# Patient Record
Sex: FEMALE | Race: WHITE | NOT HISPANIC OR LATINO | Employment: OTHER | URBAN - METROPOLITAN AREA
[De-identification: names, ages, dates, MRNs, and addresses within clinical notes are randomized per-mention and may not be internally consistent; named-entity substitution may affect disease eponyms.]

---

## 2017-04-27 ENCOUNTER — HOSPITAL ENCOUNTER (OUTPATIENT)
Dept: GENERAL RADIOLOGY | Facility: HOSPITAL | Age: 82
Discharge: HOME | End: 2017-04-27
Attending: FAMILY MEDICINE | Admitting: FAMILY MEDICINE

## 2017-05-02 ENCOUNTER — HOSPITAL ENCOUNTER (OUTPATIENT)
Dept: OTHER | Facility: HOSPITAL | Age: 82
Discharge: HOME | End: 2017-05-02
Attending: FAMILY MEDICINE | Admitting: FAMILY MEDICINE

## 2017-09-22 ENCOUNTER — HOSPITAL ENCOUNTER (OUTPATIENT)
Dept: GENERAL RADIOLOGY | Facility: HOSPITAL | Age: 82
Discharge: HOME | End: 2017-09-22
Attending: FAMILY MEDICINE | Admitting: FAMILY MEDICINE

## 2018-01-22 ENCOUNTER — HOSPITAL ENCOUNTER (OUTPATIENT)
Dept: CT IMAGING | Facility: HOSPITAL | Age: 83
Discharge: HOME | End: 2018-01-22
Attending: NURSE PRACTITIONER | Admitting: NURSE PRACTITIONER

## 2018-02-02 ENCOUNTER — HOSPITAL ENCOUNTER (OUTPATIENT)
Dept: PHYSICAL THERAPY | Facility: HOSPITAL | Age: 83
Setting detail: RECURRING SERIES
Discharge: HOME | End: 2018-03-02
Attending: FAMILY MEDICINE | Admitting: FAMILY MEDICINE

## 2019-08-30 ENCOUNTER — TRANSCRIBE ORDERS (OUTPATIENT)
Dept: ADMINISTRATIVE | Facility: HOSPITAL | Age: 84
End: 2019-08-30

## 2019-08-30 ENCOUNTER — LAB (OUTPATIENT)
Dept: LAB | Facility: HOSPITAL | Age: 84
End: 2019-08-30

## 2019-08-30 DIAGNOSIS — E87.5 HYPERPOTASSEMIA: Primary | ICD-10-CM

## 2019-08-30 DIAGNOSIS — E87.5 HYPERPOTASSEMIA: ICD-10-CM

## 2019-08-30 LAB
ANION GAP SERPL CALCULATED.3IONS-SCNC: 18.8 MMOL/L (ref 5–15)
BUN BLD-MCNC: 23 MG/DL (ref 8–20)
BUN/CREAT SERPL: 19.2 (ref 5.4–26.2)
CALCIUM SPEC-SCNC: 9.1 MG/DL (ref 8.9–10.3)
CHLORIDE SERPL-SCNC: 104 MMOL/L (ref 101–111)
CO2 SERPL-SCNC: 20 MMOL/L (ref 22–32)
CREAT BLD-MCNC: 1.2 MG/DL (ref 0.4–1)
GFR SERPL CREATININE-BSD FRML MDRD: 43 ML/MIN/1.73
GLUCOSE BLD-MCNC: 180 MG/DL (ref 65–99)
POTASSIUM BLD-SCNC: 4.8 MMOL/L (ref 3.6–5.1)
SODIUM BLD-SCNC: 138 MMOL/L (ref 136–144)

## 2019-08-30 PROCEDURE — 36415 COLL VENOUS BLD VENIPUNCTURE: CPT

## 2019-08-30 PROCEDURE — 80048 BASIC METABOLIC PNL TOTAL CA: CPT

## 2019-09-12 ENCOUNTER — TRANSCRIBE ORDERS (OUTPATIENT)
Dept: ADMINISTRATIVE | Facility: HOSPITAL | Age: 84
End: 2019-09-12

## 2019-09-12 DIAGNOSIS — Z12.31 SCREENING MAMMOGRAM, ENCOUNTER FOR: Primary | ICD-10-CM

## 2019-09-17 ENCOUNTER — HOSPITAL ENCOUNTER (OUTPATIENT)
Dept: MAMMOGRAPHY | Facility: HOSPITAL | Age: 84
Discharge: HOME | End: 2019-09-17
Admitting: FAMILY MEDICINE

## 2019-09-17 DIAGNOSIS — Z12.31 SCREENING MAMMOGRAM, ENCOUNTER FOR: ICD-10-CM

## 2019-09-17 PROCEDURE — 77067 SCR MAMMO BI INCL CAD: CPT

## 2019-09-17 PROCEDURE — 77063 BREAST TOMOSYNTHESIS BI: CPT

## 2020-08-31 ENCOUNTER — TRANSCRIBE ORDERS (OUTPATIENT)
Dept: ADMINISTRATIVE | Facility: HOSPITAL | Age: 85
End: 2020-08-31

## 2020-08-31 DIAGNOSIS — Z12.39 SCREENING BREAST EXAMINATION: Primary | ICD-10-CM

## 2020-09-16 ENCOUNTER — HOSPITAL ENCOUNTER (OUTPATIENT)
Dept: MAMMOGRAPHY | Facility: HOSPITAL | Age: 85
Discharge: HOME | End: 2020-09-16
Admitting: FAMILY MEDICINE

## 2020-09-16 DIAGNOSIS — Z12.39 SCREENING BREAST EXAMINATION: ICD-10-CM

## 2020-09-16 PROCEDURE — 77067 SCR MAMMO BI INCL CAD: CPT

## 2020-09-16 PROCEDURE — 77063 BREAST TOMOSYNTHESIS BI: CPT

## 2020-10-21 ENCOUNTER — APPOINTMENT (OUTPATIENT)
Dept: GENERAL RADIOLOGY | Facility: HOSPITAL | Age: 85
End: 2020-10-21

## 2020-10-21 ENCOUNTER — APPOINTMENT (OUTPATIENT)
Dept: CT IMAGING | Facility: HOSPITAL | Age: 85
End: 2020-10-21

## 2020-10-21 ENCOUNTER — HOSPITAL ENCOUNTER (OUTPATIENT)
Facility: HOSPITAL | Age: 85
Discharge: HOME | End: 2020-10-24
Attending: FAMILY MEDICINE | Admitting: INTERNAL MEDICINE
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
## 2020-10-21 DIAGNOSIS — R55 SYNCOPE AND COLLAPSE: Primary | ICD-10-CM

## 2020-10-21 DIAGNOSIS — R94.39 ABNORMAL NUCLEAR STRESS TEST: ICD-10-CM

## 2020-10-21 DIAGNOSIS — Z20.822 2019-NCOV NOT DETECTED: ICD-10-CM

## 2020-10-21 DIAGNOSIS — I44.1 ATRIOVENTRICULAR BLOCK, MOBITZ TYPE 1, WENCKEBACH: ICD-10-CM

## 2020-10-21 DIAGNOSIS — R41.0 CONFUSION: ICD-10-CM

## 2020-10-21 LAB
ABO GROUP BLD: NORMAL
ALBUMIN SERPL-MCNC: 4 G/DL (ref 3.5–5.2)
ALBUMIN/GLOB SERPL: 1.3 G/DL
ALP SERPL-CCNC: 94 U/L (ref 39–117)
ALT SERPL W P-5'-P-CCNC: 21 U/L (ref 1–33)
AMMONIA BLD-SCNC: 47 UMOL/L (ref 11–51)
ANION GAP SERPL CALCULATED.3IONS-SCNC: 15 MMOL/L (ref 5–15)
ARTERIAL PATENCY WRIST A: POSITIVE
AST SERPL-CCNC: 27 U/L (ref 1–32)
ATMOSPHERIC PRESS: ABNORMAL MM[HG]
BASE EXCESS BLDA CALC-SCNC: -4.8 MMOL/L (ref 0–3)
BASOPHILS # BLD AUTO: 0.1 10*3/MM3 (ref 0–0.2)
BASOPHILS NFR BLD AUTO: 1.1 % (ref 0–1.5)
BDY SITE: ABNORMAL
BILIRUB SERPL-MCNC: 0.3 MG/DL (ref 0–1.2)
BILIRUB UR QL STRIP: NEGATIVE
BLD GP AB SCN SERPL QL: NEGATIVE
BUN SERPL-MCNC: 27 MG/DL (ref 8–23)
BUN/CREAT SERPL: 20 (ref 7–25)
CALCIUM SPEC-SCNC: 9.3 MG/DL (ref 8.6–10.5)
CHLORIDE SERPL-SCNC: 105 MMOL/L (ref 98–107)
CK SERPL-CCNC: 168 U/L (ref 20–180)
CLARITY UR: ABNORMAL
CO2 BLDA-SCNC: 21.2 MMOL/L (ref 22–29)
CO2 SERPL-SCNC: 19 MMOL/L (ref 22–29)
COLOR UR: YELLOW
CREAT SERPL-MCNC: 1.35 MG/DL (ref 0.57–1)
DEPRECATED RDW RBC AUTO: 59.1 FL (ref 37–54)
EOSINOPHIL # BLD AUTO: 0.3 10*3/MM3 (ref 0–0.4)
EOSINOPHIL NFR BLD AUTO: 5.1 % (ref 0.3–6.2)
ERYTHROCYTE [DISTWIDTH] IN BLOOD BY AUTOMATED COUNT: 17.6 % (ref 12.3–15.4)
GFR SERPL CREATININE-BSD FRML MDRD: 37 ML/MIN/1.73
GFR SERPL CREATININE-BSD FRML MDRD: 45 ML/MIN/1.73
GLOBULIN UR ELPH-MCNC: 3.2 GM/DL
GLUCOSE BLDC GLUCOMTR-MCNC: 123 MG/DL (ref 70–105)
GLUCOSE SERPL-MCNC: 137 MG/DL (ref 65–99)
GLUCOSE UR STRIP-MCNC: NEGATIVE MG/DL
HCO3 BLDA-SCNC: 20.1 MMOL/L (ref 21–28)
HCT VFR BLD AUTO: 35.3 % (ref 34–46.6)
HEMODILUTION: NO
HGB BLD-MCNC: 11.3 G/DL (ref 12–15.9)
HGB UR QL STRIP.AUTO: NEGATIVE
HOLD SPECIMEN: NORMAL
HOLD SPECIMEN: NORMAL
INHALED O2 CONCENTRATION: 21 %
INR PPP: 0.99 (ref 0.93–1.1)
KETONES UR QL STRIP: NEGATIVE
LEUKOCYTE ESTERASE UR QL STRIP.AUTO: NEGATIVE
LYMPHOCYTES # BLD AUTO: 2.1 10*3/MM3 (ref 0.7–3.1)
LYMPHOCYTES NFR BLD AUTO: 36.8 % (ref 19.6–45.3)
MAGNESIUM SERPL-MCNC: 2.3 MG/DL (ref 1.6–2.4)
MCH RBC QN AUTO: 30.9 PG (ref 26.6–33)
MCHC RBC AUTO-ENTMCNC: 32.1 G/DL (ref 31.5–35.7)
MCV RBC AUTO: 96.4 FL (ref 79–97)
MODALITY: ABNORMAL
MONOCYTES # BLD AUTO: 0.5 10*3/MM3 (ref 0.1–0.9)
MONOCYTES NFR BLD AUTO: 9.3 % (ref 5–12)
NEUTROPHILS NFR BLD AUTO: 2.7 10*3/MM3 (ref 1.7–7)
NEUTROPHILS NFR BLD AUTO: 47.7 % (ref 42.7–76)
NITRITE UR QL STRIP: NEGATIVE
NRBC BLD AUTO-RTO: 0.1 /100 WBC (ref 0–0.2)
PCO2 BLDA: 35.5 MM HG (ref 35–48)
PH BLDA: 7.36 PH UNITS (ref 7.35–7.45)
PH UR STRIP.AUTO: 6 [PH] (ref 5–8)
PLATELET # BLD AUTO: 215 10*3/MM3 (ref 140–450)
PMV BLD AUTO: 7.3 FL (ref 6–12)
PO2 BLDA: 75.7 MM HG (ref 83–108)
POTASSIUM SERPL-SCNC: 4.8 MMOL/L (ref 3.5–5.2)
PROT SERPL-MCNC: 7.2 G/DL (ref 6–8.5)
PROT UR QL STRIP: NEGATIVE
PROTHROMBIN TIME: 10.9 SECONDS (ref 9.6–11.7)
RBC # BLD AUTO: 3.66 10*6/MM3 (ref 3.77–5.28)
RH BLD: POSITIVE
SAO2 % BLDCOA: 94.6 % (ref 94–98)
SARS-COV-2 RNA PNL SPEC NAA+PROBE: NOT DETECTED
SODIUM SERPL-SCNC: 139 MMOL/L (ref 136–145)
SP GR UR STRIP: 1.02 (ref 1–1.03)
T&S EXPIRATION DATE: NORMAL
TROPONIN T SERPL-MCNC: <0.01 NG/ML (ref 0–0.03)
UROBILINOGEN UR QL STRIP: ABNORMAL
WBC # BLD AUTO: 5.8 10*3/MM3 (ref 3.4–10.8)
WHOLE BLOOD HOLD SPECIMEN: NORMAL
WHOLE BLOOD HOLD SPECIMEN: NORMAL

## 2020-10-21 PROCEDURE — 86900 BLOOD TYPING SEROLOGIC ABO: CPT | Performed by: NURSE PRACTITIONER

## 2020-10-21 PROCEDURE — 82550 ASSAY OF CK (CPK): CPT | Performed by: NURSE PRACTITIONER

## 2020-10-21 PROCEDURE — 36600 WITHDRAWAL OF ARTERIAL BLOOD: CPT

## 2020-10-21 PROCEDURE — 99285 EMERGENCY DEPT VISIT HI MDM: CPT

## 2020-10-21 PROCEDURE — 70498 CT ANGIOGRAPHY NECK: CPT

## 2020-10-21 PROCEDURE — 82962 GLUCOSE BLOOD TEST: CPT

## 2020-10-21 PROCEDURE — 83735 ASSAY OF MAGNESIUM: CPT | Performed by: NURSE PRACTITIONER

## 2020-10-21 PROCEDURE — 86901 BLOOD TYPING SEROLOGIC RH(D): CPT

## 2020-10-21 PROCEDURE — 81003 URINALYSIS AUTO W/O SCOPE: CPT | Performed by: NURSE PRACTITIONER

## 2020-10-21 PROCEDURE — 85610 PROTHROMBIN TIME: CPT | Performed by: NURSE PRACTITIONER

## 2020-10-21 PROCEDURE — 86850 RBC ANTIBODY SCREEN: CPT | Performed by: NURSE PRACTITIONER

## 2020-10-21 PROCEDURE — 83036 HEMOGLOBIN GLYCOSYLATED A1C: CPT | Performed by: FAMILY MEDICINE

## 2020-10-21 PROCEDURE — 71045 X-RAY EXAM CHEST 1 VIEW: CPT

## 2020-10-21 PROCEDURE — 70450 CT HEAD/BRAIN W/O DYE: CPT

## 2020-10-21 PROCEDURE — 80053 COMPREHEN METABOLIC PANEL: CPT | Performed by: NURSE PRACTITIONER

## 2020-10-21 PROCEDURE — 86901 BLOOD TYPING SEROLOGIC RH(D): CPT | Performed by: NURSE PRACTITIONER

## 2020-10-21 PROCEDURE — 86900 BLOOD TYPING SEROLOGIC ABO: CPT

## 2020-10-21 PROCEDURE — P9612 CATHETERIZE FOR URINE SPEC: HCPCS

## 2020-10-21 PROCEDURE — 70496 CT ANGIOGRAPHY HEAD: CPT

## 2020-10-21 PROCEDURE — 0 IOPAMIDOL PER 1 ML: Performed by: NURSE PRACTITIONER

## 2020-10-21 PROCEDURE — 84484 ASSAY OF TROPONIN QUANT: CPT | Performed by: NURSE PRACTITIONER

## 2020-10-21 PROCEDURE — 87635 SARS-COV-2 COVID-19 AMP PRB: CPT | Performed by: NURSE PRACTITIONER

## 2020-10-21 PROCEDURE — 93005 ELECTROCARDIOGRAM TRACING: CPT | Performed by: NURSE PRACTITIONER

## 2020-10-21 PROCEDURE — 85025 COMPLETE CBC W/AUTO DIFF WBC: CPT | Performed by: NURSE PRACTITIONER

## 2020-10-21 PROCEDURE — 82140 ASSAY OF AMMONIA: CPT | Performed by: NURSE PRACTITIONER

## 2020-10-21 PROCEDURE — 82803 BLOOD GASES ANY COMBINATION: CPT

## 2020-10-21 RX ORDER — ASPIRIN 81 MG/1
324 TABLET, CHEWABLE ORAL ONCE
Status: COMPLETED | OUTPATIENT
Start: 2020-10-21 | End: 2020-10-22

## 2020-10-21 RX ORDER — SODIUM CHLORIDE 0.9 % (FLUSH) 0.9 %
10 SYRINGE (ML) INJECTION AS NEEDED
Status: DISCONTINUED | OUTPATIENT
Start: 2020-10-21 | End: 2020-10-24 | Stop reason: HOSPADM

## 2020-10-21 RX ADMIN — IOPAMIDOL 100 ML: 755 INJECTION, SOLUTION INTRAVENOUS at 21:20

## 2020-10-22 ENCOUNTER — APPOINTMENT (OUTPATIENT)
Dept: CARDIOLOGY | Facility: HOSPITAL | Age: 85
End: 2020-10-22

## 2020-10-22 ENCOUNTER — APPOINTMENT (OUTPATIENT)
Dept: NUCLEAR MEDICINE | Facility: HOSPITAL | Age: 85
End: 2020-10-22

## 2020-10-22 PROBLEM — E11.40 TYPE 2 DIABETES MELLITUS WITH DIABETIC NEUROPATHY, WITH LONG-TERM CURRENT USE OF INSULIN: Chronic | Status: ACTIVE | Noted: 2020-10-22
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
## 2020-10-22 PROBLEM — I12.9 HYPERTENSION ASSOCIATED WITH STAGE 3 CHRONIC KIDNEY DISEASE DUE TO TYPE 2 DIABETES MELLITUS: Chronic | Status: ACTIVE | Noted: 2020-10-22

## 2020-10-22 PROBLEM — E11.22 HYPERTENSION ASSOCIATED WITH STAGE 3 CHRONIC KIDNEY DISEASE DUE TO TYPE 2 DIABETES MELLITUS: Chronic | Status: ACTIVE | Noted: 2020-10-22

## 2020-10-22 PROBLEM — Z79.4 TYPE 2 DIABETES MELLITUS WITH DIABETIC NEUROPATHY, WITH LONG-TERM CURRENT USE OF INSULIN: Chronic | Status: ACTIVE | Noted: 2020-10-22

## 2020-10-22 PROBLEM — I25.10 ATHEROSCLEROSIS OF NATIVE CORONARY ARTERY OF NATIVE HEART WITHOUT ANGINA PECTORIS: Chronic | Status: ACTIVE | Noted: 2020-10-22

## 2020-10-22 PROBLEM — R94.39 ABNORMAL NUCLEAR STRESS TEST: Status: ACTIVE | Noted: 2020-10-21

## 2020-10-22 PROBLEM — N18.32 STAGE 3B CHRONIC KIDNEY DISEASE (HCC): Chronic | Status: ACTIVE | Noted: 2020-10-22

## 2020-10-22 PROBLEM — E66.01 MORBID OBESITY (HCC): Chronic | Status: ACTIVE | Noted: 2020-10-22

## 2020-10-22 PROBLEM — R55 SYNCOPE AND COLLAPSE: Status: ACTIVE | Noted: 2020-10-22

## 2020-10-22 PROBLEM — Z79.4 TYPE 2 DIABETES MELLITUS WITH DIABETIC NEUROPATHY, WITH LONG-TERM CURRENT USE OF INSULIN (HCC): Chronic | Status: ACTIVE | Noted: 2020-10-22

## 2020-10-22 PROBLEM — E11.69 DM TYPE 2 WITH DIABETIC DYSLIPIDEMIA: Status: ACTIVE | Noted: 2020-10-22

## 2020-10-22 PROBLEM — E78.5 DM TYPE 2 WITH DIABETIC DYSLIPIDEMIA: Status: ACTIVE | Noted: 2020-10-22

## 2020-10-22 PROBLEM — N18.30 HYPERTENSION ASSOCIATED WITH STAGE 3 CHRONIC KIDNEY DISEASE DUE TO TYPE 2 DIABETES MELLITUS (HCC): Chronic | Status: ACTIVE | Noted: 2020-10-22

## 2020-10-22 PROBLEM — E26.1 SECONDARY HYPERALDOSTERONISM: Chronic | Status: ACTIVE | Noted: 2020-10-22

## 2020-10-22 PROBLEM — I44.1 ATRIOVENTRICULAR BLOCK, MOBITZ TYPE 1, WENCKEBACH: Status: ACTIVE | Noted: 2020-10-21

## 2020-10-22 LAB
BH CV ECHO MEAS - ACS: 1.6 CM
BH CV ECHO MEAS - AO MAX PG (FULL): -0.77 MMHG
BH CV ECHO MEAS - AO MAX PG: 7.4 MMHG
BH CV ECHO MEAS - AO MEAN PG (FULL): -0.97 MMHG
BH CV ECHO MEAS - AO MEAN PG: 3.6 MMHG
BH CV ECHO MEAS - AO ROOT AREA (BSA CORRECTED): 1.7
BH CV ECHO MEAS - AO ROOT AREA: 9.7 CM^2
BH CV ECHO MEAS - AO ROOT DIAM: 3.5 CM
BH CV ECHO MEAS - AO V2 MAX: 136 CM/SEC
BH CV ECHO MEAS - AO V2 MEAN: 90.3 CM/SEC
BH CV ECHO MEAS - AO V2 VTI: 27.1 CM
BH CV ECHO MEAS - AORTIC HR: 88.1 BPM
BH CV ECHO MEAS - AORTIC R-R: 0.68 SEC
BH CV ECHO MEAS - AVA(I,A): 3.8 CM^2
BH CV ECHO MEAS - AVA(I,D): 3.8 CM^2
BH CV ECHO MEAS - AVA(V,A): 3.8 CM^2
BH CV ECHO MEAS - AVA(V,D): 3.8 CM^2
BH CV ECHO MEAS - BSA(HAYCOCK): 2.2 M^2
BH CV ECHO MEAS - BSA: 2 M^2
BH CV ECHO MEAS - BZI_BMI: 44 KILOGRAMS/M^2
BH CV ECHO MEAS - BZI_METRIC_HEIGHT: 154.9 CM
BH CV ECHO MEAS - BZI_METRIC_WEIGHT: 105.7 KG
BH CV ECHO MEAS - CI(AO): 11.5 L/MIN/M^2
BH CV ECHO MEAS - CI(LVOT): 4.5 L/MIN/M^2
BH CV ECHO MEAS - CO(AO): 23.2 L/MIN
BH CV ECHO MEAS - CO(LVOT): 9 L/MIN
BH CV ECHO MEAS - EDV(CUBED): 172 ML
BH CV ECHO MEAS - EDV(MOD-SP4): 77.2 ML
BH CV ECHO MEAS - EDV(TEICH): 151.3 ML
BH CV ECHO MEAS - EF(CUBED): 58.3 %
BH CV ECHO MEAS - EF(MOD-BP): 50 %
BH CV ECHO MEAS - EF(MOD-SP4): 50.1 %
BH CV ECHO MEAS - EF(TEICH): 49.3 %
BH CV ECHO MEAS - ESV(CUBED): 71.8 ML
BH CV ECHO MEAS - ESV(MOD-SP4): 38.5 ML
BH CV ECHO MEAS - ESV(TEICH): 76.6 ML
BH CV ECHO MEAS - FS: 25.3 %
BH CV ECHO MEAS - IVS/LVPW: 0.82
BH CV ECHO MEAS - IVSD: 0.95 CM
BH CV ECHO MEAS - LA DIMENSION(2D): 4 CM
BH CV ECHO MEAS - LV DIASTOLIC VOL/BSA (35-75): 38.3 ML/M^2
BH CV ECHO MEAS - LV MASS(C)D: 231 GRAMS
BH CV ECHO MEAS - LV MASS(C)DI: 114.6 GRAMS/M^2
BH CV ECHO MEAS - LV MAX PG: 8.2 MMHG
BH CV ECHO MEAS - LV MEAN PG: 4.6 MMHG
BH CV ECHO MEAS - LV SYSTOLIC VOL/BSA (12-30): 19.1 ML/M^2
BH CV ECHO MEAS - LV V1 MAX: 142.9 CM/SEC
BH CV ECHO MEAS - LV V1 MEAN: 99.2 CM/SEC
BH CV ECHO MEAS - LV V1 VTI: 27.9 CM
BH CV ECHO MEAS - LVIDD: 5.6 CM
BH CV ECHO MEAS - LVIDS: 4.2 CM
BH CV ECHO MEAS - LVOT AREA: 3.6 CM^2
BH CV ECHO MEAS - LVOT DIAM: 2.2 CM
BH CV ECHO MEAS - LVPWD: 1.2 CM
BH CV ECHO MEAS - MV A MAX VEL: 95.6 CM/SEC
BH CV ECHO MEAS - MV DEC SLOPE: 259.6 CM/SEC^2
BH CV ECHO MEAS - MV DEC TIME: 0.22 SEC
BH CV ECHO MEAS - MV E MAX VEL: 56.3 CM/SEC
BH CV ECHO MEAS - MV E/A: 0.59
BH CV ECHO MEAS - MV MAX PG: 5 MMHG
BH CV ECHO MEAS - MV MEAN PG: 2.6 MMHG
BH CV ECHO MEAS - MV V2 MAX: 111.3 CM/SEC
BH CV ECHO MEAS - MV V2 MEAN: 77.1 CM/SEC
BH CV ECHO MEAS - MV V2 VTI: 20.5 CM
BH CV ECHO MEAS - MVA(VTI): 5 CM^2
BH CV ECHO MEAS - PA MAX PG (FULL): 2.1 MMHG
BH CV ECHO MEAS - PA MAX PG: 6.5 MMHG
BH CV ECHO MEAS - PA V2 MAX: 127.5 CM/SEC
BH CV ECHO MEAS - PULM A REVS DUR: 0.13 SEC
BH CV ECHO MEAS - PULM A REVS VEL: 36.8 CM/SEC
BH CV ECHO MEAS - PULM DIAS VEL: 47 CM/SEC
BH CV ECHO MEAS - PULM S/D: 1.4
BH CV ECHO MEAS - PULM SYS VEL: 63.6 CM/SEC
BH CV ECHO MEAS - PVA(V,A): 3.4 CM^2
BH CV ECHO MEAS - PVA(V,D): 3.4 CM^2
BH CV ECHO MEAS - QP/QS: 0.83
BH CV ECHO MEAS - RV MAX PG: 4.5 MMHG
BH CV ECHO MEAS - RV MEAN PG: 2.5 MMHG
BH CV ECHO MEAS - RV V1 MAX: 105.6 CM/SEC
BH CV ECHO MEAS - RV V1 MEAN: 75.3 CM/SEC
BH CV ECHO MEAS - RV V1 VTI: 20.3 CM
BH CV ECHO MEAS - RVDD: 2.6 CM
BH CV ECHO MEAS - RVOT AREA: 4.1 CM^2
BH CV ECHO MEAS - RVOT DIAM: 2.3 CM
BH CV ECHO MEAS - SI(AO): 130.8 ML/M^2
BH CV ECHO MEAS - SI(CUBED): 49.8 ML/M^2
BH CV ECHO MEAS - SI(LVOT): 50.6 ML/M^2
BH CV ECHO MEAS - SI(MOD-SP4): 19.2 ML/M^2
BH CV ECHO MEAS - SI(TEICH): 37 ML/M^2
BH CV ECHO MEAS - SV(AO): 263.6 ML
BH CV ECHO MEAS - SV(CUBED): 100.3 ML
BH CV ECHO MEAS - SV(LVOT): 102 ML
BH CV ECHO MEAS - SV(MOD-SP4): 38.7 ML
BH CV ECHO MEAS - SV(RVOT): 84.3 ML
BH CV ECHO MEAS - SV(TEICH): 74.6 ML
BH CV NUCLEAR PRIOR STUDY: 3
BH CV STRESS BP STAGE 1: NORMAL
BH CV STRESS BP STAGE 2: NORMAL
BH CV STRESS COMMENTS STAGE 1: NORMAL
BH CV STRESS COMMENTS STAGE 2: NORMAL
BH CV STRESS DOSE REGADENOSON STAGE 1: 0.4
BH CV STRESS DURATION MIN STAGE 1: 0
BH CV STRESS DURATION MIN STAGE 2: 4
BH CV STRESS DURATION SEC STAGE 1: 10
BH CV STRESS DURATION SEC STAGE 2: 0
BH CV STRESS HR STAGE 1: 99
BH CV STRESS HR STAGE 2: 96
BH CV STRESS PROTOCOL 1: NORMAL
BH CV STRESS RECOVERY BP: NORMAL MMHG
BH CV STRESS RECOVERY HR: 96 BPM
BH CV STRESS STAGE 1: 1
BH CV STRESS STAGE 2: 2
GLUCOSE BLDC GLUCOMTR-MCNC: 132 MG/DL (ref 70–105)
GLUCOSE BLDC GLUCOMTR-MCNC: 157 MG/DL (ref 70–105)
GLUCOSE BLDC GLUCOMTR-MCNC: 162 MG/DL (ref 70–105)
HBA1C MFR BLD: 6.2 % (ref 3.5–5.6)
LV EF 2D ECHO EST: 60 %
MAGNESIUM SERPL-MCNC: 1.9 MG/DL (ref 1.6–2.4)
MAXIMAL PREDICTED HEART RATE: 137 BPM
MAXIMAL PREDICTED HEART RATE: 137 BPM
PERCENT MAX PREDICTED HR: 72.26 %
STRESS BASELINE BP: NORMAL MMHG
STRESS BASELINE HR: 92 BPM
STRESS PERCENT HR: 85 %
STRESS POST PEAK BP: NORMAL MMHG
STRESS POST PEAK HR: 99 BPM
STRESS TARGET HR: 116 BPM
STRESS TARGET HR: 116 BPM
TROPONIN T SERPL-MCNC: <0.01 NG/ML (ref 0–0.03)

## 2020-10-22 PROCEDURE — A9270 NON-COVERED ITEM OR SERVICE: HCPCS | Performed by: INTERNAL MEDICINE

## 2020-10-22 PROCEDURE — G0378 HOSPITAL OBSERVATION PER HR: HCPCS

## 2020-10-22 PROCEDURE — 84484 ASSAY OF TROPONIN QUANT: CPT | Performed by: FAMILY MEDICINE

## 2020-10-22 PROCEDURE — 93306 TTE W/DOPPLER COMPLETE: CPT

## 2020-10-22 PROCEDURE — 82962 GLUCOSE BLOOD TEST: CPT

## 2020-10-22 PROCEDURE — 25010000002 REGADENOSON 0.4 MG/5ML SOLUTION: Performed by: FAMILY MEDICINE

## 2020-10-22 PROCEDURE — 0 TECHNETIUM SESTAMIBI: Performed by: FAMILY MEDICINE

## 2020-10-22 PROCEDURE — 96374 THER/PROPH/DIAG INJ IV PUSH: CPT

## 2020-10-22 PROCEDURE — 63710000001 ATORVASTATIN 10 MG TABLET: Performed by: INTERNAL MEDICINE

## 2020-10-22 PROCEDURE — A9500 TC99M SESTAMIBI: HCPCS | Performed by: FAMILY MEDICINE

## 2020-10-22 PROCEDURE — 63710000001 AMLODIPINE 5 MG TABLET: Performed by: INTERNAL MEDICINE

## 2020-10-22 PROCEDURE — 93017 CV STRESS TEST TRACING ONLY: CPT

## 2020-10-22 PROCEDURE — 96376 TX/PRO/DX INJ SAME DRUG ADON: CPT

## 2020-10-22 PROCEDURE — 83735 ASSAY OF MAGNESIUM: CPT | Performed by: INTERNAL MEDICINE

## 2020-10-22 PROCEDURE — 78452 HT MUSCLE IMAGE SPECT MULT: CPT

## 2020-10-22 PROCEDURE — 96361 HYDRATE IV INFUSION ADD-ON: CPT

## 2020-10-22 PROCEDURE — 93018 CV STRESS TEST I&R ONLY: CPT | Performed by: NURSE PRACTITIONER

## 2020-10-22 PROCEDURE — 63710000001 INSULIN LISPRO (HUMAN) PER 5 UNITS: Performed by: INTERNAL MEDICINE

## 2020-10-22 PROCEDURE — 63710000001 SPIRONOLACTONE 25 MG TABLET: Performed by: INTERNAL MEDICINE

## 2020-10-22 PROCEDURE — 78452 HT MUSCLE IMAGE SPECT MULT: CPT | Performed by: INTERNAL MEDICINE

## 2020-10-22 PROCEDURE — 63710000001 METOPROLOL TARTRATE 25 MG TABLET: Performed by: INTERNAL MEDICINE

## 2020-10-22 PROCEDURE — 63710000001 PANTOPRAZOLE 40 MG TABLET DELAYED-RELEASE: Performed by: INTERNAL MEDICINE

## 2020-10-22 PROCEDURE — A9270 NON-COVERED ITEM OR SERVICE: HCPCS | Performed by: NURSE PRACTITIONER

## 2020-10-22 PROCEDURE — 93306 TTE W/DOPPLER COMPLETE: CPT | Performed by: INTERNAL MEDICINE

## 2020-10-22 PROCEDURE — 97165 OT EVAL LOW COMPLEX 30 MIN: CPT

## 2020-10-22 PROCEDURE — 63710000001 INSULIN LISPRO (HUMAN) PER 5 UNITS: Performed by: FAMILY MEDICINE

## 2020-10-22 PROCEDURE — 63710000001 ACETYLCYSTEINE 600 MG CAPSULE: Performed by: INTERNAL MEDICINE

## 2020-10-22 PROCEDURE — 63710000001 LOSARTAN 50 MG TABLET: Performed by: INTERNAL MEDICINE

## 2020-10-22 PROCEDURE — 63710000001 ASPIRIN 81 MG CHEWABLE TABLET: Performed by: NURSE PRACTITIONER

## 2020-10-22 PROCEDURE — 99205 OFFICE O/P NEW HI 60 MIN: CPT | Performed by: INTERNAL MEDICINE

## 2020-10-22 RX ORDER — GABAPENTIN 300 MG/1
300 CAPSULE ORAL 2 TIMES DAILY
COMMUNITY
End: 2022-07-03 | Stop reason: HOSPADM

## 2020-10-22 RX ORDER — INSULIN LISPRO 100 [IU]/ML
0-7 INJECTION, SOLUTION INTRAVENOUS; SUBCUTANEOUS AS NEEDED
Status: DISCONTINUED | OUTPATIENT
Start: 2020-10-22 | End: 2020-10-24 | Stop reason: HOSPADM

## 2020-10-22 RX ORDER — ATORVASTATIN CALCIUM 10 MG/1
10 TABLET, FILM COATED ORAL DAILY
COMMUNITY
End: 2021-11-22 | Stop reason: SDUPTHER

## 2020-10-22 RX ORDER — AMLODIPINE BESYLATE 5 MG/1
5 TABLET ORAL ONCE
Status: COMPLETED | OUTPATIENT
Start: 2020-10-23 | End: 2020-10-23

## 2020-10-22 RX ORDER — SODIUM CHLORIDE 9 MG/ML
75 INJECTION, SOLUTION INTRAVENOUS CONTINUOUS
Status: DISPENSED | OUTPATIENT
Start: 2020-10-22 | End: 2020-10-23

## 2020-10-22 RX ORDER — FUROSEMIDE 20 MG/1
20 TABLET ORAL DAILY
Status: DISCONTINUED | OUTPATIENT
Start: 2020-10-22 | End: 2020-10-22

## 2020-10-22 RX ORDER — SPIRONOLACTONE 25 MG/1
25 TABLET ORAL DAILY
COMMUNITY
End: 2020-12-07

## 2020-10-22 RX ORDER — METOPROLOL TARTRATE 50 MG/1
50 TABLET, FILM COATED ORAL 3 TIMES DAILY
Status: DISCONTINUED | OUTPATIENT
Start: 2020-10-22 | End: 2020-10-22

## 2020-10-22 RX ORDER — SODIUM CHLORIDE 0.9 % (FLUSH) 0.9 %
10 SYRINGE (ML) INJECTION AS NEEDED
Status: CANCELLED | OUTPATIENT
Start: 2020-10-22

## 2020-10-22 RX ORDER — LOSARTAN POTASSIUM 50 MG/1
100 TABLET ORAL DAILY
Status: DISCONTINUED | OUTPATIENT
Start: 2020-10-22 | End: 2020-10-24 | Stop reason: HOSPADM

## 2020-10-22 RX ORDER — LABETALOL HYDROCHLORIDE 5 MG/ML
10 INJECTION, SOLUTION INTRAVENOUS EVERY 6 HOURS PRN
Status: DISCONTINUED | OUTPATIENT
Start: 2020-10-22 | End: 2020-10-24 | Stop reason: HOSPADM

## 2020-10-22 RX ORDER — SPIRONOLACTONE 25 MG/1
25 TABLET ORAL DAILY
Status: DISCONTINUED | OUTPATIENT
Start: 2020-10-22 | End: 2020-10-24 | Stop reason: HOSPADM

## 2020-10-22 RX ORDER — OMEPRAZOLE 40 MG/1
40 CAPSULE, DELAYED RELEASE ORAL EVERY MORNING
COMMUNITY
End: 2022-08-17 | Stop reason: SDUPTHER

## 2020-10-22 RX ORDER — NICOTINE POLACRILEX 4 MG
15 LOZENGE BUCCAL
Status: DISCONTINUED | OUTPATIENT
Start: 2020-10-22 | End: 2020-10-24 | Stop reason: HOSPADM

## 2020-10-22 RX ORDER — AMLODIPINE BESYLATE 5 MG/1
5 TABLET ORAL ONCE
Status: COMPLETED | OUTPATIENT
Start: 2020-10-22 | End: 2020-10-22

## 2020-10-22 RX ORDER — VALSARTAN 160 MG/1
160 TABLET ORAL DAILY
COMMUNITY
End: 2020-12-07

## 2020-10-22 RX ORDER — METOPROLOL TARTRATE 50 MG/1
50 TABLET, FILM COATED ORAL 3 TIMES DAILY
COMMUNITY
End: 2020-10-24 | Stop reason: HOSPADM

## 2020-10-22 RX ORDER — LABETALOL HYDROCHLORIDE 5 MG/ML
10 INJECTION, SOLUTION INTRAVENOUS ONCE
Status: DISCONTINUED | OUTPATIENT
Start: 2020-10-22 | End: 2020-10-22

## 2020-10-22 RX ORDER — SODIUM CHLORIDE 0.9 % (FLUSH) 0.9 %
3 SYRINGE (ML) INJECTION EVERY 12 HOURS SCHEDULED
Status: CANCELLED | OUTPATIENT
Start: 2020-10-22

## 2020-10-22 RX ORDER — TOPIRAMATE 25 MG/1
12.5 TABLET ORAL 2 TIMES DAILY
COMMUNITY
End: 2022-03-23 | Stop reason: SDUPTHER

## 2020-10-22 RX ORDER — INSULIN LISPRO 100 [IU]/ML
0-7 INJECTION, SOLUTION INTRAVENOUS; SUBCUTANEOUS AS NEEDED
Status: DISCONTINUED | OUTPATIENT
Start: 2020-10-22 | End: 2020-10-22

## 2020-10-22 RX ORDER — INSULIN LISPRO 100 [IU]/ML
0-7 INJECTION, SOLUTION INTRAVENOUS; SUBCUTANEOUS
Status: DISCONTINUED | OUTPATIENT
Start: 2020-10-23 | End: 2020-10-22

## 2020-10-22 RX ORDER — FUROSEMIDE 20 MG/1
20 TABLET ORAL DAILY
COMMUNITY
End: 2021-06-02 | Stop reason: SDUPTHER

## 2020-10-22 RX ORDER — DEXTROSE MONOHYDRATE 25 G/50ML
25 INJECTION, SOLUTION INTRAVENOUS
Status: DISCONTINUED | OUTPATIENT
Start: 2020-10-22 | End: 2020-10-24 | Stop reason: HOSPADM

## 2020-10-22 RX ORDER — INSULIN LISPRO 100 [IU]/ML
0-7 INJECTION, SOLUTION INTRAVENOUS; SUBCUTANEOUS
Status: DISCONTINUED | OUTPATIENT
Start: 2020-10-22 | End: 2020-10-24 | Stop reason: HOSPADM

## 2020-10-22 RX ORDER — TRAZODONE HYDROCHLORIDE 50 MG/1
50 TABLET ORAL NIGHTLY
COMMUNITY
End: 2020-12-07

## 2020-10-22 RX ORDER — METOPROLOL TARTRATE 50 MG/1
50 TABLET, FILM COATED ORAL EVERY 12 HOURS SCHEDULED
Status: DISCONTINUED | OUTPATIENT
Start: 2020-10-22 | End: 2020-10-22

## 2020-10-22 RX ORDER — ATORVASTATIN CALCIUM 10 MG/1
10 TABLET, FILM COATED ORAL NIGHTLY
Status: DISCONTINUED | OUTPATIENT
Start: 2020-10-22 | End: 2020-10-24 | Stop reason: HOSPADM

## 2020-10-22 RX ORDER — PANTOPRAZOLE SODIUM 40 MG/1
40 TABLET, DELAYED RELEASE ORAL EVERY MORNING
Status: DISCONTINUED | OUTPATIENT
Start: 2020-10-22 | End: 2020-10-24 | Stop reason: HOSPADM

## 2020-10-22 RX ADMIN — SODIUM CHLORIDE 75 ML/HR: 9 INJECTION, SOLUTION INTRAVENOUS at 16:06

## 2020-10-22 RX ADMIN — Medication 10 ML: at 21:03

## 2020-10-22 RX ADMIN — Medication 600 MG: at 21:23

## 2020-10-22 RX ADMIN — INSULIN LISPRO 2 UNITS: 100 INJECTION, SOLUTION INTRAVENOUS; SUBCUTANEOUS at 21:23

## 2020-10-22 RX ADMIN — REGADENOSON 0.4 MG: 0.08 INJECTION, SOLUTION INTRAVENOUS at 11:30

## 2020-10-22 RX ADMIN — ASPIRIN 81 MG 324 MG: 81 TABLET ORAL at 02:55

## 2020-10-22 RX ADMIN — TECHNETIUM TC 99M SESTAMIBI 1 DOSE: 1 INJECTION INTRAVENOUS at 09:48

## 2020-10-22 RX ADMIN — LOSARTAN POTASSIUM 100 MG: 50 TABLET, FILM COATED ORAL at 08:23

## 2020-10-22 RX ADMIN — METOPROLOL TARTRATE 25 MG: 25 TABLET, FILM COATED ORAL at 21:03

## 2020-10-22 RX ADMIN — PANTOPRAZOLE SODIUM 40 MG: 40 TABLET, DELAYED RELEASE ORAL at 08:23

## 2020-10-22 RX ADMIN — ATORVASTATIN CALCIUM 10 MG: 10 TABLET, FILM COATED ORAL at 21:03

## 2020-10-22 RX ADMIN — LABETALOL 20 MG/4 ML (5 MG/ML) INTRAVENOUS SYRINGE 10 MG: at 23:12

## 2020-10-22 RX ADMIN — SPIRONOLACTONE 25 MG: 25 TABLET ORAL at 08:23

## 2020-10-22 RX ADMIN — AMLODIPINE BESYLATE 5 MG: 5 TABLET ORAL at 16:06

## 2020-10-22 RX ADMIN — LABETALOL 20 MG/4 ML (5 MG/ML) INTRAVENOUS SYRINGE 10 MG: at 13:32

## 2020-10-22 RX ADMIN — TECHNETIUM TC 99M SESTAMIBI 1 DOSE: 1 INJECTION INTRAVENOUS at 11:30

## 2020-10-22 RX ADMIN — Medication 600 MG: at 15:17

## 2020-10-22 NOTE — CONSULTS
Referring Provider: Gregory Pinedo MD  Reason for Consultation:  Syncope  Second-degree Mobitz type I AV block  Hypertension  Diabetes    Patient Care Team:  Gregory Pinedo MD as PCP - General (Family Medicine)    Chief complaint  Syncope    Subjective .     History of present illness:  Aracelis Vargas is a 85 y.o. female who presents with history of being in usual state of health with history of hypertension diabetes etc was admitted with history of possible syncope.  Patient apparently was in the living room and feeling fine except she was somewhat tired that morning and the next thing she knew she woke up from the floor.  She quickly lost her consciousness.  EMS reported  calling EMS because he was outside and even he came in she was lying down on the floor.  No other history is available.  Patient does not recall any other premonitory symptoms or post syncopal symptoms.  No seizure activity.  No loss of bowel control or bladder control.  No other history is available at this time.  Patient was seen in the ER and was noted to have second-degree Mobitz type I AV block with a narrow QRS complex.  Patient is feeling somewhat weak.  No other associated aggravating or alleviating factors.  Cardiology consultation was requested..         ROS      Since I have last seen, the patient has been without any chest discomfort ,shortness of breath, palpitations, dizziness or syncope.  Denies having any headache ,abdominal pain ,nausea, vomiting , diarrhea constipation, loss of weight or loss of appetite.  Denies having any excessive bruising ,hematuria or blood in the stool.    Review of all systems negative except as indicated      History  Past Medical History:   Diagnosis Date   • Atherosclerosis of native coronary artery of native heart without angina pectoris 10/22/2020   • Hypertension associated with stage 3 chronic kidney disease due to type 2 diabetes mellitus (CMS/[facility]) 10/22/2020   • Morbid obesity  (CMS/[facility]) 10/22/2020   • Secondary hyperaldosteronism (CMS/HCC) 10/22/2020   • Stage 3b chronic kidney disease 10/22/2020   • Type 2 diabetes mellitus with diabetic neuropathy, with long-term current use of insulin (CMS/HCC) 10/22/2020   • Type 2 diabetes mellitus with diabetic neuropathy, with long-term current use of insulin (CMS/HCC) 10/22/2020       Past Surgical History:   Procedure Laterality Date   • JOINT REPLACEMENT Right     Hip   • JOINT REPLACEMENT Left     hip   • JOINT REPLACEMENT Left     hip (for second time)   • JOINT REPLACEMENT Right     knee       Family History   Problem Relation Age of Onset   • Heart disease Mother        Social History     Tobacco Use   • Smoking status: Never Smoker   • Smokeless tobacco: Never Used   Substance Use Topics   • Alcohol use: Not Currently   • Drug use: Never        Medications Prior to Admission   Medication Sig Dispense Refill Last Dose   • atorvastatin (LIPITOR) 10 MG tablet Take 10 mg by mouth Daily.      • diclofenac (VOLTAREN) 1 % gel gel Apply 4 g topically to the appropriate area as directed 4 (Four) Times a Day As Needed.      • furosemide (LASIX) 20 MG tablet Take 20 mg by mouth Daily.      • gabapentin (NEURONTIN) 300 MG capsule Take 300 mg by mouth 2 (two) times a day.      • insulin NPH-insulin regular (humuLIN 70/30,novoLIN 70/30) (70-30) 100 UNIT/ML injection Inject  under the skin into the appropriate area as directed 2 (Two) Times a Day With Meals. Amount of units unknown      • metFORMIN (GLUCOPHAGE) 1000 MG tablet Take 1,000 mg by mouth 2 (Two) Times a Day With Meals.      • metoprolol tartrate (LOPRESSOR) 50 MG tablet Take 50 mg by mouth 3 (Three) Times a Day.      • omeprazole (priLOSEC) 40 MG capsule Take 40 mg by mouth Daily.      • spironolactone (ALDACTONE) 25 MG tablet Take 25 mg by mouth Daily.      • topiramate (TOPAMAX) 25 MG tablet Take 25 mg by mouth Daily.      • traZODone (DESYREL) 50 MG tablet Take 50 mg by mouth Every Night.     "  • valsartan (DIOVAN) 160 MG tablet Take 160 mg by mouth Daily.            Patient has no known allergies.    Scheduled Meds:atorvastatin, 10 mg, Oral, Nightly  losartan, 100 mg, Oral, Daily  metoprolol tartrate, 50 mg, Oral, Q12H  pantoprazole, 40 mg, Oral, QAM  spironolactone, 25 mg, Oral, Daily      Continuous Infusions:   PRN Meds:.labetalol  •  sodium chloride    Objective     VITAL SIGNS  Vitals:    10/22/20 0202 10/22/20 0343 10/22/20 0603 10/22/20 0805   BP: 169/48 164/62 (!) 186/80    BP Location:  Right arm Right arm    Patient Position:  Lying Lying    Pulse: 59 78 93 98   Resp: 17 16 16    Temp:  98.2 °F (36.8 °C) 98 °F (36.7 °C)    TempSrc:  Oral Oral    SpO2: 98% 99% 96% 96%   Weight:  105 kg (231 lb) 105 kg (230 lb 13.2 oz)    Height:  154.9 cm (61\")         Flowsheet Rows      First Filed Value   Admission Height  170.2 cm (67\") Documented at 10/21/2020 2036   Admission Weight  110 kg (242 lb 4.6 oz) Documented at 10/21/2020 2036          No intake or output data in the 24 hours ending 10/22/20 0905     TELEMETRY: Sinus rhythm    Physical Exam:  The patient is alert, oriented and in no distress.  Vital signs as noted above.  Exogenous obesity.  Head and neck revealed no carotid bruits or jugular venous distention.  No thyromegaly or lymph adenopathy is present  Lungs clear.  No wheezing.  Breath sounds are normal bilaterally.  Heart normal first and second heart sounds.No murmur.  No precordial rub is present.  No gallop is present.  Abdomen soft and nontender.  No organomegaly is present.  Extremities with good peripheral pulses without any pedal edema.  Skin warm and dry.  Musculoskeletal system is grossly normal  CNS grossly normal      Results Review:   I reviewed the patient's new clinical results.  Lab Results (last 24 hours)     Procedure Component Value Units Date/Time    POC Glucose Once [[id number]]  (Abnormal) Collected: 10/22/20 0832    Specimen: Blood Updated: 10/22/20 0833     Glucose " 132 mg/dL      Comment: Serial Number: [id number]:  [id number]       COVID-19,[facility] IN-HOUSE,Nasal Swab No Transport Media 3-4 HR TAT - Swab, Nasal Cavity [[id number]]  (Normal) Collected: 10/21/20 2202    Specimen: Swab from Nasal Cavity Updated: 10/21/20 2314     COVID19 Not Detected    Narrative:      Fact sheet for providers: https://www.fda.gov/media/359795/download     Fact sheet for patients: https://www.fda.gov/media/295654/download    Troponin [[id number]]  (Normal) Collected: 10/21/20 2055    Specimen: Blood Updated: 10/21/20 2202     Troponin T <0.010 ng/mL     Narrative:      Troponin T Reference Range:  <= 0.03 ng/mL-   Negative for AMI  >0.03 ng/mL-     Abnormal for myocardial necrosis.  Clinicians would have to utilize clinical acumen, EKG, Troponin and serial changes to determine if it is an Acute Myocardial Infarction or myocardial injury due to an underlying chronic condition.       Results may be falsely decreased if patient taking Biotin.      [facility] Draw [[id number]] Collected: 10/21/20 2055    Specimen: Blood Updated: 10/21/20 2201    Narrative:      The following orders were created for panel order [facility] Draw.  Procedure                               Abnormality         Status                     ---------                               -----------         ------                     Light Blue Top[[id number]]                                   Final result               Green Top (Gel)[[id number]]                                  Final result               Lavender Top[[id number]]                                     Final result               Gold Top - SST[[id number]]                                   Final result                 Please view results for these tests on the individual orders.    Light Blue Top [[id number]] Collected: 10/21/20 2055    Specimen: Blood Updated: 10/21/20 2201     Extra Tube hold for add-on     Comment: Auto resulted       Green Top (Gel) [[id number]] Collected:  10/21/20 2055    Specimen: Blood Updated: 10/21/20 2201     Extra Tube Hold for add-ons.     Comment: Auto resulted.       Gold Top - SST [[id number]] Collected: 10/21/20 2055    Specimen: Blood Updated: 10/21/20 2201     Extra Tube Hold for add-ons.     Comment: Auto resulted.       Urinalysis With Culture If Indicated - Urine, Catheter [[id number]]  (Abnormal) Collected: 10/21/20 2145    Specimen: Urine, Catheter Updated: 10/21/20 2201     Color, UA Yellow     Appearance, UA Cloudy     Comment: Result checked         pH, UA 6.0     Specific Gravity, UA 1.018     Glucose, UA Negative     Ketones, UA Negative     Bilirubin, UA Negative     Blood, UA Negative     Protein, UA Negative     Leuk Esterase, UA Negative     Nitrite, UA Negative     Urobilinogen, UA 0.2 E.U./dL    Narrative:      Urine microscopic not indicated.    Ammonia [[id number]]  (Normal) Collected: 10/21/20 2128    Specimen: Blood Updated: 10/21/20 2200     Ammonia 47 umol/L     CK [[id number]]  (Normal) Collected: 10/21/20 2055    Specimen: Blood Updated: 10/21/20 2134     Creatine Kinase 168 U/L     Magnesium [[id number]]  (Normal) Collected: 10/21/20 2055    Specimen: Blood Updated: 10/21/20 2134     Magnesium 2.3 mg/dL     Blood Gas, Arterial - [[id number]]  (Abnormal) Collected: 10/21/20 2126    Specimen: Arterial Blood Updated: 10/21/20 2129     Site Right Radial     Andrew's Test Positive     pH, Arterial 7.360 pH units      pCO2, Arterial 35.5 mm Hg      pO2, Arterial 75.7 mm Hg      HCO3, Arterial 20.1 mmol/L      Base Excess, Arterial -4.8 mmol/L      Comment: Serial Number: [id number]:  [id number]        O2 Saturation, Arterial 94.6 %      CO2 Content 21.2 mmol/L      Barometric Pressure for Blood Gas --     Comment: N/A        Modality Room Air     FIO2 21 %      Hemodilution No    POC Glucose Once [[id number]]  (Abnormal) Collected: 10/21/20 2122    Specimen: Blood Updated: 10/21/20 2124     Glucose 123 mg/dL      Comment: Serial Number:  [id number]:  [id number]       Comprehensive Metabolic Panel [[id number]]  (Abnormal) Collected: 10/21/20 2055    Specimen: Blood Updated: 10/21/20 2122     Glucose 137 mg/dL      BUN 27 mg/dL      Creatinine 1.35 mg/dL      Sodium 139 mmol/L      Potassium 4.8 mmol/L      Chloride 105 mmol/L      CO2 19.0 mmol/L      Calcium 9.3 mg/dL      Total Protein 7.2 g/dL      Albumin 4.00 g/dL      ALT (SGPT) 21 U/L      AST (SGOT) 27 U/L      Alkaline Phosphatase 94 U/L      Total Bilirubin 0.3 mg/dL      eGFR Non African Amer 37 mL/min/1.73      eGFR  African Amer 45 mL/min/1.73      Globulin 3.2 gm/dL      A/G Ratio 1.3 g/dL      BUN/Creatinine Ratio 20.0     Anion Gap 15.0 mmol/L     Narrative:      GFR Normal >60  Chronic Kidney Disease <60  Kidney Failure <15      Protime-INR [[id number]]  (Normal) Collected: 10/21/20 2055    Specimen: Blood Updated: 10/21/20 2117     Protime 10.9 Seconds      INR 0.99    Lavender Top [[id number]] Collected: 10/21/20 2055    Specimen: Blood Updated: 10/21/20 2117     Extra Tube --    CBC & Differential [[id number]]  (Abnormal) Collected: 10/21/20 2055    Specimen: Blood Updated: 10/21/20 2109    Narrative:      The following orders were created for panel order CBC & Differential.  Procedure                               Abnormality         Status                     ---------                               -----------         ------                     CBC Auto Differential[[id number]]        Abnormal            Final result                 Please view results for these tests on the individual orders.    CBC Auto Differential [[id number]]  (Abnormal) Collected: 10/21/20 2055    Specimen: Blood Updated: 10/21/20 2109     WBC 5.80 10*3/mm3      RBC 3.66 10*6/mm3      Hemoglobin 11.3 g/dL      Hematocrit 35.3 %      MCV 96.4 fL      MCH 30.9 pg      MCHC 32.1 g/dL      RDW 17.6 %      RDW-SD 59.1 fl      MPV 7.3 fL      Platelets 215 10*3/mm3      Neutrophil % 47.7 %      Lymphocyte % 36.8  %      Monocyte % 9.3 %      Eosinophil % 5.1 %      Basophil % 1.1 %      Neutrophils, Absolute 2.70 10*3/mm3      Lymphocytes, Absolute 2.10 10*3/mm3      Monocytes, Absolute 0.50 10*3/mm3      Eosinophils, Absolute 0.30 10*3/mm3      Basophils, Absolute 0.10 10*3/mm3      nRBC 0.1 /100 WBC           Imaging Results (Last 24 Hours)     Procedure Component Value Units Date/Time    CT Angiogram Head [[id number]] Collected: 10/21/20 2119     Updated: 10/21/20 2326    Narrative:      CTA HEAD WITH CONTRAST. CTA NECK WITH CONTRAST.    DATE: 10/21/2020 9:11 PM     INDICATION:  Fall one hour ago. Stroke follow-up. Altered mental status.     COMPARISON: CT head from earlier today     TECHNIQUE:   Thin section axial images through the head and neck after IV contrast. Two dimensional and three dimensional reformations. NASCET criteria used to determine stenosis.100 mL Isovue 370 given IV. Low-dose CT acquisition technique included one   of the following options: 1. Automated exposure control, 2. Adjustment of mA and/or KV according to patient's size and/or 3. Use of iterative reconstruction.    FINDINGS:           CTA NECK:    Aortic arch: Normal triple vessel anatomy. Calcifications distally.    Right common carotid artery: Normal origin. Calcifications proximally and distally. No significant stenosis, occlusion.    Right internal carotid artery: No significant stenosis, occlusion.    Right vertebral artery: Normal origin. No significant stenosis, occlusion.    Left common carotid artery: Normal origin. Calcifications distally. No significant stenosis, occlusion.    Left internal carotid artery: Calcifications distally. No significant stenosis, occlusion.    Left vertebral artery: Normal origin. No significant stenosis, occlusion.    Parotid and submandibular glands are intact. Posterior nasopharynx, oropharynx, hypopharynx, larynx, thyroid gland, proximal trachea, imaged superior mediastinum and lung apices are patent.  Lymph nodes in the neck do not meet the CT size criteria for   lymphadenopathy. Degenerative changes of the spine. Atelectasis in the lung apices.    CTA HEAD:    No large aneurysm, significant stenosis or occlusion of the proximal major cerebral arteries.     Major dural venous sinuses are patent.      Impression:      1. No significant stenosis or occlusion of the major arteries of the head and neck.  2. MRI is more sensitive for the detection of acute nonhemorrhagic infarct.       Report called to Niurka Melchor  at 10/21/2020 11:25 PM    Electronically signed by:  Ayad Mcclain M.D.    10/21/2020 9:25 PM    CT Angiogram Neck [[id number]] Collected: 10/21/20 2119     Updated: 10/21/20 2326    Narrative:      CTA HEAD WITH CONTRAST. CTA NECK WITH CONTRAST.    DATE: 10/21/2020 9:11 PM     INDICATION:  Fall one hour ago. Stroke follow-up. Altered mental status.     COMPARISON: CT head from earlier today     TECHNIQUE:   Thin section axial images through the head and neck after IV contrast. Two dimensional and three dimensional reformations. NASCET criteria used to determine stenosis.100 mL Isovue 370 given IV. Low-dose CT acquisition technique included one   of the following options: 1. Automated exposure control, 2. Adjustment of mA and/or KV according to patient's size and/or 3. Use of iterative reconstruction.    FINDINGS:           CTA NECK:    Aortic arch: Normal triple vessel anatomy. Calcifications distally.    Right common carotid artery: Normal origin. Calcifications proximally and distally. No significant stenosis, occlusion.    Right internal carotid artery: No significant stenosis, occlusion.    Right vertebral artery: Normal origin. No significant stenosis, occlusion.    Left common carotid artery: Normal origin. Calcifications distally. No significant stenosis, occlusion.    Left internal carotid artery: Calcifications distally. No significant stenosis, occlusion.    Left vertebral artery:  Normal origin. No significant stenosis, occlusion.    Parotid and submandibular glands are intact. Posterior nasopharynx, oropharynx, hypopharynx, larynx, thyroid gland, proximal trachea, imaged superior mediastinum and lung apices are patent. Lymph nodes in the neck do not meet the CT size criteria for   lymphadenopathy. Degenerative changes of the spine. Atelectasis in the lung apices.    CTA HEAD:    No large aneurysm, significant stenosis or occlusion of the proximal major cerebral arteries.     Major dural venous sinuses are patent.      Impression:      1. No significant stenosis or occlusion of the major arteries of the head and neck.  2. MRI is more sensitive for the detection of acute nonhemorrhagic infarct.       Report called to Niurka Melchor  at 10/21/2020 11:25 PM    Electronically signed by:  Ayad Mcclain M.D.    10/21/2020 9:25 PM    XR Chest 1 View [[id number]] Collected: 10/21/20 2144     Updated: 10/21/20 2147    Narrative:      XR CHEST 1 VW-     Date of Exam: 10/21/2020 9:10 PM     Indication: Stroke Protocol (Onset > 12 hrs)  recent fall     Comparison: None available.     Technique: 1 view(s) of the chest were obtained.     FINDINGS: Cardiac silhouette is enlarged. Mediastinal silhouette is  widened but this is likely all related to portable technique. There are  no prior studies for comparison. Thoracic aorta appears tortuous.  Pulmonary vascularity is unremarkable. No pneumothorax or pleural  effusion. Calcite granulomas present in the right lung base. No acute  infiltrates. Visualized bony structures are intact.       Impression:      Enlargement of the cardiac silhouette and mildly widened mediastinum,  likely reflection of portable technique. There are no prior studies for  comparison. When feasible, an upright PA and lateral view of the chest  is recommended. No active cardiopulmonary disease.        Electronically Signed By-Aaron Mattson DO. On:10/21/2020 9:45 PM  This report  was finalized on [id number] by  Aaron Mattson DO..    CT Head Without Contrast Stroke Protocol [[id number]] Collected: 10/21/20 2110     Updated: 10/21/20 2115    Narrative:         DATE OF EXAM:  10/21/2020 9:08 PM     PROCEDURE:   CT HEAD WO CONTRAST STROKE PROTOCOL-     INDICATIONS:  Stroke, follow up     COMPARISON:   No Comparisons Available     TECHNIQUE:  Routine transaxial and coronal reconstruction images were obtained  through the head without the administration of contrast. Automated  exposure control and iterative reconstruction methods were used.     FINDINGS:  There is no CT evidence of acute hemorrhage, infarct, mass, mass effect,  or midline shift. No hydrocephalus. No extra-axial fluid collections.  The globes and orbital contents are normal and symmetric. The mastoid  air cells and visualized paranasal sinuses are clear. No skull fractures  or calvarial lesions. There is a small right supraorbital scalp  hematoma/laceration. There is mild age-related volume loss.             Impression:      IMPRESSION :  Small right supraorbital scalp hematoma/laceration. No acute  intracranial abnormality.     Electronically Signed By-Aaron Mattson DO. On:10/21/2020 9:13 PM  This report was finalized on [id number] by  Aaron Mattson DO..      LAB RESULTS (LAST 7 DAYS)    CBC  Results from last 7 days   Lab Units 10/21/20  2055   WBC 10*3/mm3 5.80   RBC 10*6/mm3 3.66*   HEMOGLOBIN g/dL 11.3*   HEMATOCRIT % 35.3   MCV fL 96.4   PLATELETS 10*3/mm3 215       BMP  Results from last 7 days   Lab Units 10/21/20  2055   SODIUM mmol/L 139   POTASSIUM mmol/L 4.8   CHLORIDE mmol/L 105   CO2 mmol/L 19.0*   BUN mg/dL 27*   CREATININE mg/dL 1.35*   GLUCOSE mg/dL 137*   MAGNESIUM mg/dL 2.3       CMP   Results from last 7 days   Lab Units 10/21/20  2128 10/21/20  2055   SODIUM mmol/L  --  139   POTASSIUM mmol/L  --  4.8   CHLORIDE mmol/L  --  105   CO2 mmol/L  --  19.0*   BUN mg/dL  --  27*   CREATININE mg/dL  --  1.35*    GLUCOSE mg/dL  --  137*   ALBUMIN g/dL  --  4.00   BILIRUBIN mg/dL  --  0.3   ALK PHOS U/L  --  94   AST (SGOT) U/L  --  27   ALT (SGPT) U/L  --  21   AMMONIA umol/L 47  --          BNP        TROPONIN  Results from last 7 days   Lab Units 10/21/20  2055   CK TOTAL U/L 168   TROPONIN T ng/mL <0.010       CoAg  Results from last 7 days   Lab Units 10/21/20  2055   INR  0.99       Creatinine Clearance  Estimated Creatinine Clearance: 35.2 mL/min (A) (by C-G formula based on SCr of 1.35 mg/dL (H)).    ABG  Results from last 7 days   Lab Units 10/21/20  2126   PH, ARTERIAL pH units 7.360   PCO2, ARTERIAL mm Hg 35.5   PO2 ART mm Hg 75.7*   O2 SATURATION ART % 94.6   BASE EXCESS ART mmol/L -4.8*       Radiology  Ct Angiogram Head    Result Date: 10/21/2020  1. No significant stenosis or occlusion of the major arteries of the head and neck. 2. MRI is more sensitive for the detection of acute nonhemorrhagic infarct.  Report called to Niurka Melchor  at 10/21/2020 11:25 PM Electronically signed by:  Ayad Mcclain M.D.  10/21/2020 9:25 PM    Ct Angiogram Neck    Result Date: 10/21/2020  1. No significant stenosis or occlusion of the major arteries of the head and neck. 2. MRI is more sensitive for the detection of acute nonhemorrhagic infarct.  Report called to Niurka Melchor  at 10/21/2020 11:25 PM Electronically signed by:  Ayad Mcclain M.D.  10/21/2020 9:25 PM    Xr Chest 1 View    Result Date: 10/21/2020  Enlargement of the cardiac silhouette and mildly widened mediastinum, likely reflection of portable technique. There are no prior studies for comparison. When feasible, an upright PA and lateral view of the chest is recommended. No active cardiopulmonary disease.   Electronically Signed By-Aaron Mattson DO. On:10/21/2020 9:45 PM This report was finalized on [id number] by  Aaron Mattson DO..    Ct Head Without Contrast Stroke Protocol    Result Date: 10/21/2020  IMPRESSION : Small right  supraorbital scalp hematoma/laceration. No acute intracranial abnormality.  Electronically Signed By-Aaron Mattson DO. On:10/21/2020 9:13 PM This report was finalized on [id number] by  Aaorn Mattson DO..              EKG          I personally viewed and interpreted the patient's EKG/Telemetry data: Sinus rhythm with Mobitz type I AV block.  Normal intraventricular conduction.    ECHOCARDIOGRAM:                 Cardiolite (Tc-99m Sestamibi) stress test      OTHER:     Assessment/Plan     Principal Problem:    Syncope and collapse  Active Problems:    Fall    DM type 2 with diabetic dyslipidemia (CMS/HCC)    Secondary hyperaldosteronism (CMS/HCC)    Stage 3b chronic kidney disease    Hypertension associated with stage 3 chronic kidney disease due to type 2 diabetes mellitus (CMS/HCC)    Type 2 diabetes mellitus with diabetic neuropathy, with long-term current use of insulin (CMS/[facility])    Morbid obesity (CMS/[facility])    Atherosclerosis of native coronary artery of native heart without angina pectoris    ]]]]]]]]]]]]]]]]]]]]  Impression  ========  -Possible syncope.  Appears to be true syncope.  Concerned about advanced AV blocks    -Second-degree Mobitz type I AV block.  Narrow QRS complex.    -History of paroxysmal atrial fibrillation    -Hypertension dyslipidemia diabetes    -Renal dysfunction CKD 3  BUN 27 creatinine 1.35    -Exogenous obesity.    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    -No known allergies  ============  Plan  =========  Patient had probably a true syncopal episode likely due to advanced AV blocks.  In the ER EKG showed Mobitz type I second-degree AV block although patient is having one-to-one conduction at this time.  Patient has been on metoprolol 50 mg 3 times daily.  Reduce the dose to 25 mg twice daily or may discontinue and add antihypertensive medications that would not have effect on causing bradycardia.  Ischemic cardiac  work-up.  Echocardiogram  Stress Cardiolite test.  If no specific etiology is found consideration will be given for placement of loop recorder.  Highly suspicious for advanced AV block's causing her syncopal episode.    Renal dysfunction probably due to syncopal episode although she has problems with diabetes and hypertension etc.    Have discussed with attending nurse for coordination of care.    Close cardiac monitoring.    Further plan will depend on patient's progress.  ]]]]]]]]]]]]]]    Abnormal Lexiscan Cardiolite test with posterior and inferior ischemia.  Patient to have a cardiac catheterization tomorrow.  Renal precautions  Intravenous fluids and Mucomyst.  Depending on a.m. renal function consideration may be given for renal consultation.    Wang Plaza MD  10/22/20  09:05 EDT

## 2020-10-22 NOTE — THERAPY EVALUATION
Patient Name: Aracelis Vargas  : 1935    MRN: [id number]                              Today's Date: 10/22/2020       Admit Date: 10/21/2020    Visit Dx:     ICD-10-CM ICD-9-CM   1. Fall, initial encounter  W19.XXXA E888.9   2. Confusion  R41.0 298.9   3. Atrioventricular block, Mobitz type 1, Wenckebach  I44.1 426.13   2019-nCoV not detected  Z03.818 V71.83     Patient Active Problem List   Diagnosis   • Fall   • DM type 2 with diabetic dyslipidemia (CMS/HCC)   • Secondary hyperaldosteronism (CMS/HCC)   • Stage 3b chronic kidney disease   • Hypertension associated with stage 3 chronic kidney disease due to type 2 diabetes mellitus (CMS/HCC)   • Syncope and collapse   • Type 2 diabetes mellitus with diabetic neuropathy, with long-term current use of insulin (CMS/[facility])   • Morbid obesity (CMS/[facility])   • Atherosclerosis of native coronary artery of native heart without angina pectoris     Past Medical History:   Diagnosis Date   • Atherosclerosis of native coronary artery of native heart without angina pectoris 10/22/2020   • Hypertension associated with stage 3 chronic kidney disease due to type 2 diabetes mellitus (CMS/HCC) 10/22/2020   • Morbid obesity (CMS/HCC) 10/22/2020   • Secondary hyperaldosteronism (CMS/HCC) 10/22/2020   • Stage 3b chronic kidney disease 10/22/2020   • Type 2 diabetes mellitus with diabetic neuropathy, with long-term current use of insulin (CMS/[facility]) 10/22/2020   • Type 2 diabetes mellitus with diabetic neuropathy, with long-term current use of insulin (CMS/[facility]) 10/22/2020     Past Surgical History:   Procedure Laterality Date   • JOINT REPLACEMENT Right     Hip   • JOINT REPLACEMENT Left     hip   • JOINT REPLACEMENT Left     hip (for second time)   • JOINT REPLACEMENT Right     knee     General Information     Row Name 10/22/20 1018          OT Time and Intention    Document Type  evaluation  -NA     Mode of Treatment  individual therapy  -NA     Row Name 10/22/20 1018          General  Information    Patient Profile Reviewed  yes  -NA     Prior Level of Function  independent:;transfer;ADL's;feeding;grooming;dressing;bathing;home management;cooking;cleaning;driving;shopping;using stairs  -NA     Existing Precautions/Restrictions  fall  -NA     Barriers to Rehab  none identified  -NA     Row Name 10/22/20 1018          Living Environment    Lives With  spouse  -NA     Row Name 10/22/20 1018          Home Main Entrance    Number of Stairs, Main Entrance  one  -NA     Stair Railings, Main Entrance  none  -NA     Row Name 10/22/20 1018          Stairs Within Home, Primary    Stairs, Within Home, Primary  12 steps to basement, however pt does not use stairs as she has a chair lift  -NA     Row Name 10/22/20 1018          Cognition    Orientation Status (Cognition)  oriented x 4  -NA       User Key  (r) = Recorded By, (t) = Taken By, (c) = Cosigned By    Initials Name Provider Type    NA Vanna Vasquez OT Occupational Therapist        Mobility/ADL's     Row Name 10/22/20 1020          Transfers    Transfers  sit-stand transfer;toilet transfer  -NA     Sit-Stand [place] (Transfers)  supervision  -NA     [place] Level (Toilet Transfer)  supervision  -NA     Assistive Device (Toilet Transfer)  commode  -NA     Row Name 10/22/20 1020          Toilet Transfer    Type (Toilet Transfer)  sit-stand;stand-sit  -NA     Row Name 10/22/20 1020          Functional Mobility    Functional Mobility- Ind. Level  supervision required  -NA     Functional Mobility- Safety Issues  step length decreased;weight-shifting ability decreased  -NA     Row Name 10/22/20 1020          Activities of Daily Living    BADL Assessment/Intervention  upper body dressing;lower body dressing;grooming;toileting  -NA     Row Name 10/22/20 1020          Upper Body Dressing Assessment/Training    [place] Level (Upper Body Dressing)  upper body dressing skills;don;front opening garment;minimum assist (75% patient effort)  -NA      Position (Upper Body Dressing)  edge of bed sitting  -NA     Row Name 10/22/20 1020          Lower Body Dressing Assessment/Training    [place] Level (Lower Body Dressing)  lower body dressing skills;don;socks;moderate assist (50% patient effort)  -NA     Position (Lower Body Dressing)  edge of bed sitting  -NA     Row Name 10/22/20 1020          Grooming Assessment/Training    [place] Level (Grooming)  grooming skills;hair care, combing/brushing;wash face, hands;supervision  -NA     Position (Grooming)  supported standing  -NA     Row Name 10/22/20 1020          Toileting Assessment/Training    [place] Level (Toileting)  toileting skills;adjust/manage clothing;perform perineal hygiene;supervision  -NA     Position (Toileting)  supported standing  -NA       User Key  (r) = Recorded By, (t) = Taken By, (c) = Cosigned By    Initials Name Provider Type    NA Vanna Vasquez OT Occupational Therapist        Obj/Interventions     Row Name 10/22/20 1021          Sensory Assessment (Somatosensory)    Sensory Assessment (Somatosensory)  sensation intact  -NA     [facility] Name 10/22/20 1021          Vision Assessment/Intervention    Visual Impairment/Limitations  WFL  -NA     Row Name 10/22/20 1021          Range of Motion Comprehensive    General Range of Motion  no range of motion deficits identified  -NA     Row Name 10/22/20 1021          Strength Comprehensive (MMT)    General Manual Muscle Testing (MMT) Assessment  upper extremity strength deficits identified  -NA     Comment, General Manual Muscle Testing (MMT) Assessment  BUE 4/5  -NA     Row Name 10/22/20 1021          Balance    Balance Assessment  sitting static balance;sitting dynamic balance;standing static balance;standing dynamic balance  -NA     Static Sitting Balance  WFL  -NA     Dynamic Sitting Balance  WFL  -NA     Static Standing Balance  WFL  -NA     Dynamic Standing Balance  mild impairment  -NA       User Key  (r) = Recorded By, (t) =  Taken By, (c) = Cosigned By    Initials Name Provider Type    Vanna Hobbs OT Occupational Therapist        Goals/Plan     Row Name 10/22/20 1027          Transfer Goal 1 (OT)    Activity/Assistive Device (Transfer Goal 1, OT)  toilet  -NA     [place] Level/Cues Needed (Transfer Goal 1, OT)  modified independence  -NA     Time Frame (Transfer Goal 1, OT)  2 weeks  -NA     Row Name 10/22/20 1027          Toileting Goal 1 (OT)    Activity/Device (Toileting Goal 1, OT)  toileting skills, all;commode  -NA     [place] Level/Cues Needed (Toileting Goal 1, OT)  modified independence  -NA     Time Frame (Toileting Goal 1, OT)  2 weeks  -NA     Row Name 10/22/20 1027          Grooming Goal 1 (OT)    Activity/Device (Grooming Goal 1, OT)  grooming skills, all  -NA     [place] (Grooming Goal 1, OT)  modified independence  -NA     Time Frame (Grooming Goal 1, OT)  2 weeks  -NA       User Key  (r) = Recorded By, (t) = Taken By, (c) = Cosigned By    Initials Name Provider Type    Vanna Hobbs OT Occupational Therapist        Clinical Impression     Row Name 10/22/20 1021          Pain Assessment    Additional Documentation  Pain Scale: Numbers Pre/Post-Treatment (Group)  -NA     Row Name 10/22/20 1021          Pain Scale: Numbers Pre/Post-Treatment    Pretreatment Pain Rating  0/10 - no pain  -NA     Posttreatment Pain Rating  0/10 - no pain  -NA     Row Name 10/22/20 1021          Plan of Care Review    Plan of Care Reviewed With  patient  -NA     Outcome Summary  Pt is an 84 y/o F s/p fall at home and AMS. pt admitted for possible CVA vs TIA. CT head (-) for acute findings. Prior to admission, pt lives with  in a H with a basement, has chair lift to basement, uses a cane for func mob, independent with ADLs and IADLs. this date, pt req SUP for safety with func mob to bathroom and demo some unsteadiness on feet during ADLs in the bathroom. pt will benefit from skilled OT during hospital  stay to increase BUE strength, func transfers, and education for safety upon return home. REC home with  at [place]. Defer AE for func mob to PT cane vs RW. PPE worn: eye goggles, mask, gloves.  -NA     Row Name 10/22/20 1021          Therapy Assessment/Plan (OT)    Patient/Family Therapy Goal Statement (OT)  return home  -NA     Rehab Potential (OT)  good, to achieve stated therapy goals  -NA     Criteria for Skilled Therapeutic Interventions Met (OT)  yes;meets criteria;skilled treatment is necessary  -NA     Therapy Frequency (OT)  3 times/wk  -NA     Row Name 10/22/20 1026 10/22/20 1021       Therapy Plan Review/Discharge Plan (OT)    Anticipated Discharge Disposition (OT)  home with assist;home with home health  -NA  home with assist  -NA    Row Name 10/22/20 1021          Vital Signs    Intratreatment Heart Rate (beats/min)  91  -NA     Posttreatment Heart Rate (beats/min)  87  -NA     Intratreatment Resp Rate (breaths/min)  11  -NA     Posttreatment Resp Rate (breaths/min)  14  -NA     Pre SpO2 (%)  97  -NA     O2 Delivery Pre Treatment  room air  -NA     O2 Delivery Intra Treatment  room air  -NA     Post SpO2 (%)  94  -NA     O2 Delivery Post Treatment  room air  -NA     Pre Patient Position  Supine  -NA     Intra Patient Position  Standing  -NA     Post Patient Position  Sitting  -NA     Row Name 10/22/20 1021          Positioning and Restraints    Pre-Treatment Position  in bed  -NA     Post Treatment Position  chair  -NA     In Chair  notified nsg;sitting;encouraged to call for assist;call light within reach;exit alarm on  -NA       User Key  (r) = Recorded By, (t) = Taken By, (c) = Cosigned By    Initials Name Provider Type    NA Vanna Vasquez, DONAVAN Occupational Therapist        Outcome Measures     Row Name 10/22/20 1027          Modified [place] Scale    Pre-Stroke Modified [place] Scale  6 - Unable to determine (UTD) from the medical record documentation  -NA     Modified [place] Scale  1 - No  significant disability despite symptoms.  Able to carry out all usual duties and activities.  -TORRI     Row Name 10/22/20 1027          Functional Assessment    Outcome Measure Options  Modified [place]  -TORRI       User Key  (r) = Recorded By, (t) = Taken By, (c) = Cosigned By    Initials Name Provider Type    Vanna Hobbs OT Occupational Therapist        Occupational Therapy Education                 Title: PT OT SLP Therapies (Done)     Topic: Occupational Therapy (Done)     Point: ADL training (Done)     Description:   Instruct learner(s) on proper safety adaptation and remediation techniques during self care or transfers.   Instruct in proper use of assistive devices.              Learning Progress Summary           Patient Acceptance, E,TB, VU,DU by TORRI at 10/22/2020 1028    Comment: role and benefits of OT, safety with ADLs, signs and symptoms of CVA, commode transfers, BUE AROM in all planes, using call light                   Point: Home exercise program (Done)     Description:   Instruct learner(s) on appropriate technique for monitoring, assisting and/or progressing therapeutic exercises/activities.              Learning Progress Summary           Patient Acceptance, E,TB, VU,DU by TORRI at 10/22/2020 1028    Comment: role and benefits of OT, safety with ADLs, signs and symptoms of CVA, commode transfers, BUE AROM in all planes, using call light                   Point: Precautions (Done)     Description:   Instruct learner(s) on prescribed precautions during self-care and functional transfers.              Learning Progress Summary           Patient Acceptance, E,TB, VU,DU by TORRI at 10/22/2020 1028    Comment: role and benefits of OT, safety with ADLs, signs and symptoms of CVA, commode transfers, BUE AROM in all planes, using call light                   Point: Body mechanics (Done)     Description:   Instruct learner(s) on proper positioning and spine alignment during self-care, functional mobility  activities and/or exercises.              Learning Progress Summary           Patient Acceptance, E,TB, VU,DU by TORRI at 10/22/2020 1028    Comment: role and benefits of OT, safety with ADLs, signs and symptoms of CVA, commode transfers, BUE AROM in all planes, using call light                               User Key     Initials Effective Dates Name Provider Type Discipline     09/30/20 -  Vanna Vasquez OT Occupational Therapist OT              OT Recommendation and Plan  Therapy Frequency (OT): 3 times/wk  Plan of Care Review  Plan of Care Reviewed With: patient  Outcome Summary: Pt is an 84 y/o F s/p fall at home and AMS. pt admitted for possible CVA vs TIA. CT head (-) for acute findings. Prior to admission, pt lives with  in a SSH with a basement, has chair lift to basement, uses a cane for func mob, independent with ADLs and IADLs. this date, pt req SUP for safety with func mob to bathroom and demo some unsteadiness on feet during ADLs in the bathroom. pt will benefit from skilled OT during hospital stay to increase BUE strength, func transfers, and education for safety upon return home. REC home with  at [place]. Defer AE for func mob to PT cane vs RW. PPE worn: eye goggles, mask, gloves.     Time Calculation:   Time Calculation- OT     Row Name 10/22/20 0748             Time Calculation- OT    OT Start Time  0748  -NA      OT Stop Time  0810  -NA      OT Time Calculation (min)  22 min  -NA      Total Timed Code Minutes- OT  0 minute(s)  -NA      OT Non-Billable Time (min)  22 min  -NA      OT Received On  10/23/20  -NA      OT - Next Appointment  10/26/20  -NA      OT Goal Re-Cert Due Date  11/05/20  -NA        User Key  (r) = Recorded By, (t) = Taken By, (c) = Cosigned By    Initials Name Provider Type    NA Vanna Vasquez OT Occupational Therapist        Therapy Charges for Today     Code Description Service Date Service Provider Modifiers Qty    [id number] HC OT EVAL LOW COMPLEXITY 4  10/22/2020 Vanna Vasquez, OT GO 1               Vanna Vasquez, OT  10/22/2020

## 2020-10-22 NOTE — PLAN OF CARE
Goal Outcome Evaluation:  Plan of Care Reviewed With: patient       NIH = 0.  Patient is a falls risk due to recent fall at home causing aq bruise to her right eye.     Patient reports no pain.     Skin intact.    Plan for today: Stress test, echocardiogram, and orthostatic hypotension check when the patient returns to the floor from testing.      Problem: Adult Inpatient Plan of Care  Goal: Plan of Care Review  Outcome: Ongoing, Progressing  Flowsheets (Taken 10/22/2020 1026)  Plan of Care Reviewed With: patient  Goal: Patient-Specific Goal (Individualized)  Outcome: Ongoing, Progressing  Goal: Absence of Hospital-Acquired Illness or Injury  Outcome: Ongoing, Progressing  Intervention: Identify and Manage Fall Risk  Recent Flowsheet Documentation  Taken 10/22/2020 1025 by Mila Chavez RN  Safety Promotion/Fall Prevention: patient off unit  Taken 10/22/2020 1000 by Mila Chavez RN  Safety Promotion/Fall Prevention:   assistive device/personal items within reach   clutter free environment maintained  Taken 10/22/2020 0800 by Mila Chavez RN  Safety Promotion/Fall Prevention:   assistive device/personal items within reach   clutter free environment maintained   nonskid shoes/slippers when out of bed   safety round/check completed   fall prevention program maintained  Intervention: Prevent and Manage VTE (venous thromboembolism) Risk  Recent Flowsheet Documentation  Taken 10/22/2020 0833 by Mila Chavez RN  VTE Prevention/Management:   bilateral   sequential compression devices on  Intervention: Prevent Infection  Recent Flowsheet Documentation  Taken 10/22/2020 1000 by Mila Chavez RN  Infection Prevention: personal protective equipment utilized  Taken 10/22/2020 0800 by Mila Chavez RN  Infection Prevention: personal protective equipment utilized  Goal: Optimal Comfort and Wellbeing  Outcome: Ongoing, Progressing  Goal: Readiness for Transition of Care  Outcome: Ongoing, Progressing     Problem: Fall  Injury Risk  Goal: Absence of Fall and Fall-Related Injury  Outcome: Ongoing, Progressing  Intervention: Identify and Manage Contributors to Fall Injury Risk  Recent Flowsheet Documentation  Taken 10/22/2020 1000 by Mila Chavez RN  Medication Review/Management: medications reviewed  Taken 10/22/2020 0800 by Mila Chavez RN  Medication Review/Management: medications reviewed  Intervention: Promote Injury-Free Environment  Recent Flowsheet Documentation  Taken 10/22/2020 1025 by Mila Chavez RN  Safety Promotion/Fall Prevention: patient off unit  Taken 10/22/2020 1000 by Mila Chavez RN  Safety Promotion/Fall Prevention:   assistive device/personal items within reach   clutter free environment maintained  Taken 10/22/2020 0800 by Mila Chavez RN  Safety Promotion/Fall Prevention:   assistive device/personal items within reach   clutter free environment maintained   nonskid shoes/slippers when out of bed   safety round/check completed   fall prevention program maintained     Problem: Adjustment to Illness (Stroke, Ischemic/Transient Ischemic Attack)  Goal: Optimal Coping  Outcome: Ongoing, Progressing     Problem: Pain (Stroke, Ischemic/Transient Ischemic Attack)  Goal: Acceptable Pain Control  Outcome: Ongoing, Progressing     Problem: Urinary Elimination Impaired (Stroke, Ischemic/Transient Ischemic Attack)  Goal: Effective Urinary Elimination  Outcome: Ongoing, Progressing

## 2020-10-22 NOTE — PROGRESS NOTES
Discharge Planning Assessment  [facility]     Patient Name: Aracelis Vargas  MRN: [id number]  Today's Date: 10/22/2020    Admit Date: 10/21/2020    Discharge Needs Assessment     Row Name 10/22/20 1532       Living Environment    Lives With  spouse    Current Living Arrangements  home/apartment/condo    Primary Care Provided by  self    Able to Return to Prior Arrangements  yes       Resource/Environmental Concerns    Resource/Environmental Concerns  none    Transportation Concerns  car, none       Transition Planning    Patient/Family Anticipates Transition to  home with family    Patient/Family Anticipated Services at Transition  none    Transportation Anticipated  car, drives self;family or friend will provide       Discharge Needs Assessment    Equipment Currently Used at Home  cane, quad    Concerns to be Addressed  no discharge needs identified    Anticipated Changes Related to Illness  none    Equipment Needed After Discharge  none        Discharge Plan     Row Name 10/22/20 1533       Plan    Plan  Routine d/c to home      Plan Comments  Spoke to patient in room with mask and goggles maintaining 6 ft for less than 15 min. Patient states she lives at home with her spouse. SHe is able to drive and care for herself. Confirmed PCP and Pharmacy DC Barriers - Plan for Stress Test and Echo        Continued Care and Services - Admitted Since 10/21/2020          Demographic Summary     Row Name 10/22/20 1532       General Information    Admission Type  observation    Arrived From  emergency department    Reason for Consult  discharge planning    Preferred Language  English        Functional Status     Row Name 10/22/20 1532       Functional Status, IADL    Medications  independent    Meal Preparation  independent    Housekeeping  independent    Laundry  independent    Shopping  independent                 Elsa Blackwell RN, CM  Office Phone [phone]  Cell [phone]

## 2020-10-22 NOTE — NURSING NOTE
Stroke packet brought into the room.    Personal risk factors for stroke identified are BMI elevated at 43.6, normal range is 18-25 and elevated blood pressure.

## 2020-10-22 NOTE — H&P (VIEW-ONLY)
Referring Provider: Gregory Pinedo MD  Reason for Consultation:  Syncope  Second-degree Mobitz type I AV block  Hypertension  Diabetes    Patient Care Team:  Gregory Pinedo MD as PCP - General (Family Medicine)    Chief complaint  Syncope    Subjective .     History of present illness:  Aracelis Vargas is a 85 y.o. female who presents with history of being in usual state of health with history of hypertension diabetes etc was admitted with history of possible syncope.  Patient apparently was in the living room and feeling fine except she was somewhat tired that morning and the next thing she knew she woke up from the floor.  She quickly lost her consciousness.  EMS reported  calling EMS because he was outside and even he came in she was lying down on the floor.  No other history is available.  Patient does not recall any other premonitory symptoms or post syncopal symptoms.  No seizure activity.  No loss of bowel control or bladder control.  No other history is available at this time.  Patient was seen in the ER and was noted to have second-degree Mobitz type I AV block with a narrow QRS complex.  Patient is feeling somewhat weak.  No other associated aggravating or alleviating factors.  Cardiology consultation was requested..         ROS      Since I have last seen, the patient has been without any chest discomfort ,shortness of breath, palpitations, dizziness or syncope.  Denies having any headache ,abdominal pain ,nausea, vomiting , diarrhea constipation, loss of weight or loss of appetite.  Denies having any excessive bruising ,hematuria or blood in the stool.    Review of all systems negative except as indicated      History  Past Medical History:   Diagnosis Date   • Atherosclerosis of native coronary artery of native heart without angina pectoris 10/22/2020   • Hypertension associated with stage 3 chronic kidney disease due to type 2 diabetes mellitus (CMS/[facility]) 10/22/2020   • Morbid obesity  (CMS/[facility]) 10/22/2020   • Secondary hyperaldosteronism (CMS/HCC) 10/22/2020   • Stage 3b chronic kidney disease 10/22/2020   • Type 2 diabetes mellitus with diabetic neuropathy, with long-term current use of insulin (CMS/HCC) 10/22/2020   • Type 2 diabetes mellitus with diabetic neuropathy, with long-term current use of insulin (CMS/HCC) 10/22/2020       Past Surgical History:   Procedure Laterality Date   • JOINT REPLACEMENT Right     Hip   • JOINT REPLACEMENT Left     hip   • JOINT REPLACEMENT Left     hip (for second time)   • JOINT REPLACEMENT Right     knee       Family History   Problem Relation Age of Onset   • Heart disease Mother        Social History     Tobacco Use   • Smoking status: Never Smoker   • Smokeless tobacco: Never Used   Substance Use Topics   • Alcohol use: Not Currently   • Drug use: Never        Medications Prior to Admission   Medication Sig Dispense Refill Last Dose   • atorvastatin (LIPITOR) 10 MG tablet Take 10 mg by mouth Daily.      • diclofenac (VOLTAREN) 1 % gel gel Apply 4 g topically to the appropriate area as directed 4 (Four) Times a Day As Needed.      • furosemide (LASIX) 20 MG tablet Take 20 mg by mouth Daily.      • gabapentin (NEURONTIN) 300 MG capsule Take 300 mg by mouth 2 (two) times a day.      • insulin NPH-insulin regular (humuLIN 70/30,novoLIN 70/30) (70-30) 100 UNIT/ML injection Inject  under the skin into the appropriate area as directed 2 (Two) Times a Day With Meals. Amount of units unknown      • metFORMIN (GLUCOPHAGE) 1000 MG tablet Take 1,000 mg by mouth 2 (Two) Times a Day With Meals.      • metoprolol tartrate (LOPRESSOR) 50 MG tablet Take 50 mg by mouth 3 (Three) Times a Day.      • omeprazole (priLOSEC) 40 MG capsule Take 40 mg by mouth Daily.      • spironolactone (ALDACTONE) 25 MG tablet Take 25 mg by mouth Daily.      • topiramate (TOPAMAX) 25 MG tablet Take 25 mg by mouth Daily.      • traZODone (DESYREL) 50 MG tablet Take 50 mg by mouth Every Night.     "  • valsartan (DIOVAN) 160 MG tablet Take 160 mg by mouth Daily.            Patient has no known allergies.    Scheduled Meds:atorvastatin, 10 mg, Oral, Nightly  losartan, 100 mg, Oral, Daily  metoprolol tartrate, 50 mg, Oral, Q12H  pantoprazole, 40 mg, Oral, QAM  spironolactone, 25 mg, Oral, Daily      Continuous Infusions:   PRN Meds:.labetalol  •  sodium chloride    Objective     VITAL SIGNS  Vitals:    10/22/20 0202 10/22/20 0343 10/22/20 0603 10/22/20 0805   BP: 169/48 164/62 (!) 186/80    BP Location:  Right arm Right arm    Patient Position:  Lying Lying    Pulse: 59 78 93 98   Resp: 17 16 16    Temp:  98.2 °F (36.8 °C) 98 °F (36.7 °C)    TempSrc:  Oral Oral    SpO2: 98% 99% 96% 96%   Weight:  105 kg (231 lb) 105 kg (230 lb 13.2 oz)    Height:  154.9 cm (61\")         Flowsheet Rows      First Filed Value   Admission Height  170.2 cm (67\") Documented at 10/21/2020 2036   Admission Weight  110 kg (242 lb 4.6 oz) Documented at 10/21/2020 2036          No intake or output data in the 24 hours ending 10/22/20 0905     TELEMETRY: Sinus rhythm    Physical Exam:  The patient is alert, oriented and in no distress.  Vital signs as noted above.  Exogenous obesity.  Head and neck revealed no carotid bruits or jugular venous distention.  No thyromegaly or lymph adenopathy is present  Lungs clear.  No wheezing.  Breath sounds are normal bilaterally.  Heart normal first and second heart sounds.No murmur.  No precordial rub is present.  No gallop is present.  Abdomen soft and nontender.  No organomegaly is present.  Extremities with good peripheral pulses without any pedal edema.  Skin warm and dry.  Musculoskeletal system is grossly normal  CNS grossly normal      Results Review:   I reviewed the patient's new clinical results.  Lab Results (last 24 hours)     Procedure Component Value Units Date/Time    POC Glucose Once [[id number]]  (Abnormal) Collected: 10/22/20 0832    Specimen: Blood Updated: 10/22/20 0833     Glucose " 132 mg/dL      Comment: Serial Number: [id number]:  [id number]       COVID-19,[facility] [employer] IN-HOUSE,Nasal Swab No Transport Media 3-4 HR TAT - Swab, Nasal Cavity [[id number]]  (Normal) Collected: 10/21/20 2202    Specimen: Swab from Nasal Cavity Updated: 10/21/20 2314     COVID19 Not Detected    Narrative:      Fact sheet for providers: https://www.fda.gov/media/973750/download     Fact sheet for patients: https://www.fda.gov/media/583660/download    Troponin [[id number]]  (Normal) Collected: 10/21/20 2055    Specimen: Blood Updated: 10/21/20 2202     Troponin T <0.010 ng/mL     Narrative:      Troponin T Reference Range:  <= 0.03 ng/mL-   Negative for AMI  >0.03 ng/mL-     Abnormal for myocardial necrosis.  Clinicians would have to utilize clinical acumen, EKG, Troponin and serial changes to determine if it is an Acute Myocardial Infarction or myocardial injury due to an underlying chronic condition.       Results may be falsely decreased if patient taking Biotin.      [place] Draw [[id number]] Collected: 10/21/20 2055    Specimen: Blood Updated: 10/21/20 2201    Narrative:      The following orders were created for panel order [place] Draw.  Procedure                               Abnormality         Status                     ---------                               -----------         ------                     Light Blue Top[[id number]]                                   Final result               Green Top (Gel)[[id number]]                                  Final result               Lavender Top[[id number]]                                     Final result               Gold Top - SST[[id number]]                                   Final result                 Please view results for these tests on the individual orders.    Light Blue Top [[id number]] Collected: 10/21/20 2055    Specimen: Blood Updated: 10/21/20 2201     Extra Tube hold for add-on     Comment: Auto resulted       Green Top (Gel) [[id number]] Collected:  10/21/20 2055    Specimen: Blood Updated: 10/21/20 2201     Extra Tube Hold for add-ons.     Comment: Auto resulted.       Gold Top - SST [[id number]] Collected: 10/21/20 2055    Specimen: Blood Updated: 10/21/20 2201     Extra Tube Hold for add-ons.     Comment: Auto resulted.       Urinalysis With Culture If Indicated - Urine, Catheter [[id number]]  (Abnormal) Collected: 10/21/20 2145    Specimen: Urine, Catheter Updated: 10/21/20 2201     Color, UA Yellow     Appearance, UA Cloudy     Comment: Result checked         pH, UA 6.0     Specific Gravity, UA 1.018     Glucose, UA Negative     Ketones, UA Negative     Bilirubin, UA Negative     Blood, UA Negative     Protein, UA Negative     Leuk Esterase, UA Negative     Nitrite, UA Negative     Urobilinogen, UA 0.2 E.U./dL    Narrative:      Urine microscopic not indicated.    Ammonia [[id number]]  (Normal) Collected: 10/21/20 2128    Specimen: Blood Updated: 10/21/20 2200     Ammonia 47 umol/L     CK [[id number]]  (Normal) Collected: 10/21/20 2055    Specimen: Blood Updated: 10/21/20 2134     Creatine Kinase 168 U/L     Magnesium [[id number]]  (Normal) Collected: 10/21/20 2055    Specimen: Blood Updated: 10/21/20 2134     Magnesium 2.3 mg/dL     Blood Gas, Arterial - [[id number]]  (Abnormal) Collected: 10/21/20 2126    Specimen: Arterial Blood Updated: 10/21/20 2129     Site Right Radial     Andrew's Test Positive     pH, Arterial 7.360 pH units      pCO2, Arterial 35.5 mm Hg      pO2, Arterial 75.7 mm Hg      HCO3, Arterial 20.1 mmol/L      Base Excess, Arterial -4.8 mmol/L      Comment: Serial Number: [id number]:  [id number]        O2 Saturation, Arterial 94.6 %      CO2 Content 21.2 mmol/L      Barometric Pressure for Blood Gas --     Comment: N/A        Modality Room Air     FIO2 21 %      Hemodilution No    POC Glucose Once [[id number]]  (Abnormal) Collected: 10/21/20 2122    Specimen: Blood Updated: 10/21/20 2124     Glucose 123 mg/dL      Comment: Serial Number:  [id number]:  [id number]       Comprehensive Metabolic Panel [[id number]]  (Abnormal) Collected: 10/21/20 2055    Specimen: Blood Updated: 10/21/20 2122     Glucose 137 mg/dL      BUN 27 mg/dL      Creatinine 1.35 mg/dL      Sodium 139 mmol/L      Potassium 4.8 mmol/L      Chloride 105 mmol/L      CO2 19.0 mmol/L      Calcium 9.3 mg/dL      Total Protein 7.2 g/dL      Albumin 4.00 g/dL      ALT (SGPT) 21 U/L      AST (SGOT) 27 U/L      Alkaline Phosphatase 94 U/L      Total Bilirubin 0.3 mg/dL      eGFR Non African Amer 37 mL/min/1.73      eGFR  African Amer 45 mL/min/1.73      Globulin 3.2 gm/dL      A/G Ratio 1.3 g/dL      BUN/Creatinine Ratio 20.0     Anion Gap 15.0 mmol/L     Narrative:      GFR Normal >60  Chronic Kidney Disease <60  Kidney Failure <15      Protime-INR [[id number]]  (Normal) Collected: 10/21/20 2055    Specimen: Blood Updated: 10/21/20 2117     Protime 10.9 Seconds      INR 0.99    Lavender Top [[id number]] Collected: 10/21/20 2055    Specimen: Blood Updated: 10/21/20 2117     Extra Tube --    CBC & Differential [[id number]]  (Abnormal) Collected: 10/21/20 2055    Specimen: Blood Updated: 10/21/20 2109    Narrative:      The following orders were created for panel order CBC & Differential.  Procedure                               Abnormality         Status                     ---------                               -----------         ------                     CBC Auto Differential[[id number]]        Abnormal            Final result                 Please view results for these tests on the individual orders.    CBC Auto Differential [[id number]]  (Abnormal) Collected: 10/21/20 2055    Specimen: Blood Updated: 10/21/20 2109     WBC 5.80 10*3/mm3      RBC 3.66 10*6/mm3      Hemoglobin 11.3 g/dL      Hematocrit 35.3 %      MCV 96.4 fL      MCH 30.9 pg      MCHC 32.1 g/dL      RDW 17.6 %      RDW-SD 59.1 fl      MPV 7.3 fL      Platelets 215 10*3/mm3      Neutrophil % 47.7 %      Lymphocyte % 36.8  %      Monocyte % 9.3 %      Eosinophil % 5.1 %      Basophil % 1.1 %      Neutrophils, Absolute 2.70 10*3/mm3      Lymphocytes, Absolute 2.10 10*3/mm3      Monocytes, Absolute 0.50 10*3/mm3      Eosinophils, Absolute 0.30 10*3/mm3      Basophils, Absolute 0.10 10*3/mm3      nRBC 0.1 /100 WBC           Imaging Results (Last 24 Hours)     Procedure Component Value Units Date/Time    CT Angiogram Head [[id number]] Collected: 10/21/20 2119     Updated: 10/21/20 2326    Narrative:      CTA HEAD WITH CONTRAST. CTA NECK WITH CONTRAST.    DATE: 10/21/2020 9:11 PM     INDICATION:  Fall one hour ago. Stroke follow-up. Altered mental status.     COMPARISON: CT head from earlier today     TECHNIQUE:   Thin section axial images through the head and neck after IV contrast. Two dimensional and three dimensional reformations. NASCET criteria used to determine stenosis.100 mL Isovue 370 given IV. Low-dose CT acquisition technique included one   of the following options: 1. Automated exposure control, 2. Adjustment of mA and/or KV according to patient's size and/or 3. Use of iterative reconstruction.    FINDINGS:           CTA NECK:    Aortic arch: Normal triple vessel anatomy. Calcifications distally.    Right common carotid artery: Normal origin. Calcifications proximally and distally. No significant stenosis, occlusion.    Right internal carotid artery: No significant stenosis, occlusion.    Right vertebral artery: Normal origin. No significant stenosis, occlusion.    Left common carotid artery: Normal origin. Calcifications distally. No significant stenosis, occlusion.    Left internal carotid artery: Calcifications distally. No significant stenosis, occlusion.    Left vertebral artery: Normal origin. No significant stenosis, occlusion.    Parotid and submandibular glands are intact. Posterior nasopharynx, oropharynx, hypopharynx, larynx, thyroid gland, proximal trachea, imaged superior mediastinum and lung apices are patent.  Lymph nodes in the neck do not meet the CT size criteria for   lymphadenopathy. Degenerative changes of the spine. Atelectasis in the lung apices.    CTA HEAD:    No large aneurysm, significant stenosis or occlusion of the proximal major cerebral arteries.     Major dural venous sinuses are patent.      Impression:      1. No significant stenosis or occlusion of the major arteries of the head and neck.  2. MRI is more sensitive for the detection of acute nonhemorrhagic infarct.       Report called to Niurka Melchor  at 10/21/2020 11:25 PM    Electronically signed by:  Ayad Mcclain M.D.    10/21/2020 9:25 PM    CT Angiogram Neck [[id number]] Collected: 10/21/20 2119     Updated: 10/21/20 2326    Narrative:      CTA HEAD WITH CONTRAST. CTA NECK WITH CONTRAST.    DATE: 10/21/2020 9:11 PM     INDICATION:  Fall one hour ago. Stroke follow-up. Altered mental status.     COMPARISON: CT head from earlier today     TECHNIQUE:   Thin section axial images through the head and neck after IV contrast. Two dimensional and three dimensional reformations. NASCET criteria used to determine stenosis.100 mL Isovue 370 given IV. Low-dose CT acquisition technique included one   of the following options: 1. Automated exposure control, 2. Adjustment of mA and/or KV according to patient's size and/or 3. Use of iterative reconstruction.    FINDINGS:           CTA NECK:    Aortic arch: Normal triple vessel anatomy. Calcifications distally.    Right common carotid artery: Normal origin. Calcifications proximally and distally. No significant stenosis, occlusion.    Right internal carotid artery: No significant stenosis, occlusion.    Right vertebral artery: Normal origin. No significant stenosis, occlusion.    Left common carotid artery: Normal origin. Calcifications distally. No significant stenosis, occlusion.    Left internal carotid artery: Calcifications distally. No significant stenosis, occlusion.    Left vertebral artery:  Normal origin. No significant stenosis, occlusion.    Parotid and submandibular glands are intact. Posterior nasopharynx, oropharynx, hypopharynx, larynx, thyroid gland, proximal trachea, imaged superior mediastinum and lung apices are patent. Lymph nodes in the neck do not meet the CT size criteria for   lymphadenopathy. Degenerative changes of the spine. Atelectasis in the lung apices.    CTA HEAD:    No large aneurysm, significant stenosis or occlusion of the proximal major cerebral arteries.     Major dural venous sinuses are patent.      Impression:      1. No significant stenosis or occlusion of the major arteries of the head and neck.  2. MRI is more sensitive for the detection of acute nonhemorrhagic infarct.       Report called to Niurka Melchor  at 10/21/2020 11:25 PM    Electronically signed by:  Ayad Mcclain M.D.    10/21/2020 9:25 PM    XR Chest 1 View [[id number]] Collected: 10/21/20 2144     Updated: 10/21/20 2147    Narrative:      XR CHEST 1 VW-     Date of Exam: 10/21/2020 9:10 PM     Indication: Stroke Protocol (Onset > 12 hrs)  recent fall     Comparison: None available.     Technique: 1 view(s) of the chest were obtained.     FINDINGS: Cardiac silhouette is enlarged. Mediastinal silhouette is  widened but this is likely all related to portable technique. There are  no prior studies for comparison. Thoracic aorta appears tortuous.  Pulmonary vascularity is unremarkable. No pneumothorax or pleural  effusion. Calcite granulomas present in the right lung base. No acute  infiltrates. Visualized bony structures are intact.       Impression:      Enlargement of the cardiac silhouette and mildly widened mediastinum,  likely reflection of portable technique. There are no prior studies for  comparison. When feasible, an upright PA and lateral view of the chest  is recommended. No active cardiopulmonary disease.        Electronically Signed By-Aaron Mattson DO. On:10/21/2020 9:45 PM  This report  was finalized on [id number] by  Aaron Mattson DO..    CT Head Without Contrast Stroke Protocol [[id number]] Collected: 10/21/20 2110     Updated: 10/21/20 2115    Narrative:         DATE OF EXAM:  10/21/2020 9:08 PM     PROCEDURE:   CT HEAD WO CONTRAST STROKE PROTOCOL-     INDICATIONS:  Stroke, follow up     COMPARISON:   No Comparisons Available     TECHNIQUE:  Routine transaxial and coronal reconstruction images were obtained  through the head without the administration of contrast. Automated  exposure control and iterative reconstruction methods were used.     FINDINGS:  There is no CT evidence of acute hemorrhage, infarct, mass, mass effect,  or midline shift. No hydrocephalus. No extra-axial fluid collections.  The globes and orbital contents are normal and symmetric. The mastoid  air cells and visualized paranasal sinuses are clear. No skull fractures  or calvarial lesions. There is a small right supraorbital scalp  hematoma/laceration. There is mild age-related volume loss.             Impression:      IMPRESSION :  Small right supraorbital scalp hematoma/laceration. No acute  intracranial abnormality.     Electronically Signed By-Aaron Mattson DO. On:10/21/2020 9:13 PM  This report was finalized on [id number] by  Aaron Mattson DO..      LAB RESULTS (LAST 7 DAYS)    CBC  Results from last 7 days   Lab Units 10/21/20  2055   WBC 10*3/mm3 5.80   RBC 10*6/mm3 3.66*   HEMOGLOBIN g/dL 11.3*   HEMATOCRIT % 35.3   MCV fL 96.4   PLATELETS 10*3/mm3 215       BMP  Results from last 7 days   Lab Units 10/21/20  2055   SODIUM mmol/L 139   POTASSIUM mmol/L 4.8   CHLORIDE mmol/L 105   CO2 mmol/L 19.0*   BUN mg/dL 27*   CREATININE mg/dL 1.35*   GLUCOSE mg/dL 137*   MAGNESIUM mg/dL 2.3       CMP   Results from last 7 days   Lab Units 10/21/20  2128 10/21/20  2055   SODIUM mmol/L  --  139   POTASSIUM mmol/L  --  4.8   CHLORIDE mmol/L  --  105   CO2 mmol/L  --  19.0*   BUN mg/dL  --  27*   CREATININE mg/dL  --  1.35*    GLUCOSE mg/dL  --  137*   ALBUMIN g/dL  --  4.00   BILIRUBIN mg/dL  --  0.3   ALK PHOS U/L  --  94   AST (SGOT) U/L  --  27   ALT (SGPT) U/L  --  21   AMMONIA umol/L 47  --          BNP        TROPONIN  Results from last 7 days   Lab Units 10/21/20  2055   CK TOTAL U/L 168   TROPONIN T ng/mL <0.010       CoAg  Results from last 7 days   Lab Units 10/21/20  2055   INR  0.99       Creatinine Clearance  Estimated Creatinine Clearance: 35.2 mL/min (A) (by C-G formula based on SCr of 1.35 mg/dL (H)).    ABG  Results from last 7 days   Lab Units 10/21/20  2126   PH, ARTERIAL pH units 7.360   PCO2, ARTERIAL mm Hg 35.5   PO2 ART mm Hg 75.7*   O2 SATURATION ART % 94.6   BASE EXCESS ART mmol/L -4.8*       Radiology  Ct Angiogram Head    Result Date: 10/21/2020  1. No significant stenosis or occlusion of the major arteries of the head and neck. 2. MRI is more sensitive for the detection of acute nonhemorrhagic infarct.  Report called to Niurka Melchor  at 10/21/2020 11:25 PM Electronically signed by:  Ayad Mcclain M.D.  10/21/2020 9:25 PM    Ct Angiogram Neck    Result Date: 10/21/2020  1. No significant stenosis or occlusion of the major arteries of the head and neck. 2. MRI is more sensitive for the detection of acute nonhemorrhagic infarct.  Report called to Niurka Melchor  at 10/21/2020 11:25 PM Electronically signed by:  Ayad Mcclain M.D.  10/21/2020 9:25 PM    Xr Chest 1 View    Result Date: 10/21/2020  Enlargement of the cardiac silhouette and mildly widened mediastinum, likely reflection of portable technique. There are no prior studies for comparison. When feasible, an upright PA and lateral view of the chest is recommended. No active cardiopulmonary disease.   Electronically Signed By-Aaron Mattson DO. On:10/21/2020 9:45 PM This report was finalized on [id number] by  Aaron Mattson DO..    Ct Head Without Contrast Stroke Protocol    Result Date: 10/21/2020  IMPRESSION : Small right  supraorbital scalp hematoma/laceration. No acute intracranial abnormality.  Electronically Signed By-Aaron Mattson DO. On:10/21/2020 9:13 PM This report was finalized on [id number] by  Aaron Mattson DO..              EKG          I personally viewed and interpreted the patient's EKG/Telemetry data: Sinus rhythm with Mobitz type I AV block.  Normal intraventricular conduction.    ECHOCARDIOGRAM:                 Cardiolite (Tc-99m Sestamibi) stress test      OTHER:     Assessment/Plan     Principal Problem:    Syncope and collapse  Active Problems:    Fall    DM type 2 with diabetic dyslipidemia (CMS/HCC)    Secondary hyperaldosteronism (CMS/HCC)    Stage 3b chronic kidney disease    Hypertension associated with stage 3 chronic kidney disease due to type 2 diabetes mellitus (CMS/HCC)    Type 2 diabetes mellitus with diabetic neuropathy, with long-term current use of insulin (CMS/[facility])    Morbid obesity (CMS/[facility])    Atherosclerosis of native coronary artery of native heart without angina pectoris    ]]]]]]]]]]]]]]]]]]]]  Impression  ========  -Possible syncope.  Appears to be true syncope.  Concerned about advanced AV blocks    -Second-degree Mobitz type I AV block.  Narrow QRS complex.    -History of paroxysmal atrial fibrillation    -Hypertension dyslipidemia diabetes    -Renal dysfunction CKD 3  BUN 27 creatinine 1.35    -Exogenous obesity.    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    -No known allergies  ============  Plan  =========  Patient had probably a true syncopal episode likely due to advanced AV blocks.  In the ER EKG showed Mobitz type I second-degree AV block although patient is having one-to-one conduction at this time.  Patient has been on metoprolol 50 mg 3 times daily.  Reduce the dose to 25 mg twice daily or may discontinue and add antihypertensive medications that would not have effect on causing bradycardia.  Ischemic cardiac  work-up.  Echocardiogram  Stress Cardiolite test.  If no specific etiology is found consideration will be given for placement of loop recorder.  Highly suspicious for advanced AV block's causing her syncopal episode.    Renal dysfunction probably due to syncopal episode although she has problems with diabetes and hypertension etc.    Have discussed with attending nurse for coordination of care.    Close cardiac monitoring.    Further plan will depend on patient's progress.  ]]]]]]]]]]]]]]    Abnormal Lexiscan Cardiolite test with posterior and inferior ischemia.  Patient to have a cardiac catheterization tomorrow.  Renal precautions  Intravenous fluids and Mucomyst.  Depending on a.m. renal function consideration may be given for renal consultation.    Wang Plaza MD  10/22/20  09:05 EDT

## 2020-10-22 NOTE — ED NOTES
"EMS states pt fell x1 hour ago, reports  called grandson to help get the pt off the floor and grandson called EMS for altered mental status. Reports pt is \"not acting right\" and appears confused, LKW unknown according to EMS.     Paula Hidalgo, LPN  10/21/20 2040    "

## 2020-10-22 NOTE — PLAN OF CARE
Goal Outcome Evaluation:         Discussed plan of care with patient. Patient had a fall yesterday evening at home and was found down by her . Patient seems to be back to baseline at this time but has no memory of what happened. Will CTM

## 2020-10-22 NOTE — PLAN OF CARE
Problem: Adult Inpatient Plan of Care  Goal: Plan of Care Review  Recent Flowsheet Documentation  Taken 10/22/2020 1021 by Vanna Vasquez OT  Plan of Care Reviewed With: patient  Outcome Summary: Pt is an 84 y/o F s/p fall at home and AMS. pt admitted for possible CVA vs TIA. CT head (-) for acute findings. Prior to admission, pt lives with  in a H with a basement, has chair lift to basement, uses a cane for func mob, independent with ADLs and IADLs. this date, pt req SUP for safety with func mob to bathroom and demo some unsteadiness on feet during ADLs in the bathroom. pt will benefit from skilled OT during hospital stay to increase BUE strength, func transfers, and education for safety upon return home. REC home with  and HHOT at [place]. Defer AE for func mob to PT cane vs RW. PPE worn: eye goggles, mask, gloves.

## 2020-10-22 NOTE — ED PROVIDER NOTES
Subjective   Chief complaint: Confusion      Context: Patient is a 89-year-old female who comes in by EMS with no past medical history, no medication list, and no family at bedside with an unknown last known well time.  EMS reports the  called EMS because he was outside and when he came in she was laying on the floor.  No other history available.    Duration: Unknown    Timing: Unknown    Severity: Denies    Associated symptoms: Unknown          PCP:      LNMP:            Review of Systems   Unable to perform ROS: Mental status change       Past Medical History:   Diagnosis Date   • Diabetes mellitus (CMS/HCC)        No Known Allergies    No past surgical history on file.    No family history on file.    Social History     Socioeconomic History   • Marital status:      Spouse name: Not on file   • Number of children: Not on file   • Years of education: Not on file   • Highest education level: Not on file           Objective   Physical Exam     Vital signs in triage nurse note reviewed.  Constitutional: Awake, alert,    HEENT: Normocephalic, there is a small hematoma noted over the right eyebrow without any pain to palpation; pupils are PERRL with intact EOM; oropharynx is pink and moist without exudate or erythema.  Negative hill sign or raccoon eyes.  Neck: Supple, full range of motion without pain; there is no midline tenderness down the C-spine  Cardiovascular: Regular rate and rhythm, normal S1-S2.    Pulmonary: Respiratory effort regular, nonlabored; breath sounds clear to auscultation all fields.  Abdomen: Soft, nontender, nondistended with normoactive bowel sounds; no rebound or guarding.  Musculoskeletal: Independent range of motion of all extremities, no palpable tenderness or edema. Spine is midline without obvious curvature scoliosis. No bony tenderness, soft tissue swelling, deformity is noted. Paraspinal musculature is soft, nontender.  Neuro: Alert oriented x3, speech is clear but  "inappropriate.  Normal shoulder shrug, equal palate rise, patient will not cooperate for peripheral visual field evaluation, no facial asymmetry,no pronator drift, normal coordination.      ECG 12 Lead      Date/Time: 10/21/2020 9:44 PM  Performed by: Niurka Melchor APRN  Authorized by: Niurka Melchor APRN   Interpreted by physician (simran)  Comparison: not compared with previous ECG   Previous ECG: no previous ECG available  Rhythm: sinus rhythm  BPM: 82  Conduction: 2nd degree (Mobitz 1)                 ED Course  ED Course as of Oct 21 2335   Wed Oct 21, 2020   2106 Family just called. Initially no info from EMS and patient has never been here.      [JW]   2109 Dtr spoke with CN. States she was normal last night, was lethargic this morning and \"wasn't feeling well went back to bed for a couple hours.\"  states he went outside for approx 30 min and came back in and she was laying on the ground. Could not get her up and called grandson and EMS.     [JW]   2324 Spoke with radiologist dr porter- no acute findings on CTA    [JW]      ED Course User Index  [JW] Niurka Melchor APRN              Labs Reviewed   COMPREHENSIVE METABOLIC PANEL - Abnormal; Notable for the following components:       Result Value    Glucose 137 (*)     BUN 27 (*)     Creatinine 1.35 (*)     CO2 19.0 (*)     eGFR Non  Amer 37 (*)     eGFR   Amer 45 (*)     All other components within normal limits    Narrative:     GFR Normal >60  Chronic Kidney Disease <60  Kidney Failure <15     CBC WITH AUTO DIFFERENTIAL - Abnormal; Notable for the following components:    RBC 3.66 (*)     Hemoglobin 11.3 (*)     RDW 17.6 (*)     RDW-SD 59.1 (*)     All other components within normal limits   URINALYSIS W/ CULTURE IF INDICATED - Abnormal; Notable for the following components:    Appearance, UA Cloudy (*)     All other components within normal limits    Narrative:     Urine microscopic not indicated.   BLOOD GAS, ARTERIAL - " Abnormal; Notable for the following components:    pO2, Arterial 75.7 (*)     HCO3, Arterial 20.1 (*)     Base Excess, Arterial -4.8 (*)     CO2 Content 21.2 (*)     All other components within normal limits   POCT GLUCOSE FINGERSTICK - Abnormal; Notable for the following components:    Glucose 123 (*)     All other components within normal limits   COVID-19,PRIETO BIO IN-HOUSE,NASAL SWAB NO TRANSPORT MEDIA 2 HR TAT - Normal    Narrative:     Fact sheet for providers: https://www.fda.gov/media/048968/download     Fact sheet for patients: https://www.fda.gov/media/353600/download   PROTIME-INR - Normal   AMMONIA - Normal   CK - Normal   MAGNESIUM - Normal   TROPONIN (IN-HOUSE) - Normal    Narrative:     Troponin T Reference Range:  <= 0.03 ng/mL-   Negative for AMI  >0.03 ng/mL-     Abnormal for myocardial necrosis.  Clinicians would have to utilize clinical acumen, EKG, Troponin and serial changes to determine if it is an Acute Myocardial Infarction or myocardial injury due to an underlying chronic condition.       Results may be falsely decreased if patient taking Biotin.     RAINBOW DRAW    Narrative:     The following orders were created for panel order [place] Draw.  Procedure                               Abnormality         Status                     ---------                               -----------         ------                     Light Blue Top[[id number]]                                   Final result               Green Top (Gel)[[id number]]                                  Final result               Lavender Top[[id number]]                                     Final result               Gold Top - SST[[id number]]                                   Final result                 Please view results for these tests on the individual orders.   BLOOD GAS, ARTERIAL   POCT GLUCOSE FINGERSTICK   TYPE AND SCREEN   BB ARMBAND CHECK   CBC AND DIFFERENTIAL    Narrative:     The following orders were created for panel  order CBC & Differential.  Procedure                               Abnormality         Status                     ---------                               -----------         ------                     CBC Auto Differential[[id number]]        Abnormal            Final result                 Please view results for these tests on the individual orders.   LIGHT BLUE TOP   GREEN TOP   LAVENDER TOP   GOLD TOP - SST     Medications   sodium chloride 0.9 % flush 10 mL (has no administration in time range)   aspirin chewable tablet 324 mg (has no administration in time range)   iopamidol (ISOVUE-370) 76 % injection 100 mL (100 mL Intravenous Given 10/21/20 2120)     Ct Angiogram Head    Result Date: 10/21/2020  1. No significant stenosis or occlusion of the major arteries of the head and neck. 2. MRI is more sensitive for the detection of acute nonhemorrhagic infarct.  Report called to Niurka Melchor  at 10/21/2020 11:25 PM Electronically signed by:  Ayad Mcclain M.D.  10/21/2020 9:25 PM    Ct Angiogram Neck    Result Date: 10/21/2020  1. No significant stenosis or occlusion of the major arteries of the head and neck. 2. MRI is more sensitive for the detection of acute nonhemorrhagic infarct.  Report called to Niurka Melchor  at 10/21/2020 11:25 PM Electronically signed by:  Ayad Mcclain M.D.  10/21/2020 9:25 PM    Xr Chest 1 View    Result Date: 10/21/2020  Enlargement of the cardiac silhouette and mildly widened mediastinum, likely reflection of portable technique. There are no prior studies for comparison. When feasible, an upright PA and lateral view of the chest is recommended. No active cardiopulmonary disease.   Electronically Signed By-Araon Mattson DO. On:10/21/2020 9:45 PM This report was finalized on [id number] by  Aaron Mattson DO..    Ct Head Without Contrast Stroke Protocol    Result Date: 10/21/2020  IMPRESSION : Small right supraorbital scalp hematoma/laceration. No acute  intracranial abnormality.  Electronically Signed By-Aaron Mattson DO. On:10/21/2020 9:13 PM This report was finalized on [id number] by  Aaron Mattson DO..                                    MDM  Number of Diagnoses or Management Options  2019-nCoV not detected:   Atrioventricular block, Mobitz type 1, Wenckebach:   Confusion:   Fall, initial encounter:   Diagnosis management comments: Chart review: Patient has never been to this facility      Comorbidities:  has a past medical history of Diabetes mellitus (CMS/HCC).  Differentials: CVA ICH LVO hepatic encephalopathy infection not all inclusive of differentials considered  Discussion with provider: rudy sifuentes  Radiology interpretation:  X-rays reviewed by me and interpreted by radiologist,   Ct Angiogram Head    Result Date: 10/21/2020  1. No significant stenosis or occlusion of the major arteries of the head and neck. 2. MRI is more sensitive for the detection of acute nonhemorrhagic infarct.  Report called to Niurka Melchor  at 10/21/2020 11:25 PM Electronically signed by:  Ayad Mcclain M.D.  10/21/2020 9:25 PM    Ct Angiogram Neck    Result Date: 10/21/2020  1. No significant stenosis or occlusion of the major arteries of the head and neck. 2. MRI is more sensitive for the detection of acute nonhemorrhagic infarct.  Report called to Niurka Melchor  at 10/21/2020 11:25 PM Electronically signed by:  Ayad Mcclain M.D.  10/21/2020 9:25 PM    Xr Chest 1 View    Result Date: 10/21/2020  Enlargement of the cardiac silhouette and mildly widened mediastinum, likely reflection of portable technique. There are no prior studies for comparison. When feasible, an upright PA and lateral view of the chest is recommended. No active cardiopulmonary disease.   Electronically Signed By-Aaron Mattson DO. On:10/21/2020 9:45 PM This report was finalized on [id number] by  Aaron Mattson DO..    Ct Head Without Contrast Stroke Protocol    Result  Date: 10/21/2020  IMPRESSION : Small right supraorbital scalp hematoma/laceration. No acute intracranial abnormality.  Electronically Signed By-Aaron Mattson DO. On:10/21/2020 9:13 PM This report was finalized on [id number] by  Aaron Mattson DO..    Lab interpretation:  Labs viewed by me significant for, creat 1.3    Appropriate PPE worn during exam.  Patient remains neurologically unchanged during her ER stay.  She will be admitted for the confusion.  She was given an aspirin.  Discussed with her family doctor on-call who agreed to admit.  Discussed with Dr. Ureña who agrees with disposition and plan of care    i discussed findings with patient who voices understanding of admission        Final diagnoses:   Fall, initial encounter   Confusion   Atrioventricular block, Mobitz type 1, Mitch   2019-nCoV not detected            Niurka Melchor, APRN  10/21/20 1177

## 2020-10-22 NOTE — H&P
Patient Care Team:  Gregory Pinedo MD as PCP - General (Family Medicine)    Chief Conplaint  Subjective     The patient is a 85 y.o. female presents with an acute fall with syncope.    HPI  The patient was in her usual state of health until the day of presentation when she fell while outside.  She cannot remember details but relates that she did not trip but simply lost consciousness.  She presented to the [facility] emergency room where she was admitted.  She was found to have some arrhythmia via EKG/telemetry.  She denied substernal chest pain left arm paresthesias orthopnea or other constitutional complaint.  Review of Systems  Review of Systems   Constitutional: Positive for activity change.   Respiratory: Negative.    Cardiovascular: Negative.    Neurological: Positive for weakness.       History  Past Medical History:   Diagnosis Date   • Diabetes mellitus (CMS/HCC)      Past Surgical History:   Procedure Laterality Date   • JOINT REPLACEMENT Right     Hip   • JOINT REPLACEMENT Left     hip   • JOINT REPLACEMENT Left     hip (for second time)   • JOINT REPLACEMENT Right     knee     Family History   Problem Relation Age of Onset   • Heart disease Mother      Social History     Tobacco Use   • Smoking status: Never Smoker   • Smokeless tobacco: Never Used   Substance Use Topics   • Alcohol use: Not Currently   • Drug use: Never     Allergies:  Patient has no known allergies.    Objective     Vital Signs  Temp:  [98 °F (36.7 °C)-98.9 °F (37.2 °C)] 98 °F (36.7 °C)  Heart Rate:  [59-96] 93  Resp:  [16-18] 16  BP: (125-186)/(48-92) 186/80      Physical Exam:   Physical Exam  Vitals signs and nursing note reviewed.   HENT:      Head: Normocephalic and atraumatic.   Cardiovascular:      Rate and Rhythm: Normal rate.   Pulmonary:      Effort: Pulmonary effort is normal.   Abdominal:      Palpations: Abdomen is soft.   Skin:     General: Skin is warm.   Neurological:      General: No focal deficit present.       Mental Status: She is oriented to person, place, and time.              Results Review:   CBC    Results from last 7 days   Lab Units 10/21/20  2055   WBC 10*3/mm3 5.80   HEMOGLOBIN g/dL 11.3*   PLATELETS 10*3/mm3 215     BMP   Results from last 7 days   Lab Units 10/21/20  2055   SODIUM mmol/L 139   POTASSIUM mmol/L 4.8   CHLORIDE mmol/L 105   CO2 mmol/L 19.0*   BUN mg/dL 27*   CREATININE mg/dL 1.35*   GLUCOSE mg/dL 137*   MAGNESIUM mg/dL 2.3     Cr Clearance Estimated Creatinine Clearance: 35.2 mL/min (A) (by C-G formula based on SCr of 1.35 mg/dL (H)).  Coag   Results from last 7 days   Lab Units 10/21/20  2055   INR  0.99     HbA1C No results found for: HGBA1C  Blood Glucose   Glucose   Date/Time Value Ref Range Status   10/21/2020 2122 123 (H) 70 - 105 mg/dL Final     Comment:     Serial Number: [id number]:  [id number]     Infection     CMP   Results from last 7 days   Lab Units 10/21/20  2128 10/21/20  2055   SODIUM mmol/L  --  139   POTASSIUM mmol/L  --  4.8   CHLORIDE mmol/L  --  105   CO2 mmol/L  --  19.0*   BUN mg/dL  --  27*   CREATININE mg/dL  --  1.35*   GLUCOSE mg/dL  --  137*   ALBUMIN g/dL  --  4.00   BILIRUBIN mg/dL  --  0.3   ALK PHOS U/L  --  94   AST (SGOT) U/L  --  27   ALT (SGPT) U/L  --  21   AMMONIA umol/L 47  --      UA    Results from last 7 days   Lab Units 10/21/20  2145   NITRITE UA  Negative     Radiology(recent) Ct Angiogram Head    Result Date: 10/21/2020  1. No significant stenosis or occlusion of the major arteries of the head and neck. 2. MRI is more sensitive for the detection of acute nonhemorrhagic infarct.  Report called to Niurka Melchor  at 10/21/2020 11:25 PM Electronically signed by:  Ayad Mcclain M.D.  10/21/2020 9:25 PM    Ct Angiogram Neck    Result Date: 10/21/2020  1. No significant stenosis or occlusion of the major arteries of the head and neck. 2. MRI is more sensitive for the detection of acute nonhemorrhagic infarct.  Report called to  Niurka Melchor  at 10/21/2020 11:25 PM Electronically signed by:  Ayad Mcclain M.D.  10/21/2020 9:25 PM    Xr Chest 1 View    Result Date: 10/21/2020  Enlargement of the cardiac silhouette and mildly widened mediastinum, likely reflection of portable technique. There are no prior studies for comparison. When feasible, an upright PA and lateral view of the chest is recommended. No active cardiopulmonary disease.   Electronically Signed By-Aaron Mattson DO. On:10/21/2020 9:45 PM This report was finalized on [id number] by  Aaron Mattson DO..    Ct Head Without Contrast Stroke Protocol    Result Date: 10/21/2020  IMPRESSION : Small right supraorbital scalp hematoma/laceration. No acute intracranial abnormality.  Electronically Signed By-Aaron Mattson DO. On:10/21/2020 9:13 PM This report was finalized on [id number] by  Aaron Mattson DO..       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  Diabetes mellitus type 2 with renal, angiopathic and neuropathic manifestations  Morbid exogenous obesity  Atherosclerotic heart disease of native coronary arteries with angina pectoris  Paroxysmal atrial fibrillation  Chronic insomnia  Cervical spinal stenosis  Degenerative disc disease lumbosacral spine  Diabetic dyslipidemia  Long-term use of insulin  Myofascial pain syndrome  Osteoarthritis  Panlobular COPD with emphysema  Peripheral vascular disease  Venous hypertension  Vitamin D deficiency  Normocytic anemia/ACD  Polypharmacy    Plan:  Cardiac evaluation//medication modification//home if she continues to do well  Expected Length of Stay 1 days    I discussed the patients findings and my recommendations with patient and nursing staff.     Gregory Pinedo MD  10/22/20  06:50 EDT

## 2020-10-22 NOTE — NURSING NOTE
Placed call to on call NP Alejandrina Whitt due to elevated blood pressure and no admission orders at this time. Gave verbal order for 10 mg IV labetalol. Will CTM    Spoke with Dr. Pinedo at patient BS. Said to hold labetalol for now until patient gets her morning metoprolol dose. Will alert day shift RN

## 2020-10-23 ENCOUNTER — APPOINTMENT (OUTPATIENT)
Dept: CARDIOLOGY | Facility: HOSPITAL | Age: 85
End: 2020-10-23

## 2020-10-23 LAB
ANION GAP SERPL CALCULATED.3IONS-SCNC: 14 MMOL/L (ref 5–15)
BASOPHILS # BLD AUTO: 0 10*3/MM3 (ref 0–0.2)
BASOPHILS NFR BLD AUTO: 0.8 % (ref 0–1.5)
BUN SERPL-MCNC: 20 MG/DL (ref 8–23)
BUN/CREAT SERPL: 20.8 (ref 7–25)
CALCIUM SPEC-SCNC: 8.6 MG/DL (ref 8.6–10.5)
CHLORIDE SERPL-SCNC: 107 MMOL/L (ref 98–107)
CHOLEST SERPL-MCNC: 171 MG/DL (ref 0–200)
CO2 SERPL-SCNC: 19 MMOL/L (ref 22–29)
CREAT SERPL-MCNC: 0.96 MG/DL (ref 0.57–1)
DEPRECATED RDW RBC AUTO: 57.8 FL (ref 37–54)
EOSINOPHIL # BLD AUTO: 0.2 10*3/MM3 (ref 0–0.4)
EOSINOPHIL NFR BLD AUTO: 3.8 % (ref 0.3–6.2)
ERYTHROCYTE [DISTWIDTH] IN BLOOD BY AUTOMATED COUNT: 17.2 % (ref 12.3–15.4)
GFR SERPL CREATININE-BSD FRML MDRD: 56 ML/MIN/1.73
GLUCOSE BLDC GLUCOMTR-MCNC: 145 MG/DL (ref 70–105)
GLUCOSE BLDC GLUCOMTR-MCNC: 166 MG/DL (ref 70–105)
GLUCOSE BLDC GLUCOMTR-MCNC: 174 MG/DL (ref 70–105)
GLUCOSE SERPL-MCNC: 157 MG/DL (ref 65–99)
HCT VFR BLD AUTO: 32.1 % (ref 34–46.6)
HDLC SERPL-MCNC: 72 MG/DL (ref 40–60)
HGB BLD-MCNC: 10.5 G/DL (ref 12–15.9)
INR PPP: 1.03 (ref 0.93–1.1)
LDLC SERPL CALC-MCNC: 69 MG/DL (ref 0–100)
LDLC/HDLC SERPL: 0.87 {RATIO}
LYMPHOCYTES # BLD AUTO: 1.1 10*3/MM3 (ref 0.7–3.1)
LYMPHOCYTES NFR BLD AUTO: 23 % (ref 19.6–45.3)
MAGNESIUM SERPL-MCNC: 2 MG/DL (ref 1.6–2.4)
MCH RBC QN AUTO: 31.2 PG (ref 26.6–33)
MCHC RBC AUTO-ENTMCNC: 32.8 G/DL (ref 31.5–35.7)
MCV RBC AUTO: 95.2 FL (ref 79–97)
MONOCYTES # BLD AUTO: 0.4 10*3/MM3 (ref 0.1–0.9)
MONOCYTES NFR BLD AUTO: 8.3 % (ref 5–12)
NEUTROPHILS NFR BLD AUTO: 3.1 10*3/MM3 (ref 1.7–7)
NEUTROPHILS NFR BLD AUTO: 64.1 % (ref 42.7–76)
NRBC BLD AUTO-RTO: 0.1 /100 WBC (ref 0–0.2)
PLATELET # BLD AUTO: 179 10*3/MM3 (ref 140–450)
PMV BLD AUTO: 7.3 FL (ref 6–12)
POTASSIUM SERPL-SCNC: 3.8 MMOL/L (ref 3.5–5.2)
PROTHROMBIN TIME: 11.3 SECONDS (ref 9.6–11.7)
RBC # BLD AUTO: 3.37 10*6/MM3 (ref 3.77–5.28)
SODIUM SERPL-SCNC: 140 MMOL/L (ref 136–145)
TRIGL SERPL-MCNC: 181 MG/DL (ref 0–150)
TROPONIN T SERPL-MCNC: <0.01 NG/ML (ref 0–0.03)
TSH SERPL DL<=0.05 MIU/L-ACNC: 2.93 UIU/ML (ref 0.27–4.2)
VIT B12 BLD-MCNC: 173 PG/ML (ref 211–946)
VLDLC SERPL-MCNC: 30 MG/DL (ref 5–40)
WBC # BLD AUTO: 4.8 10*3/MM3 (ref 3.4–10.8)

## 2020-10-23 PROCEDURE — 99214 OFFICE O/P EST MOD 30 MIN: CPT | Performed by: INTERNAL MEDICINE

## 2020-10-23 PROCEDURE — 99152 MOD SED SAME PHYS/QHP 5/>YRS: CPT | Performed by: INTERNAL MEDICINE

## 2020-10-23 PROCEDURE — 63710000001 METOPROLOL TARTRATE 25 MG TABLET: Performed by: INTERNAL MEDICINE

## 2020-10-23 PROCEDURE — 83735 ASSAY OF MAGNESIUM: CPT | Performed by: INTERNAL MEDICINE

## 2020-10-23 PROCEDURE — A9270 NON-COVERED ITEM OR SERVICE: HCPCS | Performed by: INTERNAL MEDICINE

## 2020-10-23 PROCEDURE — 80061 LIPID PANEL: CPT | Performed by: FAMILY MEDICINE

## 2020-10-23 PROCEDURE — C1894 INTRO/SHEATH, NON-LASER: HCPCS | Performed by: INTERNAL MEDICINE

## 2020-10-23 PROCEDURE — 63710000001 ACETYLCYSTEINE 600 MG CAPSULE: Performed by: INTERNAL MEDICINE

## 2020-10-23 PROCEDURE — 82962 GLUCOSE BLOOD TEST: CPT

## 2020-10-23 PROCEDURE — 96361 HYDRATE IV INFUSION ADD-ON: CPT

## 2020-10-23 PROCEDURE — 63710000001 CLONIDINE 0.1 MG TABLET: Performed by: FAMILY MEDICINE

## 2020-10-23 PROCEDURE — 85025 COMPLETE CBC W/AUTO DIFF WBC: CPT | Performed by: INTERNAL MEDICINE

## 2020-10-23 PROCEDURE — C1769 GUIDE WIRE: HCPCS | Performed by: INTERNAL MEDICINE

## 2020-10-23 PROCEDURE — 63710000001 AMLODIPINE 5 MG TABLET: Performed by: INTERNAL MEDICINE

## 2020-10-23 PROCEDURE — 0 IOPAMIDOL PER 1 ML: Performed by: INTERNAL MEDICINE

## 2020-10-23 PROCEDURE — G0378 HOSPITAL OBSERVATION PER HR: HCPCS

## 2020-10-23 PROCEDURE — 82607 VITAMIN B-12: CPT | Performed by: FAMILY MEDICINE

## 2020-10-23 PROCEDURE — 84443 ASSAY THYROID STIM HORMONE: CPT | Performed by: FAMILY MEDICINE

## 2020-10-23 PROCEDURE — 80048 BASIC METABOLIC PNL TOTAL CA: CPT | Performed by: INTERNAL MEDICINE

## 2020-10-23 PROCEDURE — 93458 L HRT ARTERY/VENTRICLE ANGIO: CPT | Performed by: INTERNAL MEDICINE

## 2020-10-23 PROCEDURE — 63710000001 INSULIN LISPRO (HUMAN) PER 5 UNITS: Performed by: FAMILY MEDICINE

## 2020-10-23 PROCEDURE — 84484 ASSAY OF TROPONIN QUANT: CPT | Performed by: INTERNAL MEDICINE

## 2020-10-23 PROCEDURE — 33285 INSJ SUBQ CAR RHYTHM MNTR: CPT | Performed by: INTERNAL MEDICINE

## 2020-10-23 PROCEDURE — A9270 NON-COVERED ITEM OR SERVICE: HCPCS | Performed by: FAMILY MEDICINE

## 2020-10-23 PROCEDURE — 63710000001 ATORVASTATIN 10 MG TABLET: Performed by: INTERNAL MEDICINE

## 2020-10-23 PROCEDURE — 96375 TX/PRO/DX INJ NEW DRUG ADDON: CPT

## 2020-10-23 PROCEDURE — 33285 INSJ SUBQ CAR RHYTHM MNTR: CPT

## 2020-10-23 PROCEDURE — 63710000001 INSULIN LISPRO (HUMAN) PER 5 UNITS: Performed by: INTERNAL MEDICINE

## 2020-10-23 PROCEDURE — 85610 PROTHROMBIN TIME: CPT | Performed by: INTERNAL MEDICINE

## 2020-10-23 PROCEDURE — 25010000002 LORAZEPAM PER 2 MG: Performed by: FAMILY MEDICINE

## 2020-10-23 PROCEDURE — 63710000001 SPIRONOLACTONE 25 MG TABLET: Performed by: INTERNAL MEDICINE

## 2020-10-23 PROCEDURE — 25010000002 MIDAZOLAM PER 1 MG: Performed by: INTERNAL MEDICINE

## 2020-10-23 PROCEDURE — 63710000001 LOSARTAN 50 MG TABLET: Performed by: INTERNAL MEDICINE

## 2020-10-23 PROCEDURE — 25010000002 LORAZEPAM PER 2 MG: Performed by: INTERNAL MEDICINE

## 2020-10-23 PROCEDURE — 63710000001 PANTOPRAZOLE 40 MG TABLET DELAYED-RELEASE: Performed by: INTERNAL MEDICINE

## 2020-10-23 PROCEDURE — 25010000002 FENTANYL CITRATE (PF) 100 MCG/2ML SOLUTION: Performed by: INTERNAL MEDICINE

## 2020-10-23 PROCEDURE — 99153 MOD SED SAME PHYS/QHP EA: CPT | Performed by: INTERNAL MEDICINE

## 2020-10-23 RX ORDER — ONDANSETRON 4 MG/1
4 TABLET, FILM COATED ORAL EVERY 6 HOURS PRN
Status: DISCONTINUED | OUTPATIENT
Start: 2020-10-23 | End: 2020-10-24 | Stop reason: HOSPADM

## 2020-10-23 RX ORDER — MIDAZOLAM HYDROCHLORIDE 1 MG/ML
INJECTION INTRAMUSCULAR; INTRAVENOUS AS NEEDED
Status: DISCONTINUED | OUTPATIENT
Start: 2020-10-23 | End: 2020-10-23 | Stop reason: HOSPADM

## 2020-10-23 RX ORDER — CLONIDINE HYDROCHLORIDE 0.1 MG/1
0.1 TABLET ORAL ONCE AS NEEDED
Status: COMPLETED | OUTPATIENT
Start: 2020-10-23 | End: 2020-10-23

## 2020-10-23 RX ORDER — FENTANYL CITRATE 50 UG/ML
INJECTION, SOLUTION INTRAMUSCULAR; INTRAVENOUS AS NEEDED
Status: DISCONTINUED | OUTPATIENT
Start: 2020-10-23 | End: 2020-10-23 | Stop reason: HOSPADM

## 2020-10-23 RX ORDER — DIPHENHYDRAMINE HCL 25 MG
25 CAPSULE ORAL EVERY 6 HOURS PRN
Status: DISCONTINUED | OUTPATIENT
Start: 2020-10-23 | End: 2020-10-24 | Stop reason: HOSPADM

## 2020-10-23 RX ORDER — LORAZEPAM 2 MG/ML
0.25 INJECTION INTRAMUSCULAR EVERY 6 HOURS PRN
Status: DISCONTINUED | OUTPATIENT
Start: 2020-10-23 | End: 2020-10-24 | Stop reason: HOSPADM

## 2020-10-23 RX ORDER — CLONIDINE HYDROCHLORIDE 0.1 MG/1
0.1 TABLET ORAL ONCE
Status: COMPLETED | OUTPATIENT
Start: 2020-10-23 | End: 2020-10-23

## 2020-10-23 RX ORDER — ACETAMINOPHEN 325 MG/1
650 TABLET ORAL EVERY 4 HOURS PRN
Status: DISCONTINUED | OUTPATIENT
Start: 2020-10-23 | End: 2020-10-24 | Stop reason: HOSPADM

## 2020-10-23 RX ORDER — ONDANSETRON 2 MG/ML
4 INJECTION INTRAMUSCULAR; INTRAVENOUS EVERY 6 HOURS PRN
Status: DISCONTINUED | OUTPATIENT
Start: 2020-10-23 | End: 2020-10-24 | Stop reason: HOSPADM

## 2020-10-23 RX ORDER — SODIUM CHLORIDE 9 MG/ML
250 INJECTION, SOLUTION INTRAVENOUS ONCE AS NEEDED
Status: DISCONTINUED | OUTPATIENT
Start: 2020-10-23 | End: 2020-10-24 | Stop reason: HOSPADM

## 2020-10-23 RX ADMIN — CLONIDINE HYDROCHLORIDE 0.1 MG: 0.1 TABLET ORAL at 06:40

## 2020-10-23 RX ADMIN — PANTOPRAZOLE SODIUM 40 MG: 40 TABLET, DELAYED RELEASE ORAL at 08:09

## 2020-10-23 RX ADMIN — ATORVASTATIN CALCIUM 10 MG: 10 TABLET, FILM COATED ORAL at 20:42

## 2020-10-23 RX ADMIN — INSULIN LISPRO 2 UNITS: 100 INJECTION, SOLUTION INTRAVENOUS; SUBCUTANEOUS at 08:10

## 2020-10-23 RX ADMIN — AMLODIPINE BESYLATE 5 MG: 5 TABLET ORAL at 01:13

## 2020-10-23 RX ADMIN — METOPROLOL TARTRATE 25 MG: 25 TABLET, FILM COATED ORAL at 08:10

## 2020-10-23 RX ADMIN — LORAZEPAM 0.25 MG: 2 INJECTION INTRAMUSCULAR; INTRAVENOUS at 08:20

## 2020-10-23 RX ADMIN — Medication 10 ML: at 20:42

## 2020-10-23 RX ADMIN — INSULIN LISPRO 2 UNITS: 100 INJECTION, SOLUTION INTRAVENOUS; SUBCUTANEOUS at 20:49

## 2020-10-23 RX ADMIN — CLONIDINE HYDROCHLORIDE 0.1 MG: 0.1 TABLET ORAL at 02:24

## 2020-10-23 RX ADMIN — LOSARTAN POTASSIUM 100 MG: 50 TABLET, FILM COATED ORAL at 08:10

## 2020-10-23 RX ADMIN — SPIRONOLACTONE 25 MG: 25 TABLET ORAL at 08:10

## 2020-10-23 RX ADMIN — Medication 600 MG: at 08:10

## 2020-10-23 NOTE — PROGRESS NOTES
Continued Stay Note  BRIAN Stephens     Patient Name: Aracelis Vargas  MRN: [id number]  Today's Date: 10/23/2020    Admit Date: 10/21/2020    Discharge Plan     Row Name 10/23/20 1222       Plan    Plan  Home with [facility]    Plan Comments  OT recommends HHC. Spoke to patient in room with mask and goggles and she is agreeable. Chose [facility] as spouse has had them before. Referral made to [facility]. Order placed and sent per Epic      DC Barriers - Plan for Cardiac Cath today    Elsa Blackwell RN, CM  Office Phone [phone]  Cell [phone]

## 2020-10-23 NOTE — PROGRESS NOTES
Referring Provider: Gregory Pinedo MD    Reason for follow-up:  Syncope  Second-degree Mobitz type I AV block     Patient Care Team:  Gregory Pinedo MD as PCP - General (Family Medicine)    Subjective .  Feeling okay     ROS    Since I have last seen her yesterday, the patient has been without any chest discomfort ,shortness of breath, palpitations, dizziness or syncope.  Denies having any headache ,abdominal pain ,nausea, vomiting , diarrhea constipation, loss of weight or loss of appetite.  Denies having any excessive bruising ,hematuria or blood in the stool.    Review of all systems negative except as indicated    History  Past Medical History:   Diagnosis Date   • Atherosclerosis of native coronary artery of native heart without angina pectoris 10/22/2020   • Hypertension associated with stage 3 chronic kidney disease due to type 2 diabetes mellitus (CMS/[facility]) 10/22/2020   • Morbid obesity (CMS/[facility]) 10/22/2020   • Secondary hyperaldosteronism (CMS/[facility]) 10/22/2020   • Stage 3b chronic kidney disease 10/22/2020   • Type 2 diabetes mellitus with diabetic neuropathy, with long-term current use of insulin (CMS/[facility]) 10/22/2020   • Type 2 diabetes mellitus with diabetic neuropathy, with long-term current use of insulin (CMS/[facility]) 10/22/2020       Past Surgical History:   Procedure Laterality Date   • JOINT REPLACEMENT Right     Hip   • JOINT REPLACEMENT Left     hip   • JOINT REPLACEMENT Left     hip (for second time)   • JOINT REPLACEMENT Right     knee       Family History   Problem Relation Age of Onset   • Heart disease Mother        Social History     Tobacco Use   • Smoking status: Never Smoker   • Smokeless tobacco: Never Used   Substance Use Topics   • Alcohol use: Not Currently   • Drug use: Never        Medications Prior to Admission   Medication Sig Dispense Refill Last Dose   • atorvastatin (LIPITOR) 10 MG tablet Take 10 mg by mouth Daily.      • diclofenac (VOLTAREN) 1 % gel gel Apply 4 g topically to  "the appropriate area as directed 4 (Four) Times a Day As Needed.      • furosemide (LASIX) 20 MG tablet Take 20 mg by mouth Daily.      • gabapentin (NEURONTIN) 300 MG capsule Take 300 mg by mouth 2 (two) times a day.      • insulin NPH-insulin regular (humuLIN 70/30,novoLIN 70/30) (70-30) 100 UNIT/ML injection Inject  under the skin into the appropriate area as directed 2 (Two) Times a Day With Meals. Amount of units unknown      • metFORMIN (GLUCOPHAGE) 1000 MG tablet Take 1,000 mg by mouth 2 (Two) Times a Day With Meals.      • metoprolol tartrate (LOPRESSOR) 50 MG tablet Take 50 mg by mouth 3 (Three) Times a Day.      • omeprazole (priLOSEC) 40 MG capsule Take 40 mg by mouth Daily.      • spironolactone (ALDACTONE) 25 MG tablet Take 25 mg by mouth Daily.      • topiramate (TOPAMAX) 25 MG tablet Take 25 mg by mouth Daily.      • traZODone (DESYREL) 50 MG tablet Take 50 mg by mouth Every Night.      • valsartan (DIOVAN) 160 MG tablet Take 160 mg by mouth Daily.          Allergies  Patient has no known allergies.    Scheduled Meds:Acetylcysteine, 600 mg, Oral, Q12H  atorvastatin, 10 mg, Oral, Nightly  insulin lispro, 0-7 Units, Subcutaneous, 4x Daily With Meals & Nightly  losartan, 100 mg, Oral, Daily  metoprolol tartrate, 25 mg, Oral, Q12H  pantoprazole, 40 mg, Oral, QAM  spironolactone, 25 mg, Oral, Daily      Continuous Infusions:   PRN Meds:.dextrose  •  dextrose  •  glucagon (human recombinant)  •  insulin lispro **AND** insulin lispro  •  labetalol  •  sodium chloride    Objective     VITAL SIGNS  Vitals:    10/23/20 0345 10/23/20 0400 10/23/20 0620 10/23/20 0640   BP: 138/56  174/69 177/72   BP Location:   Left arm    Patient Position:   Lying    Pulse: 75 79 72    Resp:   22    Temp:   98.7 °F (37.1 °C)    TempSrc:   Oral    SpO2: 96% 96% 96%    Weight:   105 kg (231 lb 7.7 oz)    Height:           Flowsheet Rows      First Filed Value   Admission Height  170.2 cm (67\") Documented at 10/21/2020 2036 "   Admission Weight  110 kg (242 lb 4.6 oz) Documented at 10/21/2020 2036            Intake/Output Summary (Last 24 hours) at 10/23/2020 0648  Last data filed at 10/22/2020 1800  Gross per 24 hour   Intake 366 ml   Output --   Net 366 ml        TELEMETRY: Sinus rhythm with Mobitz type I second-degree AV block    Physical Exam:  The patient is alert, oriented and in no distress.  Vital signs as noted above.  Exogenous obesity.  Head and neck revealed no carotid bruits or jugular venous distention.  No thyromegaly or lymphadenopathy is present  Lungs clear.  No wheezing.  Breath sounds are normal bilaterally.  Heart normal first and second heart sounds.  No murmur. No precordial rub is present.  No gallop is present.  Abdomen soft and nontender.  No organomegaly is present.  Extremities with good peripheral pulses without any pedal edema.  Skin warm and dry.  Musculoskeletal system is grossly normal  CNS grossly normal      Results Review:   I reviewed the patient's new clinical results.  Lab Results (last 24 hours)     Procedure Component Value Units Date/Time    Lipid Panel [[id number]]  (Abnormal) Collected: 10/23/20 0440    Specimen: Blood Updated: 10/23/20 0644     Total Cholesterol 171 mg/dL      Triglycerides 181 mg/dL      HDL Cholesterol 72 mg/dL      LDL Cholesterol  69 mg/dL      VLDL Cholesterol 30 mg/dL      LDL/HDL Ratio 0.87    Narrative:      Cholesterol Reference Ranges  (U.S. Department of Health and Human Services ATP III Classifications)    Desirable          <200 mg/dL  Borderline High    200-239 mg/dL  High Risk          >240 mg/dL      Triglyceride Reference Ranges  (U.S. Department of Health and Human Services ATP III Classifications)    Normal           <150 mg/dL  Borderline High  150-199 mg/dL  High             200-499 mg/dL  Very High        >500 mg/dL    HDL Reference Ranges  (U.S. Department of Health and Human Services ATP III Classifcations)    Low     <40 mg/dl (major risk factor for  CHD)  High    >60 mg/dl ('negative' risk factor for CHD)        LDL Reference Ranges  (U.S. Department of Health and Human Services ATP III Classifcations)    Optimal          <100 mg/dL  Near Optimal     100-129 mg/dL  Borderline High  130-159 mg/dL  High             160-189 mg/dL  Very High        >189 mg/dL    TSH [[id number]]  (Normal) Collected: 10/23/20 0440    Specimen: Blood Updated: 10/23/20 0604     TSH 2.930 uIU/mL     Troponin [[id number]]  (Normal) Collected: 10/23/20 0440    Specimen: Blood Updated: 10/23/20 0604     Troponin T <0.010 ng/mL     Narrative:      Troponin T Reference Range:  <= 0.03 ng/mL-   Negative for AMI  >0.03 ng/mL-     Abnormal for myocardial necrosis.  Clinicians would have to utilize clinical acumen, EKG, Troponin and serial changes to determine if it is an Acute Myocardial Infarction or myocardial injury due to an underlying chronic condition.       Results may be falsely decreased if patient taking Biotin.      Basic Metabolic Panel [[id number]]  (Abnormal) Collected: 10/23/20 0440    Specimen: Blood Updated: 10/23/20 0601     Glucose 157 mg/dL      BUN 20 mg/dL      Creatinine 0.96 mg/dL      Sodium 140 mmol/L      Potassium 3.8 mmol/L      Chloride 107 mmol/L      CO2 19.0 mmol/L      Calcium 8.6 mg/dL      eGFR Non African Amer 56 mL/min/1.73      BUN/Creatinine Ratio 20.8     Anion Gap 14.0 mmol/L     Narrative:      GFR Normal >60  Chronic Kidney Disease <60  Kidney Failure <15      Magnesium [[id number]]  (Normal) Collected: 10/23/20 0440    Specimen: Blood Updated: 10/23/20 0601     Magnesium 2.0 mg/dL     Protime-INR [[id number]]  (Normal) Collected: 10/23/20 0440    Specimen: Blood Updated: 10/23/20 0544     Protime 11.3 Seconds      INR 1.03    CBC & Differential [[id number]]  (Abnormal) Collected: 10/23/20 0440    Specimen: Blood Updated: 10/23/20 0537    Narrative:      The following orders were created for panel order CBC & Differential.  Procedure                                Abnormality         Status                     ---------                               -----------         ------                     CBC Auto Differential[[id number]]        Abnormal            Final result                 Please view results for these tests on the individual orders.    CBC Auto Differential [[id number]]  (Abnormal) Collected: 10/23/20 0440    Specimen: Blood Updated: 10/23/20 0537     WBC 4.80 10*3/mm3      RBC 3.37 10*6/mm3      Hemoglobin 10.5 g/dL      Hematocrit 32.1 %      MCV 95.2 fL      MCH 31.2 pg      MCHC 32.8 g/dL      RDW 17.2 %      RDW-SD 57.8 fl      MPV 7.3 fL      Platelets 179 10*3/mm3      Neutrophil % 64.1 %      Lymphocyte % 23.0 %      Monocyte % 8.3 %      Eosinophil % 3.8 %      Basophil % 0.8 %      Neutrophils, Absolute 3.10 10*3/mm3      Lymphocytes, Absolute 1.10 10*3/mm3      Monocytes, Absolute 0.40 10*3/mm3      Eosinophils, Absolute 0.20 10*3/mm3      Basophils, Absolute 0.00 10*3/mm3      nRBC 0.1 /100 WBC     Vitamin B12 [[id number]] Collected: 10/23/20 0440    Specimen: Blood Updated: 10/23/20 0526    POC Glucose Once [[id number]]  (Abnormal) Collected: 10/22/20 2035    Specimen: Blood Updated: 10/22/20 2038     Glucose 162 mg/dL      Comment: Serial Number: [id number]:  [id number]       Hemoglobin A1c [[id number]]  (Abnormal) Collected: 10/21/20 2055    Specimen: Blood Updated: 10/22/20 1801     Hemoglobin A1C 6.2 %     Narrative:      Hemoglobin A1C Reference Range:    <5.7 %        Normal  5.7-6.4 %     Increased risk for diabetes  > 6.4 %        Diabetes       These guidelines have been recommended by the American Diabetic Association for Hgb A1c.      The following 2010 guidelines have been recommended by the American Diabetes Association for Hemoglobin A1c.    HBA1c 5.7-6.4% Increased risk for future diabetes (pre-diabetes)  HBA1c     >6.4% Diabetes      POC Glucose Once [[id number]]  (Abnormal) Collected: 10/22/20 1636    Specimen: Blood Updated:  10/22/20 1640     Glucose 157 mg/dL      Comment: Serial Number: [id number]:  [id number]       Magnesium [[id number]]  (Normal) Collected: 10/22/20 1340    Specimen: Blood Updated: 10/22/20 1439     Magnesium 1.9 mg/dL     Troponin [[id number]]  (Normal) Collected: 10/22/20 0854    Specimen: Blood Updated: 10/22/20 1018     Troponin T <0.010 ng/mL     Narrative:      Troponin T Reference Range:  <= 0.03 ng/mL-   Negative for AMI  >0.03 ng/mL-     Abnormal for myocardial necrosis.  Clinicians would have to utilize clinical acumen, EKG, Troponin and serial changes to determine if it is an Acute Myocardial Infarction or myocardial injury due to an underlying chronic condition.       Results may be falsely decreased if patient taking Biotin.      POC Glucose Once [[id number]]  (Abnormal) Collected: 10/22/20 0832    Specimen: Blood Updated: 10/22/20 0833     Glucose 132 mg/dL      Comment: Serial Number: [id number]:  [id number]             Imaging Results (Last 24 Hours)     ** No results found for the last 24 hours. **      LAB RESULTS (LAST 7 DAYS)    CBC  Results from last 7 days   Lab Units 10/23/20  0440 10/21/20  2055   WBC 10*3/mm3 4.80 5.80   RBC 10*6/mm3 3.37* 3.66*   HEMOGLOBIN g/dL 10.5* 11.3*   HEMATOCRIT % 32.1* 35.3   MCV fL 95.2 96.4   PLATELETS 10*3/mm3 179 215       BMP  Results from last 7 days   Lab Units 10/23/20  0440 10/22/20  1340 10/21/20  2055   SODIUM mmol/L 140  --  139   POTASSIUM mmol/L 3.8  --  4.8   CHLORIDE mmol/L 107  --  105   CO2 mmol/L 19.0*  --  19.0*   BUN mg/dL 20  --  27*   CREATININE mg/dL 0.96  --  1.35*   GLUCOSE mg/dL 157*  --  137*   MAGNESIUM mg/dL 2.0 1.9 2.3       CMP   Results from last 7 days   Lab Units 10/23/20  0440 10/21/20  2128 10/21/20  2055   SODIUM mmol/L 140  --  139   POTASSIUM mmol/L 3.8  --  4.8   CHLORIDE mmol/L 107  --  105   CO2 mmol/L 19.0*  --  19.0*   BUN mg/dL 20  --  27*   CREATININE mg/dL 0.96  --  1.35*   GLUCOSE mg/dL 157*  --  137*    ALBUMIN g/dL  --   --  4.00   BILIRUBIN mg/dL  --   --  0.3   ALK PHOS U/L  --   --  94   AST (SGOT) U/L  --   --  27   ALT (SGPT) U/L  --   --  21   AMMONIA umol/L  --  47  --          BNP        TROPONIN  Results from last 7 days   Lab Units 10/23/20  0440  10/21/20  2055   CK TOTAL U/L  --   --  168   TROPONIN T ng/mL <0.010   < > <0.010    < > = values in this interval not displayed.       CoAg  Results from last 7 days   Lab Units 10/23/20  0440 10/21/20  2055   INR  1.03 0.99       Creatinine Clearance  Estimated Creatinine Clearance: 49.6 mL/min (by C-G formula based on SCr of 0.96 mg/dL).    ABG  Results from last 7 days   Lab Units 10/21/20  2126   PH, ARTERIAL pH units 7.360   PCO2, ARTERIAL mm Hg 35.5   PO2 ART mm Hg 75.7*   O2 SATURATION ART % 94.6   BASE EXCESS ART mmol/L -4.8*       Radiology  Ct Angiogram Head    Result Date: 10/21/2020  1. No significant stenosis or occlusion of the major arteries of the head and neck. 2. MRI is more sensitive for the detection of acute nonhemorrhagic infarct.  Report called to Niurka Melchor  at 10/21/2020 11:25 PM Electronically signed by:  Ayad Mcclain M.D.  10/21/2020 9:25 PM    Ct Angiogram Neck    Result Date: 10/21/2020  1. No significant stenosis or occlusion of the major arteries of the head and neck. 2. MRI is more sensitive for the detection of acute nonhemorrhagic infarct.  Report called to Niurka Melchor  at 10/21/2020 11:25 PM Electronically signed by:  Ayad Mcclain M.D.  10/21/2020 9:25 PM    Xr Chest 1 View    Result Date: 10/21/2020  Enlargement of the cardiac silhouette and mildly widened mediastinum, likely reflection of portable technique. There are no prior studies for comparison. When feasible, an upright PA and lateral view of the chest is recommended. No active cardiopulmonary disease.   Electronically Signed By-Aaron Mattson DO. On:10/21/2020 9:45 PM This report was finalized on [id number] by  Aaron Mattson,  .    Ct Head Without Contrast Stroke Protocol    Result Date: 10/21/2020  IMPRESSION : Small right supraorbital scalp hematoma/laceration. No acute intracranial abnormality.  Electronically Signed By-Aaron Mattson DO. On:10/21/2020 9:13 PM This report was finalized on [id number] by  Aaron Mattson DO..              EKG          I personally viewed and interpreted the patient's EKG/Telemetry data: Sinus rhythm Mobitz type I second-degree AV block    ECHOCARDIOGRAM:    Results for orders placed during the hospital encounter of 10/21/20   Adult Transthoracic Echo Complete W/ Cont if Necessary Per Protocol    Narrative · Estimated left ventricular EF = 60% Left ventricular systolic function   is normal.     Indications  Syncope    Technically satisfactory study.  Mitral valve is structurally normal.  Tricuspid valve is structurally normal.  Aortic valve is structurally normal.  Pulmonic valve could not be well visualized.  No evidence for mitral tricuspid or aortic regurgitation is seen by   Doppler study.  Left atrium is normal in size.  Right atrium is normal in size.  Left ventricle is normal in size and contractility with ejection fraction   of 60%.  Right ventricle is normal in size.  Atrial septum is intact.  Aorta is normal.  No pericardial effusion or intracardiac thrombus is seen.    Impression  Structurally and functionally normal cardiac valves.  Left ventricular size and contractility is normal with ejection fraction   of 60%.           STRESS MYOVIEW:    Cardiolite (Tc-99m Sestamibi) stress test    CARDIAC CATHETERIZATION:            OTHER:         Assessment/Plan     Principal Problem:    Syncope and collapse  Active Problems:    Atrioventricular block, Mobitz type 1, Wenckebach    Abnormal nuclear stress test    Fall    DM type 2 with diabetic dyslipidemia (CMS/HCC)    Secondary hyperaldosteronism (CMS/HCC)    Stage 3b chronic kidney disease    Hypertension associated with stage 3 chronic kidney  disease due to type 2 diabetes mellitus (CMS/HCC)    Type 2 diabetes mellitus with diabetic neuropathy, with long-term current use of insulin (CMS/HCC)    Morbid obesity (CMS/HCC)    Atherosclerosis of native coronary artery of native heart without angina pectoris        ]]]]]]]]]]]]]]]]]]]]  Impression  ========  -Possible syncope.  Appears to be true syncope.  Concerned about advanced AV blocks     -Second-degree Mobitz type I AV block.  Narrow QRS complex.     -History of paroxysmal atrial fibrillation     -Hypertension dyslipidemia diabetes     -Renal dysfunction CKD 3  BUN 27 creatinine 1.35     -Exogenous obesity.     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     -No known allergies  ============  Plan  =========  Patient had probably a true syncopal episode likely due to advanced AV blocks.  In the ER EKG showed Mobitz type I second-degree AV block although patient is having one-to-one conduction at this time.  Discontinue metoprolol  Antihypertensive medications that would not have effect on causing bradycardia.    Echocardiogram-normal    Highly suspicious for advanced AV block's causing her syncopal episode.     Renal dysfunction probably due to syncopal episode although she has problems with diabetes and hypertension etc.  Renal function has improved with IV fluids.  BUN 20 creatinine 0.96    Abnormal stress Cardiolite test.  Patient to have cardiac catheterization coronary arteriography.  Risks and benefits pros and cons of the procedure were discussed with patient.  Renal precautions.      Close cardiac monitoring.     Further plan will depend on patient's progress.  ]]]]]]]]]]]]]]      Cardiac catheterization 10/23/2020 revealed:  Left ventricular size and contractility is normal with ejection fraction of 60%.  Normal epicardial coronary arteries.    RECOMMENDATIONS:  Patient presented with syncope and has Mobitz type I second-degree AV block.  Likely syncope  is due to advanced AV blocks although no advanced AV blocks were documented so far.  Patient has been on metoprolol as an outpatient which is discontinued.  Options will be discussed further that included placement of loop recorder versus permanent dual-chamber pacemaker.  Patient will have loop recorder placement.    ]]]]]]]]]]]]]]]]]]]]]]          Wang Plaza MD  10/23/20  06:48 EDT

## 2020-10-23 NOTE — PROGRESS NOTES
LOS: 0 days   Patient Care Team:  Gregory Pinedo MD as PCP - General (Family Medicine)    Subjective:  No chest pain but worried about impending procedure  Objective:   Myoview reviewed//afebrile      Review of Systems:   Review of Systems   Constitutional: Positive for activity change.   Respiratory: Negative.    Cardiovascular: Positive for chest pain.   Gastrointestinal: Negative.    Genitourinary: Negative.    Musculoskeletal: Positive for back pain.   Psychiatric/Behavioral: The patient is nervous/anxious.            Vital Signs  Temp:  [98.3 °F (36.8 °C)-98.8 °F (37.1 °C)] 98.7 °F (37.1 °C)  Heart Rate:  [59-98] 72  Resp:  [14-22] 22  BP: (138-232)/(56-94) 177/72    Physical Exam:  Physical Exam  Vitals signs and nursing note reviewed.   Constitutional:       Appearance: Normal appearance.   Cardiovascular:      Rate and Rhythm: Rhythm irregular.   Pulmonary:      Effort: Pulmonary effort is normal.   Skin:     General: Skin is warm.   Neurological:      General: No focal deficit present.      Mental Status: She is alert and oriented to person, place, and time.          Radiology:  Ct Angiogram Head    Result Date: 10/21/2020  1. No significant stenosis or occlusion of the major arteries of the head and neck. 2. MRI is more sensitive for the detection of acute nonhemorrhagic infarct.  Report called to Niurka Melchor  at 10/21/2020 11:25 PM Electronically signed by:  Ayad Mcclain M.D.  10/21/2020 9:25 PM    Ct Angiogram Neck    Result Date: 10/21/2020  1. No significant stenosis or occlusion of the major arteries of the head and neck. 2. MRI is more sensitive for the detection of acute nonhemorrhagic infarct.  Report called to Niurka Melchor  at 10/21/2020 11:25 PM Electronically signed by:  Ayad Mcclain M.D.  10/21/2020 9:25 PM    Xr Chest 1 View    Result Date: 10/21/2020  Enlargement of the cardiac silhouette and mildly widened mediastinum, likely reflection of portable  technique. There are no prior studies for comparison. When feasible, an upright PA and lateral view of the chest is recommended. No active cardiopulmonary disease.   Electronically Signed By-Aaron Mattson DO. On:10/21/2020 9:45 PM This report was finalized on [id number] by  Aaron Mattson DO..    Ct Head Without Contrast Stroke Protocol    Result Date: 10/21/2020  IMPRESSION : Small right supraorbital scalp hematoma/laceration. No acute intracranial abnormality.  Electronically Signed By-Aaron Mattson DO. On:10/21/2020 9:13 PM This report was finalized on [id number] by  Aaron Mattson DO..         Results Review:     I reviewed the patient's new clinical results.  I reviewed the patient's new imaging results and agree with the interpretation.    Medication Review:   Scheduled Meds:Acetylcysteine, 600 mg, Oral, Q12H  atorvastatin, 10 mg, Oral, Nightly  insulin lispro, 0-7 Units, Subcutaneous, 4x Daily With Meals & Nightly  losartan, 100 mg, Oral, Daily  metoprolol tartrate, 25 mg, Oral, Q12H  pantoprazole, 40 mg, Oral, QAM  spironolactone, 25 mg, Oral, Daily      Continuous Infusions:   PRN Meds:.dextrose  •  dextrose  •  glucagon (human recombinant)  •  insulin lispro **AND** insulin lispro  •  labetalol  •  sodium chloride    Labs:    CBC    Results from last 7 days   Lab Units 10/23/20  0440 10/21/20  2055   WBC 10*3/mm3 4.80 5.80   HEMOGLOBIN g/dL 10.5* 11.3*   PLATELETS 10*3/mm3 179 215     BMP   Results from last 7 days   Lab Units 10/23/20  0440 10/22/20  1340 10/21/20  2055   SODIUM mmol/L 140  --  139   POTASSIUM mmol/L 3.8  --  4.8   CHLORIDE mmol/L 107  --  105   CO2 mmol/L 19.0*  --  19.0*   BUN mg/dL 20  --  27*   CREATININE mg/dL 0.96  --  1.35*   GLUCOSE mg/dL 157*  --  137*   MAGNESIUM mg/dL 2.0 1.9 2.3     Cr Clearance Estimated Creatinine Clearance: 49.6 mL/min (by C-G formula based on SCr of 0.96 mg/dL).  Coag   Results from last 7 days   Lab Units 10/23/20  0440 10/21/20  2971   INR   1.03 0.99     HbA1C   Lab Results   Component Value Date    HGBA1C 6.2 (H) 10/21/2020     Blood Glucose   Glucose   Date/Time Value Ref Range Status   10/23/2020 0713 166 (H) 70 - 105 mg/dL Final     Comment:     Serial Number: [id number]:  [id number]   10/22/2020 2035 162 (H) 70 - 105 mg/dL Final     Comment:     Serial Number: [id number]:  [id number]   10/22/2020 1636 157 (H) 70 - 105 mg/dL Final     Comment:     Serial Number: [id number]:  [id number]   10/22/2020 0832 132 (H) 70 - 105 mg/dL Final     Comment:     Serial Number: [id number]:  [id number]   10/21/2020 2122 123 (H) 70 - 105 mg/dL Final     Comment:     Serial Number: [id number]:  [id number]     Infection     CMP   Results from last 7 days   Lab Units 10/23/20  0440 10/21/20  2128 10/21/20  2055   SODIUM mmol/L 140  --  139   POTASSIUM mmol/L 3.8  --  4.8   CHLORIDE mmol/L 107  --  105   CO2 mmol/L 19.0*  --  19.0*   BUN mg/dL 20  --  27*   CREATININE mg/dL 0.96  --  1.35*   GLUCOSE mg/dL 157*  --  137*   ALBUMIN g/dL  --   --  4.00   BILIRUBIN mg/dL  --   --  0.3   ALK PHOS U/L  --   --  94   AST (SGOT) U/L  --   --  27   ALT (SGPT) U/L  --   --  21   AMMONIA umol/L  --  47  --      UA    Results from last 7 days   Lab Units 10/21/20  2145   NITRITE UA  Negative     Radiology(recent) Ct Angiogram Head    Result Date: 10/21/2020  1. No significant stenosis or occlusion of the major arteries of the head and neck. 2. MRI is more sensitive for the detection of acute nonhemorrhagic infarct.  Report called to Niurka Melchor  at 10/21/2020 11:25 PM Electronically signed by:  Ayad Mcclain M.D.  10/21/2020 9:25 PM    Ct Angiogram Neck    Result Date: 10/21/2020  1. No significant stenosis or occlusion of the major arteries of the head and neck. 2. MRI is more sensitive for the detection of acute nonhemorrhagic infarct.  Report called to Niurka Melchor  at 10/21/2020 11:25 PM Electronically signed by:  Ayad FORBES  SHANTA Mcclain  10/21/2020 9:25 PM    Xr Chest 1 View    Result Date: 10/21/2020  Enlargement of the cardiac silhouette and mildly widened mediastinum, likely reflection of portable technique. There are no prior studies for comparison. When feasible, an upright PA and lateral view of the chest is recommended. No active cardiopulmonary disease.   Electronically Signed By-Aaron Mattson DO. On:10/21/2020 9:45 PM This report was finalized on [id number] by  Aaron Mattson DO..    Ct Head Without Contrast Stroke Protocol    Result Date: 10/21/2020  IMPRESSION : Small right supraorbital scalp hematoma/laceration. No acute intracranial abnormality.  Electronically Signed By-Aaron Mattson DO. On:10/21/2020 9:13 PM This report was finalized on [id number] by  Aaron Mattson DO..     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  Diabetes mellitus type 2 with renal, angiopathic and neuropathic manifestations  Morbid exogenous obesity  Atherosclerotic heart disease of native coronary arteries with angina pectoris  Paroxysmal atrial fibrillation  Chronic insomnia  Cervical spinal stenosis  Degenerative disc disease lumbosacral spine  Diabetic dyslipidemia  Long-term use of insulin  Myofascial pain syndrome  Osteoarthritis  Panlobular COPD with emphysema  Peripheral vascular disease  Venous hypertension  Vitamin D deficiency  Normocytic anemia/ACD  Polypharmacy    Plan:  Elective coronary catheterization today        Gregory Pinedo MD  10/23/20  07:56 EDT

## 2020-10-23 NOTE — PLAN OF CARE
Goal Outcome Evaluation:  Plan of Care Reviewed With: patient    Cath lab planned for about 11:30 am today, consent signed.    Patient remains a falls risk due to recent fall at home, skin intact with blanchable redness to the coccyx, and scattered bruising and scabs from recent fall.    NIH = 0 this morning.    Problem: Sensorimotor Impairment (Stroke, Ischemic/Transient Ischemic Attack)  Goal: Improved Sensorimotor Function  Outcome: Ongoing, Progressing  Intervention: Optimize Range of Motion, Motor Control and Function  Recent Flowsheet Documentation  Taken 10/23/2020 0915 by Mila Chavez RN  Positioning/Transfer Devices:   pillows   in use     Problem: Adult Inpatient Plan of Care  Goal: Absence of Hospital-Acquired Illness or Injury  Intervention: Prevent Skin Injury  Recent Flowsheet Documentation  Taken 10/23/2020 0915 by Mila Chavez RN  Body Position: position changed independently     Problem: Adult Inpatient Plan of Care  Goal: Absence of Hospital-Acquired Illness or Injury  Intervention: Identify and Manage Fall Risk  Recent Flowsheet Documentation  Taken 10/23/2020 0915 by Mila Chavez RN  Safety Promotion/Fall Prevention:   assistive device/personal items within reach   clutter free environment maintained   fall prevention program maintained   nonskid shoes/slippers when out of bed   safety round/check completed  Taken 10/23/2020 0800 by Mila Chavez RN  Safety Promotion/Fall Prevention:   assistive device/personal items within reach   clutter free environment maintained   fall prevention program maintained   nonskid shoes/slippers when out of bed   safety round/check completed     Problem: Fall Injury Risk  Goal: Absence of Fall and Fall-Related Injury  Outcome: Ongoing, Progressing  Intervention: Identify and Manage Contributors to Fall Injury Risk  Recent Flowsheet Documentation  Taken 10/23/2020 0915 by Mila Chavez RN  Medication Review/Management: medications reviewed  Taken 10/23/2020  0800 by Mila Chavez, RN  Medication Review/Management: medications reviewed  Intervention: Promote Injury-Free Environment  Recent Flowsheet Documentation  Taken 10/23/2020 0915 by Mila Chavez RN  Safety Promotion/Fall Prevention:   assistive device/personal items within reach   clutter free environment maintained   fall prevention program maintained   nonskid shoes/slippers when out of bed   safety round/check completed  Taken 10/23/2020 0800 by Mila Chavez RN  Safety Promotion/Fall Prevention:   assistive device/personal items within reach   clutter free environment maintained   fall prevention program maintained   nonskid shoes/slippers when out of bed   safety round/check completed

## 2020-10-23 NOTE — PLAN OF CARE
Pt had abnormal stress test, scheduled for cardiac cath 10/23.      Problem: Adult Inpatient Plan of Care  Goal: Plan of Care Review  Outcome: Ongoing, Progressing  Goal: Patient-Specific Goal (Individualized)  Outcome: Ongoing, Progressing  Goal: Absence of Hospital-Acquired Illness or Injury  Outcome: Ongoing, Progressing  Intervention: Identify and Manage Fall Risk  Recent Flowsheet Documentation  Taken 10/22/2020 2347 by Humberto Browne RN  Safety Promotion/Fall Prevention:   assistive device/personal items within reach   clutter free environment maintained   fall prevention program maintained   nonskid shoes/slippers when out of bed   safety round/check completed  Taken 10/22/2020 2001 by Humberto Browne RN  Safety Promotion/Fall Prevention:   assistive device/personal items within reach   clutter free environment maintained   fall prevention program maintained   nonskid shoes/slippers when out of bed   room organization consistent  Intervention: Prevent Skin Injury  Recent Flowsheet Documentation  Taken 10/22/2020 2347 by Humberto Browne RN  Body Position: position changed independently  Taken 10/22/2020 2001 by Humberto Browne RN  Body Position: position changed independently  Intervention: Prevent and Manage VTE (venous thromboembolism) Risk  Recent Flowsheet Documentation  Taken 10/22/2020 2347 by Humberto Browne RN  VTE Prevention/Management:   bilateral   sequential compression devices on  Taken 10/22/2020 2001 by Humberto Browne RN  VTE Prevention/Management:   bilateral   sequential compression devices on  Intervention: Prevent Infection  Recent Flowsheet Documentation  Taken 10/22/2020 2347 by Humberto Browne RN  Infection Prevention:   environmental surveillance performed   rest/sleep promoted   single patient room provided  Taken 10/22/2020 2001 by Humberto Browne RN  Infection Prevention:   environmental surveillance performed   hand hygiene promoted   rest/sleep promoted   single patient  room provided  Goal: Optimal Comfort and Wellbeing  Outcome: Ongoing, Progressing  Intervention: Provide Person-Centered Care  Recent Flowsheet Documentation  Taken 10/22/2020 2347 by Humberto Browne RN  Trust Relationship/Rapport:   care explained   choices provided   emotional support provided   empathic listening provided   questions answered   questions encouraged   reassurance provided   thoughts/feelings acknowledged  Taken 10/22/2020 2001 by Humberto Browne RN  Trust Relationship/Rapport:   care explained   choices provided   emotional support provided   empathic listening provided   questions answered   questions encouraged   reassurance provided   thoughts/feelings acknowledged  Goal: Readiness for Transition of Care  Outcome: Ongoing, Progressing     Problem: Fall Injury Risk  Goal: Absence of Fall and Fall-Related Injury  Outcome: Ongoing, Progressing  Intervention: Identify and Manage Contributors to Fall Injury Risk  Recent Flowsheet Documentation  Taken 10/22/2020 2347 by Humberto Browne RN  Medication Review/Management: medications reviewed  Self-Care Promotion:   independence encouraged   BADL personal objects within reach  Taken 10/22/2020 2001 by Humberto Browne RN  Medication Review/Management: medications reviewed  Self-Care Promotion:   independence encouraged   BADL personal objects within reach  Intervention: Promote Injury-Free Environment  Recent Flowsheet Documentation  Taken 10/22/2020 2347 by Humberto Browne RN  Safety Promotion/Fall Prevention:   assistive device/personal items within reach   clutter free environment maintained   fall prevention program maintained   nonskid shoes/slippers when out of bed   safety round/check completed  Taken 10/22/2020 2001 by Humberto Browne RN  Safety Promotion/Fall Prevention:   assistive device/personal items within reach   clutter free environment maintained   fall prevention program maintained   nonskid shoes/slippers when out of bed   room  organization consistent     Problem: Adjustment to Illness (Stroke, Ischemic/Transient Ischemic Attack)  Goal: Optimal Coping  Outcome: Ongoing, Progressing  Intervention: Support Patient/Family Psychosocial Response to Stroke  Recent Flowsheet Documentation  Taken 10/22/2020 2347 by Humberto Browne RN  Supportive Measures:   active listening utilized   decision-making supported   relaxation techniques promoted   self-care encouraged  Family/Support System Care: self-care encouraged  Taken 10/22/2020 2001 by Humberto Browne RN  Supportive Measures:   active listening utilized   decision-making supported   relaxation techniques promoted   self-care encouraged  Family/Support System Care:   self-care encouraged   support provided     Problem: Bowel Elimination Impaired (Stroke, Ischemic/Transient Ischemic Attack)  Goal: Effective Bowel Elimination  Outcome: Ongoing, Progressing     Problem: Cerebral Tissue Perfusion Risk (Stroke, Ischemic/Transient Ischemic Attack)  Goal: Optimal Cerebral Tissue Perfusion  Outcome: Ongoing, Progressing  Intervention: Protect and Optimize Cerebral Perfusion  Recent Flowsheet Documentation  Taken 10/22/2020 2347 by Humberto Browne RN  Sensory Stimulation Regulation:   care clustered   quiet environment promoted  Cerebral Perfusion Promotion: blood pressure monitored  Taken 10/22/2020 2001 by Humberto Browne RN  Sensory Stimulation Regulation:   care clustered   quiet environment promoted  Cerebral Perfusion Promotion: blood pressure monitored  Intervention: Optimize Oxygenation and Ventilation  Recent Flowsheet Documentation  Taken 10/22/2020 2347 by Humberto Browne RN  Head of Bed (HOB): HOB elevated  Taken 10/22/2020 2001 by Humberto Browne RN  Head of Bed (HOB): HOB elevated     Problem: Communication Impairment (Stroke, Ischemic/Transient Ischemic Attack)  Goal: Improved Communication Skills  Outcome: Ongoing, Progressing  Intervention: Optimize Cognitive and Communication  Skills  Recent Flowsheet Documentation  Taken 10/22/2020 2347 by Humberto Browne RN  Reorientation Measures:   clock in view   reorientation provided  Environment Familiarity/Consistency: daily routine followed  Communication Enhancement Strategies: call light answered in person  Taken 10/22/2020 2001 by Humberto Browne RN  Reorientation Measures: clock in view  Environment Familiarity/Consistency: daily routine followed  Communication Enhancement Strategies: call light answered in person     Problem: Eating/Swallowing Impairment (Stroke, Ischemic/Transient Ischemic Attack)  Goal: Oral Intake without Aspiration  Outcome: Ongoing, Progressing  Intervention: Optimize Eating and Swallowing  Recent Flowsheet Documentation  Taken 10/22/2020 2347 by Humberto Browne RN  Aspiration Precautions: awake/alert before oral intake  Taken 10/22/2020 2001 by Humberto Browne RN  Aspiration Precautions: awake/alert before oral intake     Problem: Functional Ability Impaired (Stroke, Ischemic/Transient Ischemic Attack)  Goal: Optimal Functional Ability  Outcome: Ongoing, Progressing  Intervention: Optimize Functional Ability  Recent Flowsheet Documentation  Taken 10/22/2020 2347 by Humberto Browne RN  Activity Management: up ad sue  Self-Care Promotion:   independence encouraged   BADL personal objects within reach  Taken 10/22/2020 2001 by Humberto Browne RN  Activity Management: up ad sue  Self-Care Promotion:   independence encouraged   BADL personal objects within reach     Problem: Hemodynamic Instability (Stroke, Ischemic/Transient Ischemic Attack)  Goal: Vital Signs Remain in Desired Range  Outcome: Ongoing, Progressing  Intervention: Optimize Blood Flow  Recent Flowsheet Documentation  Taken 10/22/2020 2347 by Humberto Browne RN  Fluid/Electrolyte Management: fluids provided  Taken 10/22/2020 2001 by Humberto Browne RN  Fluid/Electrolyte Management: fluids provided     Problem: Pain (Stroke, Ischemic/Transient Ischemic  Attack)  Goal: Acceptable Pain Control  Outcome: Ongoing, Progressing     Problem: Sensorimotor Impairment (Stroke, Ischemic/Transient Ischemic Attack)  Goal: Improved Sensorimotor Function  Outcome: Ongoing, Progressing  Intervention: Optimize Range of Motion, Motor Control and Function  Recent Flowsheet Documentation  Taken 10/22/2020 2347 by Humberto Browne RN  Positioning/Transfer Devices:   pillows   in use  Range of Motion: active ROM (range of motion) encouraged  Taken 10/22/2020 2001 by Humberto Browne RN  Positioning/Transfer Devices:   pillows   in use  Range of Motion: active ROM (range of motion) encouraged  Intervention: Optimize Sensory and Perceptual Abilities  Recent Flowsheet Documentation  Taken 10/22/2020 2347 by Humberto Browne RN  Pressure Reduction Techniques: frequent weight shift encouraged  Sensation Impairment Protection: cues provided for safety  Pressure Reduction Devices: pressure-redistributing mattress utilized  Taken 10/22/2020 2001 by Humberto Browne RN  Pressure Reduction Techniques: frequent weight shift encouraged  Sensation Impairment Protection: cues provided for safety  Pressure Reduction Devices: pressure-redistributing mattress utilized     Problem: Urinary Elimination Impaired (Stroke, Ischemic/Transient Ischemic Attack)  Goal: Effective Urinary Elimination  Outcome: Ongoing, Progressing   Goal Outcome Evaluation:  Plan of Care Reviewed With: patient

## 2020-10-23 NOTE — DISCHARGE PLACEMENT REQUEST
"Sean Vargas (85 y.o. Female)     Date of Birth Social Security Number Address Home Phone MRN    1935  [id number] [facility] [address] [phone] [id number]    Anabaptism Marital Status          [facility]        Admission Date Admission Type Admitting Provider Attending Provider Department, Room/Bed    10/21/20 Emergency Gregory Pinedo MD Heimer, Brian T, MD [facility] OPCV, Pool/OPCV    Discharge Date Discharge Disposition Discharge Destination                       Attending Provider: Gregory Pinedo MD    Allergies: No Known Allergies    Isolation: None   Infection: None   Code Status: CPR    Ht: 154.9 cm (61\")   Wt: 105 kg (231 lb 7.7 oz)    Admission Cmt: None   Principal Problem: Syncope and collapse [R55]                 Active Insurance as of 10/21/2020     Primary Coverage     Payor Plan Insurance Group Employer/Plan Group    MEDICARE MEDICARE A & B      Payor Plan Address Payor Plan Phone Number Payor Plan Fax Number Effective Dates    [address] [phone]  3/1/2002 - None Entered    [facility] [id number]       Subscriber Name Subscriber Birth Date Member ID       SEAN VARGAS 1935 [id number]           Secondary Coverage     Payor Plan Insurance Group Employer/Plan Group    [facility] SUPP [id number]     Payor Plan Address Payor Plan Phone Number Payor Plan Fax Number Effective Dates    [address]   12/1/2016 - None Entered    [facility] [id number]       Subscriber Name Subscriber Birth Date Member ID       SEAN VARGAS 1935 [id number]                 Emergency Contacts      (Rel.) Home Phone Work Phone Mobile Phone    DAMIENPAOLAESDRAS MADRID (Daughter) [phone] -- [phone]    GEORGIE VARGAS (Spouse) [phone] -- --    CatherineNika (Other) [phone] -- [phone]      Ambulatory Referral to Home Health [[id number]] (Order [id number])  Order  Date: 10/23/2020 [facility] " Ordering/Authorizing: Sarah Sagastume MD   Order History  Outpatient  Date/Time Action Taken User Additional Information   10/23/20 1218 Sign Elsa Blackwell RN Ordering Mode: Verbal with readback   Order Details    Frequency Duration Priority Order Class   None None Routine Internal Referral   Start Date/Time    Start Date   10/23/20   Order Information    Order Date Service Start Date Start Time   10/23/20 Medicine 10/23/20    Reference Links    Associated Reports External References   View Encounter Current Health Referral Information   Order Questions    Question Answer Comment   Face to Face Visit Date: 10/23/2020    Follow-up provider for Plan of Care? I treated the patient in an acute care facility and will not continue treatment after discharge.    Follow-up provider: SARAH SAGASTUME    Reason/Clinical Findings syncope and collapse    Describe mobility limitations that make leaving home difficult: syncope and collapse    Nursing/Therapeutic Services Requested Other Eval and treat   Frequency: 1 Week 1           Verbal Order Info    Action Created on Order Mode Entered by Responsible Provider Signed by Signed on   Ordering 10/23/20 1218 Verbal with readback Elsa Blackwell RN Heimer, Brian T, MD     Source Order Set / Preference List    Preference List   AMB [facility] REFERRALS [[id number]]   Clinical Indications     ICD-10-CM ICD-9-CM   Syncope and collapse  - Primary     R55 780.2   Reprint Order Requisition    Ambulatory Referral to Home Health (Order #[id number]) on 10/23/20   Encounter    View Encounter          Order Provider Info        Office phone Pager E-mail   Ordering User Elsa Blackwell RN -- -- --   Authorizing Provider Sarah Sagastume [phone] -- --   Attending Provider Sarah Sagastume [phone] -- --   Entered By Elsa Blackwell RN -- -- --   Ordering Provider Sarah Sagastume [phone] -- --   Linked Charges    No charges linked to this procedure   Tracking Reports  Rajinder  Tracking Order Transmittal Tracking   Authorized by:  Gregory Pinedo MD  (NPI: [id number])       Lab Component SmartPhrase Guide    Ambulatory Referral to Home Health (Order #[id number]) on 10/23/20           Insurance Information                MEDICARE/MEDICARE A & B Phone: [phone]    Subscriber: Aracelis Vargas Subscriber#: [id number]    Group#:  Precert#:         ANTHEM BLUE CROSS/ANTHEM BC  SUPP Phone:     Subscriber: Aracelis Vargas Subscriber#: [id number]    Group#: [id number] Precert#:              History & Physical      Wang Plaza MD at 10/23/20 1131          H&P reviewed. The patient was examined and there are no changes to the H&P.      Electronically signed by Wang Plaza MD at 10/23/20 3093   Source Note            Referring Provider: Gregory Pinedo MD  Reason for Consultation:  Syncope  Second-degree Mobitz type I AV block  Hypertension  Diabetes    Patient Care Team:  Gregory Pinedo MD as PCP - General (Family Medicine)    Chief complaint  Syncope    Subjective .     History of present illness:  Aracelis Vargas is a 85 y.o. female who presents with history of being in usual state of health with history of hypertension diabetes etc was admitted with history of possible syncope.  Patient apparently was in the living room and feeling fine except she was somewhat tired that morning and the next thing she knew she woke up from the floor.  She quickly lost her consciousness.  EMS reported  calling EMS because he was outside and even he came in she was lying down on the floor.  No other history is available.  Patient does not recall any other premonitory symptoms or post syncopal symptoms.  No seizure activity.  No loss of bowel control or bladder control.  No other history is available at this time.  Patient was seen in the ER and was noted to have second-degree Mobitz type I AV block with a narrow QRS complex.  Patient is feeling somewhat weak.  No other associated aggravating or  alleviating factors.  Cardiology consultation was requested..         ROS      Since I have last seen, the patient has been without any chest discomfort ,shortness of breath, palpitations, dizziness or syncope.  Denies having any headache ,abdominal pain ,nausea, vomiting , diarrhea constipation, loss of weight or loss of appetite.  Denies having any excessive bruising ,hematuria or blood in the stool.    Review of all systems negative except as indicated      History  Past Medical History:   Diagnosis Date   • Atherosclerosis of native coronary artery of native heart without angina pectoris 10/22/2020   • Hypertension associated with stage 3 chronic kidney disease due to type 2 diabetes mellitus (CMS/HCC) 10/22/2020   • Morbid obesity (CMS/HCC) 10/22/2020   • Secondary hyperaldosteronism (CMS/HCC) 10/22/2020   • Stage 3b chronic kidney disease 10/22/2020   • Type 2 diabetes mellitus with diabetic neuropathy, with long-term current use of insulin (CMS/HCC) 10/22/2020   • Type 2 diabetes mellitus with diabetic neuropathy, with long-term current use of insulin (CMS/HCC) 10/22/2020       Past Surgical History:   Procedure Laterality Date   • JOINT REPLACEMENT Right     Hip   • JOINT REPLACEMENT Left     hip   • JOINT REPLACEMENT Left     hip (for second time)   • JOINT REPLACEMENT Right     knee       Family History   Problem Relation Age of Onset   • Heart disease Mother        Social History     Tobacco Use   • Smoking status: Never Smoker   • Smokeless tobacco: Never Used   Substance Use Topics   • Alcohol use: Not Currently   • Drug use: Never        Medications Prior to Admission   Medication Sig Dispense Refill Last Dose   • atorvastatin (LIPITOR) 10 MG tablet Take 10 mg by mouth Daily.      • diclofenac (VOLTAREN) 1 % gel gel Apply 4 g topically to the appropriate area as directed 4 (Four) Times a Day As Needed.      • furosemide (LASIX) 20 MG tablet Take 20 mg by mouth Daily.      • gabapentin (NEURONTIN) 300  "MG capsule Take 300 mg by mouth 2 (two) times a day.      • insulin NPH-insulin regular (humuLIN 70/30,novoLIN 70/30) (70-30) 100 UNIT/ML injection Inject  under the skin into the appropriate area as directed 2 (Two) Times a Day With Meals. Amount of units unknown      • metFORMIN (GLUCOPHAGE) 1000 MG tablet Take 1,000 mg by mouth 2 (Two) Times a Day With Meals.      • metoprolol tartrate (LOPRESSOR) 50 MG tablet Take 50 mg by mouth 3 (Three) Times a Day.      • omeprazole (priLOSEC) 40 MG capsule Take 40 mg by mouth Daily.      • spironolactone (ALDACTONE) 25 MG tablet Take 25 mg by mouth Daily.      • topiramate (TOPAMAX) 25 MG tablet Take 25 mg by mouth Daily.      • traZODone (DESYREL) 50 MG tablet Take 50 mg by mouth Every Night.      • valsartan (DIOVAN) 160 MG tablet Take 160 mg by mouth Daily.            Patient has no known allergies.    Scheduled Meds:atorvastatin, 10 mg, Oral, Nightly  losartan, 100 mg, Oral, Daily  metoprolol tartrate, 50 mg, Oral, Q12H  pantoprazole, 40 mg, Oral, QAM  spironolactone, 25 mg, Oral, Daily      Continuous Infusions:   PRN Meds:.labetalol  •  sodium chloride    Objective     VITAL SIGNS  Vitals:    10/22/20 0202 10/22/20 0343 10/22/20 0603 10/22/20 0805   BP: 169/48 164/62 (!) 186/80    BP Location:  Right arm Right arm    Patient Position:  Lying Lying    Pulse: 59 78 93 98   Resp: 17 16 16    Temp:  98.2 °F (36.8 °C) 98 °F (36.7 °C)    TempSrc:  Oral Oral    SpO2: 98% 99% 96% 96%   Weight:  105 kg (231 lb) 105 kg (230 lb 13.2 oz)    Height:  154.9 cm (61\")         Flowsheet Rows      First Filed Value   Admission Height  170.2 cm (67\") Documented at 10/21/2020 2036   Admission Weight  110 kg (242 lb 4.6 oz) Documented at 10/21/2020 2036          No intake or output data in the 24 hours ending 10/22/20 0905     TELEMETRY: Sinus rhythm    Physical Exam:  The patient is alert, oriented and in no distress.  Vital signs as noted above.  Exogenous obesity.  Head and neck " revealed no carotid bruits or jugular venous distention.  No thyromegaly or lymph adenopathy is present  Lungs clear.  No wheezing.  Breath sounds are normal bilaterally.  Heart normal first and second heart sounds.No murmur.  No precordial rub is present.  No gallop is present.  Abdomen soft and nontender.  No organomegaly is present.  Extremities with good peripheral pulses without any pedal edema.  Skin warm and dry.  Musculoskeletal system is grossly normal  CNS grossly normal      Results Review:   I reviewed the patient's new clinical results.  Lab Results (last 24 hours)     Procedure Component Value Units Date/Time    POC Glucose Once [[id number]]  (Abnormal) Collected: 10/22/20 0832    Specimen: Blood Updated: 10/22/20 0833     Glucose 132 mg/dL      Comment: Serial Number: [id number]:  [id number]       COVID-19,[facility] [employer] IN-HOUSE,Nasal Swab No Transport Media 3-4 HR TAT - Swab, Nasal Cavity [[id number]]  (Normal) Collected: 10/21/20 2202    Specimen: Swab from Nasal Cavity Updated: 10/21/20 2314     COVID19 Not Detected    Narrative:      Fact sheet for providers: https://www.fda.gov/media/036052/download     Fact sheet for patients: https://www.fda.gov/media/946256/download    Troponin [[id number]]  (Normal) Collected: 10/21/20 2055    Specimen: Blood Updated: 10/21/20 2202     Troponin T <0.010 ng/mL     Narrative:      Troponin T Reference Range:  <= 0.03 ng/mL-   Negative for AMI  >0.03 ng/mL-     Abnormal for myocardial necrosis.  Clinicians would have to utilize clinical acumen, EKG, Troponin and serial changes to determine if it is an Acute Myocardial Infarction or myocardial injury due to an underlying chronic condition.       Results may be falsely decreased if patient taking Biotin.      Metaline Draw [[id number]] Collected: 10/21/20 2055    Specimen: Blood Updated: 10/21/20 2201    Narrative:      The following orders were created for panel order Metaline Draw.  Procedure                                Abnormality         Status                     ---------                               -----------         ------                     Light Blue Top[[id number]]                                   Final result               Green Top (Gel)[[id number]]                                  Final result               Lavender Top[[id number]]                                     Final result               Gold Top - SST[[id number]]                                   Final result                 Please view results for these tests on the individual orders.    Light Blue Top [[id number]] Collected: 10/21/20 2055    Specimen: Blood Updated: 10/21/20 2201     Extra Tube hold for add-on     Comment: Auto resulted       Green Top (Gel) [[id number]] Collected: 10/21/20 2055    Specimen: Blood Updated: 10/21/20 2201     Extra Tube Hold for add-ons.     Comment: Auto resulted.       Gold Top - SST [[id number]] Collected: 10/21/20 2055    Specimen: Blood Updated: 10/21/20 2201     Extra Tube Hold for add-ons.     Comment: Auto resulted.       Urinalysis With Culture If Indicated - Urine, Catheter [[id number]]  (Abnormal) Collected: 10/21/20 2145    Specimen: Urine, Catheter Updated: 10/21/20 2201     Color, UA Yellow     Appearance, UA Cloudy     Comment: Result checked         pH, UA 6.0     Specific Gravity, UA 1.018     Glucose, UA Negative     Ketones, UA Negative     Bilirubin, UA Negative     Blood, UA Negative     Protein, UA Negative     Leuk Esterase, UA Negative     Nitrite, UA Negative     Urobilinogen, UA 0.2 E.U./dL    Narrative:      Urine microscopic not indicated.    Ammonia [[id number]]  (Normal) Collected: 10/21/20 2128    Specimen: Blood Updated: 10/21/20 2200     Ammonia 47 umol/L     CK [[id number]]  (Normal) Collected: 10/21/20 2055    Specimen: Blood Updated: 10/21/20 2134     Creatine Kinase 168 U/L     Magnesium [[id number]]  (Normal) Collected: 10/21/20 2055    Specimen: Blood Updated: 10/21/20 2134     Magnesium 2.3  mg/dL     Blood Gas, Arterial - [[id number]]  (Abnormal) Collected: 10/21/20 2126    Specimen: Arterial Blood Updated: 10/21/20 2129     Site Right Radial     Andrew's Test Positive     pH, Arterial 7.360 pH units      pCO2, Arterial 35.5 mm Hg      pO2, Arterial 75.7 mm Hg      HCO3, Arterial 20.1 mmol/L      Base Excess, Arterial -4.8 mmol/L      Comment: Serial Number: [id number]:  [id number]        O2 Saturation, Arterial 94.6 %      CO2 Content 21.2 mmol/L      Barometric Pressure for Blood Gas --     Comment: N/A        Modality Room Air     FIO2 21 %      Hemodilution No    POC Glucose Once [[id number]]  (Abnormal) Collected: 10/21/20 2122    Specimen: Blood Updated: 10/21/20 2124     Glucose 123 mg/dL      Comment: Serial Number: [id number]:  [id number]       Comprehensive Metabolic Panel [[id number]]  (Abnormal) Collected: 10/21/20 2055    Specimen: Blood Updated: 10/21/20 2122     Glucose 137 mg/dL      BUN 27 mg/dL      Creatinine 1.35 mg/dL      Sodium 139 mmol/L      Potassium 4.8 mmol/L      Chloride 105 mmol/L      CO2 19.0 mmol/L      Calcium 9.3 mg/dL      Total Protein 7.2 g/dL      Albumin 4.00 g/dL      ALT (SGPT) 21 U/L      AST (SGOT) 27 U/L      Alkaline Phosphatase 94 U/L      Total Bilirubin 0.3 mg/dL      eGFR Non African Amer 37 mL/min/1.73      eGFR  African Amer 45 mL/min/1.73      Globulin 3.2 gm/dL      A/G Ratio 1.3 g/dL      BUN/Creatinine Ratio 20.0     Anion Gap 15.0 mmol/L     Narrative:      GFR Normal >60  Chronic Kidney Disease <60  Kidney Failure <15      Protime-INR [[id number]]  (Normal) Collected: 10/21/20 2055    Specimen: Blood Updated: 10/21/20 2117     Protime 10.9 Seconds      INR 0.99    Lavender Top [[id number]] Collected: 10/21/20 2055    Specimen: Blood Updated: 10/21/20 2117     Extra Tube --    CBC & Differential [[id number]]  (Abnormal) Collected: 10/21/20 2055    Specimen: Blood Updated: 10/21/20 2109    Narrative:      The following orders were created for panel  order CBC & Differential.  Procedure                               Abnormality         Status                     ---------                               -----------         ------                     CBC Auto Differential[[id number]]        Abnormal            Final result                 Please view results for these tests on the individual orders.    CBC Auto Differential [[id number]]  (Abnormal) Collected: 10/21/20 2055    Specimen: Blood Updated: 10/21/20 2109     WBC 5.80 10*3/mm3      RBC 3.66 10*6/mm3      Hemoglobin 11.3 g/dL      Hematocrit 35.3 %      MCV 96.4 fL      MCH 30.9 pg      MCHC 32.1 g/dL      RDW 17.6 %      RDW-SD 59.1 fl      MPV 7.3 fL      Platelets 215 10*3/mm3      Neutrophil % 47.7 %      Lymphocyte % 36.8 %      Monocyte % 9.3 %      Eosinophil % 5.1 %      Basophil % 1.1 %      Neutrophils, Absolute 2.70 10*3/mm3      Lymphocytes, Absolute 2.10 10*3/mm3      Monocytes, Absolute 0.50 10*3/mm3      Eosinophils, Absolute 0.30 10*3/mm3      Basophils, Absolute 0.10 10*3/mm3      nRBC 0.1 /100 WBC           Imaging Results (Last 24 Hours)     Procedure Component Value Units Date/Time    CT Angiogram Head [[id number]] Collected: 10/21/20 2119     Updated: 10/21/20 2326    Narrative:      CTA HEAD WITH CONTRAST. CTA NECK WITH CONTRAST.    DATE: 10/21/2020 9:11 PM     INDICATION:  Fall one hour ago. Stroke follow-up. Altered mental status.     COMPARISON: CT head from earlier today     TECHNIQUE:   Thin section axial images through the head and neck after IV contrast. Two dimensional and three dimensional reformations. NASCET criteria used to determine stenosis.100 mL Isovue 370 given IV. Low-dose CT acquisition technique included one   of the following options: 1. Automated exposure control, 2. Adjustment of mA and/or KV according to patient's size and/or 3. Use of iterative reconstruction.    FINDINGS:           CTA NECK:    Aortic arch: Normal triple vessel anatomy. Calcifications  distally.    Right common carotid artery: Normal origin. Calcifications proximally and distally. No significant stenosis, occlusion.    Right internal carotid artery: No significant stenosis, occlusion.    Right vertebral artery: Normal origin. No significant stenosis, occlusion.    Left common carotid artery: Normal origin. Calcifications distally. No significant stenosis, occlusion.    Left internal carotid artery: Calcifications distally. No significant stenosis, occlusion.    Left vertebral artery: Normal origin. No significant stenosis, occlusion.    Parotid and submandibular glands are intact. Posterior nasopharynx, oropharynx, hypopharynx, larynx, thyroid gland, proximal trachea, imaged superior mediastinum and lung apices are patent. Lymph nodes in the neck do not meet the CT size criteria for   lymphadenopathy. Degenerative changes of the spine. Atelectasis in the lung apices.    CTA HEAD:    No large aneurysm, significant stenosis or occlusion of the proximal major cerebral arteries.     Major dural venous sinuses are patent.      Impression:      1. No significant stenosis or occlusion of the major arteries of the head and neck.  2. MRI is more sensitive for the detection of acute nonhemorrhagic infarct.       Report called to Niurka Melchor  at 10/21/2020 11:25 PM    Electronically signed by:  Ayad Mcclain M.D.    10/21/2020 9:25 PM    CT Angiogram Neck [[id number]] Collected: 10/21/20 2119     Updated: 10/21/20 2326    Narrative:      CTA HEAD WITH CONTRAST. CTA NECK WITH CONTRAST.    DATE: 10/21/2020 9:11 PM     INDICATION:  Fall one hour ago. Stroke follow-up. Altered mental status.     COMPARISON: CT head from earlier today     TECHNIQUE:   Thin section axial images through the head and neck after IV contrast. Two dimensional and three dimensional reformations. NASCET criteria used to determine stenosis.100 mL Isovue 370 given IV. Low-dose CT acquisition technique included one   of the  following options: 1. Automated exposure control, 2. Adjustment of mA and/or KV according to patient's size and/or 3. Use of iterative reconstruction.    FINDINGS:           CTA NECK:    Aortic arch: Normal triple vessel anatomy. Calcifications distally.    Right common carotid artery: Normal origin. Calcifications proximally and distally. No significant stenosis, occlusion.    Right internal carotid artery: No significant stenosis, occlusion.    Right vertebral artery: Normal origin. No significant stenosis, occlusion.    Left common carotid artery: Normal origin. Calcifications distally. No significant stenosis, occlusion.    Left internal carotid artery: Calcifications distally. No significant stenosis, occlusion.    Left vertebral artery: Normal origin. No significant stenosis, occlusion.    Parotid and submandibular glands are intact. Posterior nasopharynx, oropharynx, hypopharynx, larynx, thyroid gland, proximal trachea, imaged superior mediastinum and lung apices are patent. Lymph nodes in the neck do not meet the CT size criteria for   lymphadenopathy. Degenerative changes of the spine. Atelectasis in the lung apices.    CTA HEAD:    No large aneurysm, significant stenosis or occlusion of the proximal major cerebral arteries.     Major dural venous sinuses are patent.      Impression:      1. No significant stenosis or occlusion of the major arteries of the head and neck.  2. MRI is more sensitive for the detection of acute nonhemorrhagic infarct.       Report called to Niurka Melchor  at 10/21/2020 11:25 PM    Electronically signed by:  Ayad Mcclain M.D.    10/21/2020 9:25 PM    XR Chest 1 View [[id number]] Collected: 10/21/20 2144     Updated: 10/21/20 2147    Narrative:      XR CHEST 1 VW-     Date of Exam: 10/21/2020 9:10 PM     Indication: Stroke Protocol (Onset > 12 hrs)  recent fall     Comparison: None available.     Technique: 1 view(s) of the chest were obtained.     FINDINGS: Cardiac  silhouette is enlarged. Mediastinal silhouette is  widened but this is likely all related to portable technique. There are  no prior studies for comparison. Thoracic aorta appears tortuous.  Pulmonary vascularity is unremarkable. No pneumothorax or pleural  effusion. Calcite granulomas present in the right lung base. No acute  infiltrates. Visualized bony structures are intact.       Impression:      Enlargement of the cardiac silhouette and mildly widened mediastinum,  likely reflection of portable technique. There are no prior studies for  comparison. When feasible, an upright PA and lateral view of the chest  is recommended. No active cardiopulmonary disease.        Electronically Signed By-Aaron Mattson DO. On:10/21/2020 9:45 PM  This report was finalized on [id number] by  Aaron Mattson DO..    CT Head Without Contrast Stroke Protocol [[id number]] Collected: 10/21/20 2110     Updated: 10/21/20 2115    Narrative:         DATE OF EXAM:  10/21/2020 9:08 PM     PROCEDURE:   CT HEAD WO CONTRAST STROKE PROTOCOL-     INDICATIONS:  Stroke, follow up     COMPARISON:   No Comparisons Available     TECHNIQUE:  Routine transaxial and coronal reconstruction images were obtained  through the head without the administration of contrast. Automated  exposure control and iterative reconstruction methods were used.     FINDINGS:  There is no CT evidence of acute hemorrhage, infarct, mass, mass effect,  or midline shift. No hydrocephalus. No extra-axial fluid collections.  The globes and orbital contents are normal and symmetric. The mastoid  air cells and visualized paranasal sinuses are clear. No skull fractures  or calvarial lesions. There is a small right supraorbital scalp  hematoma/laceration. There is mild age-related volume loss.             Impression:      IMPRESSION :  Small right supraorbital scalp hematoma/laceration. No acute  intracranial abnormality.     Electronically Signed By-Aaron Mattson DO. On:10/21/2020  9:13 PM  This report was finalized on [id number] by  Aaron Mattson DO..      LAB RESULTS (LAST 7 DAYS)    CBC  Results from last 7 days   Lab Units 10/21/20  2055   WBC 10*3/mm3 5.80   RBC 10*6/mm3 3.66*   HEMOGLOBIN g/dL 11.3*   HEMATOCRIT % 35.3   MCV fL 96.4   PLATELETS 10*3/mm3 215       BMP  Results from last 7 days   Lab Units 10/21/20  2055   SODIUM mmol/L 139   POTASSIUM mmol/L 4.8   CHLORIDE mmol/L 105   CO2 mmol/L 19.0*   BUN mg/dL 27*   CREATININE mg/dL 1.35*   GLUCOSE mg/dL 137*   MAGNESIUM mg/dL 2.3       CMP   Results from last 7 days   Lab Units 10/21/20  2128 10/21/20  2055   SODIUM mmol/L  --  139   POTASSIUM mmol/L  --  4.8   CHLORIDE mmol/L  --  105   CO2 mmol/L  --  19.0*   BUN mg/dL  --  27*   CREATININE mg/dL  --  1.35*   GLUCOSE mg/dL  --  137*   ALBUMIN g/dL  --  4.00   BILIRUBIN mg/dL  --  0.3   ALK PHOS U/L  --  94   AST (SGOT) U/L  --  27   ALT (SGPT) U/L  --  21   AMMONIA umol/L 47  --          BNP        TROPONIN  Results from last 7 days   Lab Units 10/21/20  2055   CK TOTAL U/L 168   TROPONIN T ng/mL <0.010       CoAg  Results from last 7 days   Lab Units 10/21/20  2055   INR  0.99       Creatinine Clearance  Estimated Creatinine Clearance: 35.2 mL/min (A) (by C-G formula based on SCr of 1.35 mg/dL (H)).    ABG  Results from last 7 days   Lab Units 10/21/20  2126   PH, ARTERIAL pH units 7.360   PCO2, ARTERIAL mm Hg 35.5   PO2 ART mm Hg 75.7*   O2 SATURATION ART % 94.6   BASE EXCESS ART mmol/L -4.8*       Radiology  Ct Angiogram Head    Result Date: 10/21/2020  1. No significant stenosis or occlusion of the major arteries of the head and neck. 2. MRI is more sensitive for the detection of acute nonhemorrhagic infarct.  Report called to Niurka Melchor  at 10/21/2020 11:25 PM Electronically signed by:  Ayad Mcclain M.D.  10/21/2020 9:25 PM    Ct Angiogram Neck    Result Date: 10/21/2020  1. No significant stenosis or occlusion of the major arteries of the head and neck. 2.  MRI is more sensitive for the detection of acute nonhemorrhagic infarct.  Report called to Niurka Melchor  at 10/21/2020 11:25 PM Electronically signed by:  Ayad Mcclain M.D.  10/21/2020 9:25 PM    Xr Chest 1 View    Result Date: 10/21/2020  Enlargement of the cardiac silhouette and mildly widened mediastinum, likely reflection of portable technique. There are no prior studies for comparison. When feasible, an upright PA and lateral view of the chest is recommended. No active cardiopulmonary disease.   Electronically Signed By-Aaron Mattson DO. On:10/21/2020 9:45 PM This report was finalized on [id number] by  Aaron Mattson DO..    Ct Head Without Contrast Stroke Protocol    Result Date: 10/21/2020  IMPRESSION : Small right supraorbital scalp hematoma/laceration. No acute intracranial abnormality.  Electronically Signed By-Aaron Mattson DO. On:10/21/2020 9:13 PM This report was finalized on [id number] by  Aaron Mattson DO..              EKG          I personally viewed and interpreted the patient's EKG/Telemetry data: Sinus rhythm with Mobitz type I AV block.  Normal intraventricular conduction.    ECHOCARDIOGRAM:                 Cardiolite (Tc-99m Sestamibi) stress test      OTHER:     Assessment/Plan     Principal Problem:    Syncope and collapse  Active Problems:    Fall    DM type 2 with diabetic dyslipidemia (CMS/HCC)    Secondary hyperaldosteronism (CMS/HCC)    Stage 3b chronic kidney disease    Hypertension associated with stage 3 chronic kidney disease due to type 2 diabetes mellitus (CMS/HCC)    Type 2 diabetes mellitus with diabetic neuropathy, with long-term current use of insulin (CMS/HCC)    Morbid obesity (CMS/HCC)    Atherosclerosis of native coronary artery of native heart without angina pectoris    ]]]]]]]]]]]]]]]]]]]]  Impression  ========  -Possible syncope.  Appears to be true syncope.  Concerned about advanced AV blocks    -Second-degree Mobitz type I AV block.  Narrow QRS  complex.    -History of paroxysmal atrial fibrillation    -Hypertension dyslipidemia diabetes    -Renal dysfunction CKD 3  BUN 27 creatinine 1.35    -Exogenous obesity.    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    -No known allergies  ============  Plan  =========  Patient had probably a true syncopal episode likely due to advanced AV blocks.  In the ER EKG showed Mobitz type I second-degree AV block although patient is having one-to-one conduction at this time.  Patient has been on metoprolol 50 mg 3 times daily.  Reduce the dose to 25 mg twice daily or may discontinue and add antihypertensive medications that would not have effect on causing bradycardia.  Ischemic cardiac work-up.  Echocardiogram  Stress Cardiolite test.  If no specific etiology is found consideration will be given for placement of loop recorder.  Highly suspicious for advanced AV block's causing her syncopal episode.    Renal dysfunction probably due to syncopal episode although she has problems with diabetes and hypertension etc.    Have discussed with attending nurse for coordination of care.    Close cardiac monitoring.    Further plan will depend on patient's progress.  ]]]]]]]]]]]]]]    Abnormal Lexiscan Cardiolite test with posterior and inferior ischemia.  Patient to have a cardiac catheterization tomorrow.  Renal precautions  Intravenous fluids and Mucomyst.  Depending on a.m. renal function consideration may be given for renal consultation.    Wang Plaza MD  10/22/20  09:05 EDT              Electronically signed by Wang Plaza MD at 10/22/20 1314             Wang Plaza MD at 10/22/20 0905            Referring Provider: Gregory Pinedo MD  Reason for Consultation:  Syncope  Second-degree Mobitz type I AV block  Hypertension  Diabetes    Patient Care Team:  Gregory Pinedo MD as PCP - General (Family Medicine)    Chief complaint  Syncope    Subjective .     History of present  illness:  Aracelis Vargas is a 85 y.o. female who presents with history of being in usual state of health with history of hypertension diabetes etc was admitted with history of possible syncope.  Patient apparently was in the living room and feeling fine except she was somewhat tired that morning and the next thing she knew she woke up from the floor.  She quickly lost her consciousness.  EMS reported  calling EMS because he was outside and even he came in she was lying down on the floor.  No other history is available.  Patient does not recall any other premonitory symptoms or post syncopal symptoms.  No seizure activity.  No loss of bowel control or bladder control.  No other history is available at this time.  Patient was seen in the ER and was noted to have second-degree Mobitz type I AV block with a narrow QRS complex.  Patient is feeling somewhat weak.  No other associated aggravating or alleviating factors.  Cardiology consultation was requested..         ROS      Since I have last seen, the patient has been without any chest discomfort ,shortness of breath, palpitations, dizziness or syncope.  Denies having any headache ,abdominal pain ,nausea, vomiting , diarrhea constipation, loss of weight or loss of appetite.  Denies having any excessive bruising ,hematuria or blood in the stool.    Review of all systems negative except as indicated      History  Past Medical History:   Diagnosis Date   • Atherosclerosis of native coronary artery of native heart without angina pectoris 10/22/2020   • Hypertension associated with stage 3 chronic kidney disease due to type 2 diabetes mellitus (CMS/[facility]) 10/22/2020   • Morbid obesity (CMS/[facility]) 10/22/2020   • Secondary hyperaldosteronism (CMS/[facility]) 10/22/2020   • Stage 3b chronic kidney disease 10/22/2020   • Type 2 diabetes mellitus with diabetic neuropathy, with long-term current use of insulin (CMS/[facility]) 10/22/2020   • Type 2 diabetes mellitus with diabetic neuropathy,  with long-term current use of insulin (CMS/[facility]) 10/22/2020       Past Surgical History:   Procedure Laterality Date   • JOINT REPLACEMENT Right     Hip   • JOINT REPLACEMENT Left     hip   • JOINT REPLACEMENT Left     hip (for second time)   • JOINT REPLACEMENT Right     knee       Family History   Problem Relation Age of Onset   • Heart disease Mother        Social History     Tobacco Use   • Smoking status: Never Smoker   • Smokeless tobacco: Never Used   Substance Use Topics   • Alcohol use: Not Currently   • Drug use: Never        Medications Prior to Admission   Medication Sig Dispense Refill Last Dose   • atorvastatin (LIPITOR) 10 MG tablet Take 10 mg by mouth Daily.      • diclofenac (VOLTAREN) 1 % gel gel Apply 4 g topically to the appropriate area as directed 4 (Four) Times a Day As Needed.      • furosemide (LASIX) 20 MG tablet Take 20 mg by mouth Daily.      • gabapentin (NEURONTIN) 300 MG capsule Take 300 mg by mouth 2 (two) times a day.      • insulin NPH-insulin regular (humuLIN 70/30,novoLIN 70/30) (70-30) 100 UNIT/ML injection Inject  under the skin into the appropriate area as directed 2 (Two) Times a Day With Meals. Amount of units unknown      • metFORMIN (GLUCOPHAGE) 1000 MG tablet Take 1,000 mg by mouth 2 (Two) Times a Day With Meals.      • metoprolol tartrate (LOPRESSOR) 50 MG tablet Take 50 mg by mouth 3 (Three) Times a Day.      • omeprazole (priLOSEC) 40 MG capsule Take 40 mg by mouth Daily.      • spironolactone (ALDACTONE) 25 MG tablet Take 25 mg by mouth Daily.      • topiramate (TOPAMAX) 25 MG tablet Take 25 mg by mouth Daily.      • traZODone (DESYREL) 50 MG tablet Take 50 mg by mouth Every Night.      • valsartan (DIOVAN) 160 MG tablet Take 160 mg by mouth Daily.            Patient has no known allergies.    Scheduled Meds:atorvastatin, 10 mg, Oral, Nightly  losartan, 100 mg, Oral, Daily  metoprolol tartrate, 50 mg, Oral, Q12H  pantoprazole, 40 mg, Oral, QAM  spironolactone, 25 mg,  "Oral, Daily      Continuous Infusions:   PRN Meds:.labetalol  •  sodium chloride    Objective     VITAL SIGNS  Vitals:    10/22/20 0202 10/22/20 0343 10/22/20 0603 10/22/20 0805   BP: 169/48 164/62 (!) 186/80    BP Location:  Right arm Right arm    Patient Position:  Lying Lying    Pulse: 59 78 93 98   Resp: 17 16 16    Temp:  98.2 °F (36.8 °C) 98 °F (36.7 °C)    TempSrc:  Oral Oral    SpO2: 98% 99% 96% 96%   Weight:  105 kg (231 lb) 105 kg (230 lb 13.2 oz)    Height:  154.9 cm (61\")         Flowsheet Rows      First Filed Value   Admission Height  170.2 cm (67\") Documented at 10/21/2020 2036   Admission Weight  110 kg (242 lb 4.6 oz) Documented at 10/21/2020 2036          No intake or output data in the 24 hours ending 10/22/20 0905     TELEMETRY: Sinus rhythm    Physical Exam:  The patient is alert, oriented and in no distress.  Vital signs as noted above.  Exogenous obesity.  Head and neck revealed no carotid bruits or jugular venous distention.  No thyromegaly or lymph adenopathy is present  Lungs clear.  No wheezing.  Breath sounds are normal bilaterally.  Heart normal first and second heart sounds.No murmur.  No precordial rub is present.  No gallop is present.  Abdomen soft and nontender.  No organomegaly is present.  Extremities with good peripheral pulses without any pedal edema.  Skin warm and dry.  Musculoskeletal system is grossly normal  CNS grossly normal      Results Review:   I reviewed the patient's new clinical results.  Lab Results (last 24 hours)     Procedure Component Value Units Date/Time    POC Glucose Once [[id number]]  (Abnormal) Collected: 10/22/20 0832    Specimen: Blood Updated: 10/22/20 0833     Glucose 132 mg/dL      Comment: Serial Number: [id number]:  [id number]       COVID-19,[facility] [employer] IN-HOUSE,Nasal Swab No Transport Media 3-4 HR TAT - Swab, Nasal Cavity [[id number]]  (Normal) Collected: 10/21/20 2202    Specimen: Swab from Nasal Cavity Updated: 10/21/20 2314     COVID19 Not " Detected    Narrative:      Fact sheet for providers: https://www.fda.gov/media/181024/download     Fact sheet for patients: https://www.fda.gov/media/597145/download    Troponin [[id number]]  (Normal) Collected: 10/21/20 2055    Specimen: Blood Updated: 10/21/20 2202     Troponin T <0.010 ng/mL     Narrative:      Troponin T Reference Range:  <= 0.03 ng/mL-   Negative for AMI  >0.03 ng/mL-     Abnormal for myocardial necrosis.  Clinicians would have to utilize clinical acumen, EKG, Troponin and serial changes to determine if it is an Acute Myocardial Infarction or myocardial injury due to an underlying chronic condition.       Results may be falsely decreased if patient taking Biotin.      [facility] Draw [[id number]] Collected: 10/21/20 2055    Specimen: Blood Updated: 10/21/20 2201    Narrative:      The following orders were created for panel order [facility] Draw.  Procedure                               Abnormality         Status                     ---------                               -----------         ------                     Light Blue Top[[id number]]                                   Final result               Green Top (Gel)[[id number]]                                  Final result               Lavender Top[[id number]]                                     Final result               Gold Top - SST[[id number]]                                   Final result                 Please view results for these tests on the individual orders.    Light Blue Top [[id number]] Collected: 10/21/20 2055    Specimen: Blood Updated: 10/21/20 2201     Extra Tube hold for add-on     Comment: Auto resulted       Green Top (Gel) [[id number]] Collected: 10/21/20 2055    Specimen: Blood Updated: 10/21/20 2201     Extra Tube Hold for add-ons.     Comment: Auto resulted.       Gold Top - SST [[id number]] Collected: 10/21/20 2055    Specimen: Blood Updated: 10/21/20 2201     Extra Tube Hold for add-ons.     Comment: Auto resulted.        Urinalysis With Culture If Indicated - Urine, Catheter [[id number]]  (Abnormal) Collected: 10/21/20 2145    Specimen: Urine, Catheter Updated: 10/21/20 2201     Color, UA Yellow     Appearance, UA Cloudy     Comment: Result checked         pH, UA 6.0     Specific Gravity, UA 1.018     Glucose, UA Negative     Ketones, UA Negative     Bilirubin, UA Negative     Blood, UA Negative     Protein, UA Negative     Leuk Esterase, UA Negative     Nitrite, UA Negative     Urobilinogen, UA 0.2 E.U./dL    Narrative:      Urine microscopic not indicated.    Ammonia [[id number]]  (Normal) Collected: 10/21/20 2128    Specimen: Blood Updated: 10/21/20 2200     Ammonia 47 umol/L     CK [[id number]]  (Normal) Collected: 10/21/20 2055    Specimen: Blood Updated: 10/21/20 2134     Creatine Kinase 168 U/L     Magnesium [[id number]]  (Normal) Collected: 10/21/20 2055    Specimen: Blood Updated: 10/21/20 2134     Magnesium 2.3 mg/dL     Blood Gas, Arterial - [[id number]]  (Abnormal) Collected: 10/21/20 2126    Specimen: Arterial Blood Updated: 10/21/20 2129     Site Right Radial     Andrew's Test Positive     pH, Arterial 7.360 pH units      pCO2, Arterial 35.5 mm Hg      pO2, Arterial 75.7 mm Hg      HCO3, Arterial 20.1 mmol/L      Base Excess, Arterial -4.8 mmol/L      Comment: Serial Number: [id number]:  [id number]        O2 Saturation, Arterial 94.6 %      CO2 Content 21.2 mmol/L      Barometric Pressure for Blood Gas --     Comment: N/A        Modality Room Air     FIO2 21 %      Hemodilution No    POC Glucose Once [[id number]]  (Abnormal) Collected: 10/21/20 2122    Specimen: Blood Updated: 10/21/20 2124     Glucose 123 mg/dL      Comment: Serial Number: [id number]:  [id number]       Comprehensive Metabolic Panel [[id number]]  (Abnormal) Collected: 10/21/20 2055    Specimen: Blood Updated: 10/21/20 2122     Glucose 137 mg/dL      BUN 27 mg/dL      Creatinine 1.35 mg/dL      Sodium 139 mmol/L      Potassium 4.8 mmol/L      Chloride 105  mmol/L      CO2 19.0 mmol/L      Calcium 9.3 mg/dL      Total Protein 7.2 g/dL      Albumin 4.00 g/dL      ALT (SGPT) 21 U/L      AST (SGOT) 27 U/L      Alkaline Phosphatase 94 U/L      Total Bilirubin 0.3 mg/dL      eGFR Non African Amer 37 mL/min/1.73      eGFR  African Amer 45 mL/min/1.73      Globulin 3.2 gm/dL      A/G Ratio 1.3 g/dL      BUN/Creatinine Ratio 20.0     Anion Gap 15.0 mmol/L     Narrative:      GFR Normal >60  Chronic Kidney Disease <60  Kidney Failure <15      Protime-INR [[id number]]  (Normal) Collected: 10/21/20 2055    Specimen: Blood Updated: 10/21/20 2117     Protime 10.9 Seconds      INR 0.99    Lavender Top [[id number]] Collected: 10/21/20 2055    Specimen: Blood Updated: 10/21/20 2117     Extra Tube --    CBC & Differential [[id number]]  (Abnormal) Collected: 10/21/20 2055    Specimen: Blood Updated: 10/21/20 2109    Narrative:      The following orders were created for panel order CBC & Differential.  Procedure                               Abnormality         Status                     ---------                               -----------         ------                     CBC Auto Differential[[id number]]        Abnormal            Final result                 Please view results for these tests on the individual orders.    CBC Auto Differential [[id number]]  (Abnormal) Collected: 10/21/20 2055    Specimen: Blood Updated: 10/21/20 2109     WBC 5.80 10*3/mm3      RBC 3.66 10*6/mm3      Hemoglobin 11.3 g/dL      Hematocrit 35.3 %      MCV 96.4 fL      MCH 30.9 pg      MCHC 32.1 g/dL      RDW 17.6 %      RDW-SD 59.1 fl      MPV 7.3 fL      Platelets 215 10*3/mm3      Neutrophil % 47.7 %      Lymphocyte % 36.8 %      Monocyte % 9.3 %      Eosinophil % 5.1 %      Basophil % 1.1 %      Neutrophils, Absolute 2.70 10*3/mm3      Lymphocytes, Absolute 2.10 10*3/mm3      Monocytes, Absolute 0.50 10*3/mm3      Eosinophils, Absolute 0.30 10*3/mm3      Basophils, Absolute 0.10 10*3/mm3      nRBC 0.1 /100  WBC           Imaging Results (Last 24 Hours)     Procedure Component Value Units Date/Time    CT Angiogram Head [[id number]] Collected: 10/21/20 2119     Updated: 10/21/20 2326    Narrative:      CTA HEAD WITH CONTRAST. CTA NECK WITH CONTRAST.    DATE: 10/21/2020 9:11 PM     INDICATION:  Fall one hour ago. Stroke follow-up. Altered mental status.     COMPARISON: CT head from earlier today     TECHNIQUE:   Thin section axial images through the head and neck after IV contrast. Two dimensional and three dimensional reformations. NASCET criteria used to determine stenosis.100 mL Isovue 370 given IV. Low-dose CT acquisition technique included one   of the following options: 1. Automated exposure control, 2. Adjustment of mA and/or KV according to patient's size and/or 3. Use of iterative reconstruction.    FINDINGS:           CTA NECK:    Aortic arch: Normal triple vessel anatomy. Calcifications distally.    Right common carotid artery: Normal origin. Calcifications proximally and distally. No significant stenosis, occlusion.    Right internal carotid artery: No significant stenosis, occlusion.    Right vertebral artery: Normal origin. No significant stenosis, occlusion.    Left common carotid artery: Normal origin. Calcifications distally. No significant stenosis, occlusion.    Left internal carotid artery: Calcifications distally. No significant stenosis, occlusion.    Left vertebral artery: Normal origin. No significant stenosis, occlusion.    Parotid and submandibular glands are intact. Posterior nasopharynx, oropharynx, hypopharynx, larynx, thyroid gland, proximal trachea, imaged superior mediastinum and lung apices are patent. Lymph nodes in the neck do not meet the CT size criteria for   lymphadenopathy. Degenerative changes of the spine. Atelectasis in the lung apices.    CTA HEAD:    No large aneurysm, significant stenosis or occlusion of the proximal major cerebral arteries.     Major dural venous sinuses are  patent.      Impression:      1. No significant stenosis or occlusion of the major arteries of the head and neck.  2. MRI is more sensitive for the detection of acute nonhemorrhagic infarct.       Report called to Niurka Melchor  at 10/21/2020 11:25 PM    Electronically signed by:  Ayad Mcclain M.D.    10/21/2020 9:25 PM    CT Angiogram Neck [[id number]] Collected: 10/21/20 2119     Updated: 10/21/20 2326    Narrative:      CTA HEAD WITH CONTRAST. CTA NECK WITH CONTRAST.    DATE: 10/21/2020 9:11 PM     INDICATION:  Fall one hour ago. Stroke follow-up. Altered mental status.     COMPARISON: CT head from earlier today     TECHNIQUE:   Thin section axial images through the head and neck after IV contrast. Two dimensional and three dimensional reformations. NASCET criteria used to determine stenosis.100 mL Isovue 370 given IV. Low-dose CT acquisition technique included one   of the following options: 1. Automated exposure control, 2. Adjustment of mA and/or KV according to patient's size and/or 3. Use of iterative reconstruction.    FINDINGS:           CTA NECK:    Aortic arch: Normal triple vessel anatomy. Calcifications distally.    Right common carotid artery: Normal origin. Calcifications proximally and distally. No significant stenosis, occlusion.    Right internal carotid artery: No significant stenosis, occlusion.    Right vertebral artery: Normal origin. No significant stenosis, occlusion.    Left common carotid artery: Normal origin. Calcifications distally. No significant stenosis, occlusion.    Left internal carotid artery: Calcifications distally. No significant stenosis, occlusion.    Left vertebral artery: Normal origin. No significant stenosis, occlusion.    Parotid and submandibular glands are intact. Posterior nasopharynx, oropharynx, hypopharynx, larynx, thyroid gland, proximal trachea, imaged superior mediastinum and lung apices are patent. Lymph nodes in the neck do not meet the CT size  criteria for   lymphadenopathy. Degenerative changes of the spine. Atelectasis in the lung apices.    CTA HEAD:    No large aneurysm, significant stenosis or occlusion of the proximal major cerebral arteries.     Major dural venous sinuses are patent.      Impression:      1. No significant stenosis or occlusion of the major arteries of the head and neck.  2. MRI is more sensitive for the detection of acute nonhemorrhagic infarct.       Report called to Niurka Melchor  at 10/21/2020 11:25 PM    Electronically signed by:  Ayad Mcclain M.D.    10/21/2020 9:25 PM    XR Chest 1 View [[id number]] Collected: 10/21/20 2144     Updated: 10/21/20 2147    Narrative:      XR CHEST 1 VW-     Date of Exam: 10/21/2020 9:10 PM     Indication: Stroke Protocol (Onset > 12 hrs)  recent fall     Comparison: None available.     Technique: 1 view(s) of the chest were obtained.     FINDINGS: Cardiac silhouette is enlarged. Mediastinal silhouette is  widened but this is likely all related to portable technique. There are  no prior studies for comparison. Thoracic aorta appears tortuous.  Pulmonary vascularity is unremarkable. No pneumothorax or pleural  effusion. Calcite granulomas present in the right lung base. No acute  infiltrates. Visualized bony structures are intact.       Impression:      Enlargement of the cardiac silhouette and mildly widened mediastinum,  likely reflection of portable technique. There are no prior studies for  comparison. When feasible, an upright PA and lateral view of the chest  is recommended. No active cardiopulmonary disease.        Electronically Signed By-Aaron Mattson DO. On:10/21/2020 9:45 PM  This report was finalized on [id number] by  Aaron Mattson DO..    CT Head Without Contrast Stroke Protocol [[id number]] Collected: 10/21/20 2110     Updated: 10/21/20 2115    Narrative:         DATE OF EXAM:  10/21/2020 9:08 PM     PROCEDURE:   CT HEAD WO CONTRAST STROKE PROTOCOL-      INDICATIONS:  Stroke, follow up     COMPARISON:   No Comparisons Available     TECHNIQUE:  Routine transaxial and coronal reconstruction images were obtained  through the head without the administration of contrast. Automated  exposure control and iterative reconstruction methods were used.     FINDINGS:  There is no CT evidence of acute hemorrhage, infarct, mass, mass effect,  or midline shift. No hydrocephalus. No extra-axial fluid collections.  The globes and orbital contents are normal and symmetric. The mastoid  air cells and visualized paranasal sinuses are clear. No skull fractures  or calvarial lesions. There is a small right supraorbital scalp  hematoma/laceration. There is mild age-related volume loss.             Impression:      IMPRESSION :  Small right supraorbital scalp hematoma/laceration. No acute  intracranial abnormality.     Electronically Signed By-Aaron Mattson DO. On:10/21/2020 9:13 PM  This report was finalized on [id number] by  Aaron Mattson DO..      LAB RESULTS (LAST 7 DAYS)    CBC  Results from last 7 days   Lab Units 10/21/20  2055   WBC 10*3/mm3 5.80   RBC 10*6/mm3 3.66*   HEMOGLOBIN g/dL 11.3*   HEMATOCRIT % 35.3   MCV fL 96.4   PLATELETS 10*3/mm3 215       BMP  Results from last 7 days   Lab Units 10/21/20  2055   SODIUM mmol/L 139   POTASSIUM mmol/L 4.8   CHLORIDE mmol/L 105   CO2 mmol/L 19.0*   BUN mg/dL 27*   CREATININE mg/dL 1.35*   GLUCOSE mg/dL 137*   MAGNESIUM mg/dL 2.3       CMP   Results from last 7 days   Lab Units 10/21/20  2128 10/21/20  2055   SODIUM mmol/L  --  139   POTASSIUM mmol/L  --  4.8   CHLORIDE mmol/L  --  105   CO2 mmol/L  --  19.0*   BUN mg/dL  --  27*   CREATININE mg/dL  --  1.35*   GLUCOSE mg/dL  --  137*   ALBUMIN g/dL  --  4.00   BILIRUBIN mg/dL  --  0.3   ALK PHOS U/L  --  94   AST (SGOT) U/L  --  27   ALT (SGPT) U/L  --  21   AMMONIA umol/L 47  --          BNP        TROPONIN  Results from last 7 days   Lab Units 10/21/20  2055   CK TOTAL U/L 168    TROPONIN T ng/mL <0.010       CoAg  Results from last 7 days   Lab Units 10/21/20  2055   INR  0.99       Creatinine Clearance  Estimated Creatinine Clearance: 35.2 mL/min (A) (by C-G formula based on SCr of 1.35 mg/dL (H)).    ABG  Results from last 7 days   Lab Units 10/21/20  2126   PH, ARTERIAL pH units 7.360   PCO2, ARTERIAL mm Hg 35.5   PO2 ART mm Hg 75.7*   O2 SATURATION ART % 94.6   BASE EXCESS ART mmol/L -4.8*       Radiology  Ct Angiogram Head    Result Date: 10/21/2020  1. No significant stenosis or occlusion of the major arteries of the head and neck. 2. MRI is more sensitive for the detection of acute nonhemorrhagic infarct.  Report called to Niurka Melchor  at 10/21/2020 11:25 PM Electronically signed by:  Ayad Mcclain M.D.  10/21/2020 9:25 PM    Ct Angiogram Neck    Result Date: 10/21/2020  1. No significant stenosis or occlusion of the major arteries of the head and neck. 2. MRI is more sensitive for the detection of acute nonhemorrhagic infarct.  Report called to Niurka Melchro  at 10/21/2020 11:25 PM Electronically signed by:  Ayad Mcclain M.D.  10/21/2020 9:25 PM    Xr Chest 1 View    Result Date: 10/21/2020  Enlargement of the cardiac silhouette and mildly widened mediastinum, likely reflection of portable technique. There are no prior studies for comparison. When feasible, an upright PA and lateral view of the chest is recommended. No active cardiopulmonary disease.   Electronically Signed By-Aaron Mattson DO. On:10/21/2020 9:45 PM This report was finalized on [id number] by  Aaron Mattson DO..    Ct Head Without Contrast Stroke Protocol    Result Date: 10/21/2020  IMPRESSION : Small right supraorbital scalp hematoma/laceration. No acute intracranial abnormality.  Electronically Signed By-Aaron Mattson DO. On:10/21/2020 9:13 PM This report was finalized on [id number] by  Aaron Mattson DO..              EKG          I personally viewed and interpreted the  patient's EKG/Telemetry data: Sinus rhythm with Mobitz type I AV block.  Normal intraventricular conduction.    ECHOCARDIOGRAM:                 Cardiolite (Tc-99m Sestamibi) stress test      OTHER:     Assessment/Plan     Principal Problem:    Syncope and collapse  Active Problems:    Fall    DM type 2 with diabetic dyslipidemia (CMS/HCC)    Secondary hyperaldosteronism (CMS/HCC)    Stage 3b chronic kidney disease    Hypertension associated with stage 3 chronic kidney disease due to type 2 diabetes mellitus (CMS/HCC)    Type 2 diabetes mellitus with diabetic neuropathy, with long-term current use of insulin (CMS/[facility])    Morbid obesity (CMS/[facility])    Atherosclerosis of native coronary artery of native heart without angina pectoris    ]]]]]]]]]]]]]]]]]]]]  Impression  ========  -Possible syncope.  Appears to be true syncope.  Concerned about advanced AV blocks    -Second-degree Mobitz type I AV block.  Narrow QRS complex.    -History of paroxysmal atrial fibrillation    -Hypertension dyslipidemia diabetes    -Renal dysfunction CKD 3  BUN 27 creatinine 1.35    -Exogenous obesity.    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    -No known allergies  ============  Plan  =========  Patient had probably a true syncopal episode likely due to advanced AV blocks.  In the ER EKG showed Mobitz type I second-degree AV block although patient is having one-to-one conduction at this time.  Patient has been on metoprolol 50 mg 3 times daily.  Reduce the dose to 25 mg twice daily or may discontinue and add antihypertensive medications that would not have effect on causing bradycardia.  Ischemic cardiac work-up.  Echocardiogram  Stress Cardiolite test.  If no specific etiology is found consideration will be given for placement of loop recorder.  Highly suspicious for advanced AV block's causing her syncopal episode.    Renal dysfunction probably due to syncopal episode although she has problems  with diabetes and hypertension etc.    Have discussed with attending nurse for coordination of care.    Close cardiac monitoring.    Further plan will depend on patient's progress.  ]]]]]]]]]]]]]]    Abnormal Lexiscan Cardiolite test with posterior and inferior ischemia.  Patient to have a cardiac catheterization tomorrow.  Renal precautions  Intravenous fluids and Mucomyst.  Depending on a.m. renal function consideration may be given for renal consultation.    Wang Plaza MD  10/22/20  09:05 EDT              Electronically signed by Wang Plaza MD at 10/22/20 1314     Gregory Pinedo MD at 10/22/20 0673          Patient Care Team:  Gregory Pinedo MD as PCP - General (Family Medicine)    Chief Conplaint  Subjective     The patient is a 85 y.o. female presents with an acute fall with syncope.    HPI  The patient was in her usual state of health until the day of presentation when she fell while outside.  She cannot remember details but relates that she did not trip but simply lost consciousness.  She presented to the [facility] emergency room where she was admitted.  She was found to have some arrhythmia via EKG/telemetry.  She denied substernal chest pain left arm paresthesias orthopnea or other constitutional complaint.  Review of Systems  Review of Systems   Constitutional: Positive for activity change.   Respiratory: Negative.    Cardiovascular: Negative.    Neurological: Positive for weakness.       History  Past Medical History:   Diagnosis Date   • Diabetes mellitus (CMS/HCC)      Past Surgical History:   Procedure Laterality Date   • JOINT REPLACEMENT Right     Hip   • JOINT REPLACEMENT Left     hip   • JOINT REPLACEMENT Left     hip (for second time)   • JOINT REPLACEMENT Right     knee     Family History   Problem Relation Age of Onset   • Heart disease Mother      Social History     Tobacco Use   • Smoking status: Never Smoker   • Smokeless tobacco: Never Used   Substance Use Topics    • Alcohol use: Not Currently   • Drug use: Never     Allergies:  Patient has no known allergies.    Objective     Vital Signs  Temp:  [98 °F (36.7 °C)-98.9 °F (37.2 °C)] 98 °F (36.7 °C)  Heart Rate:  [59-96] 93  Resp:  [16-18] 16  BP: (125-186)/(48-92) 186/80      Physical Exam:   Physical Exam  Vitals signs and nursing note reviewed.   HENT:      Head: Normocephalic and atraumatic.   Cardiovascular:      Rate and Rhythm: Normal rate.   Pulmonary:      Effort: Pulmonary effort is normal.   Abdominal:      Palpations: Abdomen is soft.   Skin:     General: Skin is warm.   Neurological:      General: No focal deficit present.      Mental Status: She is oriented to person, place, and time.              Results Review:   CBC    Results from last 7 days   Lab Units 10/21/20  2055   WBC 10*3/mm3 5.80   HEMOGLOBIN g/dL 11.3*   PLATELETS 10*3/mm3 215     BMP   Results from last 7 days   Lab Units 10/21/20  2055   SODIUM mmol/L 139   POTASSIUM mmol/L 4.8   CHLORIDE mmol/L 105   CO2 mmol/L 19.0*   BUN mg/dL 27*   CREATININE mg/dL 1.35*   GLUCOSE mg/dL 137*   MAGNESIUM mg/dL 2.3     Cr Clearance Estimated Creatinine Clearance: 35.2 mL/min (A) (by C-G formula based on SCr of 1.35 mg/dL (H)).  Coag   Results from last 7 days   Lab Units 10/21/20  2055   INR  0.99     HbA1C No results found for: HGBA1C  Blood Glucose   Glucose   Date/Time Value Ref Range Status   10/21/2020 2122 123 (H) 70 - 105 mg/dL Final     Comment:     Serial Number: [id number]:  [id number]     Infection     CMP   Results from last 7 days   Lab Units 10/21/20  2128 10/21/20  2055   SODIUM mmol/L  --  139   POTASSIUM mmol/L  --  4.8   CHLORIDE mmol/L  --  105   CO2 mmol/L  --  19.0*   BUN mg/dL  --  27*   CREATININE mg/dL  --  1.35*   GLUCOSE mg/dL  --  137*   ALBUMIN g/dL  --  4.00   BILIRUBIN mg/dL  --  0.3   ALK PHOS U/L  --  94   AST (SGOT) U/L  --  27   ALT (SGPT) U/L  --  21   AMMONIA umol/L 47  --      UA    Results from last 7 days   Lab Units  10/21/20  2145   NITRITE UA  Negative     Radiology(recent) Ct Angiogram Head    Result Date: 10/21/2020  1. No significant stenosis or occlusion of the major arteries of the head and neck. 2. MRI is more sensitive for the detection of acute nonhemorrhagic infarct.  Report called to Niurka Melchor  at 10/21/2020 11:25 PM Electronically signed by:  Ayad Mcclain M.D.  10/21/2020 9:25 PM    Ct Angiogram Neck    Result Date: 10/21/2020  1. No significant stenosis or occlusion of the major arteries of the head and neck. 2. MRI is more sensitive for the detection of acute nonhemorrhagic infarct.  Report called to Niurka Melchor  at 10/21/2020 11:25 PM Electronically signed by:  Ayad Mcclain M.D.  10/21/2020 9:25 PM    Xr Chest 1 View    Result Date: 10/21/2020  Enlargement of the cardiac silhouette and mildly widened mediastinum, likely reflection of portable technique. There are no prior studies for comparison. When feasible, an upright PA and lateral view of the chest is recommended. No active cardiopulmonary disease.   Electronically Signed By-Aaron Mattson DO. On:10/21/2020 9:45 PM This report was finalized on [id number] by  Aaron Mattson DO..    Ct Head Without Contrast Stroke Protocol    Result Date: 10/21/2020  IMPRESSION : Small right supraorbital scalp hematoma/laceration. No acute intracranial abnormality.  Electronically Signed By-Aaron Mattson DO. On:10/21/2020 9:13 PM This report was finalized on [id number] by  Aaron Mattson DO..       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  Diabetes mellitus type 2 with renal, angiopathic and neuropathic manifestations  Morbid exogenous obesity  Atherosclerotic heart disease of native coronary arteries with angina pectoris  Paroxysmal atrial fibrillation  Chronic insomnia  Cervical spinal stenosis  Degenerative disc disease lumbosacral  spine  Diabetic dyslipidemia  Long-term use of insulin  Myofascial pain syndrome  Osteoarthritis  Panlobular COPD with emphysema  Peripheral vascular disease  Venous hypertension  Vitamin D deficiency  Normocytic anemia/ACD  Polypharmacy    Plan:  Cardiac evaluation//medication modification//home if she continues to do well  Expected Length of Stay 1 days    I discussed the patients findings and my recommendations with patient and nursing staff.     Gregory Pinedo MD  10/22/20  06:50 EDT          Electronically signed by Gregory Pinedo MD at 10/22/20 0655          Physician Progress Notes (last 24 hours) (Notes from 10/22/20 1220 through 10/23/20 1220)      Gregory Pinedo MD at 10/23/20 0756               LOS: 0 days   Patient Care Team:  Gregory Pinedo MD as PCP - General (Family Medicine)    Subjective:  No chest pain but worried about impending procedure  Objective:   Myoview reviewed//afebrile      Review of Systems:   Review of Systems   Constitutional: Positive for activity change.   Respiratory: Negative.    Cardiovascular: Positive for chest pain.   Gastrointestinal: Negative.    Genitourinary: Negative.    Musculoskeletal: Positive for back pain.   Psychiatric/Behavioral: The patient is nervous/anxious.            Vital Signs  Temp:  [98.3 °F (36.8 °C)-98.8 °F (37.1 °C)] 98.7 °F (37.1 °C)  Heart Rate:  [59-98] 72  Resp:  [14-22] 22  BP: (138-232)/(56-94) 177/72    Physical Exam:  Physical Exam  Vitals signs and nursing note reviewed.   Constitutional:       Appearance: Normal appearance.   Cardiovascular:      Rate and Rhythm: Rhythm irregular.   Pulmonary:      Effort: Pulmonary effort is normal.   Skin:     General: Skin is warm.   Neurological:      General: No focal deficit present.      Mental Status: She is alert and oriented to person, place, and time.          Radiology:  Ct Angiogram Head    Result Date: 10/21/2020  1. No significant stenosis or occlusion of the major arteries of the head  and neck. 2. MRI is more sensitive for the detection of acute nonhemorrhagic infarct.  Report called to Niurka Melchor  at 10/21/2020 11:25 PM Electronically signed by:  Ayad Mcclain M.D.  10/21/2020 9:25 PM    Ct Angiogram Neck    Result Date: 10/21/2020  1. No significant stenosis or occlusion of the major arteries of the head and neck. 2. MRI is more sensitive for the detection of acute nonhemorrhagic infarct.  Report called to Niurka Melchor  at 10/21/2020 11:25 PM Electronically signed by:  Ayad Mcclain M.D.  10/21/2020 9:25 PM    Xr Chest 1 View    Result Date: 10/21/2020  Enlargement of the cardiac silhouette and mildly widened mediastinum, likely reflection of portable technique. There are no prior studies for comparison. When feasible, an upright PA and lateral view of the chest is recommended. No active cardiopulmonary disease.   Electronically Signed By-Aaron Mattson DO. On:10/21/2020 9:45 PM This report was finalized on [id number] by  Aaron Mattson DO..    Ct Head Without Contrast Stroke Protocol    Result Date: 10/21/2020  IMPRESSION : Small right supraorbital scalp hematoma/laceration. No acute intracranial abnormality.  Electronically Signed By-Aaron Mattson DO. On:10/21/2020 9:13 PM This report was finalized on [id number] by  Aaron Mattson DO..         Results Review:     I reviewed the patient's new clinical results.  I reviewed the patient's new imaging results and agree with the interpretation.    Medication Review:   Scheduled Meds:Acetylcysteine, 600 mg, Oral, Q12H  atorvastatin, 10 mg, Oral, Nightly  insulin lispro, 0-7 Units, Subcutaneous, 4x Daily With Meals & Nightly  losartan, 100 mg, Oral, Daily  metoprolol tartrate, 25 mg, Oral, Q12H  pantoprazole, 40 mg, Oral, QAM  spironolactone, 25 mg, Oral, Daily      Continuous Infusions:   PRN Meds:.dextrose  •  dextrose  •  glucagon (human recombinant)  •  insulin lispro **AND** insulin lispro  •  labetalol  •   sodium chloride    Labs:    CBC    Results from last 7 days   Lab Units 10/23/20  0440 10/21/20  2055   WBC 10*3/mm3 4.80 5.80   HEMOGLOBIN g/dL 10.5* 11.3*   PLATELETS 10*3/mm3 179 215     BMP   Results from last 7 days   Lab Units 10/23/20  0440 10/22/20  1340 10/21/20  2055   SODIUM mmol/L 140  --  139   POTASSIUM mmol/L 3.8  --  4.8   CHLORIDE mmol/L 107  --  105   CO2 mmol/L 19.0*  --  19.0*   BUN mg/dL 20  --  27*   CREATININE mg/dL 0.96  --  1.35*   GLUCOSE mg/dL 157*  --  137*   MAGNESIUM mg/dL 2.0 1.9 2.3     Cr Clearance Estimated Creatinine Clearance: 49.6 mL/min (by C-G formula based on SCr of 0.96 mg/dL).  Coag   Results from last 7 days   Lab Units 10/23/20  0440 10/21/20  2055   INR  1.03 0.99     HbA1C   Lab Results   Component Value Date    HGBA1C 6.2 (H) 10/21/2020     Blood Glucose   Glucose   Date/Time Value Ref Range Status   10/23/2020 0713 166 (H) 70 - 105 mg/dL Final     Comment:     Serial Number: [id number]:  [id number]   10/22/2020 2035 162 (H) 70 - 105 mg/dL Final     Comment:     Serial Number: [id number]:  [id number]   10/22/2020 1636 157 (H) 70 - 105 mg/dL Final     Comment:     Serial Number: [id number]:  [id number]   10/22/2020 0832 132 (H) 70 - 105 mg/dL Final     Comment:     Serial Number: [id number]:  [id number]   10/21/2020 2122 123 (H) 70 - 105 mg/dL Final     Comment:     Serial Number: [id number]:  [id number]     Infection     CMP   Results from last 7 days   Lab Units 10/23/20  0440 10/21/20  2128 10/21/20  2055   SODIUM mmol/L 140  --  139   POTASSIUM mmol/L 3.8  --  4.8   CHLORIDE mmol/L 107  --  105   CO2 mmol/L 19.0*  --  19.0*   BUN mg/dL 20  --  27*   CREATININE mg/dL 0.96  --  1.35*   GLUCOSE mg/dL 157*  --  137*   ALBUMIN g/dL  --   --  4.00   BILIRUBIN mg/dL  --   --  0.3   ALK PHOS U/L  --   --  94   AST (SGOT) U/L  --   --  27   ALT (SGPT) U/L  --   --  21   AMMONIA umol/L  --  47  --      UA    Results from last 7 days   Lab Units  10/21/20  2145   NITRITE UA  Negative     Radiology(recent) Ct Angiogram Head    Result Date: 10/21/2020  1. No significant stenosis or occlusion of the major arteries of the head and neck. 2. MRI is more sensitive for the detection of acute nonhemorrhagic infarct.  Report called to Niurka Melchor  at 10/21/2020 11:25 PM Electronically signed by:  Ayad Mcclain M.D.  10/21/2020 9:25 PM    Ct Angiogram Neck    Result Date: 10/21/2020  1. No significant stenosis or occlusion of the major arteries of the head and neck. 2. MRI is more sensitive for the detection of acute nonhemorrhagic infarct.  Report called to Niurka Melchor  at 10/21/2020 11:25 PM Electronically signed by:  Ayad Mcclain M.D.  10/21/2020 9:25 PM    Xr Chest 1 View    Result Date: 10/21/2020  Enlargement of the cardiac silhouette and mildly widened mediastinum, likely reflection of portable technique. There are no prior studies for comparison. When feasible, an upright PA and lateral view of the chest is recommended. No active cardiopulmonary disease.   Electronically Signed By-Aaron Mattson DO. On:10/21/2020 9:45 PM This report was finalized on [id number] by  Aaron Mattson DO..    Ct Head Without Contrast Stroke Protocol    Result Date: 10/21/2020  IMPRESSION : Small right supraorbital scalp hematoma/laceration. No acute intracranial abnormality.  Electronically Signed By-Aaron Mattson DO. On:10/21/2020 9:13 PM This report was finalized on [id number] by  Aaron Mattson DO..     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  Diabetes mellitus type 2 with renal, angiopathic and neuropathic manifestations  Morbid exogenous obesity  Atherosclerotic heart disease of native coronary arteries with angina pectoris  Paroxysmal atrial fibrillation  Chronic insomnia  Cervical spinal stenosis  Degenerative disc disease lumbosacral  spine  Diabetic dyslipidemia  Long-term use of insulin  Myofascial pain syndrome  Osteoarthritis  Panlobular COPD with emphysema  Peripheral vascular disease  Venous hypertension  Vitamin D deficiency  Normocytic anemia/ACD  Polypharmacy    Plan:  Elective coronary catheterization today        Gregory Pinedo MD  10/23/20  07:56 EDT          Electronically signed by Gregory Pinedo MD at 10/23/20 3149

## 2020-10-24 VITALS
WEIGHT: 234.57 LBS | BODY MASS INDEX: 44.29 KG/M2 | OXYGEN SATURATION: 95 % | SYSTOLIC BLOOD PRESSURE: 138 MMHG | RESPIRATION RATE: 13 BRPM | TEMPERATURE: 97.5 F | DIASTOLIC BLOOD PRESSURE: 61 MMHG | HEIGHT: 61 IN | HEART RATE: 89 BPM

## 2020-10-24 LAB
ANION GAP SERPL CALCULATED.3IONS-SCNC: 11 MMOL/L (ref 5–15)
BUN SERPL-MCNC: 20 MG/DL (ref 8–23)
BUN/CREAT SERPL: 18.3 (ref 7–25)
CALCIUM SPEC-SCNC: 8.9 MG/DL (ref 8.6–10.5)
CHLORIDE SERPL-SCNC: 108 MMOL/L (ref 98–107)
CO2 SERPL-SCNC: 22 MMOL/L (ref 22–29)
CREAT SERPL-MCNC: 1.09 MG/DL (ref 0.57–1)
DEPRECATED RDW RBC AUTO: 58.6 FL (ref 37–54)
ERYTHROCYTE [DISTWIDTH] IN BLOOD BY AUTOMATED COUNT: 17.3 % (ref 12.3–15.4)
GFR SERPL CREATININE-BSD FRML MDRD: 48 ML/MIN/1.73
GLUCOSE BLDC GLUCOMTR-MCNC: 135 MG/DL (ref 70–105)
GLUCOSE BLDC GLUCOMTR-MCNC: 154 MG/DL (ref 70–105)
GLUCOSE SERPL-MCNC: 129 MG/DL (ref 65–99)
HCT VFR BLD AUTO: 31.4 % (ref 34–46.6)
HGB BLD-MCNC: 10.3 G/DL (ref 12–15.9)
MCH RBC QN AUTO: 31.7 PG (ref 26.6–33)
MCHC RBC AUTO-ENTMCNC: 32.8 G/DL (ref 31.5–35.7)
MCV RBC AUTO: 96.7 FL (ref 79–97)
PLATELET # BLD AUTO: 158 10*3/MM3 (ref 140–450)
PMV BLD AUTO: 8.2 FL (ref 6–12)
POTASSIUM SERPL-SCNC: 4.6 MMOL/L (ref 3.5–5.2)
RBC # BLD AUTO: 3.25 10*6/MM3 (ref 3.77–5.28)
SODIUM SERPL-SCNC: 141 MMOL/L (ref 136–145)
WBC # BLD AUTO: 5 10*3/MM3 (ref 3.4–10.8)

## 2020-10-24 PROCEDURE — G0378 HOSPITAL OBSERVATION PER HR: HCPCS

## 2020-10-24 PROCEDURE — 80048 BASIC METABOLIC PNL TOTAL CA: CPT | Performed by: INTERNAL MEDICINE

## 2020-10-24 PROCEDURE — A9270 NON-COVERED ITEM OR SERVICE: HCPCS | Performed by: INTERNAL MEDICINE

## 2020-10-24 PROCEDURE — 63710000001 INSULIN LISPRO (HUMAN) PER 5 UNITS: Performed by: INTERNAL MEDICINE

## 2020-10-24 PROCEDURE — 63710000001 SPIRONOLACTONE 25 MG TABLET: Performed by: INTERNAL MEDICINE

## 2020-10-24 PROCEDURE — 63710000001 LOSARTAN 50 MG TABLET: Performed by: INTERNAL MEDICINE

## 2020-10-24 PROCEDURE — 97162 PT EVAL MOD COMPLEX 30 MIN: CPT

## 2020-10-24 PROCEDURE — 82962 GLUCOSE BLOOD TEST: CPT

## 2020-10-24 PROCEDURE — 99213 OFFICE O/P EST LOW 20 MIN: CPT | Performed by: INTERNAL MEDICINE

## 2020-10-24 PROCEDURE — 85027 COMPLETE CBC AUTOMATED: CPT | Performed by: INTERNAL MEDICINE

## 2020-10-24 PROCEDURE — 63710000001 PANTOPRAZOLE 40 MG TABLET DELAYED-RELEASE: Performed by: INTERNAL MEDICINE

## 2020-10-24 RX ORDER — ACETAMINOPHEN 325 MG/1
650 TABLET ORAL EVERY 4 HOURS PRN
Start: 2020-10-24 | End: 2021-09-07

## 2020-10-24 RX ADMIN — PANTOPRAZOLE SODIUM 40 MG: 40 TABLET, DELAYED RELEASE ORAL at 09:12

## 2020-10-24 RX ADMIN — SPIRONOLACTONE 25 MG: 25 TABLET ORAL at 09:12

## 2020-10-24 RX ADMIN — LOSARTAN POTASSIUM 100 MG: 50 TABLET, FILM COATED ORAL at 09:12

## 2020-10-24 RX ADMIN — INSULIN LISPRO 2 UNITS: 100 INJECTION, SOLUTION INTRAVENOUS; SUBCUTANEOUS at 11:20

## 2020-10-24 NOTE — PLAN OF CARE
Problem: Adult Inpatient Plan of Care  Goal: Plan of Care Review  Recent Flowsheet Documentation  Taken 10/24/2020 1144 by Bethany Wallace, SHANNON  Plan of Care Reviewed With: patient  Outcome Summary: 86 yo female fell while outside after syncopal spell.  Pt from home with spouse, normally ind with quad cane as needed.  Pt feels near baseline, had cardiac cath 10/23.  Pt able to ambulate using RW, unsteady without AD.  Safe for home with recommendation of HHPT at d/c.  PPE worn:  gloves, mask, goggles.

## 2020-10-24 NOTE — THERAPY EVALUATION
Patient Name: Aracelis Vargas  : 1935    MRN: [id number]                              Today's Date: 10/24/2020       Admit Date: 10/21/2020    Visit Dx:     ICD-10-CM ICD-9-CM   1. Syncope and collapse  R55 780.2   2. Fall, initial encounter  W19.XXXA E888.9   3. Confusion  R41.0 298.9   4. Atrioventricular block, Mobitz type 1, Wenckebach  I44.1 426.13   5. 2019-nCoV not detected  Z03.818 V71.83   6. Abnormal nuclear stress test  R94.39 794.39     Patient Active Problem List   Diagnosis   • Fall   • DM type 2 with diabetic dyslipidemia (CMS/[facility])   • Secondary hyperaldosteronism (CMS/HCC)   • Stage 3b chronic kidney disease   • Hypertension associated with stage 3 chronic kidney disease due to type 2 diabetes mellitus (CMS/HCC)   • Syncope and collapse   • Type 2 diabetes mellitus with diabetic neuropathy, with long-term current use of insulin (CMS/[facility])   • Morbid obesity (CMS/[facility])   • Atherosclerosis of native coronary artery of native heart without angina pectoris   • Atrioventricular block, Mobitz type 1, Wenckebach   • Abnormal nuclear stress test     Past Medical History:   Diagnosis Date   • Atherosclerosis of native coronary artery of native heart without angina pectoris 10/22/2020   • Hypertension associated with stage 3 chronic kidney disease due to type 2 diabetes mellitus (CMS/HCC) 10/22/2020   • Morbid obesity (CMS/HCC) 10/22/2020   • Secondary hyperaldosteronism (CMS/HCC) 10/22/2020   • Stage 3b chronic kidney disease 10/22/2020   • Type 2 diabetes mellitus with diabetic neuropathy, with long-term current use of insulin (CMS/HCC) 10/22/2020   • Type 2 diabetes mellitus with diabetic neuropathy, with long-term current use of insulin (CMS/[facility]) 10/22/2020     Past Surgical History:   Procedure Laterality Date   • CARDIAC CATHETERIZATION N/A 10/23/2020    Procedure: Left Heart Cath and coronary angiogram;  Surgeon: Wang Plaza MD;  Location: [facility] CATH INVASIVE LOCATION;  Service:  Cardiovascular;  Laterality: N/A;   • JOINT REPLACEMENT Right     Hip   • JOINT REPLACEMENT Left     hip   • JOINT REPLACEMENT Left     hip (for second time)   • JOINT REPLACEMENT Right     knee     General Information     [facility] Name 10/24/20 1140          Physical Therapy Time and Intention    Document Type  evaluation  -     Mode of Treatment  physical therapy  -[facility] Name 10/24/20 1140          General Information    Patient Profile Reviewed  yes  -     Prior Level of Function  independent: quad cane for distance walking  -     Existing Precautions/Restrictions  no known precautions/restrictions  -     Barriers to Rehab  none identified  -[facility] Name 10/24/20 1140          Living Environment    Lives With  spouse  -[facility] Name 10/24/20 1140          Home Main Entrance    Number of Stairs, Main Entrance  one  -[facility] Name 10/24/20 1140          Stairs Within Home, Primary    Number of Stairs, Within Home, Primary  none  -[facility] Name 10/24/20 1140          Cognition    Orientation Status (Cognition)  oriented x 4  -       User Key  (r) = Recorded By, (t) = Taken By, (c) = Cosigned By    Initials Name Provider Type     Bethany Wallace, PT Physical Therapist        Mobility     Row Name 10/24/20 1142          Bed Mobility    Bed Mobility  bed mobility (all) activities  -     All Activities, Hooker (Bed Mobility)  independent  -     Row Name 10/24/20 1142          Bed-Chair Transfer    Bed-Chair [place] (Transfers)  independent  -     Row Name 10/24/20 1142          Sit-Stand Transfer    Sit-Stand Hooker (Transfers)  independent  -[facility] Name 10/24/20 1142          Gait/Stairs (Locomotion)    Hooker Level (Gait)  modified independence  -     Assistive Device (Gait)  walker, front-wheeled  -     Distance in Feet (Gait)  150'  -     Comment (Gait/Stairs)  initially amb without assistive device, pt with wide  LASHONDA and unsteady on her feet.  once issued  RW, steady gait pattern in mcdonald with supervision  -       User Key  (r) = Recorded By, (t) = Taken By, (c) = Cosigned By    Initials Name Provider Type     Bethany Wallace PT Physical Therapist        Obj/Interventions     Row Name 10/24/20 1143          Range of Motion Comprehensive    General Range of Motion  no range of motion deficits identified  -[facility] 10/24/20 1143          Strength Comprehensive (MMT)    General Manual Muscle Testing (MMT) Assessment  no strength deficits identified  -[facility] 10/24/20 1143          Balance    Static Sitting Balance  WNL  -     Dynamic Sitting Balance  WNL  -     Static Standing Balance  WFL  -     Dynamic Standing Balance  mild impairment  -[facility] 10/24/20 1143          Sensory Assessment (Somatosensory)    Sensory Assessment (Somatosensory)  sensation intact  -       User Key  (r) = Recorded By, (t) = Taken By, (c) = Cosigned By    Initials Name Provider Type     Bethany Wallace PT Physical Therapist        Goals/Plan    No documentation.       Clinical Impression     Row Name 10/24/20 1144          Pain Scale: Numbers Pre/Post-Treatment    Pretreatment Pain Rating  0/10 - no pain  -     Posttreatment Pain Rating  0/10 - no pain  -[facility]     Row Name 10/24/20 1144          Plan of Care Review    Plan of Care Reviewed With  patient  -     Outcome Summary  86 yo female fall while outside after syncopal spell.  Pt from home with spouse, normally ind with quad cane as needed.  Pt feels back to baseline, had cardiac cath 10/23.  Pt able to ambulate using RW, unsteady without AD.  Safe for home with recommendation of HHPT at d/c.  PPE worn:  gloves, mask, goggles.  -[facility]     Row Name 10/24/20 1144          Therapy Assessment/Plan (PT)    Criteria for Skilled Interventions Met (PT)  no;no problems identified which require skilled intervention  -[facility] 10/24/20 1144          Vital Signs    O2 Delivery Pre Treatment  room air  -     O2  Delivery Intra Treatment  room air  -     Post SpO2 (%)  97  -     O2 Delivery Post Treatment  room air  -     Row Name 10/24/20 1144          Positioning and Restraints    Pre-Treatment Position  in bed  -     Post Treatment Position  chair  -     In Chair  notified nsg;sitting;call light within reach;exit alarm on  -       User Key  (r) = Recorded By, (t) = Taken By, (c) = Cosigned By    Initials Name Provider Type     Bethany Wallace, SHANNON Physical Therapist        Outcome Measures     Row Name 10/24/20 1146          Functional Assessment    Outcome Measure Options  Modified Carpenter  -       User Key  (r) = Recorded By, (t) = Taken By, (c) = Cosigned By    Initials Name Provider Type     Bethany Wallace, PT Physical Therapist        Physical Therapy Education                 Title: PT OT SLP Therapies (Done)     Topic: Physical Therapy (Done)     Point: Mobility training (Done)     Learning Progress Summary           Patient Acceptance, E,TB, VU by  at 10/24/2020 1147                   Point: Precautions (Done)     Learning Progress Summary           Patient Acceptance, E,TB, VU by  at 10/24/2020 1147                               User Key     Initials Effective Dates Name Provider Type [facility] 03/01/19 -  Bethany Wallace, PT Physical Therapist PT              PT Recommendation and Plan     Plan of Care Reviewed With: patient  Outcome Summary: 86 yo female fall while outside after syncopal spell.  Pt from home with spouse, normally ind with quad cane as needed.  Pt feels back to baseline, had cardiac cath 10/23.  Pt able to ambulate using RW, unsteady without AD.  Safe for home with recommendation of HHPT at d/c.  PPE worn:  gloves, mask, goggles.     Time Calculation:   PT Charges     Row Name 10/24/20 1148             Time Calculation    Start Time  0835  -      Stop Time  0850  -      Time Calculation (min)  15 min  -      PT Received On  10/24/20  -         Time Calculation- PT     Total Timed Code Minutes- PT  0 minute(s)  -JOSE        User Key  (r) = Recorded By, (t) = Taken By, (c) = Cosigned By    Initials Name Provider Type    Bethany Costa, PT Physical Therapist        Therapy Charges for Today     Code Description Service Date Service Provider Modifiers Qty    [id number]  PT EVAL MOD COMPLEXITY 2 10/24/2020 Bethany Wallace, SHANNON GP 1          PT G-Codes  Outcome Measure Options: Modified Arline  Modified Arline Scale: 1 - No significant disability despite symptoms.  Able to carry out all usual duties and activities.    Bethany Wallace PT  10/24/2020

## 2020-10-24 NOTE — PLAN OF CARE
Problem: Adult Inpatient Plan of Care  Goal: Plan of Care Review  Outcome: Ongoing, Progressing  Goal: Patient-Specific Goal (Individualized)  Outcome: Ongoing, Progressing  Goal: Absence of Hospital-Acquired Illness or Injury  Outcome: Ongoing, Progressing  Intervention: Identify and Manage Fall Risk  Recent Flowsheet Documentation  Taken 10/23/2020 1931 by Humberto Browne RN  Safety Promotion/Fall Prevention:   assistive device/personal items within reach   clutter free environment maintained   fall prevention program maintained   nonskid shoes/slippers when out of bed   safety round/check completed  Intervention: Prevent Skin Injury  Recent Flowsheet Documentation  Taken 10/24/2020 0015 by Humberto Browne RN  Body Position: supine  Taken 10/23/2020 1931 by Humberto Browne RN  Body Position: supine  Intervention: Prevent and Manage VTE (venous thromboembolism) Risk  Recent Flowsheet Documentation  Taken 10/24/2020 0015 by Humberto Browne RN  VTE Prevention/Management:   bilateral   sequential compression devices on  Taken 10/23/2020 1931 by Humberto Browne RN  VTE Prevention/Management:   bilateral   sequential compression devices on  Intervention: Prevent Infection  Recent Flowsheet Documentation  Taken 10/24/2020 0015 by Humberto Browne RN  Infection Prevention:   environmental surveillance performed   rest/sleep promoted   single patient room provided  Taken 10/23/2020 1931 by Humberto Browne RN  Infection Prevention:   environmental surveillance performed   rest/sleep promoted   single patient room provided  Goal: Optimal Comfort and Wellbeing  Outcome: Ongoing, Progressing  Intervention: Provide Person-Centered Care  Recent Flowsheet Documentation  Taken 10/24/2020 0015 by Humberto Browne RN  Trust Relationship/Rapport:   care explained   choices provided   emotional support provided   empathic listening provided   questions answered   questions encouraged   reassurance provided   thoughts/feelings  acknowledged  Taken 10/23/2020 1931 by Humberto Browne RN  Trust Relationship/Rapport:   care explained   choices provided   emotional support provided   empathic listening provided   questions answered   questions encouraged   reassurance provided   thoughts/feelings acknowledged  Goal: Readiness for Transition of Care  Outcome: Ongoing, Progressing     Problem: Fall Injury Risk  Goal: Absence of Fall and Fall-Related Injury  Outcome: Ongoing, Progressing  Intervention: Identify and Manage Contributors to Fall Injury Risk  Recent Flowsheet Documentation  Taken 10/24/2020 0015 by Humberto Browne RN  Self-Care Promotion:   independence encouraged   BADL personal objects within reach  Taken 10/23/2020 1931 by Humberto Browne RN  Medication Review/Management: medications reviewed  Self-Care Promotion:   independence encouraged   BADL personal objects within reach  Intervention: Promote Injury-Free Environment  Recent Flowsheet Documentation  Taken 10/23/2020 1931 by Humberto Browne RN  Safety Promotion/Fall Prevention:   assistive device/personal items within reach   clutter free environment maintained   fall prevention program maintained   nonskid shoes/slippers when out of bed   safety round/check completed     Problem: Adjustment to Illness (Stroke, Ischemic/Transient Ischemic Attack)  Goal: Optimal Coping  Outcome: Ongoing, Progressing  Intervention: Support Patient/Family Psychosocial Response to Stroke  Recent Flowsheet Documentation  Taken 10/24/2020 0015 by Humberto Browne RN  Supportive Measures:   active listening utilized   decision-making supported   relaxation techniques promoted   self-care encouraged  Family/Support System Care:   self-care encouraged   support provided  Taken 10/23/2020 1931 by Humberto Browne RN  Supportive Measures:   active listening utilized   decision-making supported   relaxation techniques promoted   self-care encouraged  Family/Support System Care:   self-care encouraged    support provided     Problem: Bowel Elimination Impaired (Stroke, Ischemic/Transient Ischemic Attack)  Goal: Effective Bowel Elimination  Outcome: Ongoing, Progressing     Problem: Cerebral Tissue Perfusion Risk (Stroke, Ischemic/Transient Ischemic Attack)  Goal: Optimal Cerebral Tissue Perfusion  Outcome: Ongoing, Progressing  Intervention: Protect and Optimize Cerebral Perfusion  Recent Flowsheet Documentation  Taken 10/24/2020 0015 by Humberto Browne RN  Sensory Stimulation Regulation:   care clustered   quiet environment promoted  Cerebral Perfusion Promotion: blood pressure monitored  Taken 10/23/2020 1931 by Humberto Browne RN  Sensory Stimulation Regulation:   care clustered   quiet environment promoted  Cerebral Perfusion Promotion: blood pressure monitored  Intervention: Optimize Oxygenation and Ventilation  Recent Flowsheet Documentation  Taken 10/24/2020 0015 by Humberto Browne RN  Head of Bed (HOB): HOB elevated  Taken 10/23/2020 1931 by Humberto Browne RN  Head of Bed (HOB): HOB elevated     Problem: Communication Impairment (Stroke, Ischemic/Transient Ischemic Attack)  Goal: Improved Communication Skills  Outcome: Ongoing, Progressing  Intervention: Optimize Cognitive and Communication Skills  Recent Flowsheet Documentation  Taken 10/24/2020 0015 by Humberto Browne RN  Reorientation Measures:   clock in view   reorientation provided  Environment Familiarity/Consistency: daily routine followed  Communication Enhancement Strategies: call light answered in person  Taken 10/23/2020 1931 by Humberto Browne RN  Reorientation Measures:   clock in view   reorientation provided  Environment Familiarity/Consistency: daily routine followed  Communication Enhancement Strategies: call light answered in person     Problem: Eating/Swallowing Impairment (Stroke, Ischemic/Transient Ischemic Attack)  Goal: Oral Intake without Aspiration  Outcome: Ongoing, Progressing  Intervention: Optimize Eating and  Swallowing  Recent Flowsheet Documentation  Taken 10/24/2020 0015 by Humberto Browne RN  Aspiration Precautions: awake/alert before oral intake  Taken 10/23/2020 1931 by Humberto Browne RN  Aspiration Precautions: awake/alert before oral intake     Problem: Functional Ability Impaired (Stroke, Ischemic/Transient Ischemic Attack)  Goal: Optimal Functional Ability  Outcome: Ongoing, Progressing  Intervention: Optimize Functional Ability  Recent Flowsheet Documentation  Taken 10/24/2020 0015 by Humberto Browne RN  Activity Management: up ad sue  Self-Care Promotion:   independence encouraged   BADL personal objects within reach  Taken 10/23/2020 1931 by Humberto Browne RN  Activity Management: up ad sue  Self-Care Promotion:   independence encouraged   BADL personal objects within reach     Problem: Hemodynamic Instability (Stroke, Ischemic/Transient Ischemic Attack)  Goal: Vital Signs Remain in Desired Range  Outcome: Ongoing, Progressing  Intervention: Optimize Blood Flow  Recent Flowsheet Documentation  Taken 10/24/2020 0015 by Humberto Browne RN  Fluid/Electrolyte Management: fluids provided  Taken 10/23/2020 1931 by Humberto Browne RN  Fluid/Electrolyte Management: fluids provided     Problem: Pain (Stroke, Ischemic/Transient Ischemic Attack)  Goal: Acceptable Pain Control  Outcome: Ongoing, Progressing     Problem: Sensorimotor Impairment (Stroke, Ischemic/Transient Ischemic Attack)  Goal: Improved Sensorimotor Function  Outcome: Ongoing, Progressing  Intervention: Optimize Range of Motion, Motor Control and Function  Recent Flowsheet Documentation  Taken 10/24/2020 0015 by Humberto Browne RN  Positioning/Transfer Devices:   pillows   in use  Range of Motion: active ROM (range of motion) encouraged  Taken 10/23/2020 1931 by Humberto Browne RN  Positioning/Transfer Devices:   pillows   in use  Range of Motion: active ROM (range of motion) encouraged  Intervention: Optimize Sensory and Perceptual  Abilities  Recent Flowsheet Documentation  Taken 10/24/2020 0015 by Humberto Browne, RN  Pressure Reduction Techniques: frequent weight shift encouraged  Pressure Reduction Devices: pressure-redistributing mattress utilized  Taken 10/23/2020 1931 by Humberto Browne, RN  Pressure Reduction Techniques: frequent weight shift encouraged  Sensation Impairment Protection: cues provided for safety  Pressure Reduction Devices: pressure-redistributing mattress utilized     Problem: Urinary Elimination Impaired (Stroke, Ischemic/Transient Ischemic Attack)  Goal: Effective Urinary Elimination  Outcome: Ongoing, Progressing   Goal Outcome Evaluation:  Plan of Care Reviewed With: patient

## 2020-10-24 NOTE — PROGRESS NOTES
Referring Provider: Gregory Pinedo MD    Reason for follow-up:  Syncope  Second-degree Mobitz type I AV block     Patient Care Team:  Gregory Pinedo MD as PCP - General (Family Medicine)    Subjective .  Feeling okay     ROS    Since I have last seen her yesterday, the patient has been without any chest discomfort ,shortness of breath, palpitations, dizziness or syncope.  Denies having any headache ,abdominal pain ,nausea, vomiting , diarrhea constipation, loss of weight or loss of appetite.  Denies having any excessive bruising ,hematuria or blood in the stool.    Review of all systems negative except as indicated    History  Past Medical History:   Diagnosis Date   • Atherosclerosis of native coronary artery of native heart without angina pectoris 10/22/2020   • Hypertension associated with stage 3 chronic kidney disease due to type 2 diabetes mellitus (CMS/[facility]) 10/22/2020   • Morbid obesity (CMS/[facility]) 10/22/2020   • Secondary hyperaldosteronism (CMS/[facility]) 10/22/2020   • Stage 3b chronic kidney disease 10/22/2020   • Type 2 diabetes mellitus with diabetic neuropathy, with long-term current use of insulin (CMS/[facility]) 10/22/2020   • Type 2 diabetes mellitus with diabetic neuropathy, with long-term current use of insulin (CMS/[facility]) 10/22/2020       Past Surgical History:   Procedure Laterality Date   • JOINT REPLACEMENT Right     Hip   • JOINT REPLACEMENT Left     hip   • JOINT REPLACEMENT Left     hip (for second time)   • JOINT REPLACEMENT Right     knee       Family History   Problem Relation Age of Onset   • Heart disease Mother        Social History     Tobacco Use   • Smoking status: Never Smoker   • Smokeless tobacco: Never Used   Substance Use Topics   • Alcohol use: Not Currently   • Drug use: Never        Medications Prior to Admission   Medication Sig Dispense Refill Last Dose   • atorvastatin (LIPITOR) 10 MG tablet Take 10 mg by mouth Daily.      • diclofenac (VOLTAREN) 1 % gel gel Apply 4 g topically to  the appropriate area as directed 4 (Four) Times a Day As Needed.      • furosemide (LASIX) 20 MG tablet Take 20 mg by mouth Daily.      • gabapentin (NEURONTIN) 300 MG capsule Take 300 mg by mouth 2 (two) times a day.      • insulin NPH-insulin regular (humuLIN 70/30,novoLIN 70/30) (70-30) 100 UNIT/ML injection Inject  under the skin into the appropriate area as directed 2 (Two) Times a Day With Meals. Amount of units unknown      • metFORMIN (GLUCOPHAGE) 1000 MG tablet Take 1,000 mg by mouth 2 (Two) Times a Day With Meals.      • metoprolol tartrate (LOPRESSOR) 50 MG tablet Take 50 mg by mouth 3 (Three) Times a Day.      • omeprazole (priLOSEC) 40 MG capsule Take 40 mg by mouth Daily.      • spironolactone (ALDACTONE) 25 MG tablet Take 25 mg by mouth Daily.      • topiramate (TOPAMAX) 25 MG tablet Take 25 mg by mouth Daily.      • traZODone (DESYREL) 50 MG tablet Take 50 mg by mouth Every Night.      • valsartan (DIOVAN) 160 MG tablet Take 160 mg by mouth Daily.          Allergies  Patient has no known allergies.    Scheduled Meds:atorvastatin, 10 mg, Oral, Nightly  insulin lispro, 0-7 Units, Subcutaneous, 4x Daily With Meals & Nightly  losartan, 100 mg, Oral, Daily  pantoprazole, 40 mg, Oral, QAM  spironolactone, 25 mg, Oral, Daily      Continuous Infusions:   PRN Meds:.•  acetaminophen  •  atropine  •  dextrose  •  dextrose  •  diphenhydrAMINE  •  glucagon (human recombinant)  •  insulin lispro **AND** insulin lispro  •  labetalol  •  LORazepam  •  ondansetron **OR** ondansetron  •  sodium chloride  •  sodium chloride    Objective     VITAL SIGNS  Vitals:    10/23/20 1756 10/23/20 2236 10/24/20 0214 10/24/20 0634   BP: 144/64 152/62 147/62 151/69   BP Location: Right arm Right arm Right arm Right arm   Patient Position: Lying Lying Lying Lying   Pulse: 85 82 83 87   Resp: 15 16 16 16   Temp: 98.1 °F (36.7 °C) 98.8 °F (37.1 °C) 98.9 °F (37.2 °C) 99 °F (37.2 °C)   TempSrc: Oral Oral Oral Oral   SpO2: 95% 96% 95%  "96%   Weight:    106 kg (234 lb 9.1 oz)   Height:           Flowsheet Rows      First Filed Value   Admission Height  170.2 cm (67\") Documented at 10/21/2020 2036   Admission Weight  110 kg (242 lb 4.6 oz) Documented at 10/21/2020 2036            Intake/Output Summary (Last 24 hours) at 10/24/2020 0738  Last data filed at 10/23/2020 1700  Gross per 24 hour   Intake 240 ml   Output --   Net 240 ml        TELEMETRY: Sinus rhythm     Physical Exam:  The patient is alert, oriented and in no distress.  Vital signs as noted above.  Exogenous obesity.  Head and neck revealed no carotid bruits or jugular venous distention.  No thyromegaly or lymphadenopathy is present  Lungs clear.  No wheezing.  Breath sounds are normal bilaterally.  Heart normal first and second heart sounds.  No murmur. No precordial rub is present.  No gallop is present.  Abdomen soft and nontender.  No organomegaly is present.  Extremities with good peripheral pulses without any pedal edema.  Skin warm and dry.  Loop recorder site looks normal.  Musculoskeletal system is grossly normal  CNS grossly normal      Results Review:   I reviewed the patient's new clinical results.  Lab Results (last 24 hours)     Procedure Component Value Units Date/Time    POC Glucose Once [[id number]]  (Abnormal) Collected: 10/24/20 0725    Specimen: Blood Updated: 10/24/20 0727     Glucose 135 mg/dL      Comment: Serial Number: [id number]:  [id number]       Basic Metabolic Panel [[id number]]  (Abnormal) Collected: 10/24/20 0206    Specimen: Blood Updated: 10/24/20 0250     Glucose 129 mg/dL      BUN 20 mg/dL      Creatinine 1.09 mg/dL      Sodium 141 mmol/L      Potassium 4.6 mmol/L      Comment: Slight hemolysis detected by analyzer. Results may be affected.        Chloride 108 mmol/L      CO2 22.0 mmol/L      Calcium 8.9 mg/dL      eGFR Non African Amer 48 mL/min/1.73      BUN/Creatinine Ratio 18.3     Anion Gap 11.0 mmol/L     Narrative:      GFR Normal >60  Chronic " Kidney Disease <60  Kidney Failure <15      CBC (No Diff) [[id number]]  (Abnormal) Collected: 10/24/20 0206    Specimen: Blood Updated: 10/24/20 0239     WBC 5.00 10*3/mm3      RBC 3.25 10*6/mm3      Hemoglobin 10.3 g/dL      Hematocrit 31.4 %      MCV 96.7 fL      MCH 31.7 pg      MCHC 32.8 g/dL      RDW 17.3 %      RDW-SD 58.6 fl      MPV 8.2 fL      Platelets 158 10*3/mm3      Comment: Result checked        POC Glucose Once [[id number]]  (Abnormal) Collected: 10/23/20 2045    Specimen: Blood Updated: 10/23/20 2045     Glucose 174 mg/dL      Comment: Serial Number: [id number]:  [id number]       POC Glucose Once [[id number]]  (Abnormal) Collected: 10/23/20 1630    Specimen: Blood Updated: 10/23/20 1635     Glucose 145 mg/dL      Comment: Serial Number: [id number]:  [id number]       Vitamin B12 [[id number]]  (Abnormal) Collected: 10/23/20 0440    Specimen: Blood Updated: 10/23/20 1112     Vitamin B-12 173 pg/mL     Narrative:      Results may be falsely increased if patient taking Biotin.            Imaging Results (Last 24 Hours)     ** No results found for the last 24 hours. **      LAB RESULTS (LAST 7 DAYS)    CBC  Results from last 7 days   Lab Units 10/24/20  0206 10/23/20  0440 10/21/20  2055   WBC 10*3/mm3 5.00 4.80 5.80   RBC 10*6/mm3 3.25* 3.37* 3.66*   HEMOGLOBIN g/dL 10.3* 10.5* 11.3*   HEMATOCRIT % 31.4* 32.1* 35.3   MCV fL 96.7 95.2 96.4   PLATELETS 10*3/mm3 158 179 215       BMP  Results from last 7 days   Lab Units 10/24/20  0206 10/23/20  0440 10/22/20  1340 10/21/20  2055   SODIUM mmol/L 141 140  --  139   POTASSIUM mmol/L 4.6 3.8  --  4.8   CHLORIDE mmol/L 108* 107  --  105   CO2 mmol/L 22.0 19.0*  --  19.0*   BUN mg/dL 20 20  --  27*   CREATININE mg/dL 1.09* 0.96  --  1.35*   GLUCOSE mg/dL 129* 157*  --  137*   MAGNESIUM mg/dL  --  2.0 1.9 2.3       CMP   Results from last 7 days   Lab Units 10/24/20  0206 10/23/20  0440 10/21/20  2128 10/21/20  2055   SODIUM mmol/L 141 140  --  139    POTASSIUM mmol/L 4.6 3.8  --  4.8   CHLORIDE mmol/L 108* 107  --  105   CO2 mmol/L 22.0 19.0*  --  19.0*   BUN mg/dL 20 20  --  27*   CREATININE mg/dL 1.09* 0.96  --  1.35*   GLUCOSE mg/dL 129* 157*  --  137*   ALBUMIN g/dL  --   --   --  4.00   BILIRUBIN mg/dL  --   --   --  0.3   ALK PHOS U/L  --   --   --  94   AST (SGOT) U/L  --   --   --  27   ALT (SGPT) U/L  --   --   --  21   AMMONIA umol/L  --   --  47  --          BNP        TROPONIN  Results from last 7 days   Lab Units 10/23/20  0440  10/21/20  2055   CK TOTAL U/L  --   --  168   TROPONIN T ng/mL <0.010   < > <0.010    < > = values in this interval not displayed.       CoAg  Results from last 7 days   Lab Units 10/23/20  0440 10/21/20  2055   INR  1.03 0.99       Creatinine Clearance  Estimated Creatinine Clearance: 43.9 mL/min (A) (by C-G formula based on SCr of 1.09 mg/dL (H)).    ABG  Results from last 7 days   Lab Units 10/21/20  2126   PH, ARTERIAL pH units 7.360   PCO2, ARTERIAL mm Hg 35.5   PO2 ART mm Hg 75.7*   O2 SATURATION ART % 94.6   BASE EXCESS ART mmol/L -4.8*       Radiology  No radiology results for the last day            EKG          I personally viewed and interpreted the patient's EKG/Telemetry data: Sinus rhythm Mobitz type I second-degree AV block    ECHOCARDIOGRAM:    Results for orders placed during the hospital encounter of 10/21/20   Adult Transthoracic Echo Complete W/ Cont if Necessary Per Protocol    Narrative · Estimated left ventricular EF = 60% Left ventricular systolic function   is normal.     Indications  Syncope    Technically satisfactory study.  Mitral valve is structurally normal.  Tricuspid valve is structurally normal.  Aortic valve is structurally normal.  Pulmonic valve could not be well visualized.  No evidence for mitral tricuspid or aortic regurgitation is seen by   Doppler study.  Left atrium is normal in size.  Right atrium is normal in size.  Left ventricle is normal in size and contractility with  ejection fraction   of 60%.  Right ventricle is normal in size.  Atrial septum is intact.  Aorta is normal.  No pericardial effusion or intracardiac thrombus is seen.    Impression  Structurally and functionally normal cardiac valves.  Left ventricular size and contractility is normal with ejection fraction   of 60%.           STRESS MYOVIEW:    Cardiolite (Tc-99m Sestamibi) stress test    CARDIAC CATHETERIZATION:            OTHER:         Assessment/Plan     Principal Problem:    Syncope and collapse  Active Problems:    Atrioventricular block, Mobitz type 1, Wenckebach    Abnormal nuclear stress test    Fall    DM type 2 with diabetic dyslipidemia (CMS/HCC)    Secondary hyperaldosteronism (CMS/HCC)    Stage 3b chronic kidney disease    Hypertension associated with stage 3 chronic kidney disease due to type 2 diabetes mellitus (CMS/HCC)    Type 2 diabetes mellitus with diabetic neuropathy, with long-term current use of insulin (CMS/HCC)    Morbid obesity (CMS/HCC)    Atherosclerosis of native coronary artery of native heart without angina pectoris        ]]]]]]]]]]]]]]]]]]]]  Impression  ========  -Possible syncope.  Appears to be true syncope.  Concerned about advanced AV blocks     -Second-degree Mobitz type I AV block.  Narrow QRS complex.    Cardiac catheterization 10/23/2020 revealed:  Left ventricular size and contractility is normal with ejection fraction of 60%.  Normal epicardial coronary arteries.    Status post loop recorder placement 10/23/2020 ([employer])     -History of paroxysmal atrial fibrillation     -Hypertension dyslipidemia diabetes     -Renal dysfunction CKD 3  BUN 27 creatinine 1.35     -Exogenous obesity.     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     -No known allergies  ============  Plan  =========  Patient had probably a true syncopal episode likely due to advanced AV blocks.  In the ER EKG showed Mobitz type I second-degree AV block  although patient is having one-to-one conduction at this time.  Patient is off metoprolol    Echocardiogram-normal    Highly suspicious for advanced AV block's causing her syncopal episode.     Renal dysfunction probably due to syncopal episode although she has problems with diabetes and hypertension etc.  Renal function has improved with IV fluids.  BUN 20 creatinine 0.96    Abnormal stress Cardiolite test.  Cardiac catheterization 10/23/2020 revealed normal left ventricle function and normal coronary arteries.    Status post loop recorder placement 10/23/2020 ([employer].    Okay with discharge plans.    Follow-up in the office in 1 to 2 weeks.    Permanent pacemaker implantation if patient has further symptoms with documented bradycardia arrhythmias.  Hopefully staying off metoprolol would maintain sinus rhythm without AV blocks.    I have discussed with attending nurse for coordination of care.    Further plan will depend on patient's progress.  ]]]]]]]]]]]]]]                Wang Plaza MD  10/24/20  07:38 EDT

## 2020-10-24 NOTE — DISCHARGE SUMMARY
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]   Stage 3b chronic kidney disease [N18.32]   Hypertension associated with stage 3 chronic kidney disease due to type 2 diabetes mellitus (CMS/HCC) [E11.22, I12.9, N18.30]   Type 2 diabetes mellitus with diabetic neuropathy, with long-term current use of insulin (CMS/HCC) [E11.40, Z79.4]   Morbid obesity (CMS/HCC) [E66.01]   Atherosclerosis of native coronary artery of native heart without angina pectoris [I25.10]   Atrioventricular block, Mobitz type 1, Wenckebach [I44.1]   Abnormal nuclear stress test [R94.39]       Presenting Problem/History of Present Illness  Active Hospital Problems    Diagnosis  POA   • **Syncope and collapse [R55]  Yes   • Fall [W19.XXXA]  Yes   • DM type 2 with diabetic dyslipidemia (CMS/HCC) [E11.69, E78.5]  Yes   • 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]  Yes   • Stage 3b chronic kidney disease [N18.32]  Yes   • Hypertension associated with stage 3 chronic kidney disease due to type 2 diabetes mellitus (CMS/HCC) [E11.22, I12.9, N18.30]  Yes   • Type 2 diabetes mellitus with diabetic neuropathy, with long-term current use of insulin (CMS/HCC) [E11.40, Z79.4]  Not Applicable   • Morbid obesity (CMS/HCC) [E66.01]  Yes   • Atherosclerosis of native coronary artery of native heart without angina pectoris [I25.10]  Yes   • Atrioventricular block, Mobitz type 1, Wenckebach [I44.1]  Yes   • Abnormal nuclear stress test [R94.39]  No      Resolved Hospital Problems   No resolved problems to display.          Hospital Course  Patient is a 85 y.o. female presented with syncopal episode at home.  She was found to have be in a Mobitz type I AV block.  She had a normal echo and an equivocal stress test.  Heart cath was done showed a 50% ostial lesion but was otherwise normal.    Mela recommended an implantable loop recorder, which was placed.  Her beta-blocker was discontinued and she had no symptoms following discontinuation of the beta-blocker.  At the time of discharge she feels well.  Her blood pressure is well controlled.  She is ambulating in the halls well.  And is requesting to be discharged.  She will follow-up with Dr. Pinedo in 1 week and Dr. Plaza in 1 month.     Procedures Performed    Procedure(s):  Left Heart Cath and coronary angiogram  -------------------       Consults:   Consults     Date and Time Order Name Status Description    10/22/2020 0648 Inpatient Cardiology Consult Completed     10/21/2020 8725 Family Medicine Consult Completed           Pertinent Test Results:    Lab Results (most recent)     Procedure Component Value Units Date/Time    POC Glucose Once [[id number]]  (Abnormal) Collected: 10/24/20 1103    Specimen: Blood Updated: 10/24/20 1105     Glucose 154 mg/dL      Comment: Serial Number: [id number]:  [id number]       POC Glucose Once [[id number]]  (Abnormal) Collected: 10/24/20 0725    Specimen: Blood Updated: 10/24/20 0727     Glucose 135 mg/dL      Comment: Serial Number: [id number]:  [id number]       Basic Metabolic Panel [[id number]]  (Abnormal) Collected: 10/24/20 0206    Specimen: Blood Updated: 10/24/20 0250     Glucose 129 mg/dL      BUN 20 mg/dL      Creatinine 1.09 mg/dL      Sodium 141 mmol/L      Potassium 4.6 mmol/L      Comment: Slight hemolysis detected by analyzer. Results may be affected.        Chloride 108 mmol/L      CO2 22.0 mmol/L      Calcium 8.9 mg/dL      eGFR Non African Amer 48 mL/min/1.73      BUN/Creatinine Ratio 18.3     Anion Gap 11.0 mmol/L     Narrative:      GFR Normal >60  Chronic Kidney Disease <60  Kidney Failure <15      CBC (No Diff) [[id number]]  (Abnormal) Collected: 10/24/20 0206    Specimen: Blood Updated: 10/24/20 0239     WBC 5.00 10*3/mm3      RBC 3.25 10*6/mm3      Hemoglobin 10.3 g/dL      Hematocrit  31.4 %      MCV 96.7 fL      MCH 31.7 pg      MCHC 32.8 g/dL      RDW 17.3 %      RDW-SD 58.6 fl      MPV 8.2 fL      Platelets 158 10*3/mm3      Comment: Result checked        Vitamin B12 [[id number]]  (Abnormal) Collected: 10/23/20 0440    Specimen: Blood Updated: 10/23/20 1112     Vitamin B-12 173 pg/mL     Narrative:      Results may be falsely increased if patient taking Biotin.      Lipid Panel [[id number]]  (Abnormal) Collected: 10/23/20 0440    Specimen: Blood Updated: 10/23/20 0644     Total Cholesterol 171 mg/dL      Triglycerides 181 mg/dL      HDL Cholesterol 72 mg/dL      LDL Cholesterol  69 mg/dL      VLDL Cholesterol 30 mg/dL      LDL/HDL Ratio 0.87    Narrative:      Cholesterol Reference Ranges  (U.S. Department of Health and Human Services ATP III Classifications)    Desirable          <200 mg/dL  Borderline High    200-239 mg/dL  High Risk          >240 mg/dL      Triglyceride Reference Ranges  (U.S. Department of Health and Human Services ATP III Classifications)    Normal           <150 mg/dL  Borderline High  150-199 mg/dL  High             200-499 mg/dL  Very High        >500 mg/dL    HDL Reference Ranges  (U.S. Department of Health and Human Services ATP III Classifcations)    Low     <40 mg/dl (major risk factor for CHD)  High    >60 mg/dl ('negative' risk factor for CHD)        LDL Reference Ranges  (U.S. Department of Health and Human Services ATP III Classifcations)    Optimal          <100 mg/dL  Near Optimal     100-129 mg/dL  Borderline High  130-159 mg/dL  High             160-189 mg/dL  Very High        >189 mg/dL    TSH [[id number]]  (Normal) Collected: 10/23/20 0440    Specimen: Blood Updated: 10/23/20 0604     TSH 2.930 uIU/mL     Troponin [[id number]]  (Normal) Collected: 10/23/20 0440    Specimen: Blood Updated: 10/23/20 0604     Troponin T <0.010 ng/mL     Narrative:      Troponin T Reference Range:  <= 0.03 ng/mL-   Negative for AMI  >0.03 ng/mL-     Abnormal for myocardial  necrosis.  Clinicians would have to utilize clinical acumen, EKG, Troponin and serial changes to determine if it is an Acute Myocardial Infarction or myocardial injury due to an underlying chronic condition.       Results may be falsely decreased if patient taking Biotin.      Basic Metabolic Panel [[id number]]  (Abnormal) Collected: 10/23/20 0440    Specimen: Blood Updated: 10/23/20 0601     Glucose 157 mg/dL      BUN 20 mg/dL      Creatinine 0.96 mg/dL      Sodium 140 mmol/L      Potassium 3.8 mmol/L      Chloride 107 mmol/L      CO2 19.0 mmol/L      Calcium 8.6 mg/dL      eGFR Non African Amer 56 mL/min/1.73      BUN/Creatinine Ratio 20.8     Anion Gap 14.0 mmol/L     Narrative:      GFR Normal >60  Chronic Kidney Disease <60  Kidney Failure <15      Magnesium [[id number]]  (Normal) Collected: 10/23/20 0440    Specimen: Blood Updated: 10/23/20 0601     Magnesium 2.0 mg/dL     Protime-INR [[id number]]  (Normal) Collected: 10/23/20 0440    Specimen: Blood Updated: 10/23/20 0544     Protime 11.3 Seconds      INR 1.03    CBC & Differential [[id number]]  (Abnormal) Collected: 10/23/20 0440    Specimen: Blood Updated: 10/23/20 0537    Narrative:      The following orders were created for panel order CBC & Differential.  Procedure                               Abnormality         Status                     ---------                               -----------         ------                     CBC Auto Differential[[id number]]        Abnormal            Final result                 Please view results for these tests on the individual orders.    CBC Auto Differential [[id number]]  (Abnormal) Collected: 10/23/20 0440    Specimen: Blood Updated: 10/23/20 0537     WBC 4.80 10*3/mm3      RBC 3.37 10*6/mm3      Hemoglobin 10.5 g/dL      Hematocrit 32.1 %      MCV 95.2 fL      MCH 31.2 pg      MCHC 32.8 g/dL      RDW 17.2 %      RDW-SD 57.8 fl      MPV 7.3 fL      Platelets 179 10*3/mm3      Neutrophil % 64.1 %      Lymphocyte % 23.0  %      Monocyte % 8.3 %      Eosinophil % 3.8 %      Basophil % 0.8 %      Neutrophils, Absolute 3.10 10*3/mm3      Lymphocytes, Absolute 1.10 10*3/mm3      Monocytes, Absolute 0.40 10*3/mm3      Eosinophils, Absolute 0.20 10*3/mm3      Basophils, Absolute 0.00 10*3/mm3      nRBC 0.1 /100 WBC     Hemoglobin A1c [[id number]]  (Abnormal) Collected: 10/21/20 2055    Specimen: Blood Updated: 10/22/20 1801     Hemoglobin A1C 6.2 %     Narrative:      Hemoglobin A1C Reference Range:    <5.7 %        Normal  5.7-6.4 %     Increased risk for diabetes  > 6.4 %        Diabetes       These guidelines have been recommended by the American Diabetic Association for Hgb A1c.      The following 2010 guidelines have been recommended by the American Diabetes Association for Hemoglobin A1c.    HBA1c 5.7-6.4% Increased risk for future diabetes (pre-diabetes)  HBA1c     >6.4% Diabetes      Magnesium [[id number]]  (Normal) Collected: 10/22/20 1340    Specimen: Blood Updated: 10/22/20 1439     Magnesium 1.9 mg/dL     Troponin [[id number]]  (Normal) Collected: 10/22/20 0854    Specimen: Blood Updated: 10/22/20 1018     Troponin T <0.010 ng/mL     Narrative:      Troponin T Reference Range:  <= 0.03 ng/mL-   Negative for AMI  >0.03 ng/mL-     Abnormal for myocardial necrosis.  Clinicians would have to utilize clinical acumen, EKG, Troponin and serial changes to determine if it is an Acute Myocardial Infarction or myocardial injury due to an underlying chronic condition.       Results may be falsely decreased if patient taking Biotin.      COVID-19,[facility] IN-HOUSE,Nasal Swab No Transport Media 3-4 HR TAT - Swab, Nasal Cavity [[id number]]  (Normal) Collected: 10/21/20 2202    Specimen: Swab from Nasal Cavity Updated: 10/21/20 2314     COVID19 Not Detected    Narrative:      Fact sheet for providers: https://www.fda.gov/media/983234/download     Fact sheet for patients: https://www.fda.gov/media/026960/download    [place] Draw [[id number]]  Collected: 10/21/20 2055    Specimen: Blood Updated: 10/21/20 2201    Narrative:      The following orders were created for panel order [facility] Draw.  Procedure                               Abnormality         Status                     ---------                               -----------         ------                     Light Blue Top[[id number]]                                   Final result               Green Top (Gel)[[id number]]                                  Final result               Lavender Top[[id number]]                                     Final result               Gold Top - SST[[id number]]                                   Final result                 Please view results for these tests on the individual orders.    Light Blue Top [[id number]] Collected: 10/21/20 2055    Specimen: Blood Updated: 10/21/20 2201     Extra Tube hold for add-on     Comment: Auto resulted       Green Top (Gel) [[id number]] Collected: 10/21/20 2055    Specimen: Blood Updated: 10/21/20 2201     Extra Tube Hold for add-ons.     Comment: Auto resulted.       Gold Top - SST [[id number]] Collected: 10/21/20 2055    Specimen: Blood Updated: 10/21/20 2201     Extra Tube Hold for add-ons.     Comment: Auto resulted.       Urinalysis With Culture If Indicated - Urine, Catheter [[id number]]  (Abnormal) Collected: 10/21/20 2145    Specimen: Urine, Catheter Updated: 10/21/20 2201     Color, UA Yellow     Appearance, UA Cloudy     Comment: Result checked         pH, UA 6.0     Specific Gravity, UA 1.018     Glucose, UA Negative     Ketones, UA Negative     Bilirubin, UA Negative     Blood, UA Negative     Protein, UA Negative     Leuk Esterase, UA Negative     Nitrite, UA Negative     Urobilinogen, UA 0.2 E.U./dL    Narrative:      Urine microscopic not indicated.    Ammonia [[id number]]  (Normal) Collected: 10/21/20 2128    Specimen: Blood Updated: 10/21/20 2200     Ammonia 47 umol/L     CK [[id number]]  (Normal) Collected: 10/21/20 2055     Specimen: Blood Updated: 10/21/20 2134     Creatine Kinase 168 U/L     Blood Gas, Arterial - [[id number]]  (Abnormal) Collected: 10/21/20 2126    Specimen: Arterial Blood Updated: 10/21/20 2129     Site Right Radial     Andrew's Test Positive     pH, Arterial 7.360 pH units      pCO2, Arterial 35.5 mm Hg      pO2, Arterial 75.7 mm Hg      HCO3, Arterial 20.1 mmol/L      Base Excess, Arterial -4.8 mmol/L      Comment: Serial Number: [id number]:  [id number]        O2 Saturation, Arterial 94.6 %      CO2 Content 21.2 mmol/L      Barometric Pressure for Blood Gas --     Comment: N/A        Modality Room Air     FIO2 21 %      Hemodilution No    Comprehensive Metabolic Panel [[id number]]  (Abnormal) Collected: 10/21/20 2055    Specimen: Blood Updated: 10/21/20 2122     Glucose 137 mg/dL      BUN 27 mg/dL      Creatinine 1.35 mg/dL      Sodium 139 mmol/L      Potassium 4.8 mmol/L      Chloride 105 mmol/L      CO2 19.0 mmol/L      Calcium 9.3 mg/dL      Total Protein 7.2 g/dL      Albumin 4.00 g/dL      ALT (SGPT) 21 U/L      AST (SGOT) 27 U/L      Alkaline Phosphatase 94 U/L      Total Bilirubin 0.3 mg/dL      eGFR Non African Amer 37 mL/min/1.73      eGFR  African Amer 45 mL/min/1.73      Globulin 3.2 gm/dL      A/G Ratio 1.3 g/dL      BUN/Creatinine Ratio 20.0     Anion Gap 15.0 mmol/L     Narrative:      GFR Normal >60  Chronic Kidney Disease <60  Kidney Failure <15      Protime-INR [[id number]]  (Normal) Collected: 10/21/20 2055    Specimen: Blood Updated: 10/21/20 2117     Protime 10.9 Seconds      INR 0.99    Lavender Top [[id number]] Collected: 10/21/20 2055    Specimen: Blood Updated: 10/21/20 2117     Extra Tube --    CBC & Differential [[id number]]  (Abnormal) Collected: 10/21/20 2055    Specimen: Blood Updated: 10/21/20 2109    Narrative:      The following orders were created for panel order CBC & Differential.  Procedure                               Abnormality         Status                     ---------                                -----------         ------                     CBC Auto Differential[[id number]]        Abnormal            Final result                 Please view results for these tests on the individual orders.    CBC Auto Differential [[id number]]  (Abnormal) Collected: 10/21/20 2055    Specimen: Blood Updated: 10/21/20 2109     WBC 5.80 10*3/mm3      RBC 3.66 10*6/mm3      Hemoglobin 11.3 g/dL      Hematocrit 35.3 %      MCV 96.4 fL      MCH 30.9 pg      MCHC 32.1 g/dL      RDW 17.6 %      RDW-SD 59.1 fl      MPV 7.3 fL      Platelets 215 10*3/mm3      Neutrophil % 47.7 %      Lymphocyte % 36.8 %      Monocyte % 9.3 %      Eosinophil % 5.1 %      Basophil % 1.1 %      Neutrophils, Absolute 2.70 10*3/mm3      Lymphocytes, Absolute 2.10 10*3/mm3      Monocytes, Absolute 0.50 10*3/mm3      Eosinophils, Absolute 0.30 10*3/mm3      Basophils, Absolute 0.10 10*3/mm3      nRBC 0.1 /100 WBC            Results for orders placed during the hospital encounter of 10/21/20   Adult Transthoracic Echo Complete W/ Cont if Necessary Per Protocol    Narrative · Estimated left ventricular EF = 60% Left ventricular systolic function   is normal.     Indications  Syncope    Technically satisfactory study.  Mitral valve is structurally normal.  Tricuspid valve is structurally normal.  Aortic valve is structurally normal.  Pulmonic valve could not be well visualized.  No evidence for mitral tricuspid or aortic regurgitation is seen by   Doppler study.  Left atrium is normal in size.  Right atrium is normal in size.  Left ventricle is normal in size and contractility with ejection fraction   of 60%.  Right ventricle is normal in size.  Atrial septum is intact.  Aorta is normal.  No pericardial effusion or intracardiac thrombus is seen.    Impression  Structurally and functionally normal cardiac valves.  Left ventricular size and contractility is normal with ejection fraction   of 60%.               Condition on Discharge:  Stable    Vital Signs  Temp:  [97.5 °F (36.4 °C)-99 °F (37.2 °C)] 97.5 °F (36.4 °C)  Heart Rate:  [42-89] 89  Resp:  [13-16] 13  BP: (131-162)/(32-73) 138/61    Physical Exam:     General Appearance:    Alert, cooperative, obese, pleasant, no acute distress   Head:    Normocephalic, without obvious abnormality, atraumatic   Eyes:            Lids and lashes normal, conjunctivae and sclerae normal, no   icterus, no pallor, corneas clear, PERRLA   Ears:    Ears appear intact with no abnormalities noted   Throat:   No oral lesions, no thrush, oral mucosa moist   Neck:   No adenopathy, supple, trachea midline, no thyromegaly, no   carotid bruit, no JVD   Lungs:     Clear to auscultation,respirations regular, even and                  unlabored    Heart:    Regular rhythm and normal rate, normal S1 and S2, no            murmur, no gallop, no rub, no click   Chest Wall:    No abnormalities observed   Abdomen:     Normal bowel sounds, no masses, no organomegaly, soft        non-tender, non-distended, no guarding, no rebound                tenderness   Extremities:   Moves all extremities well, no edema, no cyanosis, no             redness   Pulses:   Pulses palpable and equal bilaterally   Skin:   No bleeding, bruising or rash   Lymph nodes:   No palpable adenopathy   Neurologic:   Cranial nerves 2 - 12 grossly intact, sensation intact, DTR       present and equal bilaterally       Discharge Disposition  Home or Self Care    Discharge Medications     Discharge Medications      New Medications      Instructions Start Date   acetaminophen 325 MG tablet  Commonly known as: TYLENOL   650 mg, Oral, Every 4 Hours PRN         Continue These Medications      Instructions Start Date   atorvastatin 10 MG tablet  Commonly known as: LIPITOR   10 mg, Oral, Daily      diclofenac 1 % gel gel  Commonly known as: VOLTAREN   4 g, Topical, 4 Times Daily PRN      furosemide 20 MG tablet  Commonly known as: LASIX   20 mg, Oral, Daily       gabapentin 300 MG capsule  Commonly known as: NEURONTIN   300 mg, Oral, 2 times daily      insulin NPH-insulin regular (70-30) 100 UNIT/ML injection  Commonly known as: humuLIN 70/30,novoLIN 70/30   Subcutaneous, 2 Times Daily With Meals, Amount of units unknown       metFORMIN 1000 MG tablet  Commonly known as: GLUCOPHAGE   1,000 mg, Oral, 2 Times Daily With Meals      omeprazole 40 MG capsule  Commonly known as: priLOSEC   40 mg, Oral, Daily      spironolactone 25 MG tablet  Commonly known as: ALDACTONE   25 mg, Oral, Daily      topiramate 25 MG tablet  Commonly known as: TOPAMAX   25 mg, Oral, Daily      traZODone 50 MG tablet  Commonly known as: DESYREL   50 mg, Oral, Nightly      valsartan 160 MG tablet  Commonly known as: DIOVAN   160 mg, Oral, Daily         Stop These Medications    metoprolol tartrate 50 MG tablet  Commonly known as: LOPRESSOR            Discharge Diet: Low concentrated sweets    Activity at Discharge: No driving    Follow-up Appointments  Dr. Pinedo in 1 week; Dr. Plaza in 1 month      Test Results Pending at Discharge       Tanesha Hair MD  10/24/20  11:21 EDT    Time: Discharge 25 min

## 2020-10-25 ENCOUNTER — READMISSION MANAGEMENT (OUTPATIENT)
Dept: CALL CENTER | Facility: HOSPITAL | Age: 85
End: 2020-10-25

## 2020-10-25 NOTE — OUTREACH NOTE
Prep Survey      Responses   [facility] facility patient discharged from?  [facility]   Is LACE score < 7 ?  No   Eligibility  Readm Mgmt   Discharge diagnosis  Syncope and collapse    Does the patient have one of the following disease processes/diagnoses(primary or secondary)?  Other   Does the patient have Home health ordered?  Yes   What is the Home health agency?   [facility]    Is there a DME ordered?  No   General alerts for this patient  10/23 Left Heart Cath and coronary angiogram   Prep survey completed?  Yes          Tahira Parra RN

## 2020-10-26 ENCOUNTER — TRANSCRIBE ORDERS (OUTPATIENT)
Dept: ADMINISTRATIVE | Facility: HOSPITAL | Age: 85
End: 2020-10-26

## 2020-10-26 ENCOUNTER — READMISSION MANAGEMENT (OUTPATIENT)
Dept: CALL CENTER | Facility: HOSPITAL | Age: 85
End: 2020-10-26

## 2020-10-26 DIAGNOSIS — N18.30 STAGE 3 CHRONIC KIDNEY DISEASE, UNSPECIFIED WHETHER STAGE 3A OR 3B CKD (HCC): Primary | ICD-10-CM

## 2020-10-26 NOTE — PROGRESS NOTES
Case Management Discharge Note      Final Note: Home with [facility]         Selected Continued Care - Discharged on 10/24/2020 Admission date: 10/21/2020 - Discharge disposition: Home or Self Care        Home Medical Care     Service Provider Selected Services Address Phone Fax    [facility] [address] [id number] [phone]                       Final Discharge Disposition Code: 06 - home with home health care

## 2020-10-26 NOTE — OUTREACH NOTE
"Medical Week 1 Survey      Responses   [facility] patient discharged from?  [facility]   Does the patient have one of the following disease processes/diagnoses(primary or secondary)?  Other   Week 1 attempt successful?  Yes   Call start time  1953   Call end time  1955   General alerts for this patient  10/23 Left Heart Cath and coronary angiogram   Discharge diagnosis  Syncope and collapse    Meds reviewed with patient/caregiver?  Yes   Is the patient having any side effects they believe may be caused by any medication additions or changes?  No   Does the patient have all medications ordered at discharge?  Yes   Is the patient taking all medications as directed (includes completed medication regime)?  Yes   Does the patient have a primary care provider?   Yes   Does the patient have an appointment with their PCP within 7 days of discharge?  Yes   Has the patient kept scheduled appointments due by today?  N/A   Comments  Dr. Pinedo next Tues and Dr. Plaza on Nov 24th   What is the Home health agency?   [facility]    Has home health visited the patient within 72 hours of discharge?  Yes   Did the patient receive a copy of their discharge instructions?  Yes   Nursing interventions  Reviewed instructions with patient   What is the patient's perception of their health status since discharge?  Improving   Is the patient/caregiver able to teach back signs and symptoms related to disease process for when to call PCP?  Yes   Is the patient/caregiver able to teach back signs and symptoms related to disease process for when to call 911?  Yes   Is the patient/caregiver able to teach back the hierarchy of who to call/visit for symptoms/problems? PCP, Specialist, Home health nurse, Urgent Care, ED, 911  Yes   Additional teach back comments  States she is doing \"ok\".  Denies fall or syncope.  Feels she is getting stronger each day.  States she gets tired but will rest when she does.     Week 1 call completed?  Yes   Wrap up " additional comments  Denies needs or questions at this time          Ange Mario, SATYAN

## 2020-11-04 ENCOUNTER — READMISSION MANAGEMENT (OUTPATIENT)
Dept: CALL CENTER | Facility: HOSPITAL | Age: 85
End: 2020-11-04

## 2020-11-04 NOTE — OUTREACH NOTE
Medical Week 2 Survey      Responses   [facility] patient discharged from?  [facility]   Does the patient have one of the following disease processes/diagnoses(primary or secondary)?  Other   Week 2 attempt successful?  No   Unsuccessful attempts  Attempt 1          Ange Mario LPN

## 2020-11-09 ENCOUNTER — READMISSION MANAGEMENT (OUTPATIENT)
Dept: CALL CENTER | Facility: HOSPITAL | Age: 85
End: 2020-11-09

## 2020-11-09 ENCOUNTER — HOSPITAL ENCOUNTER (OUTPATIENT)
Dept: ULTRASOUND IMAGING | Facility: HOSPITAL | Age: 85
Discharge: HOME | End: 2020-11-09
Admitting: FAMILY MEDICINE

## 2020-11-09 DIAGNOSIS — N18.30 STAGE 3 CHRONIC KIDNEY DISEASE, UNSPECIFIED WHETHER STAGE 3A OR 3B CKD (HCC): ICD-10-CM

## 2020-11-09 PROCEDURE — 76775 US EXAM ABDO BACK WALL LIM: CPT

## 2020-11-09 NOTE — OUTREACH NOTE
Medical Week 2 Survey      Responses   [facility] patient discharged from?  [facility]   Does the patient have one of the following disease processes/diagnoses(primary or secondary)?  Other   Week 2 attempt successful?  No   Unsuccessful attempts  Attempt 2          Ange Mario LPN

## 2020-11-11 PROBLEM — E78.5 HYPERLIPIDEMIA: Status: ACTIVE | Noted: 2020-11-11

## 2020-11-11 PROBLEM — I10 HYPERTENSION: Status: ACTIVE | Noted: 2020-11-11

## 2020-11-18 ENCOUNTER — READMISSION MANAGEMENT (OUTPATIENT)
Dept: CALL CENTER | Facility: HOSPITAL | Age: 85
End: 2020-11-18

## 2020-11-18 NOTE — OUTREACH NOTE
Medical Week 3 Survey      Responses   [facility] patient discharged from?  [facility]   Does the patient have one of the following disease processes/diagnoses(primary or secondary)?  Other   Week 3 attempt successful?  Yes   Call start time  1637   Call end time  1640   General alerts for this patient  10/23 Left Heart Cath and coronary angiogram   Discharge diagnosis  Syncope and collapse    Meds reviewed with patient/caregiver?  Yes   Is the patient having any side effects they believe may be caused by any medication additions or changes?  No   Does the patient have all medications ordered at discharge?  Yes   Is the patient taking all medications as directed (includes completed medication regime)?  Yes   Does the patient have a primary care provider?   Yes   Does the patient have an appointment with their PCP within 7 days of discharge?  Yes   Has the patient kept scheduled appointments due by today?  Yes   What is the Home health agency?   [facility]    Has home health visited the patient within 72 hours of discharge?  Yes   Psychosocial issues?  No   Did the patient receive a copy of their discharge instructions?  Yes   Nursing interventions  Reviewed instructions with patient   What is the patient's perception of their health status since discharge?  Improving   Is the patient/caregiver able to teach back signs and symptoms related to disease process for when to call PCP?  Yes   Is the patient/caregiver able to teach back signs and symptoms related to disease process for when to call 911?  Yes   Is the patient/caregiver able to teach back the hierarchy of who to call/visit for symptoms/problems? PCP, Specialist, Home health nurse, Urgent Care, ED, 911  Yes   Week 3 Call Completed?  Yes          Isac Adams RN

## 2020-12-07 ENCOUNTER — OFFICE VISIT (OUTPATIENT)
Dept: CARDIOLOGY | Facility: CLINIC | Age: 85
End: 2020-12-07

## 2020-12-07 VITALS
WEIGHT: 238 LBS | SYSTOLIC BLOOD PRESSURE: 163 MMHG | BODY MASS INDEX: 44.93 KG/M2 | DIASTOLIC BLOOD PRESSURE: 92 MMHG | OXYGEN SATURATION: 97 % | HEART RATE: 90 BPM | TEMPERATURE: 96.9 F | HEIGHT: 61 IN

## 2020-12-07 DIAGNOSIS — R55 SYNCOPE AND COLLAPSE: ICD-10-CM

## 2020-12-07 DIAGNOSIS — I44.1 ATRIOVENTRICULAR BLOCK, MOBITZ TYPE 1, WENCKEBACH: Primary | ICD-10-CM

## 2020-12-07 PROCEDURE — 99214 OFFICE O/P EST MOD 30 MIN: CPT | Performed by: INTERNAL MEDICINE

## 2020-12-07 RX ORDER — ASPIRIN 81 MG/1
81 TABLET ORAL DAILY
COMMUNITY
End: 2022-02-06 | Stop reason: HOSPADM

## 2020-12-07 NOTE — PROGRESS NOTES
Encounter Date:12/07/2020  Last seen 10/24/2020      Patient ID: Aracelis Vargas is a 85 y.o. female.    Chief Complaint:  History of syncope  Second-degree Mobitz type I AV block  Hypertension  History of paroxysmal atrial fibrillation      History of Present Illness  Patient recently was admitted to [facility], [place], operated by [facility] with history of syncope and was found to have second-degree Mobitz type I AV block.  Patient was taken off metoprolol with improvement.  Patient was discharged home.    Since I have last seen, the patient has been without any chest discomfort ,shortness of breath, palpitations, dizziness or syncope.  Denies having any headache ,abdominal pain ,nausea, vomiting , diarrhea constipation, loss of weight or loss of appetite.  Denies having any excessive bruising ,hematuria or blood in the stool.    Review of all systems negative except as indicated.    Reviewed ROS.    Assessment and Plan       ]]]]]]]]]]]]]]]]]]]]  Impression  ========  -Possible syncope.  Appears to be true syncope.  Improved and no further episodes.  Concerned about advanced AV blocks     -Second-degree Mobitz type I AV block.  Narrow QRS complex.    Echocardiogram-normal 10/22/2020     Cardiac catheterization 10/23/2020 revealed:  Left ventricular size and contractility is normal with ejection fraction of 60%.  Normal epicardial coronary arteries.     Status post loop recorder placement 10/23/2020 (GLO Linq)     -History of paroxysmal atrial fibrillation     -Hypertension dyslipidemia diabetes     -Renal dysfunction CKD 3  BUN 27 creatinine 1.35     -Exogenous obesity.     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     -No known allergies  ============  Plan  =========  Recent syncope-improved.  No further episodes.  Highly suspicious for advanced AV blocks.    Mobitz type I second-degree AV block-improved off metoprolol.    Patient is not having any angina pectoris or congestive heart  failure.    Status post loop recorder placement.  No significant dysrhythmia noted.    Hypertension-not well controlled.  163/92  Started on Norvasc 5 mg a day    Permanent pacemaker implantation if patient has further symptoms with documented bradycardia arrhythmias.  Hopefully staying off metoprolol would maintain sinus rhythm without AV blocks.    Follow-up in the office in 3 months.    Further plan will depend on patient's progress.  ]]]]]]]]]]]]]]             Diagnosis Plan   1. Atrioventricular block, Mobitz type 1, Wenckebach     2. Syncope and collapse     LAB RESULTS (LAST 7 DAYS)    CBC        BMP        CMP         BNP        TROPONIN        CoAg        Creatinine Clearance  CrCl cannot be calculated (Patient's most recent lab result is older than the maximum 30 days allowed.).    ABG        Radiology  No radiology results for the last day                The following portions of the patient's history were reviewed and updated as appropriate: allergies, current medications, past family history, past medical history, past social history, past surgical history and problem list.    Review of Systems   Constitution: Negative for malaise/fatigue.   Cardiovascular: Positive for leg swelling. Negative for chest pain, palpitations and syncope.   Respiratory: Negative for shortness of breath.    Skin: Negative for rash.   Gastrointestinal: Negative for nausea and vomiting.   Neurological: Negative for dizziness, light-headedness and numbness.         Current Outpatient Medications:   •  acetaminophen (TYLENOL) 325 MG tablet, Take 2 tablets by mouth Every 4 (Four) Hours As Needed for Mild Pain  or Fever (temperature greater than 101F)., Disp:  , Rfl:   •  aspirin 81 MG EC tablet, Take 81 mg by mouth Daily., Disp: , Rfl:   •  atorvastatin (LIPITOR) 10 MG tablet, Take 10 mg by mouth Daily., Disp: , Rfl:   •  diclofenac (VOLTAREN) 1 % gel gel, Apply 4 g topically to the appropriate area as directed 4 (Four) Times a  Day As Needed., Disp: , Rfl:   •  furosemide (LASIX) 20 MG tablet, Take 20 mg by mouth Daily., Disp: , Rfl:   •  gabapentin (NEURONTIN) 300 MG capsule, Take 300 mg by mouth 2 (two) times a day., Disp: , Rfl:   •  insulin NPH-insulin regular (humuLIN 70/30,novoLIN 70/30) (70-30) 100 UNIT/ML injection, Inject  under the skin into the appropriate area as directed 2 (Two) Times a Day With Meals. Amount of units unknown, Disp: , Rfl:   •  metFORMIN (GLUCOPHAGE) 1000 MG tablet, Take 1,000 mg by mouth 2 (Two) Times a Day With Meals., Disp: , Rfl:   •  omeprazole (priLOSEC) 40 MG capsule, Take 40 mg by mouth Daily., Disp: , Rfl:   •  topiramate (TOPAMAX) 25 MG tablet, Take 25 mg by mouth Daily., Disp: , Rfl:     No Known Allergies    Family History   Problem Relation Age of Onset   • Heart disease Mother        Past Surgical History:   Procedure Laterality Date   • CARDIAC CATHETERIZATION N/A 10/23/2020    Procedure: Left Heart Cath and coronary angiogram;  Surgeon: Wang Plaza MD;  Location: [facility] CATH INVASIVE LOCATION;  Service: Cardiovascular;  Laterality: N/A;   • HYSTERECTOMY     • JOINT REPLACEMENT Right     Hip   • JOINT REPLACEMENT Left     hip   • JOINT REPLACEMENT Left     hip (for second time)   • JOINT REPLACEMENT Right     knee   • OOPHORECTOMY         Past Medical History:   Diagnosis Date   • Atherosclerosis of native coronary artery of native heart without angina pectoris 10/22/2020   • Hypertension associated with stage 3 chronic kidney disease due to type 2 diabetes mellitus (CMS/[facility]) 10/22/2020   • Morbid obesity (CMS/[facility]) 10/22/2020   • Secondary hyperaldosteronism (CMS/[facility]) 10/22/2020   • Stage 3b chronic kidney disease 10/22/2020   • Type 2 diabetes mellitus with diabetic neuropathy, with long-term current use of insulin (CMS/[facility]) 10/22/2020   • Type 2 diabetes mellitus with diabetic neuropathy, with long-term current use of insulin (CMS/[facility]) 10/22/2020       Family History   Problem Relation  "Age of Onset   • Heart disease Mother        Social History     Socioeconomic History   • Marital status:      Spouse name: Not on file   • Number of children: Not on file   • Years of education: Not on file   • Highest education level: Not on file   Tobacco Use   • Smoking status: Never Smoker   • Smokeless tobacco: Never Used   Substance and Sexual Activity   • Alcohol use: Not Currently   • Drug use: Never   • Sexual activity: Defer   Social History Narrative    ** Merged History Encounter **              Procedures      Objective:       Physical Exam    /92 (BP Location: Right arm, Patient Position: Sitting, Cuff Size: Large Adult) Comment (BP Location): wrist  Pulse 90   Temp 96.9 °F (36.1 °C)   Ht 154.9 cm (61\")   Wt 108 kg (238 lb)   SpO2 97%   BMI 44.97 kg/m²   The patient is alert, oriented and in no distress.    Vital signs as noted above.    Head and neck revealed no carotid bruits or jugular venous distension.  No thyromegaly or lymphadenopathy is present.    Lungs clear.  No wheezing.  Breath sounds are normal bilaterally.    Heart normal first and second heart sounds.  No murmur..  No pericardial rub is present.  No gallop is present.    Abdomen soft and nontender.  No organomegaly is present.    Extremities revealed good peripheral pulses without any pedal edema.    Skin warm and dry.  Loop recorder site looks normal.    Musculoskeletal system is grossly normal.    CNS grossly normal.    Reviewed and unchanged from last visit.        "

## 2020-12-21 ENCOUNTER — OFFICE VISIT (OUTPATIENT)
Dept: CARDIOLOGY | Facility: CLINIC | Age: 85
End: 2020-12-21

## 2020-12-21 VITALS
WEIGHT: 232 LBS | BODY MASS INDEX: 43.84 KG/M2 | DIASTOLIC BLOOD PRESSURE: 80 MMHG | HEART RATE: 98 BPM | SYSTOLIC BLOOD PRESSURE: 176 MMHG | OXYGEN SATURATION: 97 %

## 2020-12-21 DIAGNOSIS — I44.1 ATRIOVENTRICULAR BLOCK, MOBITZ TYPE 1, WENCKEBACH: Primary | ICD-10-CM

## 2020-12-21 DIAGNOSIS — I10 ESSENTIAL HYPERTENSION: ICD-10-CM

## 2020-12-21 DIAGNOSIS — R00.2 PALPITATIONS: ICD-10-CM

## 2020-12-21 DIAGNOSIS — R55 SYNCOPE AND COLLAPSE: ICD-10-CM

## 2020-12-21 PROCEDURE — 99204 OFFICE O/P NEW MOD 45 MIN: CPT | Performed by: INTERNAL MEDICINE

## 2020-12-21 PROCEDURE — 93000 ELECTROCARDIOGRAM COMPLETE: CPT | Performed by: INTERNAL MEDICINE

## 2020-12-21 RX ORDER — AMLODIPINE BESYLATE 5 MG/1
5 TABLET ORAL EVERY MORNING
COMMUNITY
End: 2022-03-24

## 2020-12-21 RX ORDER — LOSARTAN POTASSIUM 50 MG/1
50 TABLET ORAL DAILY
Qty: 90 TABLET | Refills: 3 | Status: SHIPPED | OUTPATIENT
Start: 2020-12-21 | End: 2021-12-30

## 2020-12-21 NOTE — PROGRESS NOTES
Patient came as a new referral from the office of Dr. Khris STEPHENS--syncope with loop recorder placement    Sub--85-year-old female patient presented to hospital with sudden loss of consciousness which was later witnessed by her  with significant confusion post syncope and further evaluation in the hospital including neurological work-up negative for any stroke.  Patient does not recall how she lost her consciousness but she had significant confusion and speech issues post episode which took quite some time for her to recover.  There was no urinary or bowel incontinence  She denied any head trauma  During hospital evaluation patient was found to have Mobitz type I AV block and underwent cardiac work-up with normal EF without any coronary artery stenosis and beta-blockers were stopped and amlodipine started recently for hypertension optimization and post evaluation in the hospital a loop recorder was placed to exclude any cardiac causes for her syncope.  Patient also has additional history of diabetes, hypertension and hyperlipidemia with acid reflux.  She denies any active symptoms apart from ankle edema.        Past Medical History:   Diagnosis Date   • Atherosclerosis of native coronary artery of native heart without angina pectoris 10/22/2020   • Hypertension associated with stage 3 chronic kidney disease due to type 2 diabetes mellitus (CMS/[facility]) 10/22/2020   • Morbid obesity (CMS/[facility]) 10/22/2020   • Secondary hyperaldosteronism (CMS/[facility]) 10/22/2020   • Stage 3b chronic kidney disease 10/22/2020   • Type 2 diabetes mellitus with diabetic neuropathy, with long-term current use of insulin (CMS/[facility]) 10/22/2020   • Type 2 diabetes mellitus with diabetic neuropathy, with long-term current use of insulin (CMS/[facility]) 10/22/2020     Past Surgical History:   Procedure Laterality Date   • CARDIAC CATHETERIZATION N/A 10/23/2020    Procedure: Left Heart Cath and coronary angiogram;  Surgeon: Wang Plaza MD;   Location: [facility] CATH INVASIVE LOCATION;  Service: Cardiovascular;  Laterality: N/A;   • HYSTERECTOMY     • JOINT REPLACEMENT Right     Hip   • JOINT REPLACEMENT Left     hip   • JOINT REPLACEMENT Left     hip (for second time)   • JOINT REPLACEMENT Right     knee   • OOPHORECTOMY       Family History   Problem Relation Age of Onset   • Heart disease Mother      Social History     Tobacco Use   • Smoking status: Never Smoker   • Smokeless tobacco: Never Used   Substance Use Topics   • Alcohol use: Not Currently   • Drug use: Never     Review of Systems  General: Negative  for fatigue and tiredness  Eyes: No redness  Cardiovascular: No chest pain,  palpitations  Respiratory:   No  shortness of breath  Gastrointestinal: No nausea or vomiting, bleeding  Genitourinary: no hematuria or dysuria  Musculoskeletal: No arthralgia or myalgia  Skin: No rash  Neurologic: No numbness, tingling, syncope  Hematologic/Lymphatic: No abnormal bleeding        Physical Exam--blood pressure 176/80, pulse rate 98 patient is afebrile respiration 12 times a minute    General:      well developed, well nourished, in no acute distress.    Head:      normocephalic and atraumatic.    Eyes:      PERRL/EOM intact, conjunctiva and sclera clear with out nystagmus.    Neck:      no masses, thyromegaly, trachea central with normal respiratory effort  Lungs:      clear bilaterally to auscultation.    Heart:       regular rate and rhythm, S1, S2 without murmurs, rubs, or gallops  Skin:      intact without lesions or rashes.    Psych:      alert and cooperative; normal mood and affect; normal attention span and concentration.      Extremities with 1+ bilateral ankle edema without any cyanosis or clubbing    Neurological no focal deficits and alert oriented x3        Assessment and plan    Sudden onset of loss of consciousness with syncope followed by prolonged confusion post event highly consistent with neurological cause either TIA with transient  seizure or reversible neurological deficit which could have been prompted by an arrhythmic cause--post extensive work-up without any acute stroke followed by cardiac evaluation followed by placement of loop recorder.  No recurrent syncope since then.  Hypertension not well controlled--start losartan 50 mg a day at nighttime and check BMP in 2 weeks to optimize hypertension and continue amlodipine  History of diabetes and hyperlipidemia  Loop recorder being followed by Dr. Plaza  Reevaluate this patient after few months  Medications reviewed      ECG 12 Lead    Date/Time: 12/21/2020 1:11 PM  Performed by: Alan Park MD  Authorized by: Alan Park MD   Comparison: compared with previous ECG   Comparison to previous ECG: Compared to prior EKG current EKG reveals sinus rhythm with first-degree AV block and absence of Mobitz type I AV block  Rhythm: sinus rhythm  Rate: normal  Conduction: 1st degree AV block  QRS axis: normal  Other findings: left atrial abnormality          Electronically signed by Alan Park MD, 12/21/20, 1:11 PM EST.

## 2021-01-04 ENCOUNTER — TRANSCRIBE ORDERS (OUTPATIENT)
Dept: ADMINISTRATIVE | Facility: HOSPITAL | Age: 86
End: 2021-01-04

## 2021-01-04 ENCOUNTER — LAB (OUTPATIENT)
Dept: LAB | Facility: HOSPITAL | Age: 86
End: 2021-01-04

## 2021-01-04 DIAGNOSIS — R55 SYNCOPE AND COLLAPSE: ICD-10-CM

## 2021-01-04 DIAGNOSIS — R00.2 PALPITATIONS: ICD-10-CM

## 2021-01-04 DIAGNOSIS — I44.1 MOBITZ TYPE II ATRIOVENTRICULAR BLOCK: Primary | ICD-10-CM

## 2021-01-04 DIAGNOSIS — I44.1 MOBITZ TYPE II ATRIOVENTRICULAR BLOCK: ICD-10-CM

## 2021-01-04 DIAGNOSIS — I10 ESSENTIAL HYPERTENSION, MALIGNANT: ICD-10-CM

## 2021-01-04 LAB
ALBUMIN SERPL-MCNC: 4.2 G/DL (ref 3.5–5.2)
ALBUMIN/GLOB SERPL: 1.2 G/DL
ALP SERPL-CCNC: 61 U/L (ref 39–117)
ALT SERPL W P-5'-P-CCNC: 20 U/L (ref 1–33)
ANION GAP SERPL CALCULATED.3IONS-SCNC: 12.2 MMOL/L (ref 5–15)
AST SERPL-CCNC: 33 U/L (ref 1–32)
BILIRUB SERPL-MCNC: 0.3 MG/DL (ref 0–1.2)
BUN SERPL-MCNC: 22 MG/DL (ref 8–23)
BUN/CREAT SERPL: 15.6 (ref 7–25)
CALCIUM SPEC-SCNC: 9 MG/DL (ref 8.6–10.5)
CHLORIDE SERPL-SCNC: 104 MMOL/L (ref 98–107)
CO2 SERPL-SCNC: 23.8 MMOL/L (ref 22–29)
CREAT SERPL-MCNC: 1.41 MG/DL (ref 0.57–1)
GFR SERPL CREATININE-BSD FRML MDRD: 36 ML/MIN/1.73
GLOBULIN UR ELPH-MCNC: 3.6 GM/DL
GLUCOSE SERPL-MCNC: 169 MG/DL (ref 65–99)
POTASSIUM SERPL-SCNC: 4.1 MMOL/L (ref 3.5–5.2)
PROT SERPL-MCNC: 7.8 G/DL (ref 6–8.5)
SODIUM SERPL-SCNC: 140 MMOL/L (ref 136–145)

## 2021-01-04 PROCEDURE — 36415 COLL VENOUS BLD VENIPUNCTURE: CPT

## 2021-01-04 PROCEDURE — 80053 COMPREHEN METABOLIC PANEL: CPT

## 2021-01-12 ENCOUNTER — TELEPHONE (OUTPATIENT)
Dept: CARDIOLOGY | Facility: CLINIC | Age: 86
End: 2021-01-12

## 2021-01-12 NOTE — TELEPHONE ENCOUNTER
She may want to discuss with her primary care.  She may need to be tested to make sure she is not infected but not symptomatic.

## 2021-02-24 PROCEDURE — 93298 REM INTERROG DEV EVAL SCRMS: CPT | Performed by: INTERNAL MEDICINE

## 2021-02-24 PROCEDURE — G2066 INTER DEVC REMOTE 30D: HCPCS | Performed by: INTERNAL MEDICINE

## 2021-03-08 ENCOUNTER — OFFICE VISIT (OUTPATIENT)
Dept: CARDIOLOGY | Facility: CLINIC | Age: 86
End: 2021-03-08

## 2021-03-08 VITALS
OXYGEN SATURATION: 97 % | SYSTOLIC BLOOD PRESSURE: 132 MMHG | TEMPERATURE: 97.1 F | BODY MASS INDEX: 42.95 KG/M2 | HEART RATE: 89 BPM | WEIGHT: 227.5 LBS | HEIGHT: 61 IN | DIASTOLIC BLOOD PRESSURE: 63 MMHG

## 2021-03-08 DIAGNOSIS — I44.1 ATRIOVENTRICULAR BLOCK, MOBITZ TYPE 1, WENCKEBACH: Primary | ICD-10-CM

## 2021-03-08 DIAGNOSIS — R00.2 PALPITATIONS: ICD-10-CM

## 2021-03-08 DIAGNOSIS — R55 SYNCOPE AND COLLAPSE: ICD-10-CM

## 2021-03-08 DIAGNOSIS — I10 ESSENTIAL HYPERTENSION: ICD-10-CM

## 2021-03-08 PROCEDURE — 99214 OFFICE O/P EST MOD 30 MIN: CPT | Performed by: INTERNAL MEDICINE

## 2021-03-08 RX ORDER — SPIRONOLACTONE 25 MG/1
25 TABLET ORAL DAILY
COMMUNITY
End: 2021-09-07

## 2021-03-08 NOTE — PROGRESS NOTES
Encounter Date:03/08/2021  Last seen on 12/7/2020      Patient ID: Aracelis Vargas is a 85 y.o. female.    Chief Complaint:    History of syncope  Second-degree Mobitz type I AV block  Hypertension  History of paroxysmal atrial fibrillation        History of Present Illness  Since I have last seen, the patient has been without any chest discomfort ,shortness of breath, palpitations, dizziness or syncope.  Denies having any headache ,abdominal pain ,nausea, vomiting , diarrhea constipation, loss of weight or loss of appetite.  Denies having any excessive bruising ,hematuria or blood in the stool.    Review of all systems negative except as indicated.    Reviewed ROS.  Assessment and Plan         ]]]]]]]]]]]]]]]]]]]]  Impression  ========  -History of possible syncope.  Appears to be true syncope.  Improved with discontinuation of metoprolol and no further episodes.  Concerned about advanced AV blocks     -Second-degree Mobitz type I AV block.  Narrow QRS complex.     Echocardiogram-normal 10/22/2020     Cardiac catheterization 10/23/2020 revealed:  Left ventricular size and contractility is normal with ejection fraction of 60%.  Normal epicardial coronary arteries.     Status post loop recorder placement 10/23/2020 (ITeam Linq)     -History of paroxysmal atrial fibrillation     -Hypertension dyslipidemia diabetes     -Renal dysfunction CKD 3  BUN 27 creatinine 1.35     -Exogenous obesity.     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     -No known allergies  ============  Plan  =========  History of syncope-improved.  No further episodes.  Highly suspicious for advanced AV blocks.     Mobitz type I second-degree AV block-improved off metoprolol.     Patient is not having any angina pectoris or congestive heart failure.     Status post loop recorder placement.  No significant dysrhythmia noted.     Hypertension-well controlled.  132/63  Patient is on on Norvasc 5 mg a  day     Permanent pacemaker implantation if patient has further symptoms with documented bradycardia arrhythmias.  Hopefully staying off metoprolol would maintain sinus rhythm without AV blocks.     Follow-up in the office in 6 months.     Further plan will depend on patient's progress.  ]]]]]]]]]]]]]]                     Diagnosis Plan   1. Atrioventricular block, Mobitz type 1, Wenckebach     2. Syncope and collapse     3. Palpitations     4. Essential hypertension     LAB RESULTS (LAST 7 DAYS)    CBC        BMP        CMP         BNP        TROPONIN        CoAg        Creatinine Clearance  CrCl cannot be calculated (Patient's most recent lab result is older than the maximum 30 days allowed.).    ABG        Radiology  No radiology results for the last day                The following portions of the patient's history were reviewed and updated as appropriate: allergies, current medications, past family history, past medical history, past social history, past surgical history and problem list.    Review of Systems   Constitutional: Negative for malaise/fatigue.   Cardiovascular: Positive for leg swelling (feet and legs). Negative for chest pain, palpitations and syncope.   Respiratory: Positive for shortness of breath (with exertion).    Skin: Negative for rash.   Gastrointestinal: Negative for nausea and vomiting.   Neurological: Negative for dizziness, light-headedness and numbness.         Current Outpatient Medications:   •  acetaminophen (TYLENOL) 325 MG tablet, Take 2 tablets by mouth Every 4 (Four) Hours As Needed for Mild Pain  or Fever (temperature greater than 101F)., Disp:  , Rfl:   •  amLODIPine (NORVASC) 5 MG tablet, Take 5 mg by mouth Daily., Disp: , Rfl:   •  aspirin 81 MG EC tablet, Take 81 mg by mouth Daily., Disp: , Rfl:   •  atorvastatin (LIPITOR) 10 MG tablet, Take 10 mg by mouth Daily., Disp: , Rfl:   •  diclofenac (VOLTAREN) 1 % gel gel, Apply 4 g topically to the appropriate area as directed 4  (Four) Times a Day As Needed., Disp: , Rfl:   •  Ergocalciferol (VITAMIN D2 PO), Take  by mouth., Disp: , Rfl:   •  furosemide (LASIX) 20 MG tablet, Take 20 mg by mouth Daily., Disp: , Rfl:   •  gabapentin (NEURONTIN) 300 MG capsule, Take 300 mg by mouth 2 (two) times a day., Disp: , Rfl:   •  insulin NPH-insulin regular (humuLIN 70/30,novoLIN 70/30) (70-30) 100 UNIT/ML injection, Inject  under the skin into the appropriate area as directed 2 (Two) Times a Day With Meals. Amount of units unknown, Disp: , Rfl:   •  losartan (Cozaar) 50 MG tablet, Take 1 tablet by mouth Daily., Disp: 90 tablet, Rfl: 3  •  metFORMIN (GLUCOPHAGE) 1000 MG tablet, Take 1,000 mg by mouth 2 (Two) Times a Day With Meals., Disp: , Rfl:   •  omeprazole (priLOSEC) 40 MG capsule, Take 40 mg by mouth Daily., Disp: , Rfl:   •  spironolactone (ALDACTONE) 25 MG tablet, Take 25 mg by mouth Daily., Disp: , Rfl:   •  topiramate (TOPAMAX) 25 MG tablet, Take 25 mg by mouth Daily., Disp: , Rfl:     Allergies   Allergen Reactions   • Adhesive Tape Rash       Family History   Problem Relation Age of Onset   • Heart disease Mother        Past Surgical History:   Procedure Laterality Date   • CARDIAC CATHETERIZATION N/A 10/23/2020    Procedure: Left Heart Cath and coronary angiogram;  Surgeon: Wang Plaza MD;  Location: [facility] INVASIVE LOCATION;  Service: Cardiovascular;  Laterality: N/A;   • HYSTERECTOMY     • JOINT REPLACEMENT Right     Hip   • JOINT REPLACEMENT Left     hip   • JOINT REPLACEMENT Left     hip (for second time)   • JOINT REPLACEMENT Right     knee   • OOPHORECTOMY         Past Medical History:   Diagnosis Date   • Atherosclerosis of native coronary artery of native heart without angina pectoris 10/22/2020   • Hypertension associated with stage 3 chronic kidney disease due to type 2 diabetes mellitus (CMS/[facility]) 10/22/2020   • Morbid obesity (CMS/[facility]) 10/22/2020   • Secondary hyperaldosteronism (CMS/[facility]) 10/22/2020   • Stage 3b  "chronic kidney disease (CMS/[facility]) 10/22/2020   • Type 2 diabetes mellitus with diabetic neuropathy, with long-term current use of insulin (CMS/[facility]) 10/22/2020   • Type 2 diabetes mellitus with diabetic neuropathy, with long-term current use of insulin (CMS/[facility]) 10/22/2020       Family History   Problem Relation Age of Onset   • Heart disease Mother        Social History     Socioeconomic History   • Marital status:      Spouse name: Not on file   • Number of children: Not on file   • Years of education: Not on file   • Highest education level: Not on file   Tobacco Use   • Smoking status: Never Smoker   • Smokeless tobacco: Never Used   Vaping Use   • Vaping Use: Never used   Substance and Sexual Activity   • Alcohol use: Not Currently   • Drug use: Never   • Sexual activity: Defer         Procedures      Objective:       Physical Exam    /63   Pulse 89   Temp 97.1 °F (36.2 °C)   Ht 154.9 cm (61\")   Wt 103 kg (227 lb 8 oz)   SpO2 97%   BMI 42.99 kg/m²   The patient is alert, oriented and in no distress.    Vital signs as noted above.    Head and neck revealed no carotid bruits or jugular venous distension.  No thyromegaly or lymphadenopathy is present.    Lungs clear.  No wheezing.  Breath sounds are normal bilaterally.    Heart normal first and second heart sounds.  No murmur..  No pericardial rub is present.  No gallop is present.    Abdomen soft and nontender.  No organomegaly is present.    Extremities revealed good peripheral pulses.  1+ edema    Skin warm and dry.    Musculoskeletal system is grossly normal.    CNS grossly normal.    Reviewed and updated.      "

## 2021-03-27 PROCEDURE — G2066 INTER DEVC REMOTE 30D: HCPCS | Performed by: INTERNAL MEDICINE

## 2021-03-27 PROCEDURE — 93298 REM INTERROG DEV EVAL SCRMS: CPT | Performed by: INTERNAL MEDICINE

## 2021-04-07 ENCOUNTER — LAB (OUTPATIENT)
Dept: FAMILY MEDICINE CLINIC | Facility: CLINIC | Age: 86
End: 2021-04-07

## 2021-04-07 ENCOUNTER — OFFICE VISIT (OUTPATIENT)
Dept: FAMILY MEDICINE CLINIC | Facility: CLINIC | Age: 86
End: 2021-04-07

## 2021-04-07 VITALS
OXYGEN SATURATION: 96 % | HEART RATE: 85 BPM | SYSTOLIC BLOOD PRESSURE: 120 MMHG | DIASTOLIC BLOOD PRESSURE: 74 MMHG | BODY MASS INDEX: 40.81 KG/M2 | WEIGHT: 216 LBS | TEMPERATURE: 98 F

## 2021-04-07 DIAGNOSIS — R53.83 FATIGUE, UNSPECIFIED TYPE: ICD-10-CM

## 2021-04-07 DIAGNOSIS — E11.22 HYPERTENSION ASSOCIATED WITH STAGE 3 CHRONIC KIDNEY DISEASE DUE TO TYPE 2 DIABETES MELLITUS (HCC): Chronic | ICD-10-CM

## 2021-04-07 DIAGNOSIS — Z79.4 TYPE 2 DIABETES MELLITUS WITH DIABETIC NEUROPATHY, WITH LONG-TERM CURRENT USE OF INSULIN (HCC): ICD-10-CM

## 2021-04-07 DIAGNOSIS — I12.9 HYPERTENSION ASSOCIATED WITH STAGE 3 CHRONIC KIDNEY DISEASE DUE TO TYPE 2 DIABETES MELLITUS (HCC): Chronic | ICD-10-CM

## 2021-04-07 DIAGNOSIS — E11.40 TYPE 2 DIABETES MELLITUS WITH DIABETIC NEUROPATHY, WITH LONG-TERM CURRENT USE OF INSULIN (HCC): ICD-10-CM

## 2021-04-07 DIAGNOSIS — E78.5 HYPERLIPIDEMIA, UNSPECIFIED HYPERLIPIDEMIA TYPE: Primary | ICD-10-CM

## 2021-04-07 DIAGNOSIS — N18.30 HYPERTENSION ASSOCIATED WITH STAGE 3 CHRONIC KIDNEY DISEASE DUE TO TYPE 2 DIABETES MELLITUS (HCC): Chronic | ICD-10-CM

## 2021-04-07 LAB
ALBUMIN SERPL-MCNC: 4.3 G/DL (ref 3.5–5.2)
ALBUMIN/GLOB SERPL: 1.1 G/DL
ALP SERPL-CCNC: 67 U/L (ref 39–117)
ALT SERPL W P-5'-P-CCNC: 25 U/L (ref 1–33)
ANION GAP SERPL CALCULATED.3IONS-SCNC: 8.4 MMOL/L (ref 5–15)
AST SERPL-CCNC: 47 U/L (ref 1–32)
BASOPHILS # BLD AUTO: 0.1 10*3/MM3 (ref 0–0.2)
BASOPHILS NFR BLD AUTO: 1.6 % (ref 0–1.5)
BILIRUB SERPL-MCNC: 0.4 MG/DL (ref 0–1.2)
BUN SERPL-MCNC: 20 MG/DL (ref 8–23)
BUN/CREAT SERPL: 14.2 (ref 7–25)
CALCIUM SPEC-SCNC: 9.3 MG/DL (ref 8.6–10.5)
CHLORIDE SERPL-SCNC: 104 MMOL/L (ref 98–107)
CHOLEST SERPL-MCNC: 188 MG/DL (ref 0–200)
CO2 SERPL-SCNC: 26.6 MMOL/L (ref 22–29)
CREAT SERPL-MCNC: 1.41 MG/DL (ref 0.57–1)
DEPRECATED RDW RBC AUTO: 51.5 FL (ref 37–54)
EOSINOPHIL # BLD AUTO: 0.45 10*3/MM3 (ref 0–0.4)
EOSINOPHIL NFR BLD AUTO: 7.4 % (ref 0.3–6.2)
ERYTHROCYTE [DISTWIDTH] IN BLOOD BY AUTOMATED COUNT: 15.8 % (ref 12.3–15.4)
GFR SERPL CREATININE-BSD FRML MDRD: 36 ML/MIN/1.73
GLOBULIN UR ELPH-MCNC: 3.8 GM/DL
GLUCOSE SERPL-MCNC: 182 MG/DL (ref 65–99)
HBA1C MFR BLD: 6.9 % (ref 3.5–5.6)
HCT VFR BLD AUTO: 34.1 % (ref 34–46.6)
HDLC SERPL-MCNC: 60 MG/DL (ref 40–60)
HGB BLD-MCNC: 10.4 G/DL (ref 12–15.9)
IMM GRANULOCYTES # BLD AUTO: 0.01 10*3/MM3 (ref 0–0.05)
IMM GRANULOCYTES NFR BLD AUTO: 0.2 % (ref 0–0.5)
LDLC SERPL CALC-MCNC: 96 MG/DL (ref 0–100)
LDLC/HDLC SERPL: 1.5 {RATIO}
LYMPHOCYTES # BLD AUTO: 1.9 10*3/MM3 (ref 0.7–3.1)
LYMPHOCYTES NFR BLD AUTO: 31 % (ref 19.6–45.3)
MCH RBC QN AUTO: 27.2 PG (ref 26.6–33)
MCHC RBC AUTO-ENTMCNC: 30.5 G/DL (ref 31.5–35.7)
MCV RBC AUTO: 89 FL (ref 79–97)
MONOCYTES # BLD AUTO: 0.47 10*3/MM3 (ref 0.1–0.9)
MONOCYTES NFR BLD AUTO: 7.7 % (ref 5–12)
NEUTROPHILS NFR BLD AUTO: 3.19 10*3/MM3 (ref 1.7–7)
NEUTROPHILS NFR BLD AUTO: 52.1 % (ref 42.7–76)
NRBC BLD AUTO-RTO: 0 /100 WBC (ref 0–0.2)
PLATELET # BLD AUTO: 254 10*3/MM3 (ref 140–450)
PMV BLD AUTO: 10.6 FL (ref 6–12)
POTASSIUM SERPL-SCNC: 4.6 MMOL/L (ref 3.5–5.2)
PROT SERPL-MCNC: 8.1 G/DL (ref 6–8.5)
RBC # BLD AUTO: 3.83 10*6/MM3 (ref 3.77–5.28)
SODIUM SERPL-SCNC: 139 MMOL/L (ref 136–145)
T3FREE SERPL-MCNC: 2.25 PG/ML (ref 2–4.4)
T4 FREE SERPL-MCNC: 1.1 NG/DL (ref 0.93–1.7)
TRIGL SERPL-MCNC: 189 MG/DL (ref 0–150)
TSH SERPL DL<=0.05 MIU/L-ACNC: 1.85 UIU/ML (ref 0.27–4.2)
VLDLC SERPL-MCNC: 32 MG/DL (ref 5–40)
WBC # BLD AUTO: 6.12 10*3/MM3 (ref 3.4–10.8)

## 2021-04-07 PROCEDURE — 85025 COMPLETE CBC W/AUTO DIFF WBC: CPT | Performed by: FAMILY MEDICINE

## 2021-04-07 PROCEDURE — 83036 HEMOGLOBIN GLYCOSYLATED A1C: CPT | Performed by: FAMILY MEDICINE

## 2021-04-07 PROCEDURE — 99203 OFFICE O/P NEW LOW 30 MIN: CPT | Performed by: FAMILY MEDICINE

## 2021-04-07 PROCEDURE — 84481 FREE ASSAY (FT-3): CPT | Performed by: FAMILY MEDICINE

## 2021-04-07 PROCEDURE — 36415 COLL VENOUS BLD VENIPUNCTURE: CPT | Performed by: FAMILY MEDICINE

## 2021-04-07 PROCEDURE — 84439 ASSAY OF FREE THYROXINE: CPT | Performed by: FAMILY MEDICINE

## 2021-04-07 PROCEDURE — 80061 LIPID PANEL: CPT | Performed by: FAMILY MEDICINE

## 2021-04-07 PROCEDURE — 84443 ASSAY THYROID STIM HORMONE: CPT | Performed by: FAMILY MEDICINE

## 2021-04-07 PROCEDURE — 80053 COMPREHEN METABOLIC PANEL: CPT | Performed by: FAMILY MEDICINE

## 2021-04-07 NOTE — PROGRESS NOTES
Subjective   Aracelis Vargas is a 86 y.o. female.     Aracelis Vargas is in to establish as a new patient.  She has history of multiple chronic health issues including type 2 diabetes, high blood pressure, and chronic renal insufficiency related to both.  She has a cardiologist that she sees for heart disease and currently has an implanted loop recorder.  She is bothered by some overwhelming fatigue of late.  She does not move much during the day and takes naps a lot.  As a result, she does not sleep well at night.  She has not had any syncopal episodes.  She has significant arthritis that does limit her mobility. There is no history of chest pain or dyspnea of late. There is no history of issue with bowel or bladder function. There is no history of vision or hearing problems. There is history of dizziness or lightheadedness but only if she rises too quickly. There is no history of issue with mood. As for checking blood sugar readings, they vary based on her dietary compliance, and she does not seem to worry about them at all.  She is due for an A1c. There is no issue with medication compliance. Diet and exercise compliance has been variable.         /74 (BP Location: Left arm, Patient Position: Sitting, Cuff Size: Large Adult)   Pulse 85   Temp 98 °F (36.7 °C) (Temporal)   Wt 98 kg (216 lb)   SpO2 96%   BMI 40.81 kg/m²       Chief Complaint   Patient presents with   • Med Refill     New Pat Est            Current Outpatient Medications:   •  acetaminophen (TYLENOL) 325 MG tablet, Take 2 tablets by mouth Every 4 (Four) Hours As Needed for Mild Pain  or Fever (temperature greater than 101F)., Disp:  , Rfl:   •  amLODIPine (NORVASC) 5 MG tablet, Take 5 mg by mouth Daily., Disp: , Rfl:   •  aspirin 81 MG EC tablet, Take 81 mg by mouth Daily., Disp: , Rfl:   •  atorvastatin (LIPITOR) 10 MG tablet, Take 10 mg by mouth Daily., Disp: , Rfl:   •  diclofenac (VOLTAREN) 1 % gel gel, Apply 4 g topically to the appropriate  area as directed 4 (Four) Times a Day As Needed., Disp: , Rfl:   •  Ergocalciferol (VITAMIN D2 PO), Take  by mouth., Disp: , Rfl:   •  furosemide (LASIX) 20 MG tablet, Take 20 mg by mouth Daily., Disp: , Rfl:   •  gabapentin (NEURONTIN) 300 MG capsule, Take 300 mg by mouth 2 (two) times a day., Disp: , Rfl:   •  insulin NPH-insulin regular (humuLIN 70/30,novoLIN 70/30) (70-30) 100 UNIT/ML injection, Inject  under the skin into the appropriate area as directed 2 (Two) Times a Day With Meals. Amount of units unknown, Disp: , Rfl:   •  losartan (Cozaar) 50 MG tablet, Take 1 tablet by mouth Daily., Disp: 90 tablet, Rfl: 3  •  metFORMIN (GLUCOPHAGE) 1000 MG tablet, Take 1,000 mg by mouth 2 (Two) Times a Day With Meals., Disp: , Rfl:   •  omeprazole (priLOSEC) 40 MG capsule, Take 40 mg by mouth Daily., Disp: , Rfl:   •  spironolactone (ALDACTONE) 25 MG tablet, Take 25 mg by mouth Daily., Disp: , Rfl:   •  topiramate (TOPAMAX) 25 MG tablet, Take 25 mg by mouth Daily., Disp: , Rfl:     Lab Results   Component Value Date    HGBA1C 6.2 (H) 10/21/2020     Lab Results   Component Value Date    CREATININE 1.41 (H) 01/04/2021         Wt Readings from Last 3 Encounters:   04/07/21 98 kg (216 lb)   03/08/21 103 kg (227 lb 8 oz)   12/21/20 105 kg (232 lb)       The following portions of the patient's history were reviewed and updated as appropriate: allergies, current medications, past family history, past medical history, past social history, past surgical history, and problem list.    Review of Systems   Constitutional: Positive for fatigue. Negative for activity change and fever.   HENT: Negative for congestion, sinus pressure, sinus pain, sore throat and trouble swallowing.    Eyes: Negative for visual disturbance.   Respiratory: Negative for chest tightness, shortness of breath and wheezing.    Cardiovascular: Negative for chest pain.   Gastrointestinal: Negative for abdominal distention, abdominal pain, constipation, diarrhea,  nausea and vomiting.   Genitourinary: Negative for difficulty urinating, dysuria, frequency and urgency.   Musculoskeletal: Positive for arthralgias and myalgias. Negative for back pain and neck pain.   Skin: Negative for rash.   Neurological: Positive for dizziness and light-headedness. Negative for weakness and headaches.   Psychiatric/Behavioral: Positive for sleep disturbance. Negative for agitation, decreased concentration, dysphoric mood, hallucinations and suicidal ideas. The patient is not nervous/anxious.        Objective   Physical Exam  Vitals and nursing note reviewed.   Constitutional:       Appearance: She is obese.   HENT:      Right Ear: Tympanic membrane, ear canal and external ear normal.      Left Ear: Tympanic membrane, ear canal and external ear normal.   Eyes:      Conjunctiva/sclera: Conjunctivae normal.      Pupils: Pupils are equal, round, and reactive to light.   Cardiovascular:      Rate and Rhythm: Normal rate and regular rhythm.      Heart sounds: Normal heart sounds. No murmur heard.     Pulmonary:      Effort: Pulmonary effort is normal.      Breath sounds: No wheezing or rales.   Abdominal:      General: Bowel sounds are normal.      Palpations: Abdomen is soft.      Tenderness: There is no abdominal tenderness. There is no guarding.   Musculoskeletal:         General: No deformity or signs of injury.      Cervical back: Neck supple.      Comments: Bilateral pedal edema from medication  Using cane to successfully ambulate  Able to get out of chair without assistance   Lymphadenopathy:      Cervical: No cervical adenopathy.   Skin:     Findings: No rash.   Neurological:      Mental Status: She is alert and oriented to person, place, and time. Mental status is at baseline.   Psychiatric:         Mood and Affect: Mood normal.           Assessment/Plan   Problems Addressed this Visit        Cardiac and Vasculature    Hypertension associated with stage 3 chronic kidney disease due to type  2 diabetes mellitus (CMS/HCC) (Chronic)    Hyperlipidemia - Primary       Endocrine and Metabolic    Type 2 diabetes mellitus with diabetic neuropathy, with long-term current use of insulin (CMS/HCC) (Chronic)    Relevant Orders    Comprehensive metabolic panel    Lipid panel    Hemoglobin A1c    CBC w AUTO Differential    TSH      Other Visit Diagnoses     Fatigue, unspecified type        Relevant Orders    CBC w AUTO Differential    TSH    T4, free    T3, free      Diagnoses       Codes Comments    Hyperlipidemia, unspecified hyperlipidemia type    -  Primary ICD-10-CM: E78.5  ICD-9-CM: 272.4     Type 2 diabetes mellitus with diabetic neuropathy, with long-term current use of insulin (CMS/HCC)     ICD-10-CM: E11.40, Z79.4  ICD-9-CM: 250.60, 357.2, V58.67     Hypertension associated with stage 3 chronic kidney disease due to type 2 diabetes mellitus (CMS/HCC)     ICD-10-CM: E11.22, I12.9, N18.30  ICD-9-CM: 250.40, 403.90, 585.3     Fatigue, unspecified type     ICD-10-CM: R53.83  ICD-9-CM: 780.79         I will update some labs to see if there is a source of the fatigue  I have asked her to be a little more active during the day and fight the sedentary lifestyle  We discussed some dietary and diabetic testing compliance  I gave post vaccine education as she and her  are both fully vaccinated  I will see her back later in the year based on test results

## 2021-04-09 ENCOUNTER — TELEPHONE (OUTPATIENT)
Dept: FAMILY MEDICINE CLINIC | Facility: CLINIC | Age: 86
End: 2021-04-09

## 2021-04-09 NOTE — TELEPHONE ENCOUNTER
Caller: Aracelis Vargas    Relationship: Self    Best call back number: [phone] (H)    Caller requesting test results: BLOOD WORK RESULTS     What test was performed: 04/07/21    When was the test performed: BLOOD WORK    Where was the test performed:     Additional notes: PATIENT CALLED TO REQUEST A CALLBACK TO DISCUSS THE RESULTS FROM HER BLOOD WORK. PLEASE CONTACT PATIENT TO ADVISE.      THANKS

## 2021-04-27 ENCOUNTER — TELEPHONE (OUTPATIENT)
Dept: CARDIOLOGY | Facility: CLINIC | Age: 86
End: 2021-04-27

## 2021-04-27 PROCEDURE — G2066 INTER DEVC REMOTE 30D: HCPCS | Performed by: INTERNAL MEDICINE

## 2021-04-27 PROCEDURE — 93298 REM INTERROG DEV EVAL SCRMS: CPT | Performed by: INTERNAL MEDICINE

## 2021-04-27 NOTE — TELEPHONE ENCOUNTER
"We are getting almost daily loop recordings on patient for bradycardia and HR in 40\"s. Can I change her to 30 or 40 bpm? Thanks    Patient set to record at 50 bpm.    "

## 2021-05-03 ENCOUNTER — CLINICAL SUPPORT NO REQUIREMENTS (OUTPATIENT)
Dept: CARDIOLOGY | Facility: CLINIC | Age: 86
End: 2021-05-03

## 2021-05-03 DIAGNOSIS — R55 SYNCOPE AND COLLAPSE: Primary | ICD-10-CM

## 2021-05-03 DIAGNOSIS — I44.1 ATRIOVENTRICULAR BLOCK, MOBITZ TYPE 1, WENCKEBACH: ICD-10-CM

## 2021-05-03 NOTE — PROGRESS NOTES
Patient here to have her loop recorder programmed. She has had 8,564 bill episodes. Programmed her lower rate to 40 bpm, Will monitor. NC, remote monitoring.

## 2021-05-28 PROCEDURE — 93298 REM INTERROG DEV EVAL SCRMS: CPT | Performed by: INTERNAL MEDICINE

## 2021-05-28 PROCEDURE — G2066 INTER DEVC REMOTE 30D: HCPCS | Performed by: INTERNAL MEDICINE

## 2021-06-02 ENCOUNTER — TELEPHONE (OUTPATIENT)
Dept: CARDIOLOGY | Facility: CLINIC | Age: 86
End: 2021-06-02

## 2021-06-02 RX ORDER — FUROSEMIDE 20 MG/1
20 TABLET ORAL DAILY
Qty: 90 TABLET | Refills: 1 | Status: SHIPPED | OUTPATIENT
Start: 2021-06-02 | End: 2021-11-25

## 2021-06-02 NOTE — TELEPHONE ENCOUNTER
Patient had 96 bill responses since 5/3/21. Spoke to Dr Plaza, reduce lower detection rate to 30 bpm.

## 2021-06-15 ENCOUNTER — TELEPHONE (OUTPATIENT)
Dept: CARDIOLOGY | Facility: CLINIC | Age: 86
End: 2021-06-15

## 2021-06-21 ENCOUNTER — OFFICE VISIT (OUTPATIENT)
Dept: CARDIOLOGY | Facility: CLINIC | Age: 86
End: 2021-06-21

## 2021-06-21 VITALS
DIASTOLIC BLOOD PRESSURE: 82 MMHG | HEIGHT: 61 IN | OXYGEN SATURATION: 98 % | BODY MASS INDEX: 43.43 KG/M2 | HEART RATE: 84 BPM | SYSTOLIC BLOOD PRESSURE: 176 MMHG | WEIGHT: 230 LBS

## 2021-06-21 DIAGNOSIS — I10 ESSENTIAL HYPERTENSION: ICD-10-CM

## 2021-06-21 DIAGNOSIS — I44.1 ATRIOVENTRICULAR BLOCK, MOBITZ TYPE 1, WENCKEBACH: ICD-10-CM

## 2021-06-21 DIAGNOSIS — E66.01 MORBID OBESITY (HCC): ICD-10-CM

## 2021-06-21 DIAGNOSIS — R55 SYNCOPE AND COLLAPSE: Primary | ICD-10-CM

## 2021-06-21 PROCEDURE — 99214 OFFICE O/P EST MOD 30 MIN: CPT | Performed by: INTERNAL MEDICINE

## 2021-06-21 PROCEDURE — 93000 ELECTROCARDIOGRAM COMPLETE: CPT | Performed by: INTERNAL MEDICINE

## 2021-06-21 NOTE — PROGRESS NOTES
Encounter Date:06/21/2021  Last seen 3/8/2021      Patient ID: Aracelis Vargas is a 86 y.o. female.    Chief Complaint:  History of syncope  Second-degree Mobitz type I AV block  Hypertension  History of paroxysmal atrial fibrillation        History of Present Illness  Patient has ankle edema.    Since I have last seen, the patient has been without any chest discomfort ,shortness of breath, palpitations, dizziness or syncope.  Denies having any headache ,abdominal pain ,nausea, vomiting , diarrhea constipation, loss of weight or loss of appetite.  Denies having any excessive bruising ,hematuria or blood in the stool.    Review of all systems negative except as indicated.    Reviewed ROS.  Assessment and Plan         ]]]]]]]]]]]]]]]]]]]]  Impression  ========  -History of possible syncope.  Appears to be true syncope.  Improved with discontinuation of metoprolol and no further episodes.  Concerned about advanced AV blocks     -Second-degree Mobitz type I AV block.  Narrow QRS complex.     Echocardiogram-normal 10/22/2020     Cardiac catheterization 10/23/2020 revealed:  Left ventricular size and contractility is normal with ejection fraction of 60%.  Normal epicardial coronary arteries.     Status post loop recorder placement 10/23/2020 ([facility])     -History of paroxysmal atrial fibrillation     -Hypertension dyslipidemia diabetes     -Renal dysfunction CKD 3  BUN 27 creatinine 1.35     -Exogenous obesity.     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     -No known allergies  ============  Plan  =========  History of syncope-improved.  No further episodes.  Highly suspicious for advanced AV blocks.     Mobitz type I second-degree AV block-improved off metoprolol.     Patient is not having any angina pectoris or congestive heart failure.     Status post loop recorder placement.  No significant dysrhythmia noted.     Hypertension-176/82.    Renal dysfunction  BUN 20  creatinine 1.41-4/7/2021.  Recent TSH was normal.    Patient has bilateral ankle edema could be due to venous insufficiency or due to Norvasc.  Patient is on Norvasc 5 mg a day.  Patient was asked to keep her legs propped up.  Patient is on Lasix and spironolactone.  Patient may need amlodipine discontinued.  Favor not to increase diuretics due to renal dysfunction.  I had a lengthy discussion with patient and patient's family.     Permanent pacemaker implantation if patient has further symptoms with documented bradycardia arrhythmias.  Hopefully staying off metoprolol would maintain sinus rhythm without AV blocks.     Follow-up in the office in 6 months.     Further plan will depend on patient's progress.  ]]]]]]]]]]]]]]                       Diagnosis Plan   1. Syncope and collapse  ECG 12 Lead   2. Atrioventricular block, Mobitz type 1, Wenckebach  ECG 12 Lead   3. Essential hypertension  ECG 12 Lead   4. Morbid obesity (CMS/HCC)  ECG 12 Lead   LAB RESULTS (LAST 7 DAYS)    CBC        BMP        CMP         BNP        TROPONIN        CoAg        Creatinine Clearance  CrCl cannot be calculated (Patient's most recent lab result is older than the maximum 30 days allowed.).    ABG        Radiology  No radiology results for the last day                The following portions of the patient's history were reviewed and updated as appropriate: allergies, current medications, past family history, past medical history, past social history, past surgical history and problem list.    Review of Systems   Constitutional: Positive for malaise/fatigue.   Cardiovascular: Positive for chest pain (pressure), leg swelling (ankles) and palpitations. Negative for syncope.   Respiratory: Positive for shortness of breath.    Skin: Negative for rash.   Gastrointestinal: Negative for nausea and vomiting.   Neurological: Negative for dizziness, light-headedness and numbness.         Current Outpatient Medications:   •  acetaminophen  (TYLENOL) 325 MG tablet, Take 2 tablets by mouth Every 4 (Four) Hours As Needed for Mild Pain  or Fever (temperature greater than 101F)., Disp:  , Rfl:   •  amLODIPine (NORVASC) 5 MG tablet, Take 5 mg by mouth Daily., Disp: , Rfl:   •  aspirin 81 MG EC tablet, Take 81 mg by mouth Daily., Disp: , Rfl:   •  atorvastatin (LIPITOR) 10 MG tablet, Take 10 mg by mouth Daily., Disp: , Rfl:   •  diclofenac (VOLTAREN) 1 % gel gel, Apply 4 g topically to the appropriate area as directed 4 (Four) Times a Day As Needed., Disp: , Rfl:   •  Ergocalciferol (VITAMIN D2 PO), Take  by mouth., Disp: , Rfl:   •  furosemide (LASIX) 20 MG tablet, Take 1 tablet by mouth Daily., Disp: 90 tablet, Rfl: 1  •  gabapentin (NEURONTIN) 300 MG capsule, Take 300 mg by mouth 2 (two) times a day., Disp: , Rfl:   •  insulin NPH-insulin regular (humuLIN 70/30,novoLIN 70/30) (70-30) 100 UNIT/ML injection, Inject 30 Units under the skin into the appropriate area as directed 2 (Two) Times a Day With Meals. Amount of units unknown, Disp: 20 mL, Rfl: 1  •  losartan (Cozaar) 50 MG tablet, Take 1 tablet by mouth Daily., Disp: 90 tablet, Rfl: 3  •  metFORMIN (GLUCOPHAGE) 1000 MG tablet, Take 1,000 mg by mouth 2 (Two) Times a Day With Meals., Disp: , Rfl:   •  omeprazole (priLOSEC) 40 MG capsule, Take 40 mg by mouth Daily., Disp: , Rfl:   •  spironolactone (ALDACTONE) 25 MG tablet, Take 25 mg by mouth Daily., Disp: , Rfl:   •  topiramate (TOPAMAX) 25 MG tablet, Take 25 mg by mouth Daily., Disp: , Rfl:     Allergies   Allergen Reactions   • Latex, Natural Rubber Rash   • Adhesive Tape Rash       Family History   Problem Relation Age of Onset   • Heart disease Mother        Past Surgical History:   Procedure Laterality Date   • CARDIAC CATHETERIZATION N/A 10/23/2020    Procedure: Left Heart Cath and coronary angiogram;  Surgeon: Wang Plaza MD;  Location: [facility] CATH INVASIVE LOCATION;  Service: Cardiovascular;  Laterality: N/A;   • HYSTERECTOMY     • JOINT  "REPLACEMENT Right     Hip   • JOINT REPLACEMENT Left     hip   • JOINT REPLACEMENT Left     hip (for second time)   • JOINT REPLACEMENT Right     knee   • OOPHORECTOMY         Past Medical History:   Diagnosis Date   • Atherosclerosis of native coronary artery of native heart without angina pectoris 10/22/2020   • Hypertension associated with stage 3 chronic kidney disease due to type 2 diabetes mellitus (CMS/[facility]) 10/22/2020   • Morbid obesity (CMS/[facility]) 10/22/2020   • Secondary hyperaldosteronism (CMS/[facility]) 10/22/2020   • Stage 3b chronic kidney disease (CMS/HCC) 10/22/2020   • Type 2 diabetes mellitus with diabetic neuropathy, with long-term current use of insulin (CMS/HCC) 10/22/2020   • Type 2 diabetes mellitus with diabetic neuropathy, with long-term current use of insulin (CMS/HCC) 10/22/2020       Family History   Problem Relation Age of Onset   • Heart disease Mother        Social History     Socioeconomic History   • Marital status:      Spouse name: Not on file   • Number of children: Not on file   • Years of education: Not on file   • Highest education level: Not on file   Tobacco Use   • Smoking status: Never Smoker   • Smokeless tobacco: Never Used   Vaping Use   • Vaping Use: Never used   Substance and Sexual Activity   • Alcohol use: Not Currently   • Drug use: Never   • Sexual activity: Defer           ECG 12 Lead    Date/Time: 6/21/2021 2:23 PM  Performed by: Wang Plaza MD  Authorized by: Wang Plaza MD   Comparison: compared with previous ECG   Similar to previous ECG  Comparison to previous ECG: Normal sinus rhythm first-degree AV block 82/min nonspecific ST-T wave changes normal axis normal intervals no ectopy no significant change from 12/21/2020                Objective:       Physical Exam    /82 (BP Location: Left arm, Patient Position: Sitting, Cuff Size: Adult) Comment (BP Location): lower ARM  Pulse 84   Ht 154.9 cm (61\")   Wt 104 kg (230 lb)   SpO2 98%   BMI " 43.46 kg/m²   The patient is alert, oriented and in no distress.    Vital signs as noted above.    Head and neck revealed no carotid bruits or jugular venous distension.  No thyromegaly or lymphadenopathy is present.    Lungs clear.  No wheezing.  Breath sounds are normal bilaterally.    Heart normal first and second heart sounds.  No murmur..  No pericardial rub is present.  No gallop is present.    Abdomen soft and nontender.  No organomegaly is present.    Extremities revealed good peripheral pulses.  2+ ankle edema.    Skin warm and dry.    Musculoskeletal system is grossly normal.    CNS grossly normal.

## 2021-06-28 PROCEDURE — G2066 INTER DEVC REMOTE 30D: HCPCS | Performed by: INTERNAL MEDICINE

## 2021-06-28 PROCEDURE — 93298 REM INTERROG DEV EVAL SCRMS: CPT | Performed by: INTERNAL MEDICINE

## 2021-07-29 PROCEDURE — G2066 INTER DEVC REMOTE 30D: HCPCS | Performed by: INTERNAL MEDICINE

## 2021-07-29 PROCEDURE — 93298 REM INTERROG DEV EVAL SCRMS: CPT | Performed by: INTERNAL MEDICINE

## 2021-08-10 ENCOUNTER — OFFICE VISIT (OUTPATIENT)
Dept: FAMILY MEDICINE CLINIC | Facility: CLINIC | Age: 86
End: 2021-08-10

## 2021-08-10 ENCOUNTER — LAB (OUTPATIENT)
Dept: FAMILY MEDICINE CLINIC | Facility: CLINIC | Age: 86
End: 2021-08-10

## 2021-08-10 VITALS
BODY MASS INDEX: 43.46 KG/M2 | DIASTOLIC BLOOD PRESSURE: 79 MMHG | OXYGEN SATURATION: 95 % | SYSTOLIC BLOOD PRESSURE: 126 MMHG | HEART RATE: 79 BPM | TEMPERATURE: 99.6 F | WEIGHT: 230 LBS

## 2021-08-10 DIAGNOSIS — D50.9 IRON DEFICIENCY ANEMIA, UNSPECIFIED IRON DEFICIENCY ANEMIA TYPE: ICD-10-CM

## 2021-08-10 DIAGNOSIS — I12.9 HYPERTENSION ASSOCIATED WITH STAGE 3 CHRONIC KIDNEY DISEASE DUE TO TYPE 2 DIABETES MELLITUS (HCC): ICD-10-CM

## 2021-08-10 DIAGNOSIS — E11.40 TYPE 2 DIABETES MELLITUS WITH DIABETIC NEUROPATHY, WITH LONG-TERM CURRENT USE OF INSULIN (HCC): Primary | ICD-10-CM

## 2021-08-10 DIAGNOSIS — E11.22 HYPERTENSION ASSOCIATED WITH STAGE 3 CHRONIC KIDNEY DISEASE DUE TO TYPE 2 DIABETES MELLITUS (HCC): ICD-10-CM

## 2021-08-10 DIAGNOSIS — Z79.4 TYPE 2 DIABETES MELLITUS WITH DIABETIC NEUROPATHY, WITH LONG-TERM CURRENT USE OF INSULIN (HCC): Primary | ICD-10-CM

## 2021-08-10 DIAGNOSIS — N18.30 HYPERTENSION ASSOCIATED WITH STAGE 3 CHRONIC KIDNEY DISEASE DUE TO TYPE 2 DIABETES MELLITUS (HCC): ICD-10-CM

## 2021-08-10 LAB
ALBUMIN SERPL-MCNC: 4.1 G/DL (ref 3.5–5.2)
ALBUMIN/GLOB SERPL: 1.1 G/DL
ALP SERPL-CCNC: 69 U/L (ref 39–117)
ALT SERPL W P-5'-P-CCNC: 25 U/L (ref 1–33)
ANION GAP SERPL CALCULATED.3IONS-SCNC: 13.6 MMOL/L (ref 5–15)
AST SERPL-CCNC: 48 U/L (ref 1–32)
BASOPHILS # BLD AUTO: 0.06 10*3/MM3 (ref 0–0.2)
BASOPHILS NFR BLD AUTO: 1.1 % (ref 0–1.5)
BILIRUB SERPL-MCNC: 0.4 MG/DL (ref 0–1.2)
BUN SERPL-MCNC: 30 MG/DL (ref 8–23)
BUN/CREAT SERPL: 21.1 (ref 7–25)
CALCIUM SPEC-SCNC: 9.2 MG/DL (ref 8.6–10.5)
CHLORIDE SERPL-SCNC: 101 MMOL/L (ref 98–107)
CHOLEST SERPL-MCNC: 179 MG/DL (ref 0–200)
CO2 SERPL-SCNC: 25.4 MMOL/L (ref 22–29)
CREAT SERPL-MCNC: 1.42 MG/DL (ref 0.57–1)
DEPRECATED RDW RBC AUTO: 51.5 FL (ref 37–54)
EOSINOPHIL # BLD AUTO: 0.31 10*3/MM3 (ref 0–0.4)
EOSINOPHIL NFR BLD AUTO: 5.7 % (ref 0.3–6.2)
ERYTHROCYTE [DISTWIDTH] IN BLOOD BY AUTOMATED COUNT: 16.2 % (ref 12.3–15.4)
GFR SERPL CREATININE-BSD FRML MDRD: 35 ML/MIN/1.73
GLOBULIN UR ELPH-MCNC: 3.8 GM/DL
GLUCOSE SERPL-MCNC: 119 MG/DL (ref 65–99)
HBA1C MFR BLD: 7 % (ref 3.5–5.6)
HCT VFR BLD AUTO: 30.7 % (ref 34–46.6)
HDLC SERPL-MCNC: 79 MG/DL (ref 40–60)
HGB BLD-MCNC: 9.4 G/DL (ref 12–15.9)
IMM GRANULOCYTES # BLD AUTO: 0.02 10*3/MM3 (ref 0–0.05)
IMM GRANULOCYTES NFR BLD AUTO: 0.4 % (ref 0–0.5)
IRON 24H UR-MRATE: 48 MCG/DL (ref 37–145)
IRON SATN MFR SERPL: 9 % (ref 20–50)
LDLC SERPL CALC-MCNC: 79 MG/DL (ref 0–100)
LDLC/HDLC SERPL: 0.95 {RATIO}
LYMPHOCYTES # BLD AUTO: 1.8 10*3/MM3 (ref 0.7–3.1)
LYMPHOCYTES NFR BLD AUTO: 33.3 % (ref 19.6–45.3)
MCH RBC QN AUTO: 27.1 PG (ref 26.6–33)
MCHC RBC AUTO-ENTMCNC: 30.6 G/DL (ref 31.5–35.7)
MCV RBC AUTO: 88.5 FL (ref 79–97)
MONOCYTES # BLD AUTO: 0.52 10*3/MM3 (ref 0.1–0.9)
MONOCYTES NFR BLD AUTO: 9.6 % (ref 5–12)
NEUTROPHILS NFR BLD AUTO: 2.69 10*3/MM3 (ref 1.7–7)
NEUTROPHILS NFR BLD AUTO: 49.9 % (ref 42.7–76)
NRBC BLD AUTO-RTO: 0 /100 WBC (ref 0–0.2)
PLATELET # BLD AUTO: 234 10*3/MM3 (ref 140–450)
PMV BLD AUTO: 9.3 FL (ref 6–12)
POTASSIUM SERPL-SCNC: 4.8 MMOL/L (ref 3.5–5.2)
PROT SERPL-MCNC: 7.9 G/DL (ref 6–8.5)
RBC # BLD AUTO: 3.47 10*6/MM3 (ref 3.77–5.28)
SODIUM SERPL-SCNC: 140 MMOL/L (ref 136–145)
TIBC SERPL-MCNC: 513 MCG/DL (ref 298–536)
TRANSFERRIN SERPL-MCNC: 344 MG/DL (ref 200–360)
TRIGL SERPL-MCNC: 125 MG/DL (ref 0–150)
VLDLC SERPL-MCNC: 21 MG/DL (ref 5–40)
WBC # BLD AUTO: 5.4 10*3/MM3 (ref 3.4–10.8)

## 2021-08-10 PROCEDURE — 83036 HEMOGLOBIN GLYCOSYLATED A1C: CPT | Performed by: FAMILY MEDICINE

## 2021-08-10 PROCEDURE — 99214 OFFICE O/P EST MOD 30 MIN: CPT | Performed by: FAMILY MEDICINE

## 2021-08-10 PROCEDURE — 84466 ASSAY OF TRANSFERRIN: CPT | Performed by: FAMILY MEDICINE

## 2021-08-10 PROCEDURE — 80061 LIPID PANEL: CPT | Performed by: FAMILY MEDICINE

## 2021-08-10 PROCEDURE — 83540 ASSAY OF IRON: CPT | Performed by: FAMILY MEDICINE

## 2021-08-10 PROCEDURE — 36415 COLL VENOUS BLD VENIPUNCTURE: CPT | Performed by: FAMILY MEDICINE

## 2021-08-10 PROCEDURE — 80053 COMPREHEN METABOLIC PANEL: CPT | Performed by: FAMILY MEDICINE

## 2021-08-10 PROCEDURE — 85025 COMPLETE CBC W/AUTO DIFF WBC: CPT | Performed by: FAMILY MEDICINE

## 2021-08-10 NOTE — PROGRESS NOTES
Subjective   Aracelis Vargas is a 86 y.o. female.     Aracelis Vargas is in for follow up on her chronic issues with diabetes and high cholesterol and anemia.  She has felt a little tired of late.  She has been taking her medication.  She has been under a little bit of stress.. There is no history of chest pain or dyspnea of late. There is no history of issue with bowel or bladder function. There is no history of vision or hearing problems. There is no history of dizziness or lightheadedness. There is no history of issue with sleep or mood. As for checking blood sugar readings, she did not bring her logbook in. There is no issue with medication compliance. Diet and exercise compliance has been okay but exercise is very difficult for her due to arthritis issues and she still walks with a cane.         /79 (BP Location: Left arm, Patient Position: Sitting, Cuff Size: Large Adult)   Pulse 79   Temp 99.6 °F (37.6 °C) (Temporal)   Wt 104 kg (230 lb)   SpO2 95%   BMI 43.46 kg/m²       Chief Complaint   Patient presents with   • Hyperlipidemia     4 month f/u            Current Outpatient Medications:   •  acetaminophen (TYLENOL) 325 MG tablet, Take 2 tablets by mouth Every 4 (Four) Hours As Needed for Mild Pain  or Fever (temperature greater than 101F)., Disp:  , Rfl:   •  amLODIPine (NORVASC) 5 MG tablet, Take 5 mg by mouth Daily., Disp: , Rfl:   •  aspirin 81 MG EC tablet, Take 81 mg by mouth Daily., Disp: , Rfl:   •  atorvastatin (LIPITOR) 10 MG tablet, Take 10 mg by mouth Daily., Disp: , Rfl:   •  diclofenac (VOLTAREN) 1 % gel gel, Apply 4 g topically to the appropriate area as directed 4 (Four) Times a Day As Needed., Disp: , Rfl:   •  Ergocalciferol (VITAMIN D2 PO), Take  by mouth., Disp: , Rfl:   •  furosemide (LASIX) 20 MG tablet, Take 1 tablet by mouth Daily., Disp: 90 tablet, Rfl: 1  •  gabapentin (NEURONTIN) 300 MG capsule, Take 300 mg by mouth 2 (two) times a day., Disp: , Rfl:   •  insulin NPH-insulin  regular (humuLIN 70/30,novoLIN 70/30) (70-30) 100 UNIT/ML injection, Inject 30 Units under the skin into the appropriate area as directed 2 (Two) Times a Day With Meals. Amount of units unknown, Disp: 20 mL, Rfl: 1  •  losartan (Cozaar) 50 MG tablet, Take 1 tablet by mouth Daily., Disp: 90 tablet, Rfl: 3  •  metFORMIN (GLUCOPHAGE) 1000 MG tablet, Take 1,000 mg by mouth 2 (Two) Times a Day With Meals., Disp: , Rfl:   •  omeprazole (priLOSEC) 40 MG capsule, Take 40 mg by mouth Daily., Disp: , Rfl:   •  spironolactone (ALDACTONE) 25 MG tablet, Take 25 mg by mouth Daily., Disp: , Rfl:   •  topiramate (TOPAMAX) 25 MG tablet, Take 25 mg by mouth Daily., Disp: , Rfl:         The following portions of the patient's history were reviewed and updated as appropriate: allergies, current medications, past family history, past medical history, past social history, past surgical history, and problem list.    Review of Systems   Constitutional: Positive for fatigue. Negative for activity change and fever.   HENT: Negative for congestion, sinus pressure, sinus pain, sore throat and trouble swallowing.    Eyes: Negative for visual disturbance.   Respiratory: Negative for chest tightness, shortness of breath and wheezing.    Cardiovascular: Negative for chest pain.   Gastrointestinal: Negative for abdominal distention, abdominal pain, constipation, diarrhea, nausea and vomiting.   Genitourinary: Negative for difficulty urinating, dysuria, frequency and urgency.   Musculoskeletal: Positive for arthralgias and myalgias. Negative for back pain and neck pain.   Skin: Negative for rash.   Neurological: Positive for dizziness and light-headedness. Negative for weakness and headaches.   Psychiatric/Behavioral: Positive for sleep disturbance. Negative for agitation, decreased concentration, dysphoric mood, hallucinations and suicidal ideas. The patient is not nervous/anxious.        Objective   Physical Exam  Vitals and nursing note reviewed.    Constitutional:       Appearance: She is obese.   Cardiovascular:      Rate and Rhythm: Normal rate and regular rhythm.      Heart sounds: Normal heart sounds. No murmur heard.     Pulmonary:      Effort: Pulmonary effort is normal.      Breath sounds: No wheezing or rales.   Abdominal:      General: Bowel sounds are normal.      Palpations: Abdomen is soft.      Tenderness: There is no abdominal tenderness. There is no guarding.   Musculoskeletal:      Cervical back: Neck supple.      Right lower leg: Edema present.      Left lower leg: Edema present.   Lymphadenopathy:      Cervical: No cervical adenopathy.   Skin:     Findings: No rash.   Neurological:      General: No focal deficit present.      Mental Status: She is alert and oriented to person, place, and time.   Psychiatric:         Mood and Affect: Mood normal.           Assessment/Plan   Problems Addressed this Visit        Cardiac and Vasculature    Hypertension associated with stage 3 chronic kidney disease due to type 2 diabetes mellitus (CMS/HCC) (Chronic)       Endocrine and Metabolic    Type 2 diabetes mellitus with diabetic neuropathy, with long-term current use of insulin (CMS/[facility]) - Primary (Chronic)    Relevant Orders    Comprehensive metabolic panel    Hemoglobin A1c    CBC w AUTO Differential    Lipid panel      Other Visit Diagnoses     Iron deficiency anemia, unspecified iron deficiency anemia type        Relevant Orders    CBC w AUTO Differential    Iron and TIBC      Diagnoses       Codes Comments    Type 2 diabetes mellitus with diabetic neuropathy, with long-term current use of insulin (CMS/HCC)    -  Primary ICD-10-CM: E11.40, Z79.4  ICD-9-CM: 250.60, 357.2, V58.67     Hypertension associated with stage 3 chronic kidney disease due to type 2 diabetes mellitus (CMS/HCC)     ICD-10-CM: E11.22, I12.9, N18.30  ICD-9-CM: 250.40, 403.90, 585.3     Iron deficiency anemia, unspecified iron deficiency anemia type     ICD-10-CM: D50.9  ICD-9-CM:  280.9           I will update labs and see if adjustments are needed in her treatment plan  I will set up follow-up appointment once I have seen her lab results  I have asked her to try to be as active as she can tolerate  She has completed her Covid series and I did make her aware she will need a booster later this year  She is to call for new concerns in the interim

## 2021-08-23 ENCOUNTER — TELEPHONE (OUTPATIENT)
Dept: FAMILY MEDICINE CLINIC | Facility: CLINIC | Age: 86
End: 2021-08-23

## 2021-08-23 ENCOUNTER — LAB (OUTPATIENT)
Dept: FAMILY MEDICINE CLINIC | Facility: CLINIC | Age: 86
End: 2021-08-23

## 2021-08-23 DIAGNOSIS — Z20.822 CLOSE EXPOSURE TO COVID-19 VIRUS: ICD-10-CM

## 2021-08-23 DIAGNOSIS — Z20.822 CLOSE EXPOSURE TO COVID-19 VIRUS: Primary | ICD-10-CM

## 2021-08-23 PROCEDURE — U0003 INFECTIOUS AGENT DETECTION BY NUCLEIC ACID (DNA OR RNA); SEVERE ACUTE RESPIRATORY SYNDROME CORONAVIRUS 2 (SARS-COV-2) (CORONAVIRUS DISEASE [COVID-19]), AMPLIFIED PROBE TECHNIQUE, MAKING USE OF HIGH THROUGHPUT TECHNOLOGIES AS DESCRIBED BY CMS-2020-01-R: HCPCS | Performed by: NURSE PRACTITIONER

## 2021-08-23 PROCEDURE — C9803 HOPD COVID-19 SPEC COLLECT: HCPCS

## 2021-08-23 PROCEDURE — U0005 INFEC AGEN DETEC AMPLI PROBE: HCPCS

## 2021-08-24 LAB
LABCORP SARS-COV-2, NAA 2 DAY TAT: NORMAL
SARS-COV-2 RNA RESP QL NAA+PROBE: NOT DETECTED

## 2021-09-01 RX ORDER — HUMAN INSULIN 100 [USP'U]/ML
INJECTION, SUSPENSION SUBCUTANEOUS
Qty: 20 EACH | Refills: 3 | Status: SHIPPED | OUTPATIENT
Start: 2021-09-01 | End: 2021-09-07

## 2021-09-07 ENCOUNTER — APPOINTMENT (OUTPATIENT)
Dept: CT IMAGING | Facility: HOSPITAL | Age: 86
End: 2021-09-07

## 2021-09-07 ENCOUNTER — APPOINTMENT (OUTPATIENT)
Dept: CARDIOLOGY | Facility: HOSPITAL | Age: 86
End: 2021-09-07

## 2021-09-07 ENCOUNTER — HOSPITAL ENCOUNTER (INPATIENT)
Facility: HOSPITAL | Age: 86
LOS: 1 days | Discharge: HOME HEALTH | End: 2021-09-09
Attending: EMERGENCY MEDICINE | Admitting: HOSPITALIST

## 2021-09-07 ENCOUNTER — APPOINTMENT (OUTPATIENT)
Dept: GENERAL RADIOLOGY | Facility: HOSPITAL | Age: 86
End: 2021-09-07

## 2021-09-07 DIAGNOSIS — R41.0 CONFUSION: ICD-10-CM

## 2021-09-07 DIAGNOSIS — S09.90XA INJURY OF HEAD, INITIAL ENCOUNTER: ICD-10-CM

## 2021-09-07 DIAGNOSIS — N39.0 ACUTE UTI: Primary | ICD-10-CM

## 2021-09-07 PROBLEM — K21.9 GERD (GASTROESOPHAGEAL REFLUX DISEASE): Status: ACTIVE | Noted: 2021-09-07

## 2021-09-07 PROBLEM — M25.551 RIGHT HIP PAIN: Status: ACTIVE | Noted: 2021-09-07

## 2021-09-07 LAB
ALBUMIN SERPL-MCNC: 4 G/DL (ref 3.5–5.2)
ALBUMIN/GLOB SERPL: 1.1 G/DL
ALP SERPL-CCNC: 90 U/L (ref 39–117)
ALT SERPL W P-5'-P-CCNC: 23 U/L (ref 1–33)
ANION GAP SERPL CALCULATED.3IONS-SCNC: 13 MMOL/L (ref 5–15)
APTT PPP: 26.3 SECONDS (ref 24–31)
AST SERPL-CCNC: 36 U/L (ref 1–32)
BACTERIA UR QL AUTO: ABNORMAL /HPF
BASOPHILS # BLD AUTO: 0.1 10*3/MM3 (ref 0–0.2)
BASOPHILS NFR BLD AUTO: 1.5 % (ref 0–1.5)
BH CV ECHO MEAS - ACS: 2.2 CM
BH CV ECHO MEAS - AI DEC SLOPE: 363.4 CM/SEC^2
BH CV ECHO MEAS - AI DEC TIME: 1.2 SEC
BH CV ECHO MEAS - AI MAX PG: 67.5 MMHG
BH CV ECHO MEAS - AI MAX VEL: 410.7 CM/SEC
BH CV ECHO MEAS - AI P1/2T: 331 MSEC
BH CV ECHO MEAS - AO MAX PG (FULL): 3.8 MMHG
BH CV ECHO MEAS - AO MAX PG: 14.5 MMHG
BH CV ECHO MEAS - AO MEAN PG (FULL): 3.9 MMHG
BH CV ECHO MEAS - AO MEAN PG: 8.5 MMHG
BH CV ECHO MEAS - AO ROOT AREA (BSA CORRECTED): 1.8
BH CV ECHO MEAS - AO ROOT AREA: 10.2 CM^2
BH CV ECHO MEAS - AO ROOT DIAM: 3.6 CM
BH CV ECHO MEAS - AO V2 MAX: 190.5 CM/SEC
BH CV ECHO MEAS - AO V2 MEAN: 137.9 CM/SEC
BH CV ECHO MEAS - AO V2 VTI: 36.6 CM
BH CV ECHO MEAS - AORTIC HR: 92.2 BPM
BH CV ECHO MEAS - AORTIC R-R: 0.65 SEC
BH CV ECHO MEAS - ASC AORTA: 3.8 CM
BH CV ECHO MEAS - AVA(I,A): 2.3 CM^2
BH CV ECHO MEAS - AVA(I,D): 2.3 CM^2
BH CV ECHO MEAS - AVA(V,A): 2.4 CM^2
BH CV ECHO MEAS - AVA(V,D): 2.4 CM^2
BH CV ECHO MEAS - BSA(HAYCOCK): 2.1 M^2
BH CV ECHO MEAS - BSA: 2 M^2
BH CV ECHO MEAS - BZI_BMI: 41.6 KILOGRAMS/M^2
BH CV ECHO MEAS - BZI_METRIC_HEIGHT: 154.9 CM
BH CV ECHO MEAS - BZI_METRIC_WEIGHT: 99.8 KG
BH CV ECHO MEAS - CI(AO): 17.5 L/MIN/M^2
BH CV ECHO MEAS - CI(LVOT): 3.9 L/MIN/M^2
BH CV ECHO MEAS - CO(AO): 34.4 L/MIN
BH CV ECHO MEAS - CO(LVOT): 7.7 L/MIN
BH CV ECHO MEAS - EDV(CUBED): 68 ML
BH CV ECHO MEAS - EDV(MOD-SP2): 93.4 ML
BH CV ECHO MEAS - EDV(MOD-SP4): 94.1 ML
BH CV ECHO MEAS - EDV(TEICH): 73.4 ML
BH CV ECHO MEAS - EF(CUBED): 69.3 %
BH CV ECHO MEAS - EF(MOD-BP): 72 %
BH CV ECHO MEAS - EF(MOD-SP2): 70.1 %
BH CV ECHO MEAS - EF(MOD-SP4): 72.1 %
BH CV ECHO MEAS - EF(TEICH): 61.4 %
BH CV ECHO MEAS - ESV(CUBED): 20.9 ML
BH CV ECHO MEAS - ESV(MOD-SP2): 27.9 ML
BH CV ECHO MEAS - ESV(MOD-SP4): 26.2 ML
BH CV ECHO MEAS - ESV(TEICH): 28.4 ML
BH CV ECHO MEAS - FS: 32.5 %
BH CV ECHO MEAS - IVS/LVPW: 0.95
BH CV ECHO MEAS - IVSD: 1.4 CM
BH CV ECHO MEAS - LA DIMENSION(2D): 4.3 CM
BH CV ECHO MEAS - LA DIMENSION: 3.8 CM
BH CV ECHO MEAS - LA/AO: 1.1
BH CV ECHO MEAS - LV DIASTOLIC VOL/BSA (35-75): 47.9 ML/M^2
BH CV ECHO MEAS - LV MASS(C)D: 226.5 GRAMS
BH CV ECHO MEAS - LV MASS(C)DI: 115.1 GRAMS/M^2
BH CV ECHO MEAS - LV MAX PG: 10.7 MMHG
BH CV ECHO MEAS - LV MEAN PG: 4.6 MMHG
BH CV ECHO MEAS - LV SYSTOLIC VOL/BSA (12-30): 13.3 ML/M^2
BH CV ECHO MEAS - LV V1 MAX: 163.6 CM/SEC
BH CV ECHO MEAS - LV V1 MEAN: 97.2 CM/SEC
BH CV ECHO MEAS - LV V1 VTI: 30.2 CM
BH CV ECHO MEAS - LVIDD: 4.1 CM
BH CV ECHO MEAS - LVIDS: 2.8 CM
BH CV ECHO MEAS - LVOT AREA: 2.7 CM^2
BH CV ECHO MEAS - LVOT DIAM: 1.9 CM
BH CV ECHO MEAS - LVPWD: 1.5 CM
BH CV ECHO MEAS - MV A MAX VEL: 84.3 CM/SEC
BH CV ECHO MEAS - MV DEC SLOPE: 431.1 CM/SEC^2
BH CV ECHO MEAS - MV DEC TIME: 0.13 SEC
BH CV ECHO MEAS - MV E MAX VEL: 58.1 CM/SEC
BH CV ECHO MEAS - MV E/A: 0.69
BH CV ECHO MEAS - MV MAX PG: 3.6 MMHG
BH CV ECHO MEAS - MV MEAN PG: 1.6 MMHG
BH CV ECHO MEAS - MV V2 MAX: 94.3 CM/SEC
BH CV ECHO MEAS - MV V2 MEAN: 58.8 CM/SEC
BH CV ECHO MEAS - MV V2 VTI: 17.5 CM
BH CV ECHO MEAS - MVA(VTI): 4.8 CM^2
BH CV ECHO MEAS - PA ACC TIME: 0.05 SEC
BH CV ECHO MEAS - PA MAX PG (FULL): 0.94 MMHG
BH CV ECHO MEAS - PA MAX PG: 5.9 MMHG
BH CV ECHO MEAS - PA MEAN PG (FULL): 0.05 MMHG
BH CV ECHO MEAS - PA MEAN PG: 2.2 MMHG
BH CV ECHO MEAS - PA PR(ACCEL): 54.6 MMHG
BH CV ECHO MEAS - PA V2 MAX: 121.6 CM/SEC
BH CV ECHO MEAS - PA V2 MEAN: 65.6 CM/SEC
BH CV ECHO MEAS - PA V2 VTI: 18.2 CM
BH CV ECHO MEAS - PI END-D VEL: 85.2 CM/SEC
BH CV ECHO MEAS - PI MAX PG: 10.9 MMHG
BH CV ECHO MEAS - PI MAX VEL: 164.7 CM/SEC
BH CV ECHO MEAS - RAP SYSTOLE: 3 MMHG
BH CV ECHO MEAS - RV MAX PG: 5 MMHG
BH CV ECHO MEAS - RV MEAN PG: 2.2 MMHG
BH CV ECHO MEAS - RV V1 MAX: 111.5 CM/SEC
BH CV ECHO MEAS - RV V1 MEAN: 67 CM/SEC
BH CV ECHO MEAS - RV V1 VTI: 18 CM
BH CV ECHO MEAS - RVDD: 3.6 CM
BH CV ECHO MEAS - RVSP: 18 MMHG
BH CV ECHO MEAS - SI(AO): 189.5 ML/M^2
BH CV ECHO MEAS - SI(CUBED): 23.9 ML/M^2
BH CV ECHO MEAS - SI(LVOT): 42.2 ML/M^2
BH CV ECHO MEAS - SI(MOD-SP2): 33.3 ML/M^2
BH CV ECHO MEAS - SI(MOD-SP4): 34.5 ML/M^2
BH CV ECHO MEAS - SI(TEICH): 22.9 ML/M^2
BH CV ECHO MEAS - SV(AO): 372.8 ML
BH CV ECHO MEAS - SV(CUBED): 47.1 ML
BH CV ECHO MEAS - SV(LVOT): 83.1 ML
BH CV ECHO MEAS - SV(MOD-SP2): 65.5 ML
BH CV ECHO MEAS - SV(MOD-SP4): 67.9 ML
BH CV ECHO MEAS - SV(TEICH): 45 ML
BH CV ECHO MEAS - TR MAX VEL: 187.5 CM/SEC
BILIRUB SERPL-MCNC: 0.4 MG/DL (ref 0–1.2)
BILIRUB UR QL STRIP: NEGATIVE
BUN SERPL-MCNC: 17 MG/DL (ref 8–23)
BUN/CREAT SERPL: 17.7 (ref 7–25)
CALCIUM SPEC-SCNC: 8.6 MG/DL (ref 8.6–10.5)
CHLORIDE SERPL-SCNC: 103 MMOL/L (ref 98–107)
CK SERPL-CCNC: 328 U/L (ref 20–180)
CLARITY UR: CLEAR
CO2 SERPL-SCNC: 21 MMOL/L (ref 22–29)
COLOR UR: YELLOW
CREAT SERPL-MCNC: 0.96 MG/DL (ref 0.57–1)
DEPRECATED RDW RBC AUTO: 55.6 FL (ref 37–54)
EOSINOPHIL # BLD AUTO: 0.1 10*3/MM3 (ref 0–0.4)
EOSINOPHIL NFR BLD AUTO: 3.2 % (ref 0.3–6.2)
ERYTHROCYTE [DISTWIDTH] IN BLOOD BY AUTOMATED COUNT: 18.4 % (ref 12.3–15.4)
GFR SERPL CREATININE-BSD FRML MDRD: 55 ML/MIN/1.73
GLOBULIN UR ELPH-MCNC: 3.8 GM/DL
GLUCOSE BLDC GLUCOMTR-MCNC: 148 MG/DL (ref 70–105)
GLUCOSE BLDC GLUCOMTR-MCNC: 194 MG/DL (ref 70–105)
GLUCOSE BLDC GLUCOMTR-MCNC: 94 MG/DL (ref 70–105)
GLUCOSE SERPL-MCNC: 182 MG/DL (ref 65–99)
GLUCOSE UR STRIP-MCNC: NEGATIVE MG/DL
HBA1C MFR BLD: 6.9 % (ref 3.5–5.6)
HCT VFR BLD AUTO: 29.8 % (ref 34–46.6)
HGB BLD-MCNC: 9.6 G/DL (ref 12–15.9)
HGB UR QL STRIP.AUTO: ABNORMAL
HOLD SPECIMEN: NORMAL
HYALINE CASTS UR QL AUTO: ABNORMAL /LPF
INR PPP: 1.07 (ref 0.93–1.1)
KETONES UR QL STRIP: ABNORMAL
LEUKOCYTE ESTERASE UR QL STRIP.AUTO: NEGATIVE
LV EF 2D ECHO EST: 60 %
LYMPHOCYTES # BLD AUTO: 0.6 10*3/MM3 (ref 0.7–3.1)
LYMPHOCYTES NFR BLD AUTO: 14.7 % (ref 19.6–45.3)
MCH RBC QN AUTO: 28.2 PG (ref 26.6–33)
MCHC RBC AUTO-ENTMCNC: 32.3 G/DL (ref 31.5–35.7)
MCV RBC AUTO: 87.4 FL (ref 79–97)
MONOCYTES # BLD AUTO: 0.3 10*3/MM3 (ref 0.1–0.9)
MONOCYTES NFR BLD AUTO: 6.5 % (ref 5–12)
NEUTROPHILS NFR BLD AUTO: 3.1 10*3/MM3 (ref 1.7–7)
NEUTROPHILS NFR BLD AUTO: 74.1 % (ref 42.7–76)
NITRITE UR QL STRIP: POSITIVE
NRBC BLD AUTO-RTO: 0 /100 WBC (ref 0–0.2)
PH UR STRIP.AUTO: 6 [PH] (ref 5–8)
PLATELET # BLD AUTO: 241 10*3/MM3 (ref 140–450)
PMV BLD AUTO: 6.7 FL (ref 6–12)
POTASSIUM SERPL-SCNC: 4.2 MMOL/L (ref 3.5–5.2)
PROT SERPL-MCNC: 7.8 G/DL (ref 6–8.5)
PROT UR QL STRIP: NEGATIVE
PROTHROMBIN TIME: 11.8 SECONDS (ref 9.6–11.7)
RBC # BLD AUTO: 3.42 10*6/MM3 (ref 3.77–5.28)
RBC # UR: ABNORMAL /HPF
REF LAB TEST METHOD: ABNORMAL
SARS-COV-2 RNA PNL SPEC NAA+PROBE: NOT DETECTED
SODIUM SERPL-SCNC: 137 MMOL/L (ref 136–145)
SP GR UR STRIP: 1.01 (ref 1–1.03)
SQUAMOUS #/AREA URNS HPF: ABNORMAL /HPF
TROPONIN T SERPL-MCNC: <0.01 NG/ML (ref 0–0.03)
TROPONIN T SERPL-MCNC: <0.01 NG/ML (ref 0–0.03)
UROBILINOGEN UR QL STRIP: ABNORMAL
WBC # BLD AUTO: 4.2 10*3/MM3 (ref 3.4–10.8)
WBC UR QL AUTO: ABNORMAL /HPF

## 2021-09-07 PROCEDURE — 93306 TTE W/DOPPLER COMPLETE: CPT | Performed by: INTERNAL MEDICINE

## 2021-09-07 PROCEDURE — 99222 1ST HOSP IP/OBS MODERATE 55: CPT | Performed by: HOSPITALIST

## 2021-09-07 PROCEDURE — 82550 ASSAY OF CK (CPK): CPT | Performed by: EMERGENCY MEDICINE

## 2021-09-07 PROCEDURE — 87635 SARS-COV-2 COVID-19 AMP PRB: CPT | Performed by: EMERGENCY MEDICINE

## 2021-09-07 PROCEDURE — 85025 COMPLETE CBC W/AUTO DIFF WBC: CPT | Performed by: EMERGENCY MEDICINE

## 2021-09-07 PROCEDURE — 73502 X-RAY EXAM HIP UNI 2-3 VIEWS: CPT

## 2021-09-07 PROCEDURE — 93005 ELECTROCARDIOGRAM TRACING: CPT | Performed by: EMERGENCY MEDICINE

## 2021-09-07 PROCEDURE — 97530 THERAPEUTIC ACTIVITIES: CPT

## 2021-09-07 PROCEDURE — 80053 COMPREHEN METABOLIC PANEL: CPT | Performed by: EMERGENCY MEDICINE

## 2021-09-07 PROCEDURE — 84484 ASSAY OF TROPONIN QUANT: CPT | Performed by: NURSE PRACTITIONER

## 2021-09-07 PROCEDURE — 97162 PT EVAL MOD COMPLEX 30 MIN: CPT

## 2021-09-07 PROCEDURE — 84484 ASSAY OF TROPONIN QUANT: CPT | Performed by: EMERGENCY MEDICINE

## 2021-09-07 PROCEDURE — 85610 PROTHROMBIN TIME: CPT | Performed by: EMERGENCY MEDICINE

## 2021-09-07 PROCEDURE — 63710000001 INSULIN LISPRO (HUMAN) PER 5 UNITS: Performed by: NURSE PRACTITIONER

## 2021-09-07 PROCEDURE — 99285 EMERGENCY DEPT VISIT HI MDM: CPT

## 2021-09-07 PROCEDURE — G0378 HOSPITAL OBSERVATION PER HR: HCPCS

## 2021-09-07 PROCEDURE — 63710000001 INSULIN ISOPHANE & REGULAR PER 5 UNITS: Performed by: NURSE PRACTITIONER

## 2021-09-07 PROCEDURE — 85730 THROMBOPLASTIN TIME PARTIAL: CPT | Performed by: EMERGENCY MEDICINE

## 2021-09-07 PROCEDURE — 25010000002 CEFTRIAXONE PER 250 MG: Performed by: EMERGENCY MEDICINE

## 2021-09-07 PROCEDURE — 81001 URINALYSIS AUTO W/SCOPE: CPT | Performed by: EMERGENCY MEDICINE

## 2021-09-07 PROCEDURE — 83036 HEMOGLOBIN GLYCOSYLATED A1C: CPT | Performed by: NURSE PRACTITIONER

## 2021-09-07 PROCEDURE — 82962 GLUCOSE BLOOD TEST: CPT

## 2021-09-07 PROCEDURE — 93306 TTE W/DOPPLER COMPLETE: CPT

## 2021-09-07 PROCEDURE — 70450 CT HEAD/BRAIN W/O DYE: CPT

## 2021-09-07 RX ORDER — DEXTROSE MONOHYDRATE 25 G/50ML
25 INJECTION, SOLUTION INTRAVENOUS
Status: DISCONTINUED | OUTPATIENT
Start: 2021-09-07 | End: 2021-09-09 | Stop reason: HOSPADM

## 2021-09-07 RX ORDER — ONDANSETRON 2 MG/ML
4 INJECTION INTRAMUSCULAR; INTRAVENOUS EVERY 6 HOURS PRN
Status: DISCONTINUED | OUTPATIENT
Start: 2021-09-07 | End: 2021-09-09 | Stop reason: HOSPADM

## 2021-09-07 RX ORDER — ALUMINA, MAGNESIA, AND SIMETHICONE 2400; 2400; 240 MG/30ML; MG/30ML; MG/30ML
15 SUSPENSION ORAL EVERY 6 HOURS PRN
Status: DISCONTINUED | OUTPATIENT
Start: 2021-09-07 | End: 2021-09-09 | Stop reason: HOSPADM

## 2021-09-07 RX ORDER — CHOLECALCIFEROL (VITAMIN D3) 125 MCG
5 CAPSULE ORAL NIGHTLY PRN
Status: DISCONTINUED | OUTPATIENT
Start: 2021-09-07 | End: 2021-09-09 | Stop reason: HOSPADM

## 2021-09-07 RX ORDER — ACETAMINOPHEN 160 MG/5ML
650 SOLUTION ORAL EVERY 4 HOURS PRN
Status: DISCONTINUED | OUTPATIENT
Start: 2021-09-07 | End: 2021-09-09 | Stop reason: HOSPADM

## 2021-09-07 RX ORDER — ASPIRIN 81 MG/1
81 TABLET ORAL DAILY
Status: DISCONTINUED | OUTPATIENT
Start: 2021-09-07 | End: 2021-09-09 | Stop reason: HOSPADM

## 2021-09-07 RX ORDER — SODIUM CHLORIDE 0.9 % (FLUSH) 0.9 %
10 SYRINGE (ML) INJECTION EVERY 12 HOURS SCHEDULED
Status: DISCONTINUED | OUTPATIENT
Start: 2021-09-07 | End: 2021-09-09 | Stop reason: HOSPADM

## 2021-09-07 RX ORDER — OLANZAPINE 10 MG/2ML
1 INJECTION, POWDER, LYOPHILIZED, FOR SOLUTION INTRAMUSCULAR AS NEEDED
Status: DISCONTINUED | OUTPATIENT
Start: 2021-09-07 | End: 2021-09-09 | Stop reason: HOSPADM

## 2021-09-07 RX ORDER — ACETAMINOPHEN 650 MG/1
650 SUPPOSITORY RECTAL EVERY 4 HOURS PRN
Status: DISCONTINUED | OUTPATIENT
Start: 2021-09-07 | End: 2021-09-09 | Stop reason: HOSPADM

## 2021-09-07 RX ORDER — GABAPENTIN 300 MG/1
300 CAPSULE ORAL EVERY 12 HOURS SCHEDULED
Status: DISCONTINUED | OUTPATIENT
Start: 2021-09-07 | End: 2021-09-09 | Stop reason: HOSPADM

## 2021-09-07 RX ORDER — TOPIRAMATE 25 MG/1
12.5 TABLET ORAL 2 TIMES DAILY
Status: DISCONTINUED | OUTPATIENT
Start: 2021-09-07 | End: 2021-09-09 | Stop reason: HOSPADM

## 2021-09-07 RX ORDER — NICOTINE POLACRILEX 4 MG
15 LOZENGE BUCCAL
Status: DISCONTINUED | OUTPATIENT
Start: 2021-09-07 | End: 2021-09-09 | Stop reason: HOSPADM

## 2021-09-07 RX ORDER — ACETAMINOPHEN 325 MG/1
650 TABLET ORAL EVERY 4 HOURS PRN
Status: DISCONTINUED | OUTPATIENT
Start: 2021-09-07 | End: 2021-09-09 | Stop reason: HOSPADM

## 2021-09-07 RX ORDER — ATORVASTATIN CALCIUM 10 MG/1
10 TABLET, FILM COATED ORAL DAILY
Status: DISCONTINUED | OUTPATIENT
Start: 2021-09-07 | End: 2021-09-07

## 2021-09-07 RX ORDER — LOSARTAN POTASSIUM 50 MG/1
50 TABLET ORAL DAILY
Status: DISCONTINUED | OUTPATIENT
Start: 2021-09-07 | End: 2021-09-09 | Stop reason: HOSPADM

## 2021-09-07 RX ORDER — PANTOPRAZOLE SODIUM 40 MG/1
40 TABLET, DELAYED RELEASE ORAL EVERY MORNING
Status: DISCONTINUED | OUTPATIENT
Start: 2021-09-07 | End: 2021-09-09 | Stop reason: HOSPADM

## 2021-09-07 RX ORDER — SODIUM CHLORIDE 0.9 % (FLUSH) 0.9 %
10 SYRINGE (ML) INJECTION AS NEEDED
Status: DISCONTINUED | OUTPATIENT
Start: 2021-09-07 | End: 2021-09-09 | Stop reason: HOSPADM

## 2021-09-07 RX ORDER — INSULIN LISPRO 100 [IU]/ML
0-7 INJECTION, SOLUTION INTRAVENOUS; SUBCUTANEOUS AS NEEDED
Status: DISCONTINUED | OUTPATIENT
Start: 2021-09-07 | End: 2021-09-09 | Stop reason: HOSPADM

## 2021-09-07 RX ORDER — AMLODIPINE BESYLATE 5 MG/1
5 TABLET ORAL EVERY MORNING
Status: DISCONTINUED | OUTPATIENT
Start: 2021-09-07 | End: 2021-09-09 | Stop reason: HOSPADM

## 2021-09-07 RX ORDER — ONDANSETRON 4 MG/1
4 TABLET, FILM COATED ORAL EVERY 6 HOURS PRN
Status: DISCONTINUED | OUTPATIENT
Start: 2021-09-07 | End: 2021-09-09 | Stop reason: HOSPADM

## 2021-09-07 RX ORDER — INSULIN LISPRO 100 [IU]/ML
0-7 INJECTION, SOLUTION INTRAVENOUS; SUBCUTANEOUS
Status: DISCONTINUED | OUTPATIENT
Start: 2021-09-07 | End: 2021-09-09 | Stop reason: HOSPADM

## 2021-09-07 RX ORDER — FUROSEMIDE 20 MG/1
20 TABLET ORAL DAILY
Status: DISCONTINUED | OUTPATIENT
Start: 2021-09-07 | End: 2021-09-08

## 2021-09-07 RX ADMIN — CEFTRIAXONE 1 G: 10 INJECTION, POWDER, FOR SOLUTION INTRAVENOUS at 07:59

## 2021-09-07 RX ADMIN — GABAPENTIN 300 MG: 300 CAPSULE ORAL at 20:13

## 2021-09-07 RX ADMIN — INSULIN LISPRO 2 UNITS: 100 INJECTION, SOLUTION INTRAVENOUS; SUBCUTANEOUS at 17:17

## 2021-09-07 RX ADMIN — SODIUM CHLORIDE, PRESERVATIVE FREE 10 ML: 5 INJECTION INTRAVENOUS at 20:13

## 2021-09-07 RX ADMIN — PANTOPRAZOLE SODIUM 40 MG: 40 TABLET, DELAYED RELEASE ORAL at 12:18

## 2021-09-07 RX ADMIN — ASPIRIN 81 MG: 81 TABLET, COATED ORAL at 12:18

## 2021-09-07 RX ADMIN — FUROSEMIDE 20 MG: 20 TABLET ORAL at 12:18

## 2021-09-07 RX ADMIN — SODIUM CHLORIDE 1000 ML: 9 INJECTION, SOLUTION INTRAVENOUS at 07:59

## 2021-09-07 RX ADMIN — INSULIN HUMAN 30 UNITS: 100 INJECTION, SUSPENSION SUBCUTANEOUS at 17:18

## 2021-09-07 RX ADMIN — TOPIRAMATE 12.5 MG: 25 TABLET, FILM COATED ORAL at 12:18

## 2021-09-07 RX ADMIN — LOSARTAN POTASSIUM 50 MG: 50 TABLET, FILM COATED ORAL at 12:18

## 2021-09-07 RX ADMIN — GABAPENTIN 300 MG: 300 CAPSULE ORAL at 12:18

## 2021-09-07 RX ADMIN — SODIUM CHLORIDE, PRESERVATIVE FREE 10 ML: 5 INJECTION INTRAVENOUS at 12:20

## 2021-09-07 RX ADMIN — TOPIRAMATE 12.5 MG: 25 TABLET, FILM COATED ORAL at 20:13

## 2021-09-07 RX ADMIN — AMLODIPINE BESYLATE 5 MG: 5 TABLET ORAL at 12:17

## 2021-09-07 NOTE — H&P
[facility] Medicine Services      Patient Name: Aracelis Vargas  : 1935  MRN: [id number]  Primary Care Physician:  Gabriel Goss MD  Date of admission: 2021      Subjective      Chief Complaint: fall    History of Present Illness: Aracelis Vargas is a 86 y.o. female with past medical history of CAD, hypertension, hyperlipidemia, CKD stage III, diabetes mellitus type 2, MADHU who presented to [facility] ED on 2021 after a fall at home. She does not remember the fall. Her  assisted her to the bathroom and then went to another bathroom himself. When he returned, the patient was on the floor. It is unknown if she fell or pass out. She was awake when her  found her. She complained of right hip pain. She was unable to get up. She has had recent urinary frequency and urgency but no dysuria, no fever. She stated she has been weak for nearly a month. She uses a cane at baseline. She has had a recent cough but no shortness of breath. A family member was positive for Covid-19 two weeks ago. The patient was tested shortly after exposure and was negative. She has been fully vaccinated.     In the ED patient had CT head that showed no acute intracranial findings.  WBC normal .  Urinalysis showed trace ketones, positive nitrites, 3-5 WBCs, 3+ bacteria.  EKG showed sinus rhythm rate 88, prolonged DE interval.  Blood pressure systolic 160s and 1 episode of systolic 190.  She was given 1 L IV fluids and 1g IV Rocephin.  Covid-19 negative. She was admitted for further treatment of UTI, fall, weakness. She is currently alert and oriented x3.     Medical record review:   Patient was admitted to [facility] back in 2020 after a syncopal episode. She had negative stroke workup. She had EKG that showed mobitz type 1 AV block and cardiac workup showed normal EF, no coronary artery stenosis. Beta-blockers were stopped. Loop recorder was placed.       Review of  Systems   HENT: Negative.    Eyes: Negative.    Cardiovascular: Negative.    Respiratory: Positive for cough.    Endocrine: Negative.    Hematologic/Lymphatic: Negative.    Skin: Negative.    Musculoskeletal: Negative.    Gastrointestinal: Negative.    Genitourinary: Positive for frequency and urgency.   Neurological: Positive for weakness.   Psychiatric/Behavioral: Negative.    Allergic/Immunologic: Negative.    All other systems reviewed and are negative.      Personal History     Past Medical History:   Diagnosis Date   • Atherosclerosis of native coronary artery of native heart without angina pectoris 10/22/2020   • Hypertension associated with stage 3 chronic kidney disease due to type 2 diabetes mellitus (CMS/[facility]) 10/22/2020   • Morbid obesity (CMS/[facility]) 10/22/2020   • Secondary hyperaldosteronism (CMS/[facility]) 10/22/2020   • Stage 3b chronic kidney disease (CMS/[facility]) 10/22/2020   • Type 2 diabetes mellitus with diabetic neuropathy, with long-term current use of insulin (CMS/[facility]) 10/22/2020   • Type 2 diabetes mellitus with diabetic neuropathy, with long-term current use of insulin (CMS/[facility]) 10/22/2020       Past Surgical History:   Procedure Laterality Date   • CARDIAC CATHETERIZATION N/A 10/23/2020    Procedure: Left Heart Cath and coronary angiogram;  Surgeon: Wang Plaza MD;  Location: [facility] CATH INVASIVE LOCATION;  Service: Cardiovascular;  Laterality: N/A;   • HYSTERECTOMY     • JOINT REPLACEMENT Right     Hip   • JOINT REPLACEMENT Left     hip   • JOINT REPLACEMENT Left     hip (for second time)   • JOINT REPLACEMENT Right     knee   • OOPHORECTOMY         Family History: family history includes Heart disease in her mother. Otherwise pertinent FHx was reviewed and not pertinent to current issue.    Social History:  reports that she has never smoked. She has never used smokeless tobacco. She reports previous alcohol use. She reports that she does not use drugs.    Home Medications:  Prior to Admission  Medications     Prescriptions Last Dose Informant Patient Reported? Taking?    amLODIPine (NORVASC) 5 MG tablet 9/6/2021 Self Yes Yes    Take 5 mg by mouth Daily.    aspirin 81 MG EC tablet 9/6/2021  Yes Yes    Take 81 mg by mouth Daily.    atorvastatin (LIPITOR) 10 MG tablet 9/6/2021  Yes Yes    Take 10 mg by mouth Daily.    furosemide (LASIX) 20 MG tablet 9/6/2021  No Yes    Take 1 tablet by mouth Daily.    insulin NPH-insulin regular (humuLIN 70/30,novoLIN 70/30) (70-30) 100 UNIT/ML injection 9/6/2021  Yes Yes    Inject 30 Units under the skin into the appropriate area as directed 2 (Two) Times a Day With Meals.    losartan (Cozaar) 50 MG tablet 9/6/2021  No Yes    Take 1 tablet by mouth Daily.    metFORMIN (GLUCOPHAGE) 1000 MG tablet 9/6/2021  Yes Yes    Take 500 mg by mouth 2 (Two) Times a Day With Meals.    omeprazole (priLOSEC) 40 MG capsule 9/6/2021  Yes Yes    Take 40 mg by mouth Daily.    topiramate (TOPAMAX) 25 MG tablet 9/6/2021  Yes Yes    Take 25 mg by mouth Daily.    gabapentin (NEURONTIN) 300 MG capsule   Yes No    Take 300 mg by mouth 2 (two) times a day.            Allergies:  Allergies   Allergen Reactions   • Latex, Natural Rubber Rash   • Adhesive Tape Rash       Objective      Vitals:   Temp:  [98.5 °F (36.9 °C)-98.6 °F (37 °C)] 98.6 °F (37 °C)  Heart Rate:  [89-96] 94  Resp:  [16-17] 16  BP: (147-190)/(72-86) 160/73    Physical Exam  Vitals and nursing note reviewed.   HENT:      Head: Normocephalic and atraumatic.   Eyes:      Extraocular Movements: Extraocular movements intact.      Pupils: Pupils are equal, round, and reactive to light.   Cardiovascular:      Rate and Rhythm: Normal rate and regular rhythm.      Pulses: Normal pulses.      Heart sounds: Normal heart sounds.   Pulmonary:      Effort: Pulmonary effort is normal.      Breath sounds: Normal breath sounds.   Abdominal:      General: Bowel sounds are normal.      Palpations: Abdomen is soft.      Tenderness: There is no abdominal  tenderness.   Musculoskeletal:         General: Normal range of motion.      Cervical back: Normal range of motion.   Skin:     General: Skin is warm and dry.   Neurological:      Mental Status: She is alert and oriented to person, place, and time.   Psychiatric:         Mood and Affect: Mood normal.         Behavior: Behavior normal.         Result Review    Result Review:  I have personally reviewed the results from the time of this admission to 9/7/2021 09:11 EDT and agree with these findings:  [x]  Laboratory  []  Microbiology  [x]  Radiology  [x]  EKG/Telemetry   []  Cardiology/Vascular   []  Pathology  [x]  Old records  []  Other:      Assessment/Plan        Active Hospital Problems:  Active Hospital Problems    Diagnosis    • **Acute UTI    • Fall    • Right hip pain    • GERD (gastroesophageal reflux disease)    • DM type 2 with diabetic dyslipidemia (CMS/[facility])    • Stage 3b chronic kidney disease (CMS/[facility])    • Hypertension associated with stage 3 chronic kidney disease due to type 2 diabetes mellitus (CMS/[facility])    • Type 2 diabetes mellitus with diabetic neuropathy, with long-term current use of insulin (CMS/[facility])    • Morbid obesity (CMS/[facility])    • Atherosclerosis of native coronary artery of native heart without angina pectoris      Plan:     Acute UTI  -WBC normal  -UA: Urinalysis showed trace ketones, positive nitrites, 3-5 WBCs, 3+ bacteria.  -follow urine culture  -IV rocephin     Fall  Right hip pain  -cannot exclude syncope  -No current confusion on exam but does not recall the fall  -CT head: no acute intracranial findings  -EKG: sinus rhythm rate 88, prolonged OH interval  -troponin normal  -CK total 328  -check right hip XR  -check serial troponins r/o ACS  -check orthostatic vitals  -PT eval in am    CAD   -cont home aspirin    Hypertension  -uncontrolled. Cont home norvasc, losartan, lasix    Hyperlipidemia  -hold home statin    CKD stage III  -BUN/creatinine stable    DM Type II  -glucose  182  -cont home insulin, hold oral metformin while inpatient  -add SSI AC/HS    GERD  -cont home ppi    Morbid obesity BMI 41  -encourage lifestyle modifications    DVT prophylaxis:  Mechanical DVT prophylaxis orders are present.    CODE STATUS:    Level Of Support Discussed With: Patient  Code Status: CPR  Medical Interventions (Level of Support Prior to Arrest): Full    Admission Status:  I believe this patient meets observation status.    I discussed the patient's findings and my recommendations with patient and nursing staff.    This patient has been examined wearing appropriate Personal Protective Equipment. 09/07/21      Signature: Electronically signed by SHANI You, 09/07/21, 9:11 AM EDT.

## 2021-09-07 NOTE — CASE MANAGEMENT/SOCIAL WORK
Discharge Planning Assessment   [facility]     Patient Name: Aracelis Vargas  MRN: [id number]  Today's Date: 9/7/2021    Admit Date: 9/7/2021    Discharge Needs Assessment     Row Name 09/07/21 1421       Living Environment    Lives With  spouse    Current Living Arrangements  home/apartment/condo    Primary Care Provided by  self    Provides Primary Care For  no one, unable/limited ability to care for self;spouse    Family Caregiver if Needed  spouse    Quality of Family Relationships  helpful    Able to Return to Prior Arrangements  yes       Resource/Environmental Concerns    Resource/Environmental Concerns  none    Transportation Concerns  car, none       Transition Planning    Patient/Family Anticipates Transition to  home with family    Patient/Family Anticipated Services at Transition      Transportation Anticipated  family or friend will provide       Discharge Needs Assessment    Readmission Within the Last 30 Days  no previous admission in last 30 days    Equipment Currently Used at Home  cane, straight    Concerns to be Addressed  discharge planning    Anticipated Changes Related to Illness  none    Equipment Needed After Discharge  none    Provided Post Acute Provider List?  N/A    N/A Provider List Comment  has used [facility]  in the past, undecided at this time        Discharge Plan     Row Name 09/07/21 1422       Plan    Plan  PT eval ordered. From home with spouse.    Patient/Family in Agreement with Plan  yes    Plan Comments  Met with patient at bedside, reports she lives at home with spouse. Normally I with ADLs, still drives. Spouse to transport home on d/c. PCP and pharmacy confirmed. No issues affording food or medications. Discussed home health, she has used [facility] in the past and undecided if she will need home health at this time. Will follow.          Expected Discharge Date and Time     Expected Discharge Date Expected Discharge Time    Sep 8, 2021          Demographic Summary     Row Name 09/07/21 1417       General Information    Admission Type  observation    Arrived From  emergency department    Required Notices Provided  Observation Status Notice    Referral Source  admission list    Reason for Consult  discharge planning    Preferred Language  English        Functional Status     Row Name 09/07/21 1417       Functional Status    Usual Activity Tolerance  moderate    Current Activity Tolerance  moderate       Functional Status, IADL    Medications  independent    Meal Preparation  independent    Housekeeping  independent    Laundry  independent    Shopping  independent          Patient Forms     Row Name 09/07/21 1424       Patient Forms    Important Message from Medicare ([facility])  -- Damico 9/7    Patient Observation Letter  Delivered    Delivered to  Patient    Method of delivery  In person        Met with patient in room wearing PPE: mask     Maintained distance greater than six feet and spent less than 15 minutes in the room.          Thi Trammell RN

## 2021-09-07 NOTE — ED NOTES
pT BROUGHT IN by EMS from home after a fall last night. Pt c/o generalized weakness, right hip and shoulder pain and Nausea and dizziness since falling. Pt reports she fell last night at dinner time and EMS reports she fell this am on the way back from the bathroom. Pt does not remember falling or why she fell.     Kira Mendez, RN  09/07/21 0650

## 2021-09-07 NOTE — ED PROVIDER NOTES
Subjective   86-year-old female presents status post fall in bathroom at home.   states he helped patient to the bathroom and then had to go to the bathroom himself and another bathroom in the house and came back and his wife was on the floor.  Patient was amnestic to the fall.  Patient is somewhat confused and told me originally that she fell last evening and laid on the floor all night long.   states this is inaccurate . Patient felt lightheaded and nauseous when she got up with some mild right hip and right shoulder pain.  Patient has a mild associated cough but no fever, no chest pain or shortness of air, no neck or back pain.  Symptoms overall are moderate, worse with movement.          Review of Systems   Constitutional: Positive for fatigue.   Respiratory: Positive for cough.    Gastrointestinal: Positive for nausea.   Neurological: Positive for syncope and light-headedness.   All other systems reviewed and are negative.      Past Medical History:   Diagnosis Date   • Atherosclerosis of native coronary artery of native heart without angina pectoris 10/22/2020   • Hypertension associated with stage 3 chronic kidney disease due to type 2 diabetes mellitus (CMS/[facility]) 10/22/2020   • Morbid obesity (CMS/[facility]) 10/22/2020   • Secondary hyperaldosteronism (CMS/[facility]) 10/22/2020   • Stage 3b chronic kidney disease (CMS/[facility]) 10/22/2020   • Type 2 diabetes mellitus with diabetic neuropathy, with long-term current use of insulin (CMS/[facility]) 10/22/2020   • Type 2 diabetes mellitus with diabetic neuropathy, with long-term current use of insulin (CMS/[facility]) 10/22/2020       Allergies   Allergen Reactions   • Latex, Natural Rubber Rash   • Adhesive Tape Rash       Past Surgical History:   Procedure Laterality Date   • CARDIAC CATHETERIZATION N/A 10/23/2020    Procedure: Left Heart Cath and coronary angiogram;  Surgeon: Wang Plaza MD;  Location: [facility] CATH INVASIVE LOCATION;  Service: Cardiovascular;   Laterality: N/A;   • HYSTERECTOMY     • JOINT REPLACEMENT Right     Hip   • JOINT REPLACEMENT Left     hip   • JOINT REPLACEMENT Left     hip (for second time)   • JOINT REPLACEMENT Right     knee   • OOPHORECTOMY         Family History   Problem Relation Age of Onset   • Heart disease Mother        Social History     Socioeconomic History   • Marital status:      Spouse name: Not on file   • Number of children: Not on file   • Years of education: Not on file   • Highest education level: Not on file   Tobacco Use   • Smoking status: Never Smoker   • Smokeless tobacco: Never Used   Vaping Use   • Vaping Use: Never used   Substance and Sexual Activity   • Alcohol use: Not Currently   • Drug use: Never   • Sexual activity: Defer           Objective   Physical Exam  Constitutional:       Appearance: Normal appearance.   HENT:      Head:      Comments: Mild ecchymosis left eyebrow, no facial bone tenderness     Mouth/Throat:      Mouth: Mucous membranes are moist.      Pharynx: Oropharynx is clear.   Eyes:      Extraocular Movements: Extraocular movements intact.      Conjunctiva/sclera: Conjunctivae normal.      Pupils: Pupils are equal, round, and reactive to light.   Cardiovascular:      Rate and Rhythm: Normal rate and regular rhythm.      Heart sounds: Normal heart sounds.   Pulmonary:      Effort: Pulmonary effort is normal.      Breath sounds: Normal breath sounds.   Abdominal:      General: Bowel sounds are normal. There is no distension.      Palpations: Abdomen is soft.      Tenderness: There is no abdominal tenderness.   Musculoskeletal:         General: Normal range of motion.      Cervical back: Normal range of motion and neck supple. No tenderness.      Comments: Full range of motion right shoulder and right hip without pain or tenderness   Skin:     General: Skin is warm and dry.      Capillary Refill: Capillary refill takes less than 2 seconds.   Neurological:      Mental Status: She is alert and  oriented to person, place, and time.      Cranial Nerves: No cranial nerve deficit.      Sensory: No sensory deficit.      Motor: No weakness.   Psychiatric:         Mood and Affect: Mood normal.         Behavior: Behavior normal.         Procedures           ED Course  ED Course as of Sep 07 0744   Tue Sep 07, 2021   0700 EKG interpretation: Normal sinus rhythm, rate 88, no acute ST change    [JR]      ED Course User Index  [JR] Faraz Fernández MD                                           MDM  Number of Diagnoses or Management Options  Acute UTI  Confusion  Injury of head, initial encounter  Diagnosis management comments: Results for orders placed or performed during the hospital encounter of 09/07/21  -COVID-19,CEPHEID/SHAHLA/BDMAX,COR/RAYRAY/PAD/MARTHA IN-HOUSE(OR EMERGENT/ADD-ON),NP SWAB IN TRANSPORT MEDIA 3-4 HR TAT, RT-PCR - Swab, Nasopharynx  Specimen: Nasopharynx; Swab       Result                      Value             Ref Range           COVID19                     Not Detected      Not Detected*  -Comprehensive Metabolic Panel  Specimen: Blood       Result                      Value             Ref Range           Glucose                     182 (H)           65 - 99 mg/dL       BUN                         17                8 - 23 mg/dL        Creatinine                  0.96              0.57 - 1.00 *       Sodium                      137               136 - 145 mm*       Potassium                   4.2               3.5 - 5.2 mm*       Chloride                    103               98 - 107 mmo*       CO2                         21.0 (L)          22.0 - 29.0 *       Calcium                     8.6               8.6 - 10.5 m*       Total Protein               7.8               6.0 - 8.5 g/*       Albumin                     4.00              3.50 - 5.20 *       ALT (SGPT)                  23                1 - 33 U/L          AST (SGOT)                  36 (H)            1 - 32 U/L          Alkaline Phosphatase         90                39 - 117 U/L        Total Bilirubin             0.4               0.0 - 1.2 mg*       eGFR Non  Amer       55 (L)            >60 mL/min/1*       Globulin                    3.8               gm/dL               A/G Ratio                   1.1               g/dL                BUN/Creatinine Ratio        17.7              7.0 - 25.0          Anion Gap                   13.0              5.0 - 15.0 m*  -Protime-INR  Specimen: Blood       Result                      Value             Ref Range           Protime                     11.8 (H)          9.6 - 11.7 S*       INR                         1.07              0.93 - 1.10    -aPTT  Specimen: Blood       Result                      Value             Ref Range           PTT                         26.3              24.0 - 31.0 *  -CK  Specimen: Blood       Result                      Value             Ref Range           Creatine Kinase             328 (H)           20 - 180 U/L   -Urinalysis With Culture If Indicated - Urine, Clean Catch  Specimen: Urine, Clean Catch       Result                      Value             Ref Range           Color, UA                   Yellow            Yellow, Straw       Appearance, UA              Clear             Clear               pH, UA                      6.0               5.0 - 8.0           Specific [place], UA        1.013             1.005 - 1.030       Glucose, UA                 Negative          Negative            Ketones, UA                 Trace (A)         Negative            Bilirubin, UA               Negative          Negative            Blood, UA                   Trace (A)         Negative            Protein, UA                 Negative          Negative            Leuk Esterase, UA           Negative          Negative            Nitrite, UA                 Positive (A)      Negative            Urobilinogen, UA            0.2 E.U./dL       0.2 - 1.0 E.*  -CBC Auto  Differential  Specimen: Blood       Result                      Value             Ref Range           WBC                         4.20              3.40 - 10.80*       RBC                         3.42 (L)          3.77 - 5.28 *       Hemoglobin                  9.6 (L)           12.0 - 15.9 *       Hematocrit                  29.8 (L)          34.0 - 46.6 %       MCV                         87.4              79.0 - 97.0 *       MCH                         28.2              26.6 - 33.0 *       MCHC                        32.3              31.5 - 35.7 *       RDW                         18.4 (H)          12.3 - 15.4 %       RDW-SD                      55.6 (H)          37.0 - 54.0 *       MPV                         6.7               6.0 - 12.0 fL       Platelets                   241               140 - 450 10*       Neutrophil %                74.1              42.7 - 76.0 %       Lymphocyte %                14.7 (L)          19.6 - 45.3 %       Monocyte %                  6.5               5.0 - 12.0 %        Eosinophil %                3.2               0.3 - 6.2 %         Basophil %                  1.5               0.0 - 1.5 %         Neutrophils, Absolute       3.10              1.70 - 7.00 *       Lymphocytes, Absolute       0.60 (L)          0.70 - 3.10 *       Monocytes, Absolute         0.30              0.10 - 0.90 *       Eosinophils, Absolute       0.10              0.00 - 0.40 *       Basophils, Absolute         0.10              0.00 - 0.20 *       nRBC                        0.0               0.0 - 0.2 /1*  -Troponin  Specimen: Blood       Result                      Value             Ref Range           Troponin T                  <0.010            0.000 - 0.03*  -Urinalysis, Microscopic Only - Urine, Clean Catch  Specimen: Urine, Clean Catch       Result                      Value             Ref Range           RBC, UA                     0-2 (A)           None Seen /H*       WBC, UA                      3-5 (A)           None Seen /H*       Bacteria, UA                3+ (A)            None Seen /H*       Squamous Epithelial Ce*     0-2               None Seen, 0*       Hyaline Casts, UA           None Seen         None Seen /L*       Methodology                                                   Automated Microscopy  -ECG 12 Lead       Result                      Value             Ref Range           QT Interval                 388               ms             -Gold Top - SST       Result                      Value             Ref Range           Extra Tube                                                    Hold for add-ons.  CT Head Without Contrast   Final Result         1. Generalized atrophy, consistent with the patient's age.    2. No acute intracranial findings.         Electronically Signed By-Faraz Patricia MD On:9/7/2021 7:26 AM    This report was finalized on [id number] by  Faraz Patricia MD.       I asked patient to clarify her story given the discrepancies between her recollection and her 's.  Patient tells me that she is not sure and feels the urinary tract infection may be causing her some confusion.  Will admit patient given age and confusion in the setting of UTI.  Syncope is in the differential as well. IV Rocephin, IV fluids.       Amount and/or Complexity of Data Reviewed  Clinical lab tests: reviewed and ordered  Tests in the radiology section of CPT®: ordered and reviewed  Tests in the medicine section of CPT®: reviewed and ordered  Decide to obtain previous medical records or to obtain history from someone other than the patient: yes        Final diagnoses:   Acute UTI   Confusion   Injury of head, initial encounter       ED Disposition  ED Disposition     ED Disposition Condition Comment    Decision to Admit  Level of Care: Telemetry [5]   Admitting Physician: NIVIA NAVA [[id number]]            No follow-up provider specified.       Medication List      No changes were made to your  prescriptions during this visit.          Faraz Fernández MD  09/07/21 0744

## 2021-09-07 NOTE — PLAN OF CARE
Goal Outcome Evaluation:  Plan of Care Reviewed With: patient           Outcome Summary: Pt is a 86 year old female who was admitted from fall s/p fall secondary to dizziness with (+) LOC.  Pt with c/o R hip pain.  Pt dx with UTI.  Head CT (-), R hip x-ray (-).  Pt on room air and on telemetry.  Pt reports she lives with  in a 1 story home with basement (chair lift to basement) with 1 step to enter into home.  Pt ambulates with st cane.  Pt reports that this is her 2nd fall within the past month.  Pt required min/mod A for rolling, mod A for bed mobiltiy and min/mod A for sit to stand transfer, stand pivot transfer from bed to b/s chair with RW.  Pt with antalgic RLE with standing.  Recommend inpt rehab

## 2021-09-07 NOTE — THERAPY EVALUATION
Patient Name: Aracelis Vargas  : 1935    MRN: [id number]                              Today's Date: 2021       Admit Date: 2021    Visit Dx:     ICD-10-CM ICD-9-CM   1. Acute UTI  N39.0 599.0   2. Confusion  R41.0 298.9   3. Injury of head, initial encounter  S09.90XA 959.01     Patient Active Problem List   Diagnosis   • Fall   • DM type 2 with diabetic dyslipidemia (CMS/[facility])   • Secondary hyperaldosteronism (CMS/[facility])   • Stage 3b chronic kidney disease (CMS/[facility])   • Hypertension associated with stage 3 chronic kidney disease due to type 2 diabetes mellitus (CMS/[facility])   • Syncope and collapse   • Type 2 diabetes mellitus with diabetic neuropathy, with long-term current use of insulin (CMS/[facility])   • Morbid obesity (CMS/[facility])   • Atherosclerosis of native coronary artery of native heart without angina pectoris   • Atrioventricular block, Mobitz type 1, Wenckebach   • Abnormal nuclear stress test   • Hyperlipidemia   • Hypertension   • Acute UTI   • GERD (gastroesophageal reflux disease)   • Right hip pain     Past Medical History:   Diagnosis Date   • Atherosclerosis of native coronary artery of native heart without angina pectoris 10/22/2020   • Hypertension associated with stage 3 chronic kidney disease due to type 2 diabetes mellitus (CMS/[facility]) 10/22/2020   • Morbid obesity (CMS/[facility]) 10/22/2020   • Secondary hyperaldosteronism (CMS/[facility]) 10/22/2020   • Stage 3b chronic kidney disease (CMS/[facility]) 10/22/2020   • Type 2 diabetes mellitus with diabetic neuropathy, with long-term current use of insulin (CMS/[facility]) 10/22/2020   • Type 2 diabetes mellitus with diabetic neuropathy, with long-term current use of insulin (CMS/[facility]) 10/22/2020     Past Surgical History:   Procedure Laterality Date   • CARDIAC CATHETERIZATION N/A 10/23/2020    Procedure: Left Heart Cath and coronary angiogram;  Surgeon: Wang Plaza MD;  Location: [facility] CATH INVASIVE LOCATION;  Service: Cardiovascular;  Laterality: N/A;   •  HYSTERECTOMY     • JOINT REPLACEMENT Right     Hip   • JOINT REPLACEMENT Left     hip   • JOINT REPLACEMENT Left     hip (for second time)   • JOINT REPLACEMENT Right     knee   • OOPHORECTOMY       General Information     [facility] Name 09/07/21 1425          Physical Therapy Time and Intention    Document Type  evaluation  -AM     Mode of Treatment  physical therapy  -AM     Row Name 09/07/21 1425          General Information    Patient Profile Reviewed  yes  -AM     Prior Level of Function  independent:;gait;transfer;bed mobility;using stairs uses st cane for ambulation  -AM     Existing Precautions/Restrictions  fall  -AM     Row Name 09/07/21 1425          Living Environment    Lives With  spouse  -AM     Row Name 09/07/21 1425          Home Main Entrance    Number of Stairs, Main Entrance  one  -AM     Stair Railings, Main Entrance  railings safe and in good condition  -AM     Row Name 09/07/21 1425          Stairs Within Home, Primary    Number of Stairs, Within Home, Primary  none  -AM     [facility] Name 09/07/21 1425          Cognition    Orientation Status (Cognition)  oriented x 3  -AM     [facility] Name 09/07/21 1425          Safety Issues, Functional Mobility    Safety Issues Affecting Function (Mobility)  safety precaution awareness  -AM     Impairments Affecting Function (Mobility)  balance;endurance/activity tolerance;pain;strength  -AM     Comment, Safety Issues/Impairments (Mobility)  gait belt utilized  -AM       User Key  (r) = Recorded By, (t) = Taken By, (c) = Cosigned By    Initials Name Provider Type    AM Aaron Farr, PT Physical Therapist        Mobility     Row Name 09/07/21 1426          Bed Mobility    Bed Mobility  supine-sit  -AM     Supine-Sit Carlos (Bed Mobility)  set up;verbal cues;moderate assist (50% patient effort);1 person assist  -AM     Assistive Device (Bed Mobility)  bed rails;head of bed elevated  -AM     Row Name 09/07/21 1426          Bed-Chair Transfer    Bed-Chair Carlos  (Transfers)  set up;verbal cues;minimum assist (75% patient effort);moderate assist (50% patient effort);1 person assist  -AM     Assistive Device (Bed-Chair Transfers)  walker, front-wheeled  -AM     Row Name 09/07/21 1426          Sit-Stand Transfer    Sit-Stand [place] (Transfers)  set up;verbal cues;minimum assist (75% patient effort);moderate assist (50% patient effort);1 person assist  -AM     Assistive Device (Sit-Stand Transfers)  walker, front-wheeled  -AM       User Key  (r) = Recorded By, (t) = Taken By, (c) = Cosigned By    Initials Name Provider Type    AM Aaron Farr, SHANNON Physical Therapist        Obj/Interventions     Row Name 09/07/21 1428          Range of Motion Comprehensive    Comment, General Range of Motion  Decreased R hip flexion, all others WFL  -AM     Row Name 09/07/21 1428          Strength Comprehensive (MMT)    Comment, General Manual Muscle Testing (MMT) Assessment  R hip flexion 4-/5, all others 4/5  -AM     [facility] Name 09/07/21 1428          Balance    Balance Assessment  sitting dynamic balance;standing static balance;standing dynamic balance;sitting static balance  -AM     Static Sitting Balance  WFL  -AM     Dynamic Sitting Balance  WFL  -AM     Static Standing Balance  mild impairment  -AM     Dynamic Standing Balance  moderate impairment  -AM     Row Name 09/07/21 1428          Sensory Assessment (Somatosensory)    Sensory Assessment (Somatosensory)  sensation intact  -AM       User Key  (r) = Recorded By, (t) = Taken By, (c) = Cosigned By    Initials Name Provider Type    AM Aaron Farr, SHANNON Physical Therapist        Goals/Plan     Row Name 09/07/21 1434          Bed Mobility Goal 1 (PT)    Activity/Assistive Device (Bed Mobility Goal 1, PT)  bed mobility activities, all  -AM     [place] Level/Cues Needed (Bed Mobility Goal 1, PT)  modified independence  -AM     Time Frame (Bed Mobility Goal 1, PT)  long term goal (LTG)  -AM     Row Name 09/07/21 1434           Transfer Goal 1 (PT)    Activity/Assistive Device (Transfer Goal 1, PT)  transfers, all  -AM     [place] Level/Cues Needed (Transfer Goal 1, PT)  modified independence  -AM     Time Frame (Transfer Goal 1, PT)  long term goal (LTG)  -AM     Row Name 09/07/21 1434          Gait Training Goal 1 (PT)    Activity/Assistive Device (Gait Training Goal 1, PT)  gait (walking locomotion);walker, rolling  -AM     [place] Level (Gait Training Goal 1, PT)  modified independence  -AM     Distance (Gait Training Goal 1, PT)  150'  -AM     Time Frame (Gait Training Goal 1, PT)  long term goal (LTG)  -AM     Row Name 09/07/21 1434          Stairs Goal 1 (PT)    Activity/Assistive Device (Stairs Goal 1, PT)  stairs, all skills  -AM     [place] Level/Cues Needed (Stairs Goal 1, PT)  contact guard assist  -AM     Number of Stairs (Stairs Goal 1, PT)  1  -AM     Time Frame (Stairs Goal 1, PT)  long term goal (LTG)  -AM       User Key  (r) = Recorded By, (t) = Taken By, (c) = Cosigned By    Initials Name Provider Type    AM Aaron Farr, PT Physical Therapist        Clinical Impression     Row Name 09/07/21 1428          Pain    Additional Documentation  Pain Scale: Numbers Pre/Post-Treatment (Group)  -AM     Row Name 09/07/21 1428          Pain Scale: Numbers Pre/Post-Treatment    Pretreatment Pain Rating  4/10  -AM     Posttreatment Pain Rating  4/10  -AM     Pain Location - Side  Right  -AM     Pain Location  hip  -AM     Pain Intervention(s)  Repositioned;Emotional support  -AM     Row Name 09/07/21 1424          Plan of Care Review    Plan of Care Reviewed With  patient  -AM     Outcome Summary  Pt is a 86 year old female who was admitted from fall s/p fall secondary to dizziness with (+) LOC.  Pt with c/o R hip pain.  Pt dx with UTI.  Head CT (-), R hip x-ray (-).  Pt on room air and on telemetry.  Pt reports she lives with  in a 1 story home with basement (chair lift to basement) with 1 step to enter into  home.  Pt ambulates with st cane.  Pt reports that this is her 2nd fall within the past month.  Pt required min/mod A for rolling, mod A for bed mobiltiy and min/mod A for sit to stand transfer, stand pivot transfer from bed to b/s chair with RW.  Pt with antalgic RLE with standing.  Recommend inpt rehab  -AM     Row Name 09/07/21 1428          Therapy Assessment/Plan (PT)    Patient/Family Therapy Goals Statement (PT)  To get stronger  -AM     Criteria for Skilled Interventions Met (PT)  yes  -AM     Predicted Duration of Therapy Intervention (PT)  until d/c  -AM     Row Name 09/07/21 1428          Vital Signs    O2 Delivery Pre Treatment  room air  -AM     O2 Delivery Intra Treatment  room air  -AM     O2 Delivery Post Treatment  room air  -AM     Row Name 09/07/21 1428          Positioning and Restraints    Pre-Treatment Position  in bed  -AM     Post Treatment Position  chair  -AM     In Chair  notified nsg;sitting;call light within reach;encouraged to call for assist;exit alarm on  -AM       User Key  (r) = Recorded By, (t) = Taken By, (c) = Cosigned By    Initials Name Provider Type    AM Aaron Farr, PT Physical Therapist        Outcome Measures     Row Name 09/07/21 1435          How much help from another person do you currently need...    Turning from your back to your side while in flat bed without using bedrails?  3  -AM     Moving from lying on back to sitting on the side of a flat bed without bedrails?  2  -AM     Moving to and from a bed to a chair (including a wheelchair)?  3  -AM     Standing up from a chair using your arms (e.g., wheelchair, bedside chair)?  3  -AM     Climbing 3-5 steps with a railing?  1  -AM     To walk in hospital room?  1  -AM     AM-PAC 6 Clicks Score (PT)  13  -AM     Row Name 09/07/21 1435          Functional Assessment    Outcome Measure Options  AM-PAC 6 Clicks Basic Mobility (PT)  -AM       User Key  (r) = Recorded By, (t) = Taken By, (c) = Cosigned By    Initials  Name Provider Type    AM Aaron Farr PT Physical Therapist                       Physical Therapy Education                 Title: PT OT SLP Therapies (In Progress)     Topic: Physical Therapy (In Progress)     Point: Mobility training (Done)     Learning Progress Summary           Patient Acceptance, E,TB, VU by AM at 9/7/2021 1436                   Point: Home exercise program (Not Started)     Learner Progress:  Not documented in this visit.          Point: Body mechanics (Not Started)     Learner Progress:  Not documented in this visit.          Point: Precautions (Not Started)     Learner Progress:  Not documented in this visit.                      User Key     Initials Effective Dates Name Provider Type Discipline    AM 05/10/21 -  Aaron Farr PT Physical Therapist PT              PT Recommendation and Plan  Planned Therapy Interventions (PT): bed mobility training, gait training, patient/family education, strengthening, transfer training  Plan of Care Reviewed With: patient  Outcome Summary: Pt is a 86 year old female who was admitted from fall s/p fall secondary to dizziness with (+) LOC.  Pt with c/o R hip pain.  Pt dx with UTI.  Head CT (-), R hip x-ray (-).  Pt on room air and on telemetry.  Pt reports she lives with  in a 1 story home with basement (chair lift to basement) with 1 step to enter into home.  Pt ambulates with st cane.  Pt reports that this is her 2nd fall within the past month.  Pt required min/mod A for rolling, mod A for bed mobiltiy and min/mod A for sit to stand transfer, stand pivot transfer from bed to b/s chair with RW.  Pt with antalgic RLE with standing.  Recommend inpt rehab     Time Calculation:   PT Charges     Row Name 09/07/21 1437             Time Calculation    Start Time  1119  -AM      Stop Time  1144  -AM      Time Calculation (min)  25 min  -AM      PT Received On  09/07/21  -AM      PT - Next Appointment  09/08/21  -AM      PT Goal Re-Cert Due Date   09/21/21  -AM         Time Calculation- PT    Total Timed Code Minutes- PT  15 minute(s)  -AM        User Key  (r) = Recorded By, (t) = Taken By, (c) = Cosigned By    Initials Name Provider Type    Aaron Vora, PT Physical Therapist        Therapy Charges for Today     Code Description Service Date Service Provider Modifiers Qty    [id number]  PT EVAL MOD COMPLEXITY 3 9/7/2021 Aaron Farr, PT GP 1    [id number]  PT THERAPEUTIC ACT EA 15 MIN 9/7/2021 Aaron Frar, PT GP 1          PT G-Codes  Outcome Measure Options: AM-PAC 6 Clicks Basic Mobility (PT)  AM-PAC 6 Clicks Score (PT): 13    Aaron Farr, PT  9/7/2021

## 2021-09-08 ENCOUNTER — HOME HEALTH ADMISSION (OUTPATIENT)
Dept: HOME HEALTH SERVICES | Facility: HOME HEALTHCARE | Age: 86
End: 2021-09-08

## 2021-09-08 LAB
ANION GAP SERPL CALCULATED.3IONS-SCNC: 7 MMOL/L (ref 5–15)
BASOPHILS # BLD AUTO: 0.1 10*3/MM3 (ref 0–0.2)
BASOPHILS NFR BLD AUTO: 1.3 % (ref 0–1.5)
BUN SERPL-MCNC: 15 MG/DL (ref 8–23)
BUN/CREAT SERPL: 13.8 (ref 7–25)
CALCIUM SPEC-SCNC: 8.3 MG/DL (ref 8.6–10.5)
CHLORIDE SERPL-SCNC: 105 MMOL/L (ref 98–107)
CO2 SERPL-SCNC: 26 MMOL/L (ref 22–29)
CREAT SERPL-MCNC: 1.09 MG/DL (ref 0.57–1)
DEPRECATED RDW RBC AUTO: 58.2 FL (ref 37–54)
EOSINOPHIL # BLD AUTO: 0.3 10*3/MM3 (ref 0–0.4)
EOSINOPHIL NFR BLD AUTO: 6.7 % (ref 0.3–6.2)
ERYTHROCYTE [DISTWIDTH] IN BLOOD BY AUTOMATED COUNT: 18.8 % (ref 12.3–15.4)
GFR SERPL CREATININE-BSD FRML MDRD: 48 ML/MIN/1.73
GLUCOSE BLDC GLUCOMTR-MCNC: 139 MG/DL (ref 70–105)
GLUCOSE BLDC GLUCOMTR-MCNC: 148 MG/DL (ref 70–105)
GLUCOSE BLDC GLUCOMTR-MCNC: 149 MG/DL (ref 70–105)
GLUCOSE BLDC GLUCOMTR-MCNC: 154 MG/DL (ref 70–105)
GLUCOSE SERPL-MCNC: 126 MG/DL (ref 65–99)
HCT VFR BLD AUTO: 28.3 % (ref 34–46.6)
HGB BLD-MCNC: 9.1 G/DL (ref 12–15.9)
LYMPHOCYTES # BLD AUTO: 1.4 10*3/MM3 (ref 0.7–3.1)
LYMPHOCYTES NFR BLD AUTO: 28.2 % (ref 19.6–45.3)
MCH RBC QN AUTO: 28.2 PG (ref 26.6–33)
MCHC RBC AUTO-ENTMCNC: 32 G/DL (ref 31.5–35.7)
MCV RBC AUTO: 88 FL (ref 79–97)
MONOCYTES # BLD AUTO: 0.4 10*3/MM3 (ref 0.1–0.9)
MONOCYTES NFR BLD AUTO: 8.3 % (ref 5–12)
NEUTROPHILS NFR BLD AUTO: 2.8 10*3/MM3 (ref 1.7–7)
NEUTROPHILS NFR BLD AUTO: 55.5 % (ref 42.7–76)
NRBC BLD AUTO-RTO: 0.1 /100 WBC (ref 0–0.2)
PLATELET # BLD AUTO: 241 10*3/MM3 (ref 140–450)
PMV BLD AUTO: 7 FL (ref 6–12)
POTASSIUM SERPL-SCNC: 4.2 MMOL/L (ref 3.5–5.2)
QT INTERVAL: 388 MS
RBC # BLD AUTO: 3.21 10*6/MM3 (ref 3.77–5.28)
SODIUM SERPL-SCNC: 138 MMOL/L (ref 136–145)
WBC # BLD AUTO: 5.1 10*3/MM3 (ref 3.4–10.8)

## 2021-09-08 PROCEDURE — 99232 SBSQ HOSP IP/OBS MODERATE 35: CPT | Performed by: HOSPITALIST

## 2021-09-08 PROCEDURE — 36415 COLL VENOUS BLD VENIPUNCTURE: CPT | Performed by: NURSE PRACTITIONER

## 2021-09-08 PROCEDURE — 80048 BASIC METABOLIC PNL TOTAL CA: CPT | Performed by: NURSE PRACTITIONER

## 2021-09-08 PROCEDURE — 25010000002 CEFTRIAXONE PER 250 MG: Performed by: NURSE PRACTITIONER

## 2021-09-08 PROCEDURE — 63710000001 INSULIN LISPRO (HUMAN) PER 5 UNITS: Performed by: NURSE PRACTITIONER

## 2021-09-08 PROCEDURE — 63710000001 INSULIN ISOPHANE & REGULAR PER 5 UNITS: Performed by: NURSE PRACTITIONER

## 2021-09-08 PROCEDURE — 82962 GLUCOSE BLOOD TEST: CPT

## 2021-09-08 PROCEDURE — 85025 COMPLETE CBC W/AUTO DIFF WBC: CPT | Performed by: NURSE PRACTITIONER

## 2021-09-08 RX ADMIN — INSULIN HUMAN 30 UNITS: 100 INJECTION, SUSPENSION SUBCUTANEOUS at 08:38

## 2021-09-08 RX ADMIN — WATER 1 G: 1000 INJECTION, SOLUTION INTRAVENOUS at 08:36

## 2021-09-08 RX ADMIN — INSULIN HUMAN 30 UNITS: 100 INJECTION, SUSPENSION SUBCUTANEOUS at 17:31

## 2021-09-08 RX ADMIN — ASPIRIN 81 MG: 81 TABLET, COATED ORAL at 08:36

## 2021-09-08 RX ADMIN — PANTOPRAZOLE SODIUM 40 MG: 40 TABLET, DELAYED RELEASE ORAL at 06:20

## 2021-09-08 RX ADMIN — LOSARTAN POTASSIUM 50 MG: 50 TABLET, FILM COATED ORAL at 08:36

## 2021-09-08 RX ADMIN — AMLODIPINE BESYLATE 5 MG: 5 TABLET ORAL at 06:20

## 2021-09-08 RX ADMIN — TOPIRAMATE 12.5 MG: 25 TABLET, FILM COATED ORAL at 08:36

## 2021-09-08 RX ADMIN — SODIUM CHLORIDE, PRESERVATIVE FREE 10 ML: 5 INJECTION INTRAVENOUS at 08:36

## 2021-09-08 RX ADMIN — TOPIRAMATE 12.5 MG: 25 TABLET, FILM COATED ORAL at 20:01

## 2021-09-08 RX ADMIN — GABAPENTIN 300 MG: 300 CAPSULE ORAL at 08:36

## 2021-09-08 RX ADMIN — GABAPENTIN 300 MG: 300 CAPSULE ORAL at 20:01

## 2021-09-08 RX ADMIN — INSULIN LISPRO 2 UNITS: 100 INJECTION, SOLUTION INTRAVENOUS; SUBCUTANEOUS at 16:48

## 2021-09-08 RX ADMIN — SODIUM CHLORIDE, PRESERVATIVE FREE 10 ML: 5 INJECTION INTRAVENOUS at 20:01

## 2021-09-08 NOTE — PROGRESS NOTES
[facility] Medicine Services Daily Progress Note    Patient Name: Aracelis Vargas  : 1935  MRN: [id number]  Primary Care Physician:  Gabriel Goss MD  Date of admission: 2021      Subjective      Chief Complaint: Fall    Seen and examined bedside.  Doing well right hip pain resolved.    Review of systems  neuro no focal weakness no focal numbness  cardiovascular no chest pain no shortness of breath  pulmonary no cough no shortness of breath  GI no nausea no vomiting      Objective      Vitals:   Temp:  [98.1 °F (36.7 °C)-99.1 °F (37.3 °C)] 98.1 °F (36.7 °C)  Heart Rate:  [85-92] 88  Resp:  [16-18] 16  BP: (129-159)/() 147/75    Physical Exam   Constitutional:  oriented to person, place, and time. No distress.   HENT:   Head: Normocephalic and atraumatic.   Eyes: Conjunctivae and EOM are normal. Pupils are equal, round, and reactive to light.   Neck: No JVD present. No thyromegaly present.   Cardiovascular: Normal rate, regular rhythm, normal heart sounds and intact distal pulses. Exam reveals no gallop and no friction rub.   No murmur heard.  Pulmonary/Chest: Effort normal and breath sounds normal. No stridor. No respiratory distress.  has no wheezes.  has no rales.  exhibits no tenderness.   Abdominal: Soft. Bowel sounds are normal.  no distension and no mass. There is no tenderness. There is no rebound and no guarding. No hernia.   Musculoskeletal: Normal range of motion.   Lymphadenopathy:     no cervical adenopathy.   Neurological:  alert and oriented to person, place, and time. No cranial nerve deficit or sensory deficit. exhibits normal muscle tone.   Skin: No rash noted.  not diaphoretic.   Psychiatric:  normal mood and affect.   Vitals reviewed.         Result Review    Result Review:  I have personally reviewed the results from the time of this admission to 2021 11:15 EDT and agree with these findings:  [x]  Laboratory  []  Microbiology  [x]   Radiology  [x]  EKG/Telemetry   []  Cardiology/Vascular   []  Pathology  []  Old records  []  Other:  Most notable findings include: UTI, KYRA        Assessment/Plan      Brief Patient Summary:  Aracelis Vargas is a 86 y.o. female who presented status post fall diagnosed with a UTI      amLODIPine, 5 mg, Oral, QAM  aspirin, 81 mg, Oral, Daily  cefTRIAXone, 1 g, Intravenous, Q24H  gabapentin, 300 mg, Oral, Q12H  insulin lispro, 0-7 Units, Subcutaneous, TID AC  insulin NPH-insulin regular, 30 Units, Subcutaneous, BID With Meals  losartan, 50 mg, Oral, Daily  pantoprazole, 40 mg, Oral, QAM  sodium chloride, 10 mL, Intravenous, Q12H  topiramate, 12.5 mg, Oral, BID             Active Hospital Problems:  Active Hospital Problems    Diagnosis    • **Acute UTI    • GERD (gastroesophageal reflux disease)    • Right hip pain    • Fall    • DM type 2 with diabetic dyslipidemia (CMS/[facility])    • Stage 3b chronic kidney disease (CMS/[facility])    • Hypertension associated with stage 3 chronic kidney disease due to type 2 diabetes mellitus (CMS/[facility])    • Type 2 diabetes mellitus with diabetic neuropathy, with long-term current use of insulin (CMS/[facility])    • Morbid obesity (CMS/[facility])    • Atherosclerosis of native coronary artery of native heart without angina pectoris      Plan:   Fall  questionable syncope, likely vasovagal from volume depletion and UTI  monitor on telemetry  EKG troponin without acute injury pattern or arrhythmia  check 2D echo  PT OT    dehydration  continue IV fluids    acute kidney injury  creatinine 1.09  volume resuscitated repeat BMP in the morning    right hip pain  status post fall  no fracture noted on x-rays    UTI  continue current antibiotic  follow-up on micro    hypertension  continue Norvasc losartan    Diabetes  continue insulin NPH and regular  continue sliding scale and Accu-Cheks    migraines  continue Topamax    DVT PUD prophylaxis    plan continue above plan discharge hopefully next 24 to 48 hours.  DVT  prophylaxis:  Mechanical DVT prophylaxis orders are present.    CODE STATUS:    Level Of Support Discussed With: Patient  Code Status: CPR  Medical Interventions (Level of Support Prior to Arrest): Full      Disposition:  I expect patient to be discharged 2 to 3 days.    Electronically signed by Shawna Gill MD, 09/08/21, 11:15 EDT.  [facility]

## 2021-09-08 NOTE — DISCHARGE PLACEMENT REQUEST
"Aracelis Vargas (86 y.o. Female)     Date of Birth Social Security Number Address Home Phone MRN    1935  [id number] [address] [phone] [id number]    Mormonism Marital Status          Jain        Admission Date Admission Type Admitting Provider Attending Provider Department, Room/Bed    9/7/21 Emergency Shawna Gill MD Piwko, Radomir, MD [facility] 2C MEDICAL INPATIENT, 246/1    Discharge Date Discharge Disposition Discharge Destination                       Attending Provider: Shawna Gill MD    Allergies: Latex, Natural Rubber, Adhesive Tape    Isolation: None   Infection: None   Code Status: CPR    Ht: 154.9 cm (61\")   Wt: 99.8 kg (220 lb)    Admission Cmt: None   Principal Problem: Acute UTI [N39.0]                 Active Insurance as of 9/7/2021     Primary Coverage     Payor Plan Insurance Group Employer/Plan Group    MEDICARE MEDICARE A & B      Payor Plan Address Payor Plan Phone Number Payor Plan Fax Number Effective Dates    [address] [phone]  3/1/2002 - None Entered    [facility] [id number]       Subscriber Name Subscriber Birth Date Member ID       ARACELIS VARGAS 1935 [id number]           Secondary Coverage     Payor Plan Insurance Group Employer/Plan Group    [facility] SUPP [id number]     Payor Plan Address Payor Plan Phone Number Payor Plan Fax Number Effective Dates    [address]   12/1/2016 - None Entered    [facility] [id number]       Subscriber Name Subscriber Birth Date Member ID       ARACELIS VARGAS 1935 [id number]                 Emergency Contacts      (Rel.) Home Phone Work Phone Mobile Phone    GEORGIE VARGAS (Spouse) [phone] -- --              "

## 2021-09-08 NOTE — PLAN OF CARE
07-Aug-2018 14:04 Goal Outcome Evaluation:  Plan of Care Reviewed With: patient        Progress: no change  Outcome Summary: Pt has voiced  no complaints tonight. She has rested well.

## 2021-09-08 NOTE — CASE MANAGEMENT/SOCIAL WORK
Continued Stay Note  [facility]     Patient Name: Aracelis Vargas  MRN: [id number]  Today's Date: 9/8/2021    Admit Date: 9/7/2021    Discharge Plan     Row Name 09/08/21 1337       Plan    Plan  Declined IP rehab. Anticipate routine home with [facility], ordered and accepted.    Plan Comments  Received notice from [facility] that patient is accepted, order placed.     Phone communication or documentation only - no physical contact with patient or family.        Thi Trammell RN

## 2021-09-08 NOTE — CASE MANAGEMENT/SOCIAL WORK
Continued Stay Note   [facility]     Patient Name: Aracelis Vargas  MRN: [id number]  Today's Date: 9/8/2021    Admit Date: 9/7/2021    Discharge Plan     Row Name 09/08/21 1217       Plan    Plan  Declined IP rehab. Anticipate routine home, referral to [facility], pending acceptance.    Provided Post Acute Provider List?  Yes    Post Acute Provider List  Home Health    Delivered To  Patient    Method of Delivery  In person    Patient/Family in Agreement with Plan  yes    Plan Comments  Met with patient at bedside to discuss IP rehab, patient declines at this time. She is agreeable to home health, options discussed and choose [facility] . Referral to [facility] and pending acceptance. Will require order once accepted.     Met with patient in room wearing PPE: mask    Maintained distance greater than six feet and spent less than 15 minutes in the room.      Thi Trammell RN

## 2021-09-09 ENCOUNTER — READMISSION MANAGEMENT (OUTPATIENT)
Dept: CALL CENTER | Facility: HOSPITAL | Age: 86
End: 2021-09-09

## 2021-09-09 VITALS
HEIGHT: 61 IN | HEART RATE: 86 BPM | RESPIRATION RATE: 18 BRPM | OXYGEN SATURATION: 94 % | DIASTOLIC BLOOD PRESSURE: 66 MMHG | SYSTOLIC BLOOD PRESSURE: 129 MMHG | WEIGHT: 220 LBS | BODY MASS INDEX: 41.54 KG/M2 | TEMPERATURE: 98 F

## 2021-09-09 LAB
ANION GAP SERPL CALCULATED.3IONS-SCNC: 8 MMOL/L (ref 5–15)
BASOPHILS # BLD AUTO: 0 10*3/MM3 (ref 0–0.2)
BASOPHILS NFR BLD AUTO: 1.1 % (ref 0–1.5)
BUN SERPL-MCNC: 15 MG/DL (ref 8–23)
BUN/CREAT SERPL: 15.5 (ref 7–25)
CALCIUM SPEC-SCNC: 8.3 MG/DL (ref 8.6–10.5)
CHLORIDE SERPL-SCNC: 106 MMOL/L (ref 98–107)
CO2 SERPL-SCNC: 25 MMOL/L (ref 22–29)
CREAT SERPL-MCNC: 0.97 MG/DL (ref 0.57–1)
DEPRECATED RDW RBC AUTO: 56.4 FL (ref 37–54)
EOSINOPHIL # BLD AUTO: 0.3 10*3/MM3 (ref 0–0.4)
EOSINOPHIL NFR BLD AUTO: 6.4 % (ref 0.3–6.2)
ERYTHROCYTE [DISTWIDTH] IN BLOOD BY AUTOMATED COUNT: 18.3 % (ref 12.3–15.4)
GFR SERPL CREATININE-BSD FRML MDRD: 55 ML/MIN/1.73
GLUCOSE BLDC GLUCOMTR-MCNC: 137 MG/DL (ref 70–105)
GLUCOSE BLDC GLUCOMTR-MCNC: 167 MG/DL (ref 70–105)
GLUCOSE SERPL-MCNC: 164 MG/DL (ref 65–99)
HCT VFR BLD AUTO: 27.8 % (ref 34–46.6)
HGB BLD-MCNC: 9.1 G/DL (ref 12–15.9)
LYMPHOCYTES # BLD AUTO: 1.3 10*3/MM3 (ref 0.7–3.1)
LYMPHOCYTES NFR BLD AUTO: 29.3 % (ref 19.6–45.3)
MCH RBC QN AUTO: 28.8 PG (ref 26.6–33)
MCHC RBC AUTO-ENTMCNC: 32.7 G/DL (ref 31.5–35.7)
MCV RBC AUTO: 88 FL (ref 79–97)
MONOCYTES # BLD AUTO: 0.4 10*3/MM3 (ref 0.1–0.9)
MONOCYTES NFR BLD AUTO: 8.3 % (ref 5–12)
NEUTROPHILS NFR BLD AUTO: 2.4 10*3/MM3 (ref 1.7–7)
NEUTROPHILS NFR BLD AUTO: 54.9 % (ref 42.7–76)
NRBC BLD AUTO-RTO: 0.2 /100 WBC (ref 0–0.2)
PLATELET # BLD AUTO: 221 10*3/MM3 (ref 140–450)
PMV BLD AUTO: 7.1 FL (ref 6–12)
POTASSIUM SERPL-SCNC: 4 MMOL/L (ref 3.5–5.2)
RBC # BLD AUTO: 3.16 10*6/MM3 (ref 3.77–5.28)
SODIUM SERPL-SCNC: 139 MMOL/L (ref 136–145)
WBC # BLD AUTO: 4.3 10*3/MM3 (ref 3.4–10.8)

## 2021-09-09 PROCEDURE — 63710000001 INSULIN ISOPHANE & REGULAR PER 5 UNITS: Performed by: NURSE PRACTITIONER

## 2021-09-09 PROCEDURE — 93010 ELECTROCARDIOGRAM REPORT: CPT | Performed by: INTERNAL MEDICINE

## 2021-09-09 PROCEDURE — 85025 COMPLETE CBC W/AUTO DIFF WBC: CPT | Performed by: HOSPITALIST

## 2021-09-09 PROCEDURE — 25010000002 CEFTRIAXONE PER 250 MG: Performed by: NURSE PRACTITIONER

## 2021-09-09 PROCEDURE — 97165 OT EVAL LOW COMPLEX 30 MIN: CPT

## 2021-09-09 PROCEDURE — 82962 GLUCOSE BLOOD TEST: CPT

## 2021-09-09 PROCEDURE — 99239 HOSP IP/OBS DSCHRG MGMT >30: CPT | Performed by: HOSPITALIST

## 2021-09-09 PROCEDURE — 63710000001 INSULIN LISPRO (HUMAN) PER 5 UNITS: Performed by: NURSE PRACTITIONER

## 2021-09-09 PROCEDURE — 93005 ELECTROCARDIOGRAM TRACING: CPT | Performed by: HOSPITALIST

## 2021-09-09 PROCEDURE — 97116 GAIT TRAINING THERAPY: CPT

## 2021-09-09 PROCEDURE — 97530 THERAPEUTIC ACTIVITIES: CPT

## 2021-09-09 PROCEDURE — 80048 BASIC METABOLIC PNL TOTAL CA: CPT | Performed by: HOSPITALIST

## 2021-09-09 RX ORDER — CEFDINIR 300 MG/1
300 CAPSULE ORAL 2 TIMES DAILY
Qty: 12 CAPSULE | Refills: 0 | Status: SHIPPED | OUTPATIENT
Start: 2021-09-09 | End: 2021-09-16

## 2021-09-09 RX ADMIN — SODIUM CHLORIDE, PRESERVATIVE FREE 10 ML: 5 INJECTION INTRAVENOUS at 08:07

## 2021-09-09 RX ADMIN — TOPIRAMATE 12.5 MG: 25 TABLET, FILM COATED ORAL at 08:05

## 2021-09-09 RX ADMIN — WATER 1 G: 1000 INJECTION, SOLUTION INTRAVENOUS at 08:04

## 2021-09-09 RX ADMIN — ASPIRIN 81 MG: 81 TABLET, COATED ORAL at 08:06

## 2021-09-09 RX ADMIN — INSULIN HUMAN 30 UNITS: 100 INJECTION, SUSPENSION SUBCUTANEOUS at 07:58

## 2021-09-09 RX ADMIN — AMLODIPINE BESYLATE 5 MG: 5 TABLET ORAL at 05:47

## 2021-09-09 RX ADMIN — INSULIN LISPRO 2 UNITS: 100 INJECTION, SOLUTION INTRAVENOUS; SUBCUTANEOUS at 11:59

## 2021-09-09 RX ADMIN — GABAPENTIN 300 MG: 300 CAPSULE ORAL at 08:05

## 2021-09-09 RX ADMIN — PANTOPRAZOLE SODIUM 40 MG: 40 TABLET, DELAYED RELEASE ORAL at 05:46

## 2021-09-09 RX ADMIN — LOSARTAN POTASSIUM 50 MG: 50 TABLET, FILM COATED ORAL at 08:06

## 2021-09-09 NOTE — DISCHARGE SUMMARY
[facility] Medicine Services  DISCHARGE SUMMARY    Patient Name: Aracelis Vargas  : 1935  MRN: [id number]    Date of Admission: 2021  Date of Discharge: 2021  Primary Care Physician: Gabriel Goss MD      Presenting Problem:   Confusion [R41.0]  Acute UTI [N39.0]  Injury of head, initial encounter [S09.90XA]    Active and Resolved Hospital Problems:  Active Hospital Problems    Diagnosis POA   • **Acute UTI [N39.0] Yes   • GERD (gastroesophageal reflux disease) [K21.9] Yes   • Right hip pain [M25.551] Yes   • Fall [W19.XXXA] Yes   • DM type 2 with diabetic dyslipidemia (CMS/[facility]) [E11.69, E78.5] Yes   • Stage 3b chronic kidney disease (CMS/[facility]) [N18.32] Yes   • Hypertension associated with stage 3 chronic kidney disease due to type 2 diabetes mellitus (CMS/[facility]) [E11.22, I12.9, N18.30] Yes   • Type 2 diabetes mellitus with diabetic neuropathy, with long-term current use of insulin (CMS/[facility]) [E11.40, Z79.4] Not Applicable   • Morbid obesity (CMS/[facility]) [E66.01] Yes   • Atherosclerosis of native coronary artery of native heart without angina pectoris [I25.10] Yes      Resolved Hospital Problems   No resolved problems to display.         Hospital Course     Hospital Course:  Chief Complaint: fall     History of Present Illness: Aracelis Vargas is a 86 y.o. female with past medical history of CAD, hypertension, hyperlipidemia, CKD stage III, diabetes mellitus type 2, MADHU who presented to [facility] ED on 2021 after a fall at home. She does not remember the fall. Her  assisted her to the bathroom and then went to another bathroom himself. When he returned, the patient was on the floor. It is unknown if she fell or pass out. She was awake when her  found her. She complained of right hip pain. She was unable to get up. She has had recent urinary frequency and urgency but no dysuria, no fever. She stated she has been weak for nearly a month. She  uses a cane at baseline. She has had a recent cough but no shortness of breath. A family member was positive for Covid-19 two weeks ago. The patient was tested shortly after exposure and was negative. She has been fully vaccinated.      In the ED patient had CT head that showed no acute intracranial findings.  WBC normal .  Urinalysis showed trace ketones, positive nitrites, 3-5 WBCs, 3+ bacteria.  EKG showed sinus rhythm rate 88, prolonged CT interval.  Blood pressure systolic 160s and 1 episode of systolic 190.  She was given 1 L IV fluids and 1g IV Rocephin.  Covid-19 negative. She was admitted for further treatment of UTI, fall, weakness. She is currently alert and oriented x3.      Medical record review:   Patient was admitted to [facility] in October 2020 after a syncopal episode. She had negative stroke workup. She had EKG that showed mobitz type 1 AV block and cardiac workup showed normal EF, no coronary artery stenosis. Beta-blockers were stopped. Loop recorder was placed.     Hospital course and problem list    Fall  questionable syncope, likely vasovagal from volume depletion and UTI though she does have history of first and even second-degree AV block history.  She does have a loop recorder implanted.  On her EKG on this admission there is no evidence of any AV blocks.  There are some PVCs.  CT is just slightly elevated.  This has been discussed with Dr. Plaza who will see the patient in a clinic for follow-up.  EKG troponin without acute injury pattern or arrhythmia as mentioned above  2D echo showed preserved EF no gross valve abnormalities  PT OT patient wants to go home     dehydration  Has been volume resuscitated with IV fluids     acute kidney injury  Resolved, creatinine normalized with IV hydration     right hip pain  status post fall  no fracture noted on x-rays     UTI  She was treated ceftriaxone week discharged on a course of Omnicef.  Functional cultures have a reflex however  her UA did suggest UTI patient did have some dysuria.     hypertension  continue Norvasc losartan  Blood pressure controlled    Diabetes  continue insulin NPH and regular  continue sliding scale and Accu-Cheks     migraines  continue Topamax     DVT PUD prophylaxis     Plan discharge home stable condition follow-up with PCP and Dr. Plaza in a week    Reasons For Change In Medications and Indications for New Medications:      Day of Discharge     Vital Signs:  Temp:  [98.1 °F (36.7 °C)-99.3 °F (37.4 °C)] 98.1 °F (36.7 °C)  Heart Rate:  [82-85] 84  Resp:  [18] 18  BP: (148-157)/(68-82) 157/68    Physical Exam:  Physical Exam   Constitutional:  oriented to person, place, and time. No distress.   HENT:   Head: Normocephalic and atraumatic.   Eyes: Conjunctivae and EOM are normal. Pupils are equal, round, and reactive to light.   Neck: No JVD present. No thyromegaly present.   Cardiovascular: Normal rate, regular rhythm, normal heart sounds and intact distal pulses. Exam reveals no gallop and no friction rub.   No murmur heard.  Pulmonary/Chest: Effort normal and breath sounds normal. No stridor. No respiratory distress.  has no wheezes.  has no rales.  exhibits no tenderness.   Abdominal: Soft. Bowel sounds are normal.  no distension and no mass. There is no tenderness. There is no rebound and no guarding. No hernia.   Musculoskeletal: Normal range of motion.   Lymphadenopathy:     no cervical adenopathy.   Neurological:  alert and oriented to person, place, and time. No cranial nerve deficit or sensory deficit. exhibits normal muscle tone.   Skin: No rash noted.  not diaphoretic.   Psychiatric:  normal mood and affect.   Vitals reviewed.        Pertinent  and/or Most Recent Results     LAB RESULTS:      Lab 09/09/21  0744 09/08/21  0413 09/07/21  0628   WBC 4.30 5.10 4.20   HEMOGLOBIN 9.1* 9.1* 9.6*   HEMATOCRIT 27.8* 28.3* 29.8*   PLATELETS 221 241 241   NEUTROS ABS 2.40 2.80 3.10   LYMPHS ABS 1.30 1.40 0.60*   MONOS  ABS 0.40 0.40 0.30   EOS ABS 0.30 0.30 0.10   MCV 88.0 88.0 87.4   PROTIME  --   --  11.8*   APTT  --   --  26.3         Lab 09/09/21  0744 09/08/21  0413 09/07/21  1015 09/07/21 0628   SODIUM 139 138  --  137   POTASSIUM 4.0 4.2  --  4.2   CHLORIDE 106 105  --  103   CO2 25.0 26.0  --  21.0*   ANION GAP 8.0 7.0  --  13.0   BUN 15 15  --  17   CREATININE 0.97 1.09*  --  0.96   GLUCOSE 164* 126*  --  182*   CALCIUM 8.3* 8.3*  --  8.6   HEMOGLOBIN A1C  --   --  6.9*  --          Lab 09/07/21 0628   TOTAL PROTEIN 7.8   ALBUMIN 4.00   GLOBULIN 3.8   ALT (SGPT) 23   AST (SGOT) 36*   BILIRUBIN 0.4   ALK PHOS 90         Lab 09/07/21 0628   TROPONIN T <0.010  <0.010   PROTIME 11.8*   INR 1.07                 Brief Urine Lab Results  (Last result in the past 365 days)      Color   Clarity   Blood   Leuk Est   Nitrite   Protein   CREAT   Urine HCG        09/07/21 0638 Yellow Clear  Comment:  Result checked  Trace Negative Positive Negative             Microbiology Results (last 10 days)     Procedure Component Value - Date/Time    COVID-19,CEPHEID/SHAHLA/BDMAX,COR/RAYRAY/PAD/MARTHA IN-HOUSE(OR EMERGENT/ADD-ON),NP SWAB IN TRANSPORT MEDIA 3-4 HR TAT, RT-PCR - Swab, Nasopharynx [[id number]]  (Normal) Collected: 09/07/21 0628    Lab Status: Final result Specimen: Swab from Nasopharynx Updated: 09/07/21 0704     COVID19 Not Detected    Narrative:      Fact sheet for providers: https://www.fda.gov/media/307210/download     Fact sheet for patients: https://www.fda.gov/media/614508/download  Fact sheet for providers: https://www.fda.gov/media/370440/download    Fact sheet for patients: https://www.fda.gov/media/114150/download    Test performed by PCR.          CT Head Without Contrast    Result Date: 9/7/2021  Impression:  1. Generalized atrophy, consistent with the patient's age. 2. No acute intracranial findings.  Electronically Signed By-Faraz Patricia MD On:9/7/2021 7:26 AM This report was finalized on [id number] by  Faraz Patricia,  MD.    XR Hip With or Without Pelvis 2 - 3 View Right    Result Date: 9/7/2021  Impression:  1. Postoperative changes of bilateral total hip arthroplasty. No acute findings.  Electronically Signed By-Faraz Patricia MD On:9/7/2021 9:52 AM This report was finalized on [id number] by  Faraz Patricia MD.              Results for orders placed during the hospital encounter of 09/07/21    Adult Transthoracic Echo Complete W/ Cont if Necessary Per Protocol    Interpretation Summary  · Estimated left ventricular EF = 60% Left ventricular systolic function is normal.    Indications  Syncope    Technically satisfactory study.  Mitral valve is structurally normal.  Tricuspid valve is structurally normal.  Aortic valve is structurally normal.  Pulmonic valve could not be well visualized.  No evidence for mitral tricuspid or aortic regurgitation is seen by Doppler study.  Left atrium is enlarged.  Right atrium is normal in size.  Left ventricle is normal in size and contractility with ejection fraction of 60%.  Right ventricle is normal in size.  Atrial septum is intact.  Aorta is normal.  No pericardial effusion or intracardiac thrombus is seen.    Impression  Left atrium is enlarged.  Structurally and functionally normal cardiac valves.  Left ventricular size and contractility is normal with ejection fraction of 60%.      Labs Pending at Discharge:      Procedures Performed           Consults:   Consults     Date and Time Order Name Status Description    9/7/2021  7:36 AM Inpatient Hospitalist Consult Completed             Discharge Details        Discharge Medications      New Medications      Instructions Start Date   cefdinir 300 MG capsule  Commonly known as: OMNICEF   300 mg, Oral, 2 Times Daily         Continue These Medications      Instructions Start Date   amLODIPine 5 MG tablet  Commonly known as: NORVASC   5 mg, Oral, Every Morning      aspirin 81 MG EC tablet   81 mg, Oral, Daily      atorvastatin 10 MG  tablet  Commonly known as: LIPITOR   10 mg, Oral, Daily      furosemide 20 MG tablet  Commonly known as: LASIX   20 mg, Oral, Daily      gabapentin 300 MG capsule  Commonly known as: NEURONTIN   300 mg, Oral, 2 times daily      insulin NPH-insulin regular (70-30) 100 UNIT/ML injection  Commonly known as: humuLIN 70/30,novoLIN 70/30   30 Units, Subcutaneous, 2 Times Daily With Meals      losartan 50 MG tablet  Commonly known as: Cozaar   50 mg, Oral, Daily      metFORMIN 1000 MG tablet  Commonly known as: GLUCOPHAGE   1,000 mg, Oral, 2 Times Daily With Meals      omeprazole 40 MG capsule  Commonly known as: priLOSEC   40 mg, Oral, Every Morning      topiramate 25 MG tablet  Commonly known as: TOPAMAX   12.5 mg, Oral, 2 Times Daily             Allergies   Allergen Reactions   • Latex, Natural Rubber Rash   • Adhesive Tape Rash         Discharge Disposition: Discharged home stable condition  [place] [facility] [place]    Diet:  Hospital:  Diet Order   Procedures   • Diet Diabetic/Consistent Carbs; Diabetic - Consistent Carb         Discharge Activity:   Activity Instructions     Activity as Tolerated              CODE STATUS:  Code Status and Medical Interventions:   Ordered at: 09/07/21 0844     Level Of Support Discussed With:    Patient     Code Status:    CPR     Medical Interventions (Level of Support Prior to Arrest):    Full         Future Appointments   Date Time Provider Department Center   12/29/2021  1:20 PM Wang Plaza MD [facility]       Additional Instructions for the Follow-ups that You Need to Schedule     Ambulatory Referral to Home Health   As directed      Face to Face Visit Date: 9/8/2021    Follow-up provider for Plan of Care?: I treated the patient in an acute care facility and will not continue treatment after discharge.    Follow-up provider: SIMBA ODONNELL [[id number]]    Reason/Clinical Findings: post hospital evaluation    Describe mobility limitations that make leaving home  difficult: weakness    Nursing/Therapeutic Services Requested: Skilled Nursing    Skilled nursing orders: Other (UTI teaching, eval for further disciplines)    Frequency: 1 Week 1         Discharge Follow-up with PCP   As directed       Currently Documented PCP:    Gabriel Goss MD    PCP Phone Number:    [phone]     Follow Up Details: one week               Time spent on Discharge including face to face service: 38 minutes    Signature:   Electronically signed by Shawna Gill MD, 09/09/21, 12:25 PM EDT.

## 2021-09-09 NOTE — PLAN OF CARE
Goal Outcome Evaluation:  Plan of Care Reviewed With: patient        Progress: no change  Outcome Summary: Pt is a 86 year old female who was admitted from fall s/p fall secondary to dizziness with (+) LOC.  Pt with c/o R hip pain.  Pt dx with UTI.  Head CT (-), R hip x-ray (-).  Pt. states she is ADL independent at baseline and lives at home w/ spouse. Pt. completes functional transfers this date w/ supervision for safety and increased min A for all LB ADLs. Pt. states she utilizes DME at home w/ bathing ADL. Pt. appears close to baseline level of functional independence, will require 24 hour care/support of spouse at d/c.

## 2021-09-09 NOTE — PLAN OF CARE
Problem: Adult Inpatient Plan of Care  Goal: Plan of Care Review  Outcome: Ongoing, Progressing  Flowsheets  Taken 9/9/2021 0342 by Leann Will RN  Progress: improving  Outcome Summary: Patient has rested well through the night. Frequent urination. No complaints. Patient using bedpan as she states she is too weak for BSC, however she is refusing IP rehab. Possible discharge home today.  Taken 9/8/2021 0403 by Maribell Gonzales RN  Plan of Care Reviewed With: patient   Goal Outcome Evaluation:           Progress: improving  Outcome Summary: Patient has rested well through the night. Frequent urination. No complaints. Patient using bedpan as she states she is too weak for BSC, however she is refusing IP rehab. Possible discharge home today.

## 2021-09-09 NOTE — THERAPY TREATMENT NOTE
Subjective: Pt agreeable to therapeutic plan of care.    Objective:     Bed mobility - Modified-Independent  Transfers - Modified-Independent  Ambulation - 40 feet Supervision and with rolling walker    Pain: 0 VAS  Education: Provided education on importance of mobility and skilled verbal / tactile cueing throughout intervention.     Assessment: Aracelis Vargas presents with functional mobility impairments which indicate the need for skilled intervention. Pt showed significant improvement from previous session and reports she feels she has returned to her baseline function. She required mod I to SBA for bed mobility, transfers and ambulation 40' with rolling walker. Her mobility was limited by fatigue which is baseline. SaO2 and HR WNL throughout on room air. Changing recs to HHPT for which pt is agreeable and plans to d/c today. Tolerating session today without incident.     Plan/Recommendations:   Pt would benefit from Home with Home Health and Home with family assist at discharge from facility and requires no DME at discharge.   Pt desires Home with Home Health and Home with family assist at discharge. Pt cooperative; agreeable to therapeutic recommendations and plan of care.       Post-Tx Position: Up in Chair, Alarms activated and Call light and personal items within reach  PPE: gloves, surgical mask, eyewear protection

## 2021-09-09 NOTE — OUTREACH NOTE
Prep Survey      Responses   [facility] facility patient discharged from?  [facility]   Is LACE score < 7 ?  No   Emergency Room discharge w/ pulse ox?  No   Eligibility  [facility]   Date of Admission  09/07/21   Date of Discharge  09/09/21   Discharge Disposition  Home-Health Care Sv   Discharge diagnosis  Acute UTI  Injury of head  Confusion   Does the patient have one of the following disease processes/diagnoses(primary or secondary)?  Other   Does the patient have Home health ordered?  Yes   What is the Home health agency?   [facility]   Is there a DME ordered?  No   Prep survey completed?  Yes          Nika Clemens RN

## 2021-09-09 NOTE — THERAPY EVALUATION
Patient Name: Aracelis Vargas  : 1935    MRN: [id number]                              Today's Date: 2021       Admit Date: 2021    Visit Dx:     ICD-10-CM ICD-9-CM   1. Acute UTI  N39.0 599.0   2. Confusion  R41.0 298.9   3. Injury of head, initial encounter  S09.90XA 959.01     Patient Active Problem List   Diagnosis   • Fall   • DM type 2 with diabetic dyslipidemia (CMS/[facility])   • Secondary hyperaldosteronism (CMS/[facility])   • Stage 3b chronic kidney disease (CMS/[facility])   • Hypertension associated with stage 3 chronic kidney disease due to type 2 diabetes mellitus (CMS/[facility])   • Syncope and collapse   • Type 2 diabetes mellitus with diabetic neuropathy, with long-term current use of insulin (CMS/[facility])   • Morbid obesity (CMS/[facility])   • Atherosclerosis of native coronary artery of native heart without angina pectoris   • Atrioventricular block, Mobitz type 1, Wenckebach   • Abnormal nuclear stress test   • Hyperlipidemia   • Hypertension   • Acute UTI   • GERD (gastroesophageal reflux disease)   • Right hip pain     Past Medical History:   Diagnosis Date   • Atherosclerosis of native coronary artery of native heart without angina pectoris 10/22/2020   • Hypertension associated with stage 3 chronic kidney disease due to type 2 diabetes mellitus (CMS/[facility]) 10/22/2020   • Morbid obesity (CMS/[facility]) 10/22/2020   • Secondary hyperaldosteronism (CMS/[facility]) 10/22/2020   • Stage 3b chronic kidney disease (CMS/[facility]) 10/22/2020   • Type 2 diabetes mellitus with diabetic neuropathy, with long-term current use of insulin (CMS/[facility]) 10/22/2020   • Type 2 diabetes mellitus with diabetic neuropathy, with long-term current use of insulin (CMS/[facility]) 10/22/2020     Past Surgical History:   Procedure Laterality Date   • CARDIAC CATHETERIZATION N/A 10/23/2020    Procedure: Left Heart Cath and coronary angiogram;  Surgeon: Wang Plaza MD;  Location: [facility] CATH INVASIVE LOCATION;  Service: Cardiovascular;  Laterality: N/A;   •  HYSTERECTOMY     • JOINT REPLACEMENT Right     Hip   • JOINT REPLACEMENT Left     hip   • JOINT REPLACEMENT Left     hip (for second time)   • JOINT REPLACEMENT Right     knee   • OOPHORECTOMY       General Information     [facility] Name 09/09/21 1700          OT Time and Intention    Document Type  evaluation  -     Mode of Treatment  occupational therapy  -[facility] Name 09/09/21 1700          General Information    Patient Profile Reviewed  yes  -MP     Prior Level of Function  independent:;ADL's  -     Existing Precautions/Restrictions  fall  -[facility] Name 09/09/21 1700          Living Environment    Lives With  spouse  -[facility] Name 09/09/21 1700          Cognition    Orientation Status (Cognition)  oriented x 3  -[facility] Name 09/09/21 1700          Safety Issues, Functional Mobility    Impairments Affecting Function (Mobility)  balance;endurance/activity tolerance;pain;strength  -       User Key  (r) = Recorded By, (t) = Taken By, (c) = Cosigned By    Initials Name Provider Type    MP Eddie Hanson OT Occupational Therapist          Mobility/ADL's     [facility] Name 09/09/21 1700          Bed Mobility    Bed Mobility  supine-sit  -     Supine-Sit [place] (Bed Mobility)  modified independence  -[facility] Name 09/09/21 1700          Transfers    Transfers  sit-stand transfer  -     Bed-Chair [place] (Transfers)  supervision  -     Assistive Device (Bed-Chair Transfers)  walker, front-wheeled  -     Sit-Stand [place] (Transfers)  supervision  -[facility] Name 09/09/21 1700          Sit-Stand Transfer    Assistive Device (Sit-Stand Transfers)  walker, front-wheeled  -[facility] Name 09/09/21 1700          Functional Mobility    Functional Mobility- Ind. Level  contact guard assist  -[facility] Name 09/09/21 1700          Activities of Daily Living    BADL Assessment/Intervention  lower body dressing  -[facility] Name 09/09/21 1700          Lower Body Dressing Assessment/Training     [place] Level (Lower Body Dressing)  don;doff;socks;moderate assist (50% patient effort)  -MP       User Key  (r) = Recorded By, (t) = Taken By, (c) = Cosigned By    Initials Name Provider Type    Eddie Woods OT Occupational Therapist        Obj/Interventions     Row Name 09/09/21 1701          Range of Motion Comprehensive    Comment, General Range of Motion  BUE WFL  -MP     Row Name 09/09/21 1701          Strength Comprehensive (MMT)    Comment, General Manual Muscle Testing (MMT) Assessment  BUE 4/5  -MP     Row Name 09/09/21 1701          Balance    Static Sitting Balance  WFL  -MP     Dynamic Sitting Balance  WFL  -MP     Static Standing Balance  WFL  -MP     Dynamic Standing Balance  WFL  -MP     Balance Interventions  sitting;standing;sit to stand;supported;static;dynamic  -MP       User Key  (r) = Recorded By, (t) = Taken By, (c) = Cosigned By    Initials Name Provider Type    Eddie Woods OT Occupational Therapist        Goals/Plan     Row Name 09/09/21 1706          Bed Mobility Goal 1 (OT)    Activity/Assistive Device (Bed Mobility Goal 1, OT)  bed mobility activities, all  -MP     [place] Level/Cues Needed (Bed Mobility Goal 1, OT)  independent  -MP     Time Frame (Bed Mobility Goal 1, OT)  long term goal (LTG);2 weeks  -MP     [facility] Name 09/09/21 1706          Transfer Goal 1 (OT)    Activity/Assistive Device (Transfer Goal 1, OT)  sit-to-stand/stand-to-sit  -MP     [place] Level/Cues Needed (Transfer Goal 1, OT)  independent  -MP     Time Frame (Transfer Goal 1, OT)  long term goal (LTG);2 weeks  -MP     [facility] Name 09/09/21 1706          Dressing Goal 1 (OT)    Activity/Device (Dressing Goal 1, OT)  dressing skills, all  -MP     [place]/Cues Needed (Dressing Goal 1, OT)  independent  -MP     Time Frame (Dressing Goal 1, OT)  long term goal (LTG);2 weeks  -MP       User Key  (r) = Recorded By, (t) = Taken By, (c) = Cosigned By    Initials Name Provider Type    ANDRADE  Eddie Hanson OT Occupational Therapist        Clinical Impression     Row Name 09/09/21 1702          Pain Scale: Numbers Pre/Post-Treatment    Pretreatment Pain Rating  0/10 - no pain  -MP     Posttreatment Pain Rating  0/10 - no pain  -MP     Row Name 09/09/21 1702          Plan of Care Review    Plan of Care Reviewed With  patient  -MP     Progress  no change  -MP     Outcome Summary  Pt is a 86 year old female who was admitted from fall s/p fall secondary to dizziness with (+) LOC.  Pt with c/o R hip pain.  Pt dx with UTI.  Head CT (-), R hip x-ray (-).  Pt. states she is ADL independent at baseline and lives at home w/ spouse. Pt. completes functional transfers this date w/ supervision for safety and increased min A for all LB ADLs. Pt. states she utilizes DME at home w/ bathing ADL. Pt. appears close to baseline level of functional independence, will require 24 hour care/support of spouse at d/c.  -MP     Row Name 09/09/21 1702          Therapy Assessment/Plan (OT)    Rehab Potential (OT)  good, to achieve stated therapy goals  -MP     Criteria for Skilled Therapeutic Interventions Met (OT)  yes;skilled treatment is necessary  -MP     Therapy Frequency (OT)  3 times/wk  -MP     Row Name 09/09/21 1702          Therapy Plan Review/Discharge Plan (OT)    Anticipated Discharge Disposition (OT)  home with assist  -MP     Row Name 09/09/21 1702          Vital Signs    Pre Patient Position  Supine  -MP     Intra Patient Position  Standing  -MP     Post Patient Position  Sitting  -MP     Row Name 09/09/21 1702          Positioning and Restraints    Pre-Treatment Position  in bed  -MP     Post Treatment Position  chair  -MP     In Chair  sitting;call light within reach;encouraged to call for assist;exit alarm on  -MP       User Key  (r) = Recorded By, (t) = Taken By, (c) = Cosigned By    Initials Name Provider Type    Eddie Woods OT Occupational Therapist        Outcome Measures     Row Name 09/09/21  0700          How much help from another person do you currently need...    Turning from your back to your side while in flat bed without using bedrails?  3  -MG     Moving from lying on back to sitting on the side of a flat bed without bedrails?  2  -MG     Moving to and from a bed to a chair (including a wheelchair)?  3  -MG     Standing up from a chair using your arms (e.g., wheelchair, bedside chair)?  3  -MG     Climbing 3-5 steps with a railing?  1  -MG     To walk in hospital room?  1  -MG     AM-PAC 6 Clicks Score (PT)  13  -MG       User Key  (r) = Recorded By, (t) = Taken By, (c) = Cosigned By    Initials Name Provider Type    Fatou Dawkins RN Registered Nurse          Occupational Therapy Education                 Title: PT OT SLP Therapies (In Progress)     Topic: Occupational Therapy (Resolved)     Point: ADL training (Resolved)     Description:   Instruct learner(s) on proper safety adaptation and remediation techniques during self care or transfers.   Instruct in proper use of assistive devices.              Learner Progress:  Not documented in this visit.          Point: Home exercise program (Resolved)     Description:   Instruct learner(s) on appropriate technique for monitoring, assisting and/or progressing therapeutic exercises/activities.              Learner Progress:  Not documented in this visit.          Point: Precautions (Resolved)     Description:   Instruct learner(s) on prescribed precautions during self-care and functional transfers.              Learner Progress:  Not documented in this visit.          Point: Body mechanics (Resolved)     Description:   Instruct learner(s) on proper positioning and spine alignment during self-care, functional mobility activities and/or exercises.              Learner Progress:  Not documented in this visit.                          OT Recommendation and Plan  Therapy Frequency (OT): 3 times/wk  Plan of Care Review  Plan of Care Reviewed  With: patient  Progress: no change  Outcome Summary: Pt is a 86 year old female who was admitted from fall s/p fall secondary to dizziness with (+) LOC.  Pt with c/o R hip pain.  Pt dx with UTI.  Head CT (-), R hip x-ray (-).  Pt. states she is ADL independent at baseline and lives at home w/ spouse. Pt. completes functional transfers this date w/ supervision for safety and increased min A for all LB ADLs. Pt. states she utilizes DME at home w/ bathing ADL. Pt. appears close to baseline level of functional independence, will require 24 hour care/support of spouse at d/c.     Time Calculation:   Time Calculation- OT     Row Name 09/09/21 1707             Time Calculation- OT    OT Start Time  1008  -MP      OT Stop Time  1022  -      OT Time Calculation (min)  14 min  -      Total Timed Code Minutes- OT  0 minute(s)  -MP      OT Received On  09/09/21  -      OT - Next Appointment  09/13/21  -      OT Goal Re-Cert Due Date  09/23/21  -        User Key  (r) = Recorded By, (t) = Taken By, (c) = Cosigned By    Initials Name Provider Type    Eddie Woods OT Occupational Therapist        Therapy Charges for Today     Code Description Service Date Service Provider Modifiers Qty    [id number]  OT EVAL LOW COMPLEXITY 3 9/9/2021 Eddie Hanson OT GO 1               Eddie Hanson OT  9/9/2021

## 2021-09-09 NOTE — CASE MANAGEMENT/SOCIAL WORK
Continued Stay Note  [facility]     Patient Name: Aracelis Vargas  MRN: [id number]  Today's Date: 9/9/2021    Admit Date: 9/7/2021    Discharge Plan     Row Name 09/09/21 1114       Plan    Plan Comments  DC orders noted.    Final Discharge Disposition Code  06 - home with home health care    Final Note  home with [facility]        Expected Discharge Date and Time     Expected Discharge Date Expected Discharge Time    Sep 9, 2021         Phone communication or documentation only - no physical contact with patient or family.      Paula Forbes RN

## 2021-09-10 ENCOUNTER — TELEPHONE (OUTPATIENT)
Dept: CARDIOLOGY | Facility: CLINIC | Age: 86
End: 2021-09-10

## 2021-09-10 ENCOUNTER — TRANSITIONAL CARE MANAGEMENT TELEPHONE ENCOUNTER (OUTPATIENT)
Dept: CALL CENTER | Facility: HOSPITAL | Age: 86
End: 2021-09-10

## 2021-09-10 ENCOUNTER — HOME CARE VISIT (OUTPATIENT)
Dept: HOME HEALTH SERVICES | Facility: HOME HEALTHCARE | Age: 86
End: 2021-09-10

## 2021-09-10 VITALS
DIASTOLIC BLOOD PRESSURE: 76 MMHG | OXYGEN SATURATION: 95 % | TEMPERATURE: 98.6 F | SYSTOLIC BLOOD PRESSURE: 120 MMHG | RESPIRATION RATE: 19 BRPM | HEART RATE: 83 BPM

## 2021-09-10 PROCEDURE — G0299 HHS/HOSPICE OF RN EA 15 MIN: HCPCS

## 2021-09-10 NOTE — TELEPHONE ENCOUNTER
Patient in ER 9/7/21, confusion, acute UTI,injury of head.She did have echo and EKG. She called to make f/u with Dr Pickard. I made apt 9/29/21. She said she needs to see Dr. Plaza within a week? Please advise.

## 2021-09-10 NOTE — TELEPHONE ENCOUNTER
Called and spoke with patient   Says she just wants to go over EKG and echo she had dont @ [facility].   Denies any symptoms or issues.   Advised she was on the wait list if anyone cancelled.   Understood

## 2021-09-10 NOTE — HOME HEALTH
Patient is a very pleasant 86 year old female who lives in her two story home with her spouse. She and her spouse are familiar to this agency. Patient was most recently admitted to [facility] after her spouse found her on the floor in the bathroom. She was found to have UTI and was treated with antibiotics, fluids and discharged to home with home health care to monitor and manage. Patient requiers SN for disease process education and to monitor for new s.sx/conerns and PT for strengthening and gait s/p fall

## 2021-09-10 NOTE — OUTREACH NOTE
Call Center TCM Note      Responses   [facility] patient discharged from?  [facility]   Does the patient have one of the following disease processes/diagnoses(primary or secondary)?  Other   TCM attempt successful?  Yes   Call start time  0945   Call end time  0947   Discharge diagnosis  Acute UTI  Injury of head  Confusion   Meds reviewed with patient/caregiver?  Yes   Is the patient having any side effects they believe may be caused by any medication additions or changes?  No   Does the patient have all medications ordered at discharge?  Yes   Is the patient taking all medications as directed (includes completed medication regime)?  Yes   Medication comments  on antibiotic   Does the patient have a primary care provider?   Yes   Does the patient have an appointment with their PCP within 7 days of discharge?  No   What is preventing the patient from scheduling follow up appointments within 7 days of discharge?  Haven't had time   Nursing Interventions  Educated patient on importance of making appointment, Advised patient to make appointment   Has the patient kept scheduled appointments due by today?  N/A   What is the Home health agency?   [facility]   Has home health visited the patient within 72 hours of discharge?  Yes   Psychosocial issues?  No   Did the patient receive a copy of their discharge instructions?  Yes   Nursing interventions  Reviewed instructions with patient   What is the patient's perception of their health status since discharge?  Improving   Is the patient/caregiver able to teach back signs and symptoms related to disease process for when to call PCP?  Yes   Is the patient/caregiver able to teach back signs and symptoms related to disease process for when to call 911?  Yes   Is the patient/caregiver able to teach back the hierarchy of who to call/visit for symptoms/problems? PCP, Specialist, Home health nurse, Urgent Care, ED, 911  Yes   If the patient is a current smoker, are they able  to teach back resources for cessation?  Not a smoker   TCM call completed?  Yes          Marlyn Mueller RN    9/10/2021, 09:47 EDT

## 2021-09-13 ENCOUNTER — HOSPITAL ENCOUNTER (EMERGENCY)
Facility: HOSPITAL | Age: 86
Discharge: HOME | End: 2021-09-13
Attending: EMERGENCY MEDICINE | Admitting: EMERGENCY MEDICINE

## 2021-09-13 ENCOUNTER — APPOINTMENT (OUTPATIENT)
Dept: CT IMAGING | Facility: HOSPITAL | Age: 86
End: 2021-09-13

## 2021-09-13 VITALS
OXYGEN SATURATION: 92 % | BODY MASS INDEX: 40.48 KG/M2 | HEART RATE: 56 BPM | SYSTOLIC BLOOD PRESSURE: 111 MMHG | TEMPERATURE: 99.2 F | WEIGHT: 220 LBS | HEIGHT: 62 IN | DIASTOLIC BLOOD PRESSURE: 55 MMHG | RESPIRATION RATE: 18 BRPM

## 2021-09-13 DIAGNOSIS — R41.0 CONFUSION: ICD-10-CM

## 2021-09-13 DIAGNOSIS — R19.7 DIARRHEA, UNSPECIFIED TYPE: ICD-10-CM

## 2021-09-13 DIAGNOSIS — M79.604 RIGHT LEG PAIN: ICD-10-CM

## 2021-09-13 DIAGNOSIS — R53.1 WEAKNESS: Primary | ICD-10-CM

## 2021-09-13 LAB
ALBUMIN SERPL-MCNC: 3.9 G/DL (ref 3.5–5.2)
ALBUMIN/GLOB SERPL: 1.2 G/DL
ALP SERPL-CCNC: 80 U/L (ref 39–117)
ALT SERPL W P-5'-P-CCNC: 26 U/L (ref 1–33)
ANION GAP SERPL CALCULATED.3IONS-SCNC: 13 MMOL/L (ref 5–15)
AST SERPL-CCNC: 47 U/L (ref 1–32)
BASOPHILS # BLD AUTO: 0.1 10*3/MM3 (ref 0–0.2)
BASOPHILS NFR BLD AUTO: 1.2 % (ref 0–1.5)
BILIRUB SERPL-MCNC: 0.3 MG/DL (ref 0–1.2)
BILIRUB UR QL STRIP: NEGATIVE
BUN SERPL-MCNC: 15 MG/DL (ref 8–23)
BUN/CREAT SERPL: 14 (ref 7–25)
CALCIUM SPEC-SCNC: 8 MG/DL (ref 8.6–10.5)
CHLORIDE SERPL-SCNC: 102 MMOL/L (ref 98–107)
CLARITY UR: CLEAR
CO2 SERPL-SCNC: 21 MMOL/L (ref 22–29)
COLOR UR: YELLOW
CREAT SERPL-MCNC: 1.07 MG/DL (ref 0.57–1)
DEPRECATED RDW RBC AUTO: 56.4 FL (ref 37–54)
EOSINOPHIL # BLD AUTO: 0.3 10*3/MM3 (ref 0–0.4)
EOSINOPHIL NFR BLD AUTO: 5.6 % (ref 0.3–6.2)
ERYTHROCYTE [DISTWIDTH] IN BLOOD BY AUTOMATED COUNT: 18.4 % (ref 12.3–15.4)
GFR SERPL CREATININE-BSD FRML MDRD: 49 ML/MIN/1.73
GLOBULIN UR ELPH-MCNC: 3.3 GM/DL
GLUCOSE SERPL-MCNC: 149 MG/DL (ref 65–99)
GLUCOSE UR STRIP-MCNC: NEGATIVE MG/DL
HCT VFR BLD AUTO: 28.6 % (ref 34–46.6)
HGB BLD-MCNC: 9.2 G/DL (ref 12–15.9)
HGB UR QL STRIP.AUTO: NEGATIVE
HOLD SPECIMEN: NORMAL
KETONES UR QL STRIP: NEGATIVE
LEUKOCYTE ESTERASE UR QL STRIP.AUTO: NEGATIVE
LYMPHOCYTES # BLD AUTO: 2 10*3/MM3 (ref 0.7–3.1)
LYMPHOCYTES NFR BLD AUTO: 32.4 % (ref 19.6–45.3)
MCH RBC QN AUTO: 28.2 PG (ref 26.6–33)
MCHC RBC AUTO-ENTMCNC: 32.1 G/DL (ref 31.5–35.7)
MCV RBC AUTO: 87.8 FL (ref 79–97)
MONOCYTES # BLD AUTO: 0.5 10*3/MM3 (ref 0.1–0.9)
MONOCYTES NFR BLD AUTO: 8.2 % (ref 5–12)
NEUTROPHILS NFR BLD AUTO: 3.2 10*3/MM3 (ref 1.7–7)
NEUTROPHILS NFR BLD AUTO: 52.6 % (ref 42.7–76)
NITRITE UR QL STRIP: NEGATIVE
NRBC BLD AUTO-RTO: 0 /100 WBC (ref 0–0.2)
PH UR STRIP.AUTO: 5.5 [PH] (ref 5–8)
PLATELET # BLD AUTO: 244 10*3/MM3 (ref 140–450)
PMV BLD AUTO: 6.5 FL (ref 6–12)
POTASSIUM SERPL-SCNC: 3.8 MMOL/L (ref 3.5–5.2)
PROT SERPL-MCNC: 7.2 G/DL (ref 6–8.5)
PROT UR QL STRIP: NEGATIVE
RBC # BLD AUTO: 3.25 10*6/MM3 (ref 3.77–5.28)
SODIUM SERPL-SCNC: 136 MMOL/L (ref 136–145)
SP GR UR STRIP: 1.01 (ref 1–1.03)
UROBILINOGEN UR QL STRIP: NORMAL
WBC # BLD AUTO: 6.1 10*3/MM3 (ref 3.4–10.8)
WHOLE BLOOD HOLD SPECIMEN: NORMAL

## 2021-09-13 PROCEDURE — 85025 COMPLETE CBC W/AUTO DIFF WBC: CPT | Performed by: EMERGENCY MEDICINE

## 2021-09-13 PROCEDURE — 70450 CT HEAD/BRAIN W/O DYE: CPT

## 2021-09-13 PROCEDURE — 99284 EMERGENCY DEPT VISIT MOD MDM: CPT

## 2021-09-13 PROCEDURE — 80053 COMPREHEN METABOLIC PANEL: CPT | Performed by: EMERGENCY MEDICINE

## 2021-09-13 PROCEDURE — P9612 CATHETERIZE FOR URINE SPEC: HCPCS

## 2021-09-13 PROCEDURE — 81003 URINALYSIS AUTO W/O SCOPE: CPT | Performed by: EMERGENCY MEDICINE

## 2021-09-13 PROCEDURE — 93005 ELECTROCARDIOGRAM TRACING: CPT | Performed by: EMERGENCY MEDICINE

## 2021-09-13 RX ORDER — SODIUM CHLORIDE 0.9 % (FLUSH) 0.9 %
10 SYRINGE (ML) INJECTION AS NEEDED
Status: DISCONTINUED | OUTPATIENT
Start: 2021-09-13 | End: 2021-09-14 | Stop reason: HOSPADM

## 2021-09-13 RX ORDER — ACETAMINOPHEN AND CODEINE PHOSPHATE 300; 30 MG/1; MG/1
1 TABLET ORAL EVERY 6 HOURS PRN
Qty: 12 TABLET | Refills: 0 | Status: ON HOLD | OUTPATIENT
Start: 2021-09-13 | End: 2021-11-02

## 2021-09-14 ENCOUNTER — HOME CARE VISIT (OUTPATIENT)
Dept: HOME HEALTH SERVICES | Facility: HOME HEALTHCARE | Age: 86
End: 2021-09-14

## 2021-09-14 VITALS
TEMPERATURE: 97.8 F | OXYGEN SATURATION: 95 % | SYSTOLIC BLOOD PRESSURE: 140 MMHG | DIASTOLIC BLOOD PRESSURE: 60 MMHG | HEART RATE: 83 BPM

## 2021-09-14 VITALS
HEART RATE: 84 BPM | DIASTOLIC BLOOD PRESSURE: 77 MMHG | TEMPERATURE: 98.5 F | OXYGEN SATURATION: 95 % | RESPIRATION RATE: 18 BRPM | SYSTOLIC BLOOD PRESSURE: 168 MMHG

## 2021-09-14 LAB — QT INTERVAL: 454 MS

## 2021-09-14 PROCEDURE — G0151 HHCP-SERV OF PT,EA 15 MIN: HCPCS

## 2021-09-14 PROCEDURE — G0300 HHS/HOSPICE OF LPN EA 15 MIN: HCPCS

## 2021-09-14 NOTE — HOME HEALTH
Pt is an 85 yo female referred for PT sp hospitalization for UTI.    PLOF Community ambulator without AD. I with ADL.    Environment Lives with spouse in a single story house with one small step to get in and out. House is clutter free. Pt able to navigate residence with use of wheeled walker for support.     Pt's main complaint is weakness and fatigue. Pt requires SBA for bed mobility and transfers with use of wheeled walker for support. Pt can only tolerate ambulation with SBA with use of wheeled walker x 20 ft with discontinuous gait and flexed trunk. Pt wants to be able to return to walking without AD.

## 2021-09-14 NOTE — HOME HEALTH
Patient denies pain, has had no falls, and there have been no medication or insurance changes.  Patient reports she had a fall. 9/13/21. S/S of Orthostatic Hypotension. Taken to ER via ambulance with right knee pain. Xray taken, no fx indicated.  SN assessed right knee, no bruising, redness or swelling noted. Patient also states she doesn't have much of an appetite.  Pain @ 4/10 when ambulating only.     NEXT VISIT:  CP assess  Safety, Pain, Falls assess  Asssess right knee

## 2021-09-14 NOTE — ED PROVIDER NOTES
Subjective   Patient is an 86-year-old female who states she has had some weakness and mild confusion over the past several weeks.  She also complains of chronic pain to her right leg which seems to be worse recently.  She states she is not taking any pain medications at this time.  She denies cough fever of arm diarrhea or other complaint.  She states he is concerned she might have urinary tract infection as this is caused some mild weakness and confusion in the past.          Review of Systems  Negative for headache earache sore throat cough fever chest pain shortness of breath abdominal pain [place] dysuria case weight loss or other complaint.  A complete assessment was obtained and is otherwise negative  Past Medical History:   Diagnosis Date   • Atherosclerosis of native coronary artery of native heart without angina pectoris 10/22/2020   • Hypertension associated with stage 3 chronic kidney disease due to type 2 diabetes mellitus (CMS/[facility]) 10/22/2020   • Morbid obesity (CMS/[facility]) 10/22/2020   • Secondary hyperaldosteronism (CMS/[facility]) 10/22/2020   • Stage 3b chronic kidney disease (CMS/[facility]) 10/22/2020   • Type 2 diabetes mellitus with diabetic neuropathy, with long-term current use of insulin (CMS/[facility]) 10/22/2020   • Type 2 diabetes mellitus with diabetic neuropathy, with long-term current use of insulin (CMS/[facility]) 10/22/2020       Allergies   Allergen Reactions   • Latex, Natural Rubber Rash   • Adhesive Tape Rash       Past Surgical History:   Procedure Laterality Date   • CARDIAC CATHETERIZATION N/A 10/23/2020    Procedure: Left Heart Cath and coronary angiogram;  Surgeon: Wang Plaza MD;  Location: [facility] CATH INVASIVE LOCATION;  Service: Cardiovascular;  Laterality: N/A;   • HYSTERECTOMY     • JOINT REPLACEMENT Right     Hip   • JOINT REPLACEMENT Left     hip   • JOINT REPLACEMENT Left     hip (for second time)   • JOINT REPLACEMENT Right     knee   • OOPHORECTOMY         Family History   Problem  Relation Age of Onset   • Heart disease Mother        Social History     Socioeconomic History   • Marital status:      Spouse name: Not on file   • Number of children: Not on file   • Years of education: Not on file   • Highest education level: Not on file   Tobacco Use   • Smoking status: Never Smoker   • Smokeless tobacco: Never Used   Vaping Use   • Vaping Use: Never used   Substance and Sexual Activity   • Alcohol use: Not Currently   • Drug use: Never   • Sexual activity: Defer           Objective   Physical Exam  HEENT exam shows TMs to be clear.  Oropharynx comers but sclera nonicteric.  Neck has no adenopathy JVD or bruits.  Lungs are clear.  Heart has regular rate rhythm without murmur rub or gallop.  Chest is nontender.  Abdomen is soft nontender.  Patient has normal bowel sounds without rebound or guarding.  Back has no CVA tenderness.  Extremity exam is no cyanosis or edema.  Procedures       My EKG interpretation shows normal sinus rhythm with no acute ST change    ED Course      Results for orders placed or performed during the hospital encounter of 09/13/21   Comprehensive Metabolic Panel    Specimen: Blood   Result Value Ref Range    Glucose 149 (H) 65 - 99 mg/dL    BUN 15 8 - 23 mg/dL    Creatinine 1.07 (H) 0.57 - 1.00 mg/dL    Sodium 136 136 - 145 mmol/L    Potassium 3.8 3.5 - 5.2 mmol/L    Chloride 102 98 - 107 mmol/L    CO2 21.0 (L) 22.0 - 29.0 mmol/L    Calcium 8.0 (L) 8.6 - 10.5 mg/dL    Total Protein 7.2 6.0 - 8.5 g/dL    Albumin 3.90 3.50 - 5.20 g/dL    ALT (SGPT) 26 1 - 33 U/L    AST (SGOT) 47 (H) 1 - 32 U/L    Alkaline Phosphatase 80 39 - 117 U/L    Total Bilirubin 0.3 0.0 - 1.2 mg/dL    eGFR Non African Amer 49 (L) >60 mL/min/1.73    Globulin 3.3 gm/dL    A/G Ratio 1.2 g/dL    BUN/Creatinine Ratio 14.0 7.0 - 25.0    Anion Gap 13.0 5.0 - 15.0 mmol/L   Urinalysis With Culture If Indicated - Urine, Catheter In/Out    Specimen: Urine, Catheter In/Out   Result Value Ref Range    Color,  UA Yellow Yellow, Straw    Appearance, UA Clear Clear    pH, UA 5.5 5.0 - 8.0    Specific Gravity, UA 1.011 1.005 - 1.030    Glucose, UA Negative Negative    Ketones, UA Negative Negative    Bilirubin, UA Negative Negative    Blood, UA Negative Negative    Protein, UA Negative Negative    Leuk Esterase, UA Negative Negative    Nitrite, UA Negative Negative    Urobilinogen, UA 0.2 E.U./dL 0.2 - 1.0 E.U./dL   CBC Auto Differential    Specimen: Blood   Result Value Ref Range    WBC 6.10 3.40 - 10.80 10*3/mm3    RBC 3.25 (L) 3.77 - 5.28 10*6/mm3    Hemoglobin 9.2 (L) 12.0 - 15.9 g/dL    Hematocrit 28.6 (L) 34.0 - 46.6 %    MCV 87.8 79.0 - 97.0 fL    MCH 28.2 26.6 - 33.0 pg    MCHC 32.1 31.5 - 35.7 g/dL    RDW 18.4 (H) 12.3 - 15.4 %    RDW-SD 56.4 (H) 37.0 - 54.0 fl    MPV 6.5 6.0 - 12.0 fL    Platelets 244 140 - 450 10*3/mm3    Neutrophil % 52.6 42.7 - 76.0 %    Lymphocyte % 32.4 19.6 - 45.3 %    Monocyte % 8.2 5.0 - 12.0 %    Eosinophil % 5.6 0.3 - 6.2 %    Basophil % 1.2 0.0 - 1.5 %    Neutrophils, Absolute 3.20 1.70 - 7.00 10*3/mm3    Lymphocytes, Absolute 2.00 0.70 - 3.10 10*3/mm3    Monocytes, Absolute 0.50 0.10 - 0.90 10*3/mm3    Eosinophils, Absolute 0.30 0.00 - 0.40 10*3/mm3    Basophils, Absolute 0.10 0.00 - 0.20 10*3/mm3    nRBC 0.0 0.0 - 0.2 /100 WBC   ECG 12 Lead   Result Value Ref Range    QT Interval 454 ms   Gold Top - SST   Result Value Ref Range    Extra Tube hold    Light Blue Top   Result Value Ref Range    Extra Tube hold for add-on      CT Head Without Contrast    Result Date: 9/13/2021  1.No CT findings of acute intracranial abnormality. 2.Volume loss and suspected chronic small vessel ischemic changes, as before.  Electronically Signed By-Osavldo Markham MD On:9/13/2021 9:08 PM This report was finalized on [id number] by  Osvaldo Markham MD.                                         MDM  Number of Diagnoses or Management Options  Diagnosis management comments: Patient has benign physical exam.  She  has no evidence of infectious process or metabolic abnormality.  On reexamination she is at baseline she is alert and oriented x3.  She states did have one episode of diarrhea earlier today.  She will check his Imodium A-D as needed.  She will see MD for recheck as needed.       Amount and/or Complexity of Data Reviewed  Clinical lab tests: reviewed  Tests in the radiology section of CPT®: reviewed  Tests in the medicine section of CPT®: reviewed    Risk of Complications, Morbidity, and/or Mortality  Presenting problems: high  Diagnostic procedures: high  Management options: high    Patient Progress  Patient progress: stable      Final diagnoses:   Weakness   Confusion   Diarrhea, unspecified type       ED Disposition  ED Disposition     ED Disposition Condition Comment    Discharge Stable           No follow-up provider specified.       Medication List      No changes were made to your prescriptions during this visit.          Germain Ureña MD  09/13/21 2823

## 2021-09-15 PROCEDURE — G0180 MD CERTIFICATION HHA PATIENT: HCPCS | Performed by: FAMILY MEDICINE

## 2021-09-16 ENCOUNTER — OFFICE VISIT (OUTPATIENT)
Dept: FAMILY MEDICINE CLINIC | Facility: CLINIC | Age: 86
End: 2021-09-16

## 2021-09-16 VITALS
DIASTOLIC BLOOD PRESSURE: 67 MMHG | WEIGHT: 227 LBS | TEMPERATURE: 98.4 F | BODY MASS INDEX: 41.52 KG/M2 | HEART RATE: 92 BPM | OXYGEN SATURATION: 96 % | SYSTOLIC BLOOD PRESSURE: 130 MMHG

## 2021-09-16 DIAGNOSIS — N39.0 URINARY TRACT INFECTION WITHOUT HEMATURIA, SITE UNSPECIFIED: ICD-10-CM

## 2021-09-16 DIAGNOSIS — G89.29 CHRONIC PAIN OF RIGHT KNEE: Primary | ICD-10-CM

## 2021-09-16 DIAGNOSIS — M25.561 CHRONIC PAIN OF RIGHT KNEE: Primary | ICD-10-CM
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
## 2021-09-16 PROCEDURE — 99495 TRANSJ CARE MGMT MOD F2F 14D: CPT | Performed by: NURSE PRACTITIONER

## 2021-09-16 PROCEDURE — 1111F DSCHRG MED/CURRENT MED MERGE: CPT | Performed by: NURSE PRACTITIONER

## 2021-09-16 NOTE — PROGRESS NOTES
Transitional Care Follow Up Visit  Subjective     Aracelis Vargas is a 86 y.o. female who presents for a transitional care management visit.    Within 48 business hours after discharge our office contacted her via telephone to coordinate her care and needs.      I reviewed and discussed the details of that call along with the discharge summary, hospital problems, inpatient lab results, inpatient diagnostic studies, and consultation reports with Aracelis.     Current outpatient and discharge medications have been reconciled for the patient.  Reviewed by: SHANI Lombardo      Date of TCM Phone Call 9/9/2021   [facility]   Date of Admission 9/7/2021   Date of Discharge 9/9/2021   [place] [facility]     Risk for Readmission (LACE) Score: 10 (9/9/2021  6:01 AM)      Patient is here today for [facility] admission follow-up.  She was admitted from September 7 through September 9.  She presented to the ER with hip pain status post fall.  Patient was going to the bathroom in the middle of the night.  When her  returned to the restroom patient was on the ground.  They are unsure whether she fell or passed out.  Patient had x-rays of the right hip which were normal.  Patient had a UA completed in the ER which showed urinary tract infection.  Patient does have a significant heart history is seen by Dr. Plaza.  She has a loop recorder implanted.  Echo and EKG were stable in the hospital.  Patient returned to the ED on September 13 with complaints of weakness and mild confusion.  CT scan was negative for acute findings.  Pt states that she is feeling better now but continues to have weakness.   She states that she was in Dr. Peguero office with her  and they checked her loop recorder and it was normal.   She is starting PT weekly.  She continues to have right leg and knee pain.   She has chronic knee pain.  She has had total knee replacement.  She hasnt had a recent  "xray.  Rates the pain 10/10.  She is taking tylenol for the pain    Per chart review hospital note as followed    \"Fall  questionable syncope, likely vasovagal from volume depletion and UTI though she does have history of first and even second-degree AV block history.  She does have a loop recorder implanted.  On her EKG on this admission there is no evidence of any AV blocks.  There are some PVCs.  SD is just slightly elevated.  This has been discussed with Dr. Plaza who will see the patient in a clinic for follow-up.  EKG troponin without acute injury pattern or arrhythmia as mentioned above  2D echo showed preserved EF no gross valve abnormalities  PT OT patient wants to go home     dehydration  Has been volume resuscitated with IV fluids     acute kidney injury  Resolved, creatinine normalized with IV hydration     right hip pain  status post fall  no fracture noted on x-rays     UTI  She was treated ceftriaxone week discharged on a course of Omnicef.  Functional cultures have a reflex however her UA did suggest UTI patient did have some dysuria.     hypertension  continue Norvasc losartan  Blood pressure controlled     Diabetes  continue insulin NPH and regular  continue sliding scale and Accu-Cheks     migraines  continue Topamax     DVT PUD prophylaxis     Plan discharge home stable condition follow-up with PCP and Dr. Plaza in a week\"     Course During Hospital Stay:  See HPI     The following portions of the patient's history were reviewed and updated as appropriate: allergies, current medications, past family history, past medical history, past social history, past surgical history and problem list.    Review of Systems   Constitutional: Positive for fatigue. Negative for chills and fever.   Respiratory: Negative for chest tightness and shortness of breath.    Cardiovascular: Negative for chest pain and palpitations.   Gastrointestinal: Negative for abdominal pain, nausea and vomiting.   Genitourinary: " Negative for frequency and urgency.   Musculoskeletal: Positive for arthralgias (right knee and leg pain).   Neurological: Negative for dizziness and headaches.   Psychiatric/Behavioral: Negative for confusion.       Objective   Physical Exam  Vitals reviewed.   Constitutional:       General: She is not in acute distress.     Appearance: Normal appearance. She is well-developed. She is not diaphoretic.   HENT:      Head: Normocephalic and atraumatic.   Eyes:      General:         Right eye: No discharge.         Left eye: No discharge.      Conjunctiva/sclera: Conjunctivae normal.   Cardiovascular:      Rate and Rhythm: Normal rate and regular rhythm.      Heart sounds: No murmur heard.     Pulmonary:      Effort: Pulmonary effort is normal. No respiratory distress.      Breath sounds: Normal breath sounds. No wheezing or rales.   Abdominal:      General: Bowel sounds are normal. There is no distension.      Palpations: Abdomen is soft.      Tenderness: There is no abdominal tenderness.   Musculoskeletal:         General: Tenderness (right lower knee) present. Normal range of motion.      Cervical back: Normal range of motion and neck supple.   Skin:     General: Skin is warm and dry.   Neurological:      General: No focal deficit present.      Mental Status: She is alert and oriented to person, place, and time.   Psychiatric:         Mood and Affect: Mood normal.         Behavior: Behavior normal.         Thought Content: Thought content normal.         Judgment: Judgment normal.         Assessment/Plan   Problems Addressed this Visit        Musculoskeletal and Injuries    Fall      Other Visit Diagnoses     Chronic pain of right knee    -  Primary    seems to be worsening  wants to cont PT before seeing ortho  check Xray    Relevant Orders    XR Knee 1 or 2 View Right    Urinary tract infection without hematuria, site unspecified        resolved  doing well  push fluids      Diagnoses       Codes Comments     Chronic pain of right knee    -  Primary ICD-10-CM: M25.561, G89.29  ICD-9-CM: 719.46, 338.29 seems to be worsening  wants to cont PT before seeing ortho  check Xray    Urinary tract infection without hematuria, site unspecified     ICD-10-CM: N39.0  ICD-9-CM: 599.0 resolved  doing well  push fluids    Fall, initial encounter     ICD-10-CM: W19.XXXA  ICD-9-CM: E888.9 doing well  reviewed hospital notes, labs, CT  no dizziness  neuro exam normal  return if symptoms persist

## 2021-09-16 NOTE — PATIENT INSTRUCTIONS
Follow up with Dr. Plaza  Get xray of right knee  Continue PT  Tylenol as needed  Push water intake  Call for issues or concerns

## 2021-09-17 ENCOUNTER — HOSPITAL ENCOUNTER (OUTPATIENT)
Dept: GENERAL RADIOLOGY | Facility: HOSPITAL | Age: 86
Discharge: HOME | End: 2021-09-17
Admitting: NURSE PRACTITIONER

## 2021-09-17 DIAGNOSIS — M25.561 CHRONIC PAIN OF RIGHT KNEE: ICD-10-CM

## 2021-09-17 DIAGNOSIS — G89.29 CHRONIC PAIN OF RIGHT KNEE: ICD-10-CM

## 2021-09-17 PROCEDURE — 73560 X-RAY EXAM OF KNEE 1 OR 2: CPT

## 2021-09-19 LAB — QT INTERVAL: 372 MS

## 2021-09-20 DIAGNOSIS — M25.561 CHRONIC PAIN OF RIGHT KNEE: Primary | ICD-10-CM

## 2021-09-20 DIAGNOSIS — G89.29 CHRONIC PAIN OF RIGHT KNEE: Primary | ICD-10-CM

## 2021-09-21 ENCOUNTER — HOME CARE VISIT (OUTPATIENT)
Dept: HOME HEALTH SERVICES | Facility: HOME HEALTHCARE | Age: 86
End: 2021-09-21

## 2021-09-21 ENCOUNTER — READMISSION MANAGEMENT (OUTPATIENT)
Dept: CALL CENTER | Facility: HOSPITAL | Age: 86
End: 2021-09-21

## 2021-09-21 VITALS
OXYGEN SATURATION: 96 % | TEMPERATURE: 98.2 F | HEART RATE: 83 BPM | RESPIRATION RATE: 17 BRPM | DIASTOLIC BLOOD PRESSURE: 64 MMHG | SYSTOLIC BLOOD PRESSURE: 112 MMHG

## 2021-09-21 PROCEDURE — G0299 HHS/HOSPICE OF RN EA 15 MIN: HCPCS

## 2021-09-21 NOTE — OUTREACH NOTE
Medical Week 2 Survey      Responses   [facility] patient discharged from?  [facility]   Does the patient have one of the following disease processes/diagnoses(primary or secondary)?  Other   Week 2 attempt successful?  Yes   Call start time  1612   Discharge diagnosis  Acute UTI  Injury of head  Confusion   Call end time  1614   Meds reviewed with patient/caregiver?  Yes   Is the patient having any side effects they believe may be caused by any medication additions or changes?  No   Does the patient have all medications ordered at discharge?  Yes   Is the patient taking all medications as directed (includes completed medication regime)?  Yes   Does the patient have a primary care provider?   Yes   Does the patient have an appointment with their PCP within 7 days of discharge?  Yes   Has the patient kept scheduled appointments due by today?  Yes   What is the Home health agency?   [facility] [place]    Has home health visited the patient within 72 hours of discharge?  Yes   Psychosocial issues?  No   Comments  No break in kneecap she says, bad bruise they say.   Did the patient receive a copy of their discharge instructions?  Yes   Nursing interventions  Reviewed instructions with patient   What is the patient's perception of their health status since discharge?  Improving   Is the patient/caregiver able to teach back signs and symptoms related to disease process for when to call PCP?  Yes   Is the patient/caregiver able to teach back signs and symptoms related to disease process for when to call 911?  Yes   Is the patient/caregiver able to teach back the hierarchy of who to call/visit for symptoms/problems? PCP, Specialist, Home health nurse, Urgent Care, ED, 911  Yes   If the patient is a current smoker, are they able to teach back resources for cessation?  Not a smoker   Additional teach back comments  She is doing well she says.  No falls, headaches, on the walker and anxious to be done with it.   Week 2 Call  Completed?  Yes   Wrap up additional comments  [facility] therapy begins Thursday.          Pricila Farias RN

## 2021-09-23 ENCOUNTER — HOME CARE VISIT (OUTPATIENT)
Dept: HOME HEALTH SERVICES | Facility: HOME HEALTHCARE | Age: 86
End: 2021-09-23

## 2021-09-23 VITALS
SYSTOLIC BLOOD PRESSURE: 136 MMHG | TEMPERATURE: 98.3 F | OXYGEN SATURATION: 96 % | HEART RATE: 74 BPM | DIASTOLIC BLOOD PRESSURE: 70 MMHG | RESPIRATION RATE: 17 BRPM

## 2021-09-23 PROCEDURE — G0157 HHC PT ASSISTANT EA 15: HCPCS

## 2021-09-24 NOTE — HOME HEALTH
pt up and prepared for PT session.   pt states she is concerned regarding mobility deficits and weakness, admitting she is having increased difficulty performing adl's and mobility related activities.      pt was compliant throughout PT session but very limited in most areas with extensive rest periods needed.  pt was challenged today to transfer to standing with repeated attempts needed and cues for proper trunk and knee flexion.    pt was minimally unsteady upon standing and needed cues to properly est dona prior to standing.

## 2021-09-28 ENCOUNTER — HOME CARE VISIT (OUTPATIENT)
Dept: HOME HEALTH SERVICES | Facility: HOME HEALTHCARE | Age: 86
End: 2021-09-28

## 2021-09-28 VITALS
DIASTOLIC BLOOD PRESSURE: 68 MMHG | TEMPERATURE: 98.3 F | OXYGEN SATURATION: 86 % | SYSTOLIC BLOOD PRESSURE: 130 MMHG | HEART RATE: 78 BPM | RESPIRATION RATE: 18 BRPM

## 2021-09-28 PROCEDURE — G0299 HHS/HOSPICE OF RN EA 15 MIN: HCPCS

## 2021-09-28 NOTE — HOME HEALTH
Patient denies new s.sx. Denies falls, uncontrolled pain, and new medications or changes to insurance. Reviewed fall prevention education. Understanding verbalized. Reviewed when to contact HHC vs when to seek immediate medical attention. Understanding verbalized    Plan for next visit -   CP assess  Assess pain/falls  Assess new s.sx/meds

## 2021-09-29 ENCOUNTER — OFFICE VISIT (OUTPATIENT)
Dept: CARDIOLOGY | Facility: CLINIC | Age: 86
End: 2021-09-29

## 2021-09-29 VITALS
DIASTOLIC BLOOD PRESSURE: 75 MMHG | HEIGHT: 62 IN | WEIGHT: 238 LBS | BODY MASS INDEX: 43.79 KG/M2 | HEART RATE: 81 BPM | OXYGEN SATURATION: 97 % | SYSTOLIC BLOOD PRESSURE: 149 MMHG

## 2021-09-29 DIAGNOSIS — I44.1 ATRIOVENTRICULAR BLOCK, MOBITZ TYPE 1, WENCKEBACH: ICD-10-CM

## 2021-09-29 DIAGNOSIS — R55 SYNCOPE AND COLLAPSE: Primary | ICD-10-CM

## 2021-09-29 DIAGNOSIS — R00.2 PALPITATIONS: ICD-10-CM

## 2021-09-29 DIAGNOSIS — I10 ESSENTIAL HYPERTENSION: ICD-10-CM

## 2021-09-29 DIAGNOSIS — E66.01 MORBID OBESITY (HCC): ICD-10-CM

## 2021-09-29 PROCEDURE — 99214 OFFICE O/P EST MOD 30 MIN: CPT | Performed by: INTERNAL MEDICINE

## 2021-09-29 PROCEDURE — G2066 INTER DEVC REMOTE 30D: HCPCS | Performed by: INTERNAL MEDICINE

## 2021-09-29 PROCEDURE — 93298 REM INTERROG DEV EVAL SCRMS: CPT | Performed by: INTERNAL MEDICINE

## 2021-09-29 RX ORDER — SPIRONOLACTONE 25 MG/1
1 TABLET ORAL EVERY MORNING
COMMUNITY
Start: 2021-09-22 | End: 2022-07-03 | Stop reason: HOSPADM

## 2021-09-29 NOTE — PROGRESS NOTES
Encounter Date:09/29/2021  Last seen 6/21/2021      Patient ID: Aracelis Vargas is a 86 y.o. female.    Chief Complaint:  History of syncope  Second-degree Mobitz type I AV block  Hypertension  History of paroxysmal atrial fibrillation        History of Present Illness  Since I have last seen, the patient has been without any chest discomfort ,shortness of breath, palpitations, dizziness or syncope.  Denies having any headache ,abdominal pain ,nausea, vomiting , diarrhea constipation, loss of weight or loss of appetite.  Denies having any excessive bruising ,hematuria or blood in the stool.    Review of all systems negative except as indicated.    Reviewed ROS.    Assessment and Plan         ]]]]]]]]]]]]]]]]]]]]  Impression  ========  -History of possible syncope.  Appears to be true syncope.  Improved with discontinuation of metoprolol and no further episodes.  Concerned about advanced AV blocks     -Second-degree Mobitz type I AV block.  Narrow QRS complex.     Echocardiogram-normal 10/22/2020     Cardiac catheterization 10/23/2020 revealed:  Left ventricular size and contractility is normal with ejection fraction of 60%.  Normal epicardial coronary arteries.     Status post loop recorder placement 10/23/2020 ([employer])     -History of paroxysmal atrial fibrillation     -Hypertension dyslipidemia diabetes     -Renal dysfunction CKD 3  BUN 27 creatinine 1.35     -Exogenous obesity.     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     -No known allergies  ============  Plan  =========  History of syncope-improved.  No further episodes.  highly suspicious for advanced AV blocks.     Mobitz type I second-degree AV block-improved off metoprolol.     Patient is not having any angina pectoris or congestive heart failure.     Status post loop recorder placement.  No significant dysrhythmia noted.     Hypertension-149/75    Renal dysfunction  BUN 20 creatinine  1.41-4/7/2021.  Recent TSH was normal.     Lower extremity edema-improved  Patient was asked to keep her legs propped up.  Patient is on Lasix and spironolactone.  Patient may need amlodipine discontinued.  Favor not to increase diuretics due to renal dysfunction.     Permanent pacemaker implantation if patient has further symptoms with documented bradycardia arrhythmias.  Hopefully staying off metoprolol would maintain sinus rhythm without AV blocks.     Follow-up in the office in 6 months.     Further plan will depend on patient's progress.  ]]]]]]]]]]]]]]                      Diagnosis Plan   1. Syncope and collapse     2. Atrioventricular block, Mobitz type 1, Wenckebach     3. Essential hypertension     4. Morbid obesity (HCC)     5. Palpitations     LAB RESULTS (LAST 7 DAYS)    CBC        BMP        CMP         BNP        TROPONIN        CoAg        Creatinine Clearance  Estimated Creatinine Clearance: 45.3 mL/min (A) (by C-G formula based on SCr of 1.07 mg/dL (H)).    ABG        Radiology  No radiology results for the last day                The following portions of the patient's history were reviewed and updated as appropriate: allergies, current medications, past family history, past medical history, past social history, past surgical history and problem list.    Review of Systems   Constitutional: Positive for malaise/fatigue. Negative for chills and fever.   Cardiovascular: Positive for leg swelling. Negative for chest pain, dyspnea on exertion, palpitations and syncope.   Respiratory: Positive for shortness of breath.    Skin: Negative for rash.   Neurological: Negative for dizziness, light-headedness and numbness.         Current Outpatient Medications:   •  acetaminophen-codeine (TYLENOL #3) 300-30 MG per tablet, Take 1 tablet by mouth Every 6 (Six) Hours As Needed for Moderate Pain ., Disp: 12 tablet, Rfl: 0  •  amLODIPine (NORVASC) 5 MG tablet, Take 5 mg by mouth Every Morning., Disp: , Rfl:   •   aspirin 81 MG EC tablet, Take 81 mg by mouth Daily., Disp: , Rfl:   •  atorvastatin (LIPITOR) 10 MG tablet, Take 10 mg by mouth Daily., Disp: , Rfl:   •  furosemide (LASIX) 20 MG tablet, Take 1 tablet by mouth Daily., Disp: 90 tablet, Rfl: 1  •  gabapentin (NEURONTIN) 300 MG capsule, Take 300 mg by mouth 2 (two) times a day., Disp: , Rfl:   •  insulin NPH-insulin regular (humuLIN 70/30,novoLIN 70/30) (70-30) 100 UNIT/ML injection, Inject 30 Units under the skin into the appropriate area as directed 2 (Two) Times a Day With Meals., Disp: , Rfl:   •  losartan (Cozaar) 50 MG tablet, Take 1 tablet by mouth Daily., Disp: 90 tablet, Rfl: 3  •  metFORMIN (GLUCOPHAGE) 1000 MG tablet, Take 1,000 mg by mouth 2 (Two) Times a Day With Meals., Disp: , Rfl:   •  omeprazole (priLOSEC) 40 MG capsule, Take 40 mg by mouth Every Morning., Disp: , Rfl:   •  spironolactone (ALDACTONE) 25 MG tablet, Take 1 tablet by mouth Daily., Disp: , Rfl:   •  topiramate (TOPAMAX) 25 MG tablet, Take 12.5 mg by mouth 2 (Two) Times a Day., Disp: , Rfl:     Allergies   Allergen Reactions   • Latex, Natural Rubber Rash   • Adhesive Tape Rash       Family History   Problem Relation Age of Onset   • Heart disease Mother        Past Surgical History:   Procedure Laterality Date   • CARDIAC CATHETERIZATION N/A 10/23/2020    Procedure: Left Heart Cath and coronary angiogram;  Surgeon: Wang Plaza MD;  Location: [facility] CATH INVASIVE LOC[facility];  Service: Cardiovascular;  Laterality: N/A;   • HYSTERECTOMY     • JOINT REPLACEMENT Right     Hip   • JOINT REPLACEMENT Left     hip   • JOINT REPLACEMENT Left     hip (for second time)   • JOINT REPLACEMENT Right     knee   • OOPHORECTOMY         Past Medical History:   Diagnosis Date   • Atherosclerosis of native coronary artery of native heart without angina pectoris 10/22/2020   • Hypertension associated with stage 3 chronic kidney disease due to type 2 diabetes mellitus (CMS/[facility]) 10/22/2020   • Morbid obesity  "(CMS/[facility]) 10/22/2020   • Secondary hyperaldosteronism (CMS/[facility]) 10/22/2020   • Stage 3b chronic kidney disease (CMS/[facility]) 10/22/2020   • Type 2 diabetes mellitus with diabetic neuropathy, with long-term current use of insulin (CMS/[facility]) 10/22/2020   • Type 2 diabetes mellitus with diabetic neuropathy, with long-term current use of insulin (CMS/[facility]) 10/22/2020       Family History   Problem Relation Age of Onset   • Heart disease Mother        Social History     Socioeconomic History   • Marital status:      Spouse name: Not on file   • Number of children: Not on file   • Years of education: Not on file   • Highest education level: Not on file   Tobacco Use   • Smoking status: Never Smoker   • Smokeless tobacco: Never Used   Vaping Use   • Vaping Use: Never used   Substance and Sexual Activity   • Alcohol use: Not Currently   • Drug use: Never   • Sexual activity: Defer         Procedures      Objective:       Physical Exam    /75   Pulse 81   Ht 157.5 cm (62\")   Wt 108 kg (238 lb)   SpO2 97%   BMI 43.53 kg/m²   The patient is alert, oriented and in no distress.    Vital signs as noted above.  Exogenous obesity.    Head and neck revealed no carotid bruits or jugular venous distension.  No thyromegaly or lymphadenopathy is present.    Lungs clear.  No wheezing.  Breath sounds are normal bilaterally.    Heart normal first and second heart sounds.  No murmur..  No pericardial rub is present.  No gallop is present.    Abdomen soft and nontender.  No organomegaly is present.    Extremities revealed good peripheral pulses without any pedal edema.    Skin warm and dry.Loop recorder site looks normal.    Musculoskeletal system is grossly normal.    CNS grossly normal.    Reviewed and unchanged from last visit.        "

## 2021-09-30 ENCOUNTER — HOME CARE VISIT (OUTPATIENT)
Dept: HOME HEALTH SERVICES | Facility: HOME HEALTHCARE | Age: 86
End: 2021-09-30

## 2021-09-30 VITALS
DIASTOLIC BLOOD PRESSURE: 66 MMHG | SYSTOLIC BLOOD PRESSURE: 130 MMHG | TEMPERATURE: 98.1 F | HEART RATE: 80 BPM | OXYGEN SATURATION: 94 %

## 2021-09-30 PROCEDURE — G0151 HHCP-SERV OF PT,EA 15 MIN: HCPCS

## 2021-09-30 NOTE — HOME HEALTH
Denies medication changes or adverse reactions. No insurance change. No falls reported. Pt states she is improving and she has been performing her HEP. Pt states she is still weak but she will just continue with her HEP and does not need continued PT visits.

## 2021-10-05 ENCOUNTER — HOME CARE VISIT (OUTPATIENT)
Dept: HOME HEALTH SERVICES | Facility: HOME HEALTHCARE | Age: 86
End: 2021-10-05

## 2021-10-05 VITALS
OXYGEN SATURATION: 96 % | DIASTOLIC BLOOD PRESSURE: 74 MMHG | HEART RATE: 77 BPM | RESPIRATION RATE: 19 BRPM | TEMPERATURE: 98.6 F | SYSTOLIC BLOOD PRESSURE: 106 MMHG

## 2021-10-05 PROCEDURE — G0299 HHS/HOSPICE OF RN EA 15 MIN: HCPCS

## 2021-10-08 ENCOUNTER — HOME CARE VISIT (OUTPATIENT)
Dept: HOME HEALTH SERVICES | Facility: HOME HEALTHCARE | Age: 86
End: 2021-10-08

## 2021-10-08 PROCEDURE — G0299 HHS/HOSPICE OF RN EA 15 MIN: HCPCS

## 2021-10-11 VITALS
SYSTOLIC BLOOD PRESSURE: 124 MMHG | HEART RATE: 76 BPM | TEMPERATURE: 98.1 F | DIASTOLIC BLOOD PRESSURE: 78 MMHG | RESPIRATION RATE: 19 BRPM | OXYGEN SATURATION: 97 %

## 2021-10-11 NOTE — HOME HEALTH
Patient/spouse report that patient had an unwitnessed fall. Spouse reports that she was in the floor when he came into check on her. Patient states she does not remember the fall or what happened. Spouse reports that she was responsive and talking when he got to her. He states that he was unable to get her up and she laid in the floor for quite some time before she allowed him to contact the fire department to help assist her up. Still reporting pain to the left knee, but denies new onset pain and states that she does not believe she hit her head. Possible syncopal episode.

## 2021-10-19 ENCOUNTER — HOME CARE VISIT (OUTPATIENT)
Dept: HOME HEALTH SERVICES | Facility: HOME HEALTHCARE | Age: 86
End: 2021-10-19

## 2021-10-19 VITALS
OXYGEN SATURATION: 96 % | HEART RATE: 76 BPM | TEMPERATURE: 98 F | SYSTOLIC BLOOD PRESSURE: 140 MMHG | DIASTOLIC BLOOD PRESSURE: 86 MMHG | RESPIRATION RATE: 18 BRPM

## 2021-10-19 PROCEDURE — G0299 HHS/HOSPICE OF RN EA 15 MIN: HCPCS

## 2021-10-20 NOTE — HOME HEALTH
pt. doing well, will see PCP tomorrow. Pt. states she is feeling well, BS is well controlled most days.  Denies any falls or any pain. SN discussed with pt. today about discharge from [facility] next week and is in agreeance.             Next SN visit: discharge from [facility] Alert and oriented to person, place and time

## 2021-10-31 ENCOUNTER — APPOINTMENT (OUTPATIENT)
Dept: CT IMAGING | Facility: HOSPITAL | Age: 86
End: 2021-10-31

## 2021-10-31 ENCOUNTER — APPOINTMENT (OUTPATIENT)
Dept: GENERAL RADIOLOGY | Facility: HOSPITAL | Age: 86
End: 2021-10-31

## 2021-10-31 ENCOUNTER — HOSPITAL ENCOUNTER (INPATIENT)
Facility: HOSPITAL | Age: 86
LOS: 3 days | Discharge: NURSING, REHAB OR LONG-TERM CARE | End: 2021-11-05
Attending: INTERNAL MEDICINE | Admitting: FAMILY MEDICINE

## 2021-10-31 DIAGNOSIS — I44.1 ATRIOVENTRICULAR BLOCK, MOBITZ TYPE 1, WENCKEBACH: ICD-10-CM

## 2021-10-31 DIAGNOSIS — A49.9 UTI (URINARY TRACT INFECTION), BACTERIAL: ICD-10-CM

## 2021-10-31 DIAGNOSIS — I44.1 SECOND DEGREE AV BLOCK, MOBITZ TYPE II: Primary | ICD-10-CM

## 2021-10-31 DIAGNOSIS — R55 SYNCOPE AND COLLAPSE: ICD-10-CM

## 2021-10-31 DIAGNOSIS — N39.0 UTI (URINARY TRACT INFECTION), BACTERIAL: ICD-10-CM

## 2021-10-31 DIAGNOSIS — R41.82 ALTERED MENTAL STATUS, UNSPECIFIED ALTERED MENTAL STATUS TYPE: ICD-10-CM

## 2021-10-31 LAB
ALBUMIN SERPL-MCNC: 3.8 G/DL (ref 3.5–5.2)
ALBUMIN/GLOB SERPL: 1.2 G/DL
ALP SERPL-CCNC: 70 U/L (ref 39–117)
ALT SERPL W P-5'-P-CCNC: 19 U/L (ref 1–33)
ANION GAP SERPL CALCULATED.3IONS-SCNC: 16 MMOL/L (ref 5–15)
AST SERPL-CCNC: 32 U/L (ref 1–32)
BACTERIA UR QL AUTO: ABNORMAL /HPF
BASOPHILS # BLD AUTO: 0 10*3/MM3 (ref 0–0.2)
BASOPHILS NFR BLD AUTO: 1 % (ref 0–1.5)
BILIRUB SERPL-MCNC: 0.2 MG/DL (ref 0–1.2)
BILIRUB UR QL STRIP: NEGATIVE
BUN SERPL-MCNC: 30 MG/DL (ref 8–23)
BUN/CREAT SERPL: 19.4 (ref 7–25)
CALCIUM SPEC-SCNC: 8.4 MG/DL (ref 8.6–10.5)
CHLORIDE SERPL-SCNC: 104 MMOL/L (ref 98–107)
CLARITY UR: ABNORMAL
CO2 SERPL-SCNC: 18 MMOL/L (ref 22–29)
COLOR UR: YELLOW
CREAT SERPL-MCNC: 1.55 MG/DL (ref 0.57–1)
DEPRECATED RDW RBC AUTO: 60.4 FL (ref 37–54)
EOSINOPHIL # BLD AUTO: 0.3 10*3/MM3 (ref 0–0.4)
EOSINOPHIL NFR BLD AUTO: 6.1 % (ref 0.3–6.2)
ERYTHROCYTE [DISTWIDTH] IN BLOOD BY AUTOMATED COUNT: 19.3 % (ref 12.3–15.4)
GFR SERPL CREATININE-BSD FRML MDRD: 32 ML/MIN/1.73
GLOBULIN UR ELPH-MCNC: 3.1 GM/DL
GLUCOSE SERPL-MCNC: 128 MG/DL (ref 65–99)
GLUCOSE UR STRIP-MCNC: NEGATIVE MG/DL
HCT VFR BLD AUTO: 27.4 % (ref 34–46.6)
HGB BLD-MCNC: 8.5 G/DL (ref 12–15.9)
HGB UR QL STRIP.AUTO: NEGATIVE
HOLD SPECIMEN: NORMAL
HYALINE CASTS UR QL AUTO: ABNORMAL /LPF
KETONES UR QL STRIP: NEGATIVE
LEUKOCYTE ESTERASE UR QL STRIP.AUTO: ABNORMAL
LYMPHOCYTES # BLD AUTO: 2 10*3/MM3 (ref 0.7–3.1)
LYMPHOCYTES NFR BLD AUTO: 40 % (ref 19.6–45.3)
MCH RBC QN AUTO: 27.4 PG (ref 26.6–33)
MCHC RBC AUTO-ENTMCNC: 31.2 G/DL (ref 31.5–35.7)
MCV RBC AUTO: 88 FL (ref 79–97)
MONOCYTES # BLD AUTO: 0.5 10*3/MM3 (ref 0.1–0.9)
MONOCYTES NFR BLD AUTO: 9 % (ref 5–12)
MUCOUS THREADS URNS QL MICRO: ABNORMAL /HPF
NEUTROPHILS NFR BLD AUTO: 2.2 10*3/MM3 (ref 1.7–7)
NEUTROPHILS NFR BLD AUTO: 43.9 % (ref 42.7–76)
NITRITE UR QL STRIP: NEGATIVE
NRBC BLD AUTO-RTO: 0 /100 WBC (ref 0–0.2)
NT-PROBNP SERPL-MCNC: 792.7 PG/ML (ref 0–1800)
PH UR STRIP.AUTO: <=5 [PH] (ref 5–8)
PLATELET # BLD AUTO: 183 10*3/MM3 (ref 140–450)
PMV BLD AUTO: 7.1 FL (ref 6–12)
POTASSIUM SERPL-SCNC: 4.5 MMOL/L (ref 3.5–5.2)
PROT SERPL-MCNC: 6.9 G/DL (ref 6–8.5)
PROT UR QL STRIP: NEGATIVE
QT INTERVAL: 377 MS
RBC # BLD AUTO: 3.11 10*6/MM3 (ref 3.77–5.28)
RBC # UR: ABNORMAL /HPF
REF LAB TEST METHOD: ABNORMAL
SODIUM SERPL-SCNC: 138 MMOL/L (ref 136–145)
SP GR UR STRIP: 1.02 (ref 1–1.03)
SQUAMOUS #/AREA URNS HPF: ABNORMAL /HPF
TROPONIN T SERPL-MCNC: 0.02 NG/ML (ref 0–0.03)
UROBILINOGEN UR QL STRIP: ABNORMAL
WBC # BLD AUTO: 5.1 10*3/MM3 (ref 3.4–10.8)
WBC UR QL AUTO: ABNORMAL /HPF
WHOLE BLOOD HOLD SPECIMEN: NORMAL
YEAST URNS QL MICRO: ABNORMAL /HPF

## 2021-10-31 PROCEDURE — 85025 COMPLETE CBC W/AUTO DIFF WBC: CPT | Performed by: NURSE PRACTITIONER

## 2021-10-31 PROCEDURE — 25010000002 CEFTRIAXONE PER 250 MG: Performed by: NURSE PRACTITIONER

## 2021-10-31 PROCEDURE — 84484 ASSAY OF TROPONIN QUANT: CPT | Performed by: NURSE PRACTITIONER

## 2021-10-31 PROCEDURE — 99222 1ST HOSP IP/OBS MODERATE 55: CPT | Performed by: NURSE PRACTITIONER

## 2021-10-31 PROCEDURE — 99285 EMERGENCY DEPT VISIT HI MDM: CPT

## 2021-10-31 PROCEDURE — 83880 ASSAY OF NATRIURETIC PEPTIDE: CPT | Performed by: NURSE PRACTITIONER

## 2021-10-31 PROCEDURE — 71045 X-RAY EXAM CHEST 1 VIEW: CPT

## 2021-10-31 PROCEDURE — 87086 URINE CULTURE/COLONY COUNT: CPT | Performed by: NURSE PRACTITIONER

## 2021-10-31 PROCEDURE — 81001 URINALYSIS AUTO W/SCOPE: CPT | Performed by: NURSE PRACTITIONER

## 2021-10-31 PROCEDURE — 93005 ELECTROCARDIOGRAM TRACING: CPT | Performed by: NURSE PRACTITIONER

## 2021-10-31 PROCEDURE — 70450 CT HEAD/BRAIN W/O DYE: CPT

## 2021-10-31 PROCEDURE — 87040 BLOOD CULTURE FOR BACTERIA: CPT | Performed by: NURSE PRACTITIONER

## 2021-10-31 PROCEDURE — 80053 COMPREHEN METABOLIC PANEL: CPT | Performed by: NURSE PRACTITIONER

## 2021-10-31 RX ORDER — ACETAMINOPHEN 160 MG/5ML
650 SOLUTION ORAL EVERY 4 HOURS PRN
Status: DISCONTINUED | OUTPATIENT
Start: 2021-10-31 | End: 2021-11-05 | Stop reason: HOSPADM

## 2021-10-31 RX ORDER — DEXTROSE MONOHYDRATE 25 G/50ML
25 INJECTION, SOLUTION INTRAVENOUS
Status: DISCONTINUED | OUTPATIENT
Start: 2021-10-31 | End: 2021-11-05 | Stop reason: HOSPADM

## 2021-10-31 RX ORDER — INSULIN LISPRO 100 [IU]/ML
0-7 INJECTION, SOLUTION INTRAVENOUS; SUBCUTANEOUS AS NEEDED
Status: DISCONTINUED | OUTPATIENT
Start: 2021-10-31 | End: 2021-11-05 | Stop reason: HOSPADM

## 2021-10-31 RX ORDER — SODIUM CHLORIDE 0.9 % (FLUSH) 0.9 %
3 SYRINGE (ML) INJECTION EVERY 12 HOURS SCHEDULED
Status: DISCONTINUED | OUTPATIENT
Start: 2021-10-31 | End: 2021-11-05 | Stop reason: HOSPADM

## 2021-10-31 RX ORDER — CHOLECALCIFEROL (VITAMIN D3) 125 MCG
5 CAPSULE ORAL NIGHTLY PRN
Status: DISCONTINUED | OUTPATIENT
Start: 2021-10-31 | End: 2021-11-05 | Stop reason: HOSPADM

## 2021-10-31 RX ORDER — SODIUM CHLORIDE 0.9 % (FLUSH) 0.9 %
3-10 SYRINGE (ML) INJECTION AS NEEDED
Status: DISCONTINUED | OUTPATIENT
Start: 2021-10-31 | End: 2021-11-05 | Stop reason: HOSPADM

## 2021-10-31 RX ORDER — OLANZAPINE 10 MG/2ML
1 INJECTION, POWDER, LYOPHILIZED, FOR SOLUTION INTRAMUSCULAR AS NEEDED
Status: DISCONTINUED | OUTPATIENT
Start: 2021-10-31 | End: 2021-11-05 | Stop reason: HOSPADM

## 2021-10-31 RX ORDER — INSULIN LISPRO 100 [IU]/ML
0-7 INJECTION, SOLUTION INTRAVENOUS; SUBCUTANEOUS
Status: DISCONTINUED | OUTPATIENT
Start: 2021-11-01 | End: 2021-11-05 | Stop reason: HOSPADM

## 2021-10-31 RX ORDER — ACETAMINOPHEN 325 MG/1
650 TABLET ORAL EVERY 4 HOURS PRN
Status: DISCONTINUED | OUTPATIENT
Start: 2021-10-31 | End: 2021-11-05 | Stop reason: HOSPADM

## 2021-10-31 RX ORDER — NICOTINE POLACRILEX 4 MG
15 LOZENGE BUCCAL
Status: DISCONTINUED | OUTPATIENT
Start: 2021-10-31 | End: 2021-11-05 | Stop reason: HOSPADM

## 2021-10-31 RX ORDER — SODIUM CHLORIDE 0.9 % (FLUSH) 0.9 %
10 SYRINGE (ML) INJECTION AS NEEDED
Status: DISCONTINUED | OUTPATIENT
Start: 2021-10-31 | End: 2021-11-05 | Stop reason: HOSPADM

## 2021-10-31 RX ORDER — ONDANSETRON 4 MG/1
4 TABLET, FILM COATED ORAL EVERY 6 HOURS PRN
Status: DISCONTINUED | OUTPATIENT
Start: 2021-10-31 | End: 2021-11-05 | Stop reason: HOSPADM

## 2021-10-31 RX ORDER — NITROGLYCERIN 0.4 MG/1
0.4 TABLET SUBLINGUAL
Status: DISCONTINUED | OUTPATIENT
Start: 2021-10-31 | End: 2021-11-05 | Stop reason: HOSPADM

## 2021-10-31 RX ORDER — ACETAMINOPHEN 650 MG/1
650 SUPPOSITORY RECTAL EVERY 4 HOURS PRN
Status: DISCONTINUED | OUTPATIENT
Start: 2021-10-31 | End: 2021-11-05 | Stop reason: HOSPADM

## 2021-10-31 RX ORDER — SODIUM CHLORIDE 9 MG/ML
75 INJECTION, SOLUTION INTRAVENOUS CONTINUOUS
Status: DISCONTINUED | OUTPATIENT
Start: 2021-10-31 | End: 2021-11-05 | Stop reason: HOSPADM

## 2021-10-31 RX ORDER — ONDANSETRON 2 MG/ML
4 INJECTION INTRAMUSCULAR; INTRAVENOUS EVERY 6 HOURS PRN
Status: DISCONTINUED | OUTPATIENT
Start: 2021-10-31 | End: 2021-11-05 | Stop reason: HOSPADM

## 2021-10-31 RX ADMIN — WATER 1 G: 100 INJECTION, SOLUTION INTRAVENOUS at 23:33

## 2021-10-31 RX ADMIN — SODIUM CHLORIDE 500 ML: 9 INJECTION, SOLUTION INTRAVENOUS at 23:38

## 2021-11-01 ENCOUNTER — APPOINTMENT (OUTPATIENT)
Dept: GENERAL RADIOLOGY | Facility: HOSPITAL | Age: 86
End: 2021-11-01

## 2021-11-01 PROBLEM — D64.9 NORMOCYTIC ANEMIA: Status: ACTIVE | Noted: 2021-11-01

## 2021-11-01 LAB
ANION GAP SERPL CALCULATED.3IONS-SCNC: 17 MMOL/L (ref 5–15)
BACTERIA SPEC AEROBE CULT: NO GROWTH
BASOPHILS # BLD AUTO: 0 10*3/MM3 (ref 0–0.2)
BASOPHILS NFR BLD AUTO: 0.9 % (ref 0–1.5)
BUN SERPL-MCNC: 29 MG/DL (ref 8–23)
BUN/CREAT SERPL: 20 (ref 7–25)
CALCIUM SPEC-SCNC: 8.3 MG/DL (ref 8.6–10.5)
CHLORIDE SERPL-SCNC: 107 MMOL/L (ref 98–107)
CO2 SERPL-SCNC: 16 MMOL/L (ref 22–29)
CREAT SERPL-MCNC: 1.45 MG/DL (ref 0.57–1)
DEPRECATED RDW RBC AUTO: 60.4 FL (ref 37–54)
EOSINOPHIL # BLD AUTO: 0.2 10*3/MM3 (ref 0–0.4)
EOSINOPHIL NFR BLD AUTO: 4 % (ref 0.3–6.2)
ERYTHROCYTE [DISTWIDTH] IN BLOOD BY AUTOMATED COUNT: 19.1 % (ref 12.3–15.4)
GFR SERPL CREATININE-BSD FRML MDRD: 34 ML/MIN/1.73
GLUCOSE BLDC GLUCOMTR-MCNC: 109 MG/DL (ref 70–105)
GLUCOSE BLDC GLUCOMTR-MCNC: 114 MG/DL (ref 70–105)
GLUCOSE BLDC GLUCOMTR-MCNC: 115 MG/DL (ref 70–105)
GLUCOSE BLDC GLUCOMTR-MCNC: 142 MG/DL (ref 70–105)
GLUCOSE BLDC GLUCOMTR-MCNC: 180 MG/DL (ref 70–105)
GLUCOSE SERPL-MCNC: 120 MG/DL (ref 65–99)
HBA1C MFR BLD: 6.2 % (ref 3.5–5.6)
HCT VFR BLD AUTO: 26.3 % (ref 34–46.6)
HGB BLD-MCNC: 8.2 G/DL (ref 12–15.9)
LYMPHOCYTES # BLD AUTO: 1.3 10*3/MM3 (ref 0.7–3.1)
LYMPHOCYTES NFR BLD AUTO: 34.3 % (ref 19.6–45.3)
MCH RBC QN AUTO: 27.4 PG (ref 26.6–33)
MCHC RBC AUTO-ENTMCNC: 31.1 G/DL (ref 31.5–35.7)
MCV RBC AUTO: 88 FL (ref 79–97)
MONOCYTES # BLD AUTO: 0.4 10*3/MM3 (ref 0.1–0.9)
MONOCYTES NFR BLD AUTO: 10 % (ref 5–12)
NEUTROPHILS NFR BLD AUTO: 2 10*3/MM3 (ref 1.7–7)
NEUTROPHILS NFR BLD AUTO: 50.8 % (ref 42.7–76)
NRBC BLD AUTO-RTO: 0.1 /100 WBC (ref 0–0.2)
PLATELET # BLD AUTO: 161 10*3/MM3 (ref 140–450)
PMV BLD AUTO: 7 FL (ref 6–12)
POTASSIUM SERPL-SCNC: 4.6 MMOL/L (ref 3.5–5.2)
QT INTERVAL: 391 MS
RBC # BLD AUTO: 2.99 10*6/MM3 (ref 3.77–5.28)
SARS-COV-2 RNA PNL SPEC NAA+PROBE: NOT DETECTED
SODIUM SERPL-SCNC: 140 MMOL/L (ref 136–145)
WBC # BLD AUTO: 3.9 10*3/MM3 (ref 3.4–10.8)

## 2021-11-01 PROCEDURE — 83036 HEMOGLOBIN GLYCOSYLATED A1C: CPT | Performed by: NURSE PRACTITIONER

## 2021-11-01 PROCEDURE — 97166 OT EVAL MOD COMPLEX 45 MIN: CPT

## 2021-11-01 PROCEDURE — G0378 HOSPITAL OBSERVATION PER HR: HCPCS

## 2021-11-01 PROCEDURE — 73523 X-RAY EXAM HIPS BI 5/> VIEWS: CPT

## 2021-11-01 PROCEDURE — 93010 ELECTROCARDIOGRAM REPORT: CPT | Performed by: INTERNAL MEDICINE

## 2021-11-01 PROCEDURE — 25010000002 CEFTRIAXONE PER 250 MG: Performed by: NURSE PRACTITIONER

## 2021-11-01 PROCEDURE — 93005 ELECTROCARDIOGRAM TRACING: CPT | Performed by: INTERNAL MEDICINE

## 2021-11-01 PROCEDURE — 80048 BASIC METABOLIC PNL TOTAL CA: CPT | Performed by: NURSE PRACTITIONER

## 2021-11-01 PROCEDURE — 82962 GLUCOSE BLOOD TEST: CPT

## 2021-11-01 PROCEDURE — 99232 SBSQ HOSP IP/OBS MODERATE 35: CPT | Performed by: INTERNAL MEDICINE

## 2021-11-01 PROCEDURE — 87635 SARS-COV-2 COVID-19 AMP PRB: CPT | Performed by: INTERNAL MEDICINE

## 2021-11-01 PROCEDURE — 99215 OFFICE O/P EST HI 40 MIN: CPT | Performed by: INTERNAL MEDICINE

## 2021-11-01 PROCEDURE — 36415 COLL VENOUS BLD VENIPUNCTURE: CPT | Performed by: NURSE PRACTITIONER

## 2021-11-01 PROCEDURE — 85025 COMPLETE CBC W/AUTO DIFF WBC: CPT | Performed by: NURSE PRACTITIONER

## 2021-11-01 RX ADMIN — ACETAMINOPHEN 650 MG: 325 TABLET, FILM COATED ORAL at 00:04

## 2021-11-01 RX ADMIN — SODIUM CHLORIDE 75 ML/HR: 900 INJECTION INTRAVENOUS at 20:33

## 2021-11-01 RX ADMIN — ACETAMINOPHEN 650 MG: 325 TABLET, FILM COATED ORAL at 20:39

## 2021-11-01 RX ADMIN — CEFTRIAXONE SODIUM 1 G: 1 INJECTION, POWDER, FOR SOLUTION INTRAMUSCULAR; INTRAVENOUS at 20:33

## 2021-11-01 RX ADMIN — ACETAMINOPHEN 650 MG: 325 TABLET, FILM COATED ORAL at 09:33

## 2021-11-01 RX ADMIN — ACETAMINOPHEN 650 MG: 325 TABLET, FILM COATED ORAL at 14:43

## 2021-11-01 RX ADMIN — SODIUM CHLORIDE 75 ML/HR: 900 INJECTION INTRAVENOUS at 00:06

## 2021-11-01 NOTE — H&P
"    [facility] Medicine Services      Patient Name: Aracelis Vargas  : 1935  MRN: [id number]  Primary Care Physician:  Gabriel Goss MD  Date of admission: 10/31/2021      Subjective      Chief Complaint: reported confusion     History of Present Illness: Aracelis Vargas is a 86 y.o. female who presented to [facility] on 10/31/2021 with reported altered mental status per EMS.  She is currently awake and alert and answering appropriately.  She is oriented to person and place.  She denies any chest pain, shortness of air, headache, focal weakness, blurry vision, confusion, or dye Boyd, fever cough or congestion.  CT head per radiology today:    \" 1. No acute intracranial abnormality.  2. No fracture the calvarium or skull base.  3. Mild chronic age-related intracranial findings, unchanged from prior.  \"    Troponin 0 0.024 proBNP 792.7 glucose was 128 on admission.  Creatinine was 1.55 with BUN 30 WBC not elevated, hemoglobin 8.5.  Urinalysis was positive for bacteria/trace WBCs 13-20 2+ leukocytes negative nitrites.  She was started on IV ceftriaxone with urine cultures pending.  IV fluids initiated.  He has a past medical history of coronary artery disease status post CABG, anemia, diabetes mellitus type 2, hyperlipidemia, hypertension, neuropathy, carotid stenosis, COPD and chronic back pain.  She will be admitted for further evaluation and treatment.  She was COVID-19 negative.        Review of Systems   Constitutional: Negative.   HENT: Negative.    Eyes: Negative.    Cardiovascular: Negative.    Respiratory: Negative.    Endocrine: Negative.    Hematologic/Lymphatic: Negative.    Skin: Negative.    Musculoskeletal: Positive for muscle weakness.   Gastrointestinal: Negative.    Genitourinary: Negative.    Neurological: Negative.    Psychiatric/Behavioral: Negative.    Allergic/Immunologic: Negative.         Personal History     Past Medical History:   Diagnosis Date   • " Atherosclerosis of native coronary artery of native heart without angina pectoris 10/22/2020   • Hypertension associated with stage 3 chronic kidney disease due to type 2 diabetes mellitus ([facility]) 10/22/2020   • Morbid obesity ([facility]) 10/22/2020   • Secondary hyperaldosteronism ([facility]) 10/22/2020   • Stage 3b chronic kidney disease ([facility]) 10/22/2020   • Type 2 diabetes mellitus with diabetic neuropathy, with long-term current use of insulin ([facility]) 10/22/2020   • Type 2 diabetes mellitus with diabetic neuropathy, with long-term current use of insulin ([facility]) 10/22/2020       Past Surgical History:   Procedure Laterality Date   • CARDIAC CATHETERIZATION N/A 10/23/2020    Procedure: Left Heart Cath and coronary angiogram;  Surgeon: Wang Plaza MD;  Location: [facility] CATH INVASIVE LOCATION;  Service: Cardiovascular;  Laterality: N/A;   • HYSTERECTOMY     • JOINT REPLACEMENT Right     Hip   • JOINT REPLACEMENT Left     hip   • JOINT REPLACEMENT Left     hip (for second time)   • JOINT REPLACEMENT Right     knee   • OOPHORECTOMY         Family History: family history includes Heart disease in her mother. Otherwise pertinent FHx was reviewed and not pertinent to current issue.    Social History:  reports that she has never smoked. She has never used smokeless tobacco. She reports previous alcohol use. She reports that she does not use drugs.    Home Medications:  Prior to Admission Medications     Prescriptions Last Dose Informant Patient Reported? Taking?    acetaminophen-codeine (TYLENOL #3) 300-30 MG per tablet   No No    Take 1 tablet by mouth Every 6 (Six) Hours As Needed for Moderate Pain .    amLODIPine (NORVASC) 5 MG tablet  Self Yes No    Take 5 mg by mouth Every Morning.    aspirin 81 MG EC tablet   Yes No    Take 81 mg by mouth Daily.    atorvastatin (LIPITOR) 10 MG tablet   Yes No    Take 10 mg by mouth Daily.    furosemide (LASIX) 20 MG tablet   No No    Take 1 tablet by mouth Daily.    gabapentin (NEURONTIN) 300  MG capsule   Yes No    Take 300 mg by mouth 2 (two) times a day.    insulin NPH-insulin regular (humuLIN 70/30,novoLIN 70/30) (70-30) 100 UNIT/ML injection   Yes No    Inject 30 Units under the skin into the appropriate area as directed 2 (Two) Times a Day With Meals.    losartan (Cozaar) 50 MG tablet   No No    Take 1 tablet by mouth Daily.    metFORMIN (GLUCOPHAGE) 1000 MG tablet   Yes No    Take 1,000 mg by mouth 2 (Two) Times a Day With Meals.    omeprazole (priLOSEC) 40 MG capsule   Yes No    Take 40 mg by mouth Every Morning.    spironolactone (ALDACTONE) 25 MG tablet   Yes No    Take 1 tablet by mouth Daily.    topiramate (TOPAMAX) 25 MG tablet   Yes No    Take 12.5 mg by mouth 2 (Two) Times a Day.            Allergies:  Allergies   Allergen Reactions   • Latex, Natural Rubber Rash   • Adhesive Tape Rash       Objective      Vitals:   Temp:  [97.5 °F (36.4 °C)-98.4 °F (36.9 °C)] 97.5 °F (36.4 °C)  Heart Rate:  [80-85] 85  Resp:  [16-20] 18  BP: ()/(45-57) 98/57  Flow (L/min):  [2-3] 2    Physical Exam  Vitals reviewed.   Constitutional:       Appearance: Normal appearance. She is obese.   HENT:      Head: Normocephalic and atraumatic.      Right Ear: External ear normal.      Left Ear: External ear normal.      Nose: Nose normal.      Mouth/Throat:      Mouth: Mucous membranes are moist.   Eyes:      Extraocular Movements: Extraocular movements intact.   Cardiovascular:      Rate and Rhythm: Normal rate and regular rhythm.      Pulses: Normal pulses.      Heart sounds: Normal heart sounds.   Pulmonary:      Effort: Pulmonary effort is normal.      Breath sounds: Normal breath sounds.   Abdominal:      Palpations: Abdomen is soft.   Genitourinary:     Comments: deferred  Musculoskeletal:         General: Normal range of motion.      Cervical back: Normal range of motion and neck supple.   Skin:     General: Skin is warm and dry.   Neurological:      General: No focal deficit present.      Mental Status:  She is alert.      Comments: Oriented x2   Psychiatric:         Mood and Affect: Mood normal.         Behavior: Behavior normal.         Thought Content: Thought content normal.         Judgment: Judgment normal.          Result Review    Result Review:  I have personally reviewed the results from the time of this admission to 11/1/2021 02:27 EDT and agree with these findings:  []  Laboratory  []  Microbiology  []  Radiology  []  EKG/Telemetry   []  Cardiology/Vascular   []  Pathology  []  Old records  []  Other:  Most notable findings include: Urinalysis suggestive of infection, creatinine 1.55 BUN 30, hemoglobin 8.5: 0.024 proBNP 792.7.  CT head negative for acute.      Assessment/Plan        Active Hospital Problems:  Active Hospital Problems    Diagnosis    • Altered mental status    • Acute UTI    • Stage 3b chronic kidney disease (CMS/[facility])    • Normocytic anemia    • Hypertension    • Type 2 diabetes mellitus with diabetic neuropathy, with long-term current use of insulin ([facility])    • GERD (gastroesophageal reflux disease)    • Hyperlipidemia    • Morbid obesity ([facility])      Plan:    Altered mental status, appears resolved oriented x2 awake alert and answering appropriately, CT head negative for acute per radiology, likely secondary to below    Acute UTI, trace bacteria elevated WBC urine, positive leukocytes continue IV ceftriaxone with urine culture pending    Acute on chronic stage IIIb chronic kidney disease creatinine 1.55 elevated from previous normal saline IV fluid hydration repeat BMP in a.m. avoid nephrotoxic meds    Normocytic anemia hemoglobin 8.5 MCV 88 hemoglobin was 9.2 previously no signs of bleeding monitor CBC    Hypertension, controlled on home meds Home meds unverified at this time monitor BP will add as needed medication if needed    Type 2 diabetes mellitus with diabetic neuropathy, home meds unverified at this time serum glucose 128, add SSI as needed and Accu-Cheks AC at bedtime A1c in a.m.  low concentrated sweet diet    Hyperlipidemia Home meds unverified at this time reorder pending verification from pharmacy    Morbid obesity BMI 44.7 lifestyle management education      DVT prophylaxis:  Mechanical DVT prophylaxis orders are present.    CODE STATUS:    Code Status: CPR  Medical Interventions (Level of Support Prior to Arrest): Full    Admission Status:  I believe this patient meets observation status.    I discussed the patient's findings and my recommendations with patient.    This patient has been examined wearing appropriate Personal Protective Equipment     Signature: Electronically signed by SHANI Evans, 11/01/21, 2:26 AM EDT.

## 2021-11-01 NOTE — PLAN OF CARE
Goal Outcome Evaluation:  Plan of Care Reviewed With: patient           Outcome Summary: 87 y/o female presenting to [facility] s/p fall at home. CT head (-) for acute. Pt lives a home and reports MOD indendent PLOF for ADL and mobility using RW and cane. Pt has a shower chair at home and reports having a lift chair to get down into the basement for laundry. Pt lives with  who uses a cane for mobility. This date, pt required mod A for bed mobility and sit to stand secondary to R posterior flank pain. Pt dependent for donning socks at baseline and SBA for grooming ADLs sitting at EOB. Pt presenting below baseline for ADLs and is at high risk for falls. OT recommending IP rehab, however pt stated she will be going home with HH. OT will follow at [facility].

## 2021-11-01 NOTE — PROGRESS NOTES
Pt is current with [facility]. Please call [phone] with any questions or concerns. Thank you

## 2021-11-01 NOTE — THERAPY EVALUATION
Patient Name: Aracelis Vargas  : 1935    MRN: [id number]                              Today's Date: 2021       Admit Date: 10/31/2021    Visit Dx:     ICD-10-CM ICD-9-CM   1. Altered mental status, unspecified altered mental status type  R41.82 780.97   2. UTI (urinary tract infection), bacterial  N39.0 599.0    A49.9 041.9     Patient Active Problem List   Diagnosis   • Fall   • DM type 2 with diabetic dyslipidemia ([facility])   • Secondary hyperaldosteronism (HCC)   • Stage 3b chronic kidney disease (CMS/HCC)   • Hypertension associated with stage 3 chronic kidney disease due to type 2 diabetes mellitus ([facility])   • Syncope and collapse   • Type 2 diabetes mellitus with diabetic neuropathy, with long-term current use of insulin ([facility])   • Morbid obesity ([facility])   • Atherosclerosis of native coronary artery of native heart without angina pectoris   • Atrioventricular block, Mobitz type 1, Wenckebach   • Abnormal nuclear stress test   • Hyperlipidemia   • Hypertension   • Acute UTI   • GERD (gastroesophageal reflux disease)   • Right hip pain   • Altered mental status   • Normocytic anemia     Past Medical History:   Diagnosis Date   • Atherosclerosis of native coronary artery of native heart without angina pectoris 10/22/2020   • Hypertension associated with stage 3 chronic kidney disease due to type 2 diabetes mellitus ([facility]) 10/22/2020   • Morbid obesity ([facility]) 10/22/2020   • Secondary hyperaldosteronism ([facility]) 10/22/2020   • Stage 3b chronic kidney disease ([facility]) 10/22/2020   • Type 2 diabetes mellitus with diabetic neuropathy, with long-term current use of insulin ([facility]) 10/22/2020   • Type 2 diabetes mellitus with diabetic neuropathy, with long-term current use of insulin ([facility]) 10/22/2020     Past Surgical History:   Procedure Laterality Date   • CARDIAC CATHETERIZATION N/A 10/23/2020    Procedure: Left Heart Cath and coronary angiogram;  Surgeon: Wang Plaza MD;  Location: [facility] INVASIVE LOCATION;   Service: Cardiovascular;  Laterality: N/A;   • HYSTERECTOMY     • JOINT REPLACEMENT Right     Hip   • JOINT REPLACEMENT Left     hip   • JOINT REPLACEMENT Left     hip (for second time)   • JOINT REPLACEMENT Right     knee   • OOPHORECTOMY        General Information     Row Name 11/01/21 1323          OT Time and Intention    Document Type evaluation  -     Mode of Treatment occupational therapy  -     Row Name 11/01/21 1323          General Information    Patient Profile Reviewed yes  -BL     Prior Level of Function independent:; ADL's; all household mobility; driving  RW and cane for mobility  -     Existing Precautions/Restrictions fall  -     Row Name 11/01/21 1323          Living Environment    Lives With spouse  -     Row Name 11/01/21 1323          Home Main Entrance    Number of Stairs, Main Entrance none  -     Row Name 11/01/21 1323          Stairs Within Home, Primary    Stairs, Within Home, Primary Chair lift to get to basement for laundry  -     Row Name 11/01/21 1323          Cognition    Orientation Status (Cognition) oriented x 4  -     Row Name 11/01/21 1323          Safety Issues, Functional Mobility    Safety Issues Affecting Function (Mobility) insight into deficits/self-awareness  -     Impairments Affecting Function (Mobility) pain  -           User Key  (r) = Recorded By, (t) = Taken By, (c) = Cosigned By    Initials Name Provider Type     Lily Lobato OT Occupational Therapist                 Mobility/ADL's     Row Name 11/01/21 1325          Bed Mobility    Bed Mobility supine-sit  -     Supine-Sit Payette (Bed Mobility) moderate assist (50% patient effort)  -     Assistive Device (Bed Mobility) bed rails; draw sheet; head of bed elevated  -     Row Name 11/01/21 1325          Transfers    Transfers bed-chair transfer; sit-stand transfer  -     Bed-Chair Payette (Transfers) moderate assist (50% patient effort)  -     Assistive Device (Bed-Chair  Transfers) walker, front-wheeled  -     Sit-Stand Ava (Transfers) moderate assist (50% patient effort)  -     Row Name 11/01/21 1325          Sit-Stand Transfer    Assistive Device (Sit-Stand Transfers) walker, front-wheeled  -[facility] Name 11/01/21 1325          Activities of Daily Living    BADL Assessment/Intervention grooming; lower body dressing  -BL     Row Name 11/01/21 1325          Grooming Assessment/Training    Ava Level (Grooming) oral care regimen; wash face, hands; standby assist  -     Position (Grooming) edge of bed sitting  -[facility] Name 11/01/21 1325          Lower Body Dressing Assessment/Training    Ava Level (Lower Body Dressing) don; socks; dependent (less than 25% patient effort)  -     Position (Lower Body Dressing) edge of bed sitting  -           User Key  (r) = Recorded By, (t) = Taken By, (c) = Cosigned By    Initials Name Provider Type     Lily Lobato OT Occupational Therapist               Obj/Interventions     [facility] Name 11/01/21 1326          Sensory Assessment (Somatosensory)    Sensory Assessment (Somatosensory) sensation intact  -[facility] Name 11/01/21 1326          Vision Assessment/Intervention    Visual Impairment/Limitations corrective lenses full-time  -[facility] Name 11/01/21 1326          Range of Motion Comprehensive    General Range of Motion bilateral upper extremity ROM WFL  -     Comment, General Range of Motion BUE shoulder flexion AROM limited ~50%; other UE WFL  -[facility] Name 11/01/21 1326          Strength Comprehensive (MMT)    Comment, General Manual Muscle Testing (MMT) Assessment BUE shoulder flexion 2-/5; others grossly 3/5; BLE grossly 3+/5  -BL     [facility] Name 11/01/21 1326          Balance    Balance Assessment sitting static balance; standing static balance; standing dynamic balance  -BL     Static Sitting Balance WFL; sitting, edge of bed  -BL     Static Standing Balance mild impairment; supported  -BL      Dynamic Standing Balance mild impairment; supported  -BL           User Key  (r) = Recorded By, (t) = Taken By, (c) = Cosigned By    Initials Name Provider Type     Lily Lobato OT Occupational Therapist               Goals/Plan     Row Name 11/01/21 6289          Transfer Goal 1 (OT)    Activity/Assistive Device (Transfer Goal 1, OT) sit-to-stand/stand-to-sit; toilet  -BL     Benton Level/Cues Needed (Transfer Goal 1, OT) contact guard assist  -BL     Time Frame (Transfer Goal 1, OT) long term goal (LTG); 2 weeks  -BL     Row Name 11/01/21 1335          Dressing Goal 1 (OT)    Activity/Device (Dressing Goal 1, OT) upper body dressing  -BL     Benton/Cues Needed (Dressing Goal 1, OT) modified independence  -BL     Time Frame (Dressing Goal 1, OT) long term goal (LTG); 2 weeks  -BL     Row Name 11/01/21 3020          Grooming Goal 1 (OT)    Activity/Device (Grooming Goal 1, OT) grooming skills, all  -BL     Benton (Grooming Goal 1, OT) modified independence  -BL     Time Frame (Grooming Goal 1, OT) long term goal (LTG); 2 weeks  -BL     Row Name 11/01/21 2355          Therapy Assessment/Plan (OT)    Planned Therapy Interventions (OT) activity tolerance training; BADL retraining; IADL retraining; patient/caregiver education/training; transfer/mobility retraining; strengthening exercise  -BL           User Key  (r) = Recorded By, (t) = Taken By, (c) = Cosigned By    Initials Name Provider Type     Lily Lobato OT Occupational Therapist               Clinical Impression     Row Name 11/01/21 4040          Pain Assessment    Additional Documentation Pain Scale: Numbers Pre/Post-Treatment (Group)  -BL     Row Name 11/01/21 1329          Pain Scale: Numbers Pre/Post-Treatment    Pretreatment Pain Rating 10/10  -BL     Posttreatment Pain Rating 10/10  -BL     Pain Location - Side Right  -BL     Pain Location - Orientation lower  -BL     Pain Location flank  -BL     Pain Intervention(s)  Medication (See MAR); Repositioned  -BL     Row Name 11/01/21 1329          Plan of Care Review    Plan of Care Reviewed With patient  -BL     Outcome Summary 87 y/o female presenting to [facility] s/p fall at home. CT head (-) for acute. Pt lives a home and reports MOD indendent PLOF for ADL and mobility using RW and cane. Pt has a shower chair at home and reports having a lift chair to get down into the basement for laundry. Pt lives with  who uses a cane for mobility. This date, pt required mod A for bed mobility and sit to stand secondary to R posterior flank pain. Pt dependent for donning socks at baseline and SBA for grooming ADLs sitting at EOB. Pt presenting below baseline for ADLs and is at high risk for falls. OT recommending IP rehab, however pt stated she will be going home with HH. OT will follow at [facility].  -BL     Row Name 11/01/21 1329          Therapy Assessment/Plan (OT)    Therapy Frequency (OT) 5 times/wk  -BL     Row Name 11/01/21 1329          Therapy Plan Review/Discharge Plan (OT)    Anticipated Discharge Disposition (OT) inpatient rehabilitation facility  -BL     Row Name 11/01/21 1329          Vital Signs    O2 Delivery Pre Treatment supplemental O2  -BL     Intra SpO2 (%) 98  -BL     O2 Delivery Intra Treatment room air  -BL     Post SpO2 (%) 95  -BL     O2 Delivery Post Treatment room air  -BL     Pre Patient Position Supine  -BL     Intra Patient Position Sitting  -BL     Post Patient Position Sitting  -BL     Row Name 11/01/21 1329          Positioning and Restraints    Pre-Treatment Position in bed  -BL     Post Treatment Position chair  -BL     In Chair notified nsg; call light within reach; encouraged to call for assist; exit alarm on  -BL           User Key  (r) = Recorded By, (t) = Taken By, (c) = Cosigned By    Initials Name Provider Type    BL Lily Lobato, OT Occupational Therapist               Outcome Measures    No documentation.                 Occupational Therapy  Education                 Title: PT OT SLP Therapies (In Progress)     Topic: Occupational Therapy (Done)     Point: ADL training (Done)     Description:   Instruct learner(s) on proper safety adaptation and remediation techniques during self care or transfers.   Instruct in proper use of assistive devices.              Learning Progress Summary           Patient Acceptance, E,TB, VU by  at 11/1/2021 1337                               User Key     Initials Effective Dates Name Provider Type Discipline     04/13/21 -  Lily Lobato OT Occupational Therapist OT              OT Recommendation and Plan  Planned Therapy Interventions (OT): activity tolerance training, BADL retraining, IADL retraining, patient/caregiver education/training, transfer/mobility retraining, strengthening exercise  Therapy Frequency (OT): 5 times/wk  Plan of Care Review  Plan of Care Reviewed With: patient  Outcome Summary: 87 y/o female presenting to [facility] s/p fall at home. CT head (-) for acute. Pt lives a home and reports MOD indendent PLOF for ADL and mobility using RW and cane. Pt has a shower chair at home and reports having a lift chair to get down into the basement for laundry. Pt lives with  who uses a cane for mobility. This date, pt required mod A for bed mobility and sit to stand secondary to R posterior flank pain. Pt dependent for donning socks at baseline and SBA for grooming ADLs sitting at EOB. Pt presenting below baseline for ADLs and is at high risk for falls. OT recommending IP rehab, however pt stated she will be going home with HH. OT will follow at [facility].     Time Calculation:    Time Calculation- OT     Row Name 11/01/21 1342             Time Calculation- OT    OT Start Time 1001  -BL      OT Stop Time 1046  -BL      OT Time Calculation (min) 45 min  -BL      OT Received On 11/01/21  -BL      OT - Next Appointment 11/02/21  -      OT Goal Re-Cert Due Date 11/15/21  -BL            User Key  (r) = Recorded By,  (t) = Taken By, (c) = Cosigned By    Initials Name Provider Type    BL Jeffrey Durham, OT Occupational Therapist              Therapy Charges for Today     Code Description Service Date Service Provider Modifiers Qty    [id number] HC OT EVAL MOD COMPLEXITY 4 11/1/2021 Jeffrey Durham OT GO 1               JEFFREY DURHAM OT  11/1/2021

## 2021-11-01 NOTE — CASE MANAGEMENT/SOCIAL WORK
Discharge Planning Assessment  [facility]     Patient Name: Aracelis Vargas  MRN: [id number]  Today's Date: 11/1/2021    Admit Date: 10/31/2021     Discharge Needs Assessment     Row Name 11/01/21 1613       Living Environment    Lives With spouse    Current Living Arrangements home/apartment/condo    Primary Care Provided by self; spouse/significant other    Provides Primary Care For no one    Family Caregiver if Needed spouse; child(melissa), adult    Family Caregiver Names Mellisa daughter helps the couple.    Quality of Family Relationships unable to assess    Able to Return to Prior Arrangements yes       Resource/Environmental Concerns    Resource/Environmental Concerns none    Transportation Concerns car, none       Transition Planning    Patient/Family Anticipates Transition to home with family    Patient/Family Anticipated Services at Transition     Transportation Anticipated family or friend will provide       Discharge Needs Assessment    Readmission Within the Last 30 Days no previous admission in last 30 days    Equipment Currently Used at Home cane, straight; walker, rolling    Concerns to be Addressed discharge planning    Patient's Choice of Community Agency(s) Current [facility]    Discharge Coordination/Progress DC Plan: From home with spouse and [facility]. PT/OT referrals.               Discharge Plan     Row Name 11/01/21 1618       Plan    Plan DC Plan: From home with spouse and [facility]. PT/OT referrals.    Plan Comments IV antibiotics. Pharmacy [facility]              Continued Care and Services - Admitted Since 10/31/2021     Home Medical Care     Service Provider Request Status Selected Services Address Phone Fax Patient Preferred    [facility]  Pending - Request Sent N/A [address] [facility] [place] [address] [phone] --            Selected Continued Care - Prior Encounters Includes selections from prior encounters from 8/2/2021 to 11/1/2021    Discharged on  9/9/2021 Admission date: 9/7/2021 - Discharge disposition: Home-Health Care c    Home Medical Care     Service Provider Selected Services Address Phone Fax Patient Preferred    Replaced by [facility]  Home Health Services [address] [facility] [place] [address] [phone] --                    Expected Discharge Date and Time     Expected Discharge Date Expected Discharge Time    Nov 2, 2021          Demographic Summary     Row Name 11/01/21 1610       General Information    Admission Type observation    Arrived From emergency department    Required Notices Provided Observation Status Notice    Referral Source admission list    Reason for Consult discharge planning    Preferred Language English     Used During This Interaction no    General Information Comments Spke to pt in room.               Functional Status     Row Name 11/01/21 1612       Functional Status    Usual Activity Tolerance moderate    Current Activity Tolerance fair       Functional Status, IADL    Medications independent    Meal Preparation assistive person    Housekeeping assistive person    Laundry assistive person    Shopping assistive person    IADL Comments pt and spouse assist each other.       Mental Status    General Appearance WDL WDL       Mental Status Summary    Recent Changes in Mental Status/Cognitive Functioning no changes                      Met with patient in room wearing PPE: mask.      Maintained distance greater than six feet and spent less than 15 minutes in the room.                       Ariela Ruiz, RN

## 2021-11-01 NOTE — SIGNIFICANT NOTE
11/01/21 1406   OTHER   Discipline physical therapist   Rehab Time/Intention   Session Not Performed unable to evaluate, medical status change  (going for xrays of hips and pelvis to r/o fx.)   Recommendation   PT - Next Appointment 11/02/21

## 2021-11-01 NOTE — CONSULTS
Referring Provider: Ginny Coulter MD  Reason for Consultation:  Syncope  Second-degree AV block    Patient Care Team:  Gabriel Goss MD as PCP - General (Family Medicine)  Gregory Pinedo MD (Family Medicine)  Wang Plaza MD as Consulting Physician (Cardiology)    Chief complaint  Syncope    Subjective .     History of present illness:  Aracelis Vargas is a 86 y.o. female who presents with history of syncope and altered mental status after she recovered from it.  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  Denies having any chest pain heaviness or tightness in the chest.  Patient has history of intermittent second-degree AV block in the past.  Patient had loop recorder placement.  No other associated aggravating or alleviating factors.  Patient was being monitored.  Patient is not on any medications to cause bradycardia.  Patient was noted to have Mobitz type II second-degree AV block.  Cardiology consultation was requested..     Review of systems    The patient has been without any chest discomfort ,shortness of breath, palpitations, dizziness.  Denies having any headache ,abdominal pain ,nausea, vomiting , diarrhea constipation, loss of weight or loss of appetite.  Denies having any excessive bruising ,hematuria or blood in the stool.    Review of all systems negative except as indicated.    Reviewed ROS.    History  Past Medical History:   Diagnosis Date   • Atherosclerosis of native coronary artery of native heart without angina pectoris 10/22/2020   • Hypertension associated with stage 3 chronic kidney disease due to type 2 diabetes mellitus (HCC) 10/22/2020   • Morbid obesity (HCC) 10/22/2020   • Secondary hyperaldosteronism (HCC) 10/22/2020   • Stage 3b chronic kidney disease (HCC) 10/22/2020   • Type 2 diabetes mellitus with diabetic neuropathy, with long-term current use of  insulin ([facility]) 10/22/2020   • Type 2 diabetes mellitus with diabetic neuropathy, with long-term current use of insulin ([facility]) 10/22/2020       Past Surgical History:   Procedure Laterality Date   • CARDIAC CATHETERIZATION N/A 10/23/2020    Procedure: Left Heart Cath and coronary angiogram;  Surgeon: Wang Plaza MD;  Location: [facility] CATH INVASIVE LOCATION;  Service: Cardiovascular;  Laterality: N/A;   • HYSTERECTOMY     • JOINT REPLACEMENT Right     Hip   • JOINT REPLACEMENT Left     hip   • JOINT REPLACEMENT Left     hip (for second time)   • JOINT REPLACEMENT Right     knee   • OOPHORECTOMY         Family History   Problem Relation Age of Onset   • Heart disease Mother        Social History     Tobacco Use   • Smoking status: Never Smoker   • Smokeless tobacco: Never Used   Vaping Use   • Vaping Use: Never used   Substance Use Topics   • Alcohol use: Not Currently   • Drug use: Never        Medications Prior to Admission   Medication Sig Dispense Refill Last Dose   • amLODIPine (NORVASC) 5 MG tablet Take 5 mg by mouth Every Morning.   10/31/2021 at Unknown time   • aspirin 81 MG EC tablet Take 81 mg by mouth Daily.   10/31/2021 at Unknown time   • atorvastatin (LIPITOR) 10 MG tablet Take 10 mg by mouth Daily.   10/31/2021 at Unknown time   • furosemide (LASIX) 20 MG tablet Take 1 tablet by mouth Daily. 90 tablet 1 10/31/2021 at Unknown time   • gabapentin (NEURONTIN) 300 MG capsule Take 300 mg by mouth 2 (two) times a day.   10/31/2021 at Unknown time   • insulin NPH-insulin regular (humuLIN 70/30,novoLIN 70/30) (70-30) 100 UNIT/ML injection Inject 30 Units under the skin into the appropriate area as directed 2 (Two) Times a Day With Meals.   10/31/2021 at Unknown time   • losartan (Cozaar) 50 MG tablet Take 1 tablet by mouth Daily. 90 tablet 3 10/31/2021 at Unknown time   • metFORMIN (GLUCOPHAGE) 1000 MG tablet Take 1,000 mg by mouth 2 (Two) Times a Day With Meals.   10/31/2021 at Unknown time   • omeprazole  "(priLOSEC) 40 MG capsule Take 40 mg by mouth Every Morning.   10/31/2021 at Unknown time   • spironolactone (ALDACTONE) 25 MG tablet Take 1 tablet by mouth Daily.   10/31/2021 at Unknown time   • topiramate (TOPAMAX) 25 MG tablet Take 12.5 mg by mouth 2 (Two) Times a Day.   10/31/2021 at Unknown time   • acetaminophen-codeine (TYLENOL #3) 300-30 MG per tablet Take 1 tablet by mouth Every 6 (Six) Hours As Needed for Moderate Pain . (Patient taking differently: Take 1 tablet by mouth Every 6 (Six) Hours As Needed for Moderate Pain . Patient had prescription filled on 9/17/2021, quantity of 12 tabs with no remaining refills.) 12 tablet 0 Unknown at Unknown time         Latex, natural rubber and Adhesive tape    Scheduled Meds:cefTRIAXone, 1 g, Intravenous, Q24H  insulin lispro, 0-7 Units, Subcutaneous, TID AC  sodium chloride, 3 mL, Intravenous, Q12H      Continuous Infusions:sodium chloride, 75 mL/hr, Last Rate: 75 mL/hr (11/01/21 1433)      PRN Meds:.•  acetaminophen **OR** acetaminophen **OR** acetaminophen  •  dextrose  •  dextrose  •  glucagon (human recombinant)  •  influenza vaccine  •  insulin lispro **AND** insulin lispro  •  melatonin  •  nitroglycerin  •  ondansetron **OR** ondansetron  •  [COMPLETED] Insert peripheral IV **AND** sodium chloride  •  sodium chloride    Objective     VITAL SIGNS  Vitals:    11/01/21 0005 11/01/21 0150 11/01/21 0533 11/01/21 1312   BP: 99/45 98/57 122/58 145/55   BP Location: Left arm Left arm Left arm Left arm   Patient Position: Lying Lying Lying Lying   Pulse: 80 85 81 87   Resp: 20 18 19 18   Temp:  97.5 °F (36.4 °C) 98.6 °F (37 °C) 97.9 °F (36.6 °C)   TempSrc:  Oral Oral Oral   SpO2: 95% 97% 97% 96%   Weight:  111 kg (244 lb 7.8 oz) 111 kg (244 lb 4.3 oz)    Height:           Flowsheet Rows      First Filed Value   Admission Height 157.5 cm (62\") Documented at 10/31/2021 2206   Admission Weight 108 kg (238 lb) Documented at 10/31/2021 2206          No intake or output data " in the 24 hours ending 11/01/21 1629     TELEMETRY: Sinus rhythm intermittent Mobitz type II AV block    Physical Exam:  The patient is alert, oriented and in no distress.  Vital signs as noted above.  Head and neck revealed no carotid bruits or jugular venous distention.  No thyromegaly or lymph adenopathy is present  Lungs clear.  No wheezing.  Breath sounds are normal bilaterally.  Heart normal first and second heart sounds.No murmur.  No precordial rub is present.  No gallop is present.  Abdomen soft and nontender.  No organomegaly is present.  Extremities with good peripheral pulses without any pedal edema.  Skin warm and dry.  Musculoskeletal system is grossly normal  CNS grossly normal      Results Review:   I reviewed the patient's new clinical results.  Lab Results (last 24 hours)     Procedure Component Value Units Date/Time    Urine Culture - Urine, Urine, Catheter In/Out [[id number]]  (Normal) Collected: 10/31/21 2238    Specimen: Urine, Catheter In/Out Updated: 11/01/21 1326     Urine Culture No growth    POC Glucose Once [[id number]]  (Abnormal) Collected: 11/01/21 1129    Specimen: Blood Updated: 11/01/21 1130     Glucose 142 mg/dL      Comment: Serial Number: [id number]:  [id number]       Hemoglobin A1c [[id number]]  (Abnormal) Collected: 11/01/21 0429    Specimen: Blood Updated: 11/01/21 1033     Hemoglobin A1C 6.2 %     Narrative:      Hemoglobin A1C Reference Range:    <5.7 %        Normal  5.7-6.4 %     Increased risk for diabetes  > 6.4 %        Diabetes       These guidelines have been recommended by the American Diabetic Association for Hgb A1c.      The following 2010 guidelines have been recommended by the American Diabetes Association for Hemoglobin A1c.    HBA1c 5.7-6.4% Increased risk for future diabetes (pre-diabetes)  HBA1c     >6.4% Diabetes      POC Glucose Once [[id number]]  (Abnormal) Collected: 11/01/21 0701    Specimen: Blood Updated: 11/01/21 0702     Glucose 109 mg/dL       Comment: Serial Number: [id number]:  [id number]       Basic Metabolic Panel [[id number]]  (Abnormal) Collected: 11/01/21 0429    Specimen: Blood Updated: 11/01/21 0517     Glucose 120 mg/dL      BUN 29 mg/dL      Creatinine 1.45 mg/dL      Sodium 140 mmol/L      Potassium 4.6 mmol/L      Chloride 107 mmol/L      CO2 16.0 mmol/L      Calcium 8.3 mg/dL      eGFR Non African Amer 34 mL/min/1.73      BUN/Creatinine Ratio 20.0     Anion Gap 17.0 mmol/L     Narrative:      GFR Normal >60  Chronic Kidney Disease <60  Kidney Failure <15      CBC Auto Differential [[id number]]  (Abnormal) Collected: 11/01/21 0429    Specimen: Blood Updated: 11/01/21 0458     WBC 3.90 10*3/mm3      RBC 2.99 10*6/mm3      Hemoglobin 8.2 g/dL      Hematocrit 26.3 %      MCV 88.0 fL      MCH 27.4 pg      MCHC 31.1 g/dL      RDW 19.1 %      RDW-SD 60.4 fl      MPV 7.0 fL      Platelets 161 10*3/mm3      Neutrophil % 50.8 %      Lymphocyte % 34.3 %      Monocyte % 10.0 %      Eosinophil % 4.0 %      Basophil % 0.9 %      Neutrophils, Absolute 2.00 10*3/mm3      Lymphocytes, Absolute 1.30 10*3/mm3      Monocytes, Absolute 0.40 10*3/mm3      Eosinophils, Absolute 0.20 10*3/mm3      Basophils, Absolute 0.00 10*3/mm3      nRBC 0.1 /100 WBC     POC Glucose Once [[id number]]  (Abnormal) Collected: 11/01/21 0149    Specimen: Blood Updated: 11/01/21 0150     Glucose 114 mg/dL      Comment: Serial Number: [id number]:  [id number]       COVID-19,CEPHEID/SHAHLA/BDMAX,COR/RAYRAY/PAD/MARTHA IN-HOUSE(OR EMERGENT/ADD-ON),NP SWAB IN TRANSPORT MEDIA 3-4 HR TAT, RT-PCR - Swab, Nasopharynx [[id number]]  (Normal) Collected: 11/01/21 0052    Specimen: Swab from Nasopharynx Updated: 11/01/21 0126     COVID19 Not Detected    Narrative:      Fact sheet for providers: https://www.fda.gov/media/241245/download     Fact sheet for patients: https://www.fda.gov/media/755277/download  Fact sheet for providers: https://www.fda.gov/media/228911/download    Fact sheet for  patients: https://www.[employer].gov/media/637975/download    Test performed by PCR.    Extra Tubes [[id number]] Collected: 10/31/21 2229    Specimen: Blood, Venous Line Updated: 10/31/21 2331    Narrative:      The following orders were created for panel order Extra Tubes.  Procedure                               Abnormality         Status                     ---------                               -----------         ------                     Gold Top - SST[[id number]]                                   Final result               Light Blue Top[[id number]]                                   Final result                 Please view results for these tests on the individual orders.    Light Blue Top [[id number]] Collected: 10/31/21 2229    Specimen: Blood Updated: 10/31/21 2331     Extra Tube hold for add-on     Comment: Auto resulted       Gold Top - SST [[id number]] Collected: 10/31/21 2229    Specimen: Blood Updated: 10/31/21 2331     Extra Tube Hold for add-ons.     Comment: Auto resulted.       Blood Culture - Blood, Arm, Right [[id number]] Collected: 10/31/21 2306    Specimen: Blood from Arm, Right Updated: 10/31/21 2308    Urinalysis, Microscopic Only - Urine, Catheter In/Out [[id number]]  (Abnormal) Collected: 10/31/21 2238    Specimen: Urine, Catheter In/Out Updated: 10/31/21 2304     RBC, UA 0-2 /HPF      WBC, UA 13-20 /HPF      Bacteria, UA Trace /HPF      Squamous Epithelial Cells, UA 3-6 /HPF      Yeast, UA Moderate/2+ Budding Yeast /HPF      Hyaline Casts, UA 3-6 /LPF      Mucus, UA Small/1+ /HPF      Methodology Manual Light Microscopy    Comprehensive Metabolic Panel [[id number]]  (Abnormal) Collected: 10/31/21 2229    Specimen: Blood Updated: 10/31/21 2302     Glucose 128 mg/dL      BUN 30 mg/dL      Creatinine 1.55 mg/dL      Sodium 138 mmol/L      Potassium 4.5 mmol/L      Chloride 104 mmol/L      CO2 18.0 mmol/L      Calcium 8.4 mg/dL      Total Protein 6.9 g/dL      Albumin 3.80 g/dL      ALT (SGPT) 19 U/L       AST (SGOT) 32 U/L      Alkaline Phosphatase 70 U/L      Total Bilirubin 0.2 mg/dL      eGFR Non African Amer 32 mL/min/1.73      Globulin 3.1 gm/dL      A/G Ratio 1.2 g/dL      BUN/Creatinine Ratio 19.4     Anion Gap 16.0 mmol/L     Narrative:      GFR Normal >60  Chronic Kidney Disease <60  Kidney Failure <15      Troponin [[id number]]  (Normal) Collected: 10/31/21 2229    Specimen: Blood Updated: 10/31/21 2302     Troponin T 0.024 ng/mL     Narrative:      Troponin T Reference Range:  <= 0.03 ng/mL-   Negative for AMI  >0.03 ng/mL-     Abnormal for myocardial necrosis.  Clinicians would have to utilize clinical acumen, EKG, Troponin and serial changes to determine if it is an Acute Myocardial Infarction or myocardial injury due to an underlying chronic condition.       Results may be falsely decreased if patient taking Biotin.      BNP [[id number]]  (Normal) Collected: 10/31/21 2229    Specimen: Blood Updated: 10/31/21 2300     proBNP 792.7 pg/mL     Narrative:      Among patients with dyspnea, NT-proBNP is highly sensitive for the detection of acute congestive heart failure. In addition NT-proBNP of <300 pg/ml effectively rules out acute congestive heart failure with 99% negative predictive value.    Results may be falsely decreased if patient taking Biotin.      Urinalysis With Culture If Indicated - Urine, Catheter In/Out [[id number]]  (Abnormal) Collected: 10/31/21 2238    Specimen: Urine, Catheter In/Out Updated: 10/31/21 2253     Color, UA Yellow     Appearance, UA Hazy     Comment: Result checked         pH, UA <=5.0     Specific Gravity, UA 1.017     Glucose, UA Negative     Ketones, UA Negative     Bilirubin, UA Negative     Blood, UA Negative     Protein, UA Negative     Leuk Esterase, UA Moderate (2+)     Nitrite, UA Negative     Urobilinogen, UA 0.2 E.U./dL    CBC & Differential [[id number]]  (Abnormal) Collected: 10/31/21 2229    Specimen: Blood Updated: 10/31/21 2243    Narrative:      The following  orders were created for panel order CBC & Differential.  Procedure                               Abnormality         Status                     ---------                               -----------         ------                     CBC Auto Differential[[id number]]        Abnormal            Final result                 Please view results for these tests on the individual orders.    CBC Auto Differential [[id number]]  (Abnormal) Collected: 10/31/21 2229    Specimen: Blood Updated: 10/31/21 2243     WBC 5.10 10*3/mm3      RBC 3.11 10*6/mm3      Hemoglobin 8.5 g/dL      Hematocrit 27.4 %      MCV 88.0 fL      MCH 27.4 pg      MCHC 31.2 g/dL      RDW 19.3 %      RDW-SD 60.4 fl      MPV 7.1 fL      Platelets 183 10*3/mm3      Neutrophil % 43.9 %      Lymphocyte % 40.0 %      Monocyte % 9.0 %      Eosinophil % 6.1 %      Basophil % 1.0 %      Neutrophils, Absolute 2.20 10*3/mm3      Lymphocytes, Absolute 2.00 10*3/mm3      Monocytes, Absolute 0.50 10*3/mm3      Eosinophils, Absolute 0.30 10*3/mm3      Basophils, Absolute 0.00 10*3/mm3      nRBC 0.0 /100 WBC     Blood Culture - Blood, Arm, Right [[id number]] Collected: 10/31/21 2229    Specimen: Blood from Arm, Right Updated: 10/31/21 2240          Imaging Results (Last 24 Hours)     Procedure Component Value Units Date/Time    XR Hips Bilateral With or Without Pelvis 5 View [[id number]] Collected: 11/01/21 1425     Updated: 11/01/21 1429    Narrative:         DATE OF EXAM:   11/1/2021 2:21 PM     PROCEDURE:   XR HIPS BILATERAL W OR WO PELVIS 5 VIEW-     INDICATIONS:   pain after fall; R41.82-Altered mental status, unspecified;  N39.0-Urinary tract infection, site not specified; A49.9-Bacterial  infection, unspecified     COMPARISON:  9/7/2021     TECHNIQUE:   AP view of the pelvis and AP and lateral views of the left and right hip  were obtained     FINDINGS:   Bilateral hip prostheses noted. The bony pelvis appears intact. Disc  disease in the lower lumbar spine partially  imaged. Sacral arcuate lines  are grossly intact within limits of overlying bowel gas. There is no  periprosthetic fracture at the left or right proximal femur.        Impression:      IMPRESSION :   1. Negative for acute fracture.  2. Intact bilateral hip prostheses.     Electronically Signed By-Benjamín Padron MD On:11/1/2021 2:27 PM  This report was finalized on [id number] by  Benjamín Padron MD.    XR Chest 1 View [[id number]] Collected: 11/01/21 0727     Updated: 11/01/21 0730    Narrative:         DATE OF EXAM:   10/31/2021 10:47 PM     PROCEDURE:   XR CHEST 1 VW-     INDICATIONS:   Altered mental status     COMPARISON:  10/21/2021     TECHNIQUE:   Portable chest radiograph.     FINDINGS:    The cardiomediastinal silhouette is within normal limits. The lungs are  clear. There is no pneumothorax, focal consolidation, or large pleural  effusion. Osseous structures grossly intact.        Impression:      No acute process.      Electronically Signed By-Benjamín Padron MD On:11/1/2021 7:28 AM  This report was finalized on [id number] by  Benjamín Padron MD.    CT Head Without Contrast [[id number]] Collected: 11/01/21 0013     Updated: 11/01/21 0015    Narrative:      EXAMINATION: CT HEAD WO CONTRAST    DATE: 10/31/2021 11:44 PM     INDICATION: Fall, change in mental status     COMPARISON: Head CT from 9/7/2021     TECHNIQUE: Thin section noncontrast axial images were obtained through the head. Coronal reformats were created.  CT dose lowering techniques were used, to include: automated exposure control, adjustment for patient size, and or use of iterative   reconstruction.     FINDINGS:     Intracranial contents:    No acute intracranial hemorrhage, evidence of acute territorial infarct, mass, mass effect or hydrocephalus. Mild global cerebral volume loss. Mild chronic small vessel ischemic changes in the white matter and mild intracranial atherosclerosis.     Bones and extracranial soft tissues:     No fracture or focal  osseous lesion in the calvarium or skull base. Benign hyperostosis frontalis interna. Paranasal sinuses are clear where visualized. Mastoid air cells are clear. Bilateral cataract surgery, otherwise orbits are unremarkable.         Impression:        1. No acute intracranial abnormality.  2. No fracture the calvarium or skull base.  3. Mild chronic age-related intracranial findings, unchanged from prior.         This examination was interpreted by Isac David M.D.     Electronically signed by:  Isac David M.D.    10/31/2021 10:14 PM      LAB RESULTS (LAST 7 DAYS)    CBC  Results from last 7 days   Lab Units 11/01/21  0429 10/31/21  2229   WBC 10*3/mm3 3.90 5.10   RBC 10*6/mm3 2.99* 3.11*   HEMOGLOBIN g/dL 8.2* 8.5*   HEMATOCRIT % 26.3* 27.4*   MCV fL 88.0 88.0   PLATELETS 10*3/mm3 161 183       BMP  Results from last 7 days   Lab Units 11/01/21  0429 10/31/21  2229   SODIUM mmol/L 140 138   POTASSIUM mmol/L 4.6 4.5   CHLORIDE mmol/L 107 104   CO2 mmol/L 16.0* 18.0*   BUN mg/dL 29* 30*   CREATININE mg/dL 1.45* 1.55*   GLUCOSE mg/dL 120* 128*       CMP   Results from last 7 days   Lab Units 11/01/21  0429 10/31/21  2229   SODIUM mmol/L 140 138   POTASSIUM mmol/L 4.6 4.5   CHLORIDE mmol/L 107 104   CO2 mmol/L 16.0* 18.0*   BUN mg/dL 29* 30*   CREATININE mg/dL 1.45* 1.55*   GLUCOSE mg/dL 120* 128*   ALBUMIN g/dL  --  3.80   BILIRUBIN mg/dL  --  0.2   ALK PHOS U/L  --  70   AST (SGOT) U/L  --  32   ALT (SGPT) U/L  --  19         BNP        TROPONIN  Results from last 7 days   Lab Units 10/31/21  2229   TROPONIN T ng/mL 0.024       CoAg        Creatinine Clearance  Estimated Creatinine Clearance: 34 mL/min (A) (by C-G formula based on SCr of 1.45 mg/dL (H)).    ABG        Radiology  CT Head Without Contrast    Result Date: 11/1/2021  1. No acute intracranial abnormality. 2. No fracture the calvarium or skull base. 3. Mild chronic age-related intracranial findings, unchanged from prior.  This examination was  interpreted by Isac David M.D. Electronically signed by:  Isac David M.D.  10/31/2021 10:14 PM    XR Chest 1 View    Result Date: 11/1/2021  No acute process.  Electronically Signed By-Benjamín Padron MD On:11/1/2021 7:28 AM This report was finalized on [id number] by  Benjamín Padron MD.    XR Hips Bilateral With or Without Pelvis 5 View    Result Date: 11/1/2021  IMPRESSION : 1. Negative for acute fracture. 2. Intact bilateral hip prostheses.  Electronically Signed By-Benjamín Padron MD On:11/1/2021 2:27 PM This report was finalized on [id number] by  Benjamín Padron MD.              EKG          I personally viewed and interpreted the patient's EKG/Telemetry data: Sinus rhythm premature ventricular contractions    ECHOCARDIOGRAM:    Results for orders placed during the hospital encounter of 09/07/21    Adult Transthoracic Echo Complete W/ Cont if Necessary Per Protocol    Interpretation Summary  · Estimated left ventricular EF = 60% Left ventricular systolic function is normal.    Indications  Syncope    Technically satisfactory study.  Mitral valve is structurally normal.  Tricuspid valve is structurally normal.  Aortic valve is structurally normal.  Pulmonic valve could not be well visualized.  No evidence for mitral tricuspid or aortic regurgitation is seen by Doppler study.  Left atrium is enlarged.  Right atrium is normal in size.  Left ventricle is normal in size and contractility with ejection fraction of 60%.  Right ventricle is normal in size.  Atrial septum is intact.  Aorta is normal.  No pericardial effusion or intracardiac thrombus is seen.    Impression  Left atrium is enlarged.  Structurally and functionally normal cardiac valves.  Left ventricular size and contractility is normal with ejection fraction of 60%.      Cardiolite (Tc-99m Sestamibi) stress test      OTHER:     Assessment/Plan     Active Problems:    Stage 3b chronic kidney disease (CMS/HCC)    Type 2 diabetes mellitus with  diabetic neuropathy, with long-term current use of insulin (HCC)    Morbid obesity (HCC)    Hyperlipidemia    Hypertension    Acute UTI    GERD (gastroesophageal reflux disease)    Altered mental status    Normocytic anemia    ]]]]]]]]]]]]]]]]]]]]  Impression  ========  -Syncope  Intermittent Mobitz type II AV block  Patient is not on any medications to cause bradycardia     -Second-degree Mobitz type I AV block.  Narrow QRS complex.     Echocardiogram-normal 10/22/2020     Cardiac catheterization 10/23/2020 revealed:  Left ventricular size and contractility is normal with ejection fraction of 60%.  Normal epicardial coronary arteries.     Status post loop recorder placement 10/23/2020 ([employer])     -History of paroxysmal atrial fibrillation     -Hypertension dyslipidemia diabetes     -Renal dysfunction CKD 3  BUN 27 creatinine 1.35     -Exogenous obesity.     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     -No known allergies  ============  Plan  =========  Patient had an episode of syncope.  Patient continues to have Mobitz type II second-degree AV block.  Patient is not on any medications to cause AV blocks.  Patient had loop recorder placement in the past.    Hypertension-well-controlled     Status post loop recorder placement.  No significant dysrhythmia noted.     Renal dysfunction  BUN 20 creatinine 1.41-4/7/2021.  29/1.45-11/1/2020     Patient would benefit from permanent dual-chamber pacemaker implantation in view of continued AV blocks and having syncopal episode.  Fortunately patient did not have any traumatic fractures etc.    Risks and benefits pros and cons of the procedure were discussed with patient including infection bleeding pneumothorax blood clot dysrhythmia anesthetic risk etc.    Further plan will depend on patient's progress.  ]]]]]]]]]]]]]]        Wang Plaza MD  11/01/21  16:29 EDT

## 2021-11-01 NOTE — NURSING NOTE
Patient does not know what home medications she takes and neither dose spouse. [facility] [place] [facility] [phone] I called [facility] and verified medication list with the pharmacist. Home medication list completed.

## 2021-11-01 NOTE — PROGRESS NOTES
"    [facility] Emergency Medicine Services Daily Progress Note    Patient Name: Aracelis Vargas  : 1935  MRN: [id number]  Primary Care Physician:  Gabriel Goss MD  Date of admission: 10/31/2021      Subjective      Chief Complaint: Reported confusion      Aracelis Vargas is a 86 y.o. female who [facility] with reported altered mental status per EMS.  She is currently awake and alert and answering appropriately.  She is oriented to person and place.  She denies any chest pain, shortness of air, headache, focal weakness, blurry vision, confusion, or dye Boyd, fever cough or congestion.  CT head per radiology today:     \" 1. No acute intracranial abnormality.  2. No fracture the calvarium or skull base.  3. Mild chronic age-related intracranial findings, unchanged from prior.  \"     Troponin 0 0.024 proBNP 792.7 glucose was 128 on admission.  Creatinine was 1.55 with BUN 30 WBC not elevated, hemoglobin 8.5.  Urinalysis was positive for bacteria/trace WBCs 13-20 2+ leukocytes negative nitrites.  She was started on IV ceftriaxone with urine cultures pending.  IV fluids initiated.  He has a past medical history of coronary artery disease status post CABG, anemia, diabetes mellitus type 2, hyperlipidemia, hypertension, neuropathy, carotid stenosis, COPD and chronic back pain.  She will be admitted for further evaluation and treatment.  She was COVID-19 negative.    2021  Patient seen and examined.  Patient on empiric treatment for UTI   Urine culture pending  Xray for pain (hx of fall): Negative for fracture, prosthetic intact       Review of Systems   Constitutional: Negative.   HENT: Negative.    Eyes: Negative.    Cardiovascular: Negative.    Respiratory: Negative.    Endocrine: Negative.    Hematologic/Lymphatic: Negative.    Skin: Negative.    Musculoskeletal: Positive for muscle weakness.   Gastrointestinal: Negative.    Genitourinary: Negative.  "   Neurological: Negative.    Psychiatric/Behavioral: Negative.    Allergic/Immunologic: Negative.      ROS     Objective      Vitals:   Temp:  [97.5 °F (36.4 °C)-98.6 °F (37 °C)] 98.6 °F (37 °C)  Heart Rate:  [80-85] 81  Resp:  [16-20] 19  BP: ()/(45-58) 122/58  Flow (L/min):  [2-3] 2    Physical Exam   Vitals reviewed.   Constitutional:       Appearance: Normal appearance. She is obese.   HENT:      Head: Normocephalic and atraumatic.      Right Ear: External ear normal.      Left Ear: External ear normal.      Nose: Nose normal.      Mouth/Throat:      Mouth: Mucous membranes are moist.   Eyes:      Extraocular Movements: Extraocular movements intact.   Cardiovascular:      Rate and Rhythm: Normal rate and regular rhythm.      Pulses: Normal pulses.      Heart sounds: Normal heart sounds.   Pulmonary:      Effort: Pulmonary effort is normal.      Breath sounds: Normal breath sounds.   Abdominal:      Palpations: Abdomen is soft.   Genitourinary:     Comments: deferred  Musculoskeletal:         General: Normal range of motion.      Cervical back: Normal range of motion and neck supple.   Skin:     General: Skin is warm and dry.   Neurological:      General: No focal deficit present.      Mental Status: She is alert.      Comments: Oriented x2   Psychiatric:         Mood and Affect: Mood normal.         Behavior: Behavior normal.         Thought Content: Thought content normal.         Judgment: Judgment normal.     Result Review    Result Review:  I have personally reviewed the results from the time of this admission to 11/1/2021 10:04 EDT and agree with these findings:  [x]  Laboratory  [x]  Microbiology  [x]  Radiology  []  EKG/Telemetry   []  Cardiology/Vascular   []  Pathology  []  Old records  []  Other:            Assessment/Plan      Brief Patient Summary:  Aracelis Vargas is a 86 y.o. female who was admitted for change in mentation.  Admission labs revealed UTI.  Urine culture pending      cefTRIAXone, 1  g, Intravenous, Q24H  insulin lispro, 0-7 Units, Subcutaneous, TID AC  sodium chloride, 3 mL, Intravenous, Q12H       sodium chloride, 75 mL/hr, Last Rate: 75 mL/hr (11/01/21 0901)         Active Hospital Problems:  Active Hospital Problems    Diagnosis    • Normocytic anemia    • Altered mental status    • Acute UTI    • GERD (gastroesophageal reflux disease)    • Hypertension    • Hyperlipidemia    • Morbid obesity ([facility])    • Type 2 diabetes mellitus with diabetic neuropathy, with long-term current use of insulin ([facility])    • Stage 3b chronic kidney disease (CMS/[facility])      Plan:   Altered mental status, appears resolved oriented x2 awake alert and answering appropriately, CT head negative for acute per radiology, likely secondary to below     Acute UTI, trace bacteria elevated WBC urine, positive leukocytes continue IV ceftriaxone with urine culture pending     Acute on chronic stage IIIb chronic kidney disease creatinine 1.55 elevated from previous normal saline IV fluid hydration repeat BMP in a.m. avoid nephrotoxic meds     Normocytic anemia hemoglobin 8.5 MCV 88 hemoglobin was 9.2 previously no signs of bleeding monitor CBC     Hypertension, controlled on home meds Home meds unverified at this time monitor BP will add as needed medication if needed     Type 2 diabetes mellitus with diabetic neuropathy, home meds unverified at this time serum glucose 128, add SSI as needed and Accu-Cheks AC at bedtime A1c in a.m. low concentrated sweet diet     Hyperlipidemia Home meds unverified at this time reorder pending verification from pharmacy     Morbid obesity BMI 44.7 lifestyle management education       DVT prophylaxis:  Mechanical DVT prophylaxis orders are present.    CODE STATUS:    Code Status: CPR  Medical Interventions (Level of Support Prior to Arrest): Full      Disposition:  I expect patient to be discharged 4 to 5 days.    This patient has been examined wearing appropriate Personal Protective Equipment and  discussed with hospital infection control department. 11/01/21      Electronically signed by Ginny Coulter MD, 11/01/21, 10:04 EDT.  [facility]ist Team

## 2021-11-01 NOTE — PLAN OF CARE
Problem: Adult Inpatient Plan of Care  Goal: Absence of Hospital-Acquired Illness or Injury  Intervention: Identify and Manage Fall Risk  Recent Flowsheet Documentation  Taken 11/1/2021 0200 by Erin Zimmer RN  Safety Promotion/Fall Prevention:   safety round/check completed   nonskid shoes/slippers when out of bed   activity supervised   fall prevention program maintained  Intervention: Prevent Skin Injury  Recent Flowsheet Documentation  Taken 11/1/2021 0200 by Erin Zimmer RN  Body Position:   turned   side-lying, left  Skin Protection: incontinence pads utilized  Intervention: Prevent Infection  Recent Flowsheet Documentation  Taken 11/1/2021 0200 by Erin Zimmer RN  Infection Prevention:   hand hygiene promoted   personal protective equipment utilized  Goal: Optimal Comfort and Wellbeing  Intervention: Provide Person-Centered Care  Recent Flowsheet Documentation  Taken 11/1/2021 0200 by Erin Zimmer RN  Trust Relationship/Rapport:   care explained   questions answered   questions encouraged  Goal: Readiness for Transition of Care  Intervention: Mutually Develop Transition Plan  Recent Flowsheet Documentation  Taken 11/1/2021 0149 by Erin Zimmer RN  Transportation Anticipated: family or friend will provide  Patient/Family Anticipated Services at Transition:   Patient/Family Anticipates Transition to: home with family  Taken 11/1/2021 0142 by Erin Zimmer RN  Equipment Currently Used at Home:   cane, straight   walker, rolling     Problem: Fall Injury Risk  Goal: Absence of Fall and Fall-Related Injury  Intervention: Identify and Manage Contributors to Fall Injury Risk  Recent Flowsheet Documentation  Taken 11/1/2021 0200 by Erin Zimmer RN  Medication Review/Management: medications reviewed  Intervention: Promote Injury-Free Environment  Recent Flowsheet Documentation  Taken 11/1/2021 0200 by Erin Zimmer RN  Safety Promotion/Fall Prevention:   safety  round/check completed   nonskid shoes/slippers when out of bed   activity supervised   fall prevention program maintained     Problem: COPD Comorbidity  Goal: Maintenance of COPD Symptom Control  Intervention: Maintain COPD-Symptom Control  Recent Flowsheet Documentation  Taken 11/1/2021 0200 by Erin Zimmer RN  Medication Review/Management: medications reviewed     Problem: Diabetes Comorbidity  Goal: Blood Glucose Level Within Desired Range  Intervention: Maintain Glycemic Control  Recent Flowsheet Documentation  Taken 11/1/2021 0200 by Erin Zimmer RN  Glycemic Management: blood glucose monitoring     Problem: Hypertension Comorbidity  Goal: Blood Pressure in Desired Range  Intervention: Maintain Hypertension-Management Strategies  Recent Flowsheet Documentation  Taken 11/1/2021 0200 by Erin Zimmer RN  Syncope Management: position changed slowly  Medication Review/Management: medications reviewed     Problem: Pain Chronic (Persistent) (Comorbidity Management)  Goal: Acceptable Pain Control and Functional Ability  Intervention: Develop Pain Management Plan  Recent Flowsheet Documentation  Taken 11/1/2021 0200 by Erin Zimmer RN  Pain Management Interventions:   pillow support provided   position adjusted  Intervention: Manage Persistent Pain  Recent Flowsheet Documentation  Taken 11/1/2021 0200 by Erin Zimmer RN  Bowel Elimination Promotion: ambulation promoted  Medication Review/Management: medications reviewed  Intervention: Optimize Psychosocial Wellbeing  Recent Flowsheet Documentation  Taken 11/1/2021 0200 by Erin Zimmer RN  Supportive Measures:   active listening utilized   self-care encouraged  Family/Support System Care: self-care encouraged   Goal Outcome Evaluation:      Pt arrived from ED.  Pt states she fell at home but does not remember how, she states she blacked out. Pt is alert and at baseline but has some moments of confusion.  Pt unable to assist in reconciling  home medications, call will need to be made to pharmacy in am.  Pt is incontinent of bowel and urine, ultra absorbs utilized.

## 2021-11-01 NOTE — DISCHARGE PLACEMENT REQUEST
"MobleySean valencia (86 y.o. Female)             Date of Birth Social Security Number Address Home Phone MRN    1935  [facility] [place] [address] [phone] [id number]    Judaism Marital Status             Shinto        Admission Date Admission Type Admitting Provider Attending Provider Department, Room/Bed    10/31/21 Emergency Ginny Coulter MD Davis, Willette C, MD [facility] 2B MEDICAL INPATIENT, 233/1    Discharge Date Discharge Disposition Discharge Destination                         Attending Provider: Ginny Coulter MD    Allergies: Latex, Natural Rubber, Adhesive Tape    Isolation: None   Infection: None   Code Status: CPR   Advance Care Planning Activity    Ht: 157.5 cm (62\")   Wt: 111 kg (244 lb 4.3 oz)    Admission Cmt: None   Principal Problem: None                Active Insurance as of 10/31/2021     Primary Coverage     Payor Plan Insurance Group Employer/Plan Group    MEDICARE MEDICARE A & B      Payor Plan Address Payor Plan Phone Number Payor Plan Fax Number Effective Dates    [address] [phone]  3/1/2002 - None Entered    [facility] [id number]       Subscriber Name Subscriber Birth Date Member ID       SEAN MOBLEY 1935 [id number]           Secondary Coverage     Payor Plan Insurance Group Employer/Plan Group    ANTHEM BLUE CROSS ANTHEM [employer] [facility] [id number]     Payor Plan Address Payor Plan Phone Number Payor Plan Fax Number Effective Dates    [address]   12/1/2016 - None Entered    [facility] [id number]       Subscriber Name Subscriber Birth Date Member ID       SEAN MOBLEY 1935 [id number]                 Emergency Contacts      (Rel.) Home Phone Work Phone Mobile Phone    MOBLEYGEORGIE VALENCIA (Spouse) [phone] -- --            Insurance Information                MEDICARE/MEDICARE A & B Phone: [phone]    Subscriber: Sean Mobley Subscriber#: [id number]    Group#: -- Precert#: --        ANTHEM BLUE CROSS/PAULETTE WREN " DANDY SUPP Phone: --    Subscriber: Aracelis Vargas Subscriber#: [id number]    Group#: [id number] Precert#: --

## 2021-11-01 NOTE — PLAN OF CARE
Goal Outcome Evaluation:           Progress: no change  Outcome Summary: Patient complaining of right hip pain that radiates up the back to right kidney area. Patient fell at home yesterday on the right side. Xrays obtained with no findings. Both hip replacements intact. UA positive for infection. Patient on continous IV fluids. Abts on board. Tylenol Q4 for pain. No other complaints at this time. Will continue to monitor.

## 2021-11-01 NOTE — ED PROVIDER NOTES
Subjective   Is an 86-year-old female who presents via EMS from home the EMS crew states that they are very familiar with this patient and normally she is alert and oriented today she called EMS because she had a fall and EMS crew states she is initially only on oriented to person-for me she was oriented to person and place she did not know the time.  She is having some difficulty with thought and does not seem alert she is unable to give much history denies pain          Review of Systems   Unable to perform ROS: Acuity of condition       Past Medical History:   Diagnosis Date   • Atherosclerosis of native coronary artery of native heart without angina pectoris 10/22/2020   • Hypertension associated with stage 3 chronic kidney disease due to type 2 diabetes mellitus (CMS/HCC) 10/22/2020   • Morbid obesity (CMS/HCC) 10/22/2020   • Secondary hyperaldosteronism (CMS/HCC) 10/22/2020   • Stage 3b chronic kidney disease (CMS/HCC) 10/22/2020   • Type 2 diabetes mellitus with diabetic neuropathy, with long-term current use of insulin (CMS/HCC) 10/22/2020   • Type 2 diabetes mellitus with diabetic neuropathy, with long-term current use of insulin (CMS/HCC) 10/22/2020       Allergies   Allergen Reactions   • Latex, Natural Rubber Rash   • Adhesive Tape Rash       Past Surgical History:   Procedure Laterality Date   • CARDIAC CATHETERIZATION N/A 10/23/2020    Procedure: Left Heart Cath and coronary angiogram;  Surgeon: Wang Plaza MD;  Location: [facility] INVASIVE LOCATION;  Service: Cardiovascular;  Laterality: N/A;   • HYSTERECTOMY     • JOINT REPLACEMENT Right     Hip   • JOINT REPLACEMENT Left     hip   • JOINT REPLACEMENT Left     hip (for second time)   • JOINT REPLACEMENT Right     knee   • OOPHORECTOMY         Family History   Problem Relation Age of Onset   • Heart disease Mother        Social History     Socioeconomic History   • Marital status:    Tobacco Use   • Smoking status: Never Smoker   •  "Smokeless tobacco: Never Used   Vaping Use   • Vaping Use: Never used   Substance and Sexual Activity   • Alcohol use: Not Currently   • Drug use: Never   • Sexual activity: Defer           Objective   Physical Exam  Vitals reviewed.   Constitutional:       Appearance: She is well-developed.   HENT:      Head: Normocephalic and atraumatic.   Eyes:      Conjunctiva/sclera: Conjunctivae normal.      Pupils: Pupils are equal, round, and reactive to light.   Cardiovascular:      Rate and Rhythm: Normal rate and regular rhythm.      Heart sounds: Normal heart sounds.   Pulmonary:      Effort: Pulmonary effort is normal. No respiratory distress.      Breath sounds: Normal breath sounds. No wheezing.   Abdominal:      General: Bowel sounds are normal. There is no distension.      Palpations: Abdomen is soft. There is no mass.      Tenderness: There is no abdominal tenderness. There is no guarding or rebound.   Musculoskeletal:         General: No deformity. Normal range of motion.      Cervical back: Normal range of motion and neck supple.   Skin:     General: Skin is warm and dry.      Capillary Refill: Capillary refill takes less than 2 seconds.   Neurological:      Mental Status: She is alert and oriented to person, place, and time.      GCS: GCS eye subscore is 4. GCS verbal subscore is 5. GCS motor subscore is 6.      Cranial Nerves: No cranial nerve deficit.      Sensory: No sensory deficit.      Deep Tendon Reflexes: Reflexes normal.   Psychiatric:         Mood and Affect: Mood normal.         Behavior: Behavior normal.         Procedures           ED Course      BP 99/45 (BP Location: Left arm, Patient Position: Lying)   Pulse 80   Temp 98.4 °F (36.9 °C) (Oral)   Resp 20   Ht 157.5 cm (62\")   Wt 108 kg (238 lb)   SpO2 95%   BMI 43.53 kg/m²   Labs Reviewed   COMPREHENSIVE METABOLIC PANEL - Abnormal; Notable for the following components:       Result Value    Glucose 128 (*)     BUN 30 (*)     Creatinine 1.55 " (*)     CO2 18.0 (*)     Calcium 8.4 (*)     eGFR Non  Amer 32 (*)     Anion Gap 16.0 (*)     All other components within normal limits    Narrative:     GFR Normal >60  Chronic Kidney Disease <60  Kidney Failure <15     URINALYSIS W/ CULTURE IF INDICATED - Abnormal; Notable for the following components:    Appearance, UA Hazy (*)     Leuk Esterase, UA Moderate (2+) (*)     All other components within normal limits   CBC WITH AUTO DIFFERENTIAL - Abnormal; Notable for the following components:    RBC 3.11 (*)     Hemoglobin 8.5 (*)     Hematocrit 27.4 (*)     MCHC 31.2 (*)     RDW 19.3 (*)     RDW-SD 60.4 (*)     All other components within normal limits   URINALYSIS, MICROSCOPIC ONLY - Abnormal; Notable for the following components:    RBC, UA 0-2 (*)     WBC, UA 13-20 (*)     Bacteria, UA Trace (*)     Squamous Epithelial Cells, UA 3-6 (*)     Mucus, UA Small/1+ (*)     All other components within normal limits   TROPONIN (IN-HOUSE) - Normal    Narrative:     Troponin T Reference Range:  <= 0.03 ng/mL-   Negative for AMI  >0.03 ng/mL-     Abnormal for myocardial necrosis.  Clinicians would have to utilize clinical acumen, EKG, Troponin and serial changes to determine if it is an Acute Myocardial Infarction or myocardial injury due to an underlying chronic condition.       Results may be falsely decreased if patient taking Biotin.     BNP (IN-HOUSE) - Normal    Narrative:     Among patients with dyspnea, NT-proBNP is highly sensitive for the detection of acute congestive heart failure. In addition NT-proBNP of <300 pg/ml effectively rules out acute congestive heart failure with 99% negative predictive value.    Results may be falsely decreased if patient taking Biotin.     BLOOD CULTURE   BLOOD CULTURE   URINE CULTURE   COVID PRE-OP / PRE-PROCEDURE SCREENING ORDER (NO ISOLATION)    Narrative:     The following orders were created for panel order COVID PRE-OP / PRE-PROCEDURE SCREENING ORDER (NO ISOLATION) -  Swab, Nasopharynx.  Procedure                               Abnormality         Status                     ---------                               -----------         ------                     COVID-19,APTIMA PANTHER(...[[id number]]                                                   Please view results for these tests on the individual orders.   COVID-19,APTIMA PANTHER (CHILO) CHERYL ,NP/OP SWAB IN UTM/VTM/SALINE TRANSPORT MEDIA,24 HR TAT   HEMOGLOBIN A1C   BASIC METABOLIC PANEL   CBC WITH AUTO DIFFERENTIAL   POCT GLUCOSE FINGERSTICK   POCT GLUCOSE FINGERSTICK   POCT GLUCOSE FINGERSTICK   POCT GLUCOSE FINGERSTICK   CBC AND DIFFERENTIAL    Narrative:     The following orders were created for panel order CBC & Differential.  Procedure                               Abnormality         Status                     ---------                               -----------         ------                     CBC Auto Differential[[id number]]        Abnormal            Final result                 Please view results for these tests on the individual orders.   EXTRA TUBES    Narrative:     The following orders were created for panel order Extra Tubes.  Procedure                               Abnormality         Status                     ---------                               -----------         ------                     Gold Top - SST[[id number]]                                   Final result               Light Blue Top[[id number]]                                   Final result                 Please view results for these tests on the individual orders.   GOLD TOP - SST   LIGHT BLUE TOP     Medications   sodium chloride 0.9 % flush 10 mL (has no administration in time range)   cefTRIAXone (ROCEPHIN) 1 g in sodium chloride 0.9 % 100 mL IVPB (1 g Intravenous Not Given 10/31/21 8710)   sodium chloride 0.9 % flush 3 mL (has no administration in time range)   sodium chloride 0.9 % flush 3-10 mL (has no administration in time range)    ondansetron (ZOFRAN) tablet 4 mg (has no administration in time range)     Or   ondansetron (ZOFRAN) injection 4 mg (has no administration in time range)   dextrose (GLUTOSE) oral gel 15 g (has no administration in time range)   dextrose (D50W) (25 g/50 mL) IV injection 25 g (has no administration in time range)   glucagon (human recombinant) (GLUCAGEN DIAGNOSTIC) 1 mg in sterile water (preservative free) 1 mL injection (has no administration in time range)   nitroglycerin (NITROSTAT) SL tablet 0.4 mg (has no administration in time range)   sodium chloride 0.9 % infusion (75 mL/hr Intravenous New Bag 11/1/21 0006)   acetaminophen (TYLENOL) tablet 650 mg (650 mg Oral Given 11/1/21 0004)     Or   acetaminophen (TYLENOL) 160 MG/5ML solution 650 mg ( Oral Not Given:  See Alt 11/1/21 0004)     Or   acetaminophen (TYLENOL) suppository 650 mg ( Rectal Not Given:  See Alt 11/1/21 0004)   insulin lispro (ADMELOG) injection 0-7 Units (has no administration in time range)     And   insulin lispro (ADMELOG) injection 0-7 Units (has no administration in time range)   melatonin tablet 5 mg (has no administration in time range)   cefTRIAXone (ROCEPHIN) in SWFI 1 gram/10ml IV PUSH syringe (1 g Intravenous Given 10/31/21 2333)   sodium chloride 0.9 % bolus 500 mL (500 mL Intravenous New Bag 10/31/21 2338)          CT Head Without Contrast   Final Result      1. No acute intracranial abnormality.   2. No fracture the calvarium or skull base.   3. Mild chronic age-related intracranial findings, unchanged from prior.             This examination was interpreted by Isac David M.D.       Electronically signed by:  Isac David M.D.     10/31/2021 10:14 PM      XR Chest 1 View    (Results Pending)                                     MDM  Number of Diagnoses or Management Options  Altered mental status, unspecified altered mental status type  UTI (urinary tract infection), bacterial  Diagnosis management comments: Patient had  IV established and blood work was obtained cath urine shows acute infection and blood cultures were obtained and Rocephin was given.    CT head-      Patient was discussed with Sima sage nurse practitioner and will be admitted to Dr. Parrish for IV antibiotic therapy       Amount and/or Complexity of Data Reviewed  Clinical lab tests: reviewed  Tests in the medicine section of CPT®: reviewed    Risk of Complications, Morbidity, and/or Mortality  Presenting problems: low  Diagnostic procedures: low  Management options: low    Patient Progress  Patient progress: stable      Final diagnoses:   Altered mental status, unspecified altered mental status type   UTI (urinary tract infection), bacterial       ED Disposition  ED Disposition     ED Disposition Condition Comment    Decision to Admit  Level of Care: Telemetry [5]   Admitting Physician: SEAMUS PARRISH [[id number]]   Attending Physician: SEAMUS PARRISH [[id number]]            No follow-up provider specified.       Medication List      No changes were made to your prescriptions during this visit.          Candi Melchor, APRN  11/01/21 0034

## 2021-11-02 ENCOUNTER — HOME CARE VISIT (OUTPATIENT)
Dept: HOME HEALTH SERVICES | Facility: HOME HEALTHCARE | Age: 86
End: 2021-11-02

## 2021-11-02 LAB
ALBUMIN SERPL-MCNC: 3.5 G/DL (ref 3.5–5.2)
ALBUMIN/GLOB SERPL: 1.3 G/DL
ALP SERPL-CCNC: 61 U/L (ref 39–117)
ALT SERPL W P-5'-P-CCNC: 16 U/L (ref 1–33)
ANION GAP SERPL CALCULATED.3IONS-SCNC: 11 MMOL/L (ref 5–15)
AST SERPL-CCNC: 20 U/L (ref 1–32)
BASOPHILS # BLD AUTO: 0 10*3/MM3 (ref 0–0.2)
BASOPHILS NFR BLD AUTO: 1 % (ref 0–1.5)
BILIRUB SERPL-MCNC: 0.3 MG/DL (ref 0–1.2)
BUN SERPL-MCNC: 18 MG/DL (ref 8–23)
BUN/CREAT SERPL: 19.4 (ref 7–25)
CALCIUM SPEC-SCNC: 8.3 MG/DL (ref 8.6–10.5)
CHLORIDE SERPL-SCNC: 109 MMOL/L (ref 98–107)
CO2 SERPL-SCNC: 20 MMOL/L (ref 22–29)
CREAT SERPL-MCNC: 0.93 MG/DL (ref 0.57–1)
DEPRECATED RDW RBC AUTO: 58.2 FL (ref 37–54)
EOSINOPHIL # BLD AUTO: 0.2 10*3/MM3 (ref 0–0.4)
EOSINOPHIL NFR BLD AUTO: 5.7 % (ref 0.3–6.2)
ERYTHROCYTE [DISTWIDTH] IN BLOOD BY AUTOMATED COUNT: 19.1 % (ref 12.3–15.4)
GFR SERPL CREATININE-BSD FRML MDRD: 57 ML/MIN/1.73
GLOBULIN UR ELPH-MCNC: 2.7 GM/DL
GLUCOSE BLDC GLUCOMTR-MCNC: 125 MG/DL (ref 70–105)
GLUCOSE BLDC GLUCOMTR-MCNC: 136 MG/DL (ref 70–105)
GLUCOSE BLDC GLUCOMTR-MCNC: 139 MG/DL (ref 70–105)
GLUCOSE SERPL-MCNC: 118 MG/DL (ref 65–99)
HCT VFR BLD AUTO: 28.7 % (ref 34–46.6)
HGB BLD-MCNC: 9 G/DL (ref 12–15.9)
LYMPHOCYTES # BLD AUTO: 1 10*3/MM3 (ref 0.7–3.1)
LYMPHOCYTES NFR BLD AUTO: 29.7 % (ref 19.6–45.3)
MCH RBC QN AUTO: 27 PG (ref 26.6–33)
MCHC RBC AUTO-ENTMCNC: 31.3 G/DL (ref 31.5–35.7)
MCV RBC AUTO: 86.3 FL (ref 79–97)
MONOCYTES # BLD AUTO: 0.3 10*3/MM3 (ref 0.1–0.9)
MONOCYTES NFR BLD AUTO: 8.6 % (ref 5–12)
NEUTROPHILS NFR BLD AUTO: 1.9 10*3/MM3 (ref 1.7–7)
NEUTROPHILS NFR BLD AUTO: 55 % (ref 42.7–76)
NRBC BLD AUTO-RTO: 0.1 /100 WBC (ref 0–0.2)
PLATELET # BLD AUTO: 170 10*3/MM3 (ref 140–450)
PMV BLD AUTO: 7 FL (ref 6–12)
POTASSIUM SERPL-SCNC: 4 MMOL/L (ref 3.5–5.2)
PROT SERPL-MCNC: 6.2 G/DL (ref 6–8.5)
RBC # BLD AUTO: 3.32 10*6/MM3 (ref 3.77–5.28)
SODIUM SERPL-SCNC: 140 MMOL/L (ref 136–145)
WBC # BLD AUTO: 3.5 10*3/MM3 (ref 3.4–10.8)

## 2021-11-02 PROCEDURE — 82962 GLUCOSE BLOOD TEST: CPT

## 2021-11-02 PROCEDURE — 85025 COMPLETE CBC W/AUTO DIFF WBC: CPT | Performed by: INTERNAL MEDICINE

## 2021-11-02 PROCEDURE — 99232 SBSQ HOSP IP/OBS MODERATE 35: CPT | Performed by: INTERNAL MEDICINE

## 2021-11-02 PROCEDURE — 80053 COMPREHEN METABOLIC PANEL: CPT | Performed by: INTERNAL MEDICINE

## 2021-11-02 PROCEDURE — 97162 PT EVAL MOD COMPLEX 30 MIN: CPT

## 2021-11-02 PROCEDURE — 25010000002 ONDANSETRON PER 1 MG: Performed by: NURSE PRACTITIONER

## 2021-11-02 PROCEDURE — 25010000002 CEFTRIAXONE PER 250 MG: Performed by: NURSE PRACTITIONER

## 2021-11-02 RX ORDER — GABAPENTIN 300 MG/1
300 CAPSULE ORAL 2 TIMES DAILY
Status: DISCONTINUED | OUTPATIENT
Start: 2021-11-02 | End: 2021-11-05 | Stop reason: HOSPADM

## 2021-11-02 RX ORDER — LOSARTAN POTASSIUM 50 MG/1
50 TABLET ORAL DAILY
Status: DISCONTINUED | OUTPATIENT
Start: 2021-11-02 | End: 2021-11-05 | Stop reason: HOSPADM

## 2021-11-02 RX ORDER — SPIRONOLACTONE 25 MG/1
25 TABLET ORAL DAILY
Status: DISCONTINUED | OUTPATIENT
Start: 2021-11-02 | End: 2021-11-05 | Stop reason: HOSPADM

## 2021-11-02 RX ORDER — AMLODIPINE BESYLATE 5 MG/1
5 TABLET ORAL EVERY MORNING
Status: DISCONTINUED | OUTPATIENT
Start: 2021-11-03 | End: 2021-11-05 | Stop reason: HOSPADM

## 2021-11-02 RX ORDER — TOPIRAMATE 25 MG/1
12.5 TABLET ORAL 2 TIMES DAILY
Status: DISCONTINUED | OUTPATIENT
Start: 2021-11-02 | End: 2021-11-05 | Stop reason: HOSPADM

## 2021-11-02 RX ORDER — ATORVASTATIN CALCIUM 10 MG/1
10 TABLET, FILM COATED ORAL DAILY
Status: DISCONTINUED | OUTPATIENT
Start: 2021-11-02 | End: 2021-11-05 | Stop reason: HOSPADM

## 2021-11-02 RX ORDER — ASPIRIN 81 MG/1
81 TABLET ORAL DAILY
Status: DISCONTINUED | OUTPATIENT
Start: 2021-11-02 | End: 2021-11-05 | Stop reason: HOSPADM

## 2021-11-02 RX ORDER — FUROSEMIDE 20 MG/1
20 TABLET ORAL DAILY
Status: DISCONTINUED | OUTPATIENT
Start: 2021-11-02 | End: 2021-11-05 | Stop reason: HOSPADM

## 2021-11-02 RX ADMIN — ACETAMINOPHEN 650 MG: 325 TABLET, FILM COATED ORAL at 07:51

## 2021-11-02 RX ADMIN — Medication 3 ML: at 09:52

## 2021-11-02 RX ADMIN — ACETAMINOPHEN 650 MG: 325 TABLET, FILM COATED ORAL at 20:29

## 2021-11-02 RX ADMIN — CEFTRIAXONE SODIUM 1 G: 1 INJECTION, POWDER, FOR SOLUTION INTRAMUSCULAR; INTRAVENOUS at 21:50

## 2021-11-02 RX ADMIN — TOPIRAMATE 12.5 MG: 25 TABLET, FILM COATED ORAL at 20:29

## 2021-11-02 RX ADMIN — GABAPENTIN 300 MG: 300 CAPSULE ORAL at 20:29

## 2021-11-02 RX ADMIN — Medication 5 MG: at 20:29

## 2021-11-02 RX ADMIN — ONDANSETRON 4 MG: 2 INJECTION INTRAMUSCULAR; INTRAVENOUS at 10:10

## 2021-11-02 NOTE — H&P (VIEW-ONLY)
Referring Provider: Ginny Coulter MD    Reason for follow-up:  Mobitz type II second-degree AV block.  Syncope  Status post loop recorder placement     Patient Care Team:  Gabriel Goss MD as PCP - General (Family Medicine)  Gregory Pinedo MD (Family Medicine)  Wang Plaza MD as Consulting Physician (Cardiology)    Subjective .  Feeling okay     ROS    Since I have last seen her yesterday, the patient has been without any chest discomfort ,shortness of breath, palpitations, dizziness or syncope.  Denies having any headache ,abdominal pain ,nausea, vomiting , diarrhea constipation, loss of weight or loss of appetite.  Denies having any excessive bruising ,hematuria or blood in the stool.    Review of all systems negative except as indicated    History  Past Medical History:   Diagnosis Date   • Atherosclerosis of native coronary artery of native heart without angina pectoris 10/22/2020   • Hypertension associated with stage 3 chronic kidney disease due to type 2 diabetes mellitus ([facility]) 10/22/2020   • Morbid obesity ([facility]) 10/22/2020   • Secondary hyperaldosteronism ([facility]) 10/22/2020   • Stage 3b chronic kidney disease ([facility]) 10/22/2020   • Type 2 diabetes mellitus with diabetic neuropathy, with long-term current use of insulin ([facility]) 10/22/2020   • Type 2 diabetes mellitus with diabetic neuropathy, with long-term current use of insulin ([facility]) 10/22/2020       Past Surgical History:   Procedure Laterality Date   • CARDIAC CATHETERIZATION N/A 10/23/2020    Procedure: Left Heart Cath and coronary angiogram;  Surgeon: Wang Plaza MD;  Location: [facility] INVASIVE LOCATION;  Service: Cardiovascular;  Laterality: N/A;   • HYSTERECTOMY     • JOINT REPLACEMENT Right     Hip   • JOINT REPLACEMENT Left     hip   • JOINT REPLACEMENT Left     hip (for second time)   • JOINT REPLACEMENT Right     knee   • OOPHORECTOMY         Family History   Problem Relation Age of Onset   • Heart disease Mother         Social History     Tobacco Use   • Smoking status: Never Smoker   • Smokeless tobacco: Never Used   Vaping Use   • Vaping Use: Never used   Substance Use Topics   • Alcohol use: Not Currently   • Drug use: Never        Medications Prior to Admission   Medication Sig Dispense Refill Last Dose   • amLODIPine (NORVASC) 5 MG tablet Take 5 mg by mouth Every Morning.   10/31/2021 at Unknown time   • aspirin 81 MG EC tablet Take 81 mg by mouth Daily.   10/31/2021 at Unknown time   • atorvastatin (LIPITOR) 10 MG tablet Take 10 mg by mouth Daily.   10/31/2021 at Unknown time   • furosemide (LASIX) 20 MG tablet Take 1 tablet by mouth Daily. 90 tablet 1 10/31/2021 at Unknown time   • gabapentin (NEURONTIN) 300 MG capsule Take 300 mg by mouth 2 (two) times a day.   10/31/2021 at Unknown time   • insulin NPH-insulin regular (humuLIN 70/30,novoLIN 70/30) (70-30) 100 UNIT/ML injection Inject 30 Units under the skin into the appropriate area as directed 2 (Two) Times a Day With Meals.   10/31/2021 at Unknown time   • losartan (Cozaar) 50 MG tablet Take 1 tablet by mouth Daily. 90 tablet 3 10/31/2021 at Unknown time   • metFORMIN (GLUCOPHAGE) 1000 MG tablet Take 1,000 mg by mouth 2 (Two) Times a Day With Meals.   10/31/2021 at Unknown time   • omeprazole (priLOSEC) 40 MG capsule Take 40 mg by mouth Every Morning.   10/31/2021 at Unknown time   • spironolactone (ALDACTONE) 25 MG tablet Take 1 tablet by mouth Daily.   10/31/2021 at Unknown time   • topiramate (TOPAMAX) 25 MG tablet Take 12.5 mg by mouth 2 (Two) Times a Day.   10/31/2021 at Unknown time   • acetaminophen-codeine (TYLENOL #3) 300-30 MG per tablet Take 1 tablet by mouth Every 6 (Six) Hours As Needed for Moderate Pain . (Patient taking differently: Take 1 tablet by mouth Every 6 (Six) Hours As Needed for Moderate Pain . Patient had prescription filled on 9/17/2021, quantity of 12 tabs with no remaining refills.) 12 tablet 0 Unknown at Unknown time  "      Allergies  Latex, natural rubber and Adhesive tape    Scheduled Meds:cefTRIAXone, 1 g, Intravenous, Q24H  insulin lispro, 0-7 Units, Subcutaneous, TID AC  sodium chloride, 3 mL, Intravenous, Q12H      Continuous Infusions:sodium chloride, 75 mL/hr, Last Rate: 75 mL/hr (11/01/21 2033)      PRN Meds:.•  acetaminophen **OR** acetaminophen **OR** acetaminophen  •  dextrose  •  dextrose  •  glucagon (human recombinant)  •  influenza vaccine  •  insulin lispro **AND** insulin lispro  •  melatonin  •  nitroglycerin  •  ondansetron **OR** ondansetron  •  [COMPLETED] Insert peripheral IV **AND** sodium chloride  •  sodium chloride    Objective     VITAL SIGNS  Vitals:    11/01/21 0533 11/01/21 1312 11/01/21 1926 11/02/21 0223   BP: 122/58 145/55 152/70 168/73   BP Location: Left arm Left arm Left arm Left arm   Patient Position: Lying Lying Lying Lying   Pulse: 81 87 66 91   Resp: 19 18 20 16   Temp: 98.6 °F (37 °C) 97.9 °F (36.6 °C) 98.4 °F (36.9 °C) 98.6 °F (37 °C)   TempSrc: Oral Oral Oral Oral   SpO2: 97% 96% 98% 95%   Weight: 111 kg (244 lb 4.3 oz)      Height:           Flowsheet Rows      First Filed Value   Admission Height 157.5 cm (62\") Documented at 10/31/2021 2206   Admission Weight 108 kg (238 lb) Documented at 10/31/2021 2206            Intake/Output Summary (Last 24 hours) at 11/2/2021 0627  Last data filed at 11/2/2021 0623  Gross per 24 hour   Intake --   Output 800 ml   Net -800 ml        TELEMETRY: Sinus rhythm first-degree AV block premature ventricular contractions.    Physical Exam:  The patient is alert, oriented and in no distress.  Vital signs as noted above.  Exogenous obesity.  Head and neck revealed no carotid bruits or jugular venous distention.  No thyromegaly or lymphadenopathy is present  Lungs clear.  No wheezing.  Breath sounds are normal bilaterally.  Heart normal first and second heart sounds.  No murmur. No precordial rub is present.  No gallop is present.  Abdomen soft and " nontender.  No organomegaly is present.  Extremities with good peripheral pulses without any pedal edema.  Skin warm and dry.  Musculoskeletal system is grossly normal  CNS grossly normal      Results Review:   I reviewed the patient's new clinical results.  Lab Results (last 24 hours)     Procedure Component Value Units Date/Time    Comprehensive Metabolic Panel [[id number]]  (Abnormal) Collected: 11/02/21 0435    Specimen: Blood Updated: 11/02/21 0625     Glucose 118 mg/dL      BUN 18 mg/dL      Creatinine 0.93 mg/dL      Sodium 140 mmol/L      Potassium 4.0 mmol/L      Chloride 109 mmol/L      CO2 20.0 mmol/L      Calcium 8.3 mg/dL      Total Protein 6.2 g/dL      Albumin 3.50 g/dL      ALT (SGPT) 16 U/L      AST (SGOT) 20 U/L      Alkaline Phosphatase 61 U/L      Total Bilirubin 0.3 mg/dL      eGFR Non African Amer 57 mL/min/1.73      Globulin 2.7 gm/dL      A/G Ratio 1.3 g/dL      BUN/Creatinine Ratio 19.4     Anion Gap 11.0 mmol/L     Narrative:      GFR Normal >60  Chronic Kidney Disease <60  Kidney Failure <15      CBC & Differential [[id number]]  (Abnormal) Collected: 11/02/21 0435    Specimen: Blood Updated: 11/02/21 0547    Narrative:      The following orders were created for panel order CBC & Differential.  Procedure                               Abnormality         Status                     ---------                               -----------         ------                     CBC Auto Differential[[id number]]        Abnormal            Final result                 Please view results for these tests on the individual orders.    CBC Auto Differential [[id number]]  (Abnormal) Collected: 11/02/21 0435    Specimen: Blood Updated: 11/02/21 0547     WBC 3.50 10*3/mm3      RBC 3.32 10*6/mm3      Hemoglobin 9.0 g/dL      Hematocrit 28.7 %      MCV 86.3 fL      MCH 27.0 pg      MCHC 31.3 g/dL      RDW 19.1 %      RDW-SD 58.2 fl      MPV 7.0 fL      Platelets 170 10*3/mm3      Neutrophil % 55.0 %      Lymphocyte %  29.7 %      Monocyte % 8.6 %      Eosinophil % 5.7 %      Basophil % 1.0 %      Neutrophils, Absolute 1.90 10*3/mm3      Lymphocytes, Absolute 1.00 10*3/mm3      Monocytes, Absolute 0.30 10*3/mm3      Eosinophils, Absolute 0.20 10*3/mm3      Basophils, Absolute 0.00 10*3/mm3      nRBC 0.1 /100 WBC     Urine Culture - Urine, Urine, Catheter In/Out [[id number]]  (Normal) Collected: 10/31/21 2238    Specimen: Urine, Catheter In/Out Updated: 11/01/21 2320     Urine Culture No growth    Blood Culture - Blood, Arm, Right [[id number]]  (Normal) Collected: 10/31/21 2306    Specimen: Blood from Arm, Right Updated: 11/01/21 2315     Blood Culture No growth at 24 hours    Blood Culture - Blood, Arm, Right [[id number]]  (Normal) Collected: 10/31/21 2229    Specimen: Blood from Arm, Right Updated: 11/01/21 2245     Blood Culture No growth at 24 hours    POC Glucose Once [[id number]]  (Abnormal) Collected: 11/01/21 1924    Specimen: Blood Updated: 11/01/21 1925     Glucose 180 mg/dL      Comment: Serial Number: [id number]:  [id number]       POC Glucose Once [[id number]]  (Abnormal) Collected: 11/01/21 1659    Specimen: Blood Updated: 11/01/21 1700     Glucose 115 mg/dL      Comment: Serial Number: [id number]:  [id number]       POC Glucose Once [[id number]]  (Abnormal) Collected: 11/01/21 1129    Specimen: Blood Updated: 11/01/21 1130     Glucose 142 mg/dL      Comment: Serial Number: [id number]:  [id number]       Hemoglobin A1c [[id number]]  (Abnormal) Collected: 11/01/21 0429    Specimen: Blood Updated: 11/01/21 1033     Hemoglobin A1C 6.2 %     Narrative:      Hemoglobin A1C Reference Range:    <5.7 %        Normal  5.7-6.4 %     Increased risk for diabetes  > 6.4 %        Diabetes       These guidelines have been recommended by the American Diabetic Association for Hgb A1c.      The following 2010 guidelines have been recommended by the American Diabetes Association for Hemoglobin A1c.    HBA1c 5.7-6.4% Increased  risk for future diabetes (pre-diabetes)  HBA1c     >6.4% Diabetes      POC Glucose Once [[id number]]  (Abnormal) Collected: 11/01/21 0701    Specimen: Blood Updated: 11/01/21 0702     Glucose 109 mg/dL      Comment: Serial Number: [id number]:  [id number]             Imaging Results (Last 24 Hours)     Procedure Component Value Units Date/Time    XR Hips Bilateral With or Without Pelvis 5 View [[id number]] Collected: 11/01/21 1425     Updated: 11/01/21 1429    Narrative:         DATE OF EXAM:   11/1/2021 2:21 PM     PROCEDURE:   XR HIPS BILATERAL W OR WO PELVIS 5 VIEW-     INDICATIONS:   pain after fall; R41.82-Altered mental status, unspecified;  N39.0-Urinary tract infection, site not specified; A49.9-Bacterial  infection, unspecified     COMPARISON:  9/7/2021     TECHNIQUE:   AP view of the pelvis and AP and lateral views of the left and right hip  were obtained     FINDINGS:   Bilateral hip prostheses noted. The bony pelvis appears intact. Disc  disease in the lower lumbar spine partially imaged. Sacral arcuate lines  are grossly intact within limits of overlying bowel gas. There is no  periprosthetic fracture at the left or right proximal femur.        Impression:      IMPRESSION :   1. Negative for acute fracture.  2. Intact bilateral hip prostheses.     Electronically Signed By-Benjamín Padron MD On:11/1/2021 2:27 PM  This report was finalized on [id number] by  Benjamín Padron MD.    XR Chest 1 View [[id number]] Collected: 11/01/21 0727     Updated: 11/01/21 0730    Narrative:         DATE OF EXAM:   10/31/2021 10:47 PM     PROCEDURE:   XR CHEST 1 VW-     INDICATIONS:   Altered mental status     COMPARISON:  10/21/2021     TECHNIQUE:   Portable chest radiograph.     FINDINGS:    The cardiomediastinal silhouette is within normal limits. The lungs are  clear. There is no pneumothorax, focal consolidation, or large pleural  effusion. Osseous structures grossly intact.        Impression:      No acute process.       Electronically Signed By-Benjamín Padron MD On:11/1/2021 7:28 AM  This report was finalized on [id number] by  Benjamín Padron MD.      LAB RESULTS (LAST 7 DAYS)    CBC  Results from last 7 days   Lab Units 11/02/21  0435 11/01/21  0429 10/31/21  2229   WBC 10*3/mm3 3.50 3.90 5.10   RBC 10*6/mm3 3.32* 2.99* 3.11*   HEMOGLOBIN g/dL 9.0* 8.2* 8.5*   HEMATOCRIT % 28.7* 26.3* 27.4*   MCV fL 86.3 88.0 88.0   PLATELETS 10*3/mm3 170 161 183       BMP  Results from last 7 days   Lab Units 11/02/21  0435 11/01/21  0429 10/31/21  2229   SODIUM mmol/L 140 140 138   POTASSIUM mmol/L 4.0 4.6 4.5   CHLORIDE mmol/L 109* 107 104   CO2 mmol/L 20.0* 16.0* 18.0*   BUN mg/dL 18 29* 30*   CREATININE mg/dL 0.93 1.45* 1.55*   GLUCOSE mg/dL 118* 120* 128*       CMP   Results from last 7 days   Lab Units 11/02/21  0435 11/01/21  0429 10/31/21  2229   SODIUM mmol/L 140 140 138   POTASSIUM mmol/L 4.0 4.6 4.5   CHLORIDE mmol/L 109* 107 104   CO2 mmol/L 20.0* 16.0* 18.0*   BUN mg/dL 18 29* 30*   CREATININE mg/dL 0.93 1.45* 1.55*   GLUCOSE mg/dL 118* 120* 128*   ALBUMIN g/dL 3.50  --  3.80   BILIRUBIN mg/dL 0.3  --  0.2   ALK PHOS U/L 61  --  70   AST (SGOT) U/L 20  --  32   ALT (SGPT) U/L 16  --  19         BNP        TROPONIN  Results from last 7 days   Lab Units 10/31/21  2229   TROPONIN T ng/mL 0.024       CoAg        Creatinine Clearance  Estimated Creatinine Clearance: 53 mL/min (by C-G formula based on SCr of 0.93 mg/dL).    ABG        Radiology  CT Head Without Contrast    Result Date: 11/1/2021  1. No acute intracranial abnormality. 2. No fracture the calvarium or skull base. 3. Mild chronic age-related intracranial findings, unchanged from prior.  This examination was interpreted by Isac David M.D. Electronically signed by:  Isac David M.D.  10/31/2021 10:14 PM    XR Chest 1 View    Result Date: 11/1/2021  No acute process.  Electronically Signed By-Benjamín Padron MD On:11/1/2021 7:28 AM This report was finalized on  [id number] by  Benjamín Padron MD.    XR Hips Bilateral With or Without Pelvis 5 View    Result Date: 11/1/2021  IMPRESSION : 1. Negative for acute fracture. 2. Intact bilateral hip prostheses.  Electronically Signed By-Benjamín Padron MD On:11/1/2021 2:27 PM This report was finalized on [id number] by  Benjamín Padron MD.              EKG                I personally viewed and interpreted the patient's EKG/Telemetry data: Sinus rhythm premature atrial contractions premature ventricular contractions.    ECHOCARDIOGRAM:    Results for orders placed during the hospital encounter of 09/07/21    Adult Transthoracic Echo Complete W/ Cont if Necessary Per Protocol    Interpretation Summary  · Estimated left ventricular EF = 60% Left ventricular systolic function is normal.    Indications  Syncope    Technically satisfactory study.  Mitral valve is structurally normal.  Tricuspid valve is structurally normal.  Aortic valve is structurally normal.  Pulmonic valve could not be well visualized.  No evidence for mitral tricuspid or aortic regurgitation is seen by Doppler study.  Left atrium is enlarged.  Right atrium is normal in size.  Left ventricle is normal in size and contractility with ejection fraction of 60%.  Right ventricle is normal in size.  Atrial septum is intact.  Aorta is normal.  No pericardial effusion or intracardiac thrombus is seen.    Impression  Left atrium is enlarged.  Structurally and functionally normal cardiac valves.  Left ventricular size and contractility is normal with ejection fraction of 60%.          STRESS MYOVIEW:    Cardiolite (Tc-99m Sestamibi) stress test    CARDIAC CATHETERIZATION:            OTHER:         Assessment/Plan     Active Problems:    Atrioventricular block, Mobitz type 1, Wenckebach    Stage 3b chronic kidney disease (CMS/HCC)    Syncope and collapse    Type 2 diabetes mellitus with diabetic neuropathy, with long-term current use of insulin ([facility])    Morbid obesity ([facility])     Hyperlipidemia    Hypertension    Acute UTI    GERD (gastroesophageal reflux disease)    Altered mental status    Normocytic anemia      ]]]]]]]]]]]]]]]]]]]]  Impression  ========  -Syncope  Intermittent Mobitz type II AV block  Patient is not on any medications to cause bradycardia     -Second-degree Mobitz type I AV block.  Narrow QRS complex.     Echocardiogram-normal 10/22/2020     Cardiac catheterization 10/23/2020 revealed:  Left ventricular size and contractility is normal with ejection fraction of 60%.  Normal epicardial coronary arteries.     Status post loop recorder placement 10/23/2020 ([employer])     -History of paroxysmal atrial fibrillation     -Hypertension dyslipidemia diabetes     -Renal dysfunction CKD 3  BUN 27 creatinine 1.35     -Exogenous obesity.     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     -No known allergies  ============  Plan  =========  Patient had an episode of syncope.  Patient continues to have Mobitz type II second-degree AV block.  Patient is not on any medications to cause AV blocks.  Patient had loop recorder placement in the past.     Hypertension-well-controlled     Status post loop recorder placement.  No significant dysrhythmia noted.     Renal dysfunction  BUN 20 creatinine 1.41-4/7/2021.  29/1.45-11/1/2020     Patient would benefit from permanent dual-chamber pacemaker implantation in view of continued AV blocks and having syncopal episode.  Fortunately patient did not have any traumatic fractures etc.     Risks and benefits pros and cons of the procedure were discussed with patient including infection bleeding pneumothorax blood clot dysrhythmia anesthetic risk etc.    We will explant loop recorder also.  Patient was educated about this.    Further plan will depend on patient's progress.  ]]]]]]]]]]]]]]            Wang Plaza MD  11/02/21  06:27 EDT

## 2021-11-02 NOTE — PLAN OF CARE
Goal Outcome Evaluation:  Plan of Care Reviewed With: patient        Progress: improving   Pt c/o right sided generalized pain relieved with tylenol. Currently on 2L O2. Pt will be NPO after light breakfast for pacemaker placement. Will continue to monitor...

## 2021-11-02 NOTE — PLAN OF CARE
Goal Outcome Evaluation:  Plan of Care Reviewed With: patient           Outcome Summary: 87 y/o female admitted on 10/31 due to fall with R hip pain and report of change in mental status. Xray indicated no acute injury to both hips and bilat prostheses intact. Pt +UTI. PMH includes DM with neuropathy, CKD, obesity. Pt normally ambulates in home using rw and independent with ADL's. At time of eval, pt wtih min limit AROM RLE due to pain. Pt needed mod A for supine<>sit transfer with HOB elevated. Pt could not tolerate sit to stand due to pain. Also scheduled for pacemaker placement this date thus returned supine. Pt will likely need short IP rehab at dc as she will be restricted in LUE use in addition to severe RLE pain.

## 2021-11-02 NOTE — PROGRESS NOTES
"    [facility] Medicine Services Daily Progress Note    Patient Name: Aracelis Vargas  : 1935  MRN: [id number]  Primary Care Physician:  Gabriel Goss MD  Date of admission: 10/31/2021      Subjective      Chief Complaint: Confusion  Aracelis Vargas is a 86 y.o. female who presented to [facility] on 10/31/2021 with reported altered mental status per EMS.  She is currently awake and alert and answering appropriately.  She is oriented to person and place.  She denies any chest pain, shortness of air, headache, focal weakness, blurry vision, confusion, or dye Boyd, fever cough or congestion.  CT head per radiology today:     \" 1. No acute intracranial abnormality.  2. No fracture the calvarium or skull base.  3. Mild chronic age-related intracranial findings, unchanged from prior.  \"     Troponin 0 0.024 proBNP 792.7 glucose was 128 on admission.  Creatinine was 1.55 with BUN 30 WBC not elevated, hemoglobin 8.5.  Urinalysis was positive for bacteria/trace WBCs 13-20 2+ leukocytes negative nitrites.  She was started on IV ceftriaxone with urine cultures pending.  IV fluids initiated.  He has a past medical history of coronary artery disease status post CABG, anemia, diabetes mellitus type 2, hyperlipidemia, hypertension, neuropathy, carotid stenosis, COPD and chronic back pain.  She will be admitted for further evaluation and treatment.  She was COVID-19 negative.     2021  Patient seen and examined.  Patient on empiric treatment for UTI   Urine culture pending  Xray for pain (hx of fall): Negative for fracture, prosthetic intact       2021  Patient seen and examined.  Urine culture negative (final)  Blood cultures: NGTD  PT/OT: Inpatient rehab recommended.  Patient prefers to be discharged to home  Cardio: Pacemaker for AV blocks      Review of Systems   Constitutional: Negative.   HENT: Negative.    Eyes: Negative.    Cardiovascular: Negative.    Respiratory: " Negative.    Endocrine: Negative.    Hematologic/Lymphatic: Negative.    Skin: Negative.    Musculoskeletal: Positive for muscle weakness.   Gastrointestinal: Negative.    Genitourinary: Negative.    Neurological: Negative.    Psychiatric/Behavioral: Negative.    Allergic/Immunologic: Negative.    ROS       Objective      Vitals:   Temp:  [97.9 °F (36.6 °C)-98.6 °F (37 °C)] 98.6 °F (37 °C)  Heart Rate:  [66-91] 91  Resp:  [16-20] 16  BP: (145-168)/(55-73) 168/73  Flow (L/min):  [2] 2    Physical Exam   Vitals reviewed.   Constitutional:       Appearance: Normal appearance. She is obese.   HENT:      Head: Normocephalic and atraumatic.      Right Ear: External ear normal.      Left Ear: External ear normal.      Nose: Nose normal.      Mouth/Throat:      Mouth: Mucous membranes are moist.   Eyes:      Extraocular Movements: Extraocular movements intact.   Cardiovascular:      Rate and Rhythm: Normal rate and regular rhythm.      Pulses: Normal pulses.      Heart sounds: Normal heart sounds.   Pulmonary:      Effort: Pulmonary effort is normal.      Breath sounds: Normal breath sounds.   Abdominal:      Palpations: Abdomen is soft.   Genitourinary:     Comments: deferred  Musculoskeletal:         General: Normal range of motion.      Cervical back: Normal range of motion and neck supple.   Skin:     General: Skin is warm and dry.   Neurological:      General: No focal deficit present.      Mental Status: She is alert.      Comments: Oriented x2   Psychiatric:         Mood and Affect: Mood normal.         Behavior: Behavior normal.         Thought Content: Thought content normal.         Judgment: Judgment normal.      Result Review    Result Review:  I have personally reviewed the results from the time of this admission to 11/2/2021 07:54 EDT and agree with these findings:  [x]  Laboratory  [x]  Microbiology  [x]  Radiology  []  EKG/Telemetry   []  Cardiology/Vascular   []  Pathology  []  Old records  []   Other:            Assessment/Plan      Brief Patient Summary:  Aracelis Vargas is a 86 y.o. female who was admitted for change in mentation.  Admission labs revealed UTI.  Urine culture pending    cefTRIAXone, 1 g, Intravenous, Q24H  insulin lispro, 0-7 Units, Subcutaneous, TID AC  sodium chloride, 3 mL, Intravenous, Q12H       sodium chloride, 75 mL/hr, Last Rate: 75 mL/hr (11/01/21 2033)         Active Hospital Problems:  Active Hospital Problems    Diagnosis    • Normocytic anemia    • Altered mental status    • Acute UTI    • GERD (gastroesophageal reflux disease)    • Hypertension    • Hyperlipidemia    • Morbid obesity ([facility])    • Type 2 diabetes mellitus with diabetic neuropathy, with long-term current use of insulin ([facility])    • Stage 3b chronic kidney disease (CMS/[facility])    • Syncope and collapse    • Atrioventricular block, Mobitz type 1, Mitch      Added automatically from request for surgery [id number]       Plan:   Altered mental status, appears resolved oriented x2 awake alert and answering appropriately, CT head negative for acute per radiology, likely secondary to below     Acute UTI, trace bacteria elevated WBC urine, positive leukocytes continue IV ceftriaxone with urine culture pending     Acute on chronic stage IIIb chronic kidney disease creatinine 1.55 elevated from previous normal saline IV fluid hydration repeat BMP in a.m. avoid nephrotoxic meds     Normocytic anemia hemoglobin 8.5 MCV 88 hemoglobin was 9.2 previously no signs of bleeding monitor CBC     Hypertension, controlled on home meds Home meds unverified at this time monitor BP will add as needed medication if needed     Type 2 diabetes mellitus with diabetic neuropathy, home meds unverified at this time serum glucose 128, add SSI as needed and Accu-Cheks AC at bedtime A1c in a.m. low concentrated sweet diet     Hyperlipidemia Home meds unverified at this time reorder pending verification from pharmacy     Morbid obesity BMI 44.7 lifestyle  management education    DVT prophylaxis:  Mechanical DVT prophylaxis orders are present.    CODE STATUS:    Code Status (Patient has no pulse and is not breathing): CPR (Attempt to Resuscitate)  Medical Interventions (Patient has pulse or is breathing): Full      Disposition:  I expect patient to be discharged 3 to 4 days.    This patient has been examined wearing appropriate Personal Protective Equipment and discussed with hospital infection control department. 11/02/21      Electronically signed by Ginny Coulter MD, 11/02/21, 07:54 EDT.  [facility] Hospitalist Team

## 2021-11-02 NOTE — PROGRESS NOTES
Referring Provider: Ginny Coulter MD    Reason for follow-up:  Mobitz type II second-degree AV block.  Syncope  Status post loop recorder placement     Patient Care Team:  Gabriel Goss MD as PCP - General (Family Medicine)  Gregory Pinedo MD (Family Medicine)  Wang Plaza MD as Consulting Physician (Cardiology)    Subjective .  Feeling okay     ROS    Since I have last seen her yesterday, the patient has been without any chest discomfort ,shortness of breath, palpitations, dizziness or syncope.  Denies having any headache ,abdominal pain ,nausea, vomiting , diarrhea constipation, loss of weight or loss of appetite.  Denies having any excessive bruising ,hematuria or blood in the stool.    Review of all systems negative except as indicated    History  Past Medical History:   Diagnosis Date   • Atherosclerosis of native coronary artery of native heart without angina pectoris 10/22/2020   • Hypertension associated with stage 3 chronic kidney disease due to type 2 diabetes mellitus ([facility]) 10/22/2020   • Morbid obesity ([facility]) 10/22/2020   • Secondary hyperaldosteronism ([facility]) 10/22/2020   • Stage 3b chronic kidney disease ([facility]) 10/22/2020   • Type 2 diabetes mellitus with diabetic neuropathy, with long-term current use of insulin ([facility]) 10/22/2020   • Type 2 diabetes mellitus with diabetic neuropathy, with long-term current use of insulin ([facility]) 10/22/2020       Past Surgical History:   Procedure Laterality Date   • CARDIAC CATHETERIZATION N/A 10/23/2020    Procedure: Left Heart Cath and coronary angiogram;  Surgeon: Wang Plaza MD;  Location: [facility] INVASIVE LOCATION;  Service: Cardiovascular;  Laterality: N/A;   • HYSTERECTOMY     • JOINT REPLACEMENT Right     Hip   • JOINT REPLACEMENT Left     hip   • JOINT REPLACEMENT Left     hip (for second time)   • JOINT REPLACEMENT Right     knee   • OOPHORECTOMY         Family History   Problem Relation Age of Onset   • Heart disease Mother         Social History     Tobacco Use   • Smoking status: Never Smoker   • Smokeless tobacco: Never Used   Vaping Use   • Vaping Use: Never used   Substance Use Topics   • Alcohol use: Not Currently   • Drug use: Never        Medications Prior to Admission   Medication Sig Dispense Refill Last Dose   • amLODIPine (NORVASC) 5 MG tablet Take 5 mg by mouth Every Morning.   10/31/2021 at Unknown time   • aspirin 81 MG EC tablet Take 81 mg by mouth Daily.   10/31/2021 at Unknown time   • atorvastatin (LIPITOR) 10 MG tablet Take 10 mg by mouth Daily.   10/31/2021 at Unknown time   • furosemide (LASIX) 20 MG tablet Take 1 tablet by mouth Daily. 90 tablet 1 10/31/2021 at Unknown time   • gabapentin (NEURONTIN) 300 MG capsule Take 300 mg by mouth 2 (two) times a day.   10/31/2021 at Unknown time   • insulin NPH-insulin regular (humuLIN 70/30,novoLIN 70/30) (70-30) 100 UNIT/ML injection Inject 30 Units under the skin into the appropriate area as directed 2 (Two) Times a Day With Meals.   10/31/2021 at Unknown time   • losartan (Cozaar) 50 MG tablet Take 1 tablet by mouth Daily. 90 tablet 3 10/31/2021 at Unknown time   • metFORMIN (GLUCOPHAGE) 1000 MG tablet Take 1,000 mg by mouth 2 (Two) Times a Day With Meals.   10/31/2021 at Unknown time   • omeprazole (priLOSEC) 40 MG capsule Take 40 mg by mouth Every Morning.   10/31/2021 at Unknown time   • spironolactone (ALDACTONE) 25 MG tablet Take 1 tablet by mouth Daily.   10/31/2021 at Unknown time   • topiramate (TOPAMAX) 25 MG tablet Take 12.5 mg by mouth 2 (Two) Times a Day.   10/31/2021 at Unknown time   • acetaminophen-codeine (TYLENOL #3) 300-30 MG per tablet Take 1 tablet by mouth Every 6 (Six) Hours As Needed for Moderate Pain . (Patient taking differently: Take 1 tablet by mouth Every 6 (Six) Hours As Needed for Moderate Pain . Patient had prescription filled on 9/17/2021, quantity of 12 tabs with no remaining refills.) 12 tablet 0 Unknown at Unknown time  "      Allergies  Latex, natural rubber and Adhesive tape    Scheduled Meds:cefTRIAXone, 1 g, Intravenous, Q24H  insulin lispro, 0-7 Units, Subcutaneous, TID AC  sodium chloride, 3 mL, Intravenous, Q12H      Continuous Infusions:sodium chloride, 75 mL/hr, Last Rate: 75 mL/hr (11/01/21 2033)      PRN Meds:.•  acetaminophen **OR** acetaminophen **OR** acetaminophen  •  dextrose  •  dextrose  •  glucagon (human recombinant)  •  influenza vaccine  •  insulin lispro **AND** insulin lispro  •  melatonin  •  nitroglycerin  •  ondansetron **OR** ondansetron  •  [COMPLETED] Insert peripheral IV **AND** sodium chloride  •  sodium chloride    Objective     VITAL SIGNS  Vitals:    11/01/21 0533 11/01/21 1312 11/01/21 1926 11/02/21 0223   BP: 122/58 145/55 152/70 168/73   BP Location: Left arm Left arm Left arm Left arm   Patient Position: Lying Lying Lying Lying   Pulse: 81 87 66 91   Resp: 19 18 20 16   Temp: 98.6 °F (37 °C) 97.9 °F (36.6 °C) 98.4 °F (36.9 °C) 98.6 °F (37 °C)   TempSrc: Oral Oral Oral Oral   SpO2: 97% 96% 98% 95%   Weight: 111 kg (244 lb 4.3 oz)      Height:           Flowsheet Rows      First Filed Value   Admission Height 157.5 cm (62\") Documented at 10/31/2021 2206   Admission Weight 108 kg (238 lb) Documented at 10/31/2021 2206            Intake/Output Summary (Last 24 hours) at 11/2/2021 0627  Last data filed at 11/2/2021 0623  Gross per 24 hour   Intake --   Output 800 ml   Net -800 ml        TELEMETRY: Sinus rhythm first-degree AV block premature ventricular contractions.    Physical Exam:  The patient is alert, oriented and in no distress.  Vital signs as noted above.  Exogenous obesity.  Head and neck revealed no carotid bruits or jugular venous distention.  No thyromegaly or lymphadenopathy is present  Lungs clear.  No wheezing.  Breath sounds are normal bilaterally.  Heart normal first and second heart sounds.  No murmur. No precordial rub is present.  No gallop is present.  Abdomen soft and " nontender.  No organomegaly is present.  Extremities with good peripheral pulses without any pedal edema.  Skin warm and dry.  Musculoskeletal system is grossly normal  CNS grossly normal      Results Review:   I reviewed the patient's new clinical results.  Lab Results (last 24 hours)     Procedure Component Value Units Date/Time    Comprehensive Metabolic Panel [[id number]]  (Abnormal) Collected: 11/02/21 0435    Specimen: Blood Updated: 11/02/21 0625     Glucose 118 mg/dL      BUN 18 mg/dL      Creatinine 0.93 mg/dL      Sodium 140 mmol/L      Potassium 4.0 mmol/L      Chloride 109 mmol/L      CO2 20.0 mmol/L      Calcium 8.3 mg/dL      Total Protein 6.2 g/dL      Albumin 3.50 g/dL      ALT (SGPT) 16 U/L      AST (SGOT) 20 U/L      Alkaline Phosphatase 61 U/L      Total Bilirubin 0.3 mg/dL      eGFR Non African Amer 57 mL/min/1.73      Globulin 2.7 gm/dL      A/G Ratio 1.3 g/dL      BUN/Creatinine Ratio 19.4     Anion Gap 11.0 mmol/L     Narrative:      GFR Normal >60  Chronic Kidney Disease <60  Kidney Failure <15      CBC & Differential [[id number]]  (Abnormal) Collected: 11/02/21 0435    Specimen: Blood Updated: 11/02/21 0547    Narrative:      The following orders were created for panel order CBC & Differential.  Procedure                               Abnormality         Status                     ---------                               -----------         ------                     CBC Auto Differential[[id number]]        Abnormal            Final result                 Please view results for these tests on the individual orders.    CBC Auto Differential [[id number]]  (Abnormal) Collected: 11/02/21 0435    Specimen: Blood Updated: 11/02/21 0547     WBC 3.50 10*3/mm3      RBC 3.32 10*6/mm3      Hemoglobin 9.0 g/dL      Hematocrit 28.7 %      MCV 86.3 fL      MCH 27.0 pg      MCHC 31.3 g/dL      RDW 19.1 %      RDW-SD 58.2 fl      MPV 7.0 fL      Platelets 170 10*3/mm3      Neutrophil % 55.0 %      Lymphocyte %  29.7 %      Monocyte % 8.6 %      Eosinophil % 5.7 %      Basophil % 1.0 %      Neutrophils, Absolute 1.90 10*3/mm3      Lymphocytes, Absolute 1.00 10*3/mm3      Monocytes, Absolute 0.30 10*3/mm3      Eosinophils, Absolute 0.20 10*3/mm3      Basophils, Absolute 0.00 10*3/mm3      nRBC 0.1 /100 WBC     Urine Culture - Urine, Urine, Catheter In/Out [[id number]]  (Normal) Collected: 10/31/21 2238    Specimen: Urine, Catheter In/Out Updated: 11/01/21 2320     Urine Culture No growth    Blood Culture - Blood, Arm, Right [[id number]]  (Normal) Collected: 10/31/21 2306    Specimen: Blood from Arm, Right Updated: 11/01/21 2315     Blood Culture No growth at 24 hours    Blood Culture - Blood, Arm, Right [[id number]]  (Normal) Collected: 10/31/21 2229    Specimen: Blood from Arm, Right Updated: 11/01/21 2245     Blood Culture No growth at 24 hours    POC Glucose Once [[id number]]  (Abnormal) Collected: 11/01/21 1924    Specimen: Blood Updated: 11/01/21 1925     Glucose 180 mg/dL      Comment: Serial Number: [id number]:  [id number]       POC Glucose Once [[id number]]  (Abnormal) Collected: 11/01/21 1659    Specimen: Blood Updated: 11/01/21 1700     Glucose 115 mg/dL      Comment: Serial Number: [id number]:  [id number]       POC Glucose Once [[id number]]  (Abnormal) Collected: 11/01/21 1129    Specimen: Blood Updated: 11/01/21 1130     Glucose 142 mg/dL      Comment: Serial Number: [id number]:  [id number]       Hemoglobin A1c [[id number]]  (Abnormal) Collected: 11/01/21 0429    Specimen: Blood Updated: 11/01/21 1033     Hemoglobin A1C 6.2 %     Narrative:      Hemoglobin A1C Reference Range:    <5.7 %        Normal  5.7-6.4 %     Increased risk for diabetes  > 6.4 %        Diabetes       These guidelines have been recommended by the American Diabetic Association for Hgb A1c.      The following 2010 guidelines have been recommended by the American Diabetes Association for Hemoglobin A1c.    HBA1c 5.7-6.4% Increased  risk for future diabetes (pre-diabetes)  HBA1c     >6.4% Diabetes      POC Glucose Once [[id number]]  (Abnormal) Collected: 11/01/21 0701    Specimen: Blood Updated: 11/01/21 0702     Glucose 109 mg/dL      Comment: Serial Number: [id number]:  [id number]             Imaging Results (Last 24 Hours)     Procedure Component Value Units Date/Time    XR Hips Bilateral With or Without Pelvis 5 View [[id number]] Collected: 11/01/21 1425     Updated: 11/01/21 1429    Narrative:         DATE OF EXAM:   11/1/2021 2:21 PM     PROCEDURE:   XR HIPS BILATERAL W OR WO PELVIS 5 VIEW-     INDICATIONS:   pain after fall; R41.82-Altered mental status, unspecified;  N39.0-Urinary tract infection, site not specified; A49.9-Bacterial  infection, unspecified     COMPARISON:  9/7/2021     TECHNIQUE:   AP view of the pelvis and AP and lateral views of the left and right hip  were obtained     FINDINGS:   Bilateral hip prostheses noted. The bony pelvis appears intact. Disc  disease in the lower lumbar spine partially imaged. Sacral arcuate lines  are grossly intact within limits of overlying bowel gas. There is no  periprosthetic fracture at the left or right proximal femur.        Impression:      IMPRESSION :   1. Negative for acute fracture.  2. Intact bilateral hip prostheses.     Electronically Signed By-Benjamín Padron MD On:11/1/2021 2:27 PM  This report was finalized on [id number] by  Benjamín Padron MD.    XR Chest 1 View [[id number]] Collected: 11/01/21 0727     Updated: 11/01/21 0730    Narrative:         DATE OF EXAM:   10/31/2021 10:47 PM     PROCEDURE:   XR CHEST 1 VW-     INDICATIONS:   Altered mental status     COMPARISON:  10/21/2021     TECHNIQUE:   Portable chest radiograph.     FINDINGS:    The cardiomediastinal silhouette is within normal limits. The lungs are  clear. There is no pneumothorax, focal consolidation, or large pleural  effusion. Osseous structures grossly intact.        Impression:      No acute process.       Electronically Signed By-Benjamín Padron MD On:11/1/2021 7:28 AM  This report was finalized on [id number] by  Benjamín Padron MD.      LAB RESULTS (LAST 7 DAYS)    CBC  Results from last 7 days   Lab Units 11/02/21  0435 11/01/21  0429 10/31/21  2229   WBC 10*3/mm3 3.50 3.90 5.10   RBC 10*6/mm3 3.32* 2.99* 3.11*   HEMOGLOBIN g/dL 9.0* 8.2* 8.5*   HEMATOCRIT % 28.7* 26.3* 27.4*   MCV fL 86.3 88.0 88.0   PLATELETS 10*3/mm3 170 161 183       BMP  Results from last 7 days   Lab Units 11/02/21  0435 11/01/21  0429 10/31/21  2229   SODIUM mmol/L 140 140 138   POTASSIUM mmol/L 4.0 4.6 4.5   CHLORIDE mmol/L 109* 107 104   CO2 mmol/L 20.0* 16.0* 18.0*   BUN mg/dL 18 29* 30*   CREATININE mg/dL 0.93 1.45* 1.55*   GLUCOSE mg/dL 118* 120* 128*       CMP   Results from last 7 days   Lab Units 11/02/21  0435 11/01/21  0429 10/31/21  2229   SODIUM mmol/L 140 140 138   POTASSIUM mmol/L 4.0 4.6 4.5   CHLORIDE mmol/L 109* 107 104   CO2 mmol/L 20.0* 16.0* 18.0*   BUN mg/dL 18 29* 30*   CREATININE mg/dL 0.93 1.45* 1.55*   GLUCOSE mg/dL 118* 120* 128*   ALBUMIN g/dL 3.50  --  3.80   BILIRUBIN mg/dL 0.3  --  0.2   ALK PHOS U/L 61  --  70   AST (SGOT) U/L 20  --  32   ALT (SGPT) U/L 16  --  19         BNP        TROPONIN  Results from last 7 days   Lab Units 10/31/21  2229   TROPONIN T ng/mL 0.024       CoAg        Creatinine Clearance  Estimated Creatinine Clearance: 53 mL/min (by C-G formula based on SCr of 0.93 mg/dL).    ABG        Radiology  CT Head Without Contrast    Result Date: 11/1/2021  1. No acute intracranial abnormality. 2. No fracture the calvarium or skull base. 3. Mild chronic age-related intracranial findings, unchanged from prior.  This examination was interpreted by Isac David M.D. Electronically signed by:  Isac David M.D.  10/31/2021 10:14 PM    XR Chest 1 View    Result Date: 11/1/2021  No acute process.  Electronically Signed By-Benjamín Padron MD On:11/1/2021 7:28 AM This report was finalized on  [id number] by  Benjamín Padron MD.    XR Hips Bilateral With or Without Pelvis 5 View    Result Date: 11/1/2021  IMPRESSION : 1. Negative for acute fracture. 2. Intact bilateral hip prostheses.  Electronically Signed By-Benjamín Padron MD On:11/1/2021 2:27 PM This report was finalized on [id number] by  Benjamín Padron MD.              EKG                I personally viewed and interpreted the patient's EKG/Telemetry data: Sinus rhythm premature atrial contractions premature ventricular contractions.    ECHOCARDIOGRAM:    Results for orders placed during the hospital encounter of 09/07/21    Adult Transthoracic Echo Complete W/ Cont if Necessary Per Protocol    Interpretation Summary  · Estimated left ventricular EF = 60% Left ventricular systolic function is normal.    Indications  Syncope    Technically satisfactory study.  Mitral valve is structurally normal.  Tricuspid valve is structurally normal.  Aortic valve is structurally normal.  Pulmonic valve could not be well visualized.  No evidence for mitral tricuspid or aortic regurgitation is seen by Doppler study.  Left atrium is enlarged.  Right atrium is normal in size.  Left ventricle is normal in size and contractility with ejection fraction of 60%.  Right ventricle is normal in size.  Atrial septum is intact.  Aorta is normal.  No pericardial effusion or intracardiac thrombus is seen.    Impression  Left atrium is enlarged.  Structurally and functionally normal cardiac valves.  Left ventricular size and contractility is normal with ejection fraction of 60%.          STRESS MYOVIEW:    Cardiolite (Tc-99m Sestamibi) stress test    CARDIAC CATHETERIZATION:            OTHER:         Assessment/Plan     Active Problems:    Atrioventricular block, Mobitz type 1, Wenckebach    Stage 3b chronic kidney disease (CMS/HCC)    Syncope and collapse    Type 2 diabetes mellitus with diabetic neuropathy, with long-term current use of insulin ([facility])    Morbid obesity ([facility])     Hyperlipidemia    Hypertension    Acute UTI    GERD (gastroesophageal reflux disease)    Altered mental status    Normocytic anemia      ]]]]]]]]]]]]]]]]]]]]  Impression  ========  -Syncope  Intermittent Mobitz type II AV block  Patient is not on any medications to cause bradycardia     -Second-degree Mobitz type I AV block.  Narrow QRS complex.     Echocardiogram-normal 10/22/2020     Cardiac catheterization 10/23/2020 revealed:  Left ventricular size and contractility is normal with ejection fraction of 60%.  Normal epicardial coronary arteries.     Status post loop recorder placement 10/23/2020 ([employer])     -History of paroxysmal atrial fibrillation     -Hypertension dyslipidemia diabetes     -Renal dysfunction CKD 3  BUN 27 creatinine 1.35     -Exogenous obesity.     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     -No known allergies  ============  Plan  =========  Patient had an episode of syncope.  Patient continues to have Mobitz type II second-degree AV block.  Patient is not on any medications to cause AV blocks.  Patient had loop recorder placement in the past.     Hypertension-well-controlled     Status post loop recorder placement.  No significant dysrhythmia noted.     Renal dysfunction  BUN 20 creatinine 1.41-4/7/2021.  29/1.45-11/1/2020     Patient would benefit from permanent dual-chamber pacemaker implantation in view of continued AV blocks and having syncopal episode.  Fortunately patient did not have any traumatic fractures etc.     Risks and benefits pros and cons of the procedure were discussed with patient including infection bleeding pneumothorax blood clot dysrhythmia anesthetic risk etc.    We will explant loop recorder also.  Patient was educated about this.    Further plan will depend on patient's progress.  ]]]]]]]]]]]]]]            Wang Plaza MD  11/02/21  06:27 EDT

## 2021-11-02 NOTE — THERAPY EVALUATION
Patient Name: Aracelis Vargas  : 1935    MRN: [id number]                              Today's Date: 2021       Admit Date: 10/31/2021    Visit Dx:     ICD-10-CM ICD-9-CM   1. Second degree AV block, Mobitz type II  I44.1 426.12   2. Altered mental status, unspecified altered mental status type  R41.82 780.97   3. UTI (urinary tract infection), bacterial  N39.0 599.0    A49.9 041.9   4. Atrioventricular block, Mobitz type 1, Wenckebach  I44.1 426.13   5. Syncope and collapse  R55 780.2     Patient Active Problem List   Diagnosis   • Fall   • DM type 2 with diabetic dyslipidemia (HCC)   • Secondary hyperaldosteronism (HCC)   • Stage 3b chronic kidney disease (CMS/HCC)   • Hypertension associated with stage 3 chronic kidney disease due to type 2 diabetes mellitus (HCC)   • Syncope and collapse   • Type 2 diabetes mellitus with diabetic neuropathy, with long-term current use of insulin ([facility])   • Morbid obesity ([facility])   • Atherosclerosis of native coronary artery of native heart without angina pectoris   • Atrioventricular block, Mobitz type 1, Wenckebach   • Abnormal nuclear stress test   • Hyperlipidemia   • Hypertension   • Acute UTI   • GERD (gastroesophageal reflux disease)   • Right hip pain   • Altered mental status   • Normocytic anemia     Past Medical History:   Diagnosis Date   • Atherosclerosis of native coronary artery of native heart without angina pectoris 10/22/2020   • Hypertension associated with stage 3 chronic kidney disease due to type 2 diabetes mellitus ([facility]) 10/22/2020   • Morbid obesity ([facility]) 10/22/2020   • Secondary hyperaldosteronism ([facility]) 10/22/2020   • Stage 3b chronic kidney disease ([facility]) 10/22/2020   • Type 2 diabetes mellitus with diabetic neuropathy, with long-term current use of insulin ([facility]) 10/22/2020   • Type 2 diabetes mellitus with diabetic neuropathy, with long-term current use of insulin ([facility]) 10/22/2020     Past Surgical History:   Procedure Laterality Date   • CARDIAC  CATHETERIZATION N/A 10/23/2020    Procedure: Left Heart Cath and coronary angiogram;  Surgeon: Wang Plaza MD;  Location: [facility] CATH INVASIVE LOCATION;  Service: Cardiovascular;  Laterality: N/A;   • HYSTERECTOMY     • JOINT REPLACEMENT Right     Hip   • JOINT REPLACEMENT Left     hip   • JOINT REPLACEMENT Left     hip (for second time)   • JOINT REPLACEMENT Right     knee   • OOPHORECTOMY        General Information     Row Name 11/02/21 1436          Physical Therapy Time and Intention    Document Type evaluation  -CR     Mode of Treatment physical therapy  -CR     Row Name 11/02/21 1436          General Information    Patient Profile Reviewed yes  -CR     Prior Level of Function independent:; all household mobility  using rw  -CR     Existing Precautions/Restrictions fall  -CR     Barriers to Rehab medically complex  -CR     Row Name 11/02/21 1436          Living Environment    Lives With spouse  -CR     Row Name 11/02/21 1436          Home Main Entrance    Number of Stairs, Main Entrance one  -CR     Row Name 11/02/21 1436          Stairs Within Home, Primary    Stairs, Within Home, Primary basement but has chair lift  -CR     Row Name 11/02/21 1436          Cognition    Orientation Status (Cognition) oriented x 4  -CR     Row Name 11/02/21 1436          Safety Issues, Functional Mobility    Safety Issues Affecting Function (Mobility) insight into deficits/self-awareness  -CR     Impairments Affecting Function (Mobility) pain; strength; range of motion (ROM)  -CR           User Key  (r) = Recorded By, (t) = Taken By, (c) = Cosigned By    Initials Name Provider Type    CR Reyes, Carmela, PT Physical Therapist               Mobility     Row Name 11/02/21 1438          Bed Mobility    Bed Mobility supine-sit; sit-supine  -CR     Supine-Sit [place] (Bed Mobility) moderate assist (50% patient effort); verbal cues  -CR     Sit-Supine [place] (Bed Mobility) moderate assist (50% patient effort); verbal  cues  -CR     Assistive Device (Bed Mobility) bed rails; head of bed elevated; draw sheet  -CR     Comment (Bed Mobility) able to scoot up to head of bed while sitting with mod A  -CR     Row Name 11/02/21 1438          Transfers    Comment (Transfers) too painful to attempt standing  -CR           User Key  (r) = Recorded By, (t) = Taken By, (c) = Cosigned By    Initials Name Provider Type    CR Reyes, Carmela, PT Physical Therapist               Obj/Interventions     Row Name 11/02/21 1438          Range of Motion Comprehensive    Comment, General Range of Motion AROM LLE wfl, min limi RLE due to pain  -CR     Row Name 11/02/21 1438          Strength Comprehensive (MMT)    Comment, General Manual Muscle Testing (MMT) Assessment LLE Grossly 4/5; RLE grossly 3-/5  -CR     Row Name 11/02/21 1438          Balance    Balance Assessment sitting static balance; sitting dynamic balance  -CR     Static Sitting Balance WFL; sitting, edge of bed  -CR     Dynamic Sitting Balance WFL; sitting, edge of bed  -CR     Row Name 11/02/21 1438          Sensory Assessment (Somatosensory)    Sensory Assessment (Somatosensory) --  neuropathy  -CR           User Key  (r) = Recorded By, (t) = Taken By, (c) = Cosigned By    Initials Name Provider Type    CR Reyes, Carmela, PT Physical Therapist               Goals/Plan     Row Name 11/02/21 1450          Bed Mobility Goal 1 (PT)    Activity/Assistive Device (Bed Mobility Goal 1, PT) sit to supine/supine to sit; other (see comments)  following pacemaker precautions  -CR     [place] Level/Cues Needed (Bed Mobility Goal 1, PT) standby assist  -CR     Time Frame (Bed Mobility Goal 1, PT) 1 week  -CR     Row Name 11/02/21 1450          Transfer Goal 1 (PT)    Activity/Assistive Device (Transfer Goal 1, PT) transfers, all; walker, zelda; cane, straight  -CR     [place] Level/Cues Needed (Transfer Goal 1, PT) minimum assist (75% or more patient effort)  -CR     Time Frame (Transfer Goal  1, PT) 1 week  -CR     Row Name 11/02/21 1450          Gait Training Goal 1 (PT)    Activity/Assistive Device (Gait Training Goal 1, PT) gait (walking locomotion); assistive device use; increase endurance/gait distance; maintain weight-bearing status  -CR     [place] Level (Gait Training Goal 1, PT) minimum assist (75% or more patient effort)  -CR     Distance (Gait Training Goal 1, PT) 50 ft  -CR     Time Frame (Gait Training Goal 1, PT) 1 week  -CR     Row Name 11/02/21 1450          Patient Education Goal (PT)    Activity (Patient Education Goal, PT) pacemaker precautions  -CR     [place]/Cues/Accuracy (Memory Goal 2, PT) demonstrates adequately  -CR     Time Frame (Patient Education Goal, PT) 3 days  -CR           User Key  (r) = Recorded By, (t) = Taken By, (c) = Cosigned By    Initials Name Provider Type    CR Reyes, Carmela, PT Physical Therapist               Clinical Impression     Row Name 11/02/21 6096          Pain    Additional Documentation Pain Scale: FACES Pre/Post-Treatment (Group)  -CR     Row Name 11/02/21 5608          Pain Scale: Numbers Pre/Post-Treatment    Pretreatment Pain Rating 5/10  -CR     Posttreatment Pain Rating 10/10  -CR     Pain Location - Side Right  -CR     Pain Location hip  -CR     Pain Intervention(s) Repositioned  -CR     Row Name 11/02/21 6125          Plan of Care Review    Plan of Care Reviewed With patient  -CR     Outcome Summary 87 y/o female admitted on 10/31 due to fall with R hip pain and report of change in mental status. Xray indicated no acute injury to both hips and bilat prostheses intact. Pt +UTI. PMH includes DM with neuropathy, CKD, obesity. Pt normally ambulates in home using rw and independent with ADL's. At time of eval, pt wtih min limit AROM RLE due to pain. Pt needed mod A for supine<>sit transfer with HOB elevated. Pt could not tolerate sit to stand due to pain. Also scheduled for pacemaker placement this date thus returned supine. Pt will  likely need short IP rehab at [place] as she will be restricted in LUE use in addition to severe RLE pain.  -CR     Row Name 11/02/21 1439          Therapy Assessment/Plan (PT)    Patient/Family Therapy Goals Statement (PT) home with HH  -CR     Rehab Potential (PT) good, to achieve stated therapy goals  -CR     Criteria for Skilled Interventions Met (PT) yes; skilled treatment is necessary  -CR     Predicted Duration of Therapy Intervention (PT) dc  -CR           User Key  (r) = Recorded By, (t) = Taken By, (c) = Cosigned By    Initials Name Provider Type    CR Reyes, Carmela, PT Physical Therapist               Outcome Measures     Row Name 11/02/21 1452          How much help from another person do you currently need...    Turning from your back to your side while in flat bed without using bedrails? 2  -CR     Moving from lying on back to sitting on the side of a flat bed without bedrails? 2  -CR     Moving to and from a bed to a chair (including a wheelchair)? 2  -CR     Standing up from a chair using your arms (e.g., wheelchair, bedside chair)? 2  -CR     Climbing 3-5 steps with a railing? 1  -CR     To walk in hospital room? 1  -CR     AM-PAC 6 Clicks Score (PT) 10  -CR     Row Name 11/02/21 1452          Functional Assessment    Outcome Measure Options AM-PAC 6 Clicks Basic Mobility (PT)  -CR           User Key  (r) = Recorded By, (t) = Taken By, (c) = Cosigned By    Initials Name Provider Type    CR Reyes, Carmela, PT Physical Therapist                             Physical Therapy Education                 Title: PT OT SLP Therapies (In Progress)     Topic: Physical Therapy (In Progress)     Point: Mobility training (In Progress)     Learning Progress Summary           Patient Acceptance, E, NR by CR at 11/2/2021 1452                   Point: Home exercise program (Not Started)     Learner Progress:  Not documented in this visit.          Point: Body mechanics (Not Started)     Learner Progress:  Not documented  in this visit.          Point: Precautions (In Progress)     Learning Progress Summary           Patient Acceptance, E, NR by CR at 11/2/2021 1452                               User Key     Initials Effective Dates Name Provider Type Discipline    CR 06/16/21 -  Reyes, Carmela, PT Physical Therapist PT              PT Recommendation and Plan  Planned Therapy Interventions (PT): bed mobility training, gait training, strengthening, transfer training, patient/family education  Plan of Care Reviewed With: patient  Outcome Summary: 87 y/o female admitted on 10/31 due to fall with R hip pain and report of change in mental status. Xray indicated no acute injury to both hips and bilat prostheses intact. Pt +UTI. PMH includes DM with neuropathy, CKD, obesity. Pt normally ambulates in home using rw and independent with ADL's. At time of eval, pt wtih min limit AROM RLE due to pain. Pt needed mod A for supine<>sit transfer with HOB elevated. Pt could not tolerate sit to stand due to pain. Also scheduled for pacemaker placement this date thus returned supine. Pt will likely need short IP rehab at [place] as she will be restricted in LUE use in addition to severe RLE pain.     Time Calculation:    PT Charges     Row Name 11/02/21 1453             Time Calculation    Start Time 1105  -CR      Stop Time 1126  -CR      Time Calculation (min) 21 min  -CR      PT Received On 11/02/21  -CR      PT - Next Appointment 11/03/21  -CR      PT Goal Re-Cert Due Date 11/16/21  -CR              Time Calculation- PT    Total Timed Code Minutes- PT 0 minute(s)  -CR            User Key  (r) = Recorded By, (t) = Taken By, (c) = Cosigned By    Initials Name Provider Type    CR Reyes, Carmela, PT Physical Therapist              Therapy Charges for Today     Code Description Service Date Service Provider Modifiers Qty    03977560240 HC PT EVAL MOD COMPLEXITY 4 11/2/2021 Reyes, Carmela, PT GP 1          PT G-Codes  Outcome Measure Options: AM-PAC 6 Clicks  Basic Mobility (PT)  AM-PAC 6 Clicks Score (PT): 10    Carmela Reyes, PT  11/2/2021

## 2021-11-02 NOTE — PLAN OF CARE
Goal Outcome Evaluation:               Pt has been NPO since this morning, waiting to go for a pacemaker insertion.  Complains of pain on rt side.  Vitals have been stable throughout shift.

## 2021-11-03 ENCOUNTER — HOME CARE VISIT (OUTPATIENT)
Dept: HOME HEALTH SERVICES | Facility: HOME HEALTHCARE | Age: 86
End: 2021-11-03

## 2021-11-03 ENCOUNTER — ANESTHESIA (OUTPATIENT)
Dept: CARDIOLOGY | Facility: HOSPITAL | Age: 86
End: 2021-11-03

## 2021-11-03 ENCOUNTER — ANESTHESIA EVENT (OUTPATIENT)
Dept: CARDIOLOGY | Facility: HOSPITAL | Age: 86
End: 2021-11-03

## 2021-11-03 ENCOUNTER — APPOINTMENT (OUTPATIENT)
Dept: GENERAL RADIOLOGY | Facility: HOSPITAL | Age: 86
End: 2021-11-03

## 2021-11-03 VITALS — OXYGEN SATURATION: 98 %

## 2021-11-03 LAB
ALBUMIN SERPL-MCNC: 3.3 G/DL (ref 3.5–5.2)
ALBUMIN/GLOB SERPL: 1.1 G/DL
ALP SERPL-CCNC: 65 U/L (ref 39–117)
ALT SERPL W P-5'-P-CCNC: 17 U/L (ref 1–33)
ANION GAP SERPL CALCULATED.3IONS-SCNC: 10 MMOL/L (ref 5–15)
AST SERPL-CCNC: 28 U/L (ref 1–32)
BASOPHILS # BLD AUTO: 0.1 10*3/MM3 (ref 0–0.2)
BASOPHILS NFR BLD AUTO: 1.2 % (ref 0–1.5)
BILIRUB SERPL-MCNC: 0.3 MG/DL (ref 0–1.2)
BUN SERPL-MCNC: 13 MG/DL (ref 8–23)
BUN/CREAT SERPL: 14.3 (ref 7–25)
CALCIUM SPEC-SCNC: 7.9 MG/DL (ref 8.6–10.5)
CHLORIDE SERPL-SCNC: 107 MMOL/L (ref 98–107)
CO2 SERPL-SCNC: 22 MMOL/L (ref 22–29)
CREAT SERPL-MCNC: 0.91 MG/DL (ref 0.57–1)
DEPRECATED RDW RBC AUTO: 60.8 FL (ref 37–54)
EOSINOPHIL # BLD AUTO: 0.3 10*3/MM3 (ref 0–0.4)
EOSINOPHIL NFR BLD AUTO: 6.1 % (ref 0.3–6.2)
ERYTHROCYTE [DISTWIDTH] IN BLOOD BY AUTOMATED COUNT: 19.5 % (ref 12.3–15.4)
GFR SERPL CREATININE-BSD FRML MDRD: 59 ML/MIN/1.73
GLOBULIN UR ELPH-MCNC: 3.1 GM/DL
GLUCOSE BLDC GLUCOMTR-MCNC: 122 MG/DL (ref 70–105)
GLUCOSE BLDC GLUCOMTR-MCNC: 128 MG/DL (ref 70–105)
GLUCOSE BLDC GLUCOMTR-MCNC: 130 MG/DL (ref 70–105)
GLUCOSE BLDC GLUCOMTR-MCNC: 168 MG/DL (ref 70–105)
GLUCOSE SERPL-MCNC: 114 MG/DL (ref 65–99)
HCT VFR BLD AUTO: 27.9 % (ref 34–46.6)
HGB BLD-MCNC: 8.9 G/DL (ref 12–15.9)
LYMPHOCYTES # BLD AUTO: 1.4 10*3/MM3 (ref 0.7–3.1)
LYMPHOCYTES NFR BLD AUTO: 32.7 % (ref 19.6–45.3)
MCH RBC QN AUTO: 27.7 PG (ref 26.6–33)
MCHC RBC AUTO-ENTMCNC: 31.8 G/DL (ref 31.5–35.7)
MCV RBC AUTO: 87.1 FL (ref 79–97)
MONOCYTES # BLD AUTO: 0.4 10*3/MM3 (ref 0.1–0.9)
MONOCYTES NFR BLD AUTO: 9.7 % (ref 5–12)
NEUTROPHILS NFR BLD AUTO: 2.2 10*3/MM3 (ref 1.7–7)
NEUTROPHILS NFR BLD AUTO: 50.3 % (ref 42.7–76)
NRBC BLD AUTO-RTO: 0 /100 WBC (ref 0–0.2)
PLATELET # BLD AUTO: 173 10*3/MM3 (ref 140–450)
PMV BLD AUTO: 7.1 FL (ref 6–12)
POTASSIUM SERPL-SCNC: 4.2 MMOL/L (ref 3.5–5.2)
PROT SERPL-MCNC: 6.4 G/DL (ref 6–8.5)
QT INTERVAL: 462 MS
RBC # BLD AUTO: 3.2 10*6/MM3 (ref 3.77–5.28)
SODIUM SERPL-SCNC: 139 MMOL/L (ref 136–145)
WBC # BLD AUTO: 4.4 10*3/MM3 (ref 3.4–10.8)

## 2021-11-03 PROCEDURE — 93005 ELECTROCARDIOGRAM TRACING: CPT | Performed by: INTERNAL MEDICINE

## 2021-11-03 PROCEDURE — 25010000002 CEFTRIAXONE PER 250 MG: Performed by: NURSE PRACTITIONER

## 2021-11-03 PROCEDURE — 71045 X-RAY EXAM CHEST 1 VIEW: CPT

## 2021-11-03 PROCEDURE — 33286 RMVL SUBQ CAR RHYTHM MNTR: CPT | Performed by: INTERNAL MEDICINE

## 2021-11-03 PROCEDURE — C1898 LEAD, PMKR, OTHER THAN TRANS: HCPCS | Performed by: INTERNAL MEDICINE

## 2021-11-03 PROCEDURE — 25010000002 VANCOMYCIN 1 G RECONSTITUTED SOLUTION 1 EACH VIAL: Performed by: INTERNAL MEDICINE

## 2021-11-03 PROCEDURE — B5171ZZ FLUOROSCOPY OF LEFT SUBCLAVIAN VEIN USING LOW OSMOLAR CONTRAST: ICD-10-PCS | Performed by: INTERNAL MEDICINE

## 2021-11-03 PROCEDURE — 0 IOPAMIDOL PER 1 ML: Performed by: INTERNAL MEDICINE

## 2021-11-03 PROCEDURE — 02H63JZ INSERTION OF PACEMAKER LEAD INTO RIGHT ATRIUM, PERCUTANEOUS APPROACH: ICD-10-PCS | Performed by: INTERNAL MEDICINE

## 2021-11-03 PROCEDURE — 85025 COMPLETE CBC W/AUTO DIFF WBC: CPT | Performed by: INTERNAL MEDICINE

## 2021-11-03 PROCEDURE — 25010000002 PROPOFOL 10 MG/ML EMULSION: Performed by: ANESTHESIOLOGY

## 2021-11-03 PROCEDURE — 25010000002 MORPHINE PER 10 MG: Performed by: INTERNAL MEDICINE

## 2021-11-03 PROCEDURE — 33208 INSRT HEART PM ATRIAL & VENT: CPT | Performed by: INTERNAL MEDICINE

## 2021-11-03 PROCEDURE — C1894 INTRO/SHEATH, NON-LASER: HCPCS | Performed by: INTERNAL MEDICINE

## 2021-11-03 PROCEDURE — 82962 GLUCOSE BLOOD TEST: CPT

## 2021-11-03 PROCEDURE — 0JH606Z INSERTION OF PACEMAKER, DUAL CHAMBER INTO CHEST SUBCUTANEOUS TISSUE AND FASCIA, OPEN APPROACH: ICD-10-PCS | Performed by: INTERNAL MEDICINE

## 2021-11-03 PROCEDURE — 99232 SBSQ HOSP IP/OBS MODERATE 35: CPT | Performed by: INTERNAL MEDICINE

## 2021-11-03 PROCEDURE — 99024 POSTOP FOLLOW-UP VISIT: CPT | Performed by: INTERNAL MEDICINE

## 2021-11-03 PROCEDURE — 02HK3JZ INSERTION OF PACEMAKER LEAD INTO RIGHT VENTRICLE, PERCUTANEOUS APPROACH: ICD-10-PCS | Performed by: INTERNAL MEDICINE

## 2021-11-03 PROCEDURE — C1785 PMKR, DUAL, RATE-RESP: HCPCS | Performed by: INTERNAL MEDICINE

## 2021-11-03 PROCEDURE — 80053 COMPREHEN METABOLIC PANEL: CPT | Performed by: INTERNAL MEDICINE

## 2021-11-03 DEVICE — PACEMAKER
Type: IMPLANTABLE DEVICE | Status: FUNCTIONAL
Brand: ACCOLADE™ MRI DR

## 2021-11-03 DEVICE — PACE/SENSE LEAD
Type: IMPLANTABLE DEVICE | Status: FUNCTIONAL
Brand: INGEVITY™+

## 2021-11-03 RX ORDER — SODIUM CHLORIDE 0.9 % (FLUSH) 0.9 %
10 SYRINGE (ML) INJECTION EVERY 12 HOURS SCHEDULED
Status: CANCELLED | OUTPATIENT
Start: 2021-11-03

## 2021-11-03 RX ORDER — SODIUM CHLORIDE 0.9 % (FLUSH) 0.9 %
10 SYRINGE (ML) INJECTION AS NEEDED
Status: DISCONTINUED | OUTPATIENT
Start: 2021-11-03 | End: 2021-11-05 | Stop reason: HOSPADM

## 2021-11-03 RX ORDER — SODIUM CHLORIDE 0.9 % (FLUSH) 0.9 %
10 SYRINGE (ML) INJECTION EVERY 12 HOURS SCHEDULED
Status: DISCONTINUED | OUTPATIENT
Start: 2021-11-03 | End: 2021-11-05 | Stop reason: HOSPADM

## 2021-11-03 RX ORDER — MORPHINE SULFATE 4 MG/ML
INJECTION, SOLUTION INTRAMUSCULAR; INTRAVENOUS AS NEEDED
Status: DISCONTINUED | OUTPATIENT
Start: 2021-11-03 | End: 2021-11-03 | Stop reason: HOSPADM

## 2021-11-03 RX ORDER — LIDOCAINE HYDROCHLORIDE 20 MG/ML
INJECTION, SOLUTION INFILTRATION; PERINEURAL AS NEEDED
Status: DISCONTINUED | OUTPATIENT
Start: 2021-11-03 | End: 2021-11-03 | Stop reason: HOSPADM

## 2021-11-03 RX ORDER — HYDROCODONE BITARTRATE AND ACETAMINOPHEN 5; 325 MG/1; MG/1
1 TABLET ORAL EVERY 4 HOURS PRN
Status: DISCONTINUED | OUTPATIENT
Start: 2021-11-03 | End: 2021-11-05 | Stop reason: HOSPADM

## 2021-11-03 RX ORDER — SODIUM CHLORIDE 0.9 % (FLUSH) 0.9 %
10 SYRINGE (ML) INJECTION AS NEEDED
Status: CANCELLED | OUTPATIENT
Start: 2021-11-03

## 2021-11-03 RX ADMIN — ASPIRIN 81 MG: 81 TABLET, COATED ORAL at 12:29

## 2021-11-03 RX ADMIN — Medication 10 ML: at 20:24

## 2021-11-03 RX ADMIN — FUROSEMIDE 20 MG: 20 TABLET ORAL at 12:28

## 2021-11-03 RX ADMIN — ATORVASTATIN CALCIUM 10 MG: 10 TABLET, FILM COATED ORAL at 12:27

## 2021-11-03 RX ADMIN — HYDROCODONE BITARTRATE AND ACETAMINOPHEN 1 TABLET: 5; 325 TABLET ORAL at 15:01

## 2021-11-03 RX ADMIN — TOPIRAMATE 12.5 MG: 25 TABLET, FILM COATED ORAL at 20:24

## 2021-11-03 RX ADMIN — SODIUM CHLORIDE 75 ML/HR: 900 INJECTION INTRAVENOUS at 02:16

## 2021-11-03 RX ADMIN — VANCOMYCIN HYDROCHLORIDE 1000 MG: 1 INJECTION, POWDER, LYOPHILIZED, FOR SOLUTION INTRAVENOUS at 07:19

## 2021-11-03 RX ADMIN — LOSARTAN POTASSIUM 50 MG: 50 TABLET, FILM COATED ORAL at 12:28

## 2021-11-03 RX ADMIN — TOPIRAMATE 12.5 MG: 25 TABLET, FILM COATED ORAL at 12:27

## 2021-11-03 RX ADMIN — CEFTRIAXONE SODIUM 1 G: 1 INJECTION, POWDER, FOR SOLUTION INTRAMUSCULAR; INTRAVENOUS at 22:33

## 2021-11-03 RX ADMIN — PROPOFOL 75 MCG/KG/MIN: 10 INJECTION, EMULSION INTRAVENOUS at 07:57

## 2021-11-03 RX ADMIN — HYDROCODONE BITARTRATE AND ACETAMINOPHEN 1 TABLET: 5; 325 TABLET ORAL at 22:33

## 2021-11-03 RX ADMIN — GABAPENTIN 300 MG: 300 CAPSULE ORAL at 20:24

## 2021-11-03 RX ADMIN — GABAPENTIN 300 MG: 300 CAPSULE ORAL at 12:28

## 2021-11-03 RX ADMIN — VANCOMYCIN HYDROCHLORIDE 1000 MG: 1 INJECTION, POWDER, LYOPHILIZED, FOR SOLUTION INTRAVENOUS at 18:08

## 2021-11-03 NOTE — ANESTHESIA PREPROCEDURE EVALUATION
Anesthesia Evaluation     Patient summary reviewed and Nursing notes reviewed   NPO Solid Status: > 8 hours  NPO Liquid Status: > 8 hours           Airway   Mallampati: II  TM distance: <3 FB  Neck ROM: full  Dental    (+) partials    Pulmonary - normal exam   Cardiovascular     ECG reviewed  Rate: abnormal    (+) hypertension, CAD, dysrhythmias Bradycardia, hyperlipidemia,     ROS comment: Cath 2020:  FINDINGS:     1. HEMODYNAMICS:  Left ventricle end-diastolic pressure is normal.  No gradient was noted across aortic valve.     2. LEFT VENTRICULOGRAPHY:  Left ventricle size and contractility is normal with ejection fraction of 60%.     3. CORONARY ANGIOGRAPHY:  Left main coronary artery is normal.  Left anterior descending artery normal.  50% ostial diagonal branch disease.  Circumflex coronary artery is normal.  Right coronary artery is a very large and dominant vessel and is normal.     SUMMARY:  Left ventricular size and contractility is normal with ejection fraction of 60%.  Normal epicardial coronary arteries.     RECOMMENDATIONS:  Patient presented with syncope and has Mobitz type I second-degree AV block.  Likely syncope is due to advanced AV blocks although no advanced AV blocks were documented so far.  Patient has been on metoprolol as an outpatient which is discontinued. Options will be discussed further that included placement of loop recorder versus permanent dual-chamber pacemaker.    Card ECHO 09/2021:  NL valves, EF 60%.    Neuro/Psych  (+) syncope,     GI/Hepatic/Renal/Endo    (+) morbid obesity, GERD,  renal disease CRI, diabetes mellitus,     Musculoskeletal     Abdominal   (+) obese,    Substance History      OB/GYN          Other                      Anesthesia Plan    ASA 4     MAC     intravenous induction     Anesthetic plan, all risks, benefits, and alternatives have been provided, discussed and informed consent has been obtained with: patient.    Plan discussed with CRNA and CAA.

## 2021-11-03 NOTE — SIGNIFICANT NOTE
11/03/21 0818   Rehab Time/Intention   Session Not Performed   (pacemaker placement this date 11/3)   Recommendation   PT - Next Appointment 11/04/21   Recommendation   OT - Next Appointment 11/04/21

## 2021-11-03 NOTE — PROGRESS NOTES
Referring Provider: Ginny Coulter MD    Reason for follow-up:  Mobitz type II second-degree AV block.  Syncope  Status post loop recorder placement     Patient Care Team:  Gabriel Goss MD as PCP - General (Family Medicine)  Gregory Pinedo MD (Family Medicine)  Wang Plaza MD as Consulting Physician (Cardiology)    Subjective .  Feeling okay     ROS    Since I have last seen her yesterday, the patient has been without any chest discomfort ,shortness of breath, palpitations, dizziness or syncope.  Denies having any headache ,abdominal pain ,nausea, vomiting , diarrhea constipation, loss of weight or loss of appetite.  Denies having any excessive bruising ,hematuria or blood in the stool.    Review of all systems negative except as indicated    History  Past Medical History:   Diagnosis Date   • Atherosclerosis of native coronary artery of native heart without angina pectoris 10/22/2020   • Hypertension associated with stage 3 chronic kidney disease due to type 2 diabetes mellitus ([facility]) 10/22/2020   • Morbid obesity ([facility]) 10/22/2020   • Secondary hyperaldosteronism ([facility]) 10/22/2020   • Stage 3b chronic kidney disease ([facility]) 10/22/2020   • Type 2 diabetes mellitus with diabetic neuropathy, with long-term current use of insulin ([facility]) 10/22/2020   • Type 2 diabetes mellitus with diabetic neuropathy, with long-term current use of insulin ([facility]) 10/22/2020       Past Surgical History:   Procedure Laterality Date   • CARDIAC CATHETERIZATION N/A 10/23/2020    Procedure: Left Heart Cath and coronary angiogram;  Surgeon: Wang Plaza MD;  Location: [facility] INVASIVE LOCATION;  Service: Cardiovascular;  Laterality: N/A;   • HYSTERECTOMY     • JOINT REPLACEMENT Right     Hip   • JOINT REPLACEMENT Left     hip   • JOINT REPLACEMENT Left     hip (for second time)   • JOINT REPLACEMENT Right     knee   • OOPHORECTOMY         Family History   Problem Relation Age of Onset   • Heart disease Mother         Social History     Tobacco Use   • Smoking status: Never Smoker   • Smokeless tobacco: Never Used   Vaping Use   • Vaping Use: Never used   Substance Use Topics   • Alcohol use: Not Currently   • Drug use: Never        Medications Prior to Admission   Medication Sig Dispense Refill Last Dose   • amLODIPine (NORVASC) 5 MG tablet Take 5 mg by mouth Every Morning.   10/31/2021 at Unknown time   • aspirin 81 MG EC tablet Take 81 mg by mouth Daily.   10/31/2021 at Unknown time   • atorvastatin (LIPITOR) 10 MG tablet Take 10 mg by mouth Daily.   10/31/2021 at Unknown time   • furosemide (LASIX) 20 MG tablet Take 1 tablet by mouth Daily. 90 tablet 1 10/31/2021 at Unknown time   • gabapentin (NEURONTIN) 300 MG capsule Take 300 mg by mouth 2 (two) times a day.   10/31/2021 at Unknown time   • insulin NPH-insulin regular (humuLIN 70/30,novoLIN 70/30) (70-30) 100 UNIT/ML injection Inject 30 Units under the skin into the appropriate area as directed 2 (Two) Times a Day With Meals.   10/31/2021 at Unknown time   • losartan (Cozaar) 50 MG tablet Take 1 tablet by mouth Daily. 90 tablet 3 10/31/2021 at Unknown time   • metFORMIN (GLUCOPHAGE) 1000 MG tablet Take 1,000 mg by mouth 2 (Two) Times a Day With Meals.   10/31/2021 at Unknown time   • omeprazole (priLOSEC) 40 MG capsule Take 40 mg by mouth Every Morning.   10/31/2021 at Unknown time   • spironolactone (ALDACTONE) 25 MG tablet Take 1 tablet by mouth Daily.   10/31/2021 at Unknown time   • topiramate (TOPAMAX) 25 MG tablet Take 12.5 mg by mouth 2 (Two) Times a Day.   10/31/2021 at Unknown time       Allergies  Latex, natural rubber and Adhesive tape    Scheduled Meds:amLODIPine, 5 mg, Oral, QAM  aspirin, 81 mg, Oral, Daily  atorvastatin, 10 mg, Oral, Daily  cefTRIAXone, 1 g, Intravenous, Q24H  furosemide, 20 mg, Oral, Daily  gabapentin, 300 mg, Oral, BID  insulin lispro, 0-7 Units, Subcutaneous, TID AC  losartan, 50 mg, Oral, Daily  sodium chloride, 3 mL, Intravenous,  "Q12H  spironolactone, 25 mg, Oral, Daily  topiramate, 12.5 mg, Oral, BID  vancomycin, 1,000 mg, Intravenous, Once      Continuous Infusions:sodium chloride, 75 mL/hr, Last Rate: 75 mL/hr (11/03/21 0216)      PRN Meds:.•  acetaminophen **OR** acetaminophen **OR** acetaminophen  •  dextrose  •  dextrose  •  glucagon (human recombinant)  •  influenza vaccine  •  insulin lispro **AND** insulin lispro  •  melatonin  •  nitroglycerin  •  ondansetron **OR** ondansetron  •  [COMPLETED] Insert peripheral IV **AND** sodium chloride  •  sodium chloride    Objective     VITAL SIGNS  Vitals:    11/02/21 1200 11/02/21 1600 11/02/21 2123 11/03/21 0541   BP: 147/54 170/65 132/74 138/62   BP Location:   Left arm Left arm   Patient Position:   Lying Lying   Pulse: 76 83 69 77   Resp: 16 16 16 16   Temp: 98.5 °F (36.9 °C) 98.9 °F (37.2 °C) 98.4 °F (36.9 °C) 98 °F (36.7 °C)   TempSrc: Oral Oral Oral Oral   SpO2: 90% 96% 95% 95%   Weight:    108 kg (238 lb 15.7 oz)   Height:           Flowsheet Rows      First Filed Value   Admission Height 157.5 cm (62\") Documented at 10/31/2021 2206   Admission Weight 108 kg (238 lb) Documented at 10/31/2021 2206            Intake/Output Summary (Last 24 hours) at 11/3/2021 0633  Last data filed at 11/2/2021 2123  Gross per 24 hour   Intake --   Output 1800 ml   Net -1800 ml        TELEMETRY: Sinus rhythm first-degree AV block premature ventricular contractions.    Physical Exam:  The patient is alert, oriented and in no distress.  Vital signs as noted above.  Exogenous obesity.  Head and neck revealed no carotid bruits or jugular venous distention.  No thyromegaly or lymphadenopathy is present  Lungs clear.  No wheezing.  Breath sounds are normal bilaterally.  Heart normal first and second heart sounds.  No murmur. No precordial rub is present.  No gallop is present.  Abdomen soft and nontender.  No organomegaly is present.  Extremities with good peripheral pulses without any pedal edema.  Skin warm " and dry.  Musculoskeletal system is grossly normal  CNS grossly normal      Results Review:   I reviewed the patient's new clinical results.  Lab Results (last 24 hours)     Procedure Component Value Units Date/Time    Comprehensive Metabolic Panel [[id number]]  (Abnormal) Collected: 11/03/21 0403    Specimen: Blood Updated: 11/03/21 0509     Glucose 114 mg/dL      BUN 13 mg/dL      Creatinine 0.91 mg/dL      Sodium 139 mmol/L      Potassium 4.2 mmol/L      Chloride 107 mmol/L      CO2 22.0 mmol/L      Calcium 7.9 mg/dL      Total Protein 6.4 g/dL      Albumin 3.30 g/dL      ALT (SGPT) 17 U/L      AST (SGOT) 28 U/L      Alkaline Phosphatase 65 U/L      Total Bilirubin 0.3 mg/dL      eGFR Non African Amer 59 mL/min/1.73      Globulin 3.1 gm/dL      A/G Ratio 1.1 g/dL      BUN/Creatinine Ratio 14.3     Anion Gap 10.0 mmol/L     Narrative:      GFR Normal >60  Chronic Kidney Disease <60  Kidney Failure <15      CBC & Differential [[id number]]  (Abnormal) Collected: 11/03/21 0403    Specimen: Blood Updated: 11/03/21 0443    Narrative:      The following orders were created for panel order CBC & Differential.  Procedure                               Abnormality         Status                     ---------                               -----------         ------                     CBC Auto Differential[[id number]]        Abnormal            Final result                 Please view results for these tests on the individual orders.    CBC Auto Differential [[id number]]  (Abnormal) Collected: 11/03/21 0403    Specimen: Blood Updated: 11/03/21 0443     WBC 4.40 10*3/mm3      RBC 3.20 10*6/mm3      Hemoglobin 8.9 g/dL      Hematocrit 27.9 %      MCV 87.1 fL      MCH 27.7 pg      MCHC 31.8 g/dL      RDW 19.5 %      RDW-SD 60.8 fl      MPV 7.1 fL      Platelets 173 10*3/mm3      Neutrophil % 50.3 %      Lymphocyte % 32.7 %      Monocyte % 9.7 %      Eosinophil % 6.1 %      Basophil % 1.2 %      Neutrophils, Absolute 2.20 10*3/mm3       Lymphocytes, Absolute 1.40 10*3/mm3      Monocytes, Absolute 0.40 10*3/mm3      Eosinophils, Absolute 0.30 10*3/mm3      Basophils, Absolute 0.10 10*3/mm3      nRBC 0.0 /100 WBC     Blood Culture - Blood, Arm, Right [[id number]]  (Normal) Collected: 10/31/21 2306    Specimen: Blood from Arm, Right Updated: 11/02/21 2315     Blood Culture No growth at 2 days    Blood Culture - Blood, Arm, Right [[id number]]  (Normal) Collected: 10/31/21 2229    Specimen: Blood from Arm, Right Updated: 11/02/21 2245     Blood Culture No growth at 2 days    POC Glucose Once [[id number]]  (Abnormal) Collected: 11/02/21 2153    Specimen: Blood Updated: 11/02/21 2154     Glucose 125 mg/dL      Comment: Serial Number: [id number]:  [id number]       POC Glucose Once [[id number]]  (Abnormal) Collected: 11/02/21 1133    Specimen: Blood Updated: 11/02/21 1134     Glucose 136 mg/dL      Comment: Serial Number: [id number]:  [id number]       POC Glucose Once [[id number]]  (Abnormal) Collected: 11/02/21 0714    Specimen: Blood Updated: 11/02/21 0715     Glucose 139 mg/dL      Comment: Serial Number: [id number]:  [id number]             Imaging Results (Last 24 Hours)     ** No results found for the last 24 hours. **      LAB RESULTS (LAST 7 DAYS)    CBC  Results from last 7 days   Lab Units 11/03/21  0403 11/02/21  0435 11/01/21  0429 10/31/21  2229   WBC 10*3/mm3 4.40 3.50 3.90 5.10   RBC 10*6/mm3 3.20* 3.32* 2.99* 3.11*   HEMOGLOBIN g/dL 8.9* 9.0* 8.2* 8.5*   HEMATOCRIT % 27.9* 28.7* 26.3* 27.4*   MCV fL 87.1 86.3 88.0 88.0   PLATELETS 10*3/mm3 173 170 161 183       BMP  Results from last 7 days   Lab Units 11/03/21  0403 11/02/21  0435 11/01/21  0429 10/31/21  2229   SODIUM mmol/L 139 140 140 138   POTASSIUM mmol/L 4.2 4.0 4.6 4.5   CHLORIDE mmol/L 107 109* 107 104   CO2 mmol/L 22.0 20.0* 16.0* 18.0*   BUN mg/dL 13 18 29* 30*   CREATININE mg/dL 0.91 0.93 1.45* 1.55*   GLUCOSE mg/dL 114* 118* 120* 128*       CMP   Results from  last 7 days   Lab Units 11/03/21  0403 11/02/21  0435 11/01/21  0429 10/31/21  2229   SODIUM mmol/L 139 140 140 138   POTASSIUM mmol/L 4.2 4.0 4.6 4.5   CHLORIDE mmol/L 107 109* 107 104   CO2 mmol/L 22.0 20.0* 16.0* 18.0*   BUN mg/dL 13 18 29* 30*   CREATININE mg/dL 0.91 0.93 1.45* 1.55*   GLUCOSE mg/dL 114* 118* 120* 128*   ALBUMIN g/dL 3.30* 3.50  --  3.80   BILIRUBIN mg/dL 0.3 0.3  --  0.2   ALK PHOS U/L 65 61  --  70   AST (SGOT) U/L 28 20  --  32   ALT (SGPT) U/L 17 16  --  19         BNP        TROPONIN  Results from last 7 days   Lab Units 10/31/21  2229   TROPONIN T ng/mL 0.024       CoAg        Creatinine Clearance  Estimated Creatinine Clearance: 53.3 mL/min (by C-G formula based on SCr of 0.91 mg/dL).    ABG        Radiology  XR Hips Bilateral With or Without Pelvis 5 View    Result Date: 11/1/2021  IMPRESSION : 1. Negative for acute fracture. 2. Intact bilateral hip prostheses.  Electronically Signed By-Benjamín Padron MD On:11/1/2021 2:27 PM This report was finalized on [id number] by  Benjamín Padron MD.              EKG                I personally viewed and interpreted the patient's EKG/Telemetry data: Sinus rhythm premature atrial contractions premature ventricular contractions.    ECHOCARDIOGRAM:    Results for orders placed during the hospital encounter of 09/07/21    Adult Transthoracic Echo Complete W/ Cont if Necessary Per Protocol    Interpretation Summary  · Estimated left ventricular EF = 60% Left ventricular systolic function is normal.    Indications  Syncope    Technically satisfactory study.  Mitral valve is structurally normal.  Tricuspid valve is structurally normal.  Aortic valve is structurally normal.  Pulmonic valve could not be well visualized.  No evidence for mitral tricuspid or aortic regurgitation is seen by Doppler study.  Left atrium is enlarged.  Right atrium is normal in size.  Left ventricle is normal in size and contractility with ejection fraction of 60%.  Right ventricle  is normal in size.  Atrial septum is intact.  Aorta is normal.  No pericardial effusion or intracardiac thrombus is seen.    Impression  Left atrium is enlarged.  Structurally and functionally normal cardiac valves.  Left ventricular size and contractility is normal with ejection fraction of 60%.          STRESS MYOVIEW:    Cardiolite (Tc-99m Sestamibi) stress test    CARDIAC CATHETERIZATION:            OTHER:         Assessment/Plan     Active Problems:    Atrioventricular block, Mobitz type 1, Wenckebach    Stage 3b chronic kidney disease (CMS/HCC)    Syncope and collapse    Type 2 diabetes mellitus with diabetic neuropathy, with long-term current use of insulin ([facility])    Morbid obesity ([facility])    Hyperlipidemia    Hypertension    Acute UTI    GERD (gastroesophageal reflux disease)    Altered mental status    Normocytic anemia      ]]]]]]]]]]]]]]]]]]]]  Impression  ========  -Syncope  Intermittent Mobitz type II AV block  Patient is not on any medications to cause bradycardia     -Second-degree Mobitz type I AV block.  Narrow QRS complex.     Echocardiogram-normal 10/22/2020     Cardiac catheterization 10/23/2020 revealed:  Left ventricular size and contractility is normal with ejection fraction of 60%.  Normal epicardial coronary arteries.     Status post loop recorder placement 10/23/2020 ([employer])     -History of paroxysmal atrial fibrillation     -Hypertension dyslipidemia diabetes     -Renal dysfunction CKD 3  BUN 27 creatinine 1.35       -Exogenous obesity.     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     -No known allergies  ============  Plan  =========  Patient had an episode of syncope.  Patient continues to have Mobitz type II second-degree AV block.  Patient is not on any medications to cause AV blocks.  Patient had loop recorder placement in the past.     Hypertension-well-controlled     Status post loop recorder placement.  No significant dysrhythmia  noted.     Renal dysfunction  BUN 20 creatinine 1.41-4/7/2021.  29/1.45-11/1/2020    Anemia  hgb 8.9     Patient would benefit from permanent dual-chamber pacemaker implantation in view of continued AV blocks and having syncopal episode.  Fortunately patient did not have any traumatic fractures etc.     Risks and benefits pros and cons of the procedure were discussed with patient including infection bleeding pneumothorax blood clot dysrhythmia anesthetic risk etc.    We will explant loop recorder also.  Patient was educated about this.    Further plan will depend on patient's progress.  ]]]]]]]]]]]]]]      Permanent dual-chamber pacemaker implantation ([employer] MRI compatible) and removal of loop recorder 11/3/2021      Wang Plaza MD  11/03/21  06:33 EDT

## 2021-11-03 NOTE — NURSING NOTE
Patient transferring to [facility] room 261. Report called to Maru. Patient belongings will be transferred to new room. Spoked to Lara in [facility]. Spouse is aware of patient transferring room. Provided Lara with information to contact Nika (pts niece) to give an update.

## 2021-11-03 NOTE — PLAN OF CARE
Goal Outcome Evaluation:                 Problem: Adult Inpatient Plan of Care  Goal: Plan of Care Review  Outcome: Ongoing, Progressing  Flowsheets (Taken 11/3/2021 0152 by Harpal Medina RN)  Plan of Care Reviewed With: patient  Goal: Patient-Specific Goal (Individualized)  Outcome: Ongoing, Progressing  Goal: Absence of Hospital-Acquired Illness or Injury  Outcome: Ongoing, Progressing  Intervention: Identify and Manage Fall Risk  Recent Flowsheet Documentation  Taken 11/3/2021 1500 by Paula Navarro RN  Safety Promotion/Fall Prevention:   activity supervised   assistive device/personal items within reach   clutter free environment maintained   fall prevention program maintained   lighting adjusted   nonskid shoes/slippers when out of bed   room organization consistent   safety round/check completed  Taken 11/3/2021 1400 by Paula Navarro RN  Safety Promotion/Fall Prevention:   activity supervised   assistive device/personal items within reach   clutter free environment maintained   lighting adjusted   fall prevention program maintained   nonskid shoes/slippers when out of bed   room organization consistent   safety round/check completed  Taken 11/3/2021 1200 by Paula Navarro RN  Safety Promotion/Fall Prevention:   activity supervised   assistive device/personal items within reach   clutter free environment maintained   fall prevention program maintained   lighting adjusted   nonskid shoes/slippers when out of bed   room organization consistent  Taken 11/3/2021 1128 by Paula Navarro, RN  Safety Promotion/Fall Prevention:   activity supervised   assistive device/personal items within reach   clutter free environment maintained   fall prevention program maintained   lighting adjusted   nonskid shoes/slippers when out of bed   room organization consistent   safety round/check completed  Intervention: Prevent Skin Injury  Recent Flowsheet Documentation  Taken 11/3/2021 1500 by Paula Navarro, TAI  Body  Position: position changed independently  Skin Protection:   adhesive use limited   incontinence pads utilized   transparent dressing maintained   tubing/devices free from skin contact  Taken 11/3/2021 1400 by Paula Navarro RN  Body Position: position changed independently  Taken 11/3/2021 1128 by Paula Navarro RN  Body Position: position changed independently  Skin Protection:   adhesive use limited   incontinence pads utilized   transparent dressing maintained   tubing/devices free from skin contact  Intervention: Prevent Infection  Recent Flowsheet Documentation  Taken 11/3/2021 1500 by Paula Navarro RN  Infection Prevention:   personal protective equipment utilized   hand hygiene promoted  Taken 11/3/2021 1400 by Paula Navarro RN  Infection Prevention: personal protective equipment utilized  Taken 11/3/2021 1200 by Paula Navarro RN  Infection Prevention: personal protective equipment utilized  Taken 11/3/2021 1128 by Paula Navarro RN  Infection Prevention: personal protective equipment utilized  Goal: Optimal Comfort and Wellbeing  Outcome: Ongoing, Progressing  Intervention: Provide Person-Centered Care  Recent Flowsheet Documentation  Taken 11/3/2021 1500 by Paula Navarro RN  Trust Relationship/Rapport:   care explained   choices provided   questions answered   questions encouraged   thoughts/feelings acknowledged  Taken 11/3/2021 1128 by Paula Navarro RN  Trust Relationship/Rapport:   care explained   choices provided   emotional support provided   questions answered   questions encouraged   thoughts/feelings acknowledged  Goal: Readiness for Transition of Care  Outcome: Ongoing, Progressing     Problem: Fall Injury Risk  Goal: Absence of Fall and Fall-Related Injury  Outcome: Ongoing, Progressing  Intervention: Identify and Manage Contributors to Fall Injury Risk  Recent Flowsheet Documentation  Taken 11/3/2021 1500 by Paula Navarro RN  Medication Review/Management:   medications  reviewed   high-risk medications identified  Self-Care Promotion: independence encouraged  Taken 11/3/2021 1400 by Paula Navarro RN  Medication Review/Management:   medications reviewed   high-risk medications identified  Self-Care Promotion: independence encouraged  Taken 11/3/2021 1200 by Paula Navarro RN  Medication Review/Management:   medications reviewed   high-risk medications identified  Taken 11/3/2021 1128 by Paula Navarro RN  Medication Review/Management:   medications reviewed   high-risk medications identified  Self-Care Promotion: independence encouraged  Intervention: Promote Injury-Free Environment  Recent Flowsheet Documentation  Taken 11/3/2021 1500 by Paula Navarro RN  Safety Promotion/Fall Prevention:   activity supervised   assistive device/personal items within reach   clutter free environment maintained   fall prevention program maintained   lighting adjusted   nonskid shoes/slippers when out of bed   room organization consistent   safety round/check completed  Taken 11/3/2021 1400 by Paula Navarro RN  Safety Promotion/Fall Prevention:   activity supervised   assistive device/personal items within reach   clutter free environment maintained   lighting adjusted   fall prevention program maintained   nonskid shoes/slippers when out of bed   room organization consistent   safety round/check completed  Taken 11/3/2021 1200 by Paual Navarro RN  Safety Promotion/Fall Prevention:   activity supervised   assistive device/personal items within reach   clutter free environment maintained   fall prevention program maintained   lighting adjusted   nonskid shoes/slippers when out of bed   room organization consistent  Taken 11/3/2021 1128 by Paula Navarro, RN  Safety Promotion/Fall Prevention:   activity supervised   assistive device/personal items within reach   clutter free environment maintained   fall prevention program maintained   lighting adjusted   nonskid shoes/slippers when  out of bed   room organization consistent   safety round/check completed     Problem: COPD Comorbidity  Goal: Maintenance of COPD Symptom Control  Outcome: Ongoing, Progressing  Intervention: Maintain COPD-Symptom Control  Recent Flowsheet Documentation  Taken 11/3/2021 1500 by Paula Navarro RN  Medication Review/Management:   medications reviewed   high-risk medications identified  Taken 11/3/2021 1400 by Paula Navarro RN  Medication Review/Management:   medications reviewed   high-risk medications identified  Taken 11/3/2021 1200 by Paula Navarro RN  Medication Review/Management:   medications reviewed   high-risk medications identified  Taken 11/3/2021 1128 by Paula Navarro RN  Medication Review/Management:   medications reviewed   high-risk medications identified     Problem: Diabetes Comorbidity  Goal: Blood Glucose Level Within Desired Range  Outcome: Ongoing, Progressing  Intervention: Maintain Glycemic Control  Recent Flowsheet Documentation  Taken 11/3/2021 1500 by Paula Navarro RN  Glycemic Management: blood glucose monitoring  Taken 11/3/2021 1128 by Paula Navarro RN  Glycemic Management: blood glucose monitoring     Problem: Obstructive Sleep Apnea Risk or Actual (Comorbidity Management)  Goal: Unobstructed Breathing During Sleep  Outcome: Ongoing, Progressing     Problem: Pain Chronic (Persistent) (Comorbidity Management)  Goal: Acceptable Pain Control and Functional Ability  Outcome: Ongoing, Progressing  Intervention: Manage Persistent Pain  Recent Flowsheet Documentation  Taken 11/3/2021 1500 by Paula Navarro RN  Sleep/Rest Enhancement: consistent schedule promoted  Medication Review/Management:   medications reviewed   high-risk medications identified  Taken 11/3/2021 1400 by Paula Navarro RN  Medication Review/Management:   medications reviewed   high-risk medications identified  Taken 11/3/2021 1200 by Paula Navarro RN  Medication Review/Management:   medications  reviewed   high-risk medications identified  Taken 11/3/2021 1128 by Paula Navarro RN  Sleep/Rest Enhancement: consistent schedule promoted  Medication Review/Management:   medications reviewed   high-risk medications identified  Intervention: Optimize Psychosocial Wellbeing  Recent Flowsheet Documentation  Taken 11/3/2021 1500 by Paula Navarro RN  Supportive Measures:   active listening utilized   self-care encouraged  Diversional Activities: television  Family/Support System Care:   self-care encouraged   support provided  Taken 11/3/2021 1128 by Paula Navarro RN  Supportive Measures:   active listening utilized   self-care encouraged  Diversional Activities: television  Family/Support System Care:   support provided   self-care encouraged     Problem: Skin Injury Risk Increased  Goal: Skin Health and Integrity  Outcome: Ongoing, Progressing  Intervention: Optimize Skin Protection  Recent Flowsheet Documentation  Taken 11/3/2021 1500 by Paula Navarro RN  Pressure Reduction Techniques:   frequent weight shift encouraged   weight shift assistance provided  Head of Bed (HOB): [facility] elevated  Pressure Reduction Devices:   positioning supports utilized   pressure-redistributing mattress utilized  Skin Protection:   adhesive use limited   incontinence pads utilized   transparent dressing maintained   tubing/devices free from skin contact  Taken 11/3/2021 1400 by Paula Navarro RN  Head of Bed (HOB): HOB elevated  Taken 11/3/2021 1128 by Paula Navarro RN  Pressure Reduction Techniques:   frequent weight shift encouraged   weight shift assistance provided  Head of Bed (HOB): [facility] elevated  Pressure Reduction Devices:   positioning supports utilized   pressure-redistributing mattress utilized  Skin Protection:   adhesive use limited   incontinence pads utilized   transparent dressing maintained   tubing/devices free from skin contact

## 2021-11-03 NOTE — PROGRESS NOTES
"    [facility] Daily Progress Note    Patient Name: Aracelis Vargas  : 1935  MRN: [id number]  Primary Care Physician:  Gabriel Goss MD  Date of admission: 10/31/2021      Subjective      Chief Complaint: Confusion  Aracelis Vargas is a 86 y.o. female who presented to [facility] on 10/31/2021 with reported altered mental status per EMS.  She is currently awake and alert and answering appropriately.  She is oriented to person and place.  She denies any chest pain, shortness of air, headache, focal weakness, blurry vision, confusion, or dye Boyd, fever cough or congestion.  CT head per radiology today:     \" 1. No acute intracranial abnormality.  2. No fracture the calvarium or skull base.  3. Mild chronic age-related intracranial findings, unchanged from prior.  \"     Troponin 0 0.024 proBNP 792.7 glucose was 128 on admission.  Creatinine was 1.55 with BUN 30 WBC not elevated, hemoglobin 8.5.  Urinalysis was positive for bacteria/trace WBCs 13-20 2+ leukocytes negative nitrites.  She was started on IV ceftriaxone with urine cultures pending.  IV fluids initiated.  He has a past medical history of coronary artery disease status post CABG, anemia, diabetes mellitus type 2, hyperlipidemia, hypertension, neuropathy, carotid stenosis, COPD and chronic back pain.  She will be admitted for further evaluation and treatment.  She was COVID-19 negative.     2021  Patient seen and examined.  Patient on empiric treatment for UTI   Urine culture pending  Xray for pain (hx of fall): Negative for fracture, prosthetic intact        2021  Patient seen and examined.  Urine culture negative (final)  Blood cultures: NGTD  PT/OT: Inpatient rehab recommended.  Patient prefers to be discharged to home  Cardio: Pacemaker for AV blocks    11/3/2021  Patient seen and examined.   Patient alert/oriented   Urine culture negative   Blood culture:NGTD  Cardio: Pacemaker placed " (second-degree AV block and syncope) //explant of loop recorder  Bedrest x24 hours    Review of Systems   Constitutional: Negative.   HENT: Negative.    Eyes: Negative.    Cardiovascular: Negative.    Respiratory: Negative.    Endocrine: Negative.    Hematologic/Lymphatic: Negative.    Skin: Negative.    Musculoskeletal: Positive for muscle weakness.   Gastrointestinal: Negative.    Genitourinary: Negative.    Neurological: Negative.    Psychiatric/Behavioral: Negative.    Allergic/Immunologic: Negative.      ROS       Objective      Vitals:   Temp:  [98 °F (36.7 °C)-98.9 °F (37.2 °C)] 98 °F (36.7 °C)  Heart Rate:  [69-83] 77  Resp:  [16] 16  BP: (132-170)/(54-74) 138/62    Physical Exam   Vitals reviewed.   Constitutional:       Appearance: Normal appearance. She is obese.   HENT:      Head: Normocephalic and atraumatic.      Right Ear: External ear normal.      Left Ear: External ear normal.      Nose: Nose normal.      Mouth/Throat:      Mouth: Mucous membranes are moist.   Eyes:      Extraocular Movements: Extraocular movements intact.   Cardiovascular:      Rate and Rhythm: Normal rate and regular rhythm.      Pulses: Normal pulses.      Heart sounds: Normal heart sounds.   Pulmonary:      Effort: Pulmonary effort is normal.      Breath sounds: Normal breath sounds.   Abdominal:      Palpations: Abdomen is soft.   Genitourinary:     Comments: deferred  Musculoskeletal:         General: Normal range of motion.      Cervical back: Normal range of motion and neck supple.   Skin:     General: Skin is warm and dry.   Neurological:      General: No focal deficit present.      Mental Status: She is alert.      Comments: Oriented x2   Psychiatric:         Mood and Affect: Mood normal.         Behavior: Behavior normal.         Thought Content: Thought content normal.         Judgment: Judgment normal.      Result Review    Result Review:  I have personally reviewed the results from the time of this admission to  11/3/2021 07:06 EDT and agree with these findings:  [x]  Laboratory  [x]  Microbiology  [x]  Radiology  []  EKG/Telemetry   []  Cardiology/Vascular   []  Pathology  []  Old records  []  Other:            Assessment/Plan      Brief Patient Summary:  Aracelis Vargas is a 86 y.o. female who  was admitted for change in mentation.  Admission labs revealed UTI.  Urine culture: Negative.      amLODIPine, 5 mg, Oral, QAM  aspirin, 81 mg, Oral, Daily  atorvastatin, 10 mg, Oral, Daily  cefTRIAXone, 1 g, Intravenous, Q24H  furosemide, 20 mg, Oral, Daily  gabapentin, 300 mg, Oral, BID  insulin lispro, 0-7 Units, Subcutaneous, TID AC  losartan, 50 mg, Oral, Daily  sodium chloride, 3 mL, Intravenous, Q12H  spironolactone, 25 mg, Oral, Daily  topiramate, 12.5 mg, Oral, BID  vancomycin, 1,000 mg, Intravenous, Once       sodium chloride, 75 mL/hr, Last Rate: 75 mL/hr (11/03/21 0216)         Active Hospital Problems:  Active Hospital Problems    Diagnosis    • Normocytic anemia    • Altered mental status    • Acute UTI    • GERD (gastroesophageal reflux disease)    • Hypertension    • Hyperlipidemia    • Morbid obesity ([facility])    • Type 2 diabetes mellitus with diabetic neuropathy, with long-term current use of insulin ([facility])    • Stage 3b chronic kidney disease (CMS/[facility])    • Syncope and collapse    • Atrioventricular block, Mobitz type 1, Johnkebach      Added automatically from request for surgery [id number]       Plan:   Altered mental status, appears resolved oriented x2 awake alert and answering appropriately, CT head negative for acute per radiology, likely secondary to below     Acute UTI, trace bacteria elevated WBC urine, positive leukocytes continue IV ceftriaxone with urine culture pending     Acute on chronic stage IIIb chronic kidney disease creatinine 1.55 elevated from previous normal saline IV fluid hydration repeat BMP in a.m. avoid nephrotoxic meds     Normocytic anemia hemoglobin 8.5 MCV 88 hemoglobin was 9.2 previously no  signs of bleeding monitor CBC     Hypertension, controlled on home meds Home meds unverified at this time monitor BP will add as needed medication if needed     Type 2 diabetes mellitus with diabetic neuropathy, home meds unverified at this time serum glucose 128, add SSI as needed and Accu-Cheks AC at bedtime A1c in a.m. low concentrated sweet diet     Hyperlipidemia Home meds unverified at this time reorder pending verification from pharmacy     Morbid obesity BMI 44.7 lifestyle management education    DVT prophylaxis:  Mechanical DVT prophylaxis orders are present.    CODE STATUS:    Code Status (Patient has no pulse and is not breathing): CPR (Attempt to Resuscitate)  Medical Interventions (Patient has pulse or is breathing): Full      Disposition:  I expect patient to be discharged 2 to 3 days.    This patient has been examined wearing appropriate Personal Protective Equipment and discussed with hospital infection control department. 11/03/21      Electronically signed by Ginny Coulter MD, 11/03/21, 07:06 EDT.  [facility]ist Team

## 2021-11-03 NOTE — PLAN OF CARE
Goal Outcome Evaluation:  Plan of Care Reviewed With: patient        Progress: improving   Pt c/o right sided pain relieved with prn tylenol. Pt has been resting comfortably with no other complaints. NPO for pacemaker placement and loop recorder removal this morning. Will continue to monitor...

## 2021-11-04 ENCOUNTER — HOME CARE VISIT (OUTPATIENT)
Dept: HOME HEALTH SERVICES | Facility: HOME HEALTHCARE | Age: 86
End: 2021-11-04

## 2021-11-04 LAB
ALBUMIN SERPL-MCNC: 3.2 G/DL (ref 3.5–5.2)
ALBUMIN/GLOB SERPL: 1 G/DL
ALP SERPL-CCNC: 65 U/L (ref 39–117)
ALT SERPL W P-5'-P-CCNC: 16 U/L (ref 1–33)
ANION GAP SERPL CALCULATED.3IONS-SCNC: 8 MMOL/L (ref 5–15)
AST SERPL-CCNC: 34 U/L (ref 1–32)
BASOPHILS # BLD AUTO: 0 10*3/MM3 (ref 0–0.2)
BASOPHILS NFR BLD AUTO: 0.6 % (ref 0–1.5)
BILIRUB SERPL-MCNC: 0.3 MG/DL (ref 0–1.2)
BUN SERPL-MCNC: 16 MG/DL (ref 8–23)
BUN/CREAT SERPL: 16.3 (ref 7–25)
CALCIUM SPEC-SCNC: 7.8 MG/DL (ref 8.6–10.5)
CHLORIDE SERPL-SCNC: 107 MMOL/L (ref 98–107)
CO2 SERPL-SCNC: 21 MMOL/L (ref 22–29)
CREAT SERPL-MCNC: 0.98 MG/DL (ref 0.57–1)
DEPRECATED RDW RBC AUTO: 62.6 FL (ref 37–54)
EOSINOPHIL # BLD AUTO: 0.2 10*3/MM3 (ref 0–0.4)
EOSINOPHIL NFR BLD AUTO: 5.8 % (ref 0.3–6.2)
ERYTHROCYTE [DISTWIDTH] IN BLOOD BY AUTOMATED COUNT: 19.9 % (ref 12.3–15.4)
GFR SERPL CREATININE-BSD FRML MDRD: 54 ML/MIN/1.73
GLOBULIN UR ELPH-MCNC: 3.1 GM/DL
GLUCOSE BLDC GLUCOMTR-MCNC: 126 MG/DL (ref 70–105)
GLUCOSE BLDC GLUCOMTR-MCNC: 147 MG/DL (ref 70–105)
GLUCOSE BLDC GLUCOMTR-MCNC: 156 MG/DL (ref 70–105)
GLUCOSE SERPL-MCNC: 115 MG/DL (ref 65–99)
HCT VFR BLD AUTO: 28.2 % (ref 34–46.6)
HGB BLD-MCNC: 8.6 G/DL (ref 12–15.9)
LYMPHOCYTES # BLD AUTO: 1 10*3/MM3 (ref 0.7–3.1)
LYMPHOCYTES NFR BLD AUTO: 26.1 % (ref 19.6–45.3)
MCH RBC QN AUTO: 27.2 PG (ref 26.6–33)
MCHC RBC AUTO-ENTMCNC: 30.5 G/DL (ref 31.5–35.7)
MCV RBC AUTO: 89.1 FL (ref 79–97)
MONOCYTES # BLD AUTO: 0.3 10*3/MM3 (ref 0.1–0.9)
MONOCYTES NFR BLD AUTO: 8.8 % (ref 5–12)
NEUTROPHILS NFR BLD AUTO: 2.3 10*3/MM3 (ref 1.7–7)
NEUTROPHILS NFR BLD AUTO: 58.7 % (ref 42.7–76)
NRBC BLD AUTO-RTO: 0 /100 WBC (ref 0–0.2)
PLATELET # BLD AUTO: 170 10*3/MM3 (ref 140–450)
PMV BLD AUTO: 6.8 FL (ref 6–12)
POTASSIUM SERPL-SCNC: 4.6 MMOL/L (ref 3.5–5.2)
PROT SERPL-MCNC: 6.3 G/DL (ref 6–8.5)
RBC # BLD AUTO: 3.16 10*6/MM3 (ref 3.77–5.28)
SODIUM SERPL-SCNC: 136 MMOL/L (ref 136–145)
WBC # BLD AUTO: 4 10*3/MM3 (ref 3.4–10.8)

## 2021-11-04 PROCEDURE — 97530 THERAPEUTIC ACTIVITIES: CPT

## 2021-11-04 PROCEDURE — 82962 GLUCOSE BLOOD TEST: CPT

## 2021-11-04 PROCEDURE — 99232 SBSQ HOSP IP/OBS MODERATE 35: CPT | Performed by: INTERNAL MEDICINE

## 2021-11-04 PROCEDURE — 99024 POSTOP FOLLOW-UP VISIT: CPT | Performed by: INTERNAL MEDICINE

## 2021-11-04 PROCEDURE — 63710000001 INSULIN LISPRO (HUMAN) PER 5 UNITS: Performed by: INTERNAL MEDICINE

## 2021-11-04 PROCEDURE — 85025 COMPLETE CBC W/AUTO DIFF WBC: CPT | Performed by: INTERNAL MEDICINE

## 2021-11-04 PROCEDURE — 80053 COMPREHEN METABOLIC PANEL: CPT | Performed by: INTERNAL MEDICINE

## 2021-11-04 PROCEDURE — 97164 PT RE-EVAL EST PLAN CARE: CPT

## 2021-11-04 RX ADMIN — ASPIRIN 81 MG: 81 TABLET, COATED ORAL at 08:10

## 2021-11-04 RX ADMIN — HYDROCODONE BITARTRATE AND ACETAMINOPHEN 1 TABLET: 5; 325 TABLET ORAL at 23:03

## 2021-11-04 RX ADMIN — ATORVASTATIN CALCIUM 10 MG: 10 TABLET, FILM COATED ORAL at 08:12

## 2021-11-04 RX ADMIN — AMLODIPINE BESYLATE 5 MG: 5 TABLET ORAL at 08:12

## 2021-11-04 RX ADMIN — TOPIRAMATE 12.5 MG: 25 TABLET, FILM COATED ORAL at 20:26

## 2021-11-04 RX ADMIN — INSULIN LISPRO 2 UNITS: 100 INJECTION, SOLUTION INTRAVENOUS; SUBCUTANEOUS at 11:30

## 2021-11-04 RX ADMIN — Medication 10 ML: at 08:14

## 2021-11-04 RX ADMIN — GABAPENTIN 300 MG: 300 CAPSULE ORAL at 08:10

## 2021-11-04 RX ADMIN — GABAPENTIN 300 MG: 300 CAPSULE ORAL at 20:26

## 2021-11-04 RX ADMIN — FUROSEMIDE 20 MG: 20 TABLET ORAL at 08:10

## 2021-11-04 RX ADMIN — TOPIRAMATE 12.5 MG: 25 TABLET, FILM COATED ORAL at 08:11

## 2021-11-04 RX ADMIN — SPIRONOLACTONE 25 MG: 25 TABLET ORAL at 08:12

## 2021-11-04 RX ADMIN — LOSARTAN POTASSIUM 50 MG: 50 TABLET, FILM COATED ORAL at 08:10

## 2021-11-04 RX ADMIN — ACETAMINOPHEN 650 MG: 325 TABLET, FILM COATED ORAL at 17:52

## 2021-11-04 NOTE — DISCHARGE PLACEMENT REQUEST
"Sean Mobley (86 y.o. Female)             Date of Birth Social Security Number Address Home Phone MRN    1935  [facility] [facility] [phone] [id number]    Alevism Marital Status             [employer]ar[place]        Admission Date Admission Type Admitting Provider Attending Provider Department, Room/Bed    10/31/21 Emergency Gabriel Goss MD Davis, Willette C, MD [facility] NEURO HEART, 261/1    Discharge Date Discharge Disposition Discharge Destination                         Attending Provider: Ginny Coulter MD    Allergies: Latex, Natural Rubber, Adhesive Tape    Isolation: None   Infection: None   Code Status: CPR   Advance Care Planning Activity    Ht: 157.5 cm (62\")   Wt: 110 kg (243 lb 2.7 oz)    Admission Cmt: None   Principal Problem: None                Active Insurance as of 10/31/2021     Primary Coverage     Payor Plan Insurance Group Employer/Plan Group    MEDICARE MEDICARE A & B      Payor Plan Address Payor Plan Phone Number Payor Plan Fax Number Effective Dates    [address] [phone]  3/1/2002 - None Entered    [facility] [id number]       Subscriber Name Subscriber Birth Date Member ID       SEAN MOBLEY 1935 [id number]           Secondary Coverage     Payor Plan Insurance Group Employer/Plan Group    [facility] SUPP [id number]     Payor Plan Address Payor Plan Phone Number Payor Plan Fax Number Effective Dates    [address]   12/1/2016 - None Entered    [facility] [id number]       Subscriber Name Subscriber Birth Date Member ID       SEAN MOBLEY 1935 [id number]                 Emergency Contacts      (Rel.) Home Phone Work Phone Mobile Phone    GEORGIE MOBLEY (Spouse) [phone] -- --    [place] (Relative) [phone] -- [phone]              "

## 2021-11-04 NOTE — PLAN OF CARE
Problem: Adult Inpatient Plan of Care  Goal: Absence of Hospital-Acquired Illness or Injury  Intervention: Identify and Manage Fall Risk  Recent Flowsheet Documentation  Taken 11/4/2021 0420 by Marbin Mcintyre RN  Safety Promotion/Fall Prevention:   assistive device/personal items within reach   clutter free environment maintained   fall prevention program maintained   nonskid shoes/slippers when out of bed   room organization consistent   safety round/check completed  Taken 11/4/2021 0006 by Marbin Mcintyre RN  Safety Promotion/Fall Prevention:   assistive device/personal items within reach   clutter free environment maintained   fall prevention program maintained   nonskid shoes/slippers when out of bed   room organization consistent   safety round/check completed  Taken 11/3/2021 2200 by Marbin Mcintyre RN  Safety Promotion/Fall Prevention: safety round/check completed  Taken 11/3/2021 2100 by Marbin Mcintyre RN  Safety Promotion/Fall Prevention: safety round/check completed  Taken 11/3/2021 2028 by Marbin Mcintyre RN  Safety Promotion/Fall Prevention:   assistive device/personal items within reach   clutter free environment maintained   fall prevention program maintained   nonskid shoes/slippers when out of bed   room organization consistent   safety round/check completed  Taken 11/3/2021 1900 by Marbin Mcintyre RN  Safety Promotion/Fall Prevention: safety round/check completed  Intervention: Prevent Skin Injury  Recent Flowsheet Documentation  Taken 11/4/2021 0420 by Marbin Mcintyre RN  Skin Protection:   adhesive use limited   incontinence pads utilized  Taken 11/4/2021 0006 by Marbin Mcintyre RN  Skin Protection:   adhesive use limited   incontinence pads utilized  Taken 11/3/2021 2028 by Marbin Mcintyre RN  Body Position:   turned   supine  Skin Protection:   adhesive use limited   incontinence pads utilized  Intervention: Prevent and Manage VTE (venous thromboembolism)  Risk  Recent Flowsheet Documentation  Taken 11/4/2021 0420 by Marbin Mcintyre RN  VTE Prevention/Management:   bilateral   sequential compression devices off   patient refused intervention  Taken 11/4/2021 0006 by Marbin Mcintyre RN  VTE Prevention/Management:   bilateral   sequential compression devices off   patient refused intervention  Taken 11/3/2021 2028 by Marbin Mcintyre RN  VTE Prevention/Management:   bilateral   sequential compression devices off   patient refused intervention  Intervention: Prevent Infection  Recent Flowsheet Documentation  Taken 11/4/2021 0420 by Marbin Mcintyre RN  Infection Prevention:   environmental surveillance performed   hand hygiene promoted   rest/sleep promoted   single patient room provided  Taken 11/4/2021 0006 by Marbin Mcintyre RN  Infection Prevention:   environmental surveillance performed   hand hygiene promoted   rest/sleep promoted   single patient room provided  Taken 11/3/2021 2028 by Marbin Mcintyre RN  Infection Prevention:   environmental surveillance performed   hand hygiene promoted   rest/sleep promoted   single patient room provided  Goal: Optimal Comfort and Wellbeing  Intervention: Provide Person-Centered Care  Recent Flowsheet Documentation  Taken 11/4/2021 0420 by Marbin Mcintyre RN  Trust Relationship/Rapport:   care explained   choices provided   questions answered   questions encouraged  Taken 11/4/2021 0006 by Marbin Mcintyre RN  Trust Relationship/Rapport:   care explained   choices provided   questions answered   questions encouraged  Taken 11/3/2021 2028 by Marbin Mcintyre RN  Trust Relationship/Rapport:   care explained   choices provided   questions answered   questions encouraged     Problem: Fall Injury Risk  Goal: Absence of Fall and Fall-Related Injury  Intervention: Identify and Manage Contributors to Fall Injury Risk  Recent Flowsheet Documentation  Taken 11/4/2021 0420 by Marbin Mcintyre  RN  Medication Review/Management:   medications reviewed   high-risk medications identified  Taken 11/4/2021 0006 by Marbin Mcintyre RN  Medication Review/Management:   medications reviewed   high-risk medications identified  Taken 11/3/2021 2028 by Marbin Mcintyre RN  Medication Review/Management:   medications reviewed   high-risk medications identified  Intervention: Promote Injury-Free Environment  Recent Flowsheet Documentation  Taken 11/4/2021 0420 by Marbin Mcintyre RN  Safety Promotion/Fall Prevention:   assistive device/personal items within reach   clutter free environment maintained   fall prevention program maintained   nonskid shoes/slippers when out of bed   room organization consistent   safety round/check completed  Taken 11/4/2021 0006 by Marbin Mcintyre RN  Safety Promotion/Fall Prevention:   assistive device/personal items within reach   clutter free environment maintained   fall prevention program maintained   nonskid shoes/slippers when out of bed   room organization consistent   safety round/check completed  Taken 11/3/2021 2200 by Marbin Mcintyre RN  Safety Promotion/Fall Prevention: safety round/check completed  Taken 11/3/2021 2100 by Marbin Mcintyre RN  Safety Promotion/Fall Prevention: safety round/check completed  Taken 11/3/2021 2028 by Marbin Mcintyre RN  Safety Promotion/Fall Prevention:   assistive device/personal items within reach   clutter free environment maintained   fall prevention program maintained   nonskid shoes/slippers when out of bed   room organization consistent   safety round/check completed  Taken 11/3/2021 1900 by Marbin Mcintyre RN  Safety Promotion/Fall Prevention: safety round/check completed     Problem: COPD Comorbidity  Goal: Maintenance of COPD Symptom Control  Intervention: Maintain COPD-Symptom Control  Recent Flowsheet Documentation  Taken 11/4/2021 0420 by Marbin Mcintyre RN  Medication Review/Management:   medications  reviewed   high-risk medications identified  Taken 11/4/2021 0006 by Marbin Mcintyre RN  Medication Review/Management:   medications reviewed   high-risk medications identified  Taken 11/3/2021 2028 by Marbin Mcintyre RN  Medication Review/Management:   medications reviewed   high-risk medications identified     Problem: Diabetes Comorbidity  Goal: Blood Glucose Level Within Desired Range  Intervention: Maintain Glycemic Control  Recent Flowsheet Documentation  Taken 11/4/2021 0420 by Marbin Mcintyre RN  Glycemic Management: blood glucose monitoring  Taken 11/3/2021 2028 by Marbin Mcintyre RN  Glycemic Management: blood glucose monitoring     Problem: Hypertension Comorbidity  Goal: Blood Pressure in Desired Range  Intervention: Maintain Hypertension-Management Strategies  Recent Flowsheet Documentation  Taken 11/4/2021 0420 by Marbin Mcintyre RN  Medication Review/Management:   medications reviewed   high-risk medications identified  Taken 11/4/2021 0006 by Marbin Mcintyre RN  Medication Review/Management:   medications reviewed   high-risk medications identified  Taken 11/3/2021 2028 by Marbin Mcintyre RN  Medication Review/Management:   medications reviewed   high-risk medications identified     Problem: Pain Chronic (Persistent) (Comorbidity Management)  Goal: Acceptable Pain Control and Functional Ability  Intervention: Manage Persistent Pain  Recent Flowsheet Documentation  Taken 11/4/2021 0420 by Marbin Mcintyre RN  Medication Review/Management:   medications reviewed   high-risk medications identified  Taken 11/4/2021 0006 by Marbin Mcintyre RN  Medication Review/Management:   medications reviewed   high-risk medications identified  Taken 11/3/2021 2028 by Marbin Mcintyre RN  Medication Review/Management:   medications reviewed   high-risk medications identified  Intervention: Optimize Psychosocial Wellbeing  Recent Flowsheet Documentation  Taken 11/4/2021 0420 by  Marbin Mcintyre RN  Diversional Activities: television  Taken 11/4/2021 0006 by Marbin Mcintyre RN  Diversional Activities: television  Taken 11/3/2021 2028 by Marbin Mcintyre RN  Supportive Measures:   active listening utilized   decision-making supported  Diversional Activities: television     Problem: Skin Injury Risk Increased  Goal: Skin Health and Integrity  Intervention: Optimize Skin Protection  Recent Flowsheet Documentation  Taken 11/4/2021 0420 by Marbin Mcintyre RN  Pressure Reduction Techniques:   frequent weight shift encouraged   weight shift assistance provided  Pressure Reduction Devices: pressure-redistributing mattress utilized  Skin Protection:   adhesive use limited   incontinence pads utilized  Taken 11/4/2021 0006 by Marbin Mcintyre RN  Pressure Reduction Techniques:   frequent weight shift encouraged   weight shift assistance provided  Pressure Reduction Devices: pressure-redistributing mattress utilized  Skin Protection:   adhesive use limited   incontinence pads utilized  Taken 11/3/2021 2028 by Marbin Mcintyre RN  Pressure Reduction Techniques:   frequent weight shift encouraged   weight shift assistance provided  Head of Bed (HOB): HOB at 30 degrees  Pressure Reduction Devices: pressure-redistributing mattress utilized  Skin Protection:   adhesive use limited   incontinence pads utilized   Goal Outcome Evaluation:

## 2021-11-04 NOTE — PLAN OF CARE
Goal Outcome Evaluation:  Plan of Care Reviewed With: patient, other (see comments) (discussed w/ RN and ;  plans to call family to discuss high level of assist needed for basic mobility and pt refusing to go to IP rehab)        Progress: declining  Outcome Summary: 85 yo female seen for re-eval due to being s/p new pacemaker placement on 11/3/21. Pt normally uses rw at home, but she has not been able to amb since admission due to severe pain in RLE. Imaging was (-) for fx in RLE. Pt now not able to wb w/ LUE due to pacemaker precautions. She requires mod assist of 1-2 to come to sitting at eob (sleeps in bed at home). She requires mod to max assist to come to stand and take a few steps in place to turn and sit in chair. Uses hemiwx to assist w/ balance, but pt does not use correctly, despite demonstration and multiple cues given by PT. She places hemiwx directly in front of her. Pt is not consistently able to follow directions for pacemaker precautions. She has poor sequencing for taking steps, even just to turn and sit in chair, using hemiwx. Pt is very high risk for injurious falls. Needs IP rehab at d/c, but pt is currently refusing. Pt believes she will be charged a large amount of money if she goes to rehab.  and RN aware.  plans to call family to see if they will encourage pt to go to rehab, unless they are able to physically manage her at home. Will continue to follow.

## 2021-11-04 NOTE — CASE MANAGEMENT/SOCIAL WORK
Continued Stay Note   Angelo     Patient Name: Aracelis Vargas  MRN: [id number]  Today's Date: 11/4/2021    Admit Date: 10/31/2021     Discharge Plan     Row Name 11/04/21 1353       Plan    Plan Referral to [facility] pending acceptance, no precert required    Provided Post Acute Provider List? Yes    Post Acute Provider List Inpatient Rehab    Provided Post Acute Provider Quality & Resource List? Yes    Post Acute Provider Quality and Resource List Inpatient Rehab; Nursing Home    Delivered To Patient; Support Person    Support Person Nika    Method of Delivery In person    Plan Comments spoke to royer Maher and Patient at bedside and obtained choice of [facility]. They declined giving a second choice but still have list. referral sent to [facility] liaison notified pending acceptance.                                    Met with patient at bedside wearing mask and goggles, Spent less than 15 minutes in room at greater than 6 feet distance.       Crystal Powell RN

## 2021-11-04 NOTE — PLAN OF CARE
Problem: Adult Inpatient Plan of Care  Goal: Absence of Hospital-Acquired Illness or Injury  Intervention: Identify and Manage Fall Risk  Recent Flowsheet Documentation  Taken 11/4/2021 0600 by Maribn Mcintyre RN  Safety Promotion/Fall Prevention: safety round/check completed  Taken 11/4/2021 0500 by Marbin Mcintyre RN  Safety Promotion/Fall Prevention: safety round/check completed  Taken 11/4/2021 0420 by Marbin Mcintyre RN  Safety Promotion/Fall Prevention:   assistive device/personal items within reach   clutter free environment maintained   fall prevention program maintained   nonskid shoes/slippers when out of bed   room organization consistent   safety round/check completed  Taken 11/4/2021 0006 by Marbin Mcintyre RN  Safety Promotion/Fall Prevention:   assistive device/personal items within reach   clutter free environment maintained   fall prevention program maintained   nonskid shoes/slippers when out of bed   room organization consistent   safety round/check completed  Taken 11/3/2021 2200 by aMrbin Mcintyre RN  Safety Promotion/Fall Prevention: safety round/check completed  Taken 11/3/2021 2100 by Marbin Mcintyre RN  Safety Promotion/Fall Prevention: safety round/check completed  Taken 11/3/2021 2028 by Marbin Mcintyre RN  Safety Promotion/Fall Prevention:   assistive device/personal items within reach   clutter free environment maintained   fall prevention program maintained   nonskid shoes/slippers when out of bed   room organization consistent   safety round/check completed  Taken 11/3/2021 1900 by Marbin Mcintyre RN  Safety Promotion/Fall Prevention: safety round/check completed  Intervention: Prevent Skin Injury  Recent Flowsheet Documentation  Taken 11/4/2021 0420 by Marbin Mcintyre RN  Skin Protection:   adhesive use limited   incontinence pads utilized  Taken 11/4/2021 0006 by Marbin Mcintyre RN  Skin Protection:   adhesive use limited   incontinence pads  utilized  Taken 11/3/2021 2028 by Marbin Mcintyre RN  Body Position:   turned   supine  Skin Protection:   adhesive use limited   incontinence pads utilized  Intervention: Prevent and Manage VTE (venous thromboembolism) Risk  Recent Flowsheet Documentation  Taken 11/4/2021 0420 by Marbin Mcintyre RN  VTE Prevention/Management:   bilateral   sequential compression devices off   patient refused intervention  Taken 11/4/2021 0006 by Marbin Mcintyre RN  VTE Prevention/Management:   bilateral   sequential compression devices off   patient refused intervention  Taken 11/3/2021 2028 by Marbin Mcintyre RN  VTE Prevention/Management:   bilateral   sequential compression devices off   patient refused intervention  Intervention: Prevent Infection  Recent Flowsheet Documentation  Taken 11/4/2021 0420 by Marbin Mcintyre RN  Infection Prevention:   environmental surveillance performed   hand hygiene promoted   rest/sleep promoted   single patient room provided  Taken 11/4/2021 0006 by Marbin Mcintyre RN  Infection Prevention:   environmental surveillance performed   hand hygiene promoted   rest/sleep promoted   single patient room provided  Taken 11/3/2021 2028 by Marbin Mcintyre RN  Infection Prevention:   environmental surveillance performed   hand hygiene promoted   rest/sleep promoted   single patient room provided  Goal: Optimal Comfort and Wellbeing  Intervention: Provide Person-Centered Care  Recent Flowsheet Documentation  Taken 11/4/2021 0420 by Marbin Mcintyre RN  Trust Relationship/Rapport:   care explained   choices provided   questions answered   questions encouraged  Taken 11/4/2021 0006 by Marbin Mcintyre RN  Trust Relationship/Rapport:   care explained   choices provided   questions answered   questions encouraged  Taken 11/3/2021 2028 by Marbin Mcintyre RN  Trust Relationship/Rapport:   care explained   choices provided   questions answered   questions encouraged      Problem: Fall Injury Risk  Goal: Absence of Fall and Fall-Related Injury  Intervention: Identify and Manage Contributors to Fall Injury Risk  Recent Flowsheet Documentation  Taken 11/4/2021 0420 by Marbin Mcintyre RN  Medication Review/Management:   medications reviewed   high-risk medications identified  Taken 11/4/2021 0006 by Marbin Mcintyre RN  Medication Review/Management:   medications reviewed   high-risk medications identified  Taken 11/3/2021 2028 by Marbin Mcintyre RN  Medication Review/Management:   medications reviewed   high-risk medications identified  Intervention: Promote Injury-Free Environment  Recent Flowsheet Documentation  Taken 11/4/2021 0600 by Marbin Mcintyre RN  Safety Promotion/Fall Prevention: safety round/check completed  Taken 11/4/2021 0500 by Marbin Mcintyre RN  Safety Promotion/Fall Prevention: safety round/check completed  Taken 11/4/2021 0420 by Marbin Mcintyre RN  Safety Promotion/Fall Prevention:   assistive device/personal items within reach   clutter free environment maintained   fall prevention program maintained   nonskid shoes/slippers when out of bed   room organization consistent   safety round/check completed  Taken 11/4/2021 0006 by Marbin Mcintyre RN  Safety Promotion/Fall Prevention:   assistive device/personal items within reach   clutter free environment maintained   fall prevention program maintained   nonskid shoes/slippers when out of bed   room organization consistent   safety round/check completed  Taken 11/3/2021 2200 by Marbin Mcintyre RN  Safety Promotion/Fall Prevention: safety round/check completed  Taken 11/3/2021 2100 by Marbin Mcintyre RN  Safety Promotion/Fall Prevention: safety round/check completed  Taken 11/3/2021 2028 by Marbin Mcintyre RN  Safety Promotion/Fall Prevention:   assistive device/personal items within reach   clutter free environment maintained   fall prevention program maintained   nonskid  shoes/slippers when out of bed   room organization consistent   safety round/check completed  Taken 11/3/2021 1900 by Marbin Mcintyre RN  Safety Promotion/Fall Prevention: safety round/check completed     Problem: COPD Comorbidity  Goal: Maintenance of COPD Symptom Control  Intervention: Maintain COPD-Symptom Control  Recent Flowsheet Documentation  Taken 11/4/2021 0420 by Marbin Mcintyre RN  Medication Review/Management:   medications reviewed   high-risk medications identified  Taken 11/4/2021 0006 by Marbin Mcintyre RN  Medication Review/Management:   medications reviewed   high-risk medications identified  Taken 11/3/2021 2028 by Marbin Mcintyre RN  Medication Review/Management:   medications reviewed   high-risk medications identified     Problem: Diabetes Comorbidity  Goal: Blood Glucose Level Within Desired Range  Intervention: Maintain Glycemic Control  Recent Flowsheet Documentation  Taken 11/4/2021 0420 by Marbin Mcintyre RN  Glycemic Management: blood glucose monitoring  Taken 11/3/2021 2028 by Marbin Mcintyre RN  Glycemic Management: blood glucose monitoring     Problem: Hypertension Comorbidity  Goal: Blood Pressure in Desired Range  Intervention: Maintain Hypertension-Management Strategies  Recent Flowsheet Documentation  Taken 11/4/2021 0420 by Marbin Mcintyre RN  Medication Review/Management:   medications reviewed   high-risk medications identified  Taken 11/4/2021 0006 by Marbin Mcintyre RN  Medication Review/Management:   medications reviewed   high-risk medications identified  Taken 11/3/2021 2028 by Marbin Mcintyre RN  Medication Review/Management:   medications reviewed   high-risk medications identified     Problem: Pain Chronic (Persistent) (Comorbidity Management)  Goal: Acceptable Pain Control and Functional Ability  Intervention: Manage Persistent Pain  Recent Flowsheet Documentation  Taken 11/4/2021 0420 by Marbin Mcintyre RN  Medication  Review/Management:   medications reviewed   high-risk medications identified  Taken 11/4/2021 0006 by Marbin Mcintyre RN  Medication Review/Management:   medications reviewed   high-risk medications identified  Taken 11/3/2021 2028 by Marbin Mcintyre RN  Medication Review/Management:   medications reviewed   high-risk medications identified  Intervention: Optimize Psychosocial Wellbeing  Recent Flowsheet Documentation  Taken 11/4/2021 0420 by Marbin Mcintyre RN  Diversional Activities: television  Taken 11/4/2021 0006 by Marbin Mcintyre RN  Diversional Activities: television  Taken 11/3/2021 2028 by Marbin Mcintyre RN  Supportive Measures:   active listening utilized   decision-making supported  Diversional Activities: television     Problem: Skin Injury Risk Increased  Goal: Skin Health and Integrity  Intervention: Optimize Skin Protection  Recent Flowsheet Documentation  Taken 11/4/2021 0420 by Marbin Mcintyre RN  Pressure Reduction Techniques:   frequent weight shift encouraged   weight shift assistance provided  Pressure Reduction Devices: pressure-redistributing mattress utilized  Skin Protection:   adhesive use limited   incontinence pads utilized  Taken 11/4/2021 0006 by Marbin Mcintyre RN  Pressure Reduction Techniques:   frequent weight shift encouraged   weight shift assistance provided  Pressure Reduction Devices: pressure-redistributing mattress utilized  Skin Protection:   adhesive use limited   incontinence pads utilized  Taken 11/3/2021 2028 by Marbin Mcintyre RN  Pressure Reduction Techniques:   frequent weight shift encouraged   weight shift assistance provided  Head of Bed (HOB): HOB at 30 degrees  Pressure Reduction Devices: pressure-redistributing mattress utilized  Skin Protection:   adhesive use limited   incontinence pads utilized   Goal Outcome Evaluation:

## 2021-11-04 NOTE — THERAPY RE-EVALUATION
Patient Name: Aracelis Vargas  : 1935    MRN: [id number]                              Today's Date: 2021       Admit Date: 10/31/2021    Visit Dx:     ICD-10-CM ICD-9-CM   1. Second degree AV block, Mobitz type II  I44.1 426.12   2. Altered mental status, unspecified altered mental status type  R41.82 780.97   3. UTI (urinary tract infection), bacterial  N39.0 599.0    A49.9 041.9   4. Atrioventricular block, Mobitz type 1, Wenckebach  I44.1 426.13   5. Syncope and collapse  R55 780.2     Patient Active Problem List   Diagnosis   • Fall   • DM type 2 with diabetic dyslipidemia (HCC)   • Secondary hyperaldosteronism (HCC)   • Stage 3b chronic kidney disease (CMS/HCC)   • Hypertension associated with stage 3 chronic kidney disease due to type 2 diabetes mellitus (HCC)   • Syncope and collapse   • Type 2 diabetes mellitus with diabetic neuropathy, with long-term current use of insulin (Formerly [facility])   • Morbid obesity (Formerly [facility])   • Atherosclerosis of native coronary artery of native heart without angina pectoris   • Atrioventricular block, Mobitz type 1, Wenckebach   • Abnormal nuclear stress test   • Hyperlipidemia   • Hypertension   • Acute UTI   • GERD (gastroesophageal reflux disease)   • Right hip pain   • Altered mental status   • Normocytic anemia     Past Medical History:   Diagnosis Date   • Atherosclerosis of native coronary artery of native heart without angina pectoris 10/22/2020   • Hypertension associated with stage 3 chronic kidney disease due to type 2 diabetes mellitus (Formerly [facility]) 10/22/2020   • Morbid obesity (Formerly [facility]) 10/22/2020   • Secondary hyperaldosteronism (Formerly [facility]) 10/22/2020   • Stage 3b chronic kidney disease (Formerly [facility]) 10/22/2020   • Type 2 diabetes mellitus with diabetic neuropathy, with long-term current use of insulin (Formerly [facility]) 10/22/2020   • Type 2 diabetes mellitus with diabetic neuropathy, with long-term current use of insulin (Formerly [facility]) 10/22/2020     Past Surgical History:   Procedure Laterality Date   • CARDIAC  CATHETERIZATION N/A 10/23/2020    Procedure: Left Heart Cath and coronary angiogram;  Surgeon: Wang Plaza MD;  Location: [facility] CATH INVASIVE LOCATION;  Service: Cardiovascular;  Laterality: N/A;   • CARDIAC ELECTROPHYSIOLOGY PROCEDURE Left 11/3/2021    Procedure: Pacemaker DC new;  Surgeon: Wang Plaza MD;  Location: [facility] CATH INVASIVE LOCATION;  Service: Cardiovascular;  Laterality: Left;   • CARDIAC ELECTROPHYSIOLOGY PROCEDURE N/A 11/3/2021    Procedure: LOOP RECORDER REMOVAL;  Surgeon: Wang Plaza MD;  Location: [facility] CATH INVASIVE LOCATION;  Service: Cardiovascular;  Laterality: N/A;   • HYSTERECTOMY     • JOINT REPLACEMENT Right     Hip   • JOINT REPLACEMENT Left     hip   • JOINT REPLACEMENT Left     hip (for second time)   • JOINT REPLACEMENT Right     knee   • OOPHORECTOMY        General Information     Row Name 11/04/21 1245          Physical Therapy Time and Intention    Document Type re-evaluation  -CM     Mode of Treatment physical therapy  -     Row Name 11/04/21 1245          General Information    Patient Profile Reviewed yes  -CM     Existing Precautions/Restrictions fall; pacemaker  new pacemaker 11/3/21  -CM     Barriers to Rehab medically complex  -CM     Row Name 11/04/21 1245          Living Environment    Lives With spouse   uses cane or rw at home, depending on day  -CM     Row Name 11/04/21 1245          Home Main Entrance    Number of Stairs, Main Entrance one  one large step to enter home  -CM     Stair Railings, Main Entrance none  -CM     Row Name 11/04/21 1245          Cognition    Orientation Status (Cognition) oriented x 4; other (see comments)  questionable mentation; slow to respond at times to orientation questions  -CM     Row Name 11/04/21 1245          Safety Issues, Functional Mobility    Safety Issues Affecting Function (Mobility) awareness of need for assistance; insight into deficits/self-awareness; judgment; sequencing abilities;  problem-solving; positioning of assistive device; safety precaution awareness; safety precautions follow-through/compliance  -CM     Impairments Affecting Function (Mobility) pain; strength; range of motion (ROM); balance; endurance/activity tolerance  -CM           User Key  (r) = Recorded By, (t) = Taken By, (c) = Cosigned By    Initials Name Provider Type    CM Senait Nguyen, PT Physical Therapist               Mobility     Row Name 11/04/21 1249          Bed Mobility    Supine-Sit [place] (Bed Mobility) moderate assist (50% patient effort); verbal cues; maximum assist (25% patient effort); 1 person assist  pt tried several times to push w/ LUE to assist in coming to sitting; needed redirection by PT not to push w/ LUE.  -CM     Sit-Supine [place] (Bed Mobility) not tested  -CM     Assistive Device (Bed Mobility) bed rails; head of bed elevated; draw sheet  -CM     Row Name 11/04/21 1249          Transfers    Comment (Transfers) holds on to hemiwx to come to sitting, despite instruction to push up w/ RUE from bed; LUE in sling to prevent use of L for wb activity.; places hemiwx in front of her, despite instruction and demonstration in proper use by PT; PT then had to correct pt several times, but pt was not able to follow instructions in proper use.  -CM     Row Name 11/04/21 1310 11/04/21 1249       Bed-Chair Transfer    Bed-Chair [place] (Transfers) -- maximum assist (25% patient effort); moderate assist (50% patient effort); 1 person assist; 1 person to manage equipment; other (see comments)  stand to sitting in chair performed w/ extremely poor eccentric control for descent  -CM    Assistive Device (Bed-Chair Transfers) other (see comments)  gaiit belt, LUE in sling; non skid socks  -CM walker, zelda  -CM    Row Name 11/04/21 1249          Sit-Stand Transfer    Sit-Stand [place] (Transfers) moderate assist (50% patient effort); maximum assist (25% patient effort); 1 person assist; 1  person to manage equipment  -CM     Assistive Device (Sit-Stand Transfers) walker, zelda  -CM     Row Name 11/04/21 1249          Gait/Stairs (Locomotion)    [place] Level (Gait) not tested  -CM     Distance in Feet (Gait) unsafe to test ambulation, as pt was too unsteady and needed high level of assistance to simply come to stand and transfer to chair.  -CM           User Key  (r) = Recorded By, (t) = Taken By, (c) = Cosigned By    Initials Name Provider Type    CM Senait Nguyen, PT Physical Therapist               Obj/Interventions     Row Name 11/04/21 1253          Range of Motion Comprehensive    General Range of Motion upper extremity range of motion deficits identified; bilateral lower extremity ROM WFL; bilateral upper extremity ROM WFL  -CM     Comment, General Range of Motion L shoulder n/a due to presence of pacemaker; pt has full ROM for L lower arm, wrist, hand, but insists she was told not to move entire arm; PT instructed pt that she should be moving wrist and hand only several times per day; RLE has minimal limits at hip 2* pain/discomfort  -CM     Row Name 11/04/21 1253          Strength Comprehensive (MMT)    General Manual Muscle Testing (MMT) Assessment lower extremity strength deficits identified  -CM     Comment, General Manual Muscle Testing (MMT) Assessment LUE n/a due to presence of new pacemaker; B hips 3-/5; other LE mm groups 3/5 except R quads 3-/5  -CM     Row Name 11/04/21 1253          Balance    Balance Assessment sitting static balance; sitting dynamic balance; standing static balance; standing dynamic balance  -CM     Static Sitting Balance WFL; unsupported; sitting, edge of bed  -CM     Dynamic Sitting Balance mild impairment; unsupported; sitting, edge of bed  -CM     Static Standing Balance mild impairment; moderate impairment; supported; standing  -CM     Dynamic Standing Balance moderate impairment; supported; standing  -CM           User Key  (r) = Recorded By, (t) =  Taken By, (c) = Cosigned By    Initials Name Provider Type    CM Senait Nguyen, PT Physical Therapist               Goals/Plan     Row Name 11/04/21 1311          Bed Mobility Goal 1 (PT)    Activity/Assistive Device (Bed Mobility Goal 1, PT) sit to supine/supine to sit; other (see comments)  -CM     [place] Level/Cues Needed (Bed Mobility Goal 1, PT) minimum assist (75% or more patient effort); 1 person assist  -CM     Time Frame (Bed Mobility Goal 1, PT) 2 weeks  -CM     Strategies/Barriers (Bed Mobility Goal 1, PT) following pacemaker precautions  -CM     Progress/Outcomes (Bed Mobility Goal 1, PT) unable to make needed progress; medical status inhibiting progress  -CM     Row Name 11/04/21 1311          Transfer Goal 1 (PT)    Activity/Assistive Device (Transfer Goal 1, PT) transfers, all; walker, zelda  -CM     [place] Level/Cues Needed (Transfer Goal 1, PT) minimum assist (75% or more patient effort); contact guard assist  -CM     Time Frame (Transfer Goal 1, PT) 2 weeks  -CM     Strategies/Barriers (Transfers Goal 1, PT) following pacemaker precautions  -CM     Progress/Outcome (Transfer Goal 1, PT) unable to make needed progress; medical status inhibiting progress  -CM     Row Name 11/04/21 1311          Gait Training Goal 1 (PT)    Activity/Assistive Device (Gait Training Goal 1, PT) gait (walking locomotion); assistive device use; increase endurance/gait distance; maintain weight-bearing status; walker, zelda  -CM     [place] Level (Gait Training Goal 1, PT) minimum assist (75% or more patient effort); moderate assist (50-74% patient effort); 1 person assist; 2 person assist  -CM     Distance (Gait Training Goal 1, PT) 50 ft  -CM     Time Frame (Gait Training Goal 1, PT) 2 weeks  -CM     Progress/Outcome (Gait Training Goal 1, PT) unable to make needed progress; medical status inhibiting progress  -CM           User Key  (r) = Recorded By, (t) = Taken By, (c) = Cosigned By    Initials Name  Provider Type    Senait Kay, PT Physical Therapist               Clinical Impression     Row Name 11/04/21 1257          Pain Scale: Numbers Pre/Post-Treatment    Pretreatment Pain Rating 7/10  -CM     Posttreatment Pain Rating 7/10  -CM     Pain Location - Side Right  -CM     Pain Location - Orientation incisional  -CM     Pain Location hip  -CM     Pain Intervention(s) Medication (See MAR); Repositioned; Emotional support; Ambulation/increased activity  -CM     Row Name 11/04/21 1257          Plan of Care Review    Plan of Care Reviewed With patient; other (see comments)  discussed w/ RN and ;  plans to call family to discuss high level of assist needed for basic mobility and pt refusing to go to IP rehab  -CM     Progress declining  -CM     Outcome Summary 87 yo female seen for re-eval due to being s/p new pacemaker placement on 11/3/21. Pt normally uses rw at home, but she has not been able to amb since admission due to severe pain in RLE. Imaging was (-) for fx in RLE. Pt now not able to wb w/ LUE due to pacemaker precautions. She requires mod assist of 1-2 to come to sitting at eob (sleeps in bed at home). She requires mod to max assist to come to stand and take a few steps in place to turn and sit in chair. Uses hemiwx to assist w/ balance, but pt does not use correctly, despite demonstration and multiple cues given by PT. She places hemiwx directly in front of her. Pt is not consistently able to follow directions for pacemaker precautions. She has poor sequencing for taking steps, even just to turn and sit in chair, using hemiwx. Pt is very high risk for injurious falls. Needs IP rehab at d/c, but pt is currently refusing. Pt believes she will be charged a large amount of money if she goes to rehab.  and RN aware.  plans to call family to see if they will encourage pt to go to rehab, unless they are able to physically manage her at home. Will continue to  "follow.  -CM     Row Name 11/04/21 1305          Therapy Assessment/Plan (PT)    Patient/Family Therapy Goals Statement (PT) pt wants HH at d/c, as she adamently refuses IP rehab; states she \"knows\" she will be \"stuck w/ a large bill, and (she) can't afford it.\"  -CM     Rehab Potential (PT) fair, will monitor progress closely  -CM     Criteria for Skilled Interventions Met (PT) yes; skilled treatment is necessary  -CM     Predicted Duration of Therapy Intervention (PT) until d/c  -     Row Name 11/04/21 1305          Vital Signs    Pre SpO2 (%) 99  -CM     O2 Delivery Pre Treatment supplemental O2  3L  -CM     O2 Delivery Intra Treatment room air  -CM     O2 Delivery Post Treatment room air  -CM     Row Name 11/04/21 1305          Positioning and Restraints    Pre-Treatment Position in bed  -CM     Post Treatment Position chair  -CM     In Chair notified nsg; sitting; call light within reach; encouraged to call for assist; exit alarm on; LUE elevated  -CM           User Key  (r) = Recorded By, (t) = Taken By, (c) = Cosigned By    Initials Name Provider Type    Senait Kay, PT Physical Therapist               Outcome Measures     Row Name 11/04/21 1314          How much help from another person do you currently need...    Turning from your back to your side while in flat bed without using bedrails? 2  -CM     Moving from lying on back to sitting on the side of a flat bed without bedrails? 2  -CM     Moving to and from a bed to a chair (including a wheelchair)? 2  -CM     Standing up from a chair using your arms (e.g., wheelchair, bedside chair)? 2  -CM     Climbing 3-5 steps with a railing? 1  -CM     To walk in hospital room? 1  -CM     AM-PAC 6 Clicks Score (PT) 10  -CM     Row Name 11/04/21 1314          Modified [place] Scale    Modified [place] Scale 5 - Severe disability.  Bedridden, incontinent, and requiring constant nursing care and attention.  -CM     Row Name 11/04/21 1314          Functional " Assessment    Outcome Measure Options AM-PAC 6 Clicks Basic Mobility (PT); Modified [place]  -CM           User Key  (r) = Recorded By, (t) = Taken By, (c) = Cosigned By    Initials Name Provider Type    Senait Kay, PT Physical Therapist                             Physical Therapy Education                 Title: PT OT SLP Therapies (Done)     Topic: Physical Therapy (Done)     Point: Mobility training (Done)     Learning Progress Summary           Patient Nonacceptance, E,TB,D, VU,NR,NL by CM at 11/4/2021 1315    Acceptance, E, VU by SP at 11/4/2021 1236    Acceptance, E, NR by CR at 11/2/2021 1452                   Point: Home exercise program (Done)     Learning Progress Summary           Patient Nonacceptance, E,TB,D, VU,NR,NL by CM at 11/4/2021 1315    Acceptance, E, VU by SP at 11/4/2021 1236                   Point: Body mechanics (Done)     Learning Progress Summary           Patient Nonacceptance, E,TB,D, VU,NR,NL by CM at 11/4/2021 1315    Acceptance, E, VU by SP at 11/4/2021 1236                   Point: Precautions (Done)     Learning Progress Summary           Patient Nonacceptance, E,TB,D, VU,NR,NL by CM at 11/4/2021 1315    Acceptance, E, VU by SP at 11/4/2021 1236    Acceptance, E, NR by CR at 11/2/2021 1452                               User Key     Initials Effective Dates Name Provider Type Discipline     06/16/21 -  Senait Nguyen, PT Physical Therapist PT    CR 06/16/21 -  Reyes, Carmela, PT Physical Therapist PT    SP 07/26/21 -  Yenny Blanco RN Registered Nurse Nurse              PT Recommendation and Plan  Planned Therapy Interventions (PT): balance training, bed mobility training, gait training, patient/family education, strengthening, ROM (range of motion), postural re-education, transfer training  Plan of Care Reviewed With: patient, other (see comments) (discussed w/ RN and ;  plans to call family to discuss high level of assist needed for basic mobility  and pt refusing to go to IP rehab)  Progress: declining  Outcome Summary: 85 yo female seen for re-eval due to being s/p new pacemaker placement on 11/3/21. Pt normally uses rw at home, but she has not been able to amb since admission due to severe pain in RLE. Imaging was (-) for fx in RLE. Pt now not able to wb w/ LUE due to pacemaker precautions. She requires mod assist of 1-2 to come to sitting at eob (sleeps in bed at home). She requires mod to max assist to come to stand and take a few steps in place to turn and sit in chair. Uses hemiwx to assist w/ balance, but pt does not use correctly, despite demonstration and multiple cues given by PT. She places hemiwx directly in front of her. Pt is not consistently able to follow directions for pacemaker precautions. She has poor sequencing for taking steps, even just to turn and sit in chair, using hemiwx. Pt is very high risk for injurious falls. Needs IP rehab at d/c, but pt is currently refusing. Pt believes she will be charged a large amount of money if she goes to rehab.  and RN aware.  plans to call family to see if they will encourage pt to go to rehab, unless they are able to physically manage her at home. Will continue to follow.     Time Calculation:    PT Charges     Row Name 11/04/21 1315             Time Calculation    Start Time 1026  -CM      Stop Time 1049  -CM      Time Calculation (min) 23 min  -CM      PT - Next Appointment 11/04/21  -CM      PT Goal Re-Cert Due Date 11/05/21  -CM              Time Calculation- PT    Total Timed Code Minutes- PT 15 minute(s)  -CM            User Key  (r) = Recorded By, (t) = Taken By, (c) = Cosigned By    Initials Name Provider Type    CM Senait Nguyen, PT Physical Therapist              Therapy Charges for Today     Code Description Service Date Service Provider Modifiers Qty    [id number]  PT RE-EVAL ESTABLISHED PLAN 2 11/4/2021 Senait Nguyen PT GP 1    [id number]  PT  THERAPEUTIC ACT EA 15 MIN 11/4/2021 Senait Nguyen, PT GP 1          PT G-Codes  Outcome Measure Options: AM-PAC 6 Clicks Basic Mobility (PT), Modified [place]  AM-PAC 6 Clicks Score (PT): 10  Modified [place] Scale: 5 - Severe disability.  Bedridden, incontinent, and requiring constant nursing care and attention.    Senait Nguyen, PT  11/4/2021

## 2021-11-04 NOTE — PROGRESS NOTES
"    [facility] Medicine Services Daily Progress Note    Patient Name: Aracelis Vargas  : 1935  MRN: [id number]  Primary Care Physician:  Gabriel Goss MD  Date of admission: 10/31/2021      Subjective      Chief Complaint: Confusion  Aracelis Vargas is a 86 y.o. female who presented to [facility] on 10/31/2021 with reported altered mental status per EMS.  She is currently awake and alert and answering appropriately.  She is oriented to person and place.  She denies any chest pain, shortness of air, headache, focal weakness, blurry vision, confusion, or dye Boyd, fever cough or congestion.  CT head per radiology today:     \" 1. No acute intracranial abnormality.  2. No fracture the calvarium or skull base.  3. Mild chronic age-related intracranial findings, unchanged from prior.  \"     Troponin 0 0.024 proBNP 792.7 glucose was 128 on admission.  Creatinine was 1.55 with BUN 30 WBC not elevated, hemoglobin 8.5.  Urinalysis was positive for bacteria/trace WBCs 13-20 2+ leukocytes negative nitrites.  She was started on IV ceftriaxone with urine cultures pending.  IV fluids initiated.  He has a past medical history of coronary artery disease status post CABG, anemia, diabetes mellitus type 2, hyperlipidemia, hypertension, neuropathy, carotid stenosis, COPD and chronic back pain.  She will be admitted for further evaluation and treatment.  She was COVID-19 negative.     2021  Patient seen and examined.  Patient on empiric treatment for UTI   Urine culture pending  Xray for pain (hx of fall): Negative for fracture, prosthetic intact        2021  Patient seen and examined.  Urine culture negative (final)  Blood cultures: NGTD  PT/OT: Inpatient rehab recommended.  Patient prefers to be discharged to home  Cardio: Pacemaker for AV blocks     11/3/2021  Patient seen and examined.   Patient alert/oriented   Urine culture negative   Blood culture:NGTD  Cardio: Pacemaker placed " (second-degree AV block and syncope) //explant of loop recorder  Bedrest x24 hours    11/4/2021  Patient seen and examined.  Afebrile   Confusion resolved  PT/OT recommends IPR but patient refuses.  Patient is a high fall risk for home discharge  Patient understands risk and voiced risk acceptance  POD #1: Pacemaker placement  Urine culture negative, completed 3-day antibiotics  Blood culture: NGTD    Review of Systems   Constitutional: Negative.   HENT: Negative.    Eyes: Negative.    Cardiovascular: Negative.    Respiratory: Negative.    Endocrine: Negative.    Hematologic/Lymphatic: Negative.    Skin: Negative.    Musculoskeletal: Positive for muscle weakness.   Gastrointestinal: Negative.    Genitourinary: Negative.    Neurological: Negative.    Psychiatric/Behavioral: Negative.    Allergic/Immunologic: Negative.    ROS       Objective      Vitals:   Temp:  [97.5 °F (36.4 °C)-98.4 °F (36.9 °C)] 98.1 °F (36.7 °C)  Heart Rate:  [71-82] 78  Resp:  [11-18] 12  BP: (125-168)/(45-90) 159/71  Flow (L/min):  [2-4] 4    Physical Exam   Vitals reviewed.   Constitutional:       Appearance: Normal appearance. She is obese.   HENT:      Head: Normocephalic and atraumatic.      Right Ear: External ear normal.      Left Ear: External ear normal.      Nose: Nose normal.      Mouth/Throat:      Mouth: Mucous membranes are moist.   Eyes:      Extraocular Movements: Extraocular movements intact.   Cardiovascular:      Rate and Rhythm: Normal rate and regular rhythm.      Pulses: Normal pulses.      Heart sounds: Normal heart sounds.   Pulmonary:      Effort: Pulmonary effort is normal.      Breath sounds: Normal breath sounds.   Abdominal:      Palpations: Abdomen is soft.   Genitourinary:     Comments: deferred  Musculoskeletal:         General: Normal range of motion.      Cervical back: Normal range of motion and neck supple.   Skin:     General: Skin is warm and dry.   Neurological:      General: No focal deficit present.       Mental Status: She is alert.      Comments: Oriented x2   Psychiatric:         Mood and Affect: Mood normal.         Behavior: Behavior normal.         Thought Content: Thought content normal.         Judgment: Judgment normal.                  Result Review    Result Review:  I have personally reviewed the results from the time of this admission to 11/4/2021 07:14 EDT and agree with these findings:  [x]  Laboratory  [x]  Microbiology  [x]  Radiology  []  EKG/Telemetry   []  Cardiology/Vascular   []  Pathology  []  Old records  []  Other:      Wounds (last 24 hours)     LDA Wound     Row Name 11/04/21 0420 11/04/21 0006 11/03/21 2028       Wound 11/03/21 0831 Left upper chest    Wound - Properties Group Placement Date: 11/03/21  -DW Placement Time: 0831 -DW Present on Hospital Admission: N  -DW Side: Left  -DW Orientation: upper  -DW Location: chest  -DW    Closure EILEEN  pressure dressing covering incision  -JR EILEEN  pressure dressing covering incision  -JR EILEEN  pressure dressing covering incision  -JR    Retired Wound - Properties Group Date first assessed: 11/03/21 -DW Time first assessed: 0831 -DW Present on Hospital Admission: N  -DW Side: Left  -DW Location: chest  -DW    Row Name 11/03/21 1500 11/03/21 1128 11/03/21 1025       Wound 11/03/21 0831 Left upper chest    Wound - Properties Group Placement Date: 11/03/21  -DW Placement Time: 0831 -DW Present on Hospital Admission: N  -DW Side: Left  -DW Orientation: upper  -DW Location: chest  -DW    Closure EILEEN  pressure dressing covering incision  -KO EILEEN  pressure dressing covering incision  -KO EILEEN  -LW    Periwound -- -- intact; dry  -LW    Periwound Temperature -- -- warm  -LW    Periwound Skin Turgor -- -- soft  -LW    Drainage Amount -- -- none  -LW    Retired Wound - Properties Group Date first assessed: 11/03/21 -DW Time first assessed: 0831 -DW Present on Hospital Admission: N  -DW Side: Left  -DW Location: chest  -DW    Row Name 11/03/21 1010  11/03/21 0955 11/03/21 0940       Wound 11/03/21 0831 Left upper chest    Wound - Properties Group Placement Date: 11/03/21 -DW Placement Time: 0831 -DW Present on Hospital Admission: N  -DW Side: Left  -DW Orientation: upper  -DW Location: chest  -DW    Dressing Appearance -- dry; intact; no drainage  -LW dry; intact; no drainage  -LW    Closure EILEEN  -LW EILEEN  -LW EILEEN  -LW    Periwound intact; dry  -LW intact; dry  -LW intact; dry  -LW    Periwound Temperature warm  -LW warm  -LW warm  -LW    Periwound Skin Turgor soft  -LW soft  -LW soft  -LW    Drainage Amount none  -LW none  -LW none  -LW    Dressing Care -- --  pressure dressing in place  -LW --  pressure dressing  -LW    Retired Wound - Properties Group Date first assessed: 11/03/21 -DW Time first assessed: 0831 -DW Present on Hospital Admission: N  -DW Side: Left  -DW Location: chest  -DW    Row Name 11/03/21 0925             Wound 11/03/21 0831 Left upper chest    Wound - Properties Group Placement Date: 11/03/21  -DW Placement Time: 0831 -DW Present on Hospital Admission: N  -DW Side: Left  -DW Orientation: upper  -DW Location: chest  -DW      Dressing Appearance dry; intact; no drainage  -LW      Closure EILEEN  -LW      Periwound intact; dry  -LW      Periwound Temperature warm  -LW      Periwound Skin Turgor soft  -LW      Drainage Amount none  -LW      Dressing Care --  Pressure Dressing in place  -LW      Retired Wound - Properties Group Date first assessed: 11/03/21 -DW Time first assessed: 0831 -DW Present on Hospital Admission: N  -DW Side: Left  -DW Location: chest  -DW            User Key  (r) = Recorded By, (t) = Taken By, (c) = Cosigned By    Initials Name Provider Type    DW Maribell Rivas Technologist    Lara Powell, RN Registered Nurse    Marbin Lott RN Registered Nurse    Paula Rogers RN Registered Nurse                  Assessment/Plan      Brief Patient Summary:  Aracelis Vargas is a 86 y.o. female who was admitted  for change in mentation.  Admission labs revealed UTI.  Urine culture: Negative.      amLODIPine, 5 mg, Oral, QAM  aspirin, 81 mg, Oral, Daily  atorvastatin, 10 mg, Oral, Daily  cefTRIAXone, 1 g, Intravenous, Q24H  furosemide, 20 mg, Oral, Daily  gabapentin, 300 mg, Oral, BID  insulin lispro, 0-7 Units, Subcutaneous, TID AC  losartan, 50 mg, Oral, Daily  sodium chloride, 10 mL, Intravenous, Q12H  sodium chloride, 3 mL, Intravenous, Q12H  spironolactone, 25 mg, Oral, Daily  topiramate, 12.5 mg, Oral, BID       sodium chloride, 75 mL/hr, Last Rate: 75 mL/hr (11/03/21 8778)         Active Hospital Problems:  Active Hospital Problems    Diagnosis    • Normocytic anemia    • Altered mental status    • Acute UTI    • GERD (gastroesophageal reflux disease)    • Hypertension    • Hyperlipidemia    • Morbid obesity ([facility])    • Type 2 diabetes mellitus with diabetic neuropathy, with long-term current use of insulin ([facility])    • Stage 3b chronic kidney disease (CMS/[facility])    • Syncope and collapse    • Atrioventricular block, Mobitz type 1, [place]      Added automatically from request for surgery [id number]       Plan:   Altered mental status, appears resolved oriented x2 awake alert and answering appropriately, CT head negative for acute per radiology, likely secondary to below     Acute UTI, trace bacteria elevated WBC urine, positive leukocytes continue IV ceftriaxone with urine culture pending     Acute on chronic stage IIIb chronic kidney disease creatinine 1.55 elevated from previous normal saline IV fluid hydration repeat BMP in a.m. avoid nephrotoxic meds     Normocytic anemia hemoglobin 8.5 MCV 88 hemoglobin was 9.2 previously no signs of bleeding monitor CBC     Hypertension, controlled on home meds Home meds unverified at this time monitor BP will add as needed medication if needed     Type 2 diabetes mellitus with diabetic neuropathy, home meds unverified at this time serum glucose 128, add SSI as needed and Accu-Cheks  AC at bedtime A1c in a.m. low concentrated sweet diet     Hyperlipidemia Home meds unverified at this time reorder pending verification from pharmacy     Morbid obesity BMI 44.7 lifestyle management education    DVT prophylaxis:  Mechanical DVT prophylaxis orders are present.    CODE STATUS:    Code Status (Patient has no pulse and is not breathing): CPR (Attempt to Resuscitate)  Medical Interventions (Patient has pulse or is breathing): Full      Disposition:  I expect patient to be discharged 1 to 2 days.    This patient has been examined wearing appropriate Personal Protective Equipment and discussed with hospital infection control department. 11/04/21      Electronically signed by Ginny Coulter MD, 11/04/21, 07:14 EDT.  [facility] Hospitalist Team

## 2021-11-04 NOTE — PROGRESS NOTES
Referring Provider: Ginny Coulter MD    Reason for follow-up:  Mobitz type II second-degree AV block.  Syncope  Status post loop recorder placement  Status post removal of loop recorder  Permanent dual-chamber pacemaker implantation     Patient Care Team:  Gabriel Goss MD as PCP - General (Family Medicine)  Gregory Pinedo MD (Family Medicine)  Wang Plaza MD as Consulting Physician (Cardiology)    Subjective .  Feeling okay     ROS    Since I have last seen her yesterday, the patient has been without any chest discomfort ,shortness of breath, palpitations, dizziness or syncope.  Denies having any headache ,abdominal pain ,nausea, vomiting , diarrhea constipation, loss of weight or loss of appetite.  Denies having any excessive bruising ,hematuria or blood in the stool.    Review of all systems negative except as indicated    History  Past Medical History:   Diagnosis Date   • Atherosclerosis of native coronary artery of native heart without angina pectoris 10/22/2020   • Hypertension associated with stage 3 chronic kidney disease due to type 2 diabetes mellitus ([facility]) 10/22/2020   • Morbid obesity ([facility]) 10/22/2020   • Secondary hyperaldosteronism ([facility]) 10/22/2020   • Stage 3b chronic kidney disease ([facility]) 10/22/2020   • Type 2 diabetes mellitus with diabetic neuropathy, with long-term current use of insulin ([facility]) 10/22/2020   • Type 2 diabetes mellitus with diabetic neuropathy, with long-term current use of insulin ([facility]) 10/22/2020       Past Surgical History:   Procedure Laterality Date   • CARDIAC CATHETERIZATION N/A 10/23/2020    Procedure: Left Heart Cath and coronary angiogram;  Surgeon: Wang Plaza MD;  Location: [facility] INVASIVE LOCATION;  Service: Cardiovascular;  Laterality: N/A;   • HYSTERECTOMY     • JOINT REPLACEMENT Right     Hip   • JOINT REPLACEMENT Left     hip   • JOINT REPLACEMENT Left     hip (for second time)   • JOINT REPLACEMENT Right     knee   • OOPHORECTOMY          Family History   Problem Relation Age of Onset   • Heart disease Mother        Social History     Tobacco Use   • Smoking status: Never Smoker   • Smokeless tobacco: Never Used   Vaping Use   • Vaping Use: Never used   Substance Use Topics   • Alcohol use: Not Currently   • Drug use: Never        Medications Prior to Admission   Medication Sig Dispense Refill Last Dose   • amLODIPine (NORVASC) 5 MG tablet Take 5 mg by mouth Every Morning.   10/31/2021 at Unknown time   • aspirin 81 MG EC tablet Take 81 mg by mouth Daily.   10/31/2021 at Unknown time   • atorvastatin (LIPITOR) 10 MG tablet Take 10 mg by mouth Daily.   10/31/2021 at Unknown time   • furosemide (LASIX) 20 MG tablet Take 1 tablet by mouth Daily. 90 tablet 1 10/31/2021 at Unknown time   • gabapentin (NEURONTIN) 300 MG capsule Take 300 mg by mouth 2 (two) times a day.   10/31/2021 at Unknown time   • insulin NPH-insulin regular (humuLIN 70/30,novoLIN 70/30) (70-30) 100 UNIT/ML injection Inject 30 Units under the skin into the appropriate area as directed 2 (Two) Times a Day With Meals.   10/31/2021 at Unknown time   • losartan (Cozaar) 50 MG tablet Take 1 tablet by mouth Daily. 90 tablet 3 10/31/2021 at Unknown time   • metFORMIN (GLUCOPHAGE) 1000 MG tablet Take 1,000 mg by mouth 2 (Two) Times a Day With Meals.   10/31/2021 at Unknown time   • omeprazole (priLOSEC) 40 MG capsule Take 40 mg by mouth Every Morning.   10/31/2021 at Unknown time   • spironolactone (ALDACTONE) 25 MG tablet Take 1 tablet by mouth Daily.   10/31/2021 at Unknown time   • topiramate (TOPAMAX) 25 MG tablet Take 12.5 mg by mouth 2 (Two) Times a Day.   10/31/2021 at Unknown time       Allergies  Latex, natural rubber and Adhesive tape    Scheduled Meds:amLODIPine, 5 mg, Oral, QAM  aspirin, 81 mg, Oral, Daily  atorvastatin, 10 mg, Oral, Daily  cefTRIAXone, 1 g, Intravenous, Q24H  furosemide, 20 mg, Oral, Daily  gabapentin, 300 mg, Oral, BID  insulin lispro, 0-7 Units,  "Subcutaneous, TID AC  losartan, 50 mg, Oral, Daily  sodium chloride, 10 mL, Intravenous, Q12H  sodium chloride, 3 mL, Intravenous, Q12H  spironolactone, 25 mg, Oral, Daily  topiramate, 12.5 mg, Oral, BID      Continuous Infusions:sodium chloride, 75 mL/hr, Last Rate: 75 mL/hr (11/03/21 0925)      PRN Meds:.•  acetaminophen **OR** acetaminophen **OR** acetaminophen  •  dextrose  •  dextrose  •  glucagon (human recombinant)  •  HYDROcodone-acetaminophen  •  influenza vaccine  •  insulin lispro **AND** insulin lispro  •  melatonin  •  nitroglycerin  •  ondansetron **OR** ondansetron  •  [COMPLETED] Insert peripheral IV **AND** sodium chloride  •  sodium chloride  •  sodium chloride    Objective     VITAL SIGNS  Vitals:    11/04/21 0230 11/04/21 0300 11/04/21 0330 11/04/21 0400   BP:    140/59   BP Location:       Patient Position:       Pulse: 71 74 75 77   Resp:       Temp:       TempSrc:       SpO2: 99% 98% 98% 95%   Weight:       Height:           Flowsheet Rows      First Filed Value   Admission Height 157.5 cm (62\") Documented at 10/31/2021 2206   Admission Weight 108 kg (238 lb) Documented at 10/31/2021 2206            Intake/Output Summary (Last 24 hours) at 11/4/2021 0611  Last data filed at 11/3/2021 2211  Gross per 24 hour   Intake 3203.75 ml   Output 300 ml   Net 2903.75 ml        TELEMETRY: Atrial sensed ventricular paced rhythm.    Physical Exam:  The patient is alert, oriented and in no distress.  Vital signs as noted above.  Exogenous obesity.  Head and neck revealed no carotid bruits or jugular venous distention.  No thyromegaly or lymphadenopathy is present  Lungs clear.  No wheezing.  Breath sounds are normal bilaterally.  Heart normal first and second heart sounds.  No murmur. No precordial rub is present.  No gallop is present.  Abdomen soft and nontender.  No organomegaly is present.  Extremities with good peripheral pulses without any pedal edema.  Skin warm and dry.  Pacemaker site and loop " recorder removal site looks normal.  Musculoskeletal system is grossly normal  CNS grossly normal      Results Review:   I reviewed the patient's new clinical results.  Lab Results (last 24 hours)     Procedure Component Value Units Date/Time    Comprehensive Metabolic Panel [[id number]]  (Abnormal) Collected: 11/04/21 0346    Specimen: Blood Updated: 11/04/21 0428     Glucose 115 mg/dL      BUN 16 mg/dL      Creatinine 0.98 mg/dL      Sodium 136 mmol/L      Potassium 4.6 mmol/L      Chloride 107 mmol/L      CO2 21.0 mmol/L      Calcium 7.8 mg/dL      Total Protein 6.3 g/dL      Albumin 3.20 g/dL      ALT (SGPT) 16 U/L      AST (SGOT) 34 U/L      Alkaline Phosphatase 65 U/L      Total Bilirubin 0.3 mg/dL      eGFR Non African Amer 54 mL/min/1.73      Globulin 3.1 gm/dL      A/G Ratio 1.0 g/dL      BUN/Creatinine Ratio 16.3     Anion Gap 8.0 mmol/L     Narrative:      GFR Normal >60  Chronic Kidney Disease <60  Kidney Failure <15      CBC & Differential [[id number]]  (Abnormal) Collected: 11/04/21 0346    Specimen: Blood Updated: 11/04/21 0405    Narrative:      The following orders were created for panel order CBC & Differential.  Procedure                               Abnormality         Status                     ---------                               -----------         ------                     CBC Auto Differential[[id number]]        Abnormal            Final result                 Please view results for these tests on the individual orders.    CBC Auto Differential [[id number]]  (Abnormal) Collected: 11/04/21 0346    Specimen: Blood Updated: 11/04/21 0405     WBC 4.00 10*3/mm3      RBC 3.16 10*6/mm3      Hemoglobin 8.6 g/dL      Hematocrit 28.2 %      MCV 89.1 fL      MCH 27.2 pg      MCHC 30.5 g/dL      RDW 19.9 %      RDW-SD 62.6 fl      MPV 6.8 fL      Platelets 170 10*3/mm3      Neutrophil % 58.7 %      Lymphocyte % 26.1 %      Monocyte % 8.8 %      Eosinophil % 5.8 %      Basophil % 0.6 %       Neutrophils, Absolute 2.30 10*3/mm3      Lymphocytes, Absolute 1.00 10*3/mm3      Monocytes, Absolute 0.30 10*3/mm3      Eosinophils, Absolute 0.20 10*3/mm3      Basophils, Absolute 0.00 10*3/mm3      nRBC 0.0 /100 WBC     Blood Culture - Blood, Arm, Right [[id number]]  (Normal) Collected: 10/31/21 2306    Specimen: Blood from Arm, Right Updated: 11/03/21 2315     Blood Culture No growth at 3 days    Blood Culture - Blood, Arm, Right [[id number]]  (Normal) Collected: 10/31/21 2229    Specimen: Blood from Arm, Right Updated: 11/03/21 2245     Blood Culture No growth at 3 days    POC Glucose Once [[id number]]  (Abnormal) Collected: 11/03/21 1948    Specimen: Blood Updated: 11/03/21 1949     Glucose 168 mg/dL      Comment: Serial Number: [id number]:  [id number]       POC Glucose Once [[id number]]  (Abnormal) Collected: 11/03/21 1623    Specimen: Blood Updated: 11/03/21 1625     Glucose 122 mg/dL      Comment: Serial Number: [id number]:  [id number]       POC Glucose Once [[id number]]  (Abnormal) Collected: 11/03/21 1128    Specimen: Blood Updated: 11/03/21 1129     Glucose 130 mg/dL      Comment: Serial Number: [id number]:  [id number]       POC Glucose Once [[id number]]  (Abnormal) Collected: 11/03/21 0717    Specimen: Blood Updated: 11/03/21 0718     Glucose 128 mg/dL      Comment: Serial Number: [id number]:  [id number]             Imaging Results (Last 24 Hours)     Procedure Component Value Units Date/Time    XR Chest 1 View [[id number]] Collected: 11/03/21 1103     Updated: 11/03/21 1106    Narrative:      DATE OF EXAM:  11/3/2021 11:01 AM     PROCEDURE:  XR CHEST 1 VW-     INDICATIONS:  pacemaker placement; I44.1-Atrioventricular block, second degree;  R41.82-Altered mental status, unspecified; N39.0-Urinary tract  infection, site not specified; A49.9-Bacterial infection, unspecified;  I44.1-Atrioventricular block, second degree; R55-Syncope and collapse       COMPARISON:  AP portable chest  10/31/2020     TECHNIQUE:   Single radiographic view of the chest was obtained.     FINDINGS:  Acute airspace disease. The patient's the left hand partially obscures  the left lung base. Benign calcified granuloma is present in the right  lower lobe. Stable cardiac megaly.     This electronic device over the left upper chest has been removed.     A new left chest wall pacemaker device has been placed with subclavian  approach leads extending to the expected location of the right atrium  and right ventricle. No visible pneumothorax.       Impression:         1. Left chest wall pacemaker placement with leads in the expected,  appropriate position. No pneumothorax.     Electronically Signed By-Lamar Holman MD On:11/3/2021 11:04 AM  This report was finalized on [id number] by  Lamar Holman MD.      LAB RESULTS (LAST 7 DAYS)    CBC  Results from last 7 days   Lab Units 11/04/21  0346 11/03/21  0403 11/02/21  0435 11/01/21  0429 10/31/21  2229   WBC 10*3/mm3 4.00 4.40 3.50 3.90 5.10   RBC 10*6/mm3 3.16* 3.20* 3.32* 2.99* 3.11*   HEMOGLOBIN g/dL 8.6* 8.9* 9.0* 8.2* 8.5*   HEMATOCRIT % 28.2* 27.9* 28.7* 26.3* 27.4*   MCV fL 89.1 87.1 86.3 88.0 88.0   PLATELETS 10*3/mm3 170 173 170 161 183       BMP  Results from last 7 days   Lab Units 11/04/21  0346 11/03/21  0403 11/02/21  0435 11/01/21  0429 10/31/21  2229   SODIUM mmol/L 136 139 140 140 138   POTASSIUM mmol/L 4.6 4.2 4.0 4.6 4.5   CHLORIDE mmol/L 107 107 109* 107 104   CO2 mmol/L 21.0* 22.0 20.0* 16.0* 18.0*   BUN mg/dL 16 13 18 29* 30*   CREATININE mg/dL 0.98 0.91 0.93 1.45* 1.55*   GLUCOSE mg/dL 115* 114* 118* 120* 128*       CMP   Results from last 7 days   Lab Units 11/04/21 0346 11/03/21 0403 11/02/21  0435 11/01/21  0429 10/31/21  2229   SODIUM mmol/L 136 139 140 140 138   POTASSIUM mmol/L 4.6 4.2 4.0 4.6 4.5   CHLORIDE mmol/L 107 107 109* 107 104   CO2 mmol/L 21.0* 22.0 20.0* 16.0* 18.0*   BUN mg/dL 16 13 18 29* 30*   CREATININE mg/dL 0.98 0.91 0.93  1.45* 1.55*   GLUCOSE mg/dL 115* 114* 118* 120* 128*   ALBUMIN g/dL 3.20* 3.30* 3.50  --  3.80   BILIRUBIN mg/dL 0.3 0.3 0.3  --  0.2   ALK PHOS U/L 65 65 61  --  70   AST (SGOT) U/L 34* 28 20  --  32   ALT (SGPT) U/L 16 17 16  --  19         BNP        TROPONIN  Results from last 7 days   Lab Units 10/31/21  2229   TROPONIN T ng/mL 0.024       CoAg        Creatinine Clearance  Estimated Creatinine Clearance: 49.4 mL/min (by C-G formula based on SCr of 0.98 mg/dL).    ABG        Radiology  XR Chest 1 View    Result Date: 11/3/2021   1. Left chest wall pacemaker placement with leads in the expected, appropriate position. No pneumothorax.  Electronically Signed By-Lamar Holman MD On:11/3/2021 11:04 AM This report was finalized on [id number] by  Lamar Holman MD.              EKG                I personally viewed and interpreted the patient's EKG/Telemetry data: Sinus rhythm premature atrial contractions premature ventricular contractions.    ECHOCARDIOGRAM:    Results for orders placed during the hospital encounter of 09/07/21    Adult Transthoracic Echo Complete W/ Cont if Necessary Per Protocol    Interpretation Summary  · Estimated left ventricular EF = 60% Left ventricular systolic function is normal.    Indications  Syncope    Technically satisfactory study.  Mitral valve is structurally normal.  Tricuspid valve is structurally normal.  Aortic valve is structurally normal.  Pulmonic valve could not be well visualized.  No evidence for mitral tricuspid or aortic regurgitation is seen by Doppler study.  Left atrium is enlarged.  Right atrium is normal in size.  Left ventricle is normal in size and contractility with ejection fraction of 60%.  Right ventricle is normal in size.  Atrial septum is intact.  Aorta is normal.  No pericardial effusion or intracardiac thrombus is seen.    Impression  Left atrium is enlarged.  Structurally and functionally normal cardiac valves.  Left ventricular size and  contractility is normal with ejection fraction of 60%.          STRESS MYOVIEW:    Cardiolite (Tc-99m Sestamibi) stress test    CARDIAC CATHETERIZATION:            OTHER:         Assessment/Plan     Active Problems:    Atrioventricular block, Mobitz type 1, Wenckebach    Stage 3b chronic kidney disease (CMS/HCC)    Syncope and collapse    Type 2 diabetes mellitus with diabetic neuropathy, with long-term current use of insulin (HCC)    Morbid obesity (HCC)    Hyperlipidemia    Hypertension    Acute UTI    GERD (gastroesophageal reflux disease)    Altered mental status    Normocytic anemia      ]]]]]]]]]]]]]]]]]]]]  Impression  ========  -Status post permanent dual-chamber pacemaker implantation ([employer] Wed[employer] MRI compatible) 11/3/2021    -Status post removal of loop recorder ([employer]) 11/3/2021    -History of syncope  Intermittent Mobitz type II AV block  Patient is not on any medications to cause bradycardia     -Second-degree Mobitz type I AV block.  Narrow QRS complex.     Echocardiogram-normal 10/22/2020     Cardiac catheterization 10/23/2020 revealed:  Left ventricular size and contractility is normal with ejection fraction of 60%.  Normal epicardial coronary arteries.     Status post loop recorder placement 10/23/2020 (Medtronic Linq)     -History of paroxysmal atrial fibrillation     -Hypertension dyslipidemia diabetes     -Renal dysfunction CKD 3  BUN 27 creatinine 1.35       -Exogenous obesity.     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     -No known allergies  ============  Plan  =========  Status post permanent dual-chamber pacemaker implantation ([place] Wedivite MRI compatible) level 3/20/2021.  Status post loop recorder removal 11/3/2021 ([employer]tronic Linq)  Pacemaker site and function appears to be normal.  Await interrogation of the pacemaker this morning.     Hypertension-well-controlled     Renal dysfunction  BUN 20 creatinine  1.41-4/7/2021.  29/1.45-11/1/2020    Anemia  hgb 8.9  8.6-11/4/2021     Okay with the discharge plans from cardiovascular standpoint after satisfactory interrogation of the pacemaker.    Follow-up in the office in 2 weeks.    Have discussed with patient regarding the activities limitations expectations from pacemaker standpoint.    Have discussed with attending nurse for coordination of care    Further plan will depend on patient's progress.  ]]]]]]]]]]]]]]            Wang Plaza MD  11/04/21  06:11 EDT

## 2021-11-04 NOTE — PLAN OF CARE
Pt remains stable. Patient is tolerating room air. She is a 2 to 3 assist to ambulate with a gait belt. Patient refused rehab, but is now agreeing to go pending being accepted. Patient complained of no pain throughout the shift upon assessments. Patient stated she was having pain near her right hip. RN gave prn pain medication. RN demonstrated how to use the call light when she needs RN, and other staff to assist her. Patient remains a falls risk; fall risk protocol in place, and bed alarm on.    Problem: Adult Inpatient Plan of Care  Goal: Absence of Hospital-Acquired Illness or Injury  Intervention: Identify and Manage Fall Risk  Recent Flowsheet Documentation  Taken 11/4/2021 1600 by Yenny Blanco RN  Safety Promotion/Fall Prevention:   activity supervised   assistive device/personal items within reach  Taken 11/4/2021 1500 by Yenny Blanco RN  Safety Promotion/Fall Prevention:   activity supervised   assistive device/personal items within reach   clutter free environment maintained   fall prevention program maintained   lighting adjusted   nonskid shoes/slippers when out of bed   room organization consistent   safety round/check completed  Taken 11/4/2021 1143 by Yenny Blanco RN  Safety Promotion/Fall Prevention:   activity supervised   assistive device/personal items within reach  Taken 11/4/2021 1000 by Yenny Blanco RN  Safety Promotion/Fall Prevention:   activity supervised   assistive device/personal items within reach   clutter free environment maintained   fall prevention program maintained   lighting adjusted   nonskid shoes/slippers when out of bed   room organization consistent   safety round/check completed  Taken 11/4/2021 0800 by Yenny Blanco RN  Safety Promotion/Fall Prevention:   activity supervised   assistive device/personal items within reach  Intervention: Prevent Skin Injury  Recent Flowsheet Documentation  Taken 11/4/2021 1143 by Yenny Blanco RN  Skin Protection:   adhesive use limited    incontinence pads utilized   pulse oximeter probe site changed   transparent dressing maintained   tubing/devices free from skin contact  Taken 11/4/2021 0800 by Yenny Blanco RN  Skin Protection:   adhesive use limited   incontinence pads utilized   pulse oximeter probe site changed   transparent dressing maintained   tubing/devices free from skin contact  Intervention: Prevent and Manage VTE (venous thromboembolism) Risk  Recent Flowsheet Documentation  Taken 11/4/2021 1500 by Yenny Blanco RN  VTE Prevention/Management:   bilateral   sequential compression devices off  Taken 11/4/2021 1143 by Yenny Blanco RN  VTE Prevention/Management:   bilateral   sequential compression devices off  Taken 11/4/2021 0800 by Yenny Blanco RN  VTE Prevention/Management:   bilateral   sequential compression devices on  Intervention: Prevent Infection  Recent Flowsheet Documentation  Taken 11/4/2021 1600 by Yenny Blanco RN  Infection Prevention:   environmental surveillance performed   equipment surfaces disinfected   hand hygiene promoted   personal protective equipment utilized   rest/sleep promoted   single patient room provided   visitors restricted/screened  Taken 11/4/2021 1500 by Yenny Blanco RN  Infection Prevention:   environmental surveillance performed   equipment surfaces disinfected   hand hygiene promoted   personal protective equipment utilized   rest/sleep promoted   single patient room provided   visitors restricted/screened  Taken 11/4/2021 1143 by Yenny Blanco RN  Infection Prevention:   environmental surveillance performed   equipment surfaces disinfected   hand hygiene promoted   personal protective equipment utilized   rest/sleep promoted   single patient room provided   visitors restricted/screened  Taken 11/4/2021 1000 by Yenny Blanco RN  Infection Prevention:   environmental surveillance performed   equipment surfaces disinfected   hand hygiene promoted   personal protective equipment utilized   rest/sleep  promoted   single patient room provided   visitors restricted/screened  Taken 11/4/2021 0800 by Yenny Blanco RN  Infection Prevention:   environmental surveillance performed   equipment surfaces disinfected   hand hygiene promoted   personal protective equipment utilized   rest/sleep promoted   single patient room provided   visitors restricted/screened  Goal: Optimal Comfort and Wellbeing  Intervention: Provide Person-Centered Care  Recent Flowsheet Documentation  Taken 11/4/2021 1500 by Yenny Blanco RN  Trust Relationship/Rapport:   care explained   choices provided   emotional support provided   empathic listening provided  Taken 11/4/2021 1143 by Yenny Blanco RN  Trust Relationship/Rapport: care explained  Taken 11/4/2021 0800 by Yenny Blanco RN  Trust Relationship/Rapport:   care explained   choices provided   emotional support provided   empathic listening provided   questions answered     Problem: Fall Injury Risk  Goal: Absence of Fall and Fall-Related Injury  Intervention: Identify and Manage Contributors to Fall Injury Risk  Recent Flowsheet Documentation  Taken 11/4/2021 1600 by Yenny Blanco RN  Medication Review/Management: medications reviewed  Taken 11/4/2021 1500 by Yenny Blanco RN  Medication Review/Management: medications reviewed  Self-Care Promotion: independence encouraged  Taken 11/4/2021 1143 by Yenny Blanco RN  Medication Review/Management: medications reviewed  Taken 11/4/2021 1000 by Yenny Blanco RN  Medication Review/Management: medications reviewed  Taken 11/4/2021 0800 by Yenny Blanco RN  Medication Review/Management: medications reviewed  Intervention: Promote Injury-Free Environment  Recent Flowsheet Documentation  Taken 11/4/2021 1600 by Yenny Blanco RN  Safety Promotion/Fall Prevention:   activity supervised   assistive device/personal items within reach  Taken 11/4/2021 1500 by Yenny Blanco RN  Safety Promotion/Fall Prevention:   activity supervised   assistive device/personal items  within reach   clutter free environment maintained   fall prevention program maintained   lighting adjusted   nonskid shoes/slippers when out of bed   room organization consistent   safety round/check completed  Taken 11/4/2021 1143 by Yenny Blanco RN  Safety Promotion/Fall Prevention:   activity supervised   assistive device/personal items within reach  Taken 11/4/2021 1000 by Yenny Blanco RN  Safety Promotion/Fall Prevention:   activity supervised   assistive device/personal items within reach   clutter free environment maintained   fall prevention program maintained   lighting adjusted   nonskid shoes/slippers when out of bed   room organization consistent   safety round/check completed  Taken 11/4/2021 0800 by Yenny Blanco RN  Safety Promotion/Fall Prevention:   activity supervised   assistive device/personal items within reach     Problem: COPD Comorbidity  Goal: Maintenance of COPD Symptom Control  Intervention: Maintain COPD-Symptom Control  Recent Flowsheet Documentation  Taken 11/4/2021 1600 by Yenny Blanco RN  Medication Review/Management: medications reviewed  Taken 11/4/2021 1500 by Yenny Blanco RN  Medication Review/Management: medications reviewed  Taken 11/4/2021 1143 by Yenny Blanco RN  Medication Review/Management: medications reviewed  Taken 11/4/2021 1000 by Yenny Blanco RN  Medication Review/Management: medications reviewed  Taken 11/4/2021 0800 by Yenny Blanco RN  Medication Review/Management: medications reviewed     Problem: Diabetes Comorbidity  Goal: Blood Glucose Level Within Desired Range  Intervention: Maintain Glycemic Control  Recent Flowsheet Documentation  Taken 11/4/2021 1500 by Yenny Blanco RN  Glycemic Management: blood glucose monitoring  Taken 11/4/2021 1143 by Yenny Blanco RN  Glycemic Management: blood glucose monitoring  Taken 11/4/2021 0800 by Yenny Blanco RN  Glycemic Management: blood glucose monitoring     Problem: Hypertension Comorbidity  Goal: Blood Pressure in Desired  Range  Intervention: Maintain Hypertension-Management Strategies  Recent Flowsheet Documentation  Taken 11/4/2021 1600 by Yenny Blanco RN  Medication Review/Management: medications reviewed  Taken 11/4/2021 1500 by Yenny Blanco RN  Syncope Management: position changed slowly  Medication Review/Management: medications reviewed  Taken 11/4/2021 1143 by Yenny Blanco RN  Medication Review/Management: medications reviewed  Taken 11/4/2021 1000 by Yenny Blanco RN  Medication Review/Management: medications reviewed  Taken 11/4/2021 0800 by Yenny Blanco RN  Syncope Management: position changed slowly  Medication Review/Management: medications reviewed     Problem: Pain Chronic (Persistent) (Comorbidity Management)  Goal: Acceptable Pain Control and Functional Ability  Intervention: Manage Persistent Pain  Recent Flowsheet Documentation  Taken 11/4/2021 1600 by Yenny Blanco RN  Medication Review/Management: medications reviewed  Taken 11/4/2021 1500 by Yenny Blanco RN  Medication Review/Management: medications reviewed  Taken 11/4/2021 1143 by Yenny Blanco RN  Sleep/Rest Enhancement: consistent schedule promoted  Medication Review/Management: medications reviewed  Taken 11/4/2021 1000 by Yenny Blanco RN  Medication Review/Management: medications reviewed  Taken 11/4/2021 0800 by Yenny Blanco RN  Sleep/Rest Enhancement: consistent schedule promoted  Medication Review/Management: medications reviewed  Intervention: Optimize Psychosocial Wellbeing  Recent Flowsheet Documentation  Taken 11/4/2021 1500 by Yenny Blanco RN  Supportive Measures: active listening utilized  Family/Support System Care: self-care encouraged  Taken 11/4/2021 1143 by Yenny Blanco RN  Supportive Measures: active listening utilized  Family/Support System Care: self-care encouraged  Taken 11/4/2021 0800 by Yenny Blanco RN  Supportive Measures: active listening utilized  Family/Support System Care: self-care encouraged     Problem: Skin Injury Risk  Increased  Goal: Skin Health and Integrity  Intervention: Optimize Skin Protection  Recent Flowsheet Documentation  Taken 11/4/2021 1143 by Yenny Blanco RN  Pressure Reduction Techniques:   frequent weight shift encouraged   weight shift assistance provided  Pressure Reduction Devices: pressure-redistributing mattress utilized  Skin Protection:   adhesive use limited   incontinence pads utilized   pulse oximeter probe site changed   transparent dressing maintained   tubing/devices free from skin contact  Taken 11/4/2021 0800 by Yenny Blanco RN  Pressure Reduction Techniques:   frequent weight shift encouraged   weight shift assistance provided  Head of Bed (HOB): HOB at 30-45 degrees  Pressure Reduction Devices: pressure-redistributing mattress utilized  Skin Protection:   adhesive use limited   incontinence pads utilized   pulse oximeter probe site changed   transparent dressing maintained   tubing/devices free from skin contact   Goal Outcome Evaluation:

## 2021-11-04 NOTE — CASE MANAGEMENT/SOCIAL WORK
Continued Stay Note  BRIAN Stephens     Patient Name: Aracelis Vargas  MRN: [id number]  Today's Date: 11/4/2021    Admit Date: 10/31/2021     Discharge Plan     Row Name 11/04/21 1151       Plan    Plan Home with [facility] HH VS IP Rehab    Plan Comments PT recommending IP rehab pateint is a 2 person assist and does not have anyone at home that is able to assist her. Patient stated that her  and niece can help her. Called  to verify and he uses a cane and unable to lift patient. Called Niece Nika and she cannot lift patient either. Informed patient and she was still refusing IP rehab but patient would not be able to make it into her home at this point with the amount of care she is requiring. MD tried to talk to patient but she is still refusing. Called Patient faithece Nika back and she is going to try and talk to patient. Choices left at the bedisde for patient.              Met with patient at bedside wearing mask and goggles, Spent less than 15 minutes in room at greater than 6 feet distance.              Crystal Powell RN

## 2021-11-05 VITALS
TEMPERATURE: 98.1 F | DIASTOLIC BLOOD PRESSURE: 54 MMHG | SYSTOLIC BLOOD PRESSURE: 146 MMHG | HEIGHT: 62 IN | OXYGEN SATURATION: 94 % | BODY MASS INDEX: 44.63 KG/M2 | WEIGHT: 242.51 LBS | HEART RATE: 80 BPM | RESPIRATION RATE: 14 BRPM

## 2021-11-05 LAB
ALBUMIN SERPL-MCNC: 3.2 G/DL (ref 3.5–5.2)
ALBUMIN/GLOB SERPL: 1.1 G/DL
ALP SERPL-CCNC: 79 U/L (ref 39–117)
ALT SERPL W P-5'-P-CCNC: 20 U/L (ref 1–33)
ANION GAP SERPL CALCULATED.3IONS-SCNC: 10 MMOL/L (ref 5–15)
AST SERPL-CCNC: 37 U/L (ref 1–32)
BACTERIA SPEC AEROBE CULT: NORMAL
BACTERIA SPEC AEROBE CULT: NORMAL
BASOPHILS # BLD AUTO: 0 10*3/MM3 (ref 0–0.2)
BASOPHILS NFR BLD AUTO: 1 % (ref 0–1.5)
BILIRUB SERPL-MCNC: 0.3 MG/DL (ref 0–1.2)
BUN SERPL-MCNC: 14 MG/DL (ref 8–23)
BUN/CREAT SERPL: 16.5 (ref 7–25)
CALCIUM SPEC-SCNC: 7.8 MG/DL (ref 8.6–10.5)
CHLORIDE SERPL-SCNC: 107 MMOL/L (ref 98–107)
CO2 SERPL-SCNC: 21 MMOL/L (ref 22–29)
CREAT SERPL-MCNC: 0.85 MG/DL (ref 0.57–1)
DEPRECATED RDW RBC AUTO: 60.8 FL (ref 37–54)
EOSINOPHIL # BLD AUTO: 0.3 10*3/MM3 (ref 0–0.4)
EOSINOPHIL NFR BLD AUTO: 6.4 % (ref 0.3–6.2)
ERYTHROCYTE [DISTWIDTH] IN BLOOD BY AUTOMATED COUNT: 19.4 % (ref 12.3–15.4)
GFR SERPL CREATININE-BSD FRML MDRD: 64 ML/MIN/1.73
GLOBULIN UR ELPH-MCNC: 2.9 GM/DL
GLUCOSE BLDC GLUCOMTR-MCNC: 113 MG/DL (ref 70–105)
GLUCOSE BLDC GLUCOMTR-MCNC: 132 MG/DL (ref 70–105)
GLUCOSE SERPL-MCNC: 119 MG/DL (ref 65–99)
HCT VFR BLD AUTO: 28.3 % (ref 34–46.6)
HGB BLD-MCNC: 8.9 G/DL (ref 12–15.9)
LYMPHOCYTES # BLD AUTO: 1.2 10*3/MM3 (ref 0.7–3.1)
LYMPHOCYTES NFR BLD AUTO: 30 % (ref 19.6–45.3)
MCH RBC QN AUTO: 27.9 PG (ref 26.6–33)
MCHC RBC AUTO-ENTMCNC: 31.5 G/DL (ref 31.5–35.7)
MCV RBC AUTO: 88.5 FL (ref 79–97)
MONOCYTES # BLD AUTO: 0.4 10*3/MM3 (ref 0.1–0.9)
MONOCYTES NFR BLD AUTO: 8.9 % (ref 5–12)
NEUTROPHILS NFR BLD AUTO: 2.2 10*3/MM3 (ref 1.7–7)
NEUTROPHILS NFR BLD AUTO: 53.7 % (ref 42.7–76)
NRBC BLD AUTO-RTO: 0.1 /100 WBC (ref 0–0.2)
PLATELET # BLD AUTO: 167 10*3/MM3 (ref 140–450)
PMV BLD AUTO: 7.1 FL (ref 6–12)
POTASSIUM SERPL-SCNC: 4.2 MMOL/L (ref 3.5–5.2)
PROT SERPL-MCNC: 6.1 G/DL (ref 6–8.5)
RBC # BLD AUTO: 3.2 10*6/MM3 (ref 3.77–5.28)
SODIUM SERPL-SCNC: 138 MMOL/L (ref 136–145)
WBC # BLD AUTO: 4.1 10*3/MM3 (ref 3.4–10.8)

## 2021-11-05 PROCEDURE — G0008 ADMIN INFLUENZA VIRUS VAC: HCPCS | Performed by: INTERNAL MEDICINE

## 2021-11-05 PROCEDURE — 80053 COMPREHEN METABOLIC PANEL: CPT | Performed by: INTERNAL MEDICINE

## 2021-11-05 PROCEDURE — 99239 HOSP IP/OBS DSCHRG MGMT >30: CPT | Performed by: INTERNAL MEDICINE

## 2021-11-05 PROCEDURE — 82962 GLUCOSE BLOOD TEST: CPT

## 2021-11-05 PROCEDURE — 25010000002 INFLUENZA VAC SPLIT QUAD 0.5 ML SUSPENSION PREFILLED SYRINGE: Performed by: INTERNAL MEDICINE

## 2021-11-05 PROCEDURE — 85025 COMPLETE CBC W/AUTO DIFF WBC: CPT | Performed by: INTERNAL MEDICINE

## 2021-11-05 PROCEDURE — 90686 IIV4 VACC NO PRSV 0.5 ML IM: CPT | Performed by: INTERNAL MEDICINE

## 2021-11-05 RX ADMIN — TOPIRAMATE 12.5 MG: 25 TABLET, FILM COATED ORAL at 09:54

## 2021-11-05 RX ADMIN — GABAPENTIN 300 MG: 300 CAPSULE ORAL at 09:54

## 2021-11-05 RX ADMIN — SPIRONOLACTONE 25 MG: 25 TABLET ORAL at 09:55

## 2021-11-05 RX ADMIN — AMLODIPINE BESYLATE 5 MG: 5 TABLET ORAL at 09:54

## 2021-11-05 RX ADMIN — ASPIRIN 81 MG: 81 TABLET, COATED ORAL at 09:53

## 2021-11-05 RX ADMIN — INFLUENZA VIRUS VACCINE 0.5 ML: 15; 15; 15; 15 SUSPENSION INTRAMUSCULAR at 16:31

## 2021-11-05 RX ADMIN — HYDROCODONE BITARTRATE AND ACETAMINOPHEN 1 TABLET: 5; 325 TABLET ORAL at 09:54

## 2021-11-05 RX ADMIN — FUROSEMIDE 20 MG: 20 TABLET ORAL at 09:54

## 2021-11-05 RX ADMIN — ATORVASTATIN CALCIUM 10 MG: 10 TABLET, FILM COATED ORAL at 09:55

## 2021-11-05 RX ADMIN — LOSARTAN POTASSIUM 50 MG: 50 TABLET, FILM COATED ORAL at 09:55

## 2021-11-05 NOTE — PLAN OF CARE
Goal Outcome Evaluation:   Patient remains a falls risk. A/O x4. Patient complaints of back pain resolved with prn pain medication. Vital signs stable. Patient has no needs/concerns at this time.     Problem: Adult Inpatient Plan of Care  Goal: Plan of Care Review  Outcome: Ongoing, Progressing  Goal: Patient-Specific Goal (Individualized)  Outcome: Ongoing, Progressing  Goal: Absence of Hospital-Acquired Illness or Injury  Outcome: Ongoing, Progressing  Intervention: Identify and Manage Fall Risk  Recent Flowsheet Documentation  Taken 11/5/2021 0400 by Azeb Campoverde RN  Safety Promotion/Fall Prevention:   safety round/check completed   fall prevention program maintained  Taken 11/5/2021 0200 by Azeb Campoverde RN  Safety Promotion/Fall Prevention:   safety round/check completed   fall prevention program maintained  Taken 11/5/2021 0000 by Azeb Campoverde RN  Safety Promotion/Fall Prevention:   safety round/check completed   fall prevention program maintained  Taken 11/4/2021 2200 by Azeb Campoverde RN  Safety Promotion/Fall Prevention:   safety round/check completed   fall prevention program maintained  Taken 11/4/2021 2027 by Azeb Campoverde RN  Safety Promotion/Fall Prevention:   safety round/check completed   fall prevention program maintained  Intervention: Prevent Skin Injury  Recent Flowsheet Documentation  Taken 11/4/2021 2027 by Azeb Campoverde RN  Skin Protection: adhesive use limited  Intervention: Prevent Infection  Recent Flowsheet Documentation  Taken 11/5/2021 0400 by Azeb Campoverde RN  Infection Prevention:   environmental surveillance performed   hand hygiene promoted  Taken 11/5/2021 0200 by Azeb Campoverde RN  Infection Prevention:   environmental surveillance performed   hand hygiene promoted  Taken 11/5/2021 0000 by Azeb Campoverde RN  Infection Prevention:   environmental surveillance performed   hand hygiene promoted  Taken 11/4/2021 2200 by Azeb Campoverde RN  Infection  Prevention:   environmental surveillance performed   hand hygiene promoted  Taken 11/4/2021 2027 by Azeb Campoverde RN  Infection Prevention:   environmental surveillance performed   hand hygiene promoted  Goal: Optimal Comfort and Wellbeing  Outcome: Ongoing, Progressing  Intervention: Provide Person-Centered Care  Recent Flowsheet Documentation  Taken 11/5/2021 0400 by Azeb Campoverde RN  Trust Relationship/Rapport:   care explained   choices provided  Taken 11/5/2021 0000 by Azeb Campoverde RN  Trust Relationship/Rapport:   care explained   choices provided  Taken 11/4/2021 2027 by Azeb Campoverde RN  Trust Relationship/Rapport:   care explained   choices provided  Goal: Readiness for Transition of Care  Outcome: Ongoing, Progressing     Problem: Fall Injury Risk  Goal: Absence of Fall and Fall-Related Injury  Outcome: Ongoing, Progressing  Intervention: Identify and Manage Contributors to Fall Injury Risk  Recent Flowsheet Documentation  Taken 11/5/2021 0400 by Azeb Campoverde RN  Medication Review/Management: medications reviewed  Taken 11/5/2021 0200 by Azeb Campoverde RN  Medication Review/Management: medications reviewed  Taken 11/5/2021 0000 by Azeb Campoverde RN  Medication Review/Management: medications reviewed  Taken 11/4/2021 2200 by Azeb Campoverde RN  Medication Review/Management: medications reviewed  Taken 11/4/2021 2027 by Azeb Campoverde RN  Medication Review/Management: medications reviewed  Intervention: Promote Injury-Free Environment  Recent Flowsheet Documentation  Taken 11/5/2021 0400 by Azeb Campoverde RN  Safety Promotion/Fall Prevention:   safety round/check completed   fall prevention program maintained  Taken 11/5/2021 0200 by Azeb Campoverde RN  Safety Promotion/Fall Prevention:   safety round/check completed   fall prevention program maintained  Taken 11/5/2021 0000 by Azeb Campoverde RN  Safety Promotion/Fall Prevention:   safety round/check completed   fall  prevention program maintained  Taken 11/4/2021 2200 by Azeb Campoverde RN  Safety Promotion/Fall Prevention:   safety round/check completed   fall prevention program maintained  Taken 11/4/2021 2027 by Azeb Campoverde RN  Safety Promotion/Fall Prevention:   safety round/check completed   fall prevention program maintained     Problem: COPD Comorbidity  Goal: Maintenance of COPD Symptom Control  Outcome: Ongoing, Progressing  Intervention: Maintain COPD-Symptom Control  Recent Flowsheet Documentation  Taken 11/5/2021 0400 by Azeb Campoverde RN  Medication Review/Management: medications reviewed  Taken 11/5/2021 0200 by Azeb Campoverde RN  Medication Review/Management: medications reviewed  Taken 11/5/2021 0000 by Azeb Campoverde RN  Medication Review/Management: medications reviewed  Taken 11/4/2021 2200 by Azeb Campoverde RN  Medication Review/Management: medications reviewed  Taken 11/4/2021 2027 by Azeb Campoverde RN  Medication Review/Management: medications reviewed     Problem: Diabetes Comorbidity  Goal: Blood Glucose Level Within Desired Range  Outcome: Ongoing, Progressing  Intervention: Maintain Glycemic Control  Recent Flowsheet Documentation  Taken 11/5/2021 0000 by Azeb Campoverde RN  Glycemic Management: blood glucose monitoring     Problem: Hypertension Comorbidity  Goal: Blood Pressure in Desired Range  Outcome: Ongoing, Progressing  Intervention: Maintain Hypertension-Management Strategies  Recent Flowsheet Documentation  Taken 11/5/2021 0400 by Azeb Campoverde RN  Medication Review/Management: medications reviewed  Taken 11/5/2021 0200 by Azeb Campoverde RN  Medication Review/Management: medications reviewed  Taken 11/5/2021 0000 by Azeb Campoverde RN  Medication Review/Management: medications reviewed  Taken 11/4/2021 2200 by Azeb Campoverde RN  Medication Review/Management: medications reviewed  Taken 11/4/2021 2027 by Azeb Campoverde RN  Medication Review/Management:  medications reviewed     Problem: Obstructive Sleep Apnea Risk or Actual (Comorbidity Management)  Goal: Unobstructed Breathing During Sleep  Outcome: Ongoing, Progressing     Problem: Pain Chronic (Persistent) (Comorbidity Management)  Goal: Acceptable Pain Control and Functional Ability  Outcome: Ongoing, Progressing  Intervention: Develop Pain Management Plan  Recent Flowsheet Documentation  Taken 11/4/2021 2303 by Azeb Campoverde RN  Pain Management Interventions: see MAR  Intervention: Manage Persistent Pain  Recent Flowsheet Documentation  Taken 11/5/2021 0400 by Azeb Campoverde RN  Medication Review/Management: medications reviewed  Taken 11/5/2021 0200 by Azeb Campoverde RN  Medication Review/Management: medications reviewed  Taken 11/5/2021 0000 by Azeb Campoverde RN  Medication Review/Management: medications reviewed  Taken 11/4/2021 2200 by Azeb Campoverde RN  Medication Review/Management: medications reviewed  Taken 11/4/2021 2027 by Azeb Campoverde RN  Medication Review/Management: medications reviewed  Intervention: Optimize Psychosocial Wellbeing  Recent Flowsheet Documentation  Taken 11/5/2021 0400 by Azeb Campoverde RN  Diversional Activities: television  Family/Support System Care: support provided  Taken 11/5/2021 0000 by Azeb Campoverde RN  Supportive Measures: active listening utilized  Diversional Activities: television  Family/Support System Care: support provided  Taken 11/4/2021 2027 by Azeb Campoverde RN  Supportive Measures: active listening utilized  Diversional Activities: television  Family/Support System Care: support provided     Problem: Skin Injury Risk Increased  Goal: Skin Health and Integrity  Outcome: Ongoing, Progressing  Intervention: Optimize Skin Protection  Recent Flowsheet Documentation  Taken 11/4/2021 2027 by Azeb Campoverde RN  Pressure Reduction Techniques: frequent weight shift encouraged  Pressure Reduction Devices: positioning supports  utilized  Skin Protection: adhesive use limited

## 2021-11-05 NOTE — DISCHARGE SUMMARY
"             [facility] Medicine Services  DISCHARGE SUMMARY    Patient Name: Aracelis Vargas  : 1935  MRN: [id number]    Date of Admission: 10/31/2021  Date of Discharge: 2021  Primary Care Physician: Gabriel Goss MD  Condition on discharge: Fair     Presenting Problem:   UTI (urinary tract infection), bacterial [N39.0, A49.9]  Altered mental status, unspecified altered mental status type [R41.82]    Active and Resolved Hospital Problems:  Active Hospital Problems    Diagnosis POA   • Normocytic anemia [D64.9] Yes   • Altered mental status [R41.82] Yes   • Acute UTI [N39.0] Yes   • GERD (gastroesophageal reflux disease) [K21.9] Yes   • Hypertension [I10] Yes   • Hyperlipidemia [E78.5] Yes   • Morbid obesity (HCC) [E66.01] Yes   • Type 2 diabetes mellitus with diabetic neuropathy, with long-term current use of insulin (HCC) [E11.40, Z79.4] Not Applicable   • Stage 3b chronic kidney disease (CMS/HCC) [N18.32] Yes   • Syncope and collapse [R55] Unknown   • Atrioventricular block, Mobitz type 1, Wenckebach [I44.1] Unknown      Resolved Hospital Problems   No resolved problems to display.         Hospital Course     Hospital Course:  Aracelis Vargas is a 86 y.o. female who presented to [facility] on 10/31/2021 with reported altered mental status per EMS.  She is currently awake and alert and answering appropriately.  She is oriented to person and place.  She denies any chest pain, shortness of air, headache, focal weakness, blurry vision, confusion, or dye Boyd, fever cough or congestion.  CT head per radiology today:     \" 1. No acute intracranial abnormality.  2. No fracture the calvarium or skull base.  3. Mild chronic age-related intracranial findings, unchanged from prior.  \"     Troponin 0 0.024 proBNP 792.7 glucose was 128 on admission.  Creatinine was 1.55 with BUN 30 WBC not elevated, hemoglobin 8.5.  Urinalysis was positive for bacteria/trace WBCs 13-20 2+ " leukocytes negative nitrites.  She was started on IV ceftriaxone with urine cultures pending.  IV fluids initiated.  He has a past medical history of coronary artery disease status post CABG, anemia, diabetes mellitus type 2, hyperlipidemia, hypertension, neuropathy, carotid stenosis, COPD and chronic back pain.  She will be admitted for further evaluation and treatment.  She was COVID-19 negative.     11/1/2021  Patient seen and examined.  Patient on empiric treatment for UTI   Urine culture pending  Xray for pain (hx of fall): Negative for fracture, prosthetic intact        11/2/2021  Patient seen and examined.  Urine culture negative (final)  Blood cultures: NGTD  PT/OT: Inpatient rehab recommended.  Patient prefers to be discharged to home  Cardio: Pacemaker for AV blocks     11/3/2021  Patient seen and examined.   Patient alert/oriented   Urine culture negative   Blood culture:NGTD  Cardio: Pacemaker placed (second-degree AV block and syncope) //explant of loop recorder  Bedrest x24 hours     11/4/2021  Patient seen and examined.  Afebrile   Confusion resolved  PT/OT recommends IPR but patient refuses.  Patient is a high fall risk for home discharge  Patient understands risk and voiced risk acceptance  POD #1: Pacemaker placement  Urine culture negative, completed 3-day antibiotics  Blood culture: NGTD     11/5/2021  Patient seen and examined.  Afebrile   PT/OT recommends IPR   POD #2: Pacemaker placement  Urine culture negative, completed 3-day antibiotics  Blood culture: NGTD        Review of Systems   Constitutional: Negative.   HENT: Negative.    Eyes: Negative.    Cardiovascular: Negative.    Respiratory: Negative.    Endocrine: Negative.    Hematologic/Lymphatic: Negative.    Skin: Negative.    Musculoskeletal: Positive for muscle weakness.   Gastrointestinal: Negative.    Genitourinary: Negative.    Neurological: Negative.    Psychiatric/Behavioral: Negative.    Allergic/Immunologic: Negative.         DISCHARGE Follow Up Recommendations for labs and diagnostics:       Reasons For Change In Medications and Indications for New Medications:      Day of Discharge     Vital Signs:  Temp:  [98.1 °F (36.7 °C)-98.3 °F (36.8 °C)] 98.2 °F (36.8 °C)  Heart Rate:  [74-82] 79  Resp:  [12-17] 16  BP: (119-168)/(49-77) 154/54  Flow (L/min):  [4] 4    Physical Exam:  Vitals reviewed.   Constitutional:       Appearance: Normal appearance. She is obese.   HENT:      Head: Normocephalic and atraumatic.      Right Ear: External ear normal.      Left Ear: External ear normal.      Nose: Nose normal.      Mouth/Throat:      Mouth: Mucous membranes are moist.   Eyes:      Extraocular Movements: Extraocular movements intact.   Cardiovascular:      Rate and Rhythm: Normal rate and regular rhythm.      Pulses: Normal pulses.      Heart sounds: Normal heart sounds.   Pulmonary:      Effort: Pulmonary effort is normal.      Breath sounds: Normal breath sounds.   Abdominal:      Palpations: Abdomen is soft.   Genitourinary:     Comments: deferred  Musculoskeletal:         General: Normal range of motion.      Cervical back: Normal range of motion and neck supple.   Skin:     General: Skin is warm and dry.   Neurological:      General: No focal deficit present.      Mental Status: She is alert.      Comments: Oriented x2   Psychiatric:         Mood and Affect: Mood normal.         Behavior: Behavior normal.         Thought Content: Thought content normal.         Judgment: Judgment normal.     Physical Exam       Pertinent  and/or Most Recent Results     LAB RESULTS:      Lab 11/05/21  0340 11/04/21  0346 11/03/21  0403 11/02/21  0435 11/01/21  0429   WBC 4.10 4.00 4.40 3.50 3.90   HEMOGLOBIN 8.9* 8.6* 8.9* 9.0* 8.2*   HEMATOCRIT 28.3* 28.2* 27.9* 28.7* 26.3*   PLATELETS 167 170 173 170 161   NEUTROS ABS 2.20 2.30 2.20 1.90 2.00   LYMPHS ABS 1.20 1.00 1.40 1.00 1.30   MONOS ABS 0.40 0.30 0.40 0.30 0.40   EOS ABS 0.30 0.20 0.30 0.20 0.20    MCV 88.5 89.1 87.1 86.3 88.0         Lab 11/05/21  0340 11/04/21  0346 11/03/21  0403 11/02/21  0435 11/01/21  0429   SODIUM 138 136 139 140 140   POTASSIUM 4.2 4.6 4.2 4.0 4.6   CHLORIDE 107 107 107 109* 107   CO2 21.0* 21.0* 22.0 20.0* 16.0*   ANION GAP 10.0 8.0 10.0 11.0 17.0*   BUN 14 16 13 18 29*   CREATININE 0.85 0.98 0.91 0.93 1.45*   GLUCOSE 119* 115* 114* 118* 120*   CALCIUM 7.8* 7.8* 7.9* 8.3* 8.3*   HEMOGLOBIN A1C  --   --   --   --  6.2*         Lab 11/05/21  0340 11/04/21  0346 11/03/21  0403 11/02/21  0435 10/31/21  2229   TOTAL PROTEIN 6.1 6.3 6.4 6.2 6.9   ALBUMIN 3.20* 3.20* 3.30* 3.50 3.80   GLOBULIN 2.9 3.1 3.1 2.7 3.1   ALT (SGPT) 20 16 17 16 19   AST (SGOT) 37* 34* 28 20 32   BILIRUBIN 0.3 0.3 0.3 0.3 0.2   ALK PHOS 79 65 65 61 70         Lab 10/31/21  2229   PROBNP 792.7   TROPONIN T 0.024                 Brief Urine Lab Results  (Last result in the past 365 days)      Color   Clarity   Blood   Leuk Est   Nitrite   Protein   CREAT   Urine HCG        10/31/21 2238 Yellow   Hazy  Comment:  Result checked    Negative   Moderate (2+)   Negative   Negative               Microbiology Results (last 10 days)     Procedure Component Value - Date/Time    COVID-19,CEPHEID/SHAHLA/BDMAX,COR/RAYRAY/PAD/MARTHA IN-HOUSE(OR EMERGENT/ADD-ON),NP SWAB IN TRANSPORT MEDIA 3-4 HR TAT, RT-PCR - Swab, Nasopharynx [[id number]]  (Normal) Collected: 11/01/21 0052    Lab Status: Final result Specimen: Swab from Nasopharynx Updated: 11/01/21 0126     COVID19 Not Detected    Narrative:      Fact sheet for providers: https://www.fda.gov/media/461378/download     Fact sheet for patients: https://www.fda.gov/media/608621/download  Fact sheet for providers: https://www.fda.gov/media/743023/download    Fact sheet for patients: https://www.fda.gov/media/462001/download    Test performed by PCR.    Blood Culture - Blood, Arm, Right [[id number]]  (Normal) Collected: 10/31/21 8262    Lab Status: Preliminary result Specimen: Blood from Arm,  Right Updated: 11/04/21 2315     Blood Culture No growth at 4 days    Urine Culture - Urine, Urine, Catheter In/Out [[id number]]  (Normal) Collected: 10/31/21 2238    Lab Status: Final result Specimen: Urine, Catheter In/Out Updated: 11/01/21 2320     Urine Culture No growth    Blood Culture - Blood, Arm, Right [[id number]]  (Normal) Collected: 10/31/21 2229    Lab Status: Preliminary result Specimen: Blood from Arm, Right Updated: 11/04/21 2245     Blood Culture No growth at 4 days          CT Head Without Contrast    Result Date: 11/1/2021  Impression: 1. No acute intracranial abnormality. 2. No fracture the calvarium or skull base. 3. Mild chronic age-related intracranial findings, unchanged from prior.  This examination was interpreted by Isac David M.D. Electronically signed by:  Isac David M.D.  10/31/2021 10:14 PM    XR Chest 1 View    Result Date: 11/3/2021  Impression:  1. Left chest wall pacemaker placement with leads in the expected, appropriate position. No pneumothorax.  Electronically Signed By-Lamar Holman MD On:11/3/2021 11:04 AM This report was finalized on [id number] by  Lamar Holman MD.    XR Chest 1 View    Result Date: 11/1/2021  Impression: No acute process.  Electronically Signed By-Benjamín Padron MD On:11/1/2021 7:28 AM This report was finalized on [id number] by  Benjamín Padron MD.    XR Hips Bilateral With or Without Pelvis 5 View    Result Date: 11/1/2021  Impression: IMPRESSION : 1. Negative for acute fracture. 2. Intact bilateral hip prostheses.  Electronically Signed By-Benjamín Padron MD On:11/1/2021 2:27 PM This report was finalized on [id number] by  Benjamín Padron MD.              Results for orders placed during the hospital encounter of 09/07/21    Adult Transthoracic Echo Complete W/ Cont if Necessary Per Protocol    Interpretation Summary  · Estimated left ventricular EF = 60% Left ventricular systolic function is normal.    Indications  Syncope    Technically  satisfactory study.  Mitral valve is structurally normal.  Tricuspid valve is structurally normal.  Aortic valve is structurally normal.  Pulmonic valve could not be well visualized.  No evidence for mitral tricuspid or aortic regurgitation is seen by Doppler study.  Left atrium is enlarged.  Right atrium is normal in size.  Left ventricle is normal in size and contractility with ejection fraction of 60%.  Right ventricle is normal in size.  Atrial septum is intact.  Aorta is normal.  No pericardial effusion or intracardiac thrombus is seen.    Impression  Left atrium is enlarged.  Structurally and functionally normal cardiac valves.  Left ventricular size and contractility is normal with ejection fraction of 60%.      Labs Pending at Discharge:  Pending Labs     Order Current Status    Blood Culture - Blood, Arm, Right Preliminary result    Blood Culture - Blood, Arm, Right Preliminary result          Procedures Performed  Procedure(s):  Pacemaker DC new  LOOP RECORDER REMOVAL         Consults:   Consults     Date and Time Order Name Status Description    11/1/2021  3:49 PM Inpatient Cardiology Consult Completed     10/31/2021 11:25 PM Hospitalist (on-call MD unless specified) Completed             Discharge Details        Discharge Medications      ASK your doctor about these medications      Instructions Start Date   amLODIPine 5 MG tablet  Commonly known as: NORVASC   5 mg, Oral, Every Morning      aspirin 81 MG EC tablet   81 mg, Oral, Daily      atorvastatin 10 MG tablet  Commonly known as: LIPITOR   10 mg, Oral, Daily      furosemide 20 MG tablet  Commonly known as: LASIX   20 mg, Oral, Daily      gabapentin 300 MG capsule  Commonly known as: NEURONTIN   300 mg, Oral, 2 times daily      insulin NPH-insulin regular (70-30) 100 UNIT/ML injection  Commonly known as: humuLIN 70/30,novoLIN 70/30   30 Units, Subcutaneous, 2 Times Daily With Meals      losartan 50 MG tablet  Commonly known as: Cozaar   50 mg, Oral,  Daily      metFORMIN 1000 MG tablet  Commonly known as: GLUCOPHAGE   1,000 mg, Oral, 2 Times Daily With Meals      omeprazole 40 MG capsule  Commonly known as: priLOSEC   40 mg, Oral, Every Morning      spironolactone 25 MG tablet  Commonly known as: ALDACTONE   1 tablet, Oral, Daily      topiramate 25 MG tablet  Commonly known as: TOPAMAX   12.5 mg, Oral, 2 Times Daily             Allergies   Allergen Reactions   • Latex, Natural Rubber Rash   • Adhesive Tape Rash         Discharge Disposition: Inpatient rehab        Diet: Healthy cardiac  Hospital:  Diet Order   Procedures   • Diet Cardiac; Healthy Heart         Discharge Activity: As tolerated        CODE STATUS:  Code Status and Medical Interventions:   Ordered at: 10/31/21 3331     Code Status (Patient has no pulse and is not breathing):    CPR (Attempt to Resuscitate)     Medical Interventions (Patient has pulse or is breathing):    Full         Future Appointments   Date Time Provider Department Center   3/29/2022  1:40 PM Wang Plaza MD [facility]       Additional Instructions for the Follow-ups that You Need to Schedule     Ambulatory Referral to Home Health   As directed      Face to Face Visit Date: 11/1/2021    Follow-up provider for Plan of Care?: I treated the patient in an acute care facility and will not continue treatment after discharge.    Follow-up provider: SIMBA ODONNELL [[id number]]    Reason/Clinical Findings: UTI COPD    Describe mobility limitations that make leaving home difficult: weakness    Nursing/Therapeutic Services Requested: Skilled Nursing (Resume services post hospitalization)    Skilled nursing orders: Other (Resume services post hospitalization)    Frequency: 1 Week 1               Time spent on Discharge including face to face service: 63 minutes    This patient has been examined wearing appropriate Personal Protective Equipment and discussed with hospital infection control department.  11/05/21      Signature: Ginny Coulter MD   November 5, 2021  Ginny Coulter MD

## 2021-11-05 NOTE — SIGNIFICANT NOTE
11/05/21 1407   OTHER   Discipline occupational therapist   Rehab Time/Intention   Session Not Performed other (see comments)  (hospital d/c pending; pt scheduled to transfer to [facility] rehab today)   Recommendation   OT - Next Appointment 11/06/21

## 2021-11-05 NOTE — CASE MANAGEMENT/SOCIAL WORK
Continued Stay Note  BRINA Stephens     Patient Name: Aracelis Vargas  MRN: [id number]  Today's Date: 11/5/2021    Admit Date: 10/31/2021     Discharge Plan     Row Name 11/05/21 0918       Plan    Plan [place] accepted, bed available 11/5, no precert required    Plan Comments per liaison patient accepted and bed availble today 11/5.              Phone communication or documentation only - no physical contact with patient or family.      Crystal Powell RN

## 2021-11-05 NOTE — SIGNIFICANT NOTE
11/05/21 1056   OTHER   Discipline physical therapist   Rehab Time/Intention   Session Not Performed other (see comments)  (hospital d/c pending; pt scheduled to transfer to [facility] rehab today)   Recommendation   PT - Next Appointment 11/07/21

## 2021-11-06 LAB — GLUCOSE BLDC GLUCOMTR-MCNC: 138 MG/DL (ref 70–105)

## 2021-11-08 ENCOUNTER — HOME CARE VISIT (OUTPATIENT)
Dept: HOME HEALTH SERVICES | Facility: HOME HEALTHCARE | Age: 86
End: 2021-11-08

## 2021-11-08 NOTE — CASE MANAGEMENT/SOCIAL WORK
Case Management Discharge Note      Final Note: [place]    Provided Post Acute Provider List?: Yes  Post Acute Provider List: Inpatient Rehab  Provided Post Acute Provider Quality & Resource List?: Yes  Post Acute Provider Quality and Resource List: Inpatient Rehab, Nursing Home  Delivered To: Patient, Support Person  Support Person: Nika  Method of Delivery: In person    Selected Continued Care - Discharged on 11/5/2021 Admission date: 10/31/2021 - Discharge disposition: Rehab Facility or Unit (DC - External)    Destination Coordination complete.    Service Provider Selected Services Address Phone Fax Patient Preferred    [facility]  Inpatient Rehabilitation [address] [place] [id number] [phone] --                    Final Discharge Disposition Code: 62 - inpatient rehab facility

## 2021-11-08 NOTE — CASE COMMUNICATION
Patient remained at inpatient facility at end of certification period.  Home health episode ended and patient discharged from home health services at this time.

## 2021-11-18 ENCOUNTER — HOME HEALTH ADMISSION (OUTPATIENT)
Dept: HOME HEALTH SERVICES | Facility: HOME HEALTHCARE | Age: 86
End: 2021-11-18

## 2021-11-18 ENCOUNTER — TRANSCRIBE ORDERS (OUTPATIENT)
Dept: HOME HEALTH SERVICES | Facility: HOME HEALTHCARE | Age: 86
End: 2021-11-18

## 2021-11-18 DIAGNOSIS — I44.1 MOBITZ TYPE II ATRIOVENTRICULAR BLOCK: Primary | ICD-10-CM

## 2021-11-19 ENCOUNTER — HOME CARE VISIT (OUTPATIENT)
Dept: HOME HEALTH SERVICES | Facility: HOME HEALTHCARE | Age: 86
End: 2021-11-19

## 2021-11-19 PROCEDURE — G0299 HHS/HOSPICE OF RN EA 15 MIN: HCPCS

## 2021-11-21 VITALS
OXYGEN SATURATION: 96 % | RESPIRATION RATE: 18 BRPM | DIASTOLIC BLOOD PRESSURE: 78 MMHG | HEART RATE: 84 BPM | SYSTOLIC BLOOD PRESSURE: 126 MMHG

## 2021-11-22 ENCOUNTER — CLINICAL SUPPORT NO REQUIREMENTS (OUTPATIENT)
Dept: CARDIOLOGY | Facility: CLINIC | Age: 86
End: 2021-11-22

## 2021-11-22 ENCOUNTER — OFFICE VISIT (OUTPATIENT)
Dept: CARDIOLOGY | Facility: CLINIC | Age: 86
End: 2021-11-22

## 2021-11-22 VITALS
WEIGHT: 225 LBS | HEIGHT: 62 IN | HEART RATE: 87 BPM | SYSTOLIC BLOOD PRESSURE: 115 MMHG | OXYGEN SATURATION: 96 % | DIASTOLIC BLOOD PRESSURE: 69 MMHG | BODY MASS INDEX: 41.41 KG/M2

## 2021-11-22 DIAGNOSIS — Z95.0 STATUS POST PLACEMENT OF CARDIAC PACEMAKER: ICD-10-CM

## 2021-11-22 DIAGNOSIS — Z95.0 PACEMAKER: ICD-10-CM

## 2021-11-22 DIAGNOSIS — I10 ESSENTIAL HYPERTENSION: ICD-10-CM

## 2021-11-22 DIAGNOSIS — I44.1 ATRIOVENTRICULAR BLOCK, MOBITZ TYPE 1, WENCKEBACH: Primary | ICD-10-CM

## 2021-11-22 DIAGNOSIS — R41.82 ALTERED MENTAL STATUS, UNSPECIFIED ALTERED MENTAL STATUS TYPE: ICD-10-CM

## 2021-11-22 DIAGNOSIS — R55 SYNCOPE AND COLLAPSE: ICD-10-CM

## 2021-11-22 PROCEDURE — 99024 POSTOP FOLLOW-UP VISIT: CPT | Performed by: INTERNAL MEDICINE

## 2021-11-22 PROCEDURE — 93280 PM DEVICE PROGR EVAL DUAL: CPT | Performed by: INTERNAL MEDICINE

## 2021-11-22 RX ORDER — ATORVASTATIN CALCIUM 10 MG/1
10 TABLET, FILM COATED ORAL DAILY
Qty: 90 TABLET | Refills: 1 | Status: SHIPPED | OUTPATIENT
Start: 2021-11-22 | End: 2022-05-27

## 2021-11-22 NOTE — PROGRESS NOTES
Encounter Date:11/22/2021  Last seen 11/4/2021      Patient ID: Aracelis Vargas is a 86 y.o. female.    Chief Complaint:  Status post pacemaker  History of syncope  Hypertension  Diabetes  Dyslipidemia      History of Present Illness  Patient recently presented with syncope and had permanent dual-chamber pacemaker implantation and was released home.    Since I have last seen, the patient has been without any chest discomfort ,shortness of breath, palpitations, dizziness or syncope.  Denies having any headache ,abdominal pain ,nausea, vomiting , diarrhea constipation, loss of weight or loss of appetite.  Denies having any excessive bruising ,hematuria or blood in the stool.    Review of all systems negative except as indicated.    Reviewed ROS.    Assessment and Plan       ]]]]]]]]]]]]]]]]]]]]  Impression  ========  -Status post permanent dual-chamber pacemaker implantation ([employer] MRI compatible) 11/3/2021     -Status post removal of loop recorder ([employer]) 11/3/2021     -History of syncope  Intermittent Mobitz type II AV block  Patient is not on any medications to cause bradycardia     -Second-degree Mobitz type I AV block.  Narrow QRS complex.     Echocardiogram-normal 10/22/2020     Cardiac catheterization 10/23/2020 revealed:  Left ventricular size and contractility is normal with ejection fraction of 60%.  Normal epicardial coronary arteries.     Status post loop recorder placement 10/23/2020 (Medtronic Linq)     -History of paroxysmal atrial fibrillation     -Hypertension dyslipidemia diabetes     -Renal dysfunction CKD 3  BUN 27 creatinine 1.35        -Exogenous obesity.     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     -No known allergies  ============  Plan  =========  Status post permanent dual-chamber pacemaker implantation ([employer] MRI compatible) level 3/20/2021.  Status post loop recorder removal 11/3/2021 ([employer]tronic Linq)  Pacemaker site  and function appears to be normal.  Interrogation of the pacemaker revealed excellent pacing parameters.     Hypertension-well-controlled     Renal dysfunction  BUN 20 creatinine 1.41-4/7/2021.  29/1.45-11/1/2020     Anemia  hgb 8.9  8.6-11/4/2021     Activities limitations and expectations were discussed with patient.  Follow-up in the office in 3 months with pacemaker interrogation.  Further plan will depend on patient's progress.  ]]]]]]]]]]]]]]             Diagnosis Plan   1. Atrioventricular block, Mobitz type 1, Wenckebach     2. Altered mental status, unspecified altered mental status type     3. Essential hypertension     4. Syncope and collapse     5. Status post placement of cardiac pacemaker     LAB RESULTS (LAST 7 DAYS)    CBC        BMP        CMP         BNP        TROPONIN        CoAg        Creatinine Clearance  Estimated Creatinine Clearance: 55.1 mL/min (by C-G formula based on SCr of 0.85 mg/dL).    ABG        Radiology  No radiology results for the last day                The following portions of the patient's history were reviewed and updated as appropriate: allergies, current medications, past family history, past medical history, past social history, past surgical history and problem list.    Review of Systems   Constitutional: Negative for malaise/fatigue.   Cardiovascular: Negative for chest pain, leg swelling, palpitations and syncope.   Respiratory: Negative for shortness of breath.    Skin: Negative for rash.   Gastrointestinal: Negative for nausea and vomiting.   Neurological: Negative for dizziness, light-headedness and numbness.   All other systems reviewed and are negative.        Current Outpatient Medications:   •  amLODIPine (NORVASC) 5 MG tablet, Take 5 mg by mouth Every Morning., Disp: , Rfl:   •  aspirin 81 MG EC tablet, Take 81 mg by mouth Daily., Disp: , Rfl:   •  atorvastatin (LIPITOR) 10 MG tablet, Take 1 tablet by mouth Daily., Disp: 90 tablet, Rfl: 1  •  furosemide  (LASIX) 20 MG tablet, Take 1 tablet by mouth Daily., Disp: 90 tablet, Rfl: 1  •  gabapentin (NEURONTIN) 300 MG capsule, Take 300 mg by mouth 2 (two) times a day., Disp: , Rfl:   •  HYDROcodone-acetaminophen (NORCO) 7.5-325 MG per tablet, Take 1 tablet by mouth Every 4 (Four) Hours As Needed., Disp: , Rfl:   •  insulin NPH-insulin regular (humuLIN 70/30,novoLIN 70/30) (70-30) 100 UNIT/ML injection, Inject 30 Units under the skin into the appropriate area as directed 2 (Two) Times a Day With Meals., Disp: , Rfl:   •  losartan (Cozaar) 50 MG tablet, Take 1 tablet by mouth Daily., Disp: 90 tablet, Rfl: 3  •  metFORMIN (GLUCOPHAGE) 1000 MG tablet, Take 1,000 mg by mouth 2 (Two) Times a Day With Meals., Disp: , Rfl:   •  omeprazole (priLOSEC) 40 MG capsule, Take 40 mg by mouth Every Morning., Disp: , Rfl:   •  spironolactone (ALDACTONE) 25 MG tablet, Take 1 tablet by mouth Daily., Disp: , Rfl:   •  topiramate (TOPAMAX) 25 MG tablet, Take 12.5 mg by mouth 2 (Two) Times a Day., Disp: , Rfl:     Allergies   Allergen Reactions   • Latex, Natural Rubber Rash   • Adhesive Tape Rash       Family History   Problem Relation Age of Onset   • Heart disease Mother        Past Surgical History:   Procedure Laterality Date   • CARDIAC CATHETERIZATION N/A 10/23/2020    Procedure: Left Heart Cath and coronary angiogram;  Surgeon: Wang Plaza MD;  Location: [facility] CATH INVASIVE LOCATION;  Service: Cardiovascular;  Laterality: N/A;   • CARDIAC ELECTROPHYSIOLOGY PROCEDURE Left 11/3/2021    Procedure: Pacemaker DC new;  Surgeon: Wang Plaza MD;  Location: [facility] CATH INVASIVE LOCATION;  Service: Cardiovascular;  Laterality: Left;   • CARDIAC ELECTROPHYSIOLOGY PROCEDURE N/A 11/3/2021    Procedure: LOOP RECORDER REMOVAL;  Surgeon: Wang Plaza MD;  Location: [facility] CATH INVASIVE LOCATION;  Service: Cardiovascular;  Laterality: N/A;   • HYSTERECTOMY     • JOINT REPLACEMENT Right     Hip   • JOINT REPLACEMENT Left     hip   •  "JOINT REPLACEMENT Left     hip (for second time)   • JOINT REPLACEMENT Right     knee   • OOPHORECTOMY         Past Medical History:   Diagnosis Date   • Atherosclerosis of native coronary artery of native heart without angina pectoris 10/22/2020   • Hypertension associated with stage 3 chronic kidney disease due to type 2 diabetes mellitus ([facility]) 10/22/2020   • Morbid obesity ([facility]) 10/22/2020   • Secondary hyperaldosteronism ([facility]) 10/22/2020   • Stage 3b chronic kidney disease ([facility]) 10/22/2020   • Type 2 diabetes mellitus with diabetic neuropathy, with long-term current use of insulin ([facility]) 10/22/2020   • Type 2 diabetes mellitus with diabetic neuropathy, with long-term current use of insulin ([facility]) 10/22/2020       Family History   Problem Relation Age of Onset   • Heart disease Mother        Social History     Socioeconomic History   • Marital status:    Tobacco Use   • Smoking status: Never Smoker   • Smokeless tobacco: Never Used   Vaping Use   • Vaping Use: Never used   Substance and Sexual Activity   • Alcohol use: Not Currently   • Drug use: Never   • Sexual activity: Defer         Procedures      Objective:       Physical Exam    /69 (BP Location: Left arm, Patient Position: Sitting, Cuff Size: Large Adult)   Pulse 87   Ht 157.5 cm (62\")   Wt 102 kg (225 lb)   SpO2 96%   BMI 41.15 kg/m²   The patient is alert, oriented and in no distress.    Vital signs as noted above.    Head and neck revealed no carotid bruits or jugular venous distension.  No thyromegaly or lymphadenopathy is present.    Lungs clear.  No wheezing.  Breath sounds are normal bilaterally.    Heart normal first and second heart sounds.  No murmur..  No pericardial rub is present.  No gallop is present.    Abdomen soft and nontender.  No organomegaly is present.    Extremities revealed good peripheral pulses without any pedal edema.    Skin warm and dry.  Pacemaker site and loop recorder removal site looks " normal.    Musculoskeletal system is grossly normal.    CNS grossly normal.

## 2021-11-22 NOTE — HOME HEALTH
Patient is a 86 year old female referred to [facility] [place] for atrioventricular block, second degree. She underwent pacemaker placement and surgical incision to left upper chest is CDI with steri strips in place. She lives in a single dwelling home with her . She is using a cane and walker for ambulation, states she has pain in knees and her primary focus is building strength so she can take care of herself. SN visits every other week and PT/OT evals in. Patient also set up for meals on wheels during admission visit through [facility] and patient given information to call them if necessary in the future.     PMH: UTI, anemia, GERD, HTN, HLD, obesity, Type 2 DM, Stage 3b CKD, syncope and collapse, AV block    Plan for next visit: CP assess, assess incision, DM education, assess pain/safety/falls, UTI teaching

## 2021-11-23 ENCOUNTER — HOME CARE VISIT (OUTPATIENT)
Dept: HOME HEALTH SERVICES | Facility: HOME HEALTHCARE | Age: 86
End: 2021-11-23

## 2021-11-23 VITALS
DIASTOLIC BLOOD PRESSURE: 70 MMHG | OXYGEN SATURATION: 95 % | TEMPERATURE: 98.9 F | HEART RATE: 88 BPM | SYSTOLIC BLOOD PRESSURE: 128 MMHG

## 2021-11-23 PROCEDURE — G0151 HHCP-SERV OF PT,EA 15 MIN: HCPCS

## 2021-11-23 NOTE — HOME HEALTH
Pt is an 87 yo female referred for PT  hospital and rehab stay for pacemaker placement.     PLOF Residential ambulator with use of cane. I with ADL.    Environment Lives with spouse in a single story house with one step to get in and out. House is clutter free. Pt has appropriate DME. Pt able to get arond with use of wheeled walker except for kitchen where walker does not fit.     Pt complaining of weakness and poor endurance. Pt currently able to perform bed mobility I but requires SBA for sit to stand transfers and use of wheeled walker for support due to weakness. Pt able to tolerate ambulation x 40 ft with use of wheeled walker on level surface but reports of shortness of breath requiring her to sit down. Pt wants to return to using cane at home.     Plan for next visit: progress HEP, transfer balance and gait training with cane as tolerated.

## 2021-11-24 PROCEDURE — G0180 MD CERTIFICATION HHA PATIENT: HCPCS | Performed by: FAMILY MEDICINE

## 2021-11-25 RX ORDER — FUROSEMIDE 20 MG/1
TABLET ORAL
Qty: 90 TABLET | Refills: 1 | Status: SHIPPED | OUTPATIENT
Start: 2021-11-25 | End: 2022-01-14 | Stop reason: SDUPTHER

## 2021-11-29 ENCOUNTER — TELEPHONE (OUTPATIENT)
Dept: CARDIOLOGY | Facility: CLINIC | Age: 86
End: 2021-11-29

## 2021-11-29 NOTE — TELEPHONE ENCOUNTER
Patient requesting pacemaker instructions. She was not given any at apt. She would like to  at . Can she drop loop recorder back off when she picks up instructions?

## 2021-11-30 ENCOUNTER — HOME CARE VISIT (OUTPATIENT)
Dept: HOME HEALTH SERVICES | Facility: HOME HEALTHCARE | Age: 86
End: 2021-11-30

## 2021-11-30 VITALS
HEART RATE: 90 BPM | SYSTOLIC BLOOD PRESSURE: 124 MMHG | TEMPERATURE: 98.2 F | OXYGEN SATURATION: 97 % | DIASTOLIC BLOOD PRESSURE: 60 MMHG

## 2021-11-30 PROCEDURE — G2066 INTER DEVC REMOTE 30D: HCPCS | Performed by: INTERNAL MEDICINE

## 2021-11-30 PROCEDURE — G0151 HHCP-SERV OF PT,EA 15 MIN: HCPCS

## 2021-11-30 PROCEDURE — 93298 REM INTERROG DEV EVAL SCRMS: CPT | Performed by: INTERNAL MEDICINE

## 2021-12-02 ENCOUNTER — HOME CARE VISIT (OUTPATIENT)
Dept: HOME HEALTH SERVICES | Facility: HOME HEALTHCARE | Age: 86
End: 2021-12-02

## 2021-12-02 ENCOUNTER — TELEPHONE (OUTPATIENT)
Dept: FAMILY MEDICINE CLINIC | Facility: CLINIC | Age: 86
End: 2021-12-02

## 2021-12-02 NOTE — TELEPHONE ENCOUNTER
Caller: Aracelis Vargas    Relationship: Self    Best call back number: [phone]     What is the best time to reach you: ANY    Who are you requesting to speak with (clinical staff, provider,  specific staff member): CLINICAL      What was the call regarding: PATIENT NEEDS LETTER STATING THAT DR. ODONNELL IS PATIENTS PCP.  SHE NEEDS THIS TO OBTAIN A NEW SOCIAL SECURITY CARD. SHE WILL BE AT THE CLINIC TOMORROW ON AN APPOINTMENT.     Do you require a callback: YES

## 2021-12-03 ENCOUNTER — OFFICE VISIT (OUTPATIENT)
Dept: FAMILY MEDICINE CLINIC | Facility: CLINIC | Age: 86
End: 2021-12-03

## 2021-12-03 VITALS
WEIGHT: 222 LBS | SYSTOLIC BLOOD PRESSURE: 148 MMHG | DIASTOLIC BLOOD PRESSURE: 68 MMHG | BODY MASS INDEX: 40.85 KG/M2 | HEIGHT: 62 IN | OXYGEN SATURATION: 99 % | HEART RATE: 102 BPM

## 2021-12-03 DIAGNOSIS — Z95.0 S/P PLACEMENT OF CARDIAC PACEMAKER: ICD-10-CM

## 2021-12-03 DIAGNOSIS — R53.1 GENERAL WEAKNESS: Primary | ICD-10-CM

## 2021-12-03 DIAGNOSIS — G89.29 OTHER CHRONIC PAIN: ICD-10-CM

## 2021-12-03 PROCEDURE — 99213 OFFICE O/P EST LOW 20 MIN: CPT | Performed by: NURSE PRACTITIONER

## 2021-12-03 RX ORDER — HYDROCODONE BITARTRATE AND ACETAMINOPHEN 7.5; 325 MG/1; MG/1
1 TABLET ORAL EVERY 4 HOURS PRN
Status: CANCELLED | OUTPATIENT
Start: 2021-12-03

## 2021-12-03 RX ORDER — HYDROCODONE BITARTRATE AND ACETAMINOPHEN 7.5; 325 MG/1; MG/1
1 TABLET ORAL EVERY 8 HOURS PRN
Qty: 20 TABLET | Refills: 0 | Status: ON HOLD | OUTPATIENT
Start: 2021-12-03 | End: 2022-02-06 | Stop reason: SDUPTHER

## 2021-12-07 ENCOUNTER — HOME CARE VISIT (OUTPATIENT)
Dept: HOME HEALTH SERVICES | Facility: HOME HEALTHCARE | Age: 86
End: 2021-12-07

## 2021-12-07 VITALS
DIASTOLIC BLOOD PRESSURE: 70 MMHG | SYSTOLIC BLOOD PRESSURE: 138 MMHG | HEART RATE: 78 BPM | TEMPERATURE: 98.1 F | OXYGEN SATURATION: 82 %

## 2021-12-07 PROCEDURE — G0151 HHCP-SERV OF PT,EA 15 MIN: HCPCS

## 2021-12-07 NOTE — HOME HEALTH
Denies medication changes or adverse reactions. No insurance change. No falls reported. Pt wants to continue using wheeled walker for now. Pt states she will continue to perform HEP and transition to cane as she thinks she is able. Pt agrees with PT DC.

## 2021-12-16 ENCOUNTER — HOME CARE VISIT (OUTPATIENT)
Dept: HOME HEALTH SERVICES | Facility: HOME HEALTHCARE | Age: 86
End: 2021-12-16

## 2021-12-16 PROCEDURE — G0299 HHS/HOSPICE OF RN EA 15 MIN: HCPCS

## 2021-12-20 VITALS
DIASTOLIC BLOOD PRESSURE: 78 MMHG | SYSTOLIC BLOOD PRESSURE: 132 MMHG | HEART RATE: 84 BPM | OXYGEN SATURATION: 97 % | TEMPERATURE: 98 F | RESPIRATION RATE: 18 BRPM

## 2021-12-20 NOTE — HOME HEALTH
Patient reports she is doing very well and continuing her HEP. Some edema in her feet and ankles but patient states it's close to her baseline and she always has swelling. Blood sugar has been under 110. No questions about medications. States she cancelled the meal deliveries to her home that she is doing okay cooking for herself.     Plan for next visit: CP assess, assess pain/falls/safety, medication education, assess BS, f/u on any MD appts

## 2021-12-30 ENCOUNTER — HOME CARE VISIT (OUTPATIENT)
Dept: HOME HEALTH SERVICES | Facility: HOME HEALTHCARE | Age: 86
End: 2021-12-30

## 2021-12-30 PROCEDURE — G0299 HHS/HOSPICE OF RN EA 15 MIN: HCPCS

## 2021-12-30 RX ORDER — LOSARTAN POTASSIUM 50 MG/1
TABLET ORAL
Qty: 90 TABLET | Refills: 3 | Status: ON HOLD | OUTPATIENT
Start: 2021-12-30 | End: 2022-08-21

## 2022-01-02 VITALS
SYSTOLIC BLOOD PRESSURE: 132 MMHG | DIASTOLIC BLOOD PRESSURE: 80 MMHG | RESPIRATION RATE: 18 BRPM | TEMPERATURE: 98 F | HEART RATE: 74 BPM | OXYGEN SATURATION: 97 %

## 2022-01-13 ENCOUNTER — HOME CARE VISIT (OUTPATIENT)
Dept: HOME HEALTH SERVICES | Facility: HOME HEALTHCARE | Age: 87
End: 2022-01-13

## 2022-01-13 PROCEDURE — G0299 HHS/HOSPICE OF RN EA 15 MIN: HCPCS

## 2022-01-14 ENCOUNTER — TELEPHONE (OUTPATIENT)
Dept: FAMILY MEDICINE CLINIC | Facility: CLINIC | Age: 87
End: 2022-01-14

## 2022-01-14 RX ORDER — FUROSEMIDE 20 MG/1
20 TABLET ORAL 2 TIMES DAILY
Qty: 180 TABLET | Refills: 1 | Status: ON HOLD | OUTPATIENT
Start: 2022-01-14 | End: 2022-06-30

## 2022-01-14 NOTE — TELEPHONE ENCOUNTER
Spoke to Gloria and gave her the information. She is good with coming in a few weeks for labs to check her kidneys.    She needs a new script for the lasix with the increased amount.

## 2022-01-14 NOTE — TELEPHONE ENCOUNTER
Caller: Aracelis Vargas    Relationship: Self    Best call back number: [phone]    What medications are you currently taking:   Current Outpatient Medications on File Prior to Visit   Medication Sig Dispense Refill   • amLODIPine (NORVASC) 5 MG tablet Take 5 mg by mouth Every Morning.     • aspirin 81 MG EC tablet Take 81 mg by mouth Daily.     • atorvastatin (LIPITOR) 10 MG tablet Take 1 tablet by mouth Daily. 90 tablet 1   • furosemide (LASIX) 20 MG tablet TAKE ONE TABLET BY MOUTH DAILY 90 tablet 1   • gabapentin (NEURONTIN) 300 MG capsule Take 300 mg by mouth 2 (two) times a day.     • HYDROcodone-acetaminophen (NORCO) 7.5-325 MG per tablet Take 1 tablet by mouth Every 8 (Eight) Hours As Needed for Moderate Pain  or Severe Pain . 20 tablet 0   • insulin NPH-insulin regular (humuLIN 70/30,novoLIN 70/30) (70-30) 100 UNIT/ML injection Inject 30 Units under the skin into the appropriate area as directed 2 (Two) Times a Day With Meals.     • losartan (COZAAR) 50 MG tablet TAKE ONE TABLET BY MOUTH DAILY 90 tablet 3   • metFORMIN (GLUCOPHAGE) 1000 MG tablet Take 1 tablet by mouth 2 (Two) Times a Day With Meals. 180 tablet 0   • omeprazole (priLOSEC) 40 MG capsule Take 40 mg by mouth Every Morning.     • spironolactone (ALDACTONE) 25 MG tablet Take 1 tablet by mouth Daily.     • topiramate (TOPAMAX) 25 MG tablet Take 12.5 mg by mouth 2 (Two) Times a Day.       No current facility-administered medications on file prior to visit.      Which medication are you concerned about: furosemide (LASIX) 20 MG tablet    Who prescribed you this medication: DR ODONNELL    What are your concerns: PATIENT WOULD LIKE TO TAKE AN EXTRA DOSE EVERY DAY OR EVERY OTHER DAY. PATIENT IS HAVING SWELLING IN HER LEGS AND IT IS DIFFICULT FOR HER TO WALK. PLEASE ADVISE.

## 2022-01-14 NOTE — TELEPHONE ENCOUNTER
I am fine with her taking 2 lasix a day  If that is working, she will need labs in a few weeks to make sure it is not harming her kidneys

## 2022-02-03 ENCOUNTER — APPOINTMENT (OUTPATIENT)
Dept: CT IMAGING | Facility: HOSPITAL | Age: 87
End: 2022-02-03

## 2022-02-03 ENCOUNTER — HOSPITAL ENCOUNTER (INPATIENT)
Facility: HOSPITAL | Age: 87
LOS: 3 days | Discharge: HOME HEALTH | End: 2022-02-06
Attending: EMERGENCY MEDICINE | Admitting: INTERNAL MEDICINE

## 2022-02-03 ENCOUNTER — APPOINTMENT (OUTPATIENT)
Dept: GENERAL RADIOLOGY | Facility: HOSPITAL | Age: 87
End: 2022-02-03

## 2022-02-03 DIAGNOSIS — I63.9 CEREBROVASCULAR ACCIDENT (CVA), UNSPECIFIED MECHANISM: Primary | ICD-10-CM

## 2022-02-03 DIAGNOSIS — Z87.898 HISTORY OF ATAXIA: ICD-10-CM

## 2022-02-03 DIAGNOSIS — R47.1 DYSARTHRIA: ICD-10-CM

## 2022-02-03 DIAGNOSIS — G89.29 OTHER CHRONIC PAIN: ICD-10-CM

## 2022-02-03 DIAGNOSIS — N39.0 ACUTE URINARY TRACT INFECTION: ICD-10-CM

## 2022-02-03 PROBLEM — I44.1 ATRIOVENTRICULAR BLOCK, MOBITZ TYPE 1, WENCKEBACH: Status: RESOLVED | Noted: 2020-10-21 | Resolved: 2022-02-03

## 2022-02-03 PROBLEM — E11.69 DM TYPE 2 WITH DIABETIC DYSLIPIDEMIA: Status: RESOLVED | Noted: 2020-10-22 | Resolved: 2022-02-03

## 2022-02-03 PROBLEM — R55 SYNCOPE AND COLLAPSE: Status: RESOLVED | Noted: 2020-10-22 | Resolved: 2022-02-03

## 2022-02-03 PROBLEM — R41.82 ALTERED MENTAL STATUS: Status: RESOLVED | Noted: 2021-10-31 | Resolved: 2022-02-03

## 2022-02-03 PROBLEM — R94.39 ABNORMAL NUCLEAR STRESS TEST: Status: RESOLVED | Noted: 2020-10-21 | Resolved: 2022-02-03

## 2022-02-03 PROBLEM — D64.9 NORMOCYTIC ANEMIA: Status: RESOLVED | Noted: 2021-11-01 | Resolved: 2022-02-03

## 2022-02-03 PROBLEM — E78.5 DM TYPE 2 WITH DIABETIC DYSLIPIDEMIA: Status: RESOLVED | Noted: 2020-10-22 | Resolved: 2022-02-03

## 2022-02-03 PROBLEM — Z95.0 PACEMAKER: Chronic | Status: ACTIVE | Noted: 2021-11-22

## 2022-02-03 PROBLEM — K21.9 GERD (GASTROESOPHAGEAL REFLUX DISEASE): Chronic | Status: ACTIVE | Noted: 2021-09-07
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
## 2022-02-03 PROBLEM — I10 HYPERTENSION: Chronic | Status: ACTIVE | Noted: 2020-11-11

## 2022-02-03 PROBLEM — E78.5 HYPERLIPIDEMIA: Chronic | Status: ACTIVE | Noted: 2020-11-11

## 2022-02-03 PROBLEM — M25.551 RIGHT HIP PAIN: Status: RESOLVED | Noted: 2021-09-07 | Resolved: 2022-02-03

## 2022-02-03 LAB
ABO GROUP BLD: NORMAL
ALBUMIN SERPL-MCNC: 3.9 G/DL (ref 3.5–5.2)
ALBUMIN/GLOB SERPL: 1.1 G/DL
ALP SERPL-CCNC: 75 U/L (ref 39–117)
ALT SERPL W P-5'-P-CCNC: 19 U/L (ref 1–33)
AMMONIA BLD-SCNC: 86 UMOL/L (ref 11–51)
ANION GAP SERPL CALCULATED.3IONS-SCNC: 11 MMOL/L (ref 5–15)
APTT PPP: 24.3 SECONDS (ref 24–31)
AST SERPL-CCNC: 35 U/L (ref 1–32)
BACTERIA UR QL AUTO: ABNORMAL /HPF
BASOPHILS # BLD AUTO: 0.1 10*3/MM3 (ref 0–0.2)
BASOPHILS NFR BLD AUTO: 1.2 % (ref 0–1.5)
BILIRUB SERPL-MCNC: 0.3 MG/DL (ref 0–1.2)
BILIRUB UR QL STRIP: NEGATIVE
BLD GP AB SCN SERPL QL: NEGATIVE
BUN SERPL-MCNC: 20 MG/DL (ref 8–23)
BUN/CREAT SERPL: 18.9 (ref 7–25)
CALCIUM SPEC-SCNC: 9 MG/DL (ref 8.6–10.5)
CHLORIDE SERPL-SCNC: 106 MMOL/L (ref 98–107)
CK SERPL-CCNC: 77 U/L (ref 20–180)
CLARITY UR: CLEAR
CO2 SERPL-SCNC: 23 MMOL/L (ref 22–29)
COLOR UR: YELLOW
CREAT SERPL-MCNC: 1.06 MG/DL (ref 0.57–1)
DEPRECATED RDW RBC AUTO: 63.9 FL (ref 37–54)
EOSINOPHIL # BLD AUTO: 0.3 10*3/MM3 (ref 0–0.4)
EOSINOPHIL NFR BLD AUTO: 6 % (ref 0.3–6.2)
ERYTHROCYTE [DISTWIDTH] IN BLOOD BY AUTOMATED COUNT: 20.4 % (ref 12.3–15.4)
GFR SERPL CREATININE-BSD FRML MDRD: 49 ML/MIN/1.73
GLOBULIN UR ELPH-MCNC: 3.5 GM/DL
GLUCOSE BLDC GLUCOMTR-MCNC: 120 MG/DL (ref 70–105)
GLUCOSE BLDC GLUCOMTR-MCNC: 126 MG/DL (ref 70–105)
GLUCOSE BLDC GLUCOMTR-MCNC: 144 MG/DL (ref 70–105)
GLUCOSE SERPL-MCNC: 138 MG/DL (ref 65–99)
GLUCOSE UR STRIP-MCNC: NEGATIVE MG/DL
HCT VFR BLD AUTO: 28.7 % (ref 34–46.6)
HGB BLD-MCNC: 9 G/DL (ref 12–15.9)
HGB UR QL STRIP.AUTO: NEGATIVE
HOLD SPECIMEN: NORMAL
HOLD SPECIMEN: NORMAL
HYALINE CASTS UR QL AUTO: ABNORMAL /LPF
INR PPP: 1.03 (ref 0.93–1.1)
KETONES UR QL STRIP: NEGATIVE
LEUKOCYTE ESTERASE UR QL STRIP.AUTO: ABNORMAL
LYMPHOCYTES # BLD AUTO: 1.9 10*3/MM3 (ref 0.7–3.1)
LYMPHOCYTES NFR BLD AUTO: 42.6 % (ref 19.6–45.3)
MCH RBC QN AUTO: 27.6 PG (ref 26.6–33)
MCHC RBC AUTO-ENTMCNC: 31.2 G/DL (ref 31.5–35.7)
MCV RBC AUTO: 88.4 FL (ref 79–97)
MONOCYTES # BLD AUTO: 0.4 10*3/MM3 (ref 0.1–0.9)
MONOCYTES NFR BLD AUTO: 8.2 % (ref 5–12)
NEUTROPHILS NFR BLD AUTO: 1.9 10*3/MM3 (ref 1.7–7)
NEUTROPHILS NFR BLD AUTO: 42 % (ref 42.7–76)
NITRITE UR QL STRIP: NEGATIVE
NRBC BLD AUTO-RTO: 0.1 /100 WBC (ref 0–0.2)
PH UR STRIP.AUTO: 5.5 [PH] (ref 5–8)
PLATELET # BLD AUTO: 195 10*3/MM3 (ref 140–450)
PMV BLD AUTO: 6.6 FL (ref 6–12)
POTASSIUM SERPL-SCNC: 3.7 MMOL/L (ref 3.5–5.2)
PROT SERPL-MCNC: 7.4 G/DL (ref 6–8.5)
PROT UR QL STRIP: NEGATIVE
PROTHROMBIN TIME: 11.4 SECONDS (ref 9.6–11.7)
RBC # BLD AUTO: 3.25 10*6/MM3 (ref 3.77–5.28)
RBC # UR STRIP: ABNORMAL /HPF
REF LAB TEST METHOD: ABNORMAL
RH BLD: POSITIVE
SARS-COV-2 RNA PNL SPEC NAA+PROBE: NOT DETECTED
SODIUM SERPL-SCNC: 140 MMOL/L (ref 136–145)
SP GR UR STRIP: 1.01 (ref 1–1.03)
SQUAMOUS #/AREA URNS HPF: ABNORMAL /HPF
T&S EXPIRATION DATE: NORMAL
TROPONIN T SERPL-MCNC: 0.02 NG/ML (ref 0–0.03)
UROBILINOGEN UR QL STRIP: ABNORMAL
WBC # UR STRIP: ABNORMAL /HPF
WBC NRBC COR # BLD: 4.5 10*3/MM3 (ref 3.4–10.8)
WHOLE BLOOD HOLD SPECIMEN: NORMAL

## 2022-02-03 PROCEDURE — 87635 SARS-COV-2 COVID-19 AMP PRB: CPT | Performed by: EMERGENCY MEDICINE

## 2022-02-03 PROCEDURE — 93005 ELECTROCARDIOGRAM TRACING: CPT | Performed by: EMERGENCY MEDICINE

## 2022-02-03 PROCEDURE — 86850 RBC ANTIBODY SCREEN: CPT | Performed by: EMERGENCY MEDICINE

## 2022-02-03 PROCEDURE — 86900 BLOOD TYPING SEROLOGIC ABO: CPT

## 2022-02-03 PROCEDURE — 86901 BLOOD TYPING SEROLOGIC RH(D): CPT | Performed by: EMERGENCY MEDICINE

## 2022-02-03 PROCEDURE — P9612 CATHETERIZE FOR URINE SPEC: HCPCS

## 2022-02-03 PROCEDURE — 85025 COMPLETE CBC W/AUTO DIFF WBC: CPT | Performed by: EMERGENCY MEDICINE

## 2022-02-03 PROCEDURE — 4A03X5D MEASUREMENT OF ARTERIAL FLOW, INTRACRANIAL, EXTERNAL APPROACH: ICD-10-PCS | Performed by: RADIOLOGY

## 2022-02-03 PROCEDURE — 80053 COMPREHEN METABOLIC PANEL: CPT | Performed by: EMERGENCY MEDICINE

## 2022-02-03 PROCEDURE — 82962 GLUCOSE BLOOD TEST: CPT

## 2022-02-03 PROCEDURE — 70496 CT ANGIOGRAPHY HEAD: CPT

## 2022-02-03 PROCEDURE — 83036 HEMOGLOBIN GLYCOSYLATED A1C: CPT | Performed by: STUDENT IN AN ORGANIZED HEALTH CARE EDUCATION/TRAINING PROGRAM

## 2022-02-03 PROCEDURE — 85730 THROMBOPLASTIN TIME PARTIAL: CPT | Performed by: EMERGENCY MEDICINE

## 2022-02-03 PROCEDURE — 82140 ASSAY OF AMMONIA: CPT | Performed by: EMERGENCY MEDICINE

## 2022-02-03 PROCEDURE — 81001 URINALYSIS AUTO W/SCOPE: CPT | Performed by: EMERGENCY MEDICINE

## 2022-02-03 PROCEDURE — 3E03317 INTRODUCTION OF OTHER THROMBOLYTIC INTO PERIPHERAL VEIN, PERCUTANEOUS APPROACH: ICD-10-PCS | Performed by: EMERGENCY MEDICINE

## 2022-02-03 PROCEDURE — 0 IOPAMIDOL PER 1 ML: Performed by: EMERGENCY MEDICINE

## 2022-02-03 PROCEDURE — 86901 BLOOD TYPING SEROLOGIC RH(D): CPT

## 2022-02-03 PROCEDURE — 70450 CT HEAD/BRAIN W/O DYE: CPT

## 2022-02-03 PROCEDURE — 86900 BLOOD TYPING SEROLOGIC ABO: CPT | Performed by: EMERGENCY MEDICINE

## 2022-02-03 PROCEDURE — 82550 ASSAY OF CK (CPK): CPT | Performed by: EMERGENCY MEDICINE

## 2022-02-03 PROCEDURE — 99222 1ST HOSP IP/OBS MODERATE 55: CPT | Performed by: STUDENT IN AN ORGANIZED HEALTH CARE EDUCATION/TRAINING PROGRAM

## 2022-02-03 PROCEDURE — 99285 EMERGENCY DEPT VISIT HI MDM: CPT

## 2022-02-03 PROCEDURE — 84484 ASSAY OF TROPONIN QUANT: CPT | Performed by: EMERGENCY MEDICINE

## 2022-02-03 PROCEDURE — 70498 CT ANGIOGRAPHY NECK: CPT

## 2022-02-03 PROCEDURE — 25010000002 ALTEPLASE PER 1 MG

## 2022-02-03 PROCEDURE — 71045 X-RAY EXAM CHEST 1 VIEW: CPT

## 2022-02-03 PROCEDURE — 85610 PROTHROMBIN TIME: CPT | Performed by: EMERGENCY MEDICINE

## 2022-02-03 PROCEDURE — 87086 URINE CULTURE/COLONY COUNT: CPT | Performed by: EMERGENCY MEDICINE

## 2022-02-03 PROCEDURE — 36415 COLL VENOUS BLD VENIPUNCTURE: CPT | Performed by: EMERGENCY MEDICINE

## 2022-02-03 RX ORDER — ASPIRIN 300 MG/1
300 SUPPOSITORY RECTAL DAILY
Status: DISCONTINUED | OUTPATIENT
Start: 2022-02-05 | End: 2022-02-06 | Stop reason: HOSPADM

## 2022-02-03 RX ORDER — ATORVASTATIN CALCIUM 40 MG/1
80 TABLET, FILM COATED ORAL NIGHTLY
Status: DISCONTINUED | OUTPATIENT
Start: 2022-02-03 | End: 2022-02-04

## 2022-02-03 RX ORDER — INSULIN LISPRO 100 [IU]/ML
0-14 INJECTION, SOLUTION INTRAVENOUS; SUBCUTANEOUS EVERY 6 HOURS SCHEDULED
Status: DISCONTINUED | OUTPATIENT
Start: 2022-02-04 | End: 2022-02-06 | Stop reason: HOSPADM

## 2022-02-03 RX ORDER — ACETAMINOPHEN 650 MG/1
650 SUPPOSITORY RECTAL EVERY 4 HOURS PRN
Status: DISCONTINUED | OUTPATIENT
Start: 2022-02-03 | End: 2022-02-06 | Stop reason: HOSPADM

## 2022-02-03 RX ORDER — BISACODYL 5 MG/1
5 TABLET, DELAYED RELEASE ORAL DAILY PRN
Status: DISCONTINUED | OUTPATIENT
Start: 2022-02-03 | End: 2022-02-06 | Stop reason: HOSPADM

## 2022-02-03 RX ORDER — ASPIRIN 325 MG
325 TABLET ORAL DAILY
Status: DISCONTINUED | OUTPATIENT
Start: 2022-02-05 | End: 2022-02-06 | Stop reason: HOSPADM

## 2022-02-03 RX ORDER — ONDANSETRON 2 MG/ML
4 INJECTION INTRAMUSCULAR; INTRAVENOUS EVERY 6 HOURS PRN
Status: DISCONTINUED | OUTPATIENT
Start: 2022-02-03 | End: 2022-02-06 | Stop reason: HOSPADM

## 2022-02-03 RX ORDER — SODIUM CHLORIDE 9 MG/ML
0.81 INJECTION, SOLUTION INTRAVENOUS ONCE
Status: COMPLETED | OUTPATIENT
Start: 2022-02-03 | End: 2022-02-04

## 2022-02-03 RX ORDER — PANTOPRAZOLE SODIUM 40 MG/10ML
40 INJECTION, POWDER, LYOPHILIZED, FOR SOLUTION INTRAVENOUS
Status: DISCONTINUED | OUTPATIENT
Start: 2022-02-04 | End: 2022-02-05

## 2022-02-03 RX ORDER — INSULIN LISPRO 100 [IU]/ML
0-14 INJECTION, SOLUTION INTRAVENOUS; SUBCUTANEOUS AS NEEDED
Status: DISCONTINUED | OUTPATIENT
Start: 2022-02-03 | End: 2022-02-06 | Stop reason: HOSPADM

## 2022-02-03 RX ORDER — NICOTINE POLACRILEX 4 MG
15 LOZENGE BUCCAL
Status: DISCONTINUED | OUTPATIENT
Start: 2022-02-03 | End: 2022-02-06 | Stop reason: HOSPADM

## 2022-02-03 RX ORDER — ONDANSETRON 4 MG/1
4 TABLET, FILM COATED ORAL EVERY 6 HOURS PRN
Status: DISCONTINUED | OUTPATIENT
Start: 2022-02-03 | End: 2022-02-06 | Stop reason: HOSPADM

## 2022-02-03 RX ORDER — AMOXICILLIN 250 MG
2 CAPSULE ORAL 2 TIMES DAILY
Status: DISCONTINUED | OUTPATIENT
Start: 2022-02-04 | End: 2022-02-06 | Stop reason: HOSPADM

## 2022-02-03 RX ORDER — OLANZAPINE 10 MG/2ML
1 INJECTION, POWDER, LYOPHILIZED, FOR SOLUTION INTRAMUSCULAR AS NEEDED
Status: DISCONTINUED | OUTPATIENT
Start: 2022-02-03 | End: 2022-02-06 | Stop reason: HOSPADM

## 2022-02-03 RX ORDER — BISACODYL 10 MG
10 SUPPOSITORY, RECTAL RECTAL DAILY PRN
Status: DISCONTINUED | OUTPATIENT
Start: 2022-02-03 | End: 2022-02-06 | Stop reason: HOSPADM

## 2022-02-03 RX ORDER — HYDRALAZINE HYDROCHLORIDE 20 MG/ML
10 INJECTION INTRAMUSCULAR; INTRAVENOUS EVERY 4 HOURS PRN
Status: DISCONTINUED | OUTPATIENT
Start: 2022-02-03 | End: 2022-02-06 | Stop reason: HOSPADM

## 2022-02-03 RX ORDER — IPRATROPIUM BROMIDE AND ALBUTEROL SULFATE 2.5; .5 MG/3ML; MG/3ML
3 SOLUTION RESPIRATORY (INHALATION) EVERY 6 HOURS PRN
Status: DISCONTINUED | OUTPATIENT
Start: 2022-02-03 | End: 2022-02-06 | Stop reason: HOSPADM

## 2022-02-03 RX ORDER — DEXTROSE MONOHYDRATE 25 G/50ML
25 INJECTION, SOLUTION INTRAVENOUS
Status: DISCONTINUED | OUTPATIENT
Start: 2022-02-03 | End: 2022-02-06 | Stop reason: HOSPADM

## 2022-02-03 RX ORDER — POLYETHYLENE GLYCOL 3350 17 G/17G
17 POWDER, FOR SOLUTION ORAL DAILY PRN
Status: DISCONTINUED | OUTPATIENT
Start: 2022-02-03 | End: 2022-02-06 | Stop reason: HOSPADM

## 2022-02-03 RX ORDER — ACETAMINOPHEN 325 MG/1
650 TABLET ORAL EVERY 4 HOURS PRN
Status: DISCONTINUED | OUTPATIENT
Start: 2022-02-03 | End: 2022-02-06 | Stop reason: HOSPADM

## 2022-02-03 RX ORDER — SODIUM CHLORIDE 0.9 % (FLUSH) 0.9 %
10 SYRINGE (ML) INJECTION AS NEEDED
Status: DISCONTINUED | OUTPATIENT
Start: 2022-02-03 | End: 2022-02-06 | Stop reason: HOSPADM

## 2022-02-03 RX ADMIN — ALTEPLASE 90 MG: KIT at 20:05

## 2022-02-03 RX ADMIN — SODIUM CHLORIDE 0.81 ML/KG/HR: 9 INJECTION, SOLUTION INTRAVENOUS at 21:06

## 2022-02-03 RX ADMIN — IOPAMIDOL 100 ML: 755 INJECTION, SOLUTION INTRAVENOUS at 19:52

## 2022-02-04 ENCOUNTER — APPOINTMENT (OUTPATIENT)
Dept: CARDIOLOGY | Facility: HOSPITAL | Age: 87
End: 2022-02-04

## 2022-02-04 ENCOUNTER — APPOINTMENT (OUTPATIENT)
Dept: CT IMAGING | Facility: HOSPITAL | Age: 87
End: 2022-02-04

## 2022-02-04 ENCOUNTER — APPOINTMENT (OUTPATIENT)
Dept: ULTRASOUND IMAGING | Facility: HOSPITAL | Age: 87
End: 2022-02-04

## 2022-02-04 PROBLEM — R53.1 WEAKNESS: Status: ACTIVE | Noted: 2022-02-04

## 2022-02-04 PROBLEM — E72.20 HYPERAMMONEMIA (HCC): Status: ACTIVE | Noted: 2022-02-04

## 2022-02-04 LAB
ALBUMIN SERPL-MCNC: 3.7 G/DL (ref 3.5–5.2)
ALBUMIN/GLOB SERPL: 1.2 G/DL
ALP SERPL-CCNC: 73 U/L (ref 39–117)
ALT SERPL W P-5'-P-CCNC: 17 U/L (ref 1–33)
AMMONIA BLD-SCNC: 41 UMOL/L (ref 11–51)
ANION GAP SERPL CALCULATED.3IONS-SCNC: 12 MMOL/L (ref 5–15)
AST SERPL-CCNC: 24 U/L (ref 1–32)
BASOPHILS # BLD AUTO: 0 10*3/MM3 (ref 0–0.2)
BASOPHILS NFR BLD AUTO: 1.2 % (ref 0–1.5)
BH CV ECHO MEAS - % IVS THICK: 43.4 %
BH CV ECHO MEAS - % LVPW THICK: 95.5 %
BH CV ECHO MEAS - ACS: 1.7 CM
BH CV ECHO MEAS - AO MAX PG (FULL): 1.7 MMHG
BH CV ECHO MEAS - AO MAX PG: 10.4 MMHG
BH CV ECHO MEAS - AO MEAN PG (FULL): 1.2 MMHG
BH CV ECHO MEAS - AO MEAN PG: 5.7 MMHG
BH CV ECHO MEAS - AO ROOT AREA (BSA CORRECTED): 1.8
BH CV ECHO MEAS - AO ROOT AREA: 11.1 CM^2
BH CV ECHO MEAS - AO ROOT DIAM: 3.8 CM
BH CV ECHO MEAS - AO V2 MAX: 161.2 CM/SEC
BH CV ECHO MEAS - AO V2 MEAN: 114.4 CM/SEC
BH CV ECHO MEAS - AO V2 VTI: 33.2 CM
BH CV ECHO MEAS - AVA(I,A): 2.3 CM^2
BH CV ECHO MEAS - AVA(I,D): 2.3 CM^2
BH CV ECHO MEAS - AVA(V,A): 2.4 CM^2
BH CV ECHO MEAS - AVA(V,D): 2.4 CM^2
BH CV ECHO MEAS - BSA(HAYCOCK): 2.2 M^2
BH CV ECHO MEAS - BSA: 2.1 M^2
BH CV ECHO MEAS - BZI_BMI: 43.2 KILOGRAMS/M^2
BH CV ECHO MEAS - BZI_METRIC_HEIGHT: 157.5 CM
BH CV ECHO MEAS - BZI_METRIC_WEIGHT: 107 KG
BH CV ECHO MEAS - EDV(CUBED): 160.2 ML
BH CV ECHO MEAS - EDV(MOD-SP4): 97.6 ML
BH CV ECHO MEAS - EDV(TEICH): 143.2 ML
BH CV ECHO MEAS - EF(CUBED): 70.8 %
BH CV ECHO MEAS - EF(MOD-BP): 60 %
BH CV ECHO MEAS - EF(MOD-SP4): 60 %
BH CV ECHO MEAS - EF(TEICH): 61.9 %
BH CV ECHO MEAS - ESV(CUBED): 46.7 ML
BH CV ECHO MEAS - ESV(MOD-SP4): 39 ML
BH CV ECHO MEAS - ESV(TEICH): 54.5 ML
BH CV ECHO MEAS - FS: 33.7 %
BH CV ECHO MEAS - IVS/LVPW: 1.1
BH CV ECHO MEAS - IVSD: 1 CM
BH CV ECHO MEAS - IVSS: 1.4 CM
BH CV ECHO MEAS - LA DIMENSION(2D): 4.1 CM
BH CV ECHO MEAS - LV DIASTOLIC VOL/BSA (35-75): 47.6 ML/M^2
BH CV ECHO MEAS - LV MASS(C)D: 197.2 GRAMS
BH CV ECHO MEAS - LV MASS(C)DI: 96.2 GRAMS/M^2
BH CV ECHO MEAS - LV MASS(C)S: 225.8 GRAMS
BH CV ECHO MEAS - LV MASS(C)SI: 110.1 GRAMS/M^2
BH CV ECHO MEAS - LV MAX PG: 8.7 MMHG
BH CV ECHO MEAS - LV MEAN PG: 4.5 MMHG
BH CV ECHO MEAS - LV SYSTOLIC VOL/BSA (12-30): 19 ML/M^2
BH CV ECHO MEAS - LV V1 MAX: 147.4 CM/SEC
BH CV ECHO MEAS - LV V1 MEAN: 99.1 CM/SEC
BH CV ECHO MEAS - LV V1 VTI: 29.8 CM
BH CV ECHO MEAS - LVIDD: 5.4 CM
BH CV ECHO MEAS - LVIDS: 3.6 CM
BH CV ECHO MEAS - LVOT AREA: 2.6 CM^2
BH CV ECHO MEAS - LVOT DIAM: 1.8 CM
BH CV ECHO MEAS - LVPWD: 0.91 CM
BH CV ECHO MEAS - LVPWS: 1.8 CM
BH CV ECHO MEAS - MV A MAX VEL: 102.8 CM/SEC
BH CV ECHO MEAS - MV DEC SLOPE: 538.8 CM/SEC^2
BH CV ECHO MEAS - MV DEC TIME: 0.14 SEC
BH CV ECHO MEAS - MV E MAX VEL: 74.2 CM/SEC
BH CV ECHO MEAS - MV E/A: 0.72
BH CV ECHO MEAS - MV MAX PG: 5.1 MMHG
BH CV ECHO MEAS - MV MEAN PG: 3 MMHG
BH CV ECHO MEAS - MV V2 MAX: 113.2 CM/SEC
BH CV ECHO MEAS - MV V2 MEAN: 83 CM/SEC
BH CV ECHO MEAS - MV V2 VTI: 18.1 CM
BH CV ECHO MEAS - MVA(VTI): 4.3 CM^2
BH CV ECHO MEAS - PA ACC TIME: 0.08 SEC
BH CV ECHO MEAS - PA MAX PG (FULL): 0.88 MMHG
BH CV ECHO MEAS - PA MAX PG: 3.5 MMHG
BH CV ECHO MEAS - PA MEAN PG (FULL): 0.78 MMHG
BH CV ECHO MEAS - PA MEAN PG: 2.1 MMHG
BH CV ECHO MEAS - PA PR(ACCEL): 41.6 MMHG
BH CV ECHO MEAS - PA V2 MAX: 93.9 CM/SEC
BH CV ECHO MEAS - PA V2 MEAN: 70.4 CM/SEC
BH CV ECHO MEAS - PA V2 VTI: 16.3 CM
BH CV ECHO MEAS - RV MAX PG: 2.6 MMHG
BH CV ECHO MEAS - RV MEAN PG: 1.4 MMHG
BH CV ECHO MEAS - RV V1 MAX: 81.3 CM/SEC
BH CV ECHO MEAS - RV V1 MEAN: 54.3 CM/SEC
BH CV ECHO MEAS - RV V1 VTI: 13.9 CM
BH CV ECHO MEAS - RVDD: 2.8 CM
BH CV ECHO MEAS - SI(AO): 180 ML/M^2
BH CV ECHO MEAS - SI(CUBED): 55.3 ML/M^2
BH CV ECHO MEAS - SI(LVOT): 38 ML/M^2
BH CV ECHO MEAS - SI(MOD-SP4): 28.6 ML/M^2
BH CV ECHO MEAS - SI(TEICH): 43.2 ML/M^2
BH CV ECHO MEAS - SV(AO): 369.2 ML
BH CV ECHO MEAS - SV(CUBED): 113.4 ML
BH CV ECHO MEAS - SV(LVOT): 77.9 ML
BH CV ECHO MEAS - SV(MOD-SP4): 58.6 ML
BH CV ECHO MEAS - SV(TEICH): 88.7 ML
BILIRUB SERPL-MCNC: 0.3 MG/DL (ref 0–1.2)
BUN SERPL-MCNC: 18 MG/DL (ref 8–23)
BUN/CREAT SERPL: 22.2 (ref 7–25)
CALCIUM SPEC-SCNC: 8.6 MG/DL (ref 8.6–10.5)
CHLORIDE SERPL-SCNC: 109 MMOL/L (ref 98–107)
CHOLEST SERPL-MCNC: 169 MG/DL (ref 0–200)
CO2 SERPL-SCNC: 21 MMOL/L (ref 22–29)
CREAT SERPL-MCNC: 0.81 MG/DL (ref 0.57–1)
DEPRECATED RDW RBC AUTO: 59.5 FL (ref 37–54)
EOSINOPHIL # BLD AUTO: 0.2 10*3/MM3 (ref 0–0.4)
EOSINOPHIL NFR BLD AUTO: 5.9 % (ref 0.3–6.2)
ERYTHROCYTE [DISTWIDTH] IN BLOOD BY AUTOMATED COUNT: 19.5 % (ref 12.3–15.4)
FOLATE SERPL-MCNC: 5.24 NG/ML (ref 4.78–24.2)
GFR SERPL CREATININE-BSD FRML MDRD: 67 ML/MIN/1.73
GLOBULIN UR ELPH-MCNC: 3.2 GM/DL
GLUCOSE BLDC GLUCOMTR-MCNC: 121 MG/DL (ref 70–105)
GLUCOSE BLDC GLUCOMTR-MCNC: 136 MG/DL (ref 70–105)
GLUCOSE BLDC GLUCOMTR-MCNC: 141 MG/DL (ref 70–105)
GLUCOSE SERPL-MCNC: 140 MG/DL (ref 65–99)
HBA1C MFR BLD: 6.9 % (ref 3.5–5.6)
HCT VFR BLD AUTO: 27.8 % (ref 34–46.6)
HDLC SERPL-MCNC: 58 MG/DL (ref 40–60)
HGB BLD-MCNC: 8.6 G/DL (ref 12–15.9)
LDLC SERPL CALC-MCNC: 72 MG/DL (ref 0–100)
LDLC/HDLC SERPL: 1.09 {RATIO}
LV EF 2D ECHO EST: 60 %
LYMPHOCYTES # BLD AUTO: 0.9 10*3/MM3 (ref 0.7–3.1)
LYMPHOCYTES NFR BLD AUTO: 25.5 % (ref 19.6–45.3)
MAGNESIUM SERPL-MCNC: 2.1 MG/DL (ref 1.6–2.4)
MAXIMAL PREDICTED HEART RATE: 136 BPM
MCH RBC QN AUTO: 26.8 PG (ref 26.6–33)
MCHC RBC AUTO-ENTMCNC: 31.1 G/DL (ref 31.5–35.7)
MCV RBC AUTO: 86.2 FL (ref 79–97)
MONOCYTES # BLD AUTO: 0.3 10*3/MM3 (ref 0.1–0.9)
MONOCYTES NFR BLD AUTO: 8.8 % (ref 5–12)
NEUTROPHILS NFR BLD AUTO: 2 10*3/MM3 (ref 1.7–7)
NEUTROPHILS NFR BLD AUTO: 58.6 % (ref 42.7–76)
NRBC BLD AUTO-RTO: 0.1 /100 WBC (ref 0–0.2)
PHOSPHATE SERPL-MCNC: 2.5 MG/DL (ref 2.5–4.5)
PLATELET # BLD AUTO: 177 10*3/MM3 (ref 140–450)
PMV BLD AUTO: 6.3 FL (ref 6–12)
POTASSIUM SERPL-SCNC: 4.1 MMOL/L (ref 3.5–5.2)
PROT SERPL-MCNC: 6.9 G/DL (ref 6–8.5)
RBC # BLD AUTO: 3.22 10*6/MM3 (ref 3.77–5.28)
SODIUM SERPL-SCNC: 142 MMOL/L (ref 136–145)
STRESS TARGET HR: 116 BPM
TRIGL SERPL-MCNC: 238 MG/DL (ref 0–150)
TROPONIN T SERPL-MCNC: 0.01 NG/ML (ref 0–0.03)
TSH SERPL DL<=0.05 MIU/L-ACNC: 1.59 UIU/ML (ref 0.27–4.2)
VIT B12 BLD-MCNC: 155 PG/ML (ref 211–946)
VLDLC SERPL-MCNC: 39 MG/DL (ref 5–40)
WBC NRBC COR # BLD: 3.4 10*3/MM3 (ref 3.4–10.8)

## 2022-02-04 PROCEDURE — 99223 1ST HOSP IP/OBS HIGH 75: CPT | Performed by: INTERNAL MEDICINE

## 2022-02-04 PROCEDURE — 99222 1ST HOSP IP/OBS MODERATE 55: CPT | Performed by: PSYCHIATRY & NEUROLOGY

## 2022-02-04 PROCEDURE — 70450 CT HEAD/BRAIN W/O DYE: CPT

## 2022-02-04 PROCEDURE — 84484 ASSAY OF TROPONIN QUANT: CPT | Performed by: STUDENT IN AN ORGANIZED HEALTH CARE EDUCATION/TRAINING PROGRAM

## 2022-02-04 PROCEDURE — 85025 COMPLETE CBC W/AUTO DIFF WBC: CPT | Performed by: STUDENT IN AN ORGANIZED HEALTH CARE EDUCATION/TRAINING PROGRAM

## 2022-02-04 PROCEDURE — 82607 VITAMIN B-12: CPT | Performed by: STUDENT IN AN ORGANIZED HEALTH CARE EDUCATION/TRAINING PROGRAM

## 2022-02-04 PROCEDURE — 84100 ASSAY OF PHOSPHORUS: CPT | Performed by: STUDENT IN AN ORGANIZED HEALTH CARE EDUCATION/TRAINING PROGRAM

## 2022-02-04 PROCEDURE — 92523 SPEECH SOUND LANG COMPREHEN: CPT

## 2022-02-04 PROCEDURE — 83735 ASSAY OF MAGNESIUM: CPT | Performed by: STUDENT IN AN ORGANIZED HEALTH CARE EDUCATION/TRAINING PROGRAM

## 2022-02-04 PROCEDURE — 82962 GLUCOSE BLOOD TEST: CPT

## 2022-02-04 PROCEDURE — 93306 TTE W/DOPPLER COMPLETE: CPT | Performed by: INTERNAL MEDICINE

## 2022-02-04 PROCEDURE — 25010000002 ONDANSETRON PER 1 MG: Performed by: STUDENT IN AN ORGANIZED HEALTH CARE EDUCATION/TRAINING PROGRAM

## 2022-02-04 PROCEDURE — 82140 ASSAY OF AMMONIA: CPT | Performed by: STUDENT IN AN ORGANIZED HEALTH CARE EDUCATION/TRAINING PROGRAM

## 2022-02-04 PROCEDURE — 82746 ASSAY OF FOLIC ACID SERUM: CPT | Performed by: PSYCHIATRY & NEUROLOGY

## 2022-02-04 PROCEDURE — 80061 LIPID PANEL: CPT | Performed by: STUDENT IN AN ORGANIZED HEALTH CARE EDUCATION/TRAINING PROGRAM

## 2022-02-04 PROCEDURE — 84443 ASSAY THYROID STIM HORMONE: CPT | Performed by: STUDENT IN AN ORGANIZED HEALTH CARE EDUCATION/TRAINING PROGRAM

## 2022-02-04 PROCEDURE — 80053 COMPREHEN METABOLIC PANEL: CPT | Performed by: STUDENT IN AN ORGANIZED HEALTH CARE EDUCATION/TRAINING PROGRAM

## 2022-02-04 PROCEDURE — 25010000002 CEFTRIAXONE PER 250 MG: Performed by: STUDENT IN AN ORGANIZED HEALTH CARE EDUCATION/TRAINING PROGRAM

## 2022-02-04 PROCEDURE — 99222 1ST HOSP IP/OBS MODERATE 55: CPT | Performed by: INTERNAL MEDICINE

## 2022-02-04 PROCEDURE — 93306 TTE W/DOPPLER COMPLETE: CPT

## 2022-02-04 PROCEDURE — 92610 EVALUATE SWALLOWING FUNCTION: CPT

## 2022-02-04 PROCEDURE — 76705 ECHO EXAM OF ABDOMEN: CPT

## 2022-02-04 RX ORDER — ATORVASTATIN CALCIUM 40 MG/1
40 TABLET, FILM COATED ORAL NIGHTLY
Status: DISCONTINUED | OUTPATIENT
Start: 2022-02-04 | End: 2022-02-06 | Stop reason: HOSPADM

## 2022-02-04 RX ORDER — LEVETIRACETAM 500 MG/1
500 TABLET ORAL EVERY 12 HOURS SCHEDULED
Status: DISCONTINUED | OUTPATIENT
Start: 2022-02-04 | End: 2022-02-06 | Stop reason: HOSPADM

## 2022-02-04 RX ORDER — HYDROMORPHONE HCL 110MG/55ML
2 PATIENT CONTROLLED ANALGESIA SYRINGE INTRAVENOUS
Status: DISCONTINUED | OUTPATIENT
Start: 2022-02-04 | End: 2022-02-04

## 2022-02-04 RX ADMIN — CEFTRIAXONE 1 G: 1 INJECTION, POWDER, FOR SOLUTION INTRAMUSCULAR; INTRAVENOUS at 03:39

## 2022-02-04 RX ADMIN — ONDANSETRON 4 MG: 2 INJECTION INTRAMUSCULAR; INTRAVENOUS at 04:53

## 2022-02-04 RX ADMIN — ATORVASTATIN CALCIUM 40 MG: 40 TABLET, FILM COATED ORAL at 21:23

## 2022-02-04 RX ADMIN — LEVETIRACETAM 500 MG: 500 TABLET, FILM COATED ORAL at 21:23

## 2022-02-04 RX ADMIN — PANTOPRAZOLE SODIUM 40 MG: 40 INJECTION, POWDER, LYOPHILIZED, FOR SOLUTION INTRAVENOUS at 06:20

## 2022-02-04 NOTE — CONSULTS
Primary Care Provider: Gabriel Goss*     Consult requested by: Dr. Finnegan    Reason for Consultation: Neurological evaluation /code stroke, speech problems    History taken from: patient chart RN    Chief complaint: Speech problems     SUBJECTIVE:    History of present illness:   The patient is a very pleasant 86-year-old right-handed white female who is evaluated in room ICU 2301 at [facility]    Source of information is the patient and the medical records    She presented with acute onset of speech difficulty  The patient is now saying that she does not recall much  Apparently she has been falling but there is nothing suggesting loss of consciousness otherwise    I was able to talk to her  over the phone  He told me that the patient has had 4 episodes like this  She has been admitted  I saw the records and she has Dr. Plaza see her several times  She now has a pacemaker because of AV block    Her  reports that she called him to the kitchen saying that she is not feeling well and he was trying to help her to the living room to sit and she kind of collapsed like she has done before  No loss of consciousness  She told him that she does not want to go to the hospital as before and this is the fourth time  He called EMS and EMS found her with some speech problems  Her NIH was apparently 5  So she was given alteplase and now she is doing great  But she does not remember any of these events  CT and CTA looked okay now she is going for MRI    No headaches or migraines       As per records, Aracelis Vargas is a 86 y.o. female with PMH of CAD, wenckebach, HTN, HLD, DM type II who presented to [facility] ED 2/3/2022 after a fall at home and difficulty speaking approximately 40 minutes prior to EMS arrival. There was no report of facial droop or lateralizing weakness. The patient does not recall falling at home.      In the ED the patient was a Code Stroke.  CT head showed no acute intracranial  hemorrhage, CTA head/neck showed no significant carotid or vertebral basilar stenosis or occlusion.  No large intracranial vessel occlusion..  She was determined to be a tPA candidate and received tPA at 20:05. She was admitted to the ICU for close neuro monitoring.      Date 2/4/2022: The patient had NIH of 0.  She was felt stable for transfer out of ICU and hospitalist group consulted for medical management.         Review of Systems   Constitutional: Negative.   HENT: Negative.    Eyes: Negative.    Cardiovascular: Negative.    Respiratory: Negative.    Endocrine: Negative.    Hematologic/Lymphatic: Negative.    Skin: Negative.    Musculoskeletal: Negative.    Gastrointestinal: Negative.    Genitourinary: Negative.    Neurological: Negative.    Psychiatric/Behavioral: Negative.    Allergic/Immunologic: Negative.    All other systems reviewed and are negative    As per admitting, Aracelis Vargas is a 86 y.o. female with PMH of CAD, wenckebach, HTN, HLD, DM type II who presented to [facility] ED 2/3/2022 after a fall at home and difficulty speaking approximately 40 minutes prior to EMS arrival. There was no report of facial droop or lateralizing weakness. The patient does not recall falling at home.      In the ED the patient was a Code Stroke.  CT head showed no acute intracranial hemorrhage, CTA head/neck showed no significant carotid or vertebral basilar stenosis or occlusion.  No large intracranial vessel occlusion..  She was determined to be a tPA candidate and received tPA at 20:05. She was admitted to the ICU for close neuro monitoring.      Date 2/4/2022: The patient had NIH of 0.  She was felt stable for transfer out of ICU and hospitalist group consulted for medical management.         As per ER team, 86-year-old female complaining of difficulty speaking.  Patient's  noted that she had fallen this evening.  She has apparently has history of recurrent falls and EMS has been called to the house for lifting  assistance in the past.  The  reports no recent changes in ataxia but reports that she had unintelligible speech.  States it started abruptly about 40 minutes prior to arrival.  He states that she has not complained of headache and has never had this problem before.  The patient denies extremity lateralizing signs.  The patient had no recent reports of fever or chills.  There is been no reports of nausea vomiting or diarrhea.  Patient has not recently complained of dysuria or cough    Review of Systems   No fever chills rigors or sweats  No weight issues  No sleep problems  HEENT:  No speech problem, vision changes, facial asymmetry or pain, or neck problem  Chest: No chest pain, clubbing, cyanosis, orthopnea palpitations  Pulmonary:  No shortness of air, cough or expectoration  Abdomen:  No swelling/tension, constipation,diarrhea or pain  No genitourinary symptoms  Extremity problems as discussed  No back problem  No psychotic issues  Neurologic issues as discussed  No hematologic, dermatologic or endocrine problems          PATIENT HISTORY:  Past Medical History:   Diagnosis Date   • Atherosclerosis of native coronary artery of native heart without angina pectoris 10/22/2020   • Atrioventricular block, Mobitz type 1, Mitch 10/21/2020    Added automatically from request for surgery [id number]   • Hypertension associated with stage 3 chronic kidney disease due to type 2 diabetes mellitus ([facility]) 10/22/2020   • Morbid obesity ([facility]) 10/22/2020   • Secondary hyperaldosteronism ([facility]) 10/22/2020   • Stage 3b chronic kidney disease ([facility]) 10/22/2020   • Type 2 diabetes mellitus with diabetic neuropathy, with long-term current use of insulin ([facility]) 10/22/2020   • Type 2 diabetes mellitus with diabetic neuropathy, with long-term current use of insulin ([facility]) 10/22/2020   ,   Past Surgical History:   Procedure Laterality Date   • CARDIAC CATHETERIZATION N/A 10/23/2020    Procedure: Left Heart Cath and coronary angiogram;   Surgeon: Wang Plaza MD;  Location: [facility] CATH INVASIVE LOCATION;  Service: Cardiovascular;  Laterality: N/A;   • CARDIAC ELECTROPHYSIOLOGY PROCEDURE Left 11/3/2021    Procedure: Pacemaker DC new;  Surgeon: Wang Plaza MD;  Location: [facility] CATH INVASIVE LOCATION;  Service: Cardiovascular;  Laterality: Left;   • CARDIAC ELECTROPHYSIOLOGY PROCEDURE N/A 11/3/2021    Procedure: LOOP RECORDER REMOVAL;  Surgeon: Wang Plaza MD;  Location: [facility] CATH INVASIVE LOCATION;  Service: Cardiovascular;  Laterality: N/A;   • HYSTERECTOMY     • JOINT REPLACEMENT Right     Hip   • JOINT REPLACEMENT Left     hip   • JOINT REPLACEMENT Left     hip (for second time)   • JOINT REPLACEMENT Right     knee   • OOPHORECTOMY     ,   Family History   Problem Relation Age of Onset   • Heart disease Mother    ,   Social History     Tobacco Use   • Smoking status: Never Smoker   • Smokeless tobacco: Never Used   Vaping Use   • Vaping Use: Never used   Substance Use Topics   • Alcohol use: Not Currently   • Drug use: Never   ,   Medications Prior to Admission   Medication Sig Dispense Refill Last Dose   • amLODIPine (NORVASC) 5 MG tablet Take 5 mg by mouth Every Morning.   Past Week at Unknown time   • aspirin 81 MG EC tablet Take 81 mg by mouth Daily.   Past Week at Unknown time   • atorvastatin (LIPITOR) 10 MG tablet Take 1 tablet by mouth Daily. 90 tablet 1 Past Week at Unknown time   • furosemide (LASIX) 20 MG tablet Take 1 tablet by mouth 2 (Two) Times a Day. 180 tablet 1 Past Week at Unknown time   • gabapentin (NEURONTIN) 300 MG capsule Take 300 mg by mouth 2 (two) times a day.   Past Week at Unknown time   • HYDROcodone-acetaminophen (NORCO) 7.5-325 MG per tablet Take 1 tablet by mouth Every 8 (Eight) Hours As Needed for Moderate Pain  or Severe Pain . 20 tablet 0 Past Week at Unknown time   • insulin NPH-insulin regular (humuLIN 70/30,novoLIN 70/30) (70-30) 100 UNIT/ML injection Inject 30 Units under the skin into  the appropriate area as directed 2 (Two) Times a Day With Meals.   Past Week at Unknown time   • losartan (COZAAR) 50 MG tablet TAKE ONE TABLET BY MOUTH DAILY 90 tablet 3 Past Week at Unknown time   • metFORMIN (GLUCOPHAGE) 1000 MG tablet Take 1 tablet by mouth 2 (Two) Times a Day With Meals. 180 tablet 0 Past Week at Unknown time   • omeprazole (priLOSEC) 40 MG capsule Take 40 mg by mouth Every Morning.   Past Week at Unknown time   • spironolactone (ALDACTONE) 25 MG tablet Take 1 tablet by mouth Every Morning.   Past Week at Unknown time   • topiramate (TOPAMAX) 25 MG tablet Take 12.5 mg by mouth 2 (Two) Times a Day.   Past Week at Unknown time   , Scheduled Meds:  [START ON 2/5/2022] aspirin, 325 mg, Oral, Daily   Or  [START ON 2/5/2022] aspirin, 300 mg, Rectal, Daily  atorvastatin, 80 mg, Oral, Nightly  cefTRIAXone, 1 g, Intravenous, Q24H  insulin lispro, 0-14 Units, Subcutaneous, Q6H  pantoprazole, 40 mg, Intravenous, Q AM  senna-docusate sodium, 2 tablet, Oral, BID    , Continuous Infusions:   , PRN Meds:  •  acetaminophen **OR** acetaminophen  •  senna-docusate sodium **AND** polyethylene glycol **AND** bisacodyl **AND** bisacodyl  •  dextrose  •  dextrose  •  glucagon (human recombinant)  •  hydrALAZINE  •  insulin lispro **AND** insulin lispro  •  ipratropium-albuterol  •  ondansetron **OR** ondansetron  •  sodium chloride, Allergies:  Latex, natural rubber and Adhesive tape    ________________________________________________________        OBJECTIVE:  Upon today's exam, the patient is resting comfortably in bed in no acute distress         Neurologic Exam    The patient is awake and alert and oriented x3     Cranial nerve examination demonstrate:  Full fields of vision to confrontation  Pupils are round, reactive to light and accommodation and size of about 3 mm  No ptosis or nystagmus  Funduscopic examination was not successful  Eye movements are conjugate     Sensation on the face and scalp are  normal  Muscles of mastication are normal and symmetric  Muscles of  facial expression are normal and symmetric  Hearing is intact bilaterally  Head turning and shoulder shrugs were unremarkable  Tongue was midline  I could not visualize her oropharynx or uvula     Motor examination:  Normal bulk, tone and strength was 5/5  No fasciculations     Sensory examination:  Intact for soft touch, pain and position sensation  Romberg was not evaluated     Reflexes:  0/4     Coordination:  Normal finger-to-nose to finger, rapid alternating movements and toe to finger     Gait:  Deferred     Toe signs:  Mute    NIH stroke scale  NIHSS:    Level Of Consciousness 0  0=Alert; keenly responsive 1=Not alert, but arousable by minor stimulation 2=Not alert, requires repeated stimulation 3=Responds only with reflex movements    LOC Questions to Month and age 0  0=Answers both questions correctly 1=Answers one question correctly 2=Answers neither question correctly    LOC Commands      -Open/Close eyes 0    -Open/close  0   0=Performs both tasks correctly 1=Performs one task correctly 2=Performs neighter task correctly     Best Gaze 0  0=Normal 1=Partial gaze palsy 2=Forced deviation, or total gaze paresis    Visual 0  0=No visual loss 1=Partial hemianopia 2=Complete hemianopia 3=Bilateral hemianopia (blind including cortical blindness)    Facial Palsy 0  0=Normal symmetrical movement 1=Minor paralysis (asymmetry) 2=Partial paralysis (lower facde) 3=Complete paralysis (upper and lower face)    Motor: Left Arm-0  Left leg-0; Right Arm-0 Right Leg-0  0=No drift, limb holds posture for full 10 seconds 1=Drift, limb holds posture, no drift to bed 2=Some antigravity effort, cannot maintain posture, drifts to bed 3=No effort against gravity, limb falls 4=No movement    Limb Ataxia 0  0=Absent 1=Present in one limb 2=Present in two limbs    Sensory 0   0=Normal 1=Mild to moderate sensory loss 2=Severe to total sensory loss    Best  Language 0   0=No aphasia, normal 1=Mild to moderate aphasia 2=Severe Aphasia (very few words correct or understood)3=Multe, global aphasia    Dysarthria 0  0=Normal 1=Mild to moderate 2=Severe, unintelligible or mute/anarthric    Extinction/Neglect 0   0=No abnormality 1=Extinction to bilateral simultaneous stimulation 2=Profound neglect    Total  _0_      ________________________________________________________   RESULTS REVIEW:    VITAL SIGNS:   Temp:  [98 °F (36.7 °C)-98.3 °F (36.8 °C)] 98.1 °F (36.7 °C)  Heart Rate:  [75-95] 93  Resp:  [12-18] 14  BP: (104-180)/(48-90) 169/79     LABS:  WBC   Date Value Ref Range Status   02/04/2022 3.40 3.40 - 10.80 10*3/mm3 Final     RBC   Date Value Ref Range Status   02/04/2022 3.22 (L) 3.77 - 5.28 10*6/mm3 Final     Hemoglobin   Date Value Ref Range Status   02/04/2022 8.6 (L) 12.0 - 15.9 g/dL Final     Hematocrit   Date Value Ref Range Status   02/04/2022 27.8 (L) 34.0 - 46.6 % Final     MCV   Date Value Ref Range Status   02/04/2022 86.2 79.0 - 97.0 fL Final     MCH   Date Value Ref Range Status   02/04/2022 26.8 26.6 - 33.0 pg Final     MCHC   Date Value Ref Range Status   02/04/2022 31.1 (L) 31.5 - 35.7 g/dL Final     RDW   Date Value Ref Range Status   02/04/2022 19.5 (H) 12.3 - 15.4 % Final     RDW-SD   Date Value Ref Range Status   02/04/2022 59.5 (H) 37.0 - 54.0 fl Final     MPV   Date Value Ref Range Status   02/04/2022 6.3 6.0 - 12.0 fL Final     Platelets   Date Value Ref Range Status   02/04/2022 177 140 - 450 10*3/mm3 Final     Neutrophil %   Date Value Ref Range Status   02/04/2022 58.6 42.7 - 76.0 % Final     Lymphocyte %   Date Value Ref Range Status   02/04/2022 25.5 19.6 - 45.3 % Final     Monocyte %   Date Value Ref Range Status   02/04/2022 8.8 5.0 - 12.0 % Final     Eosinophil %   Date Value Ref Range Status   02/04/2022 5.9 0.3 - 6.2 % Final     Basophil %   Date Value Ref Range Status   02/04/2022 1.2 0.0 - 1.5 % Final     Neutrophils, Absolute    Date Value Ref Range Status   02/04/2022 2.00 1.70 - 7.00 10*3/mm3 Final     Lymphocytes, Absolute   Date Value Ref Range Status   02/04/2022 0.90 0.70 - 3.10 10*3/mm3 Final     Monocytes, Absolute   Date Value Ref Range Status   02/04/2022 0.30 0.10 - 0.90 10*3/mm3 Final     Eosinophils, Absolute   Date Value Ref Range Status   02/04/2022 0.20 0.00 - 0.40 10*3/mm3 Final     Basophils, Absolute   Date Value Ref Range Status   02/04/2022 0.00 0.00 - 0.20 10*3/mm3 Final     nRBC   Date Value Ref Range Status   02/04/2022 0.1 0.0 - 0.2 /100 WBC Final     Glucose   Date Value Ref Range Status   02/04/2022 140 (H) 65 - 99 mg/dL Final     BUN   Date Value Ref Range Status   02/04/2022 18 8 - 23 mg/dL Final     Creatinine   Date Value Ref Range Status   02/04/2022 0.81 0.57 - 1.00 mg/dL Final     Sodium   Date Value Ref Range Status   02/04/2022 142 136 - 145 mmol/L Final     Potassium   Date Value Ref Range Status   02/04/2022 4.1 3.5 - 5.2 mmol/L Final     Chloride   Date Value Ref Range Status   02/04/2022 109 (H) 98 - 107 mmol/L Final     CO2   Date Value Ref Range Status   02/04/2022 21.0 (L) 22.0 - 29.0 mmol/L Final     Calcium   Date Value Ref Range Status   02/04/2022 8.6 8.6 - 10.5 mg/dL Final     Total Protein   Date Value Ref Range Status   02/04/2022 6.9 6.0 - 8.5 g/dL Final     Albumin   Date Value Ref Range Status   02/04/2022 3.70 3.50 - 5.20 g/dL Final     ALT (SGPT)   Date Value Ref Range Status   02/04/2022 17 1 - 33 U/L Final     AST (SGOT)   Date Value Ref Range Status   02/04/2022 24 1 - 32 U/L Final     Alkaline Phosphatase   Date Value Ref Range Status   02/04/2022 73 39 - 117 U/L Final     Total Bilirubin   Date Value Ref Range Status   02/04/2022 0.3 0.0 - 1.2 mg/dL Final     eGFR Non  Amer   Date Value Ref Range Status   02/04/2022 67 >60 mL/min/1.73 Final     BUN/Creatinine Ratio   Date Value Ref Range Status   02/04/2022 22.2 7.0 - 25.0 Final     Anion Gap   Date Value Ref Range Status    02/04/2022 12.0 5.0 - 15.0 mmol/L Final       Lab Results   Component Value Date    TSH 1.590 02/04/2022    LDL 72 02/04/2022    HGBA1C 6.9 (H) 02/03/2022    [id number] 173 (L) 10/23/2020         IMAGING STUDIES:  CT Angiogram Neck    Result Date: 2/3/2022  1. No significant carotid or vertebral basilar stenosis or occlusion 2. No large intracranial vessel occlusion Electronically Signed: Jesus Sparks MD 2/3/2022 20:34 EST    US Liver    Result Date: 2/4/2022  1. Mild enlargement of the liver. No evidence of liver mass. 2. The gallbladder and bile ducts are normal. 3. No evidence of ascites Electronically Signed: Jaylon Viramontes MD 2/4/2022 9:27 EST    XR Chest 1 View    Result Date: 2/3/2022  Cardiomegaly with pulmonary venous hypertension. No evidence of pulmonary edema. No suspicious infiltrate. Atelectasis in the bases Electronically Signed: Jesus Sparks MD 2/3/2022 20:41 EST    CT Head Without Contrast Stroke Protocol    Result Date: 2/3/2022  1. No acute intracranial hemorrhage. 2. Mild cerebral atrophy and white matter findings of chronic small vessel disease similar to prior study. Electronically Signed: Benjamín Padron MD 2/3/2022 19:45 EST    CT Angiogram Head w AI Analysis of LVO    Result Date: 2/3/2022  1. No significant carotid or vertebral basilar stenosis or occlusion 2. No large intracranial vessel occlusion Electronically Signed: Jesus Sparks MD 2/3/2022 20:34 EST      I reviewed the patient's new clinical results.      ________________________________________________________     PROBLEM LIST:    Cerebrovascular accident (CVA) (HCC)    Fall    Secondary hyperaldosteronism (HCC)    Stage 3b chronic kidney disease (CMS/HCC)    Hypertension associated with stage 3 chronic kidney disease due to type 2 diabetes mellitus (HCC)    Type 2 diabetes mellitus with diabetic neuropathy, with long-term current use of insulin (HCC)    Morbid obesity (HCC)    Atherosclerosis of native coronary artery  of native heart without angina pectoris    Hyperlipidemia    Primary hypertension    Acute UTI    GERD (gastroesophageal reflux disease)    Cardiac pacemaker in situ    Hyperammonemia (HCC)    Weakness          Assessment/Plan   ASSESSMENT/PLAN:  Speech problems  Received TPA in the ER  All problems resolved    This is the fourth episode  Previously the concern was AV block or other dysrhythmia but she has a pacemaker and she has had loop recorder and did not find anything    While I agree that she needed alteplase because she had positive NIH and focal neurologic deficits, I am concerned that she may be having seizures because she reports that she does not remember any of these events    Await for the MRI  We will treat post alteplase as indicated    But I will start her on Keppra and she can have EEG electively whenever possible    Modification of stroke risk factors:   - Blood pressure should be less than 130/80 outpatient, HbA1c less than 6.5, LDL less than 70; b12>500 and smoking cessation if applicable. We would be grateful if the primary team / primary care physician would keep a close watch on the above targets.  - Stroke education  - Follow up with neurologist of choice      I discussed the patient's findings and my recommendations with patient, family and nursing staff    Saskia Mccall MD  02/04/22  13:55 EST

## 2022-02-04 NOTE — CONSULTS
[facility] Medicine Services      Patient Name: Aracelis Vargas  : 1935  MRN: [id number]  Primary Care Physician:  Gabriel Goss MD  Date of admission: 2/3/2022      Subjective      Chief Complaint: abnormal speech    HPI taken from medical chart review  History of Present Illness: Aracelis Vargas is a 86 y.o. female with PMH of CAD, wenckebach, HTN, HLD, DM type II who presented to [facility] ED 2/3/2022 after a fall at home and difficulty speaking approximately 40 minutes prior to EMS arrival. There was no report of facial droop or lateralizing weakness. The patient does not recall falling at home.     In the ED the patient was a Code Stroke.  CT head showed no acute intracranial hemorrhage, CTA head/neck showed no significant carotid or vertebral basilar stenosis or occlusion.  No large intracranial vessel occlusion..  She was determined to be a tPA candidate and received tPA at 20:05. She was admitted to the ICU for close neuro monitoring.     Date 2022: The patient had NIH of 0.  She was felt stable for transfer out of ICU and hospitalist group consulted for medical management.       Review of Systems   Constitutional: Negative.   HENT: Negative.    Eyes: Negative.    Cardiovascular: Negative.    Respiratory: Negative.    Endocrine: Negative.    Hematologic/Lymphatic: Negative.    Skin: Negative.    Musculoskeletal: Negative.    Gastrointestinal: Negative.    Genitourinary: Negative.    Neurological: Negative.    Psychiatric/Behavioral: Negative.    Allergic/Immunologic: Negative.    All other systems reviewed and are negative.       Personal History     Past Medical History:   Diagnosis Date   • Atherosclerosis of native coronary artery of native heart without angina pectoris 10/22/2020   • Atrioventricular block, Mobitz type 1, [place] 10/21/2020    Added automatically from request for surgery [id number]   • Hypertension associated with stage 3 chronic kidney disease due to  type 2 diabetes mellitus ([facility]) 10/22/2020   • Morbid obesity ([facility]) 10/22/2020   • Secondary hyperaldosteronism ([facility]) 10/22/2020   • Stage 3b chronic kidney disease ([facility]) 10/22/2020   • Type 2 diabetes mellitus with diabetic neuropathy, with long-term current use of insulin ([facility]) 10/22/2020   • Type 2 diabetes mellitus with diabetic neuropathy, with long-term current use of insulin ([facility]) 10/22/2020       Past Surgical History:   Procedure Laterality Date   • CARDIAC CATHETERIZATION N/A 10/23/2020    Procedure: Left Heart Cath and coronary angiogram;  Surgeon: Wang Plaza MD;  Location: [facility] CATH INVASIVE LOCATION;  Service: Cardiovascular;  Laterality: N/A;   • CARDIAC ELECTROPHYSIOLOGY PROCEDURE Left 11/3/2021    Procedure: Pacemaker DC new;  Surgeon: Wang Plaza MD;  Location: [facility] CATH INVASIVE LOCATION;  Service: Cardiovascular;  Laterality: Left;   • CARDIAC ELECTROPHYSIOLOGY PROCEDURE N/A 11/3/2021    Procedure: LOOP RECORDER REMOVAL;  Surgeon: Wang Plaza MD;  Location: [facility] CATH INVASIVE LOCATION;  Service: Cardiovascular;  Laterality: N/A;   • HYSTERECTOMY     • JOINT REPLACEMENT Right     Hip   • JOINT REPLACEMENT Left     hip   • JOINT REPLACEMENT Left     hip (for second time)   • JOINT REPLACEMENT Right     knee   • OOPHORECTOMY         Family History: family history includes Heart disease in her mother. Otherwise pertinent FHx was reviewed and not pertinent to current issue.    Social History:  reports that she has never smoked. She has never used smokeless tobacco. She reports previous alcohol use. She reports that she does not use drugs.    Home Medications:  Prior to Admission Medications     Prescriptions Last Dose Informant Patient Reported? Taking?    amLODIPine (NORVASC) 5 MG tablet  Self Yes No    Take 5 mg by mouth Every Morning.    aspirin 81 MG EC tablet   Yes No    Take 81 mg by mouth Daily.    atorvastatin (LIPITOR) 10 MG tablet   No No    Take 1 tablet by mouth  Daily.    furosemide (LASIX) 20 MG tablet   No No    Take 1 tablet by mouth 2 (Two) Times a Day.    gabapentin (NEURONTIN) 300 MG capsule   Yes No    Take 300 mg by mouth 2 (two) times a day.    HYDROcodone-acetaminophen (NORCO) 7.5-325 MG per tablet   No No    Take 1 tablet by mouth Every 8 (Eight) Hours As Needed for Moderate Pain  or Severe Pain .    insulin NPH-insulin regular (humuLIN 70/30,novoLIN 70/30) (70-30) 100 UNIT/ML injection   Yes No    Inject 30 Units under the skin into the appropriate area as directed 2 (Two) Times a Day With Meals.    losartan (COZAAR) 50 MG tablet   No No    TAKE ONE TABLET BY MOUTH DAILY    metFORMIN (GLUCOPHAGE) 1000 MG tablet   No No    Take 1 tablet by mouth 2 (Two) Times a Day With Meals.    omeprazole (priLOSEC) 40 MG capsule   Yes No    Take 40 mg by mouth Every Morning.    spironolactone (ALDACTONE) 25 MG tablet   Yes No    Take 1 tablet by mouth Daily.    topiramate (TOPAMAX) 25 MG tablet   Yes No    Take 12.5 mg by mouth 2 (Two) Times a Day.            Allergies:  Allergies   Allergen Reactions   • Latex, Natural Rubber Rash   • Adhesive Tape Rash       Objective      Vitals:   Temp:  [98 °F (36.7 °C)-98.3 °F (36.8 °C)] 98.3 °F (36.8 °C)  Heart Rate:  [75-95] 89  Resp:  [12-17] 15  BP: (104-180)/(48-90) 167/84  Flow (L/min):  [4] 4    Physical Exam  Vitals and nursing note reviewed.   HENT:      Head: Normocephalic and atraumatic.   Eyes:      Extraocular Movements: Extraocular movements intact.      Pupils: Pupils are equal, round, and reactive to light.   Cardiovascular:      Rate and Rhythm: Normal rate and regular rhythm.      Pulses: Normal pulses.      Heart sounds: Normal heart sounds.   Pulmonary:      Effort: Pulmonary effort is normal.      Breath sounds: Normal breath sounds.   Abdominal:      General: Bowel sounds are normal.      Palpations: Abdomen is soft.      Tenderness: There is no abdominal tenderness.   Musculoskeletal:      Cervical back: Normal  range of motion.      Comments: Weakness bilateral lower extremities, equal   Skin:     General: Skin is warm and dry.   Neurological:      Mental Status: She is alert and oriented to person, place, and time.      Comments: NIH 0   Psychiatric:         Mood and Affect: Mood normal.         Behavior: Behavior normal.        Result Review    Result Review:  I have personally reviewed the results from the time of this admission to 2/4/2022 10:40 EST and agree with these findings:  [x]  Laboratory  []  Microbiology  [x]  Radiology  []  EKG/Telemetry   []  Cardiology/Vascular   []  Pathology  [x]  Old records    Assessment/Plan        Active Hospital Problems:  Active Hospital Problems    Diagnosis    • **Cerebrovascular accident (CVA) (HCC)    • Acute UTI    • Fall    • Hyperammonemia (HCC)    • Cardiac pacemaker in situ    • GERD (gastroesophageal reflux disease)    • Hyperlipidemia    • Primary hypertension    • Secondary hyperaldosteronism (HCC)    • Stage 3b chronic kidney disease (CMS/HCC)    • Hypertension associated with stage 3 chronic kidney disease due to type 2 diabetes mellitus (HCC)    • Type 2 diabetes mellitus with diabetic neuropathy, with long-term current use of insulin ([facility])    • Morbid obesity ([facility])    • Atherosclerosis of native coronary artery of native heart without angina pectoris      Plan:     Dysarthria: TIA versus CVA  Possible aborted CVA  -Code Stroke 2/3/2022 status post tPA 20:05   -NIH 0  -CT head showed no acute intracranial hemorrhage, CTA head/neck showed no significant carotid or vertebral basilar stenosis or occlusion.  No large intracranial vessel occlusion.  -Hemoglobin A1c 6.9%, TSH 1.590, lipid panel with triglycerides 238  -MRI brain pending, 2D echo pending, PT/OT/ST consulted    Acute UTI  -UA: Race leukocytes, negative nitrites, 6/12 WBCs, 3+ bacteria  -on IV rocephin     Hyperammonemia  -Ammonia 86 > 41  -Ultrasound liver unremarkable    Weakness  Falls at home  -PT/OT  consulted as above     Coronary artery disease  AV block Mobitz type I and Wenckebach  H/o Pacemaker  -pacemaker interrogation ordered  -cardiology consulted      Chronic kidney disease stage 3b   -BUN/Cr normal    Diabetes mellitus Type II with neuropathy   -glucose 140  -SSI AC/HS     Essential hypertension  Permissive hypertension for embolic stroke  -resume home meds when appropriate     Hyperlipidemia  -cont home statin    Obesity BMI 43  -encourage lifestyle modifications      Patient is DNR/DNI      DVT prophylaxis:  Mechanical DVT prophylaxis orders are present.    CODE STATUS:    Medical Intervention Limits: NO intubation (DNI)  Code Status (Patient has no pulse and is not breathing): No CPR (Do Not Attempt to Resuscitate)  Medical Interventions (Patient has pulse or is breathing): Limited Support    Admission Status:  I believe this patient meets inpatient status.    I discussed the patient's findings and my recommendations with patient and nursing staff.    This patient has been examined wearing appropriate Personal Protective Equipment. 02/04/22      Signature: Electronically signed by SHANI You, 02/04/22, 11:49 AM EST.    Patient seen and examined agree with above, is a 86-year-old obese white female with multiple medical problems, scented to the ER after a fall and difficulty speaking approximately 40 minutes prior to EMS arrival.  No reported facial droop or lateralizing weakness.  Patient does not recall falling at home.  On exam 86-year-old obese female in bed no acute distress, neck is supple, lungs are clear to auscultation, heart regular rhythm normal S1-S2, abdomen soft obese non tender non distended, extremities no clubbing claudication or edema, neurological exam patient is awake alert she has no focal deficit,  Assessment and plan:1.  Dysarthria, TIA versus CVA possible aborted CVA.  MRI ordered neurology consulted.  2D echo pending PT OT ordered  2.  Acute UTI.  IV Rocephin  started and follow cultures  3.  Hyperammonemia ammonia 86 now 81 ultrasound liver unremarkable.  Monitor  Electronically signed by Yoel Baker MD, 02/04/22, 3:26 PM EST.

## 2022-02-04 NOTE — PLAN OF CARE
"Goal Outcome Evaluation:         Clinical swallow evaluation completed this date due to failed swallow screen. The patient was seen today, seated up at a 90 degree angle in her bed. She was alert and cooperative, however did appear fatigued as she was having \"a lot of tests\" completed today. She was alert and cooperative for this evaluation. The patient was assessed with the following consistencies: ice chips by spoon, thin water by cup and straw, puree (applesauce x 2), mech soft (Fig Song x 2 trials) and regular solid (peanut butter cracker x 1). Labial seal on spoon, cup and straw adequate. Slow oral transit time noted with regular solid however patient is functionally able to clear oral cavity independently. No anterior spillage or oral residue noted. No overt s/s of difficulty noted. She did present with a slight cough prior to being given any po trials, and demonstrated this intermittently during the assessment, however this did not appear to be related to po trials given. The patient was independent with most trials consumed today.     RECOMMENDATIONS: ST recommends that the patient initiate a regular consistency, thin liquid diet at this time. She should be up at a full 90 degree angle for all meals/medications, eat at a slow rate of intake, and clear oral cavity before next bite/sip. ST will follow up to insure safest/least restrictive diet, use of safe swallow compensations, and additional goals/recommendatinos as indicated.     Speech/Language Evaluation also completed this date. Pt demonstrated no aphasia, dysarthria, apraxia, anomia, perseverations or neologisms. She is oriented to person, place and situation. NIH reported to be 0. No further speech/language therapy indicated at this time. Will continue to follow for dysphagia therapy as documented above.         "

## 2022-02-04 NOTE — ED NOTES
Scant vaginal blood noticed on external catheter device in place on pt, MD Finnegan notified, no new orders placed at this time       Janet Hobbs RN  02/03/22 2119

## 2022-02-04 NOTE — CASE MANAGEMENT/SOCIAL WORK
Continued Stay Note  [facility]     Patient Name: Aracelis Vargas  MRN: [id number]  Today's Date: 2/4/2022    Admit Date: 2/3/2022     Discharge Plan     Row Name 02/04/22 1551       Plan    Plan D/C Plan : Home [facility] H/H orders placed . PT/OT and speech to eval               Discharge Codes    No documentation.                     Selene Aleman RN

## 2022-02-04 NOTE — CONSULTS
"Diabetes Education  Assessment/Teaching    Patient Name:  Aracelis Vargas  YOB: 1935  MRN: [id number]  Admit Date:  2/3/2022      Assessment Date:  2/4/2022    Most Recent Value   General Information     Referral From: MD order  [MD consult per stroke protocol.]   Height 157.5 cm (62\")   Height Method Stated   Weight 107 kg (236 lb)   Weight Method Bed scale   Pregnancy Assessment    Diabetes History    What type of diabetes do you have? Type 2   Current DM knowledge fair   Do you test your blood sugar at home? yes   Frequency of checks 3-4X a week   Meter type Accu-chek about a year old   Who performs the test? patient   Typical readings 130s-140s   Have you had low blood sugar? (<70mg/dl) no   Education Preferences    What areas of diabetes would you like to learn about? diabetes complications   Nutrition Information    Assessment Topics    Reducing Risk - Assessment Needs education   DM Goals    Reducing Risk - Goal Today            Most Recent Value   DM Education Needs    Meter Has own   Meter Type Accuchek   Frequency of Testing 3 times a week   Medication Oral,  Insulin  [At home patient stated that she takes Metformin 1000 mg BID and Humulin 70/30 30 units BID.]   Reducing Risks A1C testing  [On 2/3/2022 A1c was 6.9%.]   Discharge Plan Home   Motivation Moderate   Teaching Method Discussion,  Handouts   Patient Response Verbalized understanding            Other Comments:  A1c info sheet given with discussion on A1c target and healthy blood sugar range. Patient able to afford meds and supplies for her diabetes. Patient has no further questions or concerns related to diabetes at this time.        Electronically signed by:  Nika Tsai RN  02/04/22 14:43 EST  "

## 2022-02-04 NOTE — PLAN OF CARE
Goal Outcome Evaluation:           Progress: improving  Outcome Summary: Pt arrived at [facility] last PM 19:30. NIH 5 primarily d/t aphasia & dysarthria. TPA at 20:05. By admission to ICU, all Sx resolved. Pt doing well at this time w/o Sx of hemorrhage.        Problem: Fall Injury Risk  Goal: Absence of Fall and Fall-Related Injury  Outcome: Ongoing, Progressing  Intervention: Promote Injury-Free Environment  Description: Provide a safe, barrier-free environment that encourages independent activity.  Keep care area uncluttered and well-lighted.  Determine need for increased observation or auditory alerts (e.g., bed or chair alarm).  Assess equipment and environmental modification needs (e.g., low bed, signage, nonskid footwear, grab bars).  Avoid use of restraints.     Problem: Adjustment to Illness (Stroke, Ischemic/Transient Ischemic Attack)  Goal: Optimal Coping  Outcome: Ongoing, Progressing     Problem: Bowel Elimination Impaired (Stroke, Ischemic/Transient Ischemic Attack)  Goal: Effective Bowel Elimination  Outcome: Ongoing, Progressing     Problem: Cerebral Tissue Perfusion Risk (Stroke, Ischemic/Transient Ischemic Attack)  Goal: Optimal Cerebral Tissue Perfusion  Outcome: Ongoing, Progressing  Intervention: Optimize Oxygenation and Ventilation  Description: Anticipate noninvasive and invasive monitoring (e.g., pulse oximetry, end-tidal carbon dioxide, blood gases, cardiovascular).  Maintain patent airway; facilitate regular position changes to minimize ventilation/perfusion mismatch and airway obstruction.  Monitor respiratory status for signs of hypoventilation. Maintain normal PaCO2 (arterial carbon dioxide) to minimize risk of increased intracranial pressure or cerebral ischemia.  Provide oxygen therapy judiciously; adjust to achieve oxygenation goal.  Encourage pulmonary hygiene and lung expansion to prevent atelectasis (e.g., cough, deep breathing, incentive spirometry, positive airway pressure,  suction).  Anticipate the need for intubation and mechanical ventilation for airway protection and respiratory support.  Promote early mobility/ambulation; match to ability and tolerance.     Problem: Communication Impairment (Stroke, Ischemic/Transient Ischemic Attack)  Goal: Improved Communication Skills  Outcome: Ongoing, Progressing     Problem: Eating/Swallowing Impairment (Stroke, Ischemic/Transient Ischemic Attack)  Goal: Oral Intake without Aspiration  Outcome: Ongoing, Progressing  Intervention: Optimize Eating and Swallowing  Description: Restrict oral intake until swallowing ability has been determined using a valid screening tool; assess with clinical or instrumental examination for suspected dysphagia and aspiration to determine necessary treatment.  Provide diet order recommendations for food texture and liquid consistency, while considering patient’s quality of life; may need to consider alternate means of nutrition.  Implement aspiration precautions; maintain oral hygiene to reduce risk of aspiration.  Manage swallowing impairment; utilize compensatory techniques, such as positioning adjustments (e.g., elevated head of bed, chin tuck maneuvers), adaptive feeding equipment and swallowing strategies (e.g., effortful swallow, multiple swallows).  Minimize conversation while eating to encourage focus; reduce distractions.  Implement oral motor exercises to address weakness or incoordination of underlying musculature or range of motion deficits.     Problem: Functional Ability Impaired (Stroke, Ischemic/Transient Ischemic Attack)  Goal: Optimal Functional Ability  Outcome: Ongoing, Progressing  Intervention: Optimize Functional Ability  Description: Evaluate mobility skills and ability to complete activities of daily living as patient status/condition allows.  Encourage early mobility and active participation in activities of daily living; provide level of assistance and supervision needed for  safety.  Initiate sitting and standing when able; evaluate and address balance, postural control and fall risk.  Instruct in mobility and activities of daily living techniques with task-specific training individualized to patient needs and discharge disposition; promote highest level of independence and safety.  Pace, coordinate and organize care schedule and activity per patient preference, priorities and tolerance; train in energy conservation technique.  Provide repetitive, mobility-task training for gait disturbances; consider ankle foot orthosis or functional electrical stimulation if foot drop is present.  Promote use of recommended adaptive equipment, assistive devices or orthoses, such as a cane, walker.  Maintain patient’s preferred routines and habits; respect privacy and personal space.  Provide a safe, barrier free, uncluttered environment that promotes optimal level of function.     Problem: Hemodynamic Instability (Stroke, Ischemic/Transient Ischemic Attack)  Goal: Vital Signs Remain in Desired Range  Outcome: Ongoing, Progressing     Problem: Pain (Stroke, Ischemic/Transient Ischemic Attack)  Goal: Acceptable Pain Control  Outcome: Ongoing, Progressing     Problem: Sensorimotor Impairment (Stroke, Ischemic/Transient Ischemic Attack)  Goal: Improved Sensorimotor Function  Outcome: Ongoing, Progressing  Intervention: Optimize Range of Motion, Motor Control and Function  Description: Evaluate passive and active range of motion, motor control, coordination and muscle tone.  Support limbs with flaccid paralysis with proper positioning; consider a shoulder subluxation orthosis or arm support.  Implement range of motion, orthotics or casting to maintain muscle length and prevent joint contracture; position at-risk joints in a gentle elongated position (e.g., shoulder in external rotation 30 minutes per day).  Involve patient and family/caregiver in treatment planning and implementation.  As able, implement  therapeutic interventions to address areas of deficit, such as graded repetitive task-oriented training, functional occupation-based activity, constraint-induced motor therapy.  If spasticity develops, implement range of motion and positioning techniques; consider electrical stimulation modalities.  Intervention: Optimize Sensory and Perceptual Abilities  Description: Assess visual and perceptual status and implement intervention to address impairments.  Evaluate somatosensory status to determine type and extent of impairment; consider somatosensory retraining to improve sensory discrimination.  Provide interventions for hemispatial neglect, such as training in scanning of visual field.  Encourage use of vision to compensate for sensory loss, such as frequent visual scanning.  Identify and address any hearing or visual acuity impairments.     Problem: Urinary Elimination Impaired (Stroke, Ischemic/Transient Ischemic Attack)  Goal: Effective Urinary Elimination  Outcome: Ongoing, Progressing  Intervention: Promote Effective Bladder Elimination  Description: Develop/maintain bladder regimen appropriate to neurologic function including regular voiding schedule and routine.  Encourage oral intake when able. If not able to meet fluid requirements, continue with intravenous therapy to achieve fluid balance.  Avoid indwelling catheter use; discontinue as soon as possible if present  Provide safe and ready access to toileting devices and aids (e.g., commode, urinal).  Ensure bladder emptying (e.g., intermittent catheterization, portable bladder ultrasound after voiding).  Promote behavioral methods to enhance voiding ability [e.g., reflexive voiding (males), relaxation techniques, time for bladder emptying, position to optimize flow].     Problem: Adult Inpatient Plan of Care  Goal: Plan of Care Review  Outcome: Ongoing, Progressing  Goal: Patient-Specific Goal (Individualized)  Outcome: Ongoing, Progressing  Goal: Absence  of Hospital-Acquired Illness or Injury  Outcome: Ongoing, Progressing  Intervention: Identify and Manage Fall Risk  Description: Perform standard risk assessment with a validated tool or comprehensive approach appropriate to the patient on admission; reassess fall risk frequently, with change in status or transfer to another level of care.  Communicate fall injury risk to interprofessional healthcare team.  Determine need for increased observation, equipment and environmental modification, such as low bed and signage, as well as supportive, nonskid footwear.  Adjust safety measures to individual developmental age, stage and identified risk factors.  Reinforce the importance of safety and physical activity with patient and family.  Perform regular intentional rounding to assess need for position change, pain assessment, personal needs, including assistance with toileting.  Intervention: Prevent Skin Injury  Description: Assess skin risk on admission and at regular intervals throughout hospital stay.  Keep all areas of skin (especially folds) clean and dry.  Maintain adequate skin hydration.  Relieve and redistribute pressure and protect bony prominences; implement measures based on patient-specific risk factors.  Match turning and repositioning schedule to clinical condition.  Encourage weight shift frequently; assist with reposition if unable to complete independently.  Float heels off bed. Avoid pressure on the Achilles tendon.  Keep skin free from extended contact with medical devices.  Use aids (e.g., slide boards, mechanical lift) during transfer.  Intervention: Prevent and Manage VTE (venous thromboembolism) Risk  Description: Assess for VTE risk.  Encourage/assist with early ambulation.  Initiate and maintain compression or other therapy, as indicated based on identified risk in accordance with organizational protocol/provider order.  Encourage both active and passive leg exercises while in bed, if unable to  ambulate.  Intervention: Prevent Infection  Description: Maintain skin and mucous membrane integrity; promote hand, oral and pulmonary hygiene.  Optimize fluid balance, nutrition, sleep and glycemic control to maximize infection resistance.  Identify potential sources of infection early to prevent or mitigate progression of infection (e.g., wound, lines, devices).  Evaluate ongoing need for invasive devices; remove promptly when no longer indicated.  Goal: Optimal Comfort and Wellbeing  Outcome: Ongoing, Progressing  Intervention: Provide Person-Centered Care  Description: Use a family-focused approach to care.  Develop trust and rapport by proactively providing information, encouraging questions, addressing concerns and offering reassurance.  Acknowledge emotional response to hospitalization.  Recognize and utilize personal coping strategies.  Honor spiritual and cultural preferences.  Goal: Readiness for Transition of Care  Outcome: Ongoing, Progressing  Intervention: Mutually Develop Transition Plan  Description: Identify available resources for support (e.g., family, friends, community).  Identify and address barriers to ongoing treatment and home management (e.g., environmental, financial).  Provide opportunities to practice self-management skills.  Assess and monitor emotional readiness for transition.  Establish/reconnect linkage with outpatient providers or community-based services.     Problem: Skin Injury Risk Increased  Goal: Skin Health and Integrity  Outcome: Ongoing, Progressing  Intervention: Optimize Skin Protection  Description: Reassess skin injury risk and inspect skin frequently (e.g., scheduled interval, with turning, with change in condition) to provide optimal prevention.  Maintain adequate tissue perfusion (e.g., encourage fluid balance, avoid crossing legs, constrictive clothing or devices) to promote tissue oxygenation.  Maintain head of bed at lowest degree of elevation tolerated,  considering medical condition and other restrictions. Limit amount of time head of bed is elevated greater than 30 degrees to prevent friction/shear injury.  Avoid positioning onto an area that remains reddened.  Minimize incontinence and moisture (e.g., toileting schedule; moisture-wicking pad, diaper or incontinence collection device, skin moisture barrier).  Cleanse skin promptly and gently when soiled utilizing a pH-balanced cleanser.  Relieve and redistribute pressure (e.g., schedule position changes; utilize higher specification foam mattress, chair cushion, constant low-pressure or alternating-pressure support surface; medical device repositioning; protective dressing applicatio  Support increased progressive functional activity (e.g., therapeutic exercise) to decrease risk associated with immobility. Balance rest with activity.     Problem: Diabetes Comorbidity  Goal: Blood Glucose Level Within Desired Range  Outcome: Ongoing, Progressing     Problem: Hypertension Comorbidity  Goal: Blood Pressure in Desired Range  Outcome: Ongoing, Progressing

## 2022-02-04 NOTE — THERAPY EVALUATION
Acute Care - Speech Language Pathology Initial Evaluation   Angelo     Patient Name: Aracelis Vargas  : 1935  MRN: [id number]  Today's Date: 2022               Admit Date: 2/3/2022     Visit Dx:    ICD-10-CM ICD-9-CM   1. Cerebrovascular accident (CVA), unspecified mechanism ([facility])  I63.9 434.91   2. Dysarthria  R47.1 784.51   3. Acute urinary tract infection  N39.0 599.0   4. History of ataxia  Z87.898 V15.89     Patient Active Problem List   Diagnosis   • Fall   • Secondary hyperaldosteronism (HCC)   • Stage 3b chronic kidney disease (CMS/HCC)   • Hypertension associated with stage 3 chronic kidney disease due to type 2 diabetes mellitus (HCC)   • Type 2 diabetes mellitus with diabetic neuropathy, with long-term current use of insulin (HCC)   • Morbid obesity (HCC)   • Atherosclerosis of native coronary artery of native heart without angina pectoris   • Hyperlipidemia   • Primary hypertension   • Acute UTI   • GERD (gastroesophageal reflux disease)   • Cardiac pacemaker in situ   • Cerebrovascular accident (CVA) ([facility])   • Hyperammonemia (HCC)   • Weakness     Past Medical History:   Diagnosis Date   • Atherosclerosis of native coronary artery of native heart without angina pectoris 10/22/2020   • Atrioventricular block, Mobitz type 1, Mitch 10/21/2020    Added automatically from request for surgery [id number]   • Hypertension associated with stage 3 chronic kidney disease due to type 2 diabetes mellitus ([facility]) 10/22/2020   • Morbid obesity ([facility]) 10/22/2020   • Secondary hyperaldosteronism (HCC) 10/22/2020   • Stage 3b chronic kidney disease ([facility]) 10/22/2020   • Type 2 diabetes mellitus with diabetic neuropathy, with long-term current use of insulin (HCC) 10/22/2020   • Type 2 diabetes mellitus with diabetic neuropathy, with long-term current use of insulin ([facility]) 10/22/2020     Past Surgical History:   Procedure Laterality Date   • CARDIAC CATHETERIZATION N/A 10/23/2020    Procedure: Left Heart Cath and  coronary angiogram;  Surgeon: Wang Plaza MD;  Location: [facility] CATH INVASIVE LOCATION;  Service: Cardiovascular;  Laterality: N/A;   • CARDIAC ELECTROPHYSIOLOGY PROCEDURE Left 11/3/2021    Procedure: Pacemaker DC new;  Surgeon: Wang Plaza MD;  Location: [facility] CATH INVASIVE LOCATION;  Service: Cardiovascular;  Laterality: Left;   • CARDIAC ELECTROPHYSIOLOGY PROCEDURE N/A 11/3/2021    Procedure: LOOP RECORDER REMOVAL;  Surgeon: Wang Plaza MD;  Location: [facility] CATH INVASIVE LOCATION;  Service: Cardiovascular;  Laterality: N/A;   • HYSTERECTOMY     • JOINT REPLACEMENT Right     Hip   • JOINT REPLACEMENT Left     hip   • JOINT REPLACEMENT Left     hip (for second time)   • JOINT REPLACEMENT Right     knee   • OOPHORECTOMY         SLP Recommendation and Plan  SLP Diagnosis: The patient presents with functional speech/language and cognitive functioning. NIH reported to continue to be 0. (02/04/22 1500)        Swallow Criteria for Skilled Therapeutic Interventions Met: demonstrates skilled criteria (02/04/22 1500)  SLC Criteria for Skilled Therapy Interventions Met: other (see comments) (No further skilled speech/language intervention indicated at this time) (02/04/22 1500)  Anticipated Discharge Disposition (SLP): other (see comments) (To be determined) (02/04/22 1500)     Therapy Frequency (Swallow): 2 days per week, 3 days per week (02/04/22 1500)  Predicted Duration Therapy Intervention (Days): 2 weeks (02/04/22 1500)                           SLP EVALUATION (last 72 hours)     SLP SLC Evaluation     Row Name 02/04/22 1500          Document Type evaluation  -SM    Subjective Information fatigue  -SM    Patient Observations alert; cooperative; agree to therapy  -SM    Patient/Family/Caregiver Comments/Observations No family/caregivers present  -SM    Patient Effort good  -SM    Comment The patient was seen today for initial speech/language assessment and dysphagia evaluation due to failed swallow  screen. Patient was not wearing a face mask during this therapy encounter. Therapist used appropriate personal protective equipment including mask, eye protection and gloves.  Mask used was standard procedure mask. Appropriate PPE was worn during the entire therapy session. Hand hygiene was completed before and after therapy session. Patient is not in enhanced droplet precautions.      -SM    Symptoms Noted During/After Treatment other (see comments)  Intermittent coughing before po given/trialed  -SM               Patient Profile Reviewed yes  -SM    Pertinent History Of Current Problem The patient is an 86-year-old female who presents to [facility] complaining of difficulty speaking.  Patient's  noted that she had fallen yesterday evening.  She has apparently has history of recurrent falls and EMS has been called to the house for lifting assistance in the past.  The  reports no recent changes in ataxia but reports that she had unintelligible speech.  States it started abruptly about 40 minutes prior to arrival.  He states that she has not complained of headache and has never had this problem before.  The patient denies extremity lateralizing signs.  The patient had no recent reports of fever or chills.  There is been no reports of nausea vomiting or diarrhea.  Patient has not recently complained of dysuria or cough   Stroke/TIA status post TPA at 8:05 PM with improvement in NIH score   Examination: CT HEAD WO CONTRAST STROKE PROTOCOL     Date of Exam: 2/3/2022 19:38 EST     Indication: Neuro deficit, acute, stroke suspected.     Comparison: CT head without contrast 10/31/2022     Technique: Axial noncontrast CT imaging of the head was performed. Automated exposure control and iterative reconstruction methods were used.     Findings:  There is no acute intracranial hemorrhage. Negative for mass effect or midline shift. Ventricles and cortical sulci mildly prominent consistent with mild cerebral volume loss,  stable from the prior study. Posterior fossa without acute normality. Mild   white matter findings suggesting chronic microvascular disease. Globes symmetric. Thinning of the lens bilaterally likely related to prior cataract extraction. The mastoid air cells are well-aerated. Negative for sinus fluid level. Hyperostosis frontalis   interna.     IMPRESSION:  1. No acute intracranial hemorrhage.  2. Mild cerebral atrophy and white matter findings of chronic small vessel disease similar to prior study.       -    Precautions/Limitations, Vision WFL; for purposes of eval  -SM    Precautions/Limitations, Hearing WFL; for purposes of eval  -SM    Patient Level of Education 12th grade  -    Prior Level of Function-Communication WF  -    Plans/Goals Discussed with patient  -               Comprehension Assessment/Intervention Reading Comprehension; Auditory Comprehension  -               Auditory Comprehension (Communication) [facility]  -    Answers Questions (Communication) yes/no;  questions; personal; simple; L  -    Able to Follow Commands (Communication) 1-step; L  -    Narrative Discourse L  -    Auditory Comprehension Communication, Comment The patient presented with functional auditory comprehension at the conversation level. She is able to answer questions, follow commands, and participate in a 2-3 person conversation effectively  -               Phrase Level [facility]  -    Reading Comprehension, Comment The patient is able to orally read aloud 4/4 sentences/commands, and demonstrated 100% comprehension for tasks.  -               Expression Assessment/Intervention verbal expression; graphic expression  -               Automatic Speech (Communication) counting 1-20; days of week; L  -SM    Confrontational Naming L  -    Spontaneous/Functional Words WFL  -    Conversational Discourse/Fluency WFL  -    Verbal Expression, Comment The patient is able to produce automatic sequences with  "100% accuracy. Confrontation naming of objects in the room 100%. She is able to give the function of object with 100% accuracy. She presents with no aphasia, anomia, paraphasia, neologism or perseveration at the conversation level  -               Graphic Expression WFL  -    Graphic Expression, Comment She is right hand dominant and able to perform tasks using her preferred hand. She is able to sign her name and write words to dictation with 100% accuracy and legibility  -               Oral Motor Structure and Function [facility]  -    Dentition Assessment natural, present and adequate  -               Oral Motor General Assessment generalized oral motor weakness  -    Oral Motor, Comment Lingual protrusion, lateralization and elevation all WFL. No overt weakness or asymmetry noted. Labial protrusion/retraction slightly weak/reduced on the right. She has natural dentition  -               Motor Speech Function L  -    Motor Speech, Comment No evidence of apraxia or dysarthria present  -               Quality and Resonance (Voice) [facility]  -               Cognition, Comment She is oriented to person, place, month, date, year, place, reason for admission and current president. She stated today was \"Thursday\" and when told it was Friday she confirmed that she was admitted on Thursday. No other cognitive deficits noted at this time. She was able to relay events of the day and demonstrated good short/long term memory  -               SLP Diagnosis The patient presents with functional speech/language and cognitive functioning. NIH reported to continue to be 0.  -[facility] Criteria for Skilled Therapy Interventions Met other (see comments)  No further skilled speech/language intervention indicated at this time  -               Therapy Frequency (SLP SLC) evaluation only  -          User Key  (r) = Recorded By, (t) = Taken By, (c) = Cosigned By    Initials Name Effective Dates    Sima Wilkerson, GISELA " 21 -                    EDUCATION  The patient has been educated in the following areas:     Communication Impairment Dysphagia (Swallowing Impairment).           SLP GOALS     Row Name 22 1500          Oral Nutrition/Hydration Goal 1, SLP The patient will maximize swallow function for least restrictive po diet, exhibiting no complications associated with dysphagia, adequate po intake, and demonstrating independent use of safe swallow compensations.  -SM    Time Frame (Oral Nutrition/Hydration Goal 1, SLP) by discharge  -SM               Oral Nutrition/Hydration Goal 2, SLP The patient will participate in a full meal assessment to determine safety and adequacy of recommended diet, independent use of safe swallow compensations, and additional goals/recommendations to follow  -SM    Time Frame (Oral Nutrition/Hydration Goal 2, SLP) short term goal (STG)  -SM               Oral Nutrition/Hydration Goal, SLP The patient will participate in ongoing assessment of swallow to determine need for further swallow re-evaluation and appropriateness/need for an instrumental assessment of swallow  -SM    Time Frame (Oral Nutrition/Hydration Goal, SLP) short term goal (STG)  -SM          User Key  (r) = Recorded By, (t) = Taken By, (c) = Cosigned By    Initials Name Provider Type    Sima Wilkerson, SLP Speech and Language Pathologist                        Time Calculation:                        GISELA Whittaker  2022 and Acute Care - Speech Language Pathology   Swallow Initial Evaluation  Angelo     Patient Name: Aracelis Vargas  : 1935  MRN: [id number]  Today's Date: 2022               Admit Date: 2/3/2022    Visit Dx:     ICD-10-CM ICD-9-CM   1. Cerebrovascular accident (CVA), unspecified mechanism (HCC)  I63.9 434.91   2. Dysarthria  R47.1 784.51   3. Acute urinary tract infection  N39.0 599.0   4. History of ataxia  Z87.898 V15.89     Patient Active Problem List   Diagnosis   • Fall   •  Secondary hyperaldosteronism (HCC)   • Stage 3b chronic kidney disease (CMS/HCC)   • Hypertension associated with stage 3 chronic kidney disease due to type 2 diabetes mellitus (HCC)   • Type 2 diabetes mellitus with diabetic neuropathy, with long-term current use of insulin (HCC)   • Morbid obesity (HCC)   • Atherosclerosis of native coronary artery of native heart without angina pectoris   • Hyperlipidemia   • Primary hypertension   • Acute UTI   • GERD (gastroesophageal reflux disease)   • Cardiac pacemaker in situ   • Cerebrovascular accident (CVA) ([facility])   • Hyperammonemia (HCC)   • Weakness     Past Medical History:   Diagnosis Date   • Atherosclerosis of native coronary artery of native heart without angina pectoris 10/22/2020   • Atrioventricular block, Mobitz type 1, Mitch 10/21/2020    Added automatically from request for surgery [id number]   • Hypertension associated with stage 3 chronic kidney disease due to type 2 diabetes mellitus ([facility]) 10/22/2020   • Morbid obesity (HCC) 10/22/2020   • Secondary hyperaldosteronism (HCC) 10/22/2020   • Stage 3b chronic kidney disease ([facility]) 10/22/2020   • Type 2 diabetes mellitus with diabetic neuropathy, with long-term current use of insulin (HCC) 10/22/2020   • Type 2 diabetes mellitus with diabetic neuropathy, with long-term current use of insulin ([facility]) 10/22/2020     Past Surgical History:   Procedure Laterality Date   • CARDIAC CATHETERIZATION N/A 10/23/2020    Procedure: Left Heart Cath and coronary angiogram;  Surgeon: Wang Plaza MD;  Location: [facility] CATH INVASIVE LOCATION;  Service: Cardiovascular;  Laterality: N/A;   • CARDIAC ELECTROPHYSIOLOGY PROCEDURE Left 11/3/2021    Procedure: Pacemaker DC new;  Surgeon: Wang Plaza MD;  Location: [facility] CATH INVASIVE LOCATION;  Service: Cardiovascular;  Laterality: Left;   • CARDIAC ELECTROPHYSIOLOGY PROCEDURE N/A 11/3/2021    Procedure: LOOP RECORDER REMOVAL;  Surgeon: Wang Plaza MD;  Location:   [facility] CATH INVASIVE LOCATION;  Service: Cardiovascular;  Laterality: N/A;   • HYSTERECTOMY     • JOINT REPLACEMENT Right     Hip   • JOINT REPLACEMENT Left     hip   • JOINT REPLACEMENT Left     hip (for second time)   • JOINT REPLACEMENT Right     knee   • OOPHORECTOMY         SLP Recommendation and Plan  SLP Swallowing Diagnosis: mild, oral dysphagia (02/04/22 1500)  SLP Diet Recommendation: regular textures, thin liquids (02/04/22 1500)  Recommended Precautions and Strategies: upright posture during/after eating, small bites of food and sips of liquid, alternate between small bites of food and sips of liquid, general aspiration precautions, reflux precautions, fatigue precautions, assist with feeding, other (see comments) (Assist with meals as indicated) (02/04/22 1500)  SLP Rec. for Method of Medication Administration: meds whole, meds crushed, with thin liquids, with pudding or applesauce, as tolerated (02/04/22 1500)     Monitor for Signs of Aspiration: yes, notify SLP if any concerns, cough, elevated WBC count, gurgly voice, throat clearing, fever, upper respiratory, pneumonia, right lower lobe infiltrates (02/04/22 1500)  Recommended Diagnostics: reassess via clinical swallow evaluation, other (see comments) (Instrumental assessment of swallow if indicated) (02/04/22 1500)  Swallow Criteria for Skilled Therapeutic Interventions Met: demonstrates skilled criteria (02/04/22 1500)  Anticipated Discharge Disposition (SLP): other (see comments) (To be determined) (02/04/22 1500)  Rehab Potential/Prognosis, Swallowing: good, to achieve stated therapy goals (02/04/22 1500)  Therapy Frequency (Swallow): 2 days per week, 3 days per week (02/04/22 1500)  Predicted Duration Therapy Intervention (Days): 2 weeks (02/04/22 1500)          SWALLOW EVALUATION (last 72 hours)     SLP Adult Swallow Evaluation     Row Name 02/04/22 1500          Respiratory Support Currently in Use room air  -SM    Eating/Swallowing Skills  "self-fed  -    Positioning During Eating upright 90 degree; upright in bed  -    Utensils Used straw; spoon  -    Consistencies Trialed thin liquids; pureed; soft textures; regular textures  -               Clinical Swallow Evaluation Summary Clinical swallow evaluation completed this date due to failed swallow screen. The patient was seen today, seated up at a 90 degree angle in her bed. She was alert and cooperative, however did appear fatigued as she was having \"a lot of tests\" completed today. She was alert and cooperative for this evaluation. The patient was assessed with the following consistencies: ice chips by spoon, thin water by cup and straw, puree (applesauce x 2), mech soft (Fig Song x 2 trials) and regular solid (peanut butter cracker x 1). Labial seal on spoon, cup and straw adequate. Slow oral transit time noted with regular solid however patient is functionally able to clear oral cavity independently. No anterior spillage or oral residue noted. No overt s/s of difficulty noted. She did present with a slight cough prior to being given any po trials, and demonstrated this intermittently during the assessment, however this did not appear to be related to po trials given. The patient was independent with most trials consumed today.     RECOMMENDATIONS:  recommends that the patient initiate a regular consistency, thin liquid diet at this time. She should be up at a full 90 degree angle for all meals/medications, eat at a slow rate of intake, and clear oral cavity before next bite/sip. ST will follow up to insure safest/least restrictive diet, use of safe swallow compensations, and additional goals/recommendatinos as indicated.      -               SLP Swallowing Diagnosis mild; oral dysphagia  -    Functional Impact risk of aspiration/pneumonia; risk of malnutrition  -    Rehab Potential/Prognosis, Swallowing good, to achieve stated therapy goals  -    Swallow Criteria for Skilled " Therapeutic Interventions Met demonstrates skilled criteria  -               Therapy Frequency (Swallow) 2 days per week; 3 days per week  -    Predicted Duration Therapy Intervention (Days) 2 weeks  -    SLP Diet Recommendation regular textures; thin liquids  -    Recommended Diagnostics reassess via clinical swallow evaluation; other (see comments)  Instrumental assessment of swallow if indicated  -    Recommended Precautions and Strategies upright posture during/after eating; small bites of food and sips of liquid; alternate between small bites of food and sips of liquid; general aspiration precautions; reflux precautions; fatigue precautions; assist with feeding; other (see comments)  Assist with meals as indicated  -    SLP Rec. for Method of Medication Administration meds whole; meds crushed; with thin liquids; with pudding or applesauce; as tolerated  -    Monitor for Signs of Aspiration yes; notify SLP if any concerns; cough; elevated WBC count; gurgly voice; throat clearing; fever; upper respiratory; pneumonia; right lower lobe infiltrates  -    Anticipated Discharge Disposition (SLP) other (see comments)  To be determined  -               Oral Nutrition/Hydration Goal Selection (SLP) oral nutrition/hydration, SLP goal 1; oral nutrition/hydration, SLP goal 2; oral nutrition/hydration, SLP goal (free text)  -               Oral Nutrition/Hydration Goal 1, SLP The patient will maximize swallow function for least restrictive po diet, exhibiting no complications associated with dysphagia, adequate po intake, and demonstrating independent use of safe swallow compensations.  -    Time Frame (Oral Nutrition/Hydration Goal 1, SLP) by discharge  -               Oral Nutrition/Hydration Goal 2, SLP The patient will participate in a full meal assessment to determine safety and adequacy of recommended diet, independent use of safe swallow compensations, and additional goals/recommendations to  follow  -SM    Time Frame (Oral Nutrition/Hydration Goal 2, SLP) short term goal (STG)  -SM               Oral Nutrition/Hydration Goal, SLP The patient will participate in ongoing assessment of swallow to determine need for further swallow re-evaluation and appropriateness/need for an instrumental assessment of swallow  -SM    Time Frame (Oral Nutrition/Hydration Goal, SLP) short term goal (STG)  -SM          User Key  (r) = Recorded By, (t) = Taken By, (c) = Cosigned By    Initials Name Effective Dates    Sima Wilkerson, GISELA 06/16/21 -                 EDUCATION  The patient has been educated in the following areas:   Communication Impairment Oral Care/Hydration.        SLP GOALS     Row Name 02/04/22 1500          Oral Nutrition/Hydration Goal 1, SLP The patient will maximize swallow function for least restrictive po diet, exhibiting no complications associated with dysphagia, adequate po intake, and demonstrating independent use of safe swallow compensations.  -SM    Time Frame (Oral Nutrition/Hydration Goal 1, SLP) by discharge  -SM               Oral Nutrition/Hydration Goal 2, SLP The patient will participate in a full meal assessment to determine safety and adequacy of recommended diet, independent use of safe swallow compensations, and additional goals/recommendations to follow  -SM    Time Frame (Oral Nutrition/Hydration Goal 2, SLP) short term goal (STG)  -SM               Oral Nutrition/Hydration Goal, SLP The patient will participate in ongoing assessment of swallow to determine need for further swallow re-evaluation and appropriateness/need for an instrumental assessment of swallow  -SM    Time Frame (Oral Nutrition/Hydration Goal, SLP) short term goal (STG)  -SM          User Key  (r) = Recorded By, (t) = Taken By, (c) = Cosigned By    Initials Name Provider Type    Sima Wilkerson, SLP Speech and Language Pathologist                   Time Calculation:                Sima You  SLP  2/4/2022

## 2022-02-04 NOTE — NURSING NOTE
Pt sleeping soundly since 0505 neuro check. SpO2 noted to drop several times, into low 70s. Snoring noted upon entry. RN applied 4L O2/NC. SpO2 once again maintaining high 90s

## 2022-02-04 NOTE — CONSULTS
Referring Provider: Dwain Campos MD  Reason for Consultation:  Status post pacemaker  Recent stroke.  Status post TPA    Patient Care Team:  Gabriel Goss MD as PCP - General (Family Medicine)  Gregory Pinedo MD (Family Medicine)  Wang Plaza MD as Consulting Physician (Cardiology)    Chief complaint  Difficulty speaking  Fallen and stroke    Subjective .         History of present illness:  Aracelis Vargas is a 86 y.o. female who presents with history of having speech difficulty.  Patient was noted to have fallen by patient's .  Patient has history of recurrent falls in the past.  Patient has history of ataxia.  Patient's symptoms of speech difficulty started abruptly approximately 30 minutes prior to arrival.  Patient is not having any headache fever cough chills abdominal pain chest pain shortness of breath or syncope.  Denies having any nausea vomiting.  Patient had permanent dual-chamber pacemaker implantation number of 2021.  Patient received TPA with complete resolution of symptoms.  No other associated aggravating or elevating factors.  Cardiology consultation was requested.      ROS      The patient has been without any chest discomfort ,shortness of breath, palpitations, or syncope.  Denies having any headache ,abdominal pain ,nausea, vomiting , diarrhea constipation, loss of weight or loss of appetite.  Denies having any excessive bruising ,hematuria or blood in the stool.    Review of all systems negative except as indicated      History  Past Medical History:   Diagnosis Date   • Atherosclerosis of native coronary artery of native heart without angina pectoris 10/22/2020   • Atrioventricular block, Mobitz type 1, [place] 10/21/2020    Added automatically from request for surgery [id number]   • Hypertension associated with stage 3 chronic kidney disease due to type 2 diabetes mellitus (HCC) 10/22/2020   • Morbid obesity (HCC) 10/22/2020   • Secondary hyperaldosteronism (HCC)  10/22/2020   • Stage 3b chronic kidney disease ([facility]) 10/22/2020   • Type 2 diabetes mellitus with diabetic neuropathy, with long-term current use of insulin ([facility]) 10/22/2020   • Type 2 diabetes mellitus with diabetic neuropathy, with long-term current use of insulin ([facility]) 10/22/2020       Past Surgical History:   Procedure Laterality Date   • CARDIAC CATHETERIZATION N/A 10/23/2020    Procedure: Left Heart Cath and coronary angiogram;  Surgeon: Wang Plaza MD;  Location: [facility] CATH INVASIVE LOCATION;  Service: Cardiovascular;  Laterality: N/A;   • CARDIAC ELECTROPHYSIOLOGY PROCEDURE Left 11/3/2021    Procedure: Pacemaker DC new;  Surgeon: Wang Plaza MD;  Location: [facility] CATH INVASIVE LOCATION;  Service: Cardiovascular;  Laterality: Left;   • CARDIAC ELECTROPHYSIOLOGY PROCEDURE N/A 11/3/2021    Procedure: LOOP RECORDER REMOVAL;  Surgeon: Wang Plaza MD;  Location: [facility] CATH INVASIVE LOCATION;  Service: Cardiovascular;  Laterality: N/A;   • HYSTERECTOMY     • JOINT REPLACEMENT Right     Hip   • JOINT REPLACEMENT Left     hip   • JOINT REPLACEMENT Left     hip (for second time)   • JOINT REPLACEMENT Right     knee   • OOPHORECTOMY         Family History   Problem Relation Age of Onset   • Heart disease Mother        Social History     Tobacco Use   • Smoking status: Never Smoker   • Smokeless tobacco: Never Used   Vaping Use   • Vaping Use: Never used   Substance Use Topics   • Alcohol use: Not Currently   • Drug use: Never        Medications Prior to Admission   Medication Sig Dispense Refill Last Dose   • amLODIPine (NORVASC) 5 MG tablet Take 5 mg by mouth Every Morning.      • aspirin 81 MG EC tablet Take 81 mg by mouth Daily.      • atorvastatin (LIPITOR) 10 MG tablet Take 1 tablet by mouth Daily. 90 tablet 1    • furosemide (LASIX) 20 MG tablet Take 1 tablet by mouth 2 (Two) Times a Day. 180 tablet 1    • gabapentin (NEURONTIN) 300 MG capsule Take 300 mg by mouth 2 (two) times a day.      •  "HYDROcodone-acetaminophen (NORCO) 7.5-325 MG per tablet Take 1 tablet by mouth Every 8 (Eight) Hours As Needed for Moderate Pain  or Severe Pain . 20 tablet 0    • insulin NPH-insulin regular (humuLIN 70/30,novoLIN 70/30) (70-30) 100 UNIT/ML injection Inject 30 Units under the skin into the appropriate area as directed 2 (Two) Times a Day With Meals.      • losartan (COZAAR) 50 MG tablet TAKE ONE TABLET BY MOUTH DAILY 90 tablet 3    • metFORMIN (GLUCOPHAGE) 1000 MG tablet Take 1 tablet by mouth 2 (Two) Times a Day With Meals. 180 tablet 0    • omeprazole (priLOSEC) 40 MG capsule Take 40 mg by mouth Every Morning.      • spironolactone (ALDACTONE) 25 MG tablet Take 1 tablet by mouth Daily.      • topiramate (TOPAMAX) 25 MG tablet Take 12.5 mg by mouth 2 (Two) Times a Day.            Latex, natural rubber and Adhesive tape    Scheduled Meds:[START ON 2/5/2022] aspirin, 325 mg, Oral, Daily   Or  [START ON 2/5/2022] aspirin, 300 mg, Rectal, Daily  atorvastatin, 80 mg, Oral, Nightly  cefTRIAXone, 1 g, Intravenous, Q24H  insulin lispro, 0-14 Units, Subcutaneous, Q6H  pantoprazole, 40 mg, Intravenous, Q AM  senna-docusate sodium, 2 tablet, Oral, BID      Continuous Infusions:   PRN Meds:.•  acetaminophen **OR** acetaminophen  •  senna-docusate sodium **AND** polyethylene glycol **AND** bisacodyl **AND** bisacodyl  •  dextrose  •  dextrose  •  glucagon (human recombinant)  •  hydrALAZINE  •  insulin lispro **AND** insulin lispro  •  ipratropium-albuterol  •  ondansetron **OR** ondansetron  •  sodium chloride    Objective     VITAL SIGNS  Vitals:    02/04/22 0800 02/04/22 0805 02/04/22 0900 02/04/22 0905   BP: 173/81 173/81 167/84 167/84   BP Location:       Patient Position:       Pulse: 94 95 93 89   Resp: 14 14  15   Temp: 98.3 °F (36.8 °C)      TempSrc: Oral      SpO2: 99% 99% 98% 98%   Weight:       Height:           Flowsheet Rows      First Filed Value   Admission Height 170.2 cm (67\") Documented at 02/03/2022 1923 "   Admission Weight 99.8 kg (220 lb) Documented at 02/03/2022 1923            Intake/Output Summary (Last 24 hours) at 2/4/2022 1015  Last data filed at 2/4/2022 0730  Gross per 24 hour   Intake 144 ml   Output 550 ml   Net -406 ml        TELEMETRY: Sinus rhythm premature ventricular contractions    Physical Exam:  The patient is alert, oriented and in no distress.  Vital signs as noted above.  Head and neck revealed no carotid bruits or jugular venous distention.  No thyromegaly or lymph adenopathy is present  Lungs clear.  No wheezing.  Breath sounds are normal bilaterally.  Heart normal first and second heart sounds.No murmur.  No precordial rub is present.  No gallop is present.  Abdomen soft and nontender.  No organomegaly is present.  Extremities with good peripheral pulses without any pedal edema.  Skin warm and dry.  Pacemaker site looks normal.  Musculoskeletal system is grossly normal  CNS grossly normal      Results Review:   I reviewed the patient's new clinical results.  Lab Results (last 24 hours)     Procedure Component Value Units Date/Time    TSH [[id number]]  (Normal) Collected: 02/04/22 0645    Specimen: Blood Updated: 02/04/22 0724     TSH 1.590 uIU/mL     Troponin [[id number]]  (Normal) Collected: 02/04/22 0645    Specimen: Blood Updated: 02/04/22 0724     Troponin T 0.012 ng/mL     Narrative:      Troponin T Reference Range:  <= 0.03 ng/mL-   Negative for AMI  >0.03 ng/mL-     Abnormal for myocardial necrosis.  Clinicians would have to utilize clinical acumen, EKG, Troponin and serial changes to determine if it is an Acute Myocardial Infarction or myocardial injury due to an underlying chronic condition.       Results may be falsely decreased if patient taking Biotin.      Lipid Panel [[id number]]  (Abnormal) Collected: 02/04/22 0645    Specimen: Blood Updated: 02/04/22 0721     Total Cholesterol 169 mg/dL      Triglycerides 238 mg/dL      HDL Cholesterol 58 mg/dL      LDL Cholesterol  72 mg/dL       VLDL Cholesterol 39 mg/dL      LDL/HDL Ratio 1.09    Narrative:      Cholesterol Reference Ranges  (U.S. Department of Health and Human Services ATP III Classifications)    Desirable          <200 mg/dL  Borderline High    200-239 mg/dL  High Risk          >240 mg/dL      Triglyceride Reference Ranges  (U.S. Department of Health and Human Services ATP III Classifications)    Normal           <150 mg/dL  Borderline High  150-199 mg/dL  High             200-499 mg/dL  Very High        >500 mg/dL    HDL Reference Ranges  (U.S. Department of Health and Human Services ATP III Classifications)    Low     <40 mg/dl (major risk factor for CHD)  High    >60 mg/dl ('negative' risk factor for CHD)        LDL Reference Ranges  (U.S. Department of Health and Human Services ATP III Classifications)    Optimal          <100 mg/dL  Near Optimal     100-129 mg/dL  Borderline High  130-159 mg/dL  High             160-189 mg/dL  Very High        >189 mg/dL    Comprehensive Metabolic Panel [[id number]]  (Abnormal) Collected: 02/04/22 0645    Specimen: Blood Updated: 02/04/22 0721     Glucose 140 mg/dL      BUN 18 mg/dL      Creatinine 0.81 mg/dL      Sodium 142 mmol/L      Potassium 4.1 mmol/L      Chloride 109 mmol/L      CO2 21.0 mmol/L      Calcium 8.6 mg/dL      Total Protein 6.9 g/dL      Albumin 3.70 g/dL      ALT (SGPT) 17 U/L      AST (SGOT) 24 U/L      Alkaline Phosphatase 73 U/L      Total Bilirubin 0.3 mg/dL      eGFR Non African Amer 67 mL/min/1.73      Globulin 3.2 gm/dL      A/G Ratio 1.2 g/dL      BUN/Creatinine Ratio 22.2     Anion Gap 12.0 mmol/L     Narrative:      GFR Normal >60  Chronic Kidney Disease <60  Kidney Failure <15      Magnesium [[id number]]  (Normal) Collected: 02/04/22 0645    Specimen: Blood Updated: 02/04/22 0721     Magnesium 2.1 mg/dL     Phosphorus [[id number]]  (Normal) Collected: 02/04/22 0645    Specimen: Blood Updated: 02/04/22 0721     Phosphorus 2.5 mg/dL     Ammonia [[id number]]  (Normal)  Collected: 02/04/22 0644    Specimen: Blood Updated: 02/04/22 0714     Ammonia 41 umol/L     CBC & Differential [[id number]]  (Abnormal) Collected: 02/04/22 0645    Specimen: Blood Updated: 02/04/22 0651    Narrative:      The following orders were created for panel order CBC & Differential.  Procedure                               Abnormality         Status                     ---------                               -----------         ------                     CBC Auto Differential[[id number]]        Abnormal            Final result                 Please view results for these tests on the individual orders.    CBC Auto Differential [[id number]]  (Abnormal) Collected: 02/04/22 0645    Specimen: Blood Updated: 02/04/22 0651     WBC 3.40 10*3/mm3      RBC 3.22 10*6/mm3      Hemoglobin 8.6 g/dL      Hematocrit 27.8 %      MCV 86.2 fL      MCH 26.8 pg      MCHC 31.1 g/dL      RDW 19.5 %      RDW-SD 59.5 fl      MPV 6.3 fL      Platelets 177 10*3/mm3      Neutrophil % 58.6 %      Lymphocyte % 25.5 %      Monocyte % 8.8 %      Eosinophil % 5.9 %      Basophil % 1.2 %      Neutrophils, Absolute 2.00 10*3/mm3      Lymphocytes, Absolute 0.90 10*3/mm3      Monocytes, Absolute 0.30 10*3/mm3      Eosinophils, Absolute 0.20 10*3/mm3      Basophils, Absolute 0.00 10*3/mm3      nRBC 0.1 /100 WBC     Vitamin B12 [[id number]] Collected: 02/04/22 0645    Specimen: Blood Updated: 02/04/22 0648    Urine Culture - Urine, Urine, Catheter In/Out [[id number]]  (Normal) Collected: 02/03/22 2011    Specimen: Urine, Catheter In/Out Updated: 02/04/22 0641     Urine Culture No growth    POC Glucose Once [[id number]]  (Abnormal) Collected: 02/04/22 0616    Specimen: Blood Updated: 02/04/22 0617     Glucose 136 mg/dL      Comment: Serial Number: [id number]:  [id number]       Hemoglobin A1c [[id number]] Collected: 02/03/22 1943    Specimen: Blood Updated: 02/03/22 2155    POC Glucose Once [[id number]]  (Abnormal) Collected: 02/03/22 2139     Specimen: Blood Updated: 02/03/22 2140     Glucose 120 mg/dL      Comment: Serial Number: [id number]:  [id number]       [place] Draw [[id number]] Collected: 02/03/22 1939    Specimen: Blood Updated: 02/03/22 2045    Narrative:      The following orders were created for panel order [place] Draw.  Procedure                               Abnormality         Status                     ---------                               -----------         ------                     Green Top (Gel)[[id number]]                                  Final result               Lavender Top[[id number]]                                                                Gold Top - SST[[id number]]                                   Final result               Light Blue Top[[id number]]                                   Final result                 Please view results for these tests on the individual orders.    Gold Top - SST [[id number]] Collected: 02/03/22 1939    Specimen: Blood Updated: 02/03/22 2045     Extra Tube Hold for add-ons.     Comment: Auto resulted.       Light Blue Top [[id number]] Collected: 02/03/22 1939    Specimen: Blood Updated: 02/03/22 2045     Extra Tube hold for add-on     Comment: Auto resulted       COVID PRE-OP / PRE-PROCEDURE SCREENING ORDER (NO ISOLATION) - Swab, Nasopharynx [[id number]]  (Normal) Collected: 02/03/22 2013    Specimen: Swab from Nasopharynx Updated: 02/03/22 2039    Narrative:      The following orders were created for panel order COVID PRE-OP / PRE-PROCEDURE SCREENING ORDER (NO ISOLATION) - Swab, Nasopharynx.  Procedure                               Abnormality         Status                     ---------                               -----------         ------                     COVID-19,CEPHEID/SHAHLA,CO...[[id number]]  Normal              Final result                 Please view results for these tests on the individual orders.    COVID-19,CEPHEID/SHAHLA,COR/RAYRAY/PAD/MARTHA IN-HOUSE(OR EMERGENT/ADD-ON),NP SWAB  IN TRANSPORT MEDIA 3-4 HR TAT, RT-PCR - Swab, Nasopharynx [[id number]]  (Normal) Collected: 02/03/22 2013    Specimen: Swab from Nasopharynx Updated: 02/03/22 2039     COVID19 Not Detected    Narrative:      Fact sheet for providers: https://www.fda.gov/media/680691/download     Fact sheet for patients: https://www.fda.gov/media/304011/download  Fact sheet for providers: https://www.fda.gov/media/806995/download    Fact sheet for patients: https://www.fda.gov/media/545965/download    Test performed by PCR.    Ammonia [[id number]]  (Abnormal) Collected: 02/03/22 2011    Specimen: Blood Updated: 02/03/22 2037     Ammonia 86 umol/L     Urinalysis, Microscopic Only - Urine, Catheter In/Out [[id number]]  (Abnormal) Collected: 02/03/22 2011    Specimen: Urine, Catheter In/Out Updated: 02/03/22 2037     RBC, UA 0-2 /HPF      WBC, UA 6-12 /HPF      Bacteria, UA 3+ /HPF      Squamous Epithelial Cells, UA 0-2 /HPF      Hyaline Casts, UA 0-2 /LPF      Methodology Manual Light Microscopy    Urinalysis With Culture If Indicated - Urine, Catheter In/Out [[id number]]  (Abnormal) Collected: 02/03/22 2011    Specimen: Urine, Catheter In/Out Updated: 02/03/22 2029     Color, UA Yellow     Appearance, UA Clear     pH, UA 5.5     Specific Gravity, UA 1.011     Glucose, UA Negative     Ketones, UA Negative     Bilirubin, UA Negative     Blood, UA Negative     Protein, UA Negative     Leuk Esterase, UA Trace     Nitrite, UA Negative     Urobilinogen, UA 1.0 E.U./dL    Green Top (Gel) [[id number]] Collected: 02/03/22 1939    Specimen: Blood Updated: 02/03/22 2013     Extra Tube --    Comprehensive Metabolic Panel [[id number]]  (Abnormal) Collected: 02/03/22 1939    Specimen: Blood Updated: 02/03/22 2009     Glucose 138 mg/dL      BUN 20 mg/dL      Creatinine 1.06 mg/dL      Sodium 140 mmol/L      Potassium 3.7 mmol/L      Chloride 106 mmol/L      CO2 23.0 mmol/L      Calcium 9.0 mg/dL      Total Protein 7.4 g/dL      Albumin 3.90 g/dL      ALT  (SGPT) 19 U/L      AST (SGOT) 35 U/L      Alkaline Phosphatase 75 U/L      Total Bilirubin 0.3 mg/dL      eGFR Non African Amer 49 mL/min/1.73      Globulin 3.5 gm/dL      A/G Ratio 1.1 g/dL      BUN/Creatinine Ratio 18.9     Anion Gap 11.0 mmol/L     Narrative:      GFR Normal >60  Chronic Kidney Disease <60  Kidney Failure <15      CK [[id number]]  (Normal) Collected: 02/03/22 1939    Specimen: Blood Updated: 02/03/22 2009     Creatine Kinase 77 U/L     Troponin [[id number]]  (Normal) Collected: 02/03/22 1939    Specimen: Blood Updated: 02/03/22 2009     Troponin T 0.016 ng/mL     Narrative:      Troponin T Reference Range:  <= 0.03 ng/mL-   Negative for AMI  >0.03 ng/mL-     Abnormal for myocardial necrosis.  Clinicians would have to utilize clinical acumen, EKG, Troponin and serial changes to determine if it is an Acute Myocardial Infarction or myocardial injury due to an underlying chronic condition.       Results may be falsely decreased if patient taking Biotin.      aPTT [[id number]]  (Normal) Collected: 02/03/22 1939    Specimen: Blood Updated: 02/03/22 2003     PTT 24.3 seconds     Protime-INR [[id number]]  (Normal) Collected: 02/03/22 1939    Specimen: Blood Updated: 02/03/22 2003     Protime 11.4 Seconds      INR 1.03    POC Glucose Once [[id number]]  (Abnormal) Collected: 02/03/22 1955    Specimen: Blood Updated: 02/03/22 1956     Glucose 126 mg/dL      Comment: Serial Number: [id number]:  [id number]       CBC & Differential [[id number]]  (Abnormal) Collected: 02/03/22 1943    Specimen: Blood Updated: 02/03/22 1947    Narrative:      The following orders were created for panel order CBC & Differential.  Procedure                               Abnormality         Status                     ---------                               -----------         ------                     CBC Auto Differential[[id number]]        Abnormal            Final result                 Please view results for these tests on the  individual orders.    CBC Auto Differential [[id number]]  (Abnormal) Collected: 02/03/22 1943    Specimen: Blood Updated: 02/03/22 1947     WBC 4.50 10*3/mm3      RBC 3.25 10*6/mm3      Hemoglobin 9.0 g/dL      Hematocrit 28.7 %      MCV 88.4 fL      MCH 27.6 pg      MCHC 31.2 g/dL      RDW 20.4 %      RDW-SD 63.9 fl      MPV 6.6 fL      Platelets 195 10*3/mm3      Neutrophil % 42.0 %      Lymphocyte % 42.6 %      Monocyte % 8.2 %      Eosinophil % 6.0 %      Basophil % 1.2 %      Neutrophils, Absolute 1.90 10*3/mm3      Lymphocytes, Absolute 1.90 10*3/mm3      Monocytes, Absolute 0.40 10*3/mm3      Eosinophils, Absolute 0.30 10*3/mm3      Basophils, Absolute 0.10 10*3/mm3      nRBC 0.1 /100 WBC     POC Glucose Once [[id number]]  (Abnormal) Collected: 02/03/22 1922    Specimen: Blood Updated: 02/03/22 1923     Glucose 144 mg/dL      Comment: Serial Number: [id number]:  [id number]             Imaging Results (Last 24 Hours)     Procedure Component Value Units Date/Time    US Liver [[id number]] Collected: 02/04/22 0923     Updated: 02/04/22 0929    Narrative:      US LIVER    Date of Exam: 2/4/2022 8:48 EST    Indication: Elevated ammonia.  Liver disease     Comparison Exams: CT abdomen pelvis performed on July 29, 2010    Technique: Ultrasound liver    FINDINGS:  Today's study reveals that the liver is mildly enlarged and measures 20.5 cm in length. No evidence of liver mass. No evidence of ascites. The gallbladder is normal. No evidence of mass or obstruction right kidney. Ultrasound Doppler shows normal   antegrade blood flow in the portal vein toward the liver and normal antegrade blood flow in the hepatic veins away from liver, bile duct measures 3.9 mm diameter and is normal. No evidence of dilatation of bile ducts.      Impression:        1. Mild enlargement of the liver. No evidence of liver mass.  2. The gallbladder and bile ducts are normal.  3. No evidence of ascites    Electronically Signed: Jaylon  MD Ana M 2/4/2022 9:27 EST    XR Chest 1 View [[id number]] Collected: 02/03/22 2039     Updated: 02/03/22 2043    Narrative:      XR CHEST 1 VW    Date of Exam: 2/3/2022 19:47 EST    Indication: Acute Stroke Protocol (onset < 12 hrs).    Comparison Exams: 11/3/2021    FINDINGS:  There is a left subclavian dual-lead pacer. There is cardiomegaly, and the upper lobe vessels appear larger than the lower lobe vessels. There is no interlobular septal thickening or suspicious airspace disease. There is mild strandy atelectasis in the   lung bases. There is a calcified granuloma in the right lung base. Costophrenic angles are sharp      Impression:      Cardiomegaly with pulmonary venous hypertension. No evidence of pulmonary edema. No suspicious infiltrate. Atelectasis in the bases    Electronically Signed: Jesus Sparks MD 2/3/2022 20:41 EST    CT Angiogram Head w AI Analysis of LVO [[id number]] Collected: 02/03/22 2023     Updated: 02/03/22 2036    Narrative:      CT ANGIOGRAM HEAD W AI ANALYSIS OF LVO, CT ANGIOGRAM NECK    Date of Exam: 2/3/2022 19:43 EST    Indication: Acute Stroke.    Comparison: None available.    Technique: Contiguous axial imaging of the head and neck neck was performed after uneventful administration of 1 .  Coronal and sagittal reconstructions were performed. Three-dimensional reconstructions were performed. Automated exposure control and   iterative reconstruction methods were used.  Radiation audit for number of CT and nuclear cardiology exams performed in the last year:  3.    FINDINGS:    Vascular: Normal arch anatomy. Tortuous brachiocephalic arteries. No occlusion or significant stenosis of either common carotid artery. Patent carotid bifurcation bilaterally, with calcific atherosclerosis of the carotid bulbs bilaterally. No occlusion   or significant stenosis of either internal carotid artery. Normal vertebral artery origins. Dominant left vertebral artery. No occlusion or  significant stenosis of either vertebral artery. Patent basilar artery without stenosis. Proximal anterior   cerebral, middle cerebral, and posterior cerebral arteries are patent. Peripheral cerebral branches appear symmetric. No aneurysm demonstrated    Nonvascular: No enhancing abnormality in the brain. No neck mass or adenopathy. Upper lungs unremarkable      Impression:        1. No significant carotid or vertebral basilar stenosis or occlusion  2. No large intracranial vessel occlusion        Electronically Signed: Jesus Sparks MD 2/3/2022 20:34 EST    CT Angiogram Neck [[id number]] Collected: 02/03/22 2023     Updated: 02/03/22 2036    Narrative:      CT ANGIOGRAM HEAD W AI ANALYSIS OF LVO, CT ANGIOGRAM NECK    Date of Exam: 2/3/2022 19:43 EST    Indication: Acute Stroke.    Comparison: None available.    Technique: Contiguous axial imaging of the head and neck neck was performed after uneventful administration of 1 .  Coronal and sagittal reconstructions were performed. Three-dimensional reconstructions were performed. Automated exposure control and   iterative reconstruction methods were used.  Radiation audit for number of CT and nuclear cardiology exams performed in the last year:  3.    FINDINGS:    Vascular: Normal arch anatomy. Tortuous brachiocephalic arteries. No occlusion or significant stenosis of either common carotid artery. Patent carotid bifurcation bilaterally, with calcific atherosclerosis of the carotid bulbs bilaterally. No occlusion   or significant stenosis of either internal carotid artery. Normal vertebral artery origins. Dominant left vertebral artery. No occlusion or significant stenosis of either vertebral artery. Patent basilar artery without stenosis. Proximal anterior   cerebral, middle cerebral, and posterior cerebral arteries are patent. Peripheral cerebral branches appear symmetric. No aneurysm demonstrated    Nonvascular: No enhancing abnormality in the brain. No neck mass or  adenopathy. Upper lungs unremarkable      Impression:        1. No significant carotid or vertebral basilar stenosis or occlusion  2. No large intracranial vessel occlusion        Electronically Signed: Jesus Sparks MD 2/3/2022 20:34 EST    CT Head Without Contrast Stroke Protocol [[id number]] Collected: 02/03/22 1941     Updated: 02/03/22 1947    Narrative:      Examination: CT HEAD WO CONTRAST STROKE PROTOCOL    Date of Exam: 2/3/2022 19:38 EST    Indication: Neuro deficit, acute, stroke suspected.    Comparison: CT head without contrast 10/31/2022    Technique: Axial noncontrast CT imaging of the head was performed. Automated exposure control and iterative reconstruction methods were used.    Findings:  There is no acute intracranial hemorrhage. Negative for mass effect or midline shift. Ventricles and cortical sulci mildly prominent consistent with mild cerebral volume loss, stable from the prior study. Posterior fossa without acute normality. Mild   white matter findings suggesting chronic microvascular disease. Globes symmetric. Thinning of the lens bilaterally likely related to prior cataract extraction. The mastoid air cells are well-aerated. Negative for sinus fluid level. Hyperostosis frontalis   interna.      Impression:      1. No acute intracranial hemorrhage.  2. Mild cerebral atrophy and white matter findings of chronic small vessel disease similar to prior study.    Electronically Signed: Benjamín Padron MD 2/3/2022 19:45 EST      LAB RESULTS (LAST 7 DAYS)    CBC  Results from last 7 days   Lab Units 02/04/22  0645 02/03/22 1943   WBC 10*3/mm3 3.40 4.50   RBC 10*6/mm3 3.22* 3.25*   HEMOGLOBIN g/dL 8.6* 9.0*   HEMATOCRIT % 27.8* 28.7*   MCV fL 86.2 88.4   PLATELETS 10*3/mm3 177 195       BMP  Results from last 7 days   Lab Units 02/04/22  0645 02/03/22  1939   SODIUM mmol/L 142 140   POTASSIUM mmol/L 4.1 3.7   CHLORIDE mmol/L 109* 106   CO2 mmol/L 21.0* 23.0   BUN mg/dL 18 20   CREATININE mg/dL 0.81  1.06*   GLUCOSE mg/dL 140* 138*   MAGNESIUM mg/dL 2.1  --    PHOSPHORUS mg/dL 2.5  --        CMP   Results from last 7 days   Lab Units 02/04/22  0645 02/04/22 0644 02/03/22 2011 02/03/22 1939   SODIUM mmol/L 142  --   --  140   POTASSIUM mmol/L 4.1  --   --  3.7   CHLORIDE mmol/L 109*  --   --  106   CO2 mmol/L 21.0*  --   --  23.0   BUN mg/dL 18  --   --  20   CREATININE mg/dL 0.81  --   --  1.06*   GLUCOSE mg/dL 140*  --   --  138*   ALBUMIN g/dL 3.70  --   --  3.90   BILIRUBIN mg/dL 0.3  --   --  0.3   ALK PHOS U/L 73  --   --  75   AST (SGOT) U/L 24  --   --  35*   ALT (SGPT) U/L 17  --   --  19   AMMONIA umol/L  --  41 86*  --          BNP        TROPONIN  Results from last 7 days   Lab Units 02/04/22  0645 02/03/22 1939 02/03/22 1939   CK TOTAL U/L  --   --  77   TROPONIN T ng/mL 0.012   < > 0.016    < > = values in this interval not displayed.       CoAg  Results from last 7 days   Lab Units 02/03/22 1939   INR  1.03   APTT seconds 24.3       Creatinine Clearance  Estimated Creatinine Clearance: 59.5 mL/min (by C-G formula based on SCr of 0.81 mg/dL).    ABG        Radiology  CT Angiogram Neck    Result Date: 2/3/2022  1. No significant carotid or vertebral basilar stenosis or occlusion 2. No large intracranial vessel occlusion Electronically Signed: Jesus Sparks MD 2/3/2022 20:34 EST    US Liver    Result Date: 2/4/2022  1. Mild enlargement of the liver. No evidence of liver mass. 2. The gallbladder and bile ducts are normal. 3. No evidence of ascites Electronically Signed: Jaylon Viramontes MD 2/4/2022 9:27 EST    XR Chest 1 View    Result Date: 2/3/2022  Cardiomegaly with pulmonary venous hypertension. No evidence of pulmonary edema. No suspicious infiltrate. Atelectasis in the bases Electronically Signed: Jesus Sparks MD 2/3/2022 20:41 EST    CT Head Without Contrast Stroke Protocol    Result Date: 2/3/2022  1. No acute intracranial hemorrhage. 2. Mild cerebral atrophy and white matter  findings of chronic small vessel disease similar to prior study. Electronically Signed: Benjamín Padron MD 2/3/2022 19:45 EST    CT Angiogram Head w AI Analysis of LVO    Result Date: 2/3/2022  1. No significant carotid or vertebral basilar stenosis or occlusion 2. No large intracranial vessel occlusion Electronically Signed: Jesus Sparks MD 2/3/2022 20:34 EST              EKG            I personally viewed and interpreted the patient's EKG/Telemetry data: Atrial sensed ventricular paced rhythm premature ventricular contractions.    ECHOCARDIOGRAM:    Results for orders placed during the hospital encounter of 09/07/21    Adult Transthoracic Echo Complete W/ Cont if Necessary Per Protocol    Interpretation Summary  · Estimated left ventricular EF = 60% Left ventricular systolic function is normal.    Indications  Syncope    Technically satisfactory study.  Mitral valve is structurally normal.  Tricuspid valve is structurally normal.  Aortic valve is structurally normal.  Pulmonic valve could not be well visualized.  No evidence for mitral tricuspid or aortic regurgitation is seen by Doppler study.  Left atrium is enlarged.  Right atrium is normal in size.  Left ventricle is normal in size and contractility with ejection fraction of 60%.  Right ventricle is normal in size.  Atrial septum is intact.  Aorta is normal.  No pericardial effusion or intracardiac thrombus is seen.    Impression  Left atrium is enlarged.  Structurally and functionally normal cardiac valves.  Left ventricular size and contractility is normal with ejection fraction of 60%.              Cardiolite (Tc-99m Sestamibi) stress test      OTHER:     Assessment/Plan     Principal Problem:    Cerebrovascular accident (CVA) (HCC)  Active Problems:    Fall    Secondary hyperaldosteronism (HCC)    Stage 3b chronic kidney disease (CMS/HCC)    Hypertension associated with stage 3 chronic kidney disease due to type 2 diabetes mellitus (HCC)    Type 2 diabetes  mellitus with diabetic neuropathy, with long-term current use of insulin (HCC)    Morbid obesity (HCC)    Atherosclerosis of native coronary artery of native heart without angina pectoris    Hyperlipidemia    Primary hypertension    Acute UTI    GERD (gastroesophageal reflux disease)    Cardiac pacemaker in situ    Hyperammonemia (HCC)    ]]]]]]]]]]]]]]]]]]]]  Impression  ========  -Recent difficulty with speaking.  TIA/stroke  Status post TPA with complete resolution of symptoms 2/3/2022    -Status post permanent dual-chamber pacemaker implantation ([employer] MRI compatible) 11/3/2021     -Status post removal of loop recorder ([place]) 11/3/2021     -History of syncope  Intermittent Mobitz type II AV block  Patient is not on any medications to cause bradycardia     -Second-degree Mobitz type I AV block.  Narrow QRS complex.     Echocardiogram-normal 10/22/2020     Cardiac catheterization 10/23/2020 revealed:  Left ventricular size and contractility is normal with ejection fraction of 60%.  Normal epicardial coronary arteries.     Status post loop recorder placement 10/23/2020 ([employer] Linq)     -History of paroxysmal atrial fibrillation     -Hypertension dyslipidemia diabetes     -Renal dysfunction CKD 3  BUN 27 creatinine 1.35        -Exogenous obesity.     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     -No known allergies  ============  Plan  =========  Difficulty with speaking  TIA/stroke  Status post TPA with complete resolution of symptoms 2/3/2022.    Status post permanent dual-chamber pacemaker implantation ([employer] MRI compatible) level 3/20/2021.  Status post loop recorder removal 11/3/2021 ([employer] Linq)  Pacemaker site and function appears to be normal.  Interrogation of the pacemaker revealed excellent pacing parameters.  (2/4/2022)    Premature ventricular contractions-asymptomatic.    Paroxysmal atrial fibrillation.  Interrogation of the  pacemaker revealed AF burden less than 1%.  With recent stroke history and history of paroxysmal atrial fibrillation patient would benefit from anticoagulation.  Patient just received TPA.  However concerned about patient having falling spells.  Consideration will be given for watchman procedure.     Hypertension-well-controlled     Renal dysfunction  BUN 20 creatinine 1.41-4/7/2021.  29/1.45-11/1/2020  18/0.81-2/4/2022     Anemia  HUGO globin 8.6-12/4/2022     Further plan will depend on patient's progress.  ]]]]]]]]]]]]]]              Wang Plaza MD  02/04/22  10:15 EST

## 2022-02-04 NOTE — DISCHARGE PLACEMENT REQUEST
"Sean Mobley (86 y.o. Female)             Date of Birth Social Security Number Address Home Phone MRN    1935  [id number] [address] [phone] [id number]    Sabianist Marital Status             Mormon        Admission Date Admission Type Admitting Provider Attending Provider Department, Room/Bed    2/3/22 Emergency Draw, MD Samuel Prakash Federico N, MD [facility] INTENSIVE CARE UNIT, 2301/1    Discharge Date Discharge Disposition Discharge Destination                         Attending Provider: Yoel Baker MD    Allergies: Latex, Natural Rubber, Adhesive Tape    Isolation: None   Infection: None   Code Status: No CPR   Advance Care Planning Activity    Ht: 157.5 cm (62\")   Wt: 107 kg (236 lb)    Admission Cmt: None   Principal Problem: Cerebrovascular accident (CVA) (HCC) [I63.9]                 Active Insurance as of 2/3/2022     Primary Coverage     Payor Plan Insurance Group Employer/Plan Group    MEDICARE MEDICARE A & B      Payor Plan Address Payor Plan Phone Number Payor Plan Fax Number Effective Dates    [address] [phone]  3/1/2002 - None Entered    [facility] [id number]       Subscriber Name Subscriber Birth Date Member ID       SEAN MOBLEY 1935 [id number]           Secondary Coverage     Payor Plan Insurance Group Employer/Plan Group    [facility] SUPP [id number]     Payor Plan Address Payor Plan Phone Number Payor Plan Fax Number Effective Dates    [address]   12/1/2016 - None Entered    [facility] [id number]       Subscriber Name Subscriber Birth Date Member ID       SEAN MOBLEY 1935 [id number]                 Emergency Contacts      (Rel.) Home Phone Work Phone Mobile Phone    GEORGIE MOBLEY (Spouse) [phone] -- --    [place] (Relative) [phone] -- [phone]              "

## 2022-02-04 NOTE — ED PROVIDER NOTES
Subjective   86-year-old female complaining of difficulty speaking.  Patient's  noted that she had fallen this evening.  She has apparently has history of recurrent falls and EMS has been called to the house for lifting assistance in the past.  The  reports no recent changes in ataxia but reports that she had unintelligible speech.  States it started abruptly about 40 minutes prior to arrival.  He states that she has not complained of headache and has never had this problem before.  The patient denies extremity lateralizing signs.  The patient had no recent reports of fever or chills.  There is been no reports of nausea vomiting or diarrhea.  Patient has not recently complained of dysuria or cough          Review of Systems   Constitutional: Positive for fatigue. Negative for chills and fever.   Neurological: Positive for dizziness and weakness. Negative for tremors, facial asymmetry and headaches.   Patient has a history of ataxia    Past Medical History:   Diagnosis Date   • Atherosclerosis of native coronary artery of native heart without angina pectoris 10/22/2020   • Hypertension associated with stage 3 chronic kidney disease due to type 2 diabetes mellitus ([facility]) 10/22/2020   • Morbid obesity ([facility]) 10/22/2020   • Secondary hyperaldosteronism ([facility]) 10/22/2020   • Stage 3b chronic kidney disease ([facility]) 10/22/2020   • Type 2 diabetes mellitus with diabetic neuropathy, with long-term current use of insulin ([facility]) 10/22/2020   • Type 2 diabetes mellitus with diabetic neuropathy, with long-term current use of insulin ([facility]) 10/22/2020       Allergies   Allergen Reactions   • Latex, Natural Rubber Rash   • Adhesive Tape Rash       Past Surgical History:   Procedure Laterality Date   • CARDIAC CATHETERIZATION N/A 10/23/2020    Procedure: Left Heart Cath and coronary angiogram;  Surgeon: Wang Plaza MD;  Location: [facility] CATH INVASIVE LOCATION;  Service: Cardiovascular;  Laterality: N/A;   • CARDIAC  ELECTROPHYSIOLOGY PROCEDURE Left 11/3/2021    Procedure: Pacemaker DC new;  Surgeon: Wang Plaza MD;  Location: [facility] CATH INVASIVE LOCATION;  Service: Cardiovascular;  Laterality: Left;   • CARDIAC ELECTROPHYSIOLOGY PROCEDURE N/A 11/3/2021    Procedure: LOOP RECORDER REMOVAL;  Surgeon: Wang Plaza MD;  Location: [facility] CATH INVASIVE LOCATION;  Service: Cardiovascular;  Laterality: N/A;   • HYSTERECTOMY     • JOINT REPLACEMENT Right     Hip   • JOINT REPLACEMENT Left     hip   • JOINT REPLACEMENT Left     hip (for second time)   • JOINT REPLACEMENT Right     knee   • OOPHORECTOMY         Family History   Problem Relation Age of Onset   • Heart disease Mother        Social History     Socioeconomic History   • Marital status:    Tobacco Use   • Smoking status: Never Smoker   • Smokeless tobacco: Never Used   Vaping Use   • Vaping Use: Never used   Substance and Sexual Activity   • Alcohol use: Not Currently   • Drug use: Never   • Sexual activity: Defer           Objective   Physical Exam  Alert, speech is difficult to understand.  There appears to be no inappropriate words.  The patient does have repetitive attempts to express the same phrase   [place] Coma Scale 15 initial NIH 2, 3   HEENT: Pupils equal and reactive to light. Conjunctivae are not injected. normal tympanic membranes. Oropharynx and nares are normal.   Neck: Supple. Midline trachea. No JVD. No goiter.   Chest: Clear and equal breath sounds bilaterally regular rate and rhythm without murmur or rub.   Abdomen: Positive bowel sounds nontender nondistended. No rebound or peritoneal signs. No CVA tenderness.   Extremities no clubbing cyanosis or edema motor sensory exam is normal the full range of motion is intact   skin: Warm and dry, no rashes or petechia.   Lymphatic: No regional lymphadenopathy. No calf pain, swelling or Pattie's sign    Procedures           ED Course                Labs Reviewed   AMMONIA - Abnormal; Notable for  the following components:       Result Value    Ammonia 86 (*)     All other components within normal limits   URINALYSIS W/ CULTURE IF INDICATED - Abnormal; Notable for the following components:    Leuk Esterase, UA Trace (*)     All other components within normal limits   COMPREHENSIVE METABOLIC PANEL - Abnormal; Notable for the following components:    Glucose 138 (*)     Creatinine 1.06 (*)     AST (SGOT) 35 (*)     eGFR Non  Amer 49 (*)     All other components within normal limits    Narrative:     GFR Normal >60  Chronic Kidney Disease <60  Kidney Failure <15     CBC WITH AUTO DIFFERENTIAL - Abnormal; Notable for the following components:    RBC 3.25 (*)     Hemoglobin 9.0 (*)     Hematocrit 28.7 (*)     MCHC 31.2 (*)     RDW 20.4 (*)     RDW-SD 63.9 (*)     Neutrophil % 42.0 (*)     All other components within normal limits   URINALYSIS, MICROSCOPIC ONLY - Abnormal; Notable for the following components:    RBC, UA 0-2 (*)     WBC, UA 6-12 (*)     Bacteria, UA 3+ (*)     All other components within normal limits   POCT GLUCOSE FINGERSTICK - Abnormal; Notable for the following components:    Glucose 144 (*)     All other components within normal limits   POCT GLUCOSE FINGERSTICK - Abnormal; Notable for the following components:    Glucose 126 (*)     All other components within normal limits   COVID-19,CEPHEID/SHAHLA,COR/RAYRAY/PAD/MARTHA IN-HOUSE,NP SWAB IN TRANSPORT MEDIA 3-4 HR TAT, RT-PCR - Normal    Narrative:     Fact sheet for providers: https://www.fda.gov/media/280366/download     Fact sheet for patients: https://www.fda.gov/media/809679/download  Fact sheet for providers: https://www.fda.gov/media/904987/download    Fact sheet for patients: https://www.fda.gov/media/323204/download    Test performed by PCR.   CK - Normal   PROTIME-INR - Normal   APTT - Normal   TROPONIN (IN-HOUSE) - Normal    Narrative:     Troponin T Reference Range:  <= 0.03 ng/mL-   Negative for AMI  >0.03 ng/mL-     Abnormal for  myocardial necrosis.  Clinicians would have to utilize clinical acumen, EKG, Troponin and serial changes to determine if it is an Acute Myocardial Infarction or myocardial injury due to an underlying chronic condition.       Results may be falsely decreased if patient taking Biotin.     COVID PRE-OP / PRE-PROCEDURE SCREENING ORDER (NO ISOLATION)    Narrative:     The following orders were created for panel order COVID PRE-OP / PRE-PROCEDURE SCREENING ORDER (NO ISOLATION) - Swab, Nasopharynx.  Procedure                               Abnormality         Status                     ---------                               -----------         ------                     COVID-19,CEPHEID/SHAHLA,CO...[[id number]]  Normal              Final result                 Please view results for these tests on the individual orders.   URINE CULTURE   RAINBOW DRAW    Narrative:     The following orders were created for panel order [place] Draw.  Procedure                               Abnormality         Status                     ---------                               -----------         ------                     Green Top (Gel)[[id number]]                                  Final result               Lavender Top[[id number]]                                                                Gold Top - SST[[id number]]                                   Final result               Light Blue Top[[id number]]                                   Final result                 Please view results for these tests on the individual orders.   POCT GLUCOSE FINGERSTICK   TYPE AND SCREEN   BB ARMBAND CHECK   CBC AND DIFFERENTIAL    Narrative:     The following orders were created for panel order CBC & Differential.  Procedure                               Abnormality         Status                     ---------                               -----------         ------                     CBC Auto Differential[[id number]]        Abnormal            Final result                  Please view results for these tests on the individual orders.   GREEN TOP   GOLD TOP - SST   LIGHT BLUE TOP     Medications   sodium chloride 0.9 % flush 10 mL (has no administration in time range)   sodium chloride 0.9 % infusion (has no administration in time range)   iopamidol (ISOVUE-370) 76 % injection 100 mL (100 mL Intravenous Given 2/3/22 1952)   alteplase (ACTIVASE) bolus from vial (0 mg Intravenous Stopped 2/3/22 2011)     Followed by   alteplase (ACTIVASE) 1 mg/mL infusion vial (90 mg Intravenous New Bag 2/3/22 2005)     CT Angiogram Neck    Result Date: 2/3/2022  1. No significant carotid or vertebral basilar stenosis or occlusion 2. No large intracranial vessel occlusion Electronically Signed: Jesus Sparks MD 2/3/2022 20:34 EST    XR Chest 1 View    Result Date: 2/3/2022  Cardiomegaly with pulmonary venous hypertension. No evidence of pulmonary edema. No suspicious infiltrate. Atelectasis in the bases Electronically Signed: Jesus Sparks MD 2/3/2022 20:41 EST    CT Head Without Contrast Stroke Protocol    Result Date: 2/3/2022  1. No acute intracranial hemorrhage. 2. Mild cerebral atrophy and white matter findings of chronic small vessel disease similar to prior study. Electronically Signed: Benjamín Padron MD 2/3/2022 19:45 EST    CT Angiogram Head w AI Analysis of LVO    Result Date: 2/3/2022  1. No significant carotid or vertebral basilar stenosis or occlusion 2. No large intracranial vessel occlusion Electronically Signed: Jesus Sparks MD 2/3/2022 20:34 EST                                     MDM  Number of Diagnoses or Management Options     Amount and/or Complexity of Data Reviewed  Clinical lab tests: reviewed  Tests in the radiology section of CPT®: reviewed  Tests in the medicine section of CPT®: reviewed  Discuss the patient with other providers: yes  Independent visualization of images, tracings, or specimens: yes    Risk of Complications, Morbidity, and/or  Mortality  Presenting problems: high  Diagnostic procedures: high  Management options: high  General comments: The patient was discussed with the radiologist and with the on-call neurologist.  TPA was administered after discussion of risks and benefits.  NIH remains 2 at time of dictation.  The patient was also noted to have a urinary tract infection and the patient was given traction pending culture results.  Case was discussed with the intensivist practitioner        Final diagnoses:   Cerebrovascular accident (CVA), unspecified mechanism (HCC)   Dysarthria   Acute urinary tract infection   History of ataxia       ED Disposition  ED Disposition     ED Disposition Condition Comment    Decision to Admit  Level of Care: Critical Care [6]   Diagnosis: Cerebrovascular accident (CVA), unspecified mechanism (HCC) [[id number]]   Admitting Physician: SHASHANK BOYER [[id number]]   Attending Physician: SHASHANK BOYER [[id number]]   Bed Request Comments: ICU            No follow-up provider specified.       Medication List      No changes were made to your prescriptions during this visit.          Kang Finnegan MD  02/03/22 3045

## 2022-02-04 NOTE — ED NOTES
Pt brought in post fall, pt  reports slurred speech and confusion after fall. Pt not on blood thinners.      Janet Hobbs RN  02/03/22 2046

## 2022-02-04 NOTE — H&P
PULMONARY/CRITICAL CARE HISTORY & PHYSICAL       PATIENT NAME:     Aracelis Vargas  :     1935    MRN:     [id number]       ROOM:     [facility] ED FADI/FADI     PRIMARY CARE PHYSICIAN:  Gabriel Goss MD    SUBJECTIVE     CHIEF COMPLAINT:   Dysarthria    HISTORY OF PRESENT ILLNESS:  Aracelis Vargas is a 86 y.o. female  has a past medical history of Atherosclerosis of native coronary artery of native heart without angina pectoris (10/22/2020), Atrioventricular block, Mobitz type 1, Wenckebach (10/21/2020), Hypertension associated with stage 3 chronic kidney disease due to type 2 diabetes mellitus ([facility]) (10/22/2020), Morbid obesity ([facility]) (10/22/2020), Secondary hyperaldosteronism ([facility]) (10/22/2020), Stage 3b chronic kidney disease ([facility]) (10/22/2020), Type 2 diabetes mellitus with diabetic neuropathy, with long-term current use of insulin ([facility]) (10/22/2020), and Type 2 diabetes mellitus with diabetic neuropathy, with long-term current use of insulin ([facility]) (10/22/2020).   Patient presented to [facility] on 2/3/2022 with complaint of dysarthria.  HPI information is limited due to patient with language difficulties at the time my assessment; information obtained from chart review and ER report.  EMS was initially called to patient's home for lifting help due to patient had fallen.  She had fallen at home frequently recently and EMS had been there multiple times.  When EMS arrived the  reported that the patient had unintelligible speech that started abruptly about 40 minutes prior to arrival to the hospital.  He stated to the emergency room physician the patient had not complained of a headache, or weakness.  Patient's  stated there was no change to the patient's gait.  At the time of my assessment, the patient is able to answer who she is, where she is, and what year it is with intermittent confusion and severe dysarthria.    In the ED, labs were obtained with abnormalities as follows:  Glucose 138, creatinine 1.06, AST 35, ammonia 86, hemoglobin 9.0.  Radiology studies obtained with the following findings: CT head shows no acute intercranial hemorrhage mild cerebral atrophy and white matter findings of chronic small vessel disease similar to prior study; CTA head and neck shows no significant carotid or vertebrobasilar stenosis or occlusion, no large intracranial vessel occlusion.    Pulmonary/Intensivist service was contacted for admission to ICU and further evaluation and treatment of patient's condition.      REVIEW OF SYSTEMS:  Review of systems could not be obtained due to   patient confusion.      ASSESSMENT     Principal Problem:    Cerebrovascular accident (CVA) (HCC)  Active Problems:    Fall    Acute UTI    Hyperammonemia (HCC)    Secondary hyperaldosteronism (HCC)    Stage 3b chronic kidney disease (CMS/HCC)    Hypertension associated with stage 3 chronic kidney disease due to type 2 diabetes mellitus (HCC)    Type 2 diabetes mellitus with diabetic neuropathy, with long-term current use of insulin (HCC)    Morbid obesity (HCC)    Atherosclerosis of native coronary artery of native heart without angina pectoris    Hyperlipidemia    Primary hypertension    GERD (gastroesophageal reflux disease)    Cardiac pacemaker in situ         PLAN     Stroke/TIA    --Status post TPA  -Reviewed imaging   -EKG reviewed  -ECHO  -TSH,  Lipid panel, A1C, Vit B12  -Accu-Checks every 6  -Neuro checks q2  -NIH q shift  -MRI brain  -Neuro consult  -PT/OT eval  -NPO pending, swallow study, failed bedside swallow SLP consult  -Continue/start statin  -Continue ASA  -Avoid hydralazine for BP control    Fall  -PT/OT    UTI     --likely contributing to AMS, may benefit from further workup or neurology consult if symptoms fail to improve  -UA reviewed  -Continue ceftriaxone    Hyperammonemia  -Ultrasound liver  -Monitor and trend levels  -Consider lactulose    Chronic Kidney Disease with  "hyperaldosteronism  -Stable  -Monitor and trend labs as appropriate    Essential Hypertension, Chronic; hyperlipidemia; pacemaker in situ; atherosclerosis  -Continue home medications as appropriate   - Monitor with routine vital signs     Diabetes mellitus type 2, chronic  -A1C  -Hold home diabetes medications  -Start SSI  -Accu-checks Q 6 hours    Obesity  -encourage lifestyle modifications      Code Status: DNR/DNI  VTE Prophylaxis: SCDs  PUD Prophylaxis: PPI      HOSPITAL MEDICATIONS     SCHEDULED MEDICATIONS:  [START ON 2/5/2022] aspirin, 325 mg, Oral, Daily   Or  [START ON 2/5/2022] aspirin, 300 mg, Rectal, Daily  atorvastatin, 80 mg, Oral, Nightly  insulin lispro, 0-14 Units, Subcutaneous, Q6H  pantoprazole, 40 mg, Intravenous, Q AM  senna-docusate sodium, 2 tablet, Oral, BID         CONTINUOUS INFUSIONS:          PRN MEDICATIONS:   •  acetaminophen **OR** acetaminophen  •  senna-docusate sodium **AND** polyethylene glycol **AND** bisacodyl **AND** bisacodyl  •  dextrose  •  dextrose  •  glucagon (human recombinant)  •  hydrALAZINE  •  insulin lispro **AND** insulin lispro  •  ipratropium-albuterol  •  ondansetron **OR** ondansetron  •  sodium chloride       OBJECTIVE     VITAL SIGNS:  /58   Pulse 82   Temp 98 °F (36.7 °C)   Resp 16   Ht 170.2 cm (67\")   Wt 117 kg (257 lb)   SpO2 98%   BMI 40.25 kg/m²     Wt Readings from Last 3 Encounters:   02/03/22 117 kg (257 lb)   12/03/21 101 kg (222 lb)   11/22/21 102 kg (225 lb)       INTAKE/OUTPUT:  No intake or output data in the 24 hours ending 02/04/22 0310    PHYSICAL EXAM:   Constitutional: Obese, non-toxic appearance   Eyes:  PERRL  HENT:  Atraumatic, trachea midline  Respiratory: Clear throughout, non-labored respirations  Cardiovascular:  Normal rate, normal rhythm  GI:  Soft, nondistended, normal bowel sounds  :  No costovertebral angle tenderness   Musculoskeletal: Generalized edema, no tenderness, no deformities  Integument: Dry skin, no rash "   Neurologic:  Alert & oriented x 3, dysarthria, aphasia, no focal deficits noted   Psychiatric: Behavior appropriate      HISTORY     HISTORY:  Past Medical History:   Diagnosis Date   • Atherosclerosis of native coronary artery of native heart without angina pectoris 10/22/2020   • Atrioventricular block, Mobitz type 1, Blairbach 10/21/2020    Added automatically from request for surgery [id number]   • Hypertension associated with stage 3 chronic kidney disease due to type 2 diabetes mellitus ([facility]) 10/22/2020   • Morbid obesity ([facility]) 10/22/2020   • Secondary hyperaldosteronism ([facility]) 10/22/2020   • Stage 3b chronic kidney disease ([facility]) 10/22/2020   • Type 2 diabetes mellitus with diabetic neuropathy, with long-term current use of insulin ([facility]) 10/22/2020   • Type 2 diabetes mellitus with diabetic neuropathy, with long-term current use of insulin ([facility]) 10/22/2020     Past Surgical History:   Procedure Laterality Date   • CARDIAC CATHETERIZATION N/A 10/23/2020    Procedure: Left Heart Cath and coronary angiogram;  Surgeon: Wang Plaza MD;  Location: [facility] CATH INVASIVE LOCATION;  Service: Cardiovascular;  Laterality: N/A;   • CARDIAC ELECTROPHYSIOLOGY PROCEDURE Left 11/3/2021    Procedure: Pacemaker DC new;  Surgeon: Wang Plaza MD;  Location: [facility] CATH INVASIVE LOCATION;  Service: Cardiovascular;  Laterality: Left;   • CARDIAC ELECTROPHYSIOLOGY PROCEDURE N/A 11/3/2021    Procedure: LOOP RECORDER REMOVAL;  Surgeon: Wang Plaza MD;  Location: [facility] CATH INVASIVE LOCATION;  Service: Cardiovascular;  Laterality: N/A;   • HYSTERECTOMY     • JOINT REPLACEMENT Right     Hip   • JOINT REPLACEMENT Left     hip   • JOINT REPLACEMENT Left     hip (for second time)   • JOINT REPLACEMENT Right     knee   • OOPHORECTOMY       Family History   Problem Relation Age of Onset   • Heart disease Mother      Social History     Socioeconomic History   • Marital status:    Tobacco Use   • Smoking status: Never  Smoker   • Smokeless tobacco: Never Used   Vaping Use   • Vaping Use: Never used   Substance and Sexual Activity   • Alcohol use: Not Currently   • Drug use: Never   • Sexual activity: Defer        HOME MEDICATIONS:   Prior to Admission medications    Medication Sig Start Date End Date Taking? Authorizing Provider   amLODIPine (NORVASC) 5 MG tablet Take 5 mg by mouth Every Morning.    Lucas Arreguin MD   aspirin 81 MG EC tablet Take 81 mg by mouth Daily.    Lucas Arreguin MD   atorvastatin (LIPITOR) 10 MG tablet Take 1 tablet by mouth Daily. 11/22/21   Gabriel Goss MD   furosemide (LASIX) 20 MG tablet Take 1 tablet by mouth 2 (Two) Times a Day. 1/14/22   Gabriel Goss MD   gabapentin (NEURONTIN) 300 MG capsule Take 300 mg by mouth 2 (two) times a day.    Lucas Arreguin MD   HYDROcodone-acetaminophen (NORCO) 7.5-325 MG per tablet Take 1 tablet by mouth Every 8 (Eight) Hours As Needed for Moderate Pain  or Severe Pain . 12/3/21   Fatou Purvis APRN   insulin NPH-insulin regular (humuLIN 70/30,novoLIN 70/30) (70-30) 100 UNIT/ML injection Inject 30 Units under the skin into the appropriate area as directed 2 (Two) Times a Day With Meals.    Lucas Arreguin MD   losartan (COZAAR) 50 MG tablet TAKE ONE TABLET BY MOUTH DAILY 12/30/21   Alan Park MD   metFORMIN (GLUCOPHAGE) 1000 MG tablet Take 1 tablet by mouth 2 (Two) Times a Day With Meals. 12/6/21   Gabriel Goss MD   omeprazole (priLOSEC) 40 MG capsule Take 40 mg by mouth Every Morning.    Lucas Arreguin MD   spironolactone (ALDACTONE) 25 MG tablet Take 1 tablet by mouth Daily. 9/22/21   Lucas Arreguin MD   topiramate (TOPAMAX) 25 MG tablet Take 12.5 mg by mouth 2 (Two) Times a Day.    Lucas Arreguin MD     Patient's home medications not verified, unable to reconcile at this time    IMMUNIZATIONS:  Immunization History   Administered Date(s) Administered   • DTaP 03/01/2013    • Flu Vaccine Quad PF 6-35MO 09/15/2016, 09/19/2017, 10/17/2018   • FluLaval/Fluarix/Fluzone >6 11/05/2021   • Hep A, 2 Dose 03/01/2013   • Hib (PRP-T) 03/01/2013   • Pneumococcal Conjugate 13-Valent (PCV13) 12/03/2015   • Pneumococcal Polysaccharide (PPSV23) 11/07/2016   • Shingrix 11/05/2019, 04/01/2020   • Td 01/16/2007        ALLERGIES:  Latex, natural rubber and Adhesive tape      RESULTS     LABS:  Lab Results (last 24 hours)     Procedure Component Value Units Date/Time    POC Glucose Once [[id number]]  (Abnormal) Collected: 02/03/22 1922    Specimen: Blood Updated: 02/03/22 1923     Glucose 144 mg/dL      Comment: Serial Number: [id number]:  [id number]       CK [[id number]]  (Normal) Collected: 02/03/22 1939    Specimen: Blood Updated: 02/03/22 2009     Creatine Kinase 77 U/L     Comprehensive Metabolic Panel [[id number]]  (Abnormal) Collected: 02/03/22 1939    Specimen: Blood Updated: 02/03/22 2009     Glucose 138 mg/dL      BUN 20 mg/dL      Creatinine 1.06 mg/dL      Sodium 140 mmol/L      Potassium 3.7 mmol/L      Chloride 106 mmol/L      CO2 23.0 mmol/L      Calcium 9.0 mg/dL      Total Protein 7.4 g/dL      Albumin 3.90 g/dL      ALT (SGPT) 19 U/L      AST (SGOT) 35 U/L      Alkaline Phosphatase 75 U/L      Total Bilirubin 0.3 mg/dL      eGFR Non African Amer 49 mL/min/1.73      Globulin 3.5 gm/dL      A/G Ratio 1.1 g/dL      BUN/Creatinine Ratio 18.9     Anion Gap 11.0 mmol/L     Narrative:      GFR Normal >60  Chronic Kidney Disease <60  Kidney Failure <15      Protime-INR [[id number]]  (Normal) Collected: 02/03/22 1939    Specimen: Blood Updated: 02/03/22 2003     Protime 11.4 Seconds      INR 1.03    aPTT [[id number]]  (Normal) Collected: 02/03/22 1939    Specimen: Blood Updated: 02/03/22 2003     PTT 24.3 seconds     Troponin [[id number]]  (Normal) Collected: 02/03/22 1939    Specimen: Blood Updated: 02/03/22 2009     Troponin T 0.016 ng/mL     Narrative:      Troponin T Reference Range:  <=  0.03 ng/mL-   Negative for AMI  >0.03 ng/mL-     Abnormal for myocardial necrosis.  Clinicians would have to utilize clinical acumen, EKG, Troponin and serial changes to determine if it is an Acute Myocardial Infarction or myocardial injury due to an underlying chronic condition.       Results may be falsely decreased if patient taking Biotin.      CBC & Differential [[id number]]  (Abnormal) Collected: 02/03/22 1943    Specimen: Blood Updated: 02/03/22 1947    Narrative:      The following orders were created for panel order CBC & Differential.  Procedure                               Abnormality         Status                     ---------                               -----------         ------                     CBC Auto Differential[[id number]]        Abnormal            Final result                 Please view results for these tests on the individual orders.    CBC Auto Differential [[id number]]  (Abnormal) Collected: 02/03/22 1943    Specimen: Blood Updated: 02/03/22 1947     WBC 4.50 10*3/mm3      RBC 3.25 10*6/mm3      Hemoglobin 9.0 g/dL      Hematocrit 28.7 %      MCV 88.4 fL      MCH 27.6 pg      MCHC 31.2 g/dL      RDW 20.4 %      RDW-SD 63.9 fl      MPV 6.6 fL      Platelets 195 10*3/mm3      Neutrophil % 42.0 %      Lymphocyte % 42.6 %      Monocyte % 8.2 %      Eosinophil % 6.0 %      Basophil % 1.2 %      Neutrophils, Absolute 1.90 10*3/mm3      Lymphocytes, Absolute 1.90 10*3/mm3      Monocytes, Absolute 0.40 10*3/mm3      Eosinophils, Absolute 0.30 10*3/mm3      Basophils, Absolute 0.10 10*3/mm3      nRBC 0.1 /100 WBC     Hemoglobin A1c [[id number]] Collected: 02/03/22 1943    Specimen: Blood Updated: 02/03/22 2155    POC Glucose Once [[id number]]  (Abnormal) Collected: 02/03/22 1955    Specimen: Blood Updated: 02/03/22 1956     Glucose 126 mg/dL      Comment: Serial Number: [id number]:  [id number]       Ammonia [[id number]]  (Abnormal) Collected: 02/03/22 2011    Specimen: Blood Updated:  02/03/22 2037     Ammonia 86 umol/L     Urinalysis With Culture If Indicated - Urine, Catheter In/Out [[id number]]  (Abnormal) Collected: 02/03/22 2011    Specimen: Urine, Catheter In/Out Updated: 02/03/22 2029     Color, UA Yellow     Appearance, UA Clear     pH, UA 5.5     Specific Gravity, UA 1.011     Glucose, UA Negative     Ketones, UA Negative     Bilirubin, UA Negative     Blood, UA Negative     Protein, UA Negative     Leuk Esterase, UA Trace     Nitrite, UA Negative     Urobilinogen, UA 1.0 E.U./dL    Urinalysis, Microscopic Only - Urine, Catheter In/Out [[id number]]  (Abnormal) Collected: 02/03/22 2011    Specimen: Urine, Catheter In/Out Updated: 02/03/22 2037     RBC, UA 0-2 /HPF      WBC, UA 6-12 /HPF      Bacteria, UA 3+ /HPF      Squamous Epithelial Cells, UA 0-2 /HPF      Hyaline Casts, UA 0-2 /LPF      Methodology Manual Light Microscopy    Urine Culture - Urine, Urine, Catheter In/Out [[id number]] Collected: 02/03/22 2011    Specimen: Urine, Catheter In/Out Updated: 02/03/22 2037    COVID PRE-OP / PRE-PROCEDURE SCREENING ORDER (NO ISOLATION) - Swab, Nasopharynx [[id number]]  (Normal) Collected: 02/03/22 2013    Specimen: Swab from Nasopharynx Updated: 02/03/22 2039    Narrative:      The following orders were created for panel order COVID PRE-OP / PRE-PROCEDURE SCREENING ORDER (NO ISOLATION) - Swab, Nasopharynx.  Procedure                               Abnormality         Status                     ---------                               -----------         ------                     COVID-19,CEPHEID/SHAHLA,CO...[[id number]]  Normal              Final result                 Please view results for these tests on the individual orders.    COVID-19,CEPHEID/SHAHLA,COR/RAYRAY/PAD/MARTHA IN-HOUSE(OR EMERGENT/ADD-ON),NP SWAB IN TRANSPORT MEDIA 3-4 HR TAT, RT-PCR - Swab, Nasopharynx [[id number]]  (Normal) Collected: 02/03/22 2013    Specimen: Swab from Nasopharynx Updated: 02/03/22 2039     COVID19 Not Detected     Narrative:      Fact sheet for providers: https://www.fda.gov/media/961413/download     Fact sheet for patients: https://www.fda.gov/media/144408/download  Fact sheet for providers: https://www.fda.gov/media/744291/download    Fact sheet for patients: https://www.fda.gov/media/174717/download    Test performed by PCR.    POC Glucose Once [[id number]]  (Abnormal) Collected: 02/03/22 2139    Specimen: Blood Updated: 02/03/22 2140     Glucose 120 mg/dL      Comment: Serial Number: [id number]:  [id number]               MICRO:  Microbiology Results (last 10 days)     Procedure Component Value - Date/Time    COVID PRE-OP / PRE-PROCEDURE SCREENING ORDER (NO ISOLATION) - Swab, Nasopharynx [[id number]]  (Normal) Collected: 02/03/22 2013    Lab Status: Final result Specimen: Swab from Nasopharynx Updated: 02/03/22 2039    Narrative:      The following orders were created for panel order COVID PRE-OP / PRE-PROCEDURE SCREENING ORDER (NO ISOLATION) - Swab, Nasopharynx.  Procedure                               Abnormality         Status                     ---------                               -----------         ------                     COVID-19,CEPHEID/SHAHLA,CO...[[id number]]  Normal              Final result                 Please view results for these tests on the individual orders.    COVID-19,CEPHEID/SHAHLA,COR/RAYRAY/PAD/MARTHA IN-HOUSE(OR EMERGENT/ADD-ON),NP SWAB IN TRANSPORT MEDIA 3-4 HR TAT, RT-PCR - Swab, Nasopharynx [[id number]]  (Normal) Collected: 02/03/22 2013    Lab Status: Final result Specimen: Swab from Nasopharynx Updated: 02/03/22 2039     COVID19 Not Detected    Narrative:      Fact sheet for providers: https://www.fda.gov/media/146041/download     Fact sheet for patients: https://www.fda.gov/media/276248/download  Fact sheet for providers: https://www.fda.gov/media/538474/download    Fact sheet for patients: https://www.fda.gov/media/273709/download    Test performed by PCR.            RADIOLOGY  STUDIES:  Imaging Results (Last 72 Hours)     Procedure Component Value Units Date/Time    XR Chest 1 View [[id number]] Collected: 02/03/22 2039     Updated: 02/03/22 2043    Narrative:      XR CHEST 1 VW    Date of Exam: 2/3/2022 19:47 EST    Indication: Acute Stroke Protocol (onset < 12 hrs).    Comparison Exams: 11/3/2021    FINDINGS:  There is a left subclavian dual-lead pacer. There is cardiomegaly, and the upper lobe vessels appear larger than the lower lobe vessels. There is no interlobular septal thickening or suspicious airspace disease. There is mild strandy atelectasis in the   lung bases. There is a calcified granuloma in the right lung base. Costophrenic angles are sharp      Impression:      Cardiomegaly with pulmonary venous hypertension. No evidence of pulmonary edema. No suspicious infiltrate. Atelectasis in the bases    Electronically Signed: Jesus Sparks MD 2/3/2022 20:41 EST    CT Angiogram Head w AI Analysis of LVO [[id number]] Collected: 02/03/22 2023     Updated: 02/03/22 2036    Narrative:      CT ANGIOGRAM HEAD W AI ANALYSIS OF LVO, CT ANGIOGRAM NECK    Date of Exam: 2/3/2022 19:43 EST    Indication: Acute Stroke.    Comparison: None available.    Technique: Contiguous axial imaging of the head and neck neck was performed after uneventful administration of 1 .  Coronal and sagittal reconstructions were performed. Three-dimensional reconstructions were performed. Automated exposure control and   iterative reconstruction methods were used.  Radiation audit for number of CT and nuclear cardiology exams performed in the last year:  3.    FINDINGS:    Vascular: Normal arch anatomy. Tortuous brachiocephalic arteries. No occlusion or significant stenosis of either common carotid artery. Patent carotid bifurcation bilaterally, with calcific atherosclerosis of the carotid bulbs bilaterally. No occlusion   or significant stenosis of either internal carotid artery. Normal vertebral artery origins.  Dominant left vertebral artery. No occlusion or significant stenosis of either vertebral artery. Patent basilar artery without stenosis. Proximal anterior   cerebral, middle cerebral, and posterior cerebral arteries are patent. Peripheral cerebral branches appear symmetric. No aneurysm demonstrated    Nonvascular: No enhancing abnormality in the brain. No neck mass or adenopathy. Upper lungs unremarkable      Impression:        1. No significant carotid or vertebral basilar stenosis or occlusion  2. No large intracranial vessel occlusion        Electronically Signed: Jesus Sparks MD 2/3/2022 20:34 EST    CT Angiogram Neck [[id number]] Collected: 02/03/22 2023     Updated: 02/03/22 2036    Narrative:      CT ANGIOGRAM HEAD W AI ANALYSIS OF LVO, CT ANGIOGRAM NECK    Date of Exam: 2/3/2022 19:43 EST    Indication: Acute Stroke.    Comparison: None available.    Technique: Contiguous axial imaging of the head and neck neck was performed after uneventful administration of 1 .  Coronal and sagittal reconstructions were performed. Three-dimensional reconstructions were performed. Automated exposure control and   iterative reconstruction methods were used.  Radiation audit for number of CT and nuclear cardiology exams performed in the last year:  3.    FINDINGS:    Vascular: Normal arch anatomy. Tortuous brachiocephalic arteries. No occlusion or significant stenosis of either common carotid artery. Patent carotid bifurcation bilaterally, with calcific atherosclerosis of the carotid bulbs bilaterally. No occlusion   or significant stenosis of either internal carotid artery. Normal vertebral artery origins. Dominant left vertebral artery. No occlusion or significant stenosis of either vertebral artery. Patent basilar artery without stenosis. Proximal anterior   cerebral, middle cerebral, and posterior cerebral arteries are patent. Peripheral cerebral branches appear symmetric. No aneurysm demonstrated    Nonvascular: No  enhancing abnormality in the brain. No neck mass or adenopathy. Upper lungs unremarkable      Impression:        1. No significant carotid or vertebral basilar stenosis or occlusion  2. No large intracranial vessel occlusion        Electronically Signed: Jesus Sparks MD 2/3/2022 20:34 EST    CT Head Without Contrast Stroke Protocol [[id number]] Collected: 02/03/22 1941     Updated: 02/03/22 1947    Narrative:      Examination: CT HEAD WO CONTRAST STROKE PROTOCOL    Date of Exam: 2/3/2022 19:38 EST    Indication: Neuro deficit, acute, stroke suspected.    Comparison: CT head without contrast 10/31/2022    Technique: Axial noncontrast CT imaging of the head was performed. Automated exposure control and iterative reconstruction methods were used.    Findings:  There is no acute intracranial hemorrhage. Negative for mass effect or midline shift. Ventricles and cortical sulci mildly prominent consistent with mild cerebral volume loss, stable from the prior study. Posterior fossa without acute normality. Mild   white matter findings suggesting chronic microvascular disease. Globes symmetric. Thinning of the lens bilaterally likely related to prior cataract extraction. The mastoid air cells are well-aerated. Negative for sinus fluid level. Hyperostosis frontalis   interna.      Impression:      1. No acute intracranial hemorrhage.  2. Mild cerebral atrophy and white matter findings of chronic small vessel disease similar to prior study.    Electronically Signed: Bejnamín aPdron MD 2/3/2022 19:45 EST              ECHOCARDIOGRAM:  Results for orders placed during the hospital encounter of 09/07/21    Adult Transthoracic Echo Complete W/ Cont if Necessary Per Protocol    Interpretation Summary  · Estimated left ventricular EF = 60% Left ventricular systolic function is normal.    Indications  Syncope    Technically satisfactory study.  Mitral valve is structurally normal.  Tricuspid valve is structurally normal.  Aortic valve  is structurally normal.  Pulmonic valve could not be well visualized.  No evidence for mitral tricuspid or aortic regurgitation is seen by Doppler study.  Left atrium is enlarged.  Right atrium is normal in size.  Left ventricle is normal in size and contractility with ejection fraction of 60%.  Right ventricle is normal in size.  Atrial septum is intact.  Aorta is normal.  No pericardial effusion or intracardiac thrombus is seen.    Impression  Left atrium is enlarged.  Structurally and functionally normal cardiac valves.  Left ventricular size and contractility is normal with ejection fraction of 60%.         I reviewed the patient's new clinical results.    I discussed the patient's findings and my recommendations with patient and nursing staff.  I have discussed plan with the attending Pulmonary/Intensivist physician, who agrees with this plan of care.    Appropriate PPE worn during assessment of patient per established guidelines.      Electronically signed by SHANI Ling, 02/03/22, 10:02 PM EST.

## 2022-02-04 NOTE — PLAN OF CARE
PT Eval not completed this date: Pt s/p tPA at 20:05 on 2/3/2022 and not appropriate for therapy this date.    Erica Todd, PT, DPT

## 2022-02-04 NOTE — H&P
Patient Care Team:  Gabriel Goss MD as PCP - General (Family Medicine)  Gregory Pinedo MD (Family Medicine)  Wang Plaza MD as Consulting Physician (Cardiology)    Chief complaint CVA        Assessment/Plan   Stroke/TIA status post TPA at 8:05 PM with improvement in NIH score  Echo pending  Neurology is following  MRI brain pending  Patient has compatible pacemaker placed last year    Patient will benefit from sleep apnea work-up as an outpatient    Coronary artery disease  AV block Mobitz type I and Wenckebach  Surgery consult to interrogate pacemaker    Chronic kidney disease    Diabetes mellitus    Obesity    Essential hypertension  Permissive hypertension for embolic stroke    Secondary hyperaldosteronism  Diabetic neuropathy    Patient is DNR/DNI.         History    86-year-old female complaining of difficulty speaking.  Patient's  noted that she had fallen this evening.  She has apparently has history of recurrent falls and EMS has been called to the house for lifting assistance in the past.  The  reports no recent changes in ataxia but reports that she had unintelligible speech.  States it started abruptly about 40 minutes prior to arrival.  He states that she has not complained of headache and has never had this problem before.  The patient denies extremity lateralizing signs.  The patient had no recent reports of fever or chills.  There is been no reports of nausea vomiting or diarrhea.  Patient has not recently complained of dysuria or cough    past medical history of Atherosclerosis of native coronary artery of native heart without angina pectoris (10/22/2020), Atrioventricular block, Mobitz type 1, Wenckebach (10/21/2020), Hypertension associated with stage 3 chronic kidney disease due to type 2 diabetes mellitus (HCC) (10/22/2020), Morbid obesity (HCC) (10/22/2020), Secondary hyperaldosteronism (HCC) (10/22/2020), Stage 3b chronic kidney disease (HCC) (10/22/2020),  Type 2 diabetes mellitus with diabetic neuropathy, with long-term current use of insulin ([facility]) (10/22/2020), and Type 2 diabetes mellitus with diabetic neuropathy, with long-term current use of insulin ([facility]) (10/22/2020).    Patient was admitted for stroke NIH score was 5 received TPA at 8:05 PM with improvement in NIH score  Patient will be admitted to the intensive care unit with neurology following  Rec diabetic control          Cerebrovascular accident (CVA) ([facility])    Fall    Secondary hyperaldosteronism ([facility])    Stage 3b chronic kidney disease (CMS/HCC)    Hypertension associated with stage 3 chronic kidney disease due to type 2 diabetes mellitus ([facility])    Type 2 diabetes mellitus with diabetic neuropathy, with long-term current use of insulin ([facility])    Morbid obesity ([facility])    Atherosclerosis of native coronary artery of native heart without angina pectoris    Hyperlipidemia    Primary hypertension    Acute UTI    GERD (gastroesophageal reflux disease)    Cardiac pacemaker in situ    Hyperammonemia ([facility])      Past Medical History:   Diagnosis Date   • Atherosclerosis of native coronary artery of native heart without angina pectoris 10/22/2020   • Atrioventricular block, Mobitz type 1, Mitch 10/21/2020    Added automatically from request for surgery [id number]   • Hypertension associated with stage 3 chronic kidney disease due to type 2 diabetes mellitus ([facility]) 10/22/2020   • Morbid obesity ([facility]) 10/22/2020   • Secondary hyperaldosteronism ([facility]) 10/22/2020   • Stage 3b chronic kidney disease ([facility]) 10/22/2020   • Type 2 diabetes mellitus with diabetic neuropathy, with long-term current use of insulin ([facility]) 10/22/2020   • Type 2 diabetes mellitus with diabetic neuropathy, with long-term current use of insulin ([facility]) 10/22/2020       Past Surgical History:   Procedure Laterality Date   • CARDIAC CATHETERIZATION N/A 10/23/2020    Procedure: Left Heart Cath and coronary angiogram;  Surgeon: Wang Plaza MD;   Location: [facility] CATH INVASIVE LOCATION;  Service: Cardiovascular;  Laterality: N/A;   • CARDIAC ELECTROPHYSIOLOGY PROCEDURE Left 11/3/2021    Procedure: Pacemaker DC new;  Surgeon: Wang Plaza MD;  Location: [facility] CATH INVASIVE LOCATION;  Service: Cardiovascular;  Laterality: Left;   • CARDIAC ELECTROPHYSIOLOGY PROCEDURE N/A 11/3/2021    Procedure: LOOP RECORDER REMOVAL;  Surgeon: Wang Plaza MD;  Location: [facility] CATH INVASIVE LOCATION;  Service: Cardiovascular;  Laterality: N/A;   • HYSTERECTOMY     • JOINT REPLACEMENT Right     Hip   • JOINT REPLACEMENT Left     hip   • JOINT REPLACEMENT Left     hip (for second time)   • JOINT REPLACEMENT Right     knee   • OOPHORECTOMY         Family History   Problem Relation Age of Onset   • Heart disease Mother        Social History     Socioeconomic History   • Marital status:    Tobacco Use   • Smoking status: Never Smoker   • Smokeless tobacco: Never Used   Vaping Use   • Vaping Use: Never used   Substance and Sexual Activity   • Alcohol use: Not Currently   • Drug use: Never   • Sexual activity: Defer       Review of Systems  Review of Systems   Respiratory: Positive for cough. Negative for shortness of breath.    Cardiovascular: Positive for palpitations and leg swelling. Negative for chest pain.        Vital Signs  Temp:  [98 °F (36.7 °C)-98.2 °F (36.8 °C)] 98.2 °F (36.8 °C)  Heart Rate:  [75-95] 95  Resp:  [12-17] 14  BP: (104-180)/(48-90) 173/81    Physical Exam:  Physical Exam  Cardiovascular:      Heart sounds: Murmur heard.       Pulmonary:      Breath sounds: Rales present.           Radiology  Imaging Results (Last 24 Hours)     Procedure Component Value Units Date/Time    XR Chest 1 View [[id number]] Collected: 02/03/22 2039     Updated: 02/03/22 2043    Narrative:      XR CHEST 1 VW    Date of Exam: 2/3/2022 19:47 EST    Indication: Acute Stroke Protocol (onset < 12 hrs).    Comparison Exams: 11/3/2021    FINDINGS:  There is a left  subclavian dual-lead pacer. There is cardiomegaly, and the upper lobe vessels appear larger than the lower lobe vessels. There is no interlobular septal thickening or suspicious airspace disease. There is mild strandy atelectasis in the   lung bases. There is a calcified granuloma in the right lung base. Costophrenic angles are sharp      Impression:      Cardiomegaly with pulmonary venous hypertension. No evidence of pulmonary edema. No suspicious infiltrate. Atelectasis in the bases    Electronically Signed: Jesus Sparks MD 2/3/2022 20:41 EST    CT Angiogram Head w AI Analysis of LVO [[id number]] Collected: 02/03/22 2023     Updated: 02/03/22 2036    Narrative:      CT ANGIOGRAM HEAD W AI ANALYSIS OF LVO, CT ANGIOGRAM NECK    Date of Exam: 2/3/2022 19:43 EST    Indication: Acute Stroke.    Comparison: None available.    Technique: Contiguous axial imaging of the head and neck neck was performed after uneventful administration of 1 .  Coronal and sagittal reconstructions were performed. Three-dimensional reconstructions were performed. Automated exposure control and   iterative reconstruction methods were used.  Radiation audit for number of CT and nuclear cardiology exams performed in the last year:  3.    FINDINGS:    Vascular: Normal arch anatomy. Tortuous brachiocephalic arteries. No occlusion or significant stenosis of either common carotid artery. Patent carotid bifurcation bilaterally, with calcific atherosclerosis of the carotid bulbs bilaterally. No occlusion   or significant stenosis of either internal carotid artery. Normal vertebral artery origins. Dominant left vertebral artery. No occlusion or significant stenosis of either vertebral artery. Patent basilar artery without stenosis. Proximal anterior   cerebral, middle cerebral, and posterior cerebral arteries are patent. Peripheral cerebral branches appear symmetric. No aneurysm demonstrated    Nonvascular: No enhancing abnormality in the brain. No  neck mass or adenopathy. Upper lungs unremarkable      Impression:        1. No significant carotid or vertebral basilar stenosis or occlusion  2. No large intracranial vessel occlusion        Electronically Signed: Jesus Sparks MD 2/3/2022 20:34 EST    CT Angiogram Neck [[id number]] Collected: 02/03/22 2023     Updated: 02/03/22 2036    Narrative:      CT ANGIOGRAM HEAD W AI ANALYSIS OF LVO, CT ANGIOGRAM NECK    Date of Exam: 2/3/2022 19:43 EST    Indication: Acute Stroke.    Comparison: None available.    Technique: Contiguous axial imaging of the head and neck neck was performed after uneventful administration of 1 .  Coronal and sagittal reconstructions were performed. Three-dimensional reconstructions were performed. Automated exposure control and   iterative reconstruction methods were used.  Radiation audit for number of CT and nuclear cardiology exams performed in the last year:  3.    FINDINGS:    Vascular: Normal arch anatomy. Tortuous brachiocephalic arteries. No occlusion or significant stenosis of either common carotid artery. Patent carotid bifurcation bilaterally, with calcific atherosclerosis of the carotid bulbs bilaterally. No occlusion   or significant stenosis of either internal carotid artery. Normal vertebral artery origins. Dominant left vertebral artery. No occlusion or significant stenosis of either vertebral artery. Patent basilar artery without stenosis. Proximal anterior   cerebral, middle cerebral, and posterior cerebral arteries are patent. Peripheral cerebral branches appear symmetric. No aneurysm demonstrated    Nonvascular: No enhancing abnormality in the brain. No neck mass or adenopathy. Upper lungs unremarkable      Impression:        1. No significant carotid or vertebral basilar stenosis or occlusion  2. No large intracranial vessel occlusion        Electronically Signed: Jesus Sparks MD 2/3/2022 20:34 EST    CT Head Without Contrast Stroke Protocol [[id number]] Collected:  02/03/22 1941     Updated: 02/03/22 1947    Narrative:      Examination: CT HEAD WO CONTRAST STROKE PROTOCOL    Date of Exam: 2/3/2022 19:38 EST    Indication: Neuro deficit, acute, stroke suspected.    Comparison: CT head without contrast 10/31/2022    Technique: Axial noncontrast CT imaging of the head was performed. Automated exposure control and iterative reconstruction methods were used.    Findings:  There is no acute intracranial hemorrhage. Negative for mass effect or midline shift. Ventricles and cortical sulci mildly prominent consistent with mild cerebral volume loss, stable from the prior study. Posterior fossa without acute normality. Mild   white matter findings suggesting chronic microvascular disease. Globes symmetric. Thinning of the lens bilaterally likely related to prior cataract extraction. The mastoid air cells are well-aerated. Negative for sinus fluid level. Hyperostosis frontalis   interna.      Impression:      1. No acute intracranial hemorrhage.  2. Mild cerebral atrophy and white matter findings of chronic small vessel disease similar to prior study.    Electronically Signed: Benjamín Padron MD 2/3/2022 19:45 EST          Labs:  Results from last 7 days   Lab Units 02/04/22  0645   WBC 10*3/mm3 3.40   HEMOGLOBIN g/dL 8.6*   HEMATOCRIT % 27.8*   PLATELETS 10*3/mm3 177     Results from last 7 days   Lab Units 02/04/22  0645   SODIUM mmol/L 142   POTASSIUM mmol/L 4.1   CHLORIDE mmol/L 109*   CO2 mmol/L 21.0*   BUN mg/dL 18   CREATININE mg/dL 0.81   CALCIUM mg/dL 8.6   BILIRUBIN mg/dL 0.3   ALK PHOS U/L 73   ALT (SGPT) U/L 17   AST (SGOT) U/L 24   GLUCOSE mg/dL 140*         Results from last 7 days   Lab Units 02/04/22  0645 02/03/22  1939   ALBUMIN g/dL 3.70 3.90     Results from last 7 days   Lab Units 02/04/22  0645 02/03/22  1939   CK TOTAL U/L  --  77   TROPONIN T ng/mL 0.012 0.016         Results from last 7 days   Lab Units 02/04/22  0645   MAGNESIUM mg/dL 2.1     Results from last 7  days   Lab Units 02/03/22  1939   INR  1.03   APTT seconds 24.3     Results from last 7 days   Lab Units 02/04/22  0645   TSH uIU/mL 1.590           Meds:   SCHEDULE  [START ON 2/5/2022] aspirin, 325 mg, Oral, Daily   Or  [START ON 2/5/2022] aspirin, 300 mg, Rectal, Daily  atorvastatin, 80 mg, Oral, Nightly  cefTRIAXone, 1 g, Intravenous, Q24H  insulin lispro, 0-14 Units, Subcutaneous, Q6H  pantoprazole, 40 mg, Intravenous, Q AM  senna-docusate sodium, 2 tablet, Oral, BID      Infusions     PRNs  •  acetaminophen **OR** acetaminophen  •  senna-docusate sodium **AND** polyethylene glycol **AND** bisacodyl **AND** bisacodyl  •  dextrose  •  dextrose  •  glucagon (human recombinant)  •  hydrALAZINE  •  insulin lispro **AND** insulin lispro  •  ipratropium-albuterol  •  ondansetron **OR** ondansetron  •  sodium chloride      I discussed the patients findings and my recommendations with patient and primary care team.     Dwain Campos MD  02/04/22  08:38 EST    Time: Critical care 70 min

## 2022-02-04 NOTE — CASE MANAGEMENT/SOCIAL WORK
Discharge Planning Assessment  [facility]     Patient Name: Aracelis Vargas  MRN: [id number]  Today's Date: 2/4/2022    Admit Date: 2/3/2022     Discharge Needs Assessment     Row Name 02/04/22 1146       Living Environment    Lives With spouse    Current Living Arrangements home/apartment/condo    Quality of Family Relationships supportive    Able to Return to Prior Arrangements yes       Resource/Environmental Concerns    Resource/Environmental Concerns none    Transportation Concerns car, none       Transition Planning    Patient/Family Anticipates Transition to home with help/services; inpatient rehabilitation facility    Transportation Anticipated family or friend will provide       Discharge Needs Assessment    Readmission Within the Last 30 Days no previous admission in last 30 days    Equipment Currently Used at Home cane, straight; glucometer; walker, rolling               Discharge Plan     Row Name 02/04/22 5106       Plan    Plan D/C Plan : New referral ro [facility]/H pending acceptance , PT/OT and speech to eval .    Plan Comments Confirmed PCP and pharmacy , pt spouse will be transport at D/C . Pt was a code stroke in the ER and got TPA , Pt refusing rehab at this time              Continued Care and Services - Admitted Since 2/3/2022     Home Medical Care     Service Provider Request Status Selected Services Address Phone Fax Patient Preferred    [facility]  Pending - Request Sent N/A [address] [place] [address] [phone] --            Selected Continued Care - Prior Encounters Includes selections from prior encounters from 11/5/2021 to 2/4/2022    Discharged on 11/5/2021 Admission date: 10/31/2021 - Discharge disposition: Rehab Facility or Unit (DC - External)    Destination     Service Provider Selected Services Address Phone Fax Patient Preferred    [facility]  Inpatient Rehabilitation [facility] [address] [facility] [place] [address]  [phone] --                       Demographic Summary     Row Name 02/04/22 1145       General Information    Admission Type inpatient    Arrived From emergency department    Required Notices Provided Important Message from Medicare    Preferred Language English     Used During This Interaction no               Functional Status     Row Name 02/04/22 1146       Functional Status    Usual Activity Tolerance moderate    Current Activity Tolerance fair       Mental Status    General Appearance WDL WDL       Mental Status Summary    Recent Changes in Mental Status/Cognitive Functioning no changes                         Selene Aleman RN

## 2022-02-05 LAB
ALBUMIN SERPL-MCNC: 3.4 G/DL (ref 3.5–5.2)
ALBUMIN/GLOB SERPL: 1.1 G/DL
ALP SERPL-CCNC: 71 U/L (ref 39–117)
ALT SERPL W P-5'-P-CCNC: 14 U/L (ref 1–33)
AMMONIA BLD-SCNC: 44 UMOL/L (ref 11–51)
ANION GAP SERPL CALCULATED.3IONS-SCNC: 9 MMOL/L (ref 5–15)
AST SERPL-CCNC: 19 U/L (ref 1–32)
BACTERIA SPEC AEROBE CULT: NORMAL
BASOPHILS # BLD AUTO: 0 10*3/MM3 (ref 0–0.2)
BASOPHILS NFR BLD AUTO: 1.3 % (ref 0–1.5)
BILIRUB SERPL-MCNC: 0.5 MG/DL (ref 0–1.2)
BUN SERPL-MCNC: 12 MG/DL (ref 8–23)
BUN/CREAT SERPL: 12.6 (ref 7–25)
CALCIUM SPEC-SCNC: 8.5 MG/DL (ref 8.6–10.5)
CHLORIDE SERPL-SCNC: 108 MMOL/L (ref 98–107)
CO2 SERPL-SCNC: 23 MMOL/L (ref 22–29)
CREAT SERPL-MCNC: 0.95 MG/DL (ref 0.57–1)
DEPRECATED RDW RBC AUTO: 59.9 FL (ref 37–54)
EOSINOPHIL # BLD AUTO: 0.3 10*3/MM3 (ref 0–0.4)
EOSINOPHIL NFR BLD AUTO: 6.8 % (ref 0.3–6.2)
ERYTHROCYTE [DISTWIDTH] IN BLOOD BY AUTOMATED COUNT: 19.6 % (ref 12.3–15.4)
GFR SERPL CREATININE-BSD FRML MDRD: 56 ML/MIN/1.73
GLOBULIN UR ELPH-MCNC: 3.2 GM/DL
GLUCOSE BLDC GLUCOMTR-MCNC: 130 MG/DL (ref 70–105)
GLUCOSE BLDC GLUCOMTR-MCNC: 140 MG/DL (ref 70–105)
GLUCOSE BLDC GLUCOMTR-MCNC: 144 MG/DL (ref 70–105)
GLUCOSE BLDC GLUCOMTR-MCNC: 205 MG/DL (ref 70–105)
GLUCOSE SERPL-MCNC: 142 MG/DL (ref 65–99)
HCT VFR BLD AUTO: 26.8 % (ref 34–46.6)
HGB BLD-MCNC: 8.5 G/DL (ref 12–15.9)
LYMPHOCYTES # BLD AUTO: 0.9 10*3/MM3 (ref 0.7–3.1)
LYMPHOCYTES NFR BLD AUTO: 24.6 % (ref 19.6–45.3)
MAGNESIUM SERPL-MCNC: 1.9 MG/DL (ref 1.6–2.4)
MCH RBC QN AUTO: 27.1 PG (ref 26.6–33)
MCHC RBC AUTO-ENTMCNC: 31.5 G/DL (ref 31.5–35.7)
MCV RBC AUTO: 85.8 FL (ref 79–97)
MONOCYTES # BLD AUTO: 0.3 10*3/MM3 (ref 0.1–0.9)
MONOCYTES NFR BLD AUTO: 8.1 % (ref 5–12)
NEUTROPHILS NFR BLD AUTO: 2.2 10*3/MM3 (ref 1.7–7)
NEUTROPHILS NFR BLD AUTO: 59.2 % (ref 42.7–76)
NRBC BLD AUTO-RTO: 0 /100 WBC (ref 0–0.2)
PHOSPHATE SERPL-MCNC: 2.9 MG/DL (ref 2.5–4.5)
PLATELET # BLD AUTO: 190 10*3/MM3 (ref 140–450)
PMV BLD AUTO: 6.5 FL (ref 6–12)
POTASSIUM SERPL-SCNC: 4 MMOL/L (ref 3.5–5.2)
PROT SERPL-MCNC: 6.6 G/DL (ref 6–8.5)
RBC # BLD AUTO: 3.12 10*6/MM3 (ref 3.77–5.28)
SODIUM SERPL-SCNC: 140 MMOL/L (ref 136–145)
TROPONIN T SERPL-MCNC: 0.01 NG/ML (ref 0–0.03)
TROPONIN T SERPL-MCNC: 0.02 NG/ML (ref 0–0.03)
TROPONIN T SERPL-MCNC: 0.02 NG/ML (ref 0–0.03)
WBC NRBC COR # BLD: 3.8 10*3/MM3 (ref 3.4–10.8)

## 2022-02-05 PROCEDURE — 99232 SBSQ HOSP IP/OBS MODERATE 35: CPT | Performed by: INTERNAL MEDICINE

## 2022-02-05 PROCEDURE — 84484 ASSAY OF TROPONIN QUANT: CPT | Performed by: STUDENT IN AN ORGANIZED HEALTH CARE EDUCATION/TRAINING PROGRAM

## 2022-02-05 PROCEDURE — 84100 ASSAY OF PHOSPHORUS: CPT | Performed by: NURSE PRACTITIONER

## 2022-02-05 PROCEDURE — 80053 COMPREHEN METABOLIC PANEL: CPT | Performed by: NURSE PRACTITIONER

## 2022-02-05 PROCEDURE — 82962 GLUCOSE BLOOD TEST: CPT

## 2022-02-05 PROCEDURE — 36415 COLL VENOUS BLD VENIPUNCTURE: CPT | Performed by: NURSE PRACTITIONER

## 2022-02-05 PROCEDURE — 83735 ASSAY OF MAGNESIUM: CPT | Performed by: NURSE PRACTITIONER

## 2022-02-05 PROCEDURE — 85025 COMPLETE CBC W/AUTO DIFF WBC: CPT | Performed by: NURSE PRACTITIONER

## 2022-02-05 PROCEDURE — 63710000001 INSULIN LISPRO (HUMAN) PER 5 UNITS: Performed by: INTERNAL MEDICINE

## 2022-02-05 PROCEDURE — 82140 ASSAY OF AMMONIA: CPT | Performed by: NURSE PRACTITIONER

## 2022-02-05 PROCEDURE — 25010000002 CEFTRIAXONE PER 250 MG: Performed by: NURSE PRACTITIONER

## 2022-02-05 RX ORDER — LOSARTAN POTASSIUM 50 MG/1
50 TABLET ORAL DAILY
Status: DISCONTINUED | OUTPATIENT
Start: 2022-02-05 | End: 2022-02-06 | Stop reason: HOSPADM

## 2022-02-05 RX ORDER — FUROSEMIDE 20 MG/1
20 TABLET ORAL
Status: DISCONTINUED | OUTPATIENT
Start: 2022-02-05 | End: 2022-02-06 | Stop reason: HOSPADM

## 2022-02-05 RX ORDER — AMLODIPINE BESYLATE 5 MG/1
5 TABLET ORAL EVERY MORNING
Status: DISCONTINUED | OUTPATIENT
Start: 2022-02-06 | End: 2022-02-06 | Stop reason: HOSPADM

## 2022-02-05 RX ORDER — TOPIRAMATE 25 MG/1
12.5 TABLET ORAL 2 TIMES DAILY
Status: DISCONTINUED | OUTPATIENT
Start: 2022-02-05 | End: 2022-02-06 | Stop reason: HOSPADM

## 2022-02-05 RX ORDER — PANTOPRAZOLE SODIUM 40 MG/1
40 TABLET, DELAYED RELEASE ORAL
Status: DISCONTINUED | OUTPATIENT
Start: 2022-02-06 | End: 2022-02-06 | Stop reason: HOSPADM

## 2022-02-05 RX ORDER — GABAPENTIN 300 MG/1
300 CAPSULE ORAL 2 TIMES DAILY
Status: DISCONTINUED | OUTPATIENT
Start: 2022-02-05 | End: 2022-02-06 | Stop reason: HOSPADM

## 2022-02-05 RX ORDER — SPIRONOLACTONE 25 MG/1
25 TABLET ORAL EVERY MORNING
Status: DISCONTINUED | OUTPATIENT
Start: 2022-02-06 | End: 2022-02-06 | Stop reason: HOSPADM

## 2022-02-05 RX ADMIN — TOPIRAMATE 12.5 MG: 25 TABLET, FILM COATED ORAL at 20:13

## 2022-02-05 RX ADMIN — CEFTRIAXONE 1 G: 1 INJECTION, POWDER, FOR SOLUTION INTRAMUSCULAR; INTRAVENOUS at 04:13

## 2022-02-05 RX ADMIN — ATORVASTATIN CALCIUM 40 MG: 40 TABLET, FILM COATED ORAL at 20:13

## 2022-02-05 RX ADMIN — ASPIRIN 325 MG ORAL TABLET 325 MG: 325 PILL ORAL at 10:12

## 2022-02-05 RX ADMIN — LOSARTAN POTASSIUM 50 MG: 50 TABLET, FILM COATED ORAL at 20:13

## 2022-02-05 RX ADMIN — PANTOPRAZOLE SODIUM 40 MG: 40 INJECTION, POWDER, LYOPHILIZED, FOR SOLUTION INTRAVENOUS at 11:41

## 2022-02-05 RX ADMIN — DOCUSATE SODIUM 50 MG AND SENNOSIDES 8.6 MG 2 TABLET: 8.6; 5 TABLET, FILM COATED ORAL at 10:12

## 2022-02-05 RX ADMIN — LEVETIRACETAM 500 MG: 500 TABLET, FILM COATED ORAL at 20:13

## 2022-02-05 RX ADMIN — LEVETIRACETAM 500 MG: 500 TABLET, FILM COATED ORAL at 10:12

## 2022-02-05 RX ADMIN — INSULIN LISPRO 5 UNITS: 100 INJECTION, SOLUTION INTRAVENOUS; SUBCUTANEOUS at 18:37

## 2022-02-05 RX ADMIN — DOCUSATE SODIUM 50 MG AND SENNOSIDES 8.6 MG 2 TABLET: 8.6; 5 TABLET, FILM COATED ORAL at 20:13

## 2022-02-05 RX ADMIN — FUROSEMIDE 20 MG: 20 TABLET ORAL at 20:13

## 2022-02-05 RX ADMIN — GABAPENTIN 300 MG: 300 CAPSULE ORAL at 20:13

## 2022-02-05 NOTE — CONSULTS
[facility] Medicine Services      Patient Name: Aracelis Vargas  : 1935  MRN: [id number]  Primary Care Physician:  Gabriel Goss MD  Date of admission: 2/3/2022      Subjective      Chief Complaint: abnormal speech    HPI taken from medical chart review  History of Present Illness: Aracelis Vargas is a 86 y.o. female with PMH of CAD, wenckebach, HTN, HLD, DM type II who presented to [facility] ED 2/3/2022 after a fall at home and difficulty speaking approximately 40 minutes prior to EMS arrival. There was no report of facial droop or lateralizing weakness. The patient does not recall falling at home.     In the ED the patient was a Code Stroke.  CT head showed no acute intracranial hemorrhage, CTA head/neck showed no significant carotid or vertebral basilar stenosis or occlusion.  No large intracranial vessel occlusion..  She was determined to be a tPA candidate and received tPA at 20:05. She was admitted to the ICU for close neuro monitoring.     Date   2022: The patient had NIH of 0.  She was felt stable for transfer out of ICU and hospitalist group consulted for medical management.   22 patient seen and examined in bed no acute distress, doing better today, awaiting physical therapy to evaluate. Awaiting MRI, discussed with RN.    Review of Systems   Constitutional: Negative.   HENT: Negative.    Eyes: Negative.    Cardiovascular: Negative.    Respiratory: Negative.    Endocrine: Negative.    Hematologic/Lymphatic: Negative.    Skin: Negative.    Musculoskeletal: Negative.    Gastrointestinal: Negative.    Genitourinary: Negative.    Neurological: Negative.    Psychiatric/Behavioral: Negative.    Allergic/Immunologic: Negative.    All other systems reviewed and are negative.       Personal History     Past Medical History:   Diagnosis Date   • Atherosclerosis of native coronary artery of native heart without angina pectoris 10/22/2020   • Atrioventricular block, Mobitz type 1,  Blairbach 10/21/2020    Added automatically from request for surgery [id number]   • Hypertension associated with stage 3 chronic kidney disease due to type 2 diabetes mellitus ([facility]) 10/22/2020   • Morbid obesity ([facility]) 10/22/2020   • Secondary hyperaldosteronism ([facility]) 10/22/2020   • Stage 3b chronic kidney disease ([facility]) 10/22/2020   • Type 2 diabetes mellitus with diabetic neuropathy, with long-term current use of insulin ([facility]) 10/22/2020   • Type 2 diabetes mellitus with diabetic neuropathy, with long-term current use of insulin ([facility]) 10/22/2020       Past Surgical History:   Procedure Laterality Date   • CARDIAC CATHETERIZATION N/A 10/23/2020    Procedure: Left Heart Cath and coronary angiogram;  Surgeon: Wang Plaza MD;  Location: [facility] CATH INVASIVE LOCATION;  Service: Cardiovascular;  Laterality: N/A;   • CARDIAC ELECTROPHYSIOLOGY PROCEDURE Left 11/3/2021    Procedure: Pacemaker DC new;  Surgeon: Wang Plaza MD;  Location: [facility] CATH INVASIVE LOCATION;  Service: Cardiovascular;  Laterality: Left;   • CARDIAC ELECTROPHYSIOLOGY PROCEDURE N/A 11/3/2021    Procedure: LOOP RECORDER REMOVAL;  Surgeon: Wang Plaza MD;  Location: [facility] CATH INVASIVE LOCATION;  Service: Cardiovascular;  Laterality: N/A;   • HYSTERECTOMY     • JOINT REPLACEMENT Right     Hip   • JOINT REPLACEMENT Left     hip   • JOINT REPLACEMENT Left     hip (for second time)   • JOINT REPLACEMENT Right     knee   • OOPHORECTOMY         Family History: family history includes Heart disease in her mother. Otherwise pertinent FHx was reviewed and not pertinent to current issue.    Social History:  reports that she has never smoked. She has never used smokeless tobacco. She reports previous alcohol use. She reports that she does not use drugs.    Home Medications:  Prior to Admission Medications     Prescriptions Last Dose Informant Patient Reported? Taking?    amLODIPine (NORVASC) 5 MG tablet  Self Yes No    Take 5 mg by mouth Every  Morning.    aspirin 81 MG EC tablet   Yes No    Take 81 mg by mouth Daily.    atorvastatin (LIPITOR) 10 MG tablet   No No    Take 1 tablet by mouth Daily.    furosemide (LASIX) 20 MG tablet   No No    Take 1 tablet by mouth 2 (Two) Times a Day.    gabapentin (NEURONTIN) 300 MG capsule   Yes No    Take 300 mg by mouth 2 (two) times a day.    HYDROcodone-acetaminophen (NORCO) 7.5-325 MG per tablet   No No    Take 1 tablet by mouth Every 8 (Eight) Hours As Needed for Moderate Pain  or Severe Pain .    insulin NPH-insulin regular (humuLIN 70/30,novoLIN 70/30) (70-30) 100 UNIT/ML injection   Yes No    Inject 30 Units under the skin into the appropriate area as directed 2 (Two) Times a Day With Meals.    losartan (COZAAR) 50 MG tablet   No No    TAKE ONE TABLET BY MOUTH DAILY    metFORMIN (GLUCOPHAGE) 1000 MG tablet   No No    Take 1 tablet by mouth 2 (Two) Times a Day With Meals.    omeprazole (priLOSEC) 40 MG capsule   Yes No    Take 40 mg by mouth Every Morning.    spironolactone (ALDACTONE) 25 MG tablet   Yes No    Take 1 tablet by mouth Daily.    topiramate (TOPAMAX) 25 MG tablet   Yes No    Take 12.5 mg by mouth 2 (Two) Times a Day.            Allergies:  Allergies   Allergen Reactions   • Latex, Natural Rubber Rash   • Adhesive Tape Rash       Objective      Vitals:   Temp:  [98.1 °F (36.7 °C)-99.5 °F (37.5 °C)] 99.3 °F (37.4 °C)  Heart Rate:  [79-88] 84  Resp:  [13-17] 17  BP: (152-168)/(63-75) 162/70  Flow (L/min):  [2] 2    Physical Exam  Vitals and nursing note reviewed.   HENT:      Head: Normocephalic and atraumatic.   Eyes:      Extraocular Movements: Extraocular movements intact.      Pupils: Pupils are equal, round, and reactive to light.   Cardiovascular:      Rate and Rhythm: Normal rate and regular rhythm.      Pulses: Normal pulses.      Heart sounds: Normal heart sounds.   Pulmonary:      Effort: Pulmonary effort is normal.      Breath sounds: Normal breath sounds.   Abdominal:      General: Bowel  sounds are normal.      Palpations: Abdomen is soft.      Tenderness: There is no abdominal tenderness.   Musculoskeletal:      Cervical back: Normal range of motion.      Comments: Weakness bilateral lower extremities, equal   Skin:     General: Skin is warm and dry.   Neurological:      Mental Status: She is alert and oriented to person, place, and time.      Comments: NIH 0   Psychiatric:         Mood and Affect: Mood normal.         Behavior: Behavior normal.        Result Review    Result Review:  I have personally reviewed the results from the time of this admission to 2/5/2022 15:37 EST and agree with these findings:  [x]  Laboratory  []  Microbiology  [x]  Radiology  []  EKG/Telemetry   []  Cardiology/Vascular   []  Pathology  [x]  Old records  CMP:      Lab 02/05/22  0723 02/04/22  0645 02/03/22 1939   SODIUM 140 142 140   POTASSIUM 4.0 4.1 3.7   CHLORIDE 108* 109* 106   CO2 23.0 21.0* 23.0   ANION GAP 9.0 12.0 11.0   BUN 12 18 20   CREATININE 0.95 0.81 1.06*   GLUCOSE 142* 140* 138*   CALCIUM 8.5* 8.6 9.0   MAGNESIUM 1.9 2.1  --    PHOSPHORUS 2.9 2.5  --    TOTAL PROTEIN 6.6 6.9 7.4   ALBUMIN 3.40* 3.70 3.90   GLOBULIN 3.2 3.2 3.5   ALT (SGPT) 14 17 19   AST (SGOT) 19 24 35*   BILIRUBIN 0.5 0.3 0.3   ALK PHOS 71 73 75     CBC:      Lab 02/05/22  0723 02/04/22  0645 02/04/22  0645 02/03/22 1943 02/03/22 1943   WBC 3.80   < > 3.40   < > 4.50   HEMOGLOBIN 8.5*   < > 8.6*   < > 9.0*   HEMATOCRIT 26.8*   < > 27.8*   < > 28.7*   PLATELETS 190  --  177  --  195   NEUTROS ABS 2.20  --  2.00  --  1.90   LYMPHS ABS 0.90  --  0.90  --  1.90   MONOS ABS 0.30  --  0.30  --  0.40   EOS ABS 0.30  --  0.20  --  0.30   MCV 85.8  --  86.2  --  88.4    < > = values in this interval not displayed.     Assessment/Plan        Active Hospital Problems:  Active Hospital Problems    Diagnosis    • **Cerebrovascular accident (CVA) (HCC)    • Hyperammonemia (HCC)    • Weakness    • Cardiac pacemaker in situ    • GERD  (gastroesophageal reflux disease)    • Acute UTI    • Hyperlipidemia    • Primary hypertension    • Secondary hyperaldosteronism (HCC)    • Stage 3b chronic kidney disease (CMS/HCC)    • Hypertension associated with stage 3 chronic kidney disease due to type 2 diabetes mellitus (HCC)    • Type 2 diabetes mellitus with diabetic neuropathy, with long-term current use of insulin (HCC)    • Morbid obesity (HCC)    • Atherosclerosis of native coronary artery of native heart without angina pectoris    • Fall      Dysarthria: TIA versus CVA  Possible aborted CVA  -Code Stroke 2/3/2022 status post tPA 20:05   -NIH 0  -CT head showed no acute intracranial hemorrhage, CTA head/neck showed no significant carotid or vertebral basilar stenosis or occlusion.  No large intracranial vessel occlusion.  -Hemoglobin A1c 6.9%, TSH 1.590, lipid panel with triglycerides 238  -MRI brain pending, 2D echo pending, PT/OT/ST consulted  -per neurology-I am treating it as possible seizures and abortive stroke in both treatments have been initiated    Acute UTI  -UA: Race leukocytes, negative nitrites, 6/12 WBCs, 3+ bacteria  -on IV rocephin     Hyperammonemia  -Ammonia 86 > 41  -Ultrasound liver unremarkable    Weakness  Falls at home  -PT/OT consulted as above     Coronary artery disease  AV block Mobitz type I and Wenckebach  H/o Pacemaker  -pacemaker interrogation ordered  -cardiology consulted      Chronic kidney disease stage 3b   -BUN/Cr normal    Diabetes mellitus Type II with neuropathy   -glucose 140  -SSI AC/HS     Essential hypertension  Permissive hypertension for embolic stroke  -resume home meds when appropriate     Hyperlipidemia  -cont home statin    Obesity BMI 43  -encourage lifestyle modifications      Patient is DNR/DNI      DVT prophylaxis:  Mechanical DVT prophylaxis orders are present.    CODE STATUS:    Medical Intervention Limits: NO intubation (DNI)  Code Status (Patient has no pulse and is not breathing): No CPR (Do  Not Attempt to Resuscitate)  Medical Interventions (Patient has pulse or is breathing): Limited Support    Admission Status:  I believe this patient meets inpatient status.    I discussed the patient's findings and my recommendations with patient and nursing staff.    This patient has been examined wearing appropriate Personal Protective Equipment. 02/05/22        Electronically signed by Yoel Baker MD, 02/04/22, 3:26 PM EST.

## 2022-02-05 NOTE — PLAN OF CARE
A+Ox4. NIH 0.   Problem: Fall Injury Risk  Goal: Absence of Fall and Fall-Related Injury  Outcome: Ongoing, Progressing  Intervention: Identify and Manage Contributors to Fall Injury Risk  Recent Flowsheet Documentation  Taken 2/5/2022 1600 by Matilda Vela RN  Medication Review/Management: medications reviewed  Taken 2/5/2022 1400 by Matilda Vela, RN  Medication Review/Management: medications reviewed  Taken 2/5/2022 1200 by Matilda Vela, RN  Medication Review/Management: medications reviewed  Taken 2/5/2022 1000 by Matilda Vela RN  Medication Review/Management: medications reviewed  Taken 2/5/2022 0800 by Matilda Vela RN  Medication Review/Management: medications reviewed  Intervention: Promote Injury-Free Environment  Recent Flowsheet Documentation  Taken 2/5/2022 1600 by Matilda Vela RN  Safety Promotion/Fall Prevention:   safety round/check completed   fall prevention program maintained  Taken 2/5/2022 1400 by Matilda Vela RN  Safety Promotion/Fall Prevention:   safety round/check completed   fall prevention program maintained  Taken 2/5/2022 1200 by Matilda Vela RN  Safety Promotion/Fall Prevention:   safety round/check completed   fall prevention program maintained  Taken 2/5/2022 1000 by Matilda Vela RN  Safety Promotion/Fall Prevention:   safety round/check completed   fall prevention program maintained  Taken 2/5/2022 0800 by Matilda Vela, RN  Safety Promotion/Fall Prevention:   safety round/check completed   fall prevention program maintained     Problem: Adjustment to Illness (Stroke, Ischemic/Transient Ischemic Attack)  Goal: Optimal Coping  Outcome: Ongoing, Progressing     Problem: Bowel Elimination Impaired (Stroke, Ischemic/Transient Ischemic Attack)  Goal: Effective Bowel Elimination  Outcome: Ongoing, Progressing     Problem: Cerebral Tissue Perfusion Risk (Stroke, Ischemic/Transient Ischemic  Attack)  Goal: Optimal Cerebral Tissue Perfusion  Outcome: Ongoing, Progressing     Problem: Communication Impairment (Stroke, Ischemic/Transient Ischemic Attack)  Goal: Improved Communication Skills  Outcome: Ongoing, Progressing     Problem: Eating/Swallowing Impairment (Stroke, Ischemic/Transient Ischemic Attack)  Goal: Oral Intake without Aspiration  Outcome: Ongoing, Progressing     Problem: Functional Ability Impaired (Stroke, Ischemic/Transient Ischemic Attack)  Goal: Optimal Functional Ability  Outcome: Ongoing, Progressing     Problem: Hemodynamic Instability (Stroke, Ischemic/Transient Ischemic Attack)  Goal: Vital Signs Remain in Desired Range  Outcome: Ongoing, Progressing     Problem: Pain (Stroke, Ischemic/Transient Ischemic Attack)  Goal: Acceptable Pain Control  Outcome: Ongoing, Progressing     Problem: Sensorimotor Impairment (Stroke, Ischemic/Transient Ischemic Attack)  Goal: Improved Sensorimotor Function  Outcome: Ongoing, Progressing  Intervention: Optimize Sensory and Perceptual Abilities  Recent Flowsheet Documentation  Taken 2/5/2022 0800 by Matilda Vela RN  Pressure Reduction Techniques:   weight shift assistance provided   frequent weight shift encouraged     Problem: Urinary Elimination Impaired (Stroke, Ischemic/Transient Ischemic Attack)  Goal: Effective Urinary Elimination  Outcome: Ongoing, Progressing     Problem: Adult Inpatient Plan of Care  Goal: Plan of Care Review  Outcome: Ongoing, Progressing  Goal: Patient-Specific Goal (Individualized)  Outcome: Ongoing, Progressing  Goal: Absence of Hospital-Acquired Illness or Injury  Outcome: Ongoing, Progressing  Intervention: Identify and Manage Fall Risk  Recent Flowsheet Documentation  Taken 2/5/2022 1600 by Matilda Vela RN  Safety Promotion/Fall Prevention:   safety round/check completed   fall prevention program maintained  Taken 2/5/2022 1400 by Matilda Vela RN  Safety Promotion/Fall Prevention:    safety round/check completed   fall prevention program maintained  Taken 2/5/2022 1200 by Matilda Vela RN  Safety Promotion/Fall Prevention:   safety round/check completed   fall prevention program maintained  Taken 2/5/2022 1000 by Matilda Vela RN  Safety Promotion/Fall Prevention:   safety round/check completed   fall prevention program maintained  Taken 2/5/2022 0800 by Matilda Vela RN  Safety Promotion/Fall Prevention:   safety round/check completed   fall prevention program maintained  Intervention: Prevent Skin Injury  Recent Flowsheet Documentation  Taken 2/5/2022 0800 by Matilda Vela RN  Skin Protection: incontinence pads utilized  Intervention: Prevent and Manage VTE (venous thromboembolism) Risk  Recent Flowsheet Documentation  Taken 2/5/2022 0800 by Matilda Vela RN  VTE Prevention/Management: sequential compression devices on  Goal: Optimal Comfort and Wellbeing  Outcome: Ongoing, Progressing  Intervention: Provide Person-Centered Care  Recent Flowsheet Documentation  Taken 2/5/2022 0800 by Matilda Vela RN  Trust Relationship/Rapport:   care explained   choices provided  Goal: Readiness for Transition of Care  Outcome: Ongoing, Progressing     Problem: Skin Injury Risk Increased  Goal: Skin Health and Integrity  Outcome: Ongoing, Progressing  Intervention: Optimize Skin Protection  Recent Flowsheet Documentation  Taken 2/5/2022 0800 by Matilda Vela RN  Pressure Reduction Techniques:   weight shift assistance provided   frequent weight shift encouraged  Skin Protection: incontinence pads utilized     Problem: Diabetes Comorbidity  Goal: Blood Glucose Level Within Desired Range  Outcome: Ongoing, Progressing     Problem: Hypertension Comorbidity  Goal: Blood Pressure in Desired Range  Outcome: Ongoing, Progressing  Intervention: Maintain Hypertension-Management Strategies  Recent Flowsheet Documentation  Taken 2/5/2022 1600 by Dane  Matilda Moore RN  Medication Review/Management: medications reviewed  Taken 2/5/2022 1400 by Matilda Vela, RN  Medication Review/Management: medications reviewed  Taken 2/5/2022 1200 by Matilda Vela, RN  Medication Review/Management: medications reviewed  Taken 2/5/2022 1000 by Matilda Vela, RN  Medication Review/Management: medications reviewed  Taken 2/5/2022 0800 by Matilda Vela, RN  Medication Review/Management: medications reviewed   Goal Outcome Evaluation:

## 2022-02-05 NOTE — PROGRESS NOTES
"PULMONARY CRITICAL CARE Progress  NOTE      PATIENT IDENTIFICATION:  Name: Aracelis Vargas  MRN: [id number]  :  1935     Age: 86 y.o.  Sex: female    DATE OF Note:  2022   Referring Physician: wDain Campos MD                  Subjective:   Feeling better, no new complaints  No SOB no chest pain, no nausea or vomiting, no change in bowel habit, no dysuria,  no new  skin rash or itching.      Objective:  tMax 24 hrs: Temp (24hrs), Av.8 °F (37.1 °C), Min:98.1 °F (36.7 °C), Max:99.5 °F (37.5 °C)      Vitals Ranges:   Temp:  [98.1 °F (36.7 °C)-99.5 °F (37.5 °C)] 99.3 °F (37.4 °C)  Heart Rate:  [79-88] 84  Resp:  [13-17] 17  BP: (152-168)/(63-75) 162/70    Intake and Output Last 3 Shifts:   I/O last 3 completed shifts:  In: 504 [P.O.:360; I.V.:144]  Out: 2800 [Urine:2800]    Exam:  /70 (BP Location: Right arm, Patient Position: Lying)   Pulse 84   Temp 99.3 °F (37.4 °C) (Oral)   Resp 17   Ht 157.5 cm (62\")   Wt 107 kg (236 lb)   SpO2 96%   BMI 43.16 kg/m²     General Appearance:     HEENT:  Normocephalic, without obvious abnormality, Conjunctiva/corneas clear,.  Normal external ear canals, Nares normal, no drainage     Neck:  Supple, symmetrical, trachea midline. No JVD.  Lungs /Chest wall:   Bilateral basal rhonchi, respirations unlabored symmetrical wall movement.     Heart:  Regular rate and rhythm, systolic murmur PMI left sternal border  Abdomen: Soft, non-tender, no masses, no organomegaly.    Extremities: Trace edema no clubbing or Cyanosis        Medications:    Current Facility-Administered Medications:   •  acetaminophen (TYLENOL) tablet 650 mg, 650 mg, Oral, Q4H PRN **OR** acetaminophen (TYLENOL) suppository 650 mg, 650 mg, Rectal, Q4H PRN, Ramy Villaseñor APRN  •  aspirin tablet 325 mg, 325 mg, Oral, Daily, 325 mg at 22 1012 **OR** aspirin suppository 300 mg, 300 mg, Rectal, Daily, Ramy Villaseñor APRN  •  atorvastatin (LIPITOR) tablet 40 mg, 40 mg, Oral, Nightly, Cal, " Saskia SEVERINO MD, 40 mg at 02/04/22 2123  •  sennosides-docusate (PERICOLACE) 8.6-50 MG per tablet 2 tablet, 2 tablet, Oral, BID, 2 tablet at 02/05/22 1012 **AND** polyethylene glycol (MIRALAX) packet 17 g, 17 g, Oral, Daily PRN **AND** bisacodyl (DULCOLAX) EC tablet 5 mg, 5 mg, Oral, Daily PRN **AND** bisacodyl (DULCOLAX) suppository 10 mg, 10 mg, Rectal, Daily PRN, Ramy Villaseñor APRMARCIA  •  cefTRIAXone (ROCEPHIN) 1 g in sodium chloride 0.9 % 100 mL IVPB, 1 g, Intravenous, Q24H, Ramy Villaseñor APRN, Last Rate: 200 mL/hr at 02/05/22 0413, 1 g at 02/05/22 0413  •  dextrose (D50W) (25 g/50 mL) IV injection 25 g, 25 g, Intravenous, Q15 Min PRN, Ramy Villaseñor APRMARCIA  •  dextrose (GLUTOSE) oral gel 15 g, 15 g, Oral, Q15 Min PRN, Ramy Villaseñor APRN  •  glucagon (human recombinant) (GLUCAGEN DIAGNOSTIC) 1 mg in sterile water (preservative free) 1 mL injection, 1 mg, Subcutaneous, PRN, Ramy Villaseñor, APRN  •  hydrALAZINE (APRESOLINE) injection 10 mg, 10 mg, Intravenous, Q4H PRN, Ramy Villaseñor APRN  •  insulin lispro (ADMELOG) injection 0-14 Units, 0-14 Units, Subcutaneous, Q6H **AND** insulin lispro (ADMELOG) injection 0-14 Units, 0-14 Units, Subcutaneous, PRN, Ramy Villaseñor APRN  •  ipratropium-albuterol (DUO-NEB) nebulizer solution 3 mL, 3 mL, Nebulization, Q6H PRN, Ramy Villaseñor APRN  •  levETIRAcetam (KEPPRA) tablet 500 mg, 500 mg, Oral, Q12H, Saskia Mccall MD, 500 mg at 02/05/22 1012  •  ondansetron (ZOFRAN) tablet 4 mg, 4 mg, Oral, Q6H PRN **OR** ondansetron (ZOFRAN) injection 4 mg, 4 mg, Intravenous, Q6H PRN, Ramy Villaseñor APRN, 4 mg at 02/04/22 0453  •  [START ON 2/6/2022] pantoprazole (PROTONIX) EC tablet 40 mg, 40 mg, Oral, Q AM, Yoel Baker MD  •  sodium chloride 0.9 % flush 10 mL, 10 mL, Intravenous, PRN, Ramy Villaseñor APRN    Data Review:  All labs (24hrs):   Recent Results (from the past 24 hour(s))   Folate    Collection Time: 02/04/22  3:21 PM    Specimen: Blood   Result Value  Ref Range    Folate 5.24 4.78 - 24.20 ng/mL   Troponin    Collection Time: 02/04/22  6:14 PM    Specimen: Blood   Result Value Ref Range    Troponin T 0.011 0.000 - 0.030 ng/mL   POC Glucose Once    Collection Time: 02/04/22  6:19 PM    Specimen: Blood   Result Value Ref Range    Glucose 141 (H) 70 - 105 mg/dL   POC Glucose Once    Collection Time: 02/04/22  9:09 PM    Specimen: Blood   Result Value Ref Range    Glucose 121 (H) 70 - 105 mg/dL   POC Glucose Once    Collection Time: 02/05/22  1:53 AM    Specimen: Blood   Result Value Ref Range    Glucose 130 (H) 70 - 105 mg/dL   POC Glucose Once    Collection Time: 02/05/22  6:44 AM    Specimen: Blood   Result Value Ref Range    Glucose 144 (H) 70 - 105 mg/dL   Magnesium    Collection Time: 02/05/22  7:23 AM    Specimen: Blood   Result Value Ref Range    Magnesium 1.9 1.6 - 2.4 mg/dL   Phosphorus    Collection Time: 02/05/22  7:23 AM    Specimen: Blood   Result Value Ref Range    Phosphorus 2.9 2.5 - 4.5 mg/dL   Comprehensive Metabolic Panel    Collection Time: 02/05/22  7:23 AM    Specimen: Blood   Result Value Ref Range    Glucose 142 (H) 65 - 99 mg/dL    BUN 12 8 - 23 mg/dL    Creatinine 0.95 0.57 - 1.00 mg/dL    Sodium 140 136 - 145 mmol/L    Potassium 4.0 3.5 - 5.2 mmol/L    Chloride 108 (H) 98 - 107 mmol/L    CO2 23.0 22.0 - 29.0 mmol/L    Calcium 8.5 (L) 8.6 - 10.5 mg/dL    Total Protein 6.6 6.0 - 8.5 g/dL    Albumin 3.40 (L) 3.50 - 5.20 g/dL    ALT (SGPT) 14 1 - 33 U/L    AST (SGOT) 19 1 - 32 U/L    Alkaline Phosphatase 71 39 - 117 U/L    Total Bilirubin 0.5 0.0 - 1.2 mg/dL    eGFR Non African Amer 56 (L) >60 mL/min/1.73    Globulin 3.2 gm/dL    A/G Ratio 1.1 g/dL    BUN/Creatinine Ratio 12.6 7.0 - 25.0    Anion Gap 9.0 5.0 - 15.0 mmol/L   Ammonia    Collection Time: 02/05/22  7:23 AM    Specimen: Blood   Result Value Ref Range    Ammonia 44 11 - 51 umol/L   Troponin    Collection Time: 02/05/22  7:23 AM    Specimen: Blood   Result Value Ref Range    Troponin  T 0.016 0.000 - 0.030 ng/mL   CBC Auto Differential    Collection Time: 02/05/22  7:23 AM    Specimen: Blood   Result Value Ref Range    WBC 3.80 3.40 - 10.80 10*3/mm3    RBC 3.12 (L) 3.77 - 5.28 10*6/mm3    Hemoglobin 8.5 (L) 12.0 - 15.9 g/dL    Hematocrit 26.8 (L) 34.0 - 46.6 %    MCV 85.8 79.0 - 97.0 fL    MCH 27.1 26.6 - 33.0 pg    MCHC 31.5 31.5 - 35.7 g/dL    RDW 19.6 (H) 12.3 - 15.4 %    RDW-SD 59.9 (H) 37.0 - 54.0 fl    MPV 6.5 6.0 - 12.0 fL    Platelets 190 140 - 450 10*3/mm3    Neutrophil % 59.2 42.7 - 76.0 %    Lymphocyte % 24.6 19.6 - 45.3 %    Monocyte % 8.1 5.0 - 12.0 %    Eosinophil % 6.8 (H) 0.3 - 6.2 %    Basophil % 1.3 0.0 - 1.5 %    Neutrophils, Absolute 2.20 1.70 - 7.00 10*3/mm3    Lymphocytes, Absolute 0.90 0.70 - 3.10 10*3/mm3    Monocytes, Absolute 0.30 0.10 - 0.90 10*3/mm3    Eosinophils, Absolute 0.30 0.00 - 0.40 10*3/mm3    Basophils, Absolute 0.00 0.00 - 0.20 10*3/mm3    nRBC 0.0 0.0 - 0.2 /100 WBC   POC Glucose Once    Collection Time: 02/05/22 12:05 PM    Specimen: Blood   Result Value Ref Range    Glucose 140 (H) 70 - 105 mg/dL   Troponin    Collection Time: 02/05/22 12:10 PM    Specimen: Blood   Result Value Ref Range    Troponin T 0.016 0.000 - 0.030 ng/mL        Imaging:  CT Head Without Contrast  Narrative:    DATE OF EXAM:  2/4/2022 8:43 PM     PROCEDURE:   CT HEAD WO CONTRAST-     INDICATIONS:   Stroke, follow up; I63.9-Cerebral infarction, unspecified;  R47.1-Dysarthria and anarthria; N39.0-Urinary tract infection, site not  specified; Z87.898-Personal history of other specified conditions     COMPARISON:  2/3/2022     TECHNIQUE:   Routine transaxial and coronal reconstruction images were obtained  through the head without the administration of contrast. Automated  exposure control and iterative reconstruction methods were used.     FINDINGS:  There is no hemorrhage, edema, or mass effect. There is generalized  atrophy. There are no abnormal extra-axial fluid collections. There  is  no fluid in the middle ear cavities and paranasal sinuses      Impression: Negative. No intracranial hemorrhage or development of CT changes of  brain ischemia     Electronically Signed By-Jesus Sparks On:2/4/2022 9:13 PM  This report was finalized on [id number] by  Jesus Sparks, .  Adult Transthoracic Echo Complete W/ Cont if Necessary Per Protocol  · Estimated left ventricular EF = 60% Left ventricular systolic function   is normal.     Indications  Recent stroke    Technically satisfactory study.  Mitral valve is structurally normal.  Tricuspid valve is structurally normal.  Aortic valve is structurally normal.  Pulmonic valve could not be well visualized.  No evidence for mitral tricuspid or aortic regurgitation is seen by   Doppler study.  Left atrium is normal in size.  Right atrium is normal in size.  Left ventricle is normal in size and contractility with ejection fraction   of 60%.  Right ventricle is normal in size.  Atrial septum is intact.  Aorta is normal.  No pericardial effusion or intracardiac thrombus is seen.  Bubble study is negative for PFO.    Impression  Structurally and functionally normal cardiac valves.  Left ventricular size and contractility is normal with ejection fraction   of 60%.  Bubble study negative for PFO.  US Liver  Narrative: US LIVER    Date of Exam: 2/4/2022 8:48 EST    Indication: Elevated ammonia.  Liver disease     Comparison Exams: CT abdomen pelvis performed on July 29, 2010    Technique: Ultrasound liver    FINDINGS:  Today's study reveals that the liver is mildly enlarged and measures 20.5 cm in length. No evidence of liver mass. No evidence of ascites. The gallbladder is normal. No evidence of mass or obstruction right kidney. Ultrasound Doppler shows normal   antegrade blood flow in the portal vein toward the liver and normal antegrade blood flow in the hepatic veins away from liver, bile duct measures 3.9 mm diameter and is normal. No evidence of  dilatation of bile ducts.  Impression: 1. Mild enlargement of the liver. No evidence of liver mass.  2. The gallbladder and bile ducts are normal.  3. No evidence of ascites    Electronically Signed: Jaylon Viramontes MD 2/4/2022 9:27 EST       ASSESSMENT:    Cerebrovascular accident (CVA) (HCC)    Fall    Secondary hyperaldosteronism (HCC)    Stage 3b chronic kidney disease (CMS/HCC)    Hypertension associated with stage 3 chronic kidney disease due to type 2 diabetes mellitus (HCC)    Type 2 diabetes mellitus with diabetic neuropathy, with long-term current use of insulin (HCC)    Morbid obesity (HCC)    Atherosclerosis of native coronary artery of native heart without angina pectoris    Hyperlipidemia    Primary hypertension    Acute UTI    GERD (gastroesophageal reflux disease)    Cardiac pacemaker in situ    Hyperammonemia (HCC)    Weakness       PLAN:  Continue neurochecks  Patient most likely has underlying obstructive sleep apnea we will keep oxygen for now at night  Noncontributory chest x-ray  Starting diet but will follow up with a speech pathologist watch for aspiration  Electrolytes/ glycemic control  DVT and GI prophylaxis.    Total Critical care time in direct medical management (   ) minutes, This time specifically excludes time spent performing procedures.  Chevy Pineda MD. D, ABSM.     2/5/2022  15:04 EST

## 2022-02-05 NOTE — PLAN OF CARE
Problem: Fall Injury Risk  Goal: Absence of Fall and Fall-Related Injury  Outcome: Ongoing, Progressing  Intervention: Identify and Manage Contributors to Fall Injury Risk  Recent Flowsheet Documentation  Taken 2/5/2022 0200 by Azeb Campoverde RN  Medication Review/Management: medications reviewed  Taken 2/5/2022 0000 by Azeb Campoverde RN  Medication Review/Management: medications reviewed  Taken 2/4/2022 2200 by Azeb Campoverde RN  Medication Review/Management: medications reviewed  Taken 2/4/2022 2021 by Azeb Campoverde RN  Medication Review/Management: medications reviewed  Intervention: Promote Injury-Free Environment  Recent Flowsheet Documentation  Taken 2/5/2022 0400 by Azeb Campoverde RN  Safety Promotion/Fall Prevention:   safety round/check completed   fall prevention program maintained   activity supervised   clutter free environment maintained   assistive device/personal items within reach   nonskid shoes/slippers when out of bed   room organization consistent  Taken 2/5/2022 0200 by Azeb Campoverde RN  Safety Promotion/Fall Prevention:   safety round/check completed   fall prevention program maintained   activity supervised   assistive device/personal items within reach   clutter free environment maintained   nonskid shoes/slippers when out of bed   room organization consistent  Taken 2/5/2022 0000 by Azeb Campoverde RN  Safety Promotion/Fall Prevention:   safety round/check completed   fall prevention program maintained   activity supervised   assistive device/personal items within reach   clutter free environment maintained   nonskid shoes/slippers when out of bed   room organization consistent  Taken 2/4/2022 2200 by Azeb Campoverde RN  Safety Promotion/Fall Prevention:   safety round/check completed   fall prevention program maintained   activity supervised   assistive device/personal items within reach   clutter free environment maintained   nonskid shoes/slippers when out of  bed   room organization consistent  Taken 2/4/2022 2021 by Azeb Campoverde RN  Safety Promotion/Fall Prevention:   safety round/check completed   fall prevention program maintained   activity supervised   assistive device/personal items within reach   clutter free environment maintained   nonskid shoes/slippers when out of bed   room organization consistent     Problem: Adjustment to Illness (Stroke, Ischemic/Transient Ischemic Attack)  Goal: Optimal Coping  Outcome: Ongoing, Progressing  Intervention: Support Patient/Family Psychosocial Response to Stroke  Recent Flowsheet Documentation  Taken 2/5/2022 0400 by Azeb Campoverde RN  Family/Support System Care: support provided  Taken 2/5/2022 0000 by Azeb Campoverde RN  Family/Support System Care: support provided  Taken 2/4/2022 2021 by Azeb Campoverde RN  Family/Support System Care: support provided     Problem: Bowel Elimination Impaired (Stroke, Ischemic/Transient Ischemic Attack)  Goal: Effective Bowel Elimination  Outcome: Ongoing, Progressing     Problem: Cerebral Tissue Perfusion Risk (Stroke, Ischemic/Transient Ischemic Attack)  Goal: Optimal Cerebral Tissue Perfusion  Outcome: Ongoing, Progressing     Problem: Communication Impairment (Stroke, Ischemic/Transient Ischemic Attack)  Goal: Improved Communication Skills  Outcome: Ongoing, Progressing  Intervention: Optimize Cognitive and Communication Skills  Recent Flowsheet Documentation  Taken 2/5/2022 0400 by Azeb Campoverde RN  Communication Enhancement Strategies: call light answered in person  Taken 2/5/2022 0000 by Azeb Campoverde RN  Communication Enhancement Strategies: call light answered in person  Taken 2/4/2022 2021 by Azeb Campoverde RN  Communication Enhancement Strategies: call light answered in person     Problem: Eating/Swallowing Impairment (Stroke, Ischemic/Transient Ischemic Attack)  Goal: Oral Intake without Aspiration  Outcome: Ongoing, Progressing     Problem: Functional  Ability Impaired (Stroke, Ischemic/Transient Ischemic Attack)  Goal: Optimal Functional Ability  Outcome: Ongoing, Progressing     Problem: Hemodynamic Instability (Stroke, Ischemic/Transient Ischemic Attack)  Goal: Vital Signs Remain in Desired Range  Outcome: Ongoing, Progressing     Problem: Pain (Stroke, Ischemic/Transient Ischemic Attack)  Goal: Acceptable Pain Control  Outcome: Ongoing, Progressing     Problem: Sensorimotor Impairment (Stroke, Ischemic/Transient Ischemic Attack)  Goal: Improved Sensorimotor Function  Outcome: Ongoing, Progressing  Intervention: Optimize Sensory and Perceptual Abilities  Recent Flowsheet Documentation  Taken 2/5/2022 0000 by Azeb Campoverde RN  Pressure Reduction Techniques: frequent weight shift encouraged  Pressure Reduction Devices: pressure-redistributing mattress utilized  Taken 2/4/2022 2021 by Azeb Campoverde RN  Pressure Reduction Techniques:   frequent weight shift encouraged   weight shift assistance provided  Pressure Reduction Devices: positioning supports utilized     Problem: Urinary Elimination Impaired (Stroke, Ischemic/Transient Ischemic Attack)  Goal: Effective Urinary Elimination  Outcome: Ongoing, Progressing     Problem: Adult Inpatient Plan of Care  Goal: Plan of Care Review  Outcome: Ongoing, Progressing  Goal: Patient-Specific Goal (Individualized)  Outcome: Ongoing, Progressing  Goal: Absence of Hospital-Acquired Illness or Injury  Outcome: Ongoing, Progressing  Intervention: Identify and Manage Fall Risk  Recent Flowsheet Documentation  Taken 2/5/2022 0400 by Azeb Campoverde, RN  Safety Promotion/Fall Prevention:   safety round/check completed   fall prevention program maintained   activity supervised   clutter free environment maintained   assistive device/personal items within reach   nonskid shoes/slippers when out of bed   room organization consistent  Taken 2/5/2022 0200 by Azeb Campoverde RN  Safety Promotion/Fall Prevention:   safety  round/check completed   fall prevention program maintained   activity supervised   assistive device/personal items within reach   clutter free environment maintained   nonskid shoes/slippers when out of bed   room organization consistent  Taken 2/5/2022 0000 by Azeb Campoverde RN  Safety Promotion/Fall Prevention:   safety round/check completed   fall prevention program maintained   activity supervised   assistive device/personal items within reach   clutter free environment maintained   nonskid shoes/slippers when out of bed   room organization consistent  Taken 2/4/2022 2200 by Azeb Campoverde RN  Safety Promotion/Fall Prevention:   safety round/check completed   fall prevention program maintained   activity supervised   assistive device/personal items within reach   clutter free environment maintained   nonskid shoes/slippers when out of bed   room organization consistent  Taken 2/4/2022 2021 by Azeb Campoverde RN  Safety Promotion/Fall Prevention:   safety round/check completed   fall prevention program maintained   activity supervised   assistive device/personal items within reach   clutter free environment maintained   nonskid shoes/slippers when out of bed   room organization consistent  Intervention: Prevent Skin Injury  Recent Flowsheet Documentation  Taken 2/5/2022 0000 by Azeb Campoverde RN  Skin Protection: adhesive use limited  Taken 2/4/2022 2021 by Azeb Campoverde RN  Skin Protection: adhesive use limited  Intervention: Prevent and Manage VTE (venous thromboembolism) Risk  Recent Flowsheet Documentation  Taken 2/5/2022 0400 by Azeb Campoverde RN  VTE Prevention/Management:   bilateral   sequential compression devices on  Taken 2/4/2022 2021 by Azeb Campoverde RN  VTE Prevention/Management:   bilateral   sequential compression devices on  Intervention: Prevent Infection  Recent Flowsheet Documentation  Taken 2/5/2022 0400 by Azeb Campoverde RN  Infection Prevention:   environmental  surveillance performed   hand hygiene promoted  Taken 2/5/2022 0200 by Azeb Campoverde RN  Infection Prevention:   environmental surveillance performed   hand hygiene promoted  Taken 2/5/2022 0000 by Azeb Campoverde RN  Infection Prevention:   environmental surveillance performed   hand hygiene promoted  Taken 2/4/2022 2200 by Azeb Campoverde RN  Infection Prevention:   environmental surveillance performed   hand hygiene promoted  Taken 2/4/2022 2021 by Azeb Campoverde RN  Infection Prevention:   environmental surveillance performed   hand hygiene promoted   rest/sleep promoted  Goal: Optimal Comfort and Wellbeing  Outcome: Ongoing, Progressing  Intervention: Provide Person-Centered Care  Recent Flowsheet Documentation  Taken 2/5/2022 0400 by Azeb Campoverde RN  Trust Relationship/Rapport:   care explained   choices provided  Taken 2/5/2022 0000 by Azeb Campoverde RN  Trust Relationship/Rapport:   care explained   choices provided  Taken 2/4/2022 2021 by Azeb Campoverde RN  Trust Relationship/Rapport:   care explained   choices provided  Goal: Readiness for Transition of Care  Outcome: Ongoing, Progressing     Problem: Skin Injury Risk Increased  Goal: Skin Health and Integrity  Outcome: Ongoing, Progressing  Intervention: Optimize Skin Protection  Recent Flowsheet Documentation  Taken 2/5/2022 0000 by Azeb Campoverde RN  Pressure Reduction Techniques: frequent weight shift encouraged  Pressure Reduction Devices: pressure-redistributing mattress utilized  Skin Protection: adhesive use limited  Taken 2/4/2022 2021 by Azeb Campoverde RN  Pressure Reduction Techniques:   frequent weight shift encouraged   weight shift assistance provided  Pressure Reduction Devices: positioning supports utilized  Skin Protection: adhesive use limited     Problem: Diabetes Comorbidity  Goal: Blood Glucose Level Within Desired Range  Outcome: Ongoing, Progressing     Problem: Hypertension Comorbidity  Goal: Blood  Pressure in Desired Range  Outcome: Ongoing, Progressing  Intervention: Maintain Hypertension-Management Strategies  Recent Flowsheet Documentation  Taken 2/5/2022 0200 by Azeb Campoverde, RN  Medication Review/Management: medications reviewed  Taken 2/5/2022 0000 by Azeb Campoverde, RN  Medication Review/Management: medications reviewed  Taken 2/4/2022 2200 by Azeb Campoverde, RN  Medication Review/Management: medications reviewed  Taken 2/4/2022 2021 by Azeb Campoverde, RN  Medication Review/Management: medications reviewed   Goal Outcome Evaluation:

## 2022-02-05 NOTE — PROGRESS NOTES
Patient stable  No new issues  Post alteplase 24-hour CT was okay  She has not had MRI yet and I am wondering whether she can have 1    If she can have 1 and it is okay she may be discharged  If she cannot have one then we already have 2 head CTs    I am treating it as possible seizures and abortive stroke in both treatments have been initiated    Modification of stroke risk factors:   - Blood pressure should be less than 130/80 outpatient, HbA1c less than 6.5, LDL less than 70; b12>500 and smoking cessation if applicable. We would be grateful if the primary team / primary care physician keep a close watch on this above targets.  - Stroke education  - Follow up with neurologist of choice/Dr Seipel        - Fall, syncope precautions (see below)    SEIZURE/SYNCOPE INSTRUCTIONS:  -Recommended to observe all seizure precautions, including, but not limited to:   -No driving until seizure free for more than 3 months- as per State driving regulation / law;   -Avoid all high-risk activity, Take showers instead of baths, Avoid swimming without observation, Avoid open heat sources, Avoid working at heights, and Avoid engaging in all potentially hazardous activities.   -Patient expressed clear understanding.

## 2022-02-05 NOTE — PROGRESS NOTES
Cardiology Progress Note      Admiting Physician:  Yoel Baker MD   LOS: 2 days       Reason For Followup:  Sick sinus syndrome status post PPM 11/3/2021  TIA/stroke  Paroxysmal atrial fibrillation    Subjective:    Interval History:  Seen and examined.  Chart and labs reviewed.  Patient denies any chest pain pressure heaviness or tightness.  Does report some weakness.  No shortness of breath.    Review of Systems:  A complete review of systems was undertaken with the pertinent cardiovascular findings listed in history of present illness and all other systems being negative.     Assessment & Plan    Impressions:  Paroxysmal atrial fibrillation  TIA/stroke status post TPA with complete resolution of symptoms  Sick sinus syndrome status post permanent pacemaker 11/3/2021    Recommendations:  Continue to monitor rate and rhythm  Consider oral anticoagulation when okay with neurology  May need to give consideration for watchman as well.  Patient is a DNR/DNI      Objective:    Medication Review:   Scheduled Meds:aspirin, 325 mg, Oral, Daily   Or  aspirin, 300 mg, Rectal, Daily  atorvastatin, 40 mg, Oral, Nightly  cefTRIAXone, 1 g, Intravenous, Q24H  insulin lispro, 0-14 Units, Subcutaneous, Q6H  levETIRAcetam, 500 mg, Oral, Q12H  pantoprazole, 40 mg, Intravenous, Q AM  senna-docusate sodium, 2 tablet, Oral, BID      Continuous Infusions:   PRN Meds:.•  acetaminophen **OR** acetaminophen  •  senna-docusate sodium **AND** polyethylene glycol **AND** bisacodyl **AND** bisacodyl  •  dextrose  •  dextrose  •  glucagon (human recombinant)  •  hydrALAZINE  •  insulin lispro **AND** insulin lispro  •  ipratropium-albuterol  •  ondansetron **OR** ondansetron  •  sodium chloride    Patient Active Problem List   Diagnosis   • Fall   • Secondary hyperaldosteronism (HCC)   • Stage 3b chronic kidney disease (CMS/HCC)   • Hypertension associated with stage 3 chronic kidney disease due to type 2 diabetes mellitus (HCC)   •  Type 2 diabetes mellitus with diabetic neuropathy, with long-term current use of insulin (HCC)   • Morbid obesity (HCC)   • Atherosclerosis of native coronary artery of native heart without angina pectoris   • Hyperlipidemia   • Primary hypertension   • Acute UTI   • GERD (gastroesophageal reflux disease)   • Cardiac pacemaker in situ   • Cerebrovascular accident (CVA) (HCC)   • Hyperammonemia (HCC)   • Weakness         Physical Exam:    General: Alert, cooperative, no distress, appears stated age  Head:  Normocephalic, atraumatic, mucous membranes moist  Eyes:  Conjunctivae/corneas clear, EOM's intact     Neck:  Supple,  no bruit  Lungs: Coarse and diminished bilaterally but otherwise clear to auscultation  Chest wall: No tenderness  Heart::  Regular rate and rhythm, S1 and S2 normal, 1/6 holosystolic murmur.  No rub or gallop  Abdomen: Soft, non-tender, nondistended bowel sounds active.  Obese  Extremities: No cyanosis, clubbing, trace edema  Pulses: 2+ and symmetric all extremities  Skin:  No rashes or lesions  Neuro/psych: A&O x3. CN II through XII are grossly intact with appropriate affect    Vital Signs:  Vitals:    02/04/22 2126 02/05/22 0155 02/05/22 0642 02/05/22 0944   BP: 152/63 156/64 152/75 153/66   BP Location: Right arm Right arm Right arm Right arm   Patient Position: Lying Lying Lying Lying   Pulse: 88 82 86 79   Resp: 16 13 14 17   Temp: 99.1 °F (37.3 °C) 98.2 °F (36.8 °C) 98.1 °F (36.7 °C) 98.7 °F (37.1 °C)   TempSrc: Oral Oral Oral Oral   SpO2: 95% 99% 100% 96%   Weight:       Height:         Wt Readings from Last 1 Encounters:   02/04/22 107 kg (236 lb)       Intake/Output Summary (Last 24 hours) at 2/5/2022 1036  Last data filed at 2/5/2022 0944  Gross per 24 hour   Intake 360 ml   Output 2350 ml   Net -1990 ml         Results Review:     CBC    Results from last 7 days   Lab Units 02/05/22  0723 02/04/22  0645 02/03/22  1943   WBC 10*3/mm3 3.80 3.40 4.50   HEMOGLOBIN g/dL 8.5* 8.6* 9.0*    PLATELETS 10*3/mm3 190 177 195     Cr Clearance Estimated Creatinine Clearance: 50.7 mL/min (by C-G formula based on SCr of 0.95 mg/dL).  Coag   Results from last 7 days   Lab Units 02/03/22 1939   INR  1.03   APTT seconds 24.3     HbA1C   Lab Results   Component Value Date    HGBA1C 6.9 (H) 02/03/2022    HGBA1C 6.2 (H) 11/01/2021    HGBA1C 6.9 (H) 09/07/2021     Blood Glucose   Glucose   Date/Time Value Ref Range Status   02/05/2022 0644 144 (H) 70 - 105 mg/dL Final     Comment:     Serial Number: [id number]:  [id number]   02/05/2022 0153 130 (H) 70 - 105 mg/dL Final     Comment:     Serial Number: [id number]:  [id number]   02/04/2022 2109 121 (H) 70 - 105 mg/dL Final     Comment:     Serial Number: [id number]:  [id number]   02/04/2022 1819 141 (H) 70 - 105 mg/dL Final     Comment:     Serial Number: [id number]:  [id number]   02/04/2022 0616 136 (H) 70 - 105 mg/dL Final     Comment:     Serial Number: [id number]:  [id number]   02/03/2022 2139 120 (H) 70 - 105 mg/dL Final     Comment:     Serial Number: [id number]:  [id number]   02/03/2022 1955 126 (H) 70 - 105 mg/dL Final     Comment:     Serial Number: [id number]:  [id number]   02/03/2022 1922 144 (H) 70 - 105 mg/dL Final     Comment:     Serial Number: [id number]:  [id number]     Infection   Results from last 7 days   Lab Units 02/03/22 2011   URINECX  >100,000 CFU/mL Mixed Nancy Isolated     CMP   Results from last 7 days   Lab Units 02/05/22 0723 02/04/22 0645 02/04/22 0644 02/03/22 2011 02/03/22 1939   SODIUM mmol/L 140 142  --   --  140   POTASSIUM mmol/L 4.0 4.1  --   --  3.7   CHLORIDE mmol/L 108* 109*  --   --  106   CO2 mmol/L 23.0 21.0*  --   --  23.0   BUN mg/dL 12 18  --   --  20   CREATININE mg/dL 0.95 0.81  --   --  1.06*   GLUCOSE mg/dL 142* 140*  --   --  138*   ALBUMIN g/dL 3.40* 3.70  --   --  3.90   BILIRUBIN mg/dL 0.5 0.3  --   --  0.3   ALK PHOS U/L 71 73  --   --  75   AST (SGOT) U/L 19  24  --   --  35*   ALT (SGPT) U/L 14 17  --   --  19   AMMONIA umol/L 44  --  41 86*  --      ABG      UA    Results from last 7 days   Lab Units 02/03/22 2011   NITRITE UA  Negative   WBC UA /HPF 6-12*   BACTERIA UA /HPF 3+*   SQUAM EPITHEL UA /HPF 0-2   URINECX  >100,000 CFU/mL Mixed Nancy Isolated     MARVA  No results found for: POCMETH, POCAMPHET, POCBARBITUR, POCBENZO, POCCOCAINE, POCOPIATES, POCOXYCODO, POCPHENCYC, POCPROPOXY, POCTHC, POCTRICYC  Lysis Labs   Results from last 7 days   Lab Units 02/05/22  0723 02/04/22  0645 02/03/22 1943 02/03/22 1939   INR   --   --   --  1.03   APTT seconds  --   --   --  24.3   HEMOGLOBIN g/dL 8.5* 8.6* 9.0*  --    PLATELETS 10*3/mm3 190 177 195  --    CREATININE mg/dL 0.95 0.81  --  1.06*     Radiology(recent) CT Head Without Contrast    Result Date: 2/4/2022  Negative. No intracranial hemorrhage or development of CT changes of brain ischemia  Electronically Signed By-Jesus Sparks On:2/4/2022 9:13 PM This report was finalized on [id number] by  Jesus Sparks, .    CT Angiogram Neck    Result Date: 2/3/2022  1. No significant carotid or vertebral basilar stenosis or occlusion 2. No large intracranial vessel occlusion Electronically Signed: Jesus Sparks MD 2/3/2022 20:34 EST    US Liver    Result Date: 2/4/2022  1. Mild enlargement of the liver. No evidence of liver mass. 2. The gallbladder and bile ducts are normal. 3. No evidence of ascites Electronically Signed: Jaylon Viramontes MD 2/4/2022 9:27 EST    XR Chest 1 View    Result Date: 2/3/2022  Cardiomegaly with pulmonary venous hypertension. No evidence of pulmonary edema. No suspicious infiltrate. Atelectasis in the bases Electronically Signed: Jesus Sparks MD 2/3/2022 20:41 EST    CT Head Without Contrast Stroke Protocol    Result Date: 2/3/2022  1. No acute intracranial hemorrhage. 2. Mild cerebral atrophy and white matter findings of chronic small vessel disease similar to prior study. Electronically  Signed: Benjamín Padron MD 2/3/2022 19:45 EST    CT Angiogram Head w AI Analysis of LVO    Result Date: 2/3/2022  1. No significant carotid or vertebral basilar stenosis or occlusion 2. No large intracranial vessel occlusion Electronically Signed: Jesus Sparks MD 2/3/2022 20:34 EST        Results from last 7 days   Lab Units 02/05/22  0723 02/04/22  0645 02/03/22  1939   CK TOTAL U/L  --   --  77   TROPONIN T ng/mL 0.016   < > 0.016    < > = values in this interval not displayed.       Imaging Results (Last 24 Hours)     Procedure Component Value Units Date/Time    CT Head Without Contrast [[id number]] Collected: 02/04/22 2110     Updated: 02/04/22 2115    Narrative:         DATE OF EXAM:  2/4/2022 8:43 PM     PROCEDURE:   CT HEAD WO CONTRAST-     INDICATIONS:   Stroke, follow up; I63.9-Cerebral infarction, unspecified;  R47.1-Dysarthria and anarthria; N39.0-Urinary tract infection, site not  specified; Z87.898-Personal history of other specified conditions     COMPARISON:  2/3/2022     TECHNIQUE:   Routine transaxial and coronal reconstruction images were obtained  through the head without the administration of contrast. Automated  exposure control and iterative reconstruction methods were used.     FINDINGS:  There is no hemorrhage, edema, or mass effect. There is generalized  atrophy. There are no abnormal extra-axial fluid collections. There is  no fluid in the middle ear cavities and paranasal sinuses        Impression:      Negative. No intracranial hemorrhage or development of CT changes of  brain ischemia     Electronically Signed By-Jesus Sparks On:2/4/2022 9:13 PM  This report was finalized on [id number] by  Jesus Sparks, .          Cardiac Studies:  Echo- Results for orders placed during the hospital encounter of 02/03/22    Adult Transthoracic Echo Complete W/ Cont if Necessary Per Protocol    Interpretation Summary  · Estimated left ventricular EF = 60% Left ventricular systolic function is  normal.    Indications  Recent stroke    Technically satisfactory study.  Mitral valve is structurally normal.  Tricuspid valve is structurally normal.  Aortic valve is structurally normal.  Pulmonic valve could not be well visualized.  No evidence for mitral tricuspid or aortic regurgitation is seen by Doppler study.  Left atrium is normal in size.  Right atrium is normal in size.  Left ventricle is normal in size and contractility with ejection fraction of 60%.  Right ventricle is normal in size.  Atrial septum is intact.  Aorta is normal.  No pericardial effusion or intracardiac thrombus is seen.  Bubble study is negative for PFO.    Impression  Structurally and functionally normal cardiac valves.  Left ventricular size and contractility is normal with ejection fraction of 60%.  Bubble study negative for PFO.    Stress Myoview-  Cath-        Tha Wahl DO  02/05/22  10:36 EST

## 2022-02-06 ENCOUNTER — APPOINTMENT (OUTPATIENT)
Dept: GENERAL RADIOLOGY | Facility: HOSPITAL | Age: 87
End: 2022-02-06

## 2022-02-06 ENCOUNTER — HOME HEALTH ADMISSION (OUTPATIENT)
Dept: HOME HEALTH SERVICES | Facility: HOME HEALTHCARE | Age: 87
End: 2022-02-06

## 2022-02-06 ENCOUNTER — READMISSION MANAGEMENT (OUTPATIENT)
Dept: CALL CENTER | Facility: HOSPITAL | Age: 87
End: 2022-02-06

## 2022-02-06 VITALS
HEIGHT: 62 IN | OXYGEN SATURATION: 95 % | TEMPERATURE: 98.2 F | DIASTOLIC BLOOD PRESSURE: 56 MMHG | HEART RATE: 80 BPM | BODY MASS INDEX: 43.43 KG/M2 | WEIGHT: 236 LBS | SYSTOLIC BLOOD PRESSURE: 144 MMHG | RESPIRATION RATE: 12 BRPM

## 2022-02-06 LAB
ALBUMIN SERPL-MCNC: 3.3 G/DL (ref 3.5–5.2)
ALBUMIN/GLOB SERPL: 1 G/DL
ALP SERPL-CCNC: 68 U/L (ref 39–117)
ALT SERPL W P-5'-P-CCNC: 13 U/L (ref 1–33)
AMMONIA BLD-SCNC: 37 UMOL/L (ref 11–51)
ANION GAP SERPL CALCULATED.3IONS-SCNC: 8 MMOL/L (ref 5–15)
AST SERPL-CCNC: 26 U/L (ref 1–32)
BASOPHILS # BLD AUTO: 0 10*3/MM3 (ref 0–0.2)
BASOPHILS NFR BLD AUTO: 1 % (ref 0–1.5)
BILIRUB SERPL-MCNC: 0.4 MG/DL (ref 0–1.2)
BUN SERPL-MCNC: 12 MG/DL (ref 8–23)
BUN/CREAT SERPL: 12.4 (ref 7–25)
CALCIUM SPEC-SCNC: 8.2 MG/DL (ref 8.6–10.5)
CHLORIDE SERPL-SCNC: 109 MMOL/L (ref 98–107)
CO2 SERPL-SCNC: 25 MMOL/L (ref 22–29)
CREAT SERPL-MCNC: 0.97 MG/DL (ref 0.57–1)
DEPRECATED RDW RBC AUTO: 64.3 FL (ref 37–54)
EOSINOPHIL # BLD AUTO: 0.2 10*3/MM3 (ref 0–0.4)
EOSINOPHIL NFR BLD AUTO: 6.7 % (ref 0.3–6.2)
ERYTHROCYTE [DISTWIDTH] IN BLOOD BY AUTOMATED COUNT: 20.1 % (ref 12.3–15.4)
GFR SERPL CREATININE-BSD FRML MDRD: 55 ML/MIN/1.73
GLOBULIN UR ELPH-MCNC: 3.2 GM/DL
GLUCOSE BLDC GLUCOMTR-MCNC: 125 MG/DL (ref 70–105)
GLUCOSE BLDC GLUCOMTR-MCNC: 141 MG/DL (ref 70–105)
GLUCOSE BLDC GLUCOMTR-MCNC: 73 MG/DL (ref 70–105)
GLUCOSE SERPL-MCNC: 139 MG/DL (ref 65–99)
HCT VFR BLD AUTO: 26.5 % (ref 34–46.6)
HGB BLD-MCNC: 8.1 G/DL (ref 12–15.9)
LYMPHOCYTES # BLD AUTO: 0.9 10*3/MM3 (ref 0.7–3.1)
LYMPHOCYTES NFR BLD AUTO: 24.3 % (ref 19.6–45.3)
MAGNESIUM SERPL-MCNC: 2.1 MG/DL (ref 1.6–2.4)
MCH RBC QN AUTO: 27 PG (ref 26.6–33)
MCHC RBC AUTO-ENTMCNC: 30.6 G/DL (ref 31.5–35.7)
MCV RBC AUTO: 88.1 FL (ref 79–97)
MONOCYTES # BLD AUTO: 0.3 10*3/MM3 (ref 0.1–0.9)
MONOCYTES NFR BLD AUTO: 9.2 % (ref 5–12)
NEUTROPHILS NFR BLD AUTO: 2.1 10*3/MM3 (ref 1.7–7)
NEUTROPHILS NFR BLD AUTO: 58.8 % (ref 42.7–76)
NRBC BLD AUTO-RTO: 0.1 /100 WBC (ref 0–0.2)
PHOSPHATE SERPL-MCNC: 3.4 MG/DL (ref 2.5–4.5)
PLATELET # BLD AUTO: 168 10*3/MM3 (ref 140–450)
PMV BLD AUTO: 6.5 FL (ref 6–12)
POTASSIUM SERPL-SCNC: 4.2 MMOL/L (ref 3.5–5.2)
PROT SERPL-MCNC: 6.5 G/DL (ref 6–8.5)
QT INTERVAL: 413 MS
RBC # BLD AUTO: 3.01 10*6/MM3 (ref 3.77–5.28)
SODIUM SERPL-SCNC: 142 MMOL/L (ref 136–145)
TROPONIN T SERPL-MCNC: <0.01 NG/ML (ref 0–0.03)
TROPONIN T SERPL-MCNC: <0.01 NG/ML (ref 0–0.03)
WBC NRBC COR # BLD: 3.6 10*3/MM3 (ref 3.4–10.8)

## 2022-02-06 PROCEDURE — 82140 ASSAY OF AMMONIA: CPT | Performed by: INTERNAL MEDICINE

## 2022-02-06 PROCEDURE — 97166 OT EVAL MOD COMPLEX 45 MIN: CPT

## 2022-02-06 PROCEDURE — 84100 ASSAY OF PHOSPHORUS: CPT | Performed by: INTERNAL MEDICINE

## 2022-02-06 PROCEDURE — 97162 PT EVAL MOD COMPLEX 30 MIN: CPT

## 2022-02-06 PROCEDURE — 80053 COMPREHEN METABOLIC PANEL: CPT | Performed by: INTERNAL MEDICINE

## 2022-02-06 PROCEDURE — 99239 HOSP IP/OBS DSCHRG MGMT >30: CPT | Performed by: INTERNAL MEDICINE

## 2022-02-06 PROCEDURE — 85025 COMPLETE CBC W/AUTO DIFF WBC: CPT | Performed by: INTERNAL MEDICINE

## 2022-02-06 PROCEDURE — 84484 ASSAY OF TROPONIN QUANT: CPT | Performed by: STUDENT IN AN ORGANIZED HEALTH CARE EDUCATION/TRAINING PROGRAM

## 2022-02-06 PROCEDURE — 25010000002 CEFTRIAXONE PER 250 MG: Performed by: INTERNAL MEDICINE

## 2022-02-06 PROCEDURE — 71045 X-RAY EXAM CHEST 1 VIEW: CPT

## 2022-02-06 PROCEDURE — 82962 GLUCOSE BLOOD TEST: CPT

## 2022-02-06 PROCEDURE — 99232 SBSQ HOSP IP/OBS MODERATE 35: CPT | Performed by: INTERNAL MEDICINE

## 2022-02-06 PROCEDURE — 83735 ASSAY OF MAGNESIUM: CPT | Performed by: INTERNAL MEDICINE

## 2022-02-06 RX ORDER — POLYETHYLENE GLYCOL 3350 17 G/17G
17 POWDER, FOR SOLUTION ORAL DAILY PRN
Qty: 30 EACH | Refills: 0 | Status: SHIPPED | OUTPATIENT
Start: 2022-02-06 | End: 2022-03-16

## 2022-02-06 RX ORDER — HYDROCODONE BITARTRATE AND ACETAMINOPHEN 7.5; 325 MG/1; MG/1
1 TABLET ORAL EVERY 8 HOURS PRN
Qty: 20 TABLET | Refills: 0 | Status: ON HOLD | OUTPATIENT
Start: 2022-02-06 | End: 2022-08-21

## 2022-02-06 RX ORDER — ASPIRIN 325 MG
325 TABLET ORAL DAILY
Qty: 30 TABLET | Refills: 0 | Status: ON HOLD | OUTPATIENT
Start: 2022-02-07 | End: 2022-12-22

## 2022-02-06 RX ORDER — AMOXICILLIN 250 MG
2 CAPSULE ORAL 2 TIMES DAILY
Qty: 30 TABLET | Refills: 0 | Status: ON HOLD | OUTPATIENT
Start: 2022-02-06 | End: 2022-08-21

## 2022-02-06 RX ORDER — CEFDINIR 300 MG/1
300 CAPSULE ORAL 2 TIMES DAILY
Qty: 14 CAPSULE | Refills: 0 | Status: ON HOLD | OUTPATIENT
Start: 2022-02-06 | End: 2022-02-26

## 2022-02-06 RX ORDER — LEVETIRACETAM 500 MG/1
500 TABLET ORAL EVERY 12 HOURS SCHEDULED
Qty: 60 TABLET | Refills: 0 | Status: SHIPPED | OUTPATIENT
Start: 2022-02-06 | End: 2022-03-09 | Stop reason: SDUPTHER

## 2022-02-06 RX ADMIN — DOCUSATE SODIUM 50 MG AND SENNOSIDES 8.6 MG 2 TABLET: 8.6; 5 TABLET, FILM COATED ORAL at 08:04

## 2022-02-06 RX ADMIN — PANTOPRAZOLE SODIUM 40 MG: 40 TABLET, DELAYED RELEASE ORAL at 05:06

## 2022-02-06 RX ADMIN — ASPIRIN 325 MG ORAL TABLET 325 MG: 325 PILL ORAL at 08:05

## 2022-02-06 RX ADMIN — LOSARTAN POTASSIUM 50 MG: 50 TABLET, FILM COATED ORAL at 08:04

## 2022-02-06 RX ADMIN — TOPIRAMATE 12.5 MG: 25 TABLET, FILM COATED ORAL at 08:04

## 2022-02-06 RX ADMIN — FUROSEMIDE 20 MG: 20 TABLET ORAL at 08:04

## 2022-02-06 RX ADMIN — Medication 10 ML: at 08:05

## 2022-02-06 RX ADMIN — SPIRONOLACTONE 25 MG: 25 TABLET ORAL at 08:04

## 2022-02-06 RX ADMIN — LEVETIRACETAM 500 MG: 500 TABLET, FILM COATED ORAL at 08:04

## 2022-02-06 RX ADMIN — AMLODIPINE BESYLATE 5 MG: 5 TABLET ORAL at 08:05

## 2022-02-06 RX ADMIN — GABAPENTIN 300 MG: 300 CAPSULE ORAL at 08:04

## 2022-02-06 RX ADMIN — CEFTRIAXONE 1 G: 1 INJECTION, POWDER, FOR SOLUTION INTRAMUSCULAR; INTRAVENOUS at 05:06

## 2022-02-06 NOTE — PLAN OF CARE
Pt will be discharging home via care with her spouse. Pt education has been done, pt was given the stroke packet and made aware of all discharge instructions and follow up appointments.   Problem: Fall Injury Risk  Goal: Absence of Fall and Fall-Related Injury  Outcome: Adequate for Care Transition  Intervention: Identify and Manage Contributors to Fall Injury Risk  Recent Flowsheet Documentation  Taken 2/6/2022 1400 by Sarah Gotti LPN  Medication Review/Management: medications reviewed  Taken 2/6/2022 1200 by Sarah Gotti LPN  Medication Review/Management: medications reviewed  Taken 2/6/2022 1000 by Sarah Gotti LPN  Medication Review/Management: medications reviewed  Taken 2/6/2022 0800 by Sarah Gotti LPN  Medication Review/Management: medications reviewed  Intervention: Promote Injury-Free Environment  Recent Flowsheet Documentation  Taken 2/6/2022 1400 by Sarah Gotti LPN  Safety Promotion/Fall Prevention:   activity supervised   assistive device/personal items within reach   clutter free environment maintained   fall prevention program maintained   gait belt   nonskid shoes/slippers when out of bed   safety round/check completed  Taken 2/6/2022 1200 by Sarah Gotti LPN  Safety Promotion/Fall Prevention:   activity supervised   assistive device/personal items within reach   clutter free environment maintained   fall prevention program maintained   gait belt   nonskid shoes/slippers when out of bed   safety round/check completed  Taken 2/6/2022 1000 by Sarah Gotti LPN  Safety Promotion/Fall Prevention:   activity supervised   assistive device/personal items within reach   clutter free environment maintained   fall prevention program maintained   gait belt   nonskid shoes/slippers when out of bed   safety round/check completed  Taken 2/6/2022 0800 by Sarah Gotti LPN  Safety Promotion/Fall Prevention:   activity supervised   assistive device/personal items within reach   clutter free  environment maintained   fall prevention program maintained   nonskid shoes/slippers when out of bed   safety round/check completed     Problem: Adjustment to Illness (Stroke, Ischemic/Transient Ischemic Attack)  Goal: Optimal Coping  Outcome: Adequate for Care Transition  Intervention: Support Patient/Family Psychosocial Response to Stroke  Recent Flowsheet Documentation  Taken 2/6/2022 1200 by Sarah Gotti LPN  Family/Support System Care: support provided  Taken 2/6/2022 0800 by Sarah Gotti LPN  Family/Support System Care: support provided     Problem: Bowel Elimination Impaired (Stroke, Ischemic/Transient Ischemic Attack)  Goal: Effective Bowel Elimination  Outcome: Adequate for Care Transition     Problem: Cerebral Tissue Perfusion Risk (Stroke, Ischemic/Transient Ischemic Attack)  Goal: Optimal Cerebral Tissue Perfusion  Outcome: Adequate for Care Transition     Problem: Communication Impairment (Stroke, Ischemic/Transient Ischemic Attack)  Goal: Improved Communication Skills  Outcome: Adequate for Care Transition  Intervention: Optimize Cognitive and Communication Skills  Recent Flowsheet Documentation  Taken 2/6/2022 1200 by Sarah Gotti LPN  Reorientation Measures: clock in view  Communication Enhancement Strategies: call light answered in person  Taken 2/6/2022 0800 by Sarah Gotti LPN  Reorientation Measures: clock in view  Communication Enhancement Strategies: call light answered in person     Problem: Eating/Swallowing Impairment (Stroke, Ischemic/Transient Ischemic Attack)  Goal: Oral Intake without Aspiration  Outcome: Adequate for Care Transition     Problem: Functional Ability Impaired (Stroke, Ischemic/Transient Ischemic Attack)  Goal: Optimal Functional Ability  Outcome: Adequate for Care Transition     Problem: Hemodynamic Instability (Stroke, Ischemic/Transient Ischemic Attack)  Goal: Vital Signs Remain in Desired Range  Outcome: Adequate for Care Transition  Intervention: Optimize  Blood Flow  Recent Flowsheet Documentation  Taken 2/6/2022 1200 by Sarah Gotti LPN  Fluid/Electrolyte Management: fluids provided  Taken 2/6/2022 0800 by Sarah Gotti LPN  Fluid/Electrolyte Management: fluids provided     Problem: Pain (Stroke, Ischemic/Transient Ischemic Attack)  Goal: Acceptable Pain Control  Outcome: Adequate for Care Transition     Problem: Sensorimotor Impairment (Stroke, Ischemic/Transient Ischemic Attack)  Goal: Improved Sensorimotor Function  Outcome: Adequate for Care Transition  Intervention: Optimize Range of Motion, Motor Control and Function  Recent Flowsheet Documentation  Taken 2/6/2022 0800 by Sarah Gotti LPN  Range of Motion: ROM (range of motion) performed  Intervention: Optimize Sensory and Perceptual Abilities  Recent Flowsheet Documentation  Taken 2/6/2022 1200 by Sarah Gotti LPN  Pressure Reduction Techniques:   frequent weight shift encouraged   sit time limited to 2 hours  Pressure Reduction Devices:   pressure-redistributing mattress utilized   positioning supports utilized  Taken 2/6/2022 0800 by Sarah Gotti LPN  Pressure Reduction Techniques: frequent weight shift encouraged  Pressure Reduction Devices:   pressure-redistributing mattress utilized   positioning supports utilized     Problem: Urinary Elimination Impaired (Stroke, Ischemic/Transient Ischemic Attack)  Goal: Effective Urinary Elimination  Outcome: Adequate for Care Transition     Problem: Adult Inpatient Plan of Care  Goal: Plan of Care Review  Outcome: Adequate for Care Transition  Goal: Patient-Specific Goal (Individualized)  Outcome: Adequate for Care Transition  Goal: Absence of Hospital-Acquired Illness or Injury  Outcome: Adequate for Care Transition  Intervention: Identify and Manage Fall Risk  Recent Flowsheet Documentation  Taken 2/6/2022 1400 by Sarah Gotti LPN  Safety Promotion/Fall Prevention:   activity supervised   assistive device/personal items within reach   clutter free  environment maintained   fall prevention program maintained   gait belt   nonskid shoes/slippers when out of bed   safety round/check completed  Taken 2/6/2022 1200 by Sarah Gotti LPN  Safety Promotion/Fall Prevention:   activity supervised   assistive device/personal items within reach   clutter free environment maintained   fall prevention program maintained   gait belt   nonskid shoes/slippers when out of bed   safety round/check completed  Taken 2/6/2022 1000 by Sarah Gotti LPN  Safety Promotion/Fall Prevention:   activity supervised   assistive device/personal items within reach   clutter free environment maintained   fall prevention program maintained   gait belt   nonskid shoes/slippers when out of bed   safety round/check completed  Taken 2/6/2022 0800 by Sarah Gotti LPN  Safety Promotion/Fall Prevention:   activity supervised   assistive device/personal items within reach   clutter free environment maintained   fall prevention program maintained   nonskid shoes/slippers when out of bed   safety round/check completed  Intervention: Prevent Skin Injury  Recent Flowsheet Documentation  Taken 2/6/2022 1200 by Sarah Gotti LPN  Skin Protection: adhesive use limited  Taken 2/6/2022 0800 by Sarah Gotti LPN  Skin Protection:   adhesive use limited   incontinence pads utilized  Intervention: Prevent and Manage VTE (venous thromboembolism) Risk  Recent Flowsheet Documentation  Taken 2/6/2022 1200 by Sarah Gotti LPN  VTE Prevention/Management: (Pt is up in the chair at this time)   bilateral   sequential compression devices off   other (see comments)  Taken 2/6/2022 0800 by Sarah Gotti LPN  VTE Prevention/Management:   bilateral   sequential compression devices on  Intervention: Prevent Infection  Recent Flowsheet Documentation  Taken 2/6/2022 1400 by Sarah Gotti LPN  Infection Prevention:   hand hygiene promoted   single patient room provided  Taken 2/6/2022 1200 by Sarah Gotti  LPN  Infection Prevention:   hand hygiene promoted   single patient room provided  Taken 2/6/2022 1000 by Sarah Gotti LPN  Infection Prevention:   hand hygiene promoted   single patient room provided  Taken 2/6/2022 0800 by Sarah Gotti LPN  Infection Prevention:   hand hygiene promoted   single patient room provided  Goal: Optimal Comfort and Wellbeing  Outcome: Adequate for Care Transition  Intervention: Provide Person-Centered Care  Recent Flowsheet Documentation  Taken 2/6/2022 1200 by Sarah Gotti LPN  Trust Relationship/Rapport:   care explained   thoughts/feelings acknowledged   questions encouraged  Taken 2/6/2022 0800 by Sarah Gotti LPN  Trust Relationship/Rapport:   care explained   thoughts/feelings acknowledged   reassurance provided   questions encouraged  Goal: Readiness for Transition of Care  Outcome: Adequate for Care Transition     Problem: Skin Injury Risk Increased  Goal: Skin Health and Integrity  Outcome: Adequate for Care Transition  Intervention: Optimize Skin Protection  Recent Flowsheet Documentation  Taken 2/6/2022 1200 by Sarah Gotti LPN  Pressure Reduction Techniques:   frequent weight shift encouraged   sit time limited to 2 hours  Pressure Reduction Devices:   pressure-redistributing mattress utilized   positioning supports utilized  Skin Protection: adhesive use limited  Taken 2/6/2022 0800 by Sarah Gotti LPN  Pressure Reduction Techniques: frequent weight shift encouraged  Pressure Reduction Devices:   pressure-redistributing mattress utilized   positioning supports utilized  Skin Protection:   adhesive use limited   incontinence pads utilized     Problem: Diabetes Comorbidity  Goal: Blood Glucose Level Within Desired Range  Outcome: Adequate for Care Transition  Intervention: Maintain Glycemic Control  Recent Flowsheet Documentation  Taken 2/6/2022 1200 by Sarah Gotti LPN  Glycemic Management: blood glucose monitoring  Taken 2/6/2022 0800 by Sarah Gotti  LPN  Glycemic Management: blood glucose monitoring     Problem: Hypertension Comorbidity  Goal: Blood Pressure in Desired Range  Outcome: Adequate for Care Transition  Intervention: Maintain Hypertension-Management Strategies  Recent Flowsheet Documentation  Taken 2/6/2022 1400 by Sarah Gotti LPN  Medication Review/Management: medications reviewed  Taken 2/6/2022 1200 by Sarah Gotti LPN  Medication Review/Management: medications reviewed  Taken 2/6/2022 1000 by Sarah Gotti LPN  Medication Review/Management: medications reviewed  Taken 2/6/2022 0800 by Sarah Gotti LPN  Medication Review/Management: medications reviewed   Goal Outcome Evaluation:

## 2022-02-06 NOTE — DISCHARGE SUMMARY
[facility] Medicine Services  DISCHARGE SUMMARY    Patient Name: Aracelis Vargas  : 1935  MRN: [id number]    Date of Admission: 2/3/2022  Discharge Diagnosis: cva  Date of Discharge:  22  Primary Care Physician: Gabriel Goss MD      Presenting Problem:   Acute urinary tract infection [N39.0]  Dysarthria [R47.1]  History of ataxia [Z87.898]  Cerebrovascular accident (CVA), unspecified mechanism (HCC) [I63.9]    Active and Resolved Hospital Problems:  Active Hospital Problems    Diagnosis POA   • **Cerebrovascular accident (CVA) (HCC) [I63.9] Yes   • Hyperammonemia (HCC) [E72.20] Yes   • Weakness [R53.1] Yes   • Cardiac pacemaker in situ [Z95.0] Yes   • GERD (gastroesophageal reflux disease) [K21.9] Yes   • Acute UTI [N39.0] Yes   • Hyperlipidemia [E78.5] Yes   • Primary hypertension [I10] Yes   • 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     Dysarthria: TIA versus CVA  Possible aborted CVA  -Code Stroke 2/3/2022 status post tPA 20:05   -NIH 0  -CT head showed no acute intracranial hemorrhage, CTA head/neck showed no significant carotid or vertebral basilar stenosis or occlusion.  No large intracranial vessel occlusion.  -Hemoglobin A1c 6.9%, TSH 1.590, lipid panel with triglycerides 238  -MRI brain pending, 2D echo pending, PT/OT/ST consulted  -per neurology-I am treating it as possible seizures and abortive stroke in both  treatments have been initiated     Acute UTI  -UA: Race leukocytes, negative nitrites, 6/12 WBCs, 3+ bacteria  -on IV rocephin      Hyperammonemia  -Ammonia 86 > 41  -Ultrasound liver unremarkable     Weakness  Falls at home  -PT/OT consulted as above     Coronary artery disease  AV block Mobitz type I and Wenckebach  H/o Pacemaker  -pacemaker interrogation ordered  -cardiology consulted      Chronic kidney disease stage 3b   -BUN/Cr normal     Diabetes mellitus Type II with neuropathy   -glucose 140  -SSI AC/HS     Essential hypertension  Permissive hypertension for embolic stroke  -resume home meds when appropriate      Hyperlipidemia   -cont home statin     Obesity BMI 43  -encourage lifestyle modifications      Patient is DNR/DNI     Hospital Course     Hospital Course:  Aracelis Vargas is a 86 y.o. female with PMH of CAD, wenckebach, HTN, HLD, DM type II who presented to [facility] ED 2/3/2022 after a fall at home and difficulty speaking approximately 40 minutes prior to EMS arrival. There was no report of facial droop or lateralizing weakness. The patient does not recall falling at home.      In the ED the patient was a Code Stroke.  CT head showed no acute intracranial hemorrhage, CTA head/neck showed no significant carotid or vertebral basilar stenosis or occlusion.  No large intracranial vessel occlusion..  She was determined to be a tPA candidate and received tPA at 20:05. She was admitted to the ICU for close neuro monitoring.      2/4/2022: The patient had NIH of 0.  She was felt stable for transfer out of ICU and hospitalist group consulted for medical management.   2/5/22 patient seen and examined in bed no acute distress, doing better today, awaiting physical therapy to evaluate. Awaiting MRI, discussed with RN.       DISCHARGE Follow Up Recommendations for labs and diagnostics: cbc/bmp      Reasons For Change In Medications and Indications for New Medications:   START taking:   aspirin   Start taking on:  February 7, 2022    This replaces a similar medication. See the full medication list for instructions.   cefdinir (OMNICEF)      levETIRAcetam (KEPPRA)      polyethylene glycol (MIRALAX)      sennosides-docusate (PERICOLACE          Day of Discharge     Vital Signs:  Temp:  [98.2 °F (36.8 °C)-98.8 °F (37.1 °C)] 98.2 °F (36.8 °C)  Heart Rate:  [77-88] 80  Resp:  [9-19] 12  BP: (114-160)/(50-70) 144/56  Flow (L/min):  [2] 2      Physical Exam  Vitals and nursing note reviewed.   Constitutional:       General: She is not in acute distress.     Appearance: She is well-developed. She is obese. She is not ill-appearing, toxic-appearing or diaphoretic.   HENT:      Head: Normocephalic and atraumatic.      Nose: Nose normal. No congestion or rhinorrhea.      Mouth/Throat:      Mouth: Mucous membranes are moist.      Pharynx: No oropharyngeal exudate.   Eyes:      General: No scleral icterus.        Right eye: No discharge.         Left eye: No discharge.      Extraocular Movements: Extraocular movements intact.      Conjunctiva/sclera: Conjunctivae normal.      Pupils: Pupils are equal, round, and reactive to light.   Neck:      Thyroid: No thyromegaly.      Vascular: No carotid bruit or JVD.      Trachea: No tracheal deviation.   Cardiovascular:      Rate and Rhythm: Normal rate and regular rhythm.      Pulses: Normal pulses.      Heart sounds: Normal heart sounds. No murmur heard.  No friction rub. No gallop.    Pulmonary:      Effort: Pulmonary effort is normal. No respiratory distress.      Breath sounds: Normal breath sounds. No stridor. No wheezing, rhonchi or rales.   Chest:      Chest wall: No tenderness.   Abdominal:      General: Bowel sounds are normal. There is no distension.      Palpations: Abdomen is soft. There is no mass.      Tenderness: There is no abdominal tenderness. There is no guarding or rebound.      Hernia: No hernia is present.   Musculoskeletal:         General: No swelling, tenderness,  deformity or signs of injury. Normal range of motion.      Cervical back: Normal range of motion and neck supple. No rigidity. No muscular tenderness.      Right lower leg: No edema.      Left lower leg: No edema.   Lymphadenopathy:      Cervical: No cervical adenopathy.   Skin:     General: Skin is warm and dry.      Coloration: Skin is pale. Skin is not jaundiced.      Findings: No bruising, erythema or rash.   Neurological:      General: No focal deficit present.      Mental Status: She is alert and oriented to person, place, and time. Mental status is at baseline.      Cranial Nerves: No cranial nerve deficit.      Sensory: No sensory deficit.      Motor: Weakness present. No abnormal muscle tone.      Coordination: Coordination normal.   Psychiatric:         Mood and Affect: Mood normal.         Behavior: Behavior normal.         Thought Content: Thought content normal.         Judgment: Judgment normal.        Pertinent  and/or Most Recent Results     LAB RESULTS:      Lab 02/06/22  0639 02/05/22  0723 02/04/22 0645 02/03/22 1943 02/03/22 1939   WBC 3.60 3.80 3.40 4.50  --    HEMOGLOBIN 8.1* 8.5* 8.6* 9.0*  --    HEMATOCRIT 26.5* 26.8* 27.8* 28.7*  --    PLATELETS 168 190 177 195  --    NEUTROS ABS 2.10 2.20 2.00 1.90  --    LYMPHS ABS 0.90 0.90 0.90 1.90  --    MONOS ABS 0.30 0.30 0.30 0.40  --    EOS ABS 0.20 0.30 0.20 0.30  --    MCV 88.1 85.8 86.2 88.4  --    PROTIME  --   --   --   --  11.4   APTT  --   --   --   --  24.3         Lab 02/06/22  0639 02/05/22  0723 02/04/22  0645 02/03/22 1943 02/03/22 1939   SODIUM 142 140 142  --  140   POTASSIUM 4.2 4.0 4.1  --  3.7   CHLORIDE 109* 108* 109*  --  106   CO2 25.0 23.0 21.0*  --  23.0   ANION GAP 8.0 9.0 12.0  --  11.0   BUN 12 12 18  --  20   CREATININE 0.97 0.95 0.81  --  1.06*   GLUCOSE 139* 142* 140*  --  138*   CALCIUM 8.2* 8.5* 8.6  --  9.0   MAGNESIUM 2.1 1.9 2.1  --   --    PHOSPHORUS 3.4 2.9 2.5  --   --    HEMOGLOBIN A1C  --   --   --  6.9*   --    TSH  --   --  1.590  --   --          Lab 02/06/22  0639 02/05/22  0723 02/04/22  0645 02/03/22 1939   TOTAL PROTEIN 6.5 6.6 6.9 7.4   ALBUMIN 3.30* 3.40* 3.70 3.90   GLOBULIN 3.2 3.2 3.2 3.5   ALT (SGPT) 13 14 17 19   AST (SGOT) 26 19 24 35*   BILIRUBIN 0.4 0.5 0.3 0.3   ALK PHOS 68 71 73 75         Lab 02/06/22  1255 02/06/22  0639 02/05/22  1821 02/05/22  1210 02/05/22  0723 02/04/22  0645 02/03/22 1939   TROPONIN T <0.010 <0.010 0.012 0.016 0.016   < > 0.016   PROTIME  --   --   --   --   --   --  11.4   INR  --   --   --   --   --   --  1.03    < > = values in this interval not displayed.         Lab 02/04/22  0645   CHOLESTEROL 169   LDL CHOL 72   HDL CHOL 58   TRIGLYCERIDES 238*         Lab 02/04/22  1521 02/04/22  0645 02/03/22  1939   FOLATE 5.24  --   --    VITAMIN B 12  --  155*  --    ABO TYPING  --   --  A   RH TYPING  --   --  Positive   ANTIBODY SCREEN  --   --  Negative         Brief Urine Lab Results  (Last result in the past 365 days)      Color   Clarity   Blood   Leuk Est   Nitrite   Protein   CREAT   Urine HCG        02/03/22 2011 Yellow   Clear   Negative   Trace   Negative   Negative               Microbiology Results (last 10 days)     Procedure Component Value - Date/Time    COVID PRE-OP / PRE-PROCEDURE SCREENING ORDER (NO ISOLATION) - Swab, Nasopharynx [[id number]]  (Normal) Collected: 02/03/22 2013    Lab Status: Final result Specimen: Swab from Nasopharynx Updated: 02/03/22 2039    Narrative:      The following orders were created for panel order COVID PRE-OP / PRE-PROCEDURE SCREENING ORDER (NO ISOLATION) - Swab, Nasopharynx.  Procedure                               Abnormality         Status                     ---------                               -----------         ------                     COVID-19,CEPHEID/SHAHLA,CO...[[id number]]  Normal              Final result                 Please view results for these tests on the individual orders.     COVID-19,CEPHEID/SHAHLA,COR/RAYRAY/PAD/MARTHA IN-HOUSE(OR EMERGENT/ADD-ON),NP SWAB IN TRANSPORT MEDIA 3-4 HR TAT, RT-PCR - Swab, Nasopharynx [[id number]]  (Normal) Collected: 02/03/22 2013    Lab Status: Final result Specimen: Swab from Nasopharynx Updated: 02/03/22 2039     COVID19 Not Detected    Narrative:      Fact sheet for providers: https://www.fda.gov/media/081187/download     Fact sheet for patients: https://www.fda.gov/media/104217/download  Fact sheet for providers: https://www.fda.gov/media/138309/download    Fact sheet for patients: https://www.fda.gov/media/904268/download    Test performed by PCR.    Urine Culture - Urine, Urine, Catheter In/Out [[id number]] Collected: 02/03/22 2011    Lab Status: Final result Specimen: Urine, Catheter In/Out Updated: 02/05/22 1024     Urine Culture >100,000 CFU/mL Mixed Corinna Isolated    Narrative:      Specimen contains mixed organisms of questionable pathogenicity which indicates contamination with commensal corinna.  Further identification is unlikely to provide clinically useful information.  Suggest recollection.          CT Head Without Contrast    Result Date: 2/4/2022  Impression: Negative. No intracranial hemorrhage or development of CT changes of brain ischemia  Electronically Signed By-Jesus Sparks On:2/4/2022 9:13 PM This report was finalized on [id number] by  Jesus Sparks, .    CT Angiogram Neck    Result Date: 2/3/2022  Impression: 1. No significant carotid or vertebral basilar stenosis or occlusion 2. No large intracranial vessel occlusion Electronically Signed: Jesus Sparks MD 2/3/2022 20:34 EST    US Liver    Result Date: 2/4/2022  Impression: 1. Mild enlargement of the liver. No evidence of liver mass. 2. The gallbladder and bile ducts are normal. 3. No evidence of ascites Electronically Signed: Jaylon Viramontes MD 2/4/2022 9:27 EST    XR Chest 1 View    Result Date: 2/6/2022  Impression:   1.  Stable cardiomegaly. 2.  No focal airspace  consolidation or other acute process.   Electronically Signed By-Alfa Shaw MD On:2/6/2022 8:14 AM This report was finalized on [id number] by  Alfa Shaw MD.    XR Chest 1 View    Result Date: 2/3/2022  Impression: Cardiomegaly with pulmonary venous hypertension. No evidence of pulmonary edema. No suspicious infiltrate. Atelectasis in the bases Electronically Signed: Jesus Sparks MD 2/3/2022 20:41 EST    CT Head Without Contrast Stroke Protocol    Result Date: 2/3/2022  Impression: 1. No acute intracranial hemorrhage. 2. Mild cerebral atrophy and white matter findings of chronic small vessel disease similar to prior study. Electronically Signed: Benjamín Padron MD 2/3/2022 19:45 EST    CT Angiogram Head w AI Analysis of LVO    Result Date: 2/3/2022  Impression: 1. No significant carotid or vertebral basilar stenosis or occlusion 2. No large intracranial vessel occlusion Electronically Signed: Jesus Sparks MD 2/3/2022 20:34 EST              Results for orders placed during the hospital encounter of 02/03/22    Adult Transthoracic Echo Complete W/ Cont if Necessary Per Protocol    Interpretation Summary  · Estimated left ventricular EF = 60% Left ventricular systolic function is normal.    Indications  Recent stroke    Technically satisfactory study.  Mitral valve is structurally normal.  Tricuspid valve is structurally normal.  Aortic valve is structurally normal.  Pulmonic valve could not be well visualized.  No evidence for mitral tricuspid or aortic regurgitation is seen by Doppler study.  Left atrium is normal in size.  Right atrium is normal in size.  Left ventricle is normal in size and contractility with ejection fraction of 60%.  Right ventricle is normal in size.  Atrial septum is intact.  Aorta is normal.  No pericardial effusion or intracardiac thrombus is seen.  Bubble study is negative for PFO.    Impression  Structurally and functionally normal cardiac valves.  Left ventricular size and  contractility is normal with ejection fraction of 60%.  Bubble study negative for PFO.      Labs Pending at Discharge:      Procedures Performed           Consults:   Consults     Date and Time Order Name Status Description    2/4/2022  9:53 AM Inpatient Hospitalist Consult      2/4/2022  8:47 AM Inpatient Cardiology Consult Completed     2/3/2022  7:29 PM Inpatient Neurology Consult Stroke Completed     2/3/2022  7:29 PM Inpatient Neurology Consult Stroke          Patient stable  No new issues  Post alteplase 24-hour CT was okay  She has not had MRI yet and I am wondering whether she can have 1     If she can have 1 and it is okay she may be discharged  If she cannot have one then we already have 2 head CTs     I am treating it as possible seizures and abortive stroke in both treatments have been initiated     Modification of stroke risk factors:   - Blood pressure should be less than 130/80 outpatient, HbA1c less than 6.5, LDL less than 70; b12>500 and smoking cessation if applicable. We would be grateful if the primary team / primary care physician keep a close watch on this above targets.  - Stroke education  - Follow up with neurologist of choice/Dr Seipel     - Fall, syncope precautions (see below)     SEIZURE/SYNCOPE INSTRUCTIONS:  -Recommended to observe all seizure precautions, including, but not limited to:   -No driving until seizure free for more than 3 months- as per State driving regulation / law;   -Avoid all high-risk activity, Take showers instead of baths, Avoid swimming without observation, Avoid open heat sources, Avoid working at heights, and Avoid engaging in all potentially hazardous activities.   -Patient expressed clear understanding.    Discharge Details        Discharge Medications      New Medications      Instructions Start Date   aspirin 325 MG tablet  Replaces: aspirin 81 MG EC tablet   325 mg, Oral, Daily   Start Date: February 7, 2022     cefdinir 300 MG capsule  Commonly known as:  OMNICEF   300 mg, Oral, 2 Times Daily      levETIRAcetam 500 MG tablet  Commonly known as: KEPPRA   500 mg, Oral, Every 12 Hours Scheduled      polyethylene glycol 17 g packet  Commonly known as: MIRALAX   17 g, Oral, Daily PRN      sennosides-docusate 8.6-50 MG per tablet  Commonly known as: PERICOLACE   2 tablets, Oral, 2 Times Daily         Continue These Medications      Instructions Start Date   amLODIPine 5 MG tablet  Commonly known as: NORVASC   5 mg, Oral, Every Morning      atorvastatin 10 MG tablet  Commonly known as: LIPITOR   10 mg, Oral, Daily      furosemide 20 MG tablet  Commonly known as: LASIX   20 mg, Oral, 2 Times Daily      gabapentin 300 MG capsule  Commonly known as: NEURONTIN   300 mg, Oral, 2 times daily      HYDROcodone-acetaminophen 7.5-325 MG per tablet  Commonly known as: NORCO   1 tablet, Oral, Every 8 Hours PRN      insulin NPH-insulin regular (70-30) 100 UNIT/ML injection  Commonly known as: humuLIN 70/30,novoLIN 70/30   30 Units, Subcutaneous, 2 Times Daily With Meals      losartan 50 MG tablet  Commonly known as: COZAAR   TAKE ONE TABLET BY MOUTH DAILY      omeprazole 40 MG capsule  Commonly known as: priLOSEC   40 mg, Oral, Every Morning      spironolactone 25 MG tablet  Commonly known as: ALDACTONE   1 tablet, Oral, Every Morning      topiramate 25 MG tablet  Commonly known as: TOPAMAX   12.5 mg, Oral, 2 Times Daily         Stop These Medications    aspirin 81 MG EC tablet  Replaced by: aspirin 325 MG tablet     metFORMIN 1000 MG tablet  Commonly known as: GLUCOPHAGE            Allergies   Allergen Reactions   • Latex, Natural Rubber Rash   • Adhesive Tape Rash         Discharge Disposition:   Home or Self Care    Diet:  Hospital:  Diet Order   Procedures   • Diet Diabetic/Consistent Carbs; Diabetic - Consistent Carb         Discharge Activity:   Activity Instructions     Gradually Increase Activity Until at Pre-Hospitalization Level              CODE STATUS:  Code Status and  Medical Interventions:   Ordered at: 02/04/22 0332     Medical Intervention Limits:    NO intubation (DNI)     Code Status (Patient has no pulse and is not breathing):    No CPR (Do Not Attempt to Resuscitate)     Medical Interventions (Patient has pulse or is breathing):    Limited Support         Future Appointments   Date Time Provider Department Center   3/24/2022  3:00 PM EDER MENDES, [facility]   3/24/2022  3:30 PM Wang Plaza MD [facility]   4/11/2022  2:00 PM Gabriel Goss MD [facility]       Additional Instructions for the Follow-ups that You Need to Schedule     Ambulatory Referral to Home Health   As directed      [facility] H/H to eval and treat    Order Comments: [facility] H/[facility] to eval and treat     Face to Face Visit Date: 2/4/2022    Follow-up provider for Plan of Care?: I treated the patient in an acute care facility and will not continue treatment after discharge.    Follow-up provider: GABRIEL GOSS [[id number]]    Reason/Clinical Findings: stroke    Describe mobility limitations that make leaving home difficult: weakness    Nursing/Therapeutic Services Requested: Skilled Nursing (stroke education ) Physical Therapy    Skilled nursing orders: Other    PT orders: Strengthening    Frequency: 1 Week 1         Discharge Follow-up with PCP   As directed       Currently Documented PCP:    Gabriel Goss MD    PCP Phone Number:    [phone]     Follow Up Details: 1 week               Time spent on Discharge including face to face service: 34 minutes    This patient has been examined wearing appropriate Personal Protective Equipment and discussed with rn. 02/06/22      Signature: Electronically signed by Yoel Baker MD, 02/06/22, 3:33 PM EST.

## 2022-02-06 NOTE — PLAN OF CARE
Problem: Fall Injury Risk  Goal: Absence of Fall and Fall-Related Injury  Outcome: Ongoing, Progressing  Intervention: Identify and Manage Contributors to Fall Injury Risk  Recent Flowsheet Documentation  Taken 2/6/2022 0400 by Azeb Campoverde RN  Medication Review/Management: medications reviewed  Taken 2/5/2022 2200 by Azeb Campoverde RN  Medication Review/Management: medications reviewed  Taken 2/5/2022 2000 by Azeb Campoverde RN  Medication Review/Management: medications reviewed  Intervention: Promote Injury-Free Environment  Recent Flowsheet Documentation  Taken 2/6/2022 0400 by Azeb Campoverde RN  Safety Promotion/Fall Prevention:   safety round/check completed   fall prevention program maintained   activity supervised   assistive device/personal items within reach   clutter free environment maintained   nonskid shoes/slippers when out of bed   room organization consistent  Taken 2/6/2022 0200 by Azeb Campoverde RN  Safety Promotion/Fall Prevention:   safety round/check completed   fall prevention program maintained   activity supervised   assistive device/personal items within reach   clutter free environment maintained   nonskid shoes/slippers when out of bed   room organization consistent  Taken 2/6/2022 0000 by Azeb Campoverde RN  Safety Promotion/Fall Prevention:   safety round/check completed   fall prevention program maintained   activity supervised   assistive device/personal items within reach   clutter free environment maintained   room organization consistent   nonskid shoes/slippers when out of bed  Taken 2/5/2022 2200 by Azeb Campoverde, RN  Safety Promotion/Fall Prevention:   safety round/check completed   fall prevention program maintained   activity supervised   assistive device/personal items within reach   clutter free environment maintained   nonskid shoes/slippers when out of bed   room organization consistent  Taken 2/5/2022 2000 by Azeb Campoverde RN  Safety  Promotion/Fall Prevention:   safety round/check completed   fall prevention program maintained   activity supervised   assistive device/personal items within reach   clutter free environment maintained   nonskid shoes/slippers when out of bed   room organization consistent     Problem: Adjustment to Illness (Stroke, Ischemic/Transient Ischemic Attack)  Goal: Optimal Coping  Outcome: Ongoing, Progressing  Intervention: Support Patient/Family Psychosocial Response to Stroke  Recent Flowsheet Documentation  Taken 2/6/2022 0400 by Azeb Campoverde RN  Family/Support System Care: support provided  Taken 2/6/2022 0000 by Azeb Campoverde RN  Family/Support System Care: support provided  Taken 2/5/2022 2000 by Azeb Campoverde RN  Family/Support System Care: support provided     Problem: Bowel Elimination Impaired (Stroke, Ischemic/Transient Ischemic Attack)  Goal: Effective Bowel Elimination  Outcome: Ongoing, Progressing     Problem: Cerebral Tissue Perfusion Risk (Stroke, Ischemic/Transient Ischemic Attack)  Goal: Optimal Cerebral Tissue Perfusion  Outcome: Ongoing, Progressing     Problem: Communication Impairment (Stroke, Ischemic/Transient Ischemic Attack)  Goal: Improved Communication Skills  Outcome: Ongoing, Progressing  Intervention: Optimize Cognitive and Communication Skills  Recent Flowsheet Documentation  Taken 2/6/2022 0400 by Azeb Campoverde RN  Communication Enhancement Strategies: call light answered in person  Taken 2/6/2022 0000 by Azeb Campoverde RN  Communication Enhancement Strategies: call light answered in person  Taken 2/5/2022 2000 by Azeb Campoverde RN  Communication Enhancement Strategies: call light answered in person     Problem: Eating/Swallowing Impairment (Stroke, Ischemic/Transient Ischemic Attack)  Goal: Oral Intake without Aspiration  Outcome: Ongoing, Progressing     Problem: Functional Ability Impaired (Stroke, Ischemic/Transient Ischemic Attack)  Goal: Optimal Functional  Ability  Outcome: Ongoing, Progressing     Problem: Hemodynamic Instability (Stroke, Ischemic/Transient Ischemic Attack)  Goal: Vital Signs Remain in Desired Range  Outcome: Ongoing, Progressing     Problem: Pain (Stroke, Ischemic/Transient Ischemic Attack)  Goal: Acceptable Pain Control  Outcome: Ongoing, Progressing     Problem: Sensorimotor Impairment (Stroke, Ischemic/Transient Ischemic Attack)  Goal: Improved Sensorimotor Function  Outcome: Ongoing, Progressing     Problem: Urinary Elimination Impaired (Stroke, Ischemic/Transient Ischemic Attack)  Goal: Effective Urinary Elimination  Outcome: Ongoing, Progressing     Problem: Adult Inpatient Plan of Care  Goal: Plan of Care Review  Outcome: Ongoing, Progressing  Goal: Patient-Specific Goal (Individualized)  Outcome: Ongoing, Progressing  Goal: Absence of Hospital-Acquired Illness or Injury  Outcome: Ongoing, Progressing  Intervention: Identify and Manage Fall Risk  Recent Flowsheet Documentation  Taken 2/6/2022 0400 by Azeb Campoverde, RN  Safety Promotion/Fall Prevention:   safety round/check completed   fall prevention program maintained   activity supervised   assistive device/personal items within reach   clutter free environment maintained   nonskid shoes/slippers when out of bed   room organization consistent  Taken 2/6/2022 0200 by Azeb Campoverde, RN  Safety Promotion/Fall Prevention:   safety round/check completed   fall prevention program maintained   activity supervised   assistive device/personal items within reach   clutter free environment maintained   nonskid shoes/slippers when out of bed   room organization consistent  Taken 2/6/2022 0000 by Azeb Campoverde, RN  Safety Promotion/Fall Prevention:   safety round/check completed   fall prevention program maintained   activity supervised   assistive device/personal items within reach   clutter free environment maintained   room organization consistent   nonskid shoes/slippers when out of  bed  Taken 2/5/2022 2200 by Azeb Campoverde RN  Safety Promotion/Fall Prevention:   safety round/check completed   fall prevention program maintained   activity supervised   assistive device/personal items within reach   clutter free environment maintained   nonskid shoes/slippers when out of bed   room organization consistent  Taken 2/5/2022 2000 by Azeb Campoverde RN  Safety Promotion/Fall Prevention:   safety round/check completed   fall prevention program maintained   activity supervised   assistive device/personal items within reach   clutter free environment maintained   nonskid shoes/slippers when out of bed   room organization consistent  Intervention: Prevent and Manage VTE (venous thromboembolism) Risk  Recent Flowsheet Documentation  Taken 2/6/2022 0000 by Azeb Campoverde RN  VTE Prevention/Management:   bilateral   sequential compression devices on  Taken 2/5/2022 2000 by Azeb Campoverde RN  VTE Prevention/Management:   bilateral   sequential compression devices on  Intervention: Prevent Infection  Recent Flowsheet Documentation  Taken 2/6/2022 0400 by Azeb Campoverde RN  Infection Prevention:   environmental surveillance performed   hand hygiene promoted  Taken 2/6/2022 0200 by Azeb Campoverde RN  Infection Prevention:   environmental surveillance performed   hand hygiene promoted  Taken 2/6/2022 0000 by Azeb Campoverde RN  Infection Prevention:   environmental surveillance performed   hand hygiene promoted  Taken 2/5/2022 2200 by Azeb Campoverde RN  Infection Prevention:   environmental surveillance performed   hand hygiene promoted  Taken 2/5/2022 2000 by Azeb Campoverde RN  Infection Prevention:   environmental surveillance performed   hand hygiene promoted  Goal: Optimal Comfort and Wellbeing  Outcome: Ongoing, Progressing  Intervention: Provide Person-Centered Care  Recent Flowsheet Documentation  Taken 2/6/2022 0400 by Azeb Campoverde RN  Trust Relationship/Rapport:   care  explained   choices provided  Taken 2/6/2022 0000 by Azeb Campoverde RN  Trust Relationship/Rapport:   care explained   choices provided  Taken 2/5/2022 2000 by Azeb Campoverde RN  Trust Relationship/Rapport:   care explained   choices provided  Goal: Readiness for Transition of Care  Outcome: Ongoing, Progressing     Problem: Skin Injury Risk Increased  Goal: Skin Health and Integrity  Outcome: Ongoing, Progressing     Problem: Diabetes Comorbidity  Goal: Blood Glucose Level Within Desired Range  Outcome: Ongoing, Progressing     Problem: Hypertension Comorbidity  Goal: Blood Pressure in Desired Range  Outcome: Ongoing, Progressing  Intervention: Maintain Hypertension-Management Strategies  Recent Flowsheet Documentation  Taken 2/6/2022 0400 by Azeb Campoverde RN  Medication Review/Management: medications reviewed  Taken 2/5/2022 2200 by Azeb Campoverde RN  Medication Review/Management: medications reviewed  Taken 2/5/2022 2000 by Azeb Campoverde RN  Medication Review/Management: medications reviewed   Goal Outcome Evaluation:

## 2022-02-06 NOTE — THERAPY EVALUATION
Patient Name: Aracelis Vargas  : 1935    MRN: [id number]                              Today's Date: 2022       Admit Date: 2/3/2022    Visit Dx:     ICD-10-CM ICD-9-CM   1. Cerebrovascular accident (CVA), unspecified mechanism ([facility])  I63.9 434.91   2. Dysarthria  R47.1 784.51   3. Acute urinary tract infection  N39.0 599.0   4. History of ataxia  Z87.898 V15.89     Patient Active Problem List   Diagnosis   • Fall   • Secondary hyperaldosteronism (HCC)   • Stage 3b chronic kidney disease (CMS/HCC)   • Hypertension associated with stage 3 chronic kidney disease due to type 2 diabetes mellitus (HCC)   • Type 2 diabetes mellitus with diabetic neuropathy, with long-term current use of insulin (HCC)   • Morbid obesity (HCC)   • Atherosclerosis of native coronary artery of native heart without angina pectoris   • Hyperlipidemia   • Primary hypertension   • Acute UTI   • GERD (gastroesophageal reflux disease)   • Cardiac pacemaker in situ   • Cerebrovascular accident (CVA) ([facility])   • Hyperammonemia (HCC)   • Weakness     Past Medical History:   Diagnosis Date   • Atherosclerosis of native coronary artery of native heart without angina pectoris 10/22/2020   • Atrioventricular block, Mobitz type 1, Sidneynckebach 10/21/2020    Added automatically from request for surgery [id number]   • Hypertension associated with stage 3 chronic kidney disease due to type 2 diabetes mellitus (HCC) 10/22/2020   • Morbid obesity (HCC) 10/22/2020   • Secondary hyperaldosteronism (HCC) 10/22/2020   • Stage 3b chronic kidney disease (HCC) 10/22/2020   • Type 2 diabetes mellitus with diabetic neuropathy, with long-term current use of insulin (HCC) 10/22/2020   • Type 2 diabetes mellitus with diabetic neuropathy, with long-term current use of insulin (HCC) 10/22/2020     Past Surgical History:   Procedure Laterality Date   • CARDIAC CATHETERIZATION N/A 10/23/2020    Procedure: Left Heart Cath and coronary angiogram;  Surgeon: Wang Plaza MD;   Location:  [facility] CATH INVASIVE LOCATION;  Service: Cardiovascular;  Laterality: N/A;   • CARDIAC ELECTROPHYSIOLOGY PROCEDURE Left 11/3/2021    Procedure: Pacemaker DC new;  Surgeon: Wang Plaza MD;  Location: [facility] CATH INVASIVE LOCATION;  Service: Cardiovascular;  Laterality: Left;   • CARDIAC ELECTROPHYSIOLOGY PROCEDURE N/A 11/3/2021    Procedure: LOOP RECORDER REMOVAL;  Surgeon: Wang Plaza MD;  Location: [facility] CATH INVASIVE LOCATION;  Service: Cardiovascular;  Laterality: N/A;   • HYSTERECTOMY     • JOINT REPLACEMENT Right     Hip   • JOINT REPLACEMENT Left     hip   • JOINT REPLACEMENT Left     hip (for second time)   • JOINT REPLACEMENT Right     knee   • OOPHORECTOMY        General Information     Row Name 02/06/22 1322          Physical Therapy Time and Intention    Document Type evaluation  -     Mode of Treatment physical therapy  -     Row Name 02/06/22 1322          General Information    Patient Profile Reviewed yes  -     Prior Level of Function independent:; ADL's; all household mobility  does not drive  -     Existing Precautions/Restrictions fall  -     Barriers to Rehab none identified  -     Row Name 02/06/22 1322          Living Environment    Lives With spouse  -     Row Name 02/06/22 1322          Home Main Entrance    Number of Stairs, Main Entrance one  -     Row Name 02/06/22 1322          Stairs Within Home, Primary    Number of Stairs, Within Home, Primary none  -     Row Name 02/06/22 1322          Cognition    Orientation Status (Cognition) oriented x 4  -           User Key  (r) = Recorded By, (t) = Taken By, (c) = Cosigned By    Initials Name Provider Type     Bethany Wallaec PT Physical Therapist               Mobility     Row Name 02/06/22 1322          Bed Mobility    Comment (Bed Mobility) up in chair upon arrival, seen by OT earlier for OOB activity  -     Row Name 02/06/22 1322          Sit-Stand Transfer    Sit-Stand [place] (Transfers)  standby assist  -JH     Row Name 02/06/22 1322          Gait/Stairs (Locomotion)    [place] Level (Gait) contact guard; 1 person assist  -     Assistive Device (Gait) walker, front-wheeled  -     Distance in Feet (Gait) 125'  -     Comment (Gait/Stairs) pt does well using RW, steady gait speed, no LOB  -           User Key  (r) = Recorded By, (t) = Taken By, (c) = Cosigned By    Initials Name Provider Type     Bethany Wallace PT Physical Therapist               Obj/Interventions     Row Name 02/06/22 1323          Range of Motion Comprehensive    General Range of Motion bilateral lower extremity ROM WFL  -JH     Row Name 02/06/22 1323          Strength Comprehensive (MMT)    Comment, General Manual Muscle Testing (MMT) Assessment 4+/5  -JH     Row Name 02/06/22 1323          Balance    Balance Assessment sitting static balance; sitting dynamic balance; standing static balance; standing dynamic balance  -     Static Sitting Balance WFL  -     Dynamic Sitting Balance WFL  -     Static Standing Balance WFL; supported  -     Dynamic Standing Balance WFL; supported  -JH     Row Name 02/06/22 1323          Sensory Assessment (Somatosensory)    Sensory Assessment (Somatosensory) sensation intact  -           User Key  (r) = Recorded By, (t) = Taken By, (c) = Cosigned By    Initials Name Provider Type    Bethany Costa PT Physical Therapist               Goals/Plan    No documentation.                Clinical Impression     Row Name 02/06/22 1324          Pain    Additional Documentation Pain Scale: Numbers Pre/Post-Treatment (Group)  -JH     Row Name 02/06/22 1324          Pain Scale: Numbers Pre/Post-Treatment    Pretreatment Pain Rating 0/10 - no pain  -     Posttreatment Pain Rating 0/10 - no pain  -JH     Row Name 02/06/22 1324          Plan of Care Review    Plan of Care Reviewed With patient  [facility]     Outcome Summary 85 yo female admitted after fall at home.  Pt was a code stroke and tPA  administered.  Pt with s/o UTI and possible seizure.  Pt is from home with her spouse, normally ind in the home with a cane and RW for community distances.  Pt feels back to her baseline, sitting up in chair.  Pt did well with all mobiltiy, ambulates in mcdonald using RW.  Safe for returm home with spouse at d/c.  -[facility] Name 02/06/22 1324          Therapy Assessment/Plan (PT)    Criteria for Skilled Interventions Met (PT) no; does not meet criteria for skilled intervention  -[facility] Name 02/06/22 1324          Vital Signs    O2 Delivery Pre Treatment supplemental O2  1L, not on home O2  -     O2 Delivery Intra Treatment room air  -     Post SpO2 (%) 96  -     O2 Delivery Post Treatment room air  -[facility] Name 02/06/22 1324          Positioning and Restraints    Pre-Treatment Position sitting in chair/recliner  -     Post Treatment Position chair  -     In Chair notified nsg; reclined; call light within reach; encouraged to call for assist; exit alarm on  -           User Key  (r) = Recorded By, (t) = Taken By, (c) = Cosigned By    Initials Name Provider Type    Bethany Costa, PT Physical Therapist               Outcome Measures     [facility] Name 02/06/22 1325 02/06/22 1303       Modified Arline Scale    Pre-Stroke Modified Arline Scale -- 0 - No Symptoms at all.  -    Modified [place] Scale 3 - Moderate disability.  Requiring some help, but able to walk without assistance.  - 4 - Moderately severe disability.  Unable to walk without assistance, and unable to attend to own bodily needs without assistance.  -    [facility] Name 02/06/22 1303          Functional Assessment    Outcome Measure Options AM-PAC 6 Clicks Daily Activity (OT); Modified [place]  -           User Key  (r) = Recorded By, (t) = Taken By, (c) = Cosigned By    Initials Name Provider Type    Bethany Costa, PT Physical Therapist    Fe Pablo, OT Occupational Therapist                             Physical Therapy  Education                 Title: PT OT SLP Therapies (In Progress)     Topic: Physical Therapy (Done)     Point: Mobility training (Done)     Learning Progress Summary           Patient Acceptance, E,TB, VU by  at 2/6/2022 1326                               User Key     Initials Effective Dates Name Provider Type Discipline     06/16/21 -  Bethany Wallace PT Physical Therapist PT              PT Recommendation and Plan     Plan of Care Reviewed With: patient  Outcome Summary: 85 yo female admitted after fall at home.  Pt was a code stroke and tPA administered.  Pt with s/o UTI and possible seizure.  Pt is from home with her spouse, normally ind in the home with a cane and RW for community distances.  Pt feels back to her baseline, sitting up in chair.  Pt did well with all mobiltiy, ambulates in mcdonald using RW.  Safe for returm home with spouse at d/c.     Time Calculation:    PT Charges     Row Name 02/06/22 1327             Time Calculation    Start Time 1047  -      Stop Time 1100  -      Time Calculation (min) 13 min  -      PT Received On 02/06/22  -              Time Calculation- PT    Total Timed Code Minutes- PT 0 minute(s)  -            User Key  (r) = Recorded By, (t) = Taken By, (c) = Cosigned By    Initials Name Provider Type     Bethany Wallace PT Physical Therapist              Therapy Charges for Today     Code Description Service Date Service Provider Modifiers Qty    [id number] HC PT EVAL MOD COMPLEXITY 2 2/6/2022 Bethany Wallace PT GP 1          PT G-Codes  Outcome Measure Options: AM-PAC 6 Clicks Daily Activity (OT), Modified [place]  AM-PAC 6 Clicks Score (OT): 17  Modified [place] Scale: 3 - Moderate disability.  Requiring some help, but able to walk without assistance.    Bethany Wallace PT  2/6/2022

## 2022-02-06 NOTE — PLAN OF CARE
Goal Outcome Evaluation:  Plan of Care Reviewed With: patient             Problem: Adult Inpatient Plan of Care  Goal: Plan of Care Review  Recent Flowsheet Documentation  Taken 2/6/2022 1324 by Bethany Wallace PT  Plan of Care Reviewed With: patient  Outcome Summary: 85 yo female admitted after fall at home.  Pt was a code stroke and tPA administered.  Pt with d/o UTI and possible seizure.  Pt is from home with her spouse, normally ind in the home with a cane and RW for community distances.  Pt feels back to her baseline, sitting up in chair.  Pt did well with all mobiltiy, ambulates in mcdonald using RW.  Safe for retur home with spouse at d/c.

## 2022-02-06 NOTE — PLAN OF CARE
Goal Outcome Evaluation:  Plan of Care Reviewed With: patient           Outcome Summary: Pt is an 87 yo female who presented to [facility] 2/3 after a fall at home.  CVA vs TIA s/p TPA. +UTI and possible seizure.  Pt lives with  in H 1 [place] and mod I at [facility] level PTA.  assist with socks and shoes PTA. This date, pt is SBA bed mobility, SBA standing at rwx level, and SBA/CGA for SPS xfers at rwx level.  Pt is mod I feeding, strong GH, min A for gown, and dependent for donning socks. OT recommending home with HH services and assist from . OT will see 3x/wk while at [facility].

## 2022-02-06 NOTE — OUTREACH NOTE
Prep Survey      Responses   [facility] patient discharged from? [facility]   Is LACE score < 7 ? No   Emergency Room discharge w/ pulse ox? No   Eligibility [facility]   Date of Admission 02/03/22   Date of Discharge 02/06/22   Discharge Disposition Home or Self Care   Discharge diagnosis Dysarthria: TIA versus CVA,    Acute UTI   Does the patient have one of the following disease processes/diagnoses(primary or secondary)? Stroke (TIA)   Does the patient have Home health ordered? Yes   What is the Home health agency?  [facility]   Is there a DME ordered? No   Prep survey completed? Yes          Shara Crane RN

## 2022-02-06 NOTE — THERAPY EVALUATION
Patient Name: Aracelis Vargas  : 1935    MRN: [id number]                              Today's Date: 2022       Admit Date: 2/3/2022    Visit Dx:     ICD-10-CM ICD-9-CM   1. Cerebrovascular accident (CVA), unspecified mechanism (Formerly [facility])  I63.9 434.91   2. Dysarthria  R47.1 784.51   3. Acute urinary tract infection  N39.0 599.0   4. History of ataxia  Z87.898 V15.89     Patient Active Problem List   Diagnosis   • Fall   • Secondary hyperaldosteronism (HCC)   • Stage 3b chronic kidney disease (CMS/HCC)   • Hypertension associated with stage 3 chronic kidney disease due to type 2 diabetes mellitus (HCC)   • Type 2 diabetes mellitus with diabetic neuropathy, with long-term current use of insulin (HCC)   • Morbid obesity (HCC)   • Atherosclerosis of native coronary artery of native heart without angina pectoris   • Hyperlipidemia   • Primary hypertension   • Acute UTI   • GERD (gastroesophageal reflux disease)   • Cardiac pacemaker in situ   • Cerebrovascular accident (CVA) (Formerly [facility])   • Hyperammonemia (HCC)   • Weakness     Past Medical History:   Diagnosis Date   • Atherosclerosis of native coronary artery of native heart without angina pectoris 10/22/2020   • Atrioventricular block, Mobitz type 1, Sidneynckebach 10/21/2020    Added automatically from request for surgery [id number]   • Hypertension associated with stage 3 chronic kidney disease due to type 2 diabetes mellitus (HCC) 10/22/2020   • Morbid obesity (HCC) 10/22/2020   • Secondary hyperaldosteronism (HCC) 10/22/2020   • Stage 3b chronic kidney disease (HCC) 10/22/2020   • Type 2 diabetes mellitus with diabetic neuropathy, with long-term current use of insulin (HCC) 10/22/2020   • Type 2 diabetes mellitus with diabetic neuropathy, with long-term current use of insulin (HCC) 10/22/2020     Past Surgical History:   Procedure Laterality Date   • CARDIAC CATHETERIZATION N/A 10/23/2020    Procedure: Left Heart Cath and coronary angiogram;  Surgeon: Wang Plaza MD;   Location: [facility] CATH INVASIVE LOCATION;  Service: Cardiovascular;  Laterality: N/A;   • CARDIAC ELECTROPHYSIOLOGY PROCEDURE Left 11/3/2021    Procedure: Pacemaker DC new;  Surgeon: Wang Plaza MD;  Location: [facility] CATH INVASIVE LOCATION;  Service: Cardiovascular;  Laterality: Left;   • CARDIAC ELECTROPHYSIOLOGY PROCEDURE N/A 11/3/2021    Procedure: LOOP RECORDER REMOVAL;  Surgeon: Wang Plaza MD;  Location: [facility] CATH INVASIVE LOCATION;  Service: Cardiovascular;  Laterality: N/A;   • HYSTERECTOMY     • JOINT REPLACEMENT Right     Hip   • JOINT REPLACEMENT Left     hip   • JOINT REPLACEMENT Left     hip (for second time)   • JOINT REPLACEMENT Right     knee   • OOPHORECTOMY        General Information     Row Name 02/06/22 1253          OT Time and Intention    Document Type evaluation  -     Mode of Treatment occupational therapy  -     Row Name 02/06/22 1253          General Information    Patient Profile Reviewed yes  -     Prior Level of Function independent:; ADL's; all household mobility  Mod I BADLs except for socks/shoes.  Uses STC for mobility.  -     Existing Precautions/Restrictions fall; oxygen therapy device and L/min  -     Barriers to Rehab none identified  -     Row Name 02/06/22 1253          Living Environment    Lives With spouse  -     Row Name 02/06/22 1253          Home Main Entrance    Number of Stairs, Main Entrance one  -     Stair Railings, Main Entrance railings safe and in good condition  -     Row Name 02/06/22 1253          Stairs Within Home, Primary    Number of Stairs, Within Home, Primary none  -     Row Name 02/06/22 1253          Cognition    Orientation Status (Cognition) oriented x 4  -     Row Name 02/06/22 1253          Safety Issues, Functional Mobility    Impairments Affecting Function (Mobility) endurance/activity tolerance; balance; strength  -           User Key  (r) = Recorded By, (t) = Taken By, (c) = Cosigned By    Initials Name  Provider Type    Fe Pablo, OT Occupational Therapist                 Mobility/ADL's     Row Name 02/06/22 1255          Bed Mobility    Bed Mobility bed mobility (all) activities  -     All Activities, [place] (Bed Mobility) standby assist  -     Assistive Device (Bed Mobility) bed rails  -     Row Name 02/06/22 1255          Transfers    Transfers bed-chair transfer; sit-stand transfer; toilet transfer  -     Bed-Chair [place] (Transfers) supervision; contact guard  -     Assistive Device (Bed-Chair Transfers) walker, front-wheeled  -     Sit-Stand [place] (Transfers) supervision; contact guard  -     [place] Level (Toilet Transfer) contact guard; supervision  -     Assistive Device (Toilet Transfer) commode, 3-in-1  -     Row Name 02/06/22 1255          Sit-Stand Transfer    Assistive Device (Sit-Stand Transfers) walker, front-wheeled  -     Row Name 02/06/22 1255          Toilet Transfer    Type (Toilet Transfer) stand pivot/stand step  -     Row Name 02/06/22 1255          Activities of Daily Living    BADL Assessment/Intervention feeding; grooming; upper body dressing; lower body dressing  -     Row Name 02/06/22 1255          Self-Feeding Assessment/Training    [place] Level (Feeding) feeding skills; modified independence  -DR     Position (Self-Feeding) supported sitting  -DR     Row Name 02/06/22 1255          Grooming Assessment/Training    [place] Level (Grooming) grooming skills; set up  -DR     Position (Grooming) supported sitting  -DR     Row Name 02/06/22 1255          Upper Body Dressing Assessment/Training    [place] Level (Upper Body Dressing) doff; don; pajama/robe; minimum assist (75% patient effort)  -     Row Name 02/06/22 1255          Lower Body Dressing Assessment/Training    [place] Level (Lower Body Dressing) doff; don; socks; dependent (less than 25% patient effort); other (see comments)  A needed PTA  -            User Key  (r) = Recorded By, (t) = Taken By, (c) = Cosigned By    Initials Name Provider Type    Fe Pablo OT Occupational Therapist               Obj/Interventions     Row Name 02/06/22 1257          Sensory Assessment (Somatosensory)    Sensory Assessment (Somatosensory) UE sensation intact  -     [facility] Name 02/06/22 1257          Vision Assessment/Intervention    Visual Impairment/Limitations WFL  -     [facility] Name 02/06/22 1257          Range of Motion Comprehensive    General Range of Motion no range of motion deficits identified  -     [facility] Name 02/06/22 1257          Strength Comprehensive (MMT)    Comment, General Manual Muscle Testing (MMT) Assessment BUE 4-/5  -     Row Name 02/06/22 1257          Balance    Balance Assessment sitting static balance; sitting dynamic balance; standing static balance; standing dynamic balance  -DR     Static Sitting Balance WFL; sitting, edge of bed  -DR     Dynamic Sitting Balance WFL; sitting, edge of bed  -DR     Static Standing Balance WFL; supported; standing  -DR     Dynamic Standing Balance mild impairment; unsupported; standing  -DR     Balance Interventions occupation based/functional task  -DR           User Key  (r) = Recorded By, (t) = Taken By, (c) = Cosigned By    Initials Name Provider Type    Fe Pablo, OT Occupational Therapist               Goals/Plan     Row Name 02/06/22 1301          Transfer Goal 1 (OT)    Activity/Assistive Device (Transfer Goal 1, OT) transfers, all  -DR     [place] Level/Cues Needed (Transfer Goal 1, OT) modified independence  -DR     Time Frame (Transfer Goal 1, OT) 2 weeks  -     Row Name 02/06/22 1301          Bathing Goal 1 (OT)    Activity/Device (Bathing Goal 1, OT) bathing skills, all  -DR     [place] Level/Cues Needed (Bathing Goal 1, OT) supervision required  -DR     Time Frame (Bathing Goal 1, OT) 2 weeks  -     Row Name 02/06/22 1301          Dressing Goal 1 (OT)     Activity/Device (Dressing Goal 1, OT) upper body dressing  -DR     Pratt/Cues Needed (Dressing Goal 1, OT) set-up required  -DR     Time Frame (Dressing Goal 1, OT) 2 weeks  -     Row Name 02/06/22 1301          Toileting Goal 1 (OT)    Activity/Device (Toileting Goal 1, OT) toileting skills, all  -DR     Pratt Level/Cues Needed (Toileting Goal 1, OT) modified independence  -DR     Time Frame (Toileting Goal 1, OT) 2 weeks  -     Row Name 02/06/22 1301          Grooming Goal 1 (OT)    Activity/Device (Grooming Goal 1, OT) grooming skills, all  -DR     Pratt (Grooming Goal 1, OT) modified independence  -DR     Time Frame (Grooming Goal 1, OT) 2 weeks  -     Row Name 02/06/22 1301          Therapy Assessment/Plan (OT)    Planned Therapy Interventions (OT) activity tolerance training; BADL retraining; occupation/activity based interventions; transfer/mobility retraining; patient/caregiver education/training  -           User Key  (r) = Recorded By, (t) = Taken By, (c) = Cosigned By    Initials Name Provider Type    Fe Pablo, OT Occupational Therapist               Clinical Impression     Row Name 02/06/22 1258          Pain Assessment    Additional Documentation Pain Scale: Numbers Pre/Post-Treatment (Group)  -     Row Name 02/06/22 1258          Pain Scale: Numbers Pre/Post-Treatment    Pretreatment Pain Rating 0/10 - no pain  -     Posttreatment Pain Rating 0/10 - no pain  -     Row Name 02/06/22 1256          Plan of Care Review    Plan of Care Reviewed With patient  -DR     Outcome Summary Pt is an 87 yo female who presented to [facility] 2/3 after a fall at home.  CVA vs TIA s/p TPA. +UTI and possible seizure.  Pt lives with  in [facility] 1 [place] and mod I at [facility] level PTA.  assist with socks and shoes PTA. This date, pt is SBA bed mobility, SBA standing at rwx level, and SBA/CGA for SPS xfers at rwx level.  Pt is mod I feeding, strong GH, min A for gown, and dependent for  donning socks. OT recommending home with  services and assist from . OT will see 3x/wk while at [facility].  -DR Rizo Name 02/06/22 1258          Therapy Assessment/Plan (OT)    Rehab Potential (OT) good, to achieve stated therapy goals  -     Criteria for Skilled Therapeutic Interventions Met (OT) yes; meets criteria; skilled treatment is necessary  -     Therapy Frequency (OT) 3 times/wk  -     Row Name 02/06/22 1258          Therapy Plan Review/Discharge Plan (OT)    Anticipated Discharge Disposition (OT) home with assist; home with home health  -     Row Name 02/06/22 1258          Positioning and Restraints    Pre-Treatment Position in bed  -     Post Treatment Position chair  -DR     In Chair reclined; call light within reach; encouraged to call for assist; exit alarm on  -           User Key  (r) = Recorded By, (t) = Taken By, (c) = Cosigned By    Initials Name Provider Type    Fe Pablo, OT Occupational Therapist               Outcome Measures     Row Name 02/06/22 1303          How much help from another is currently needed...    Putting on and taking off regular lower body clothing? 2  -DR     Bathing (including washing, rinsing, and drying) 3  -DR     Toileting (which includes using toilet bed pan or urinal) 3  -DR     Putting on and taking off regular upper body clothing 3  -DR     Taking care of personal grooming (such as brushing teeth) 3  -DR     Eating meals 3  -DR     AM-PAC 6 Clicks Score (OT) 17  -DR Rizo Name 02/06/22 1303          Modified Arline Scale    Pre-Stroke Modified Pratt Scale 0 - No Symptoms at all.  -     Modified Arline Scale 4 - Moderately severe disability.  Unable to walk without assistance, and unable to attend to own bodily needs without assistance.  -     Row Name 02/06/22 1303          Functional Assessment    Outcome Measure Options AM-PAC 6 Clicks Daily Activity (OT); Modified Pratt  -           User Key  (r) = Recorded By, (t) = Taken  By, (c) = Cosigned By    Initials Name Provider Type    Fe Pablo, OT Occupational Therapist                Occupational Therapy Education                 Title: PT OT SLP Therapies (In Progress)     Topic: Occupational Therapy (Not Started)     Point: ADL training (Not Started)     Description:   Instruct learner(s) on proper safety adaptation and remediation techniques during self care or transfers.   Instruct in proper use of assistive devices.              Learner Progress:  Not documented in this visit.          Point: Home exercise program (Not Started)     Description:   Instruct learner(s) on appropriate technique for monitoring, assisting and/or progressing therapeutic exercises/activities.              Learner Progress:  Not documented in this visit.          Point: Precautions (Not Started)     Description:   Instruct learner(s) on prescribed precautions during self-care and functional transfers.              Learner Progress:  Not documented in this visit.          Point: Body mechanics (Not Started)     Description:   Instruct learner(s) on proper positioning and spine alignment during self-care, functional mobility activities and/or exercises.              Learner Progress:  Not documented in this visit.                          OT Recommendation and Plan  Planned Therapy Interventions (OT): activity tolerance training, BADL retraining, occupation/activity based interventions, transfer/mobility retraining, patient/caregiver education/training  Therapy Frequency (OT): 3 times/wk  Plan of Care Review  Plan of Care Reviewed With: patient  Outcome Summary: Pt is an 87 yo female who presented to [facility] 2/3 after a fall at home.  CVA vs TIA s/p TPA. +UTI and possible seizure.  Pt lives with  in [facility] 1 [place] and mod I at [facility] level PTA.  assist with socks and shoes PTA. This date, pt is SBA bed mobility, SBA standing at rwx level, and SBA/CGA for SPS xfers at rwx level.  Pt is mod I feeding,  strong GH, min A for gown, and dependent for donning socks. OT recommending home with HH services and assist from . OT will see 3x/wk while at [facility].     Time Calculation:    Time Calculation- OT     Row Name 02/06/22 1304             Time Calculation- OT    OT Start Time 0945  -      OT Stop Time 1011  -      OT Time Calculation (min) 26 min  -      OT Received On 02/06/22  -      OT - Next Appointment 02/08/22  -      OT Goal Re-Cert Due Date 02/20/22  -            User Key  (r) = Recorded By, (t) = Taken By, (c) = Cosigned By    Initials Name Provider Type    Fe Pablo OT Occupational Therapist              Therapy Charges for Today     Code Description Service Date Service Provider Modifiers Qty    [id number] HC OT EVAL MOD COMPLEXITY 4 2/6/2022 Fe Ling OT GO 1               Fe Ling OT  2/6/2022

## 2022-02-06 NOTE — PROGRESS NOTES
Cardiology Progress Note      Admiting Physician:  Yoel Baker MD   LOS: 3 days       Reason For Followup:  Sick sinus syndrome status post PPM 11/3/2021  TIA/stroke  Paroxysmal atrial fibrillation    Subjective:    Interval History:  Seen and examined.  Chart and labs reviewed.  Patient denies any chest pain pressure heaviness or tightness.  Does report some weakness.  No shortness of breath.  Patient does have significant desaturations while sleeping likely secondary to sleep apnea.    Review of Systems:  A complete review of systems was undertaken with the pertinent cardiovascular findings listed in history of present illness and all other systems being negative.     Assessment & Plan    Impressions:  Paroxysmal atrial fibrillation  TIA/stroke status post TPA with complete resolution of symptoms  Sick sinus syndrome status post permanent pacemaker 11/3/2021  Probable obstructive sleep apnea    Recommendations:  Continue to monitor rate and rhythm  Consider oral anticoagulation when okay with neurology  May need to give consideration for watchman as well.  Patient is a DNR/DNI        Objective:    Medication Review:   Scheduled Meds:amLODIPine, 5 mg, Oral, QAM  aspirin, 325 mg, Oral, Daily   Or  aspirin, 300 mg, Rectal, Daily  atorvastatin, 40 mg, Oral, Nightly  cefTRIAXone, 1 g, Intravenous, Q24H  furosemide, 20 mg, Oral, BID  gabapentin, 300 mg, Oral, BID  insulin lispro, 0-14 Units, Subcutaneous, Q6H  levETIRAcetam, 500 mg, Oral, Q12H  losartan, 50 mg, Oral, Daily  pantoprazole, 40 mg, Oral, Q AM  senna-docusate sodium, 2 tablet, Oral, BID  spironolactone, 25 mg, Oral, QAM  topiramate, 12.5 mg, Oral, BID      Continuous Infusions:   PRN Meds:.  acetaminophen **OR** acetaminophen    senna-docusate sodium **AND** polyethylene glycol **AND** bisacodyl **AND** bisacodyl    dextrose    dextrose    glucagon (human recombinant)    hydrALAZINE    insulin lispro **AND** insulin lispro    ipratropium-albuterol     ondansetron **OR** ondansetron    sodium chloride    Patient Active Problem List   Diagnosis    Fall    Secondary hyperaldosteronism (HCC)    Stage 3b chronic kidney disease (CMS/HCC)    Hypertension associated with stage 3 chronic kidney disease due to type 2 diabetes mellitus (HCC)    Type 2 diabetes mellitus with diabetic neuropathy, with long-term current use of insulin (HCC)    Morbid obesity (HCC)    Atherosclerosis of native coronary artery of native heart without angina pectoris    Hyperlipidemia    Primary hypertension    Acute UTI    GERD (gastroesophageal reflux disease)    Cardiac pacemaker in situ    Cerebrovascular accident (CVA) (HCC)    Hyperammonemia (HCC)    Weakness         Physical Exam:    General: Alert, cooperative, no distress, appears stated age  Head:  Normocephalic, atraumatic, mucous membranes moist  Eyes:  Conjunctivae/corneas clear, EOM's intact     Neck:  Supple,  no bruit  Lungs: Coarse and diminished bilaterally but otherwise clear to auscultation  Chest wall: No tenderness  Heart::  Regular rate and rhythm, S1 and S2 normal, 1/6 holosystolic murmur.  No rub or gallop  Abdomen: Soft, non-tender, nondistended bowel sounds active.  Obese  Extremities: No cyanosis, clubbing, trace edema  Pulses: 2+ and symmetric all extremities  Skin:  No rashes or lesions  Neuro/psych: A&O x3. CN II through XII are grossly intact with appropriate affect    Vital Signs:  Vitals:    02/06/22 0158 02/06/22 0613 02/06/22 0804 02/06/22 1007   BP: 114/50 160/67 133/55 140/63   BP Location: Right arm Right arm  Right arm   Patient Position: Lying Lying  Sitting   Pulse: 77 79 88 81   Resp: 11 9  12   Temp: 98.8 °F (37.1 °C) 98.8 °F (37.1 °C)  98.3 °F (36.8 °C)   TempSrc: Oral Oral  Oral   SpO2: 100% 99%  98%   Weight:       Height:         Wt Readings from Last 1 Encounters:   02/04/22 107 kg (236 lb)       Intake/Output Summary (Last 24 hours) at 2/6/2022 1038  Last data filed at 2/6/2022 0800  Gross per 24  hour   Intake 720 ml   Output 1400 ml   Net -680 ml         Results Review:     CBC    Results from last 7 days   Lab Units 02/06/22  0639 02/05/22  0723 02/04/22  0645 02/03/22 1943   WBC 10*3/mm3 3.60 3.80 3.40 4.50   HEMOGLOBIN g/dL 8.1* 8.5* 8.6* 9.0*   PLATELETS 10*3/mm3 168 190 177 195     Cr Clearance Estimated Creatinine Clearance: 49.7 mL/min (by C-G formula based on SCr of 0.97 mg/dL).  Coag   Results from last 7 days   Lab Units 02/03/22  1939   INR  1.03   APTT seconds 24.3     HbA1C   Lab Results   Component Value Date    HGBA1C 6.9 (H) 02/03/2022    HGBA1C 6.2 (H) 11/01/2021    HGBA1C 6.9 (H) 09/07/2021     Blood Glucose   Glucose   Date/Time Value Ref Range Status   02/06/2022 0612 73 70 - 105 mg/dL Final     Comment:     Serial Number: [id number]:  [id number]   02/06/2022 0016 125 (H) 70 - 105 mg/dL Final     Comment:     Serial Number: [id number]:  [id number]   02/05/2022 1823 205 (H) 70 - 105 mg/dL Final     Comment:     Serial Number: [id number]:  [id number]   02/05/2022 1205 140 (H) 70 - 105 mg/dL Final     Comment:     Serial Number: [id number]:  [id number]   02/05/2022 0644 144 (H) 70 - 105 mg/dL Final     Comment:     Serial Number: [id number]:  [id number]   02/05/2022 0153 130 (H) 70 - 105 mg/dL Final     Comment:     Serial Number: [id number]:  [id number]   02/04/2022 2109 121 (H) 70 - 105 mg/dL Final     Comment:     Serial Number: [id number]:  [id number]   02/04/2022 1819 141 (H) 70 - 105 mg/dL Final     Comment:     Serial Number: [id number]:  [id number]     Infection   Results from last 7 days   Lab Units 02/03/22 2011   URINECX  >100,000 CFU/mL Mixed Nancy Isolated     CMP   Results from last 7 days   Lab Units 02/06/22  0639 02/05/22  0723 02/04/22  0645 02/04/22  0644 02/03/22 2011 02/03/22  1939   SODIUM mmol/L 142 140 142  --   --  140   POTASSIUM mmol/L 4.2 4.0 4.1  --   --  3.7   CHLORIDE mmol/L 109* 108* 109*  --   --  106   CO2  mmol/L 25.0 23.0 21.0*  --   --  23.0   BUN mg/dL 12 12 18  --   --  20   CREATININE mg/dL 0.97 0.95 0.81  --   --  1.06*   GLUCOSE mg/dL 139* 142* 140*  --   --  138*   ALBUMIN g/dL 3.30* 3.40* 3.70  --   --  3.90   BILIRUBIN mg/dL 0.4 0.5 0.3  --   --  0.3   ALK PHOS U/L 68 71 73  --   --  75   AST (SGOT) U/L 26 19 24  --   --  35*   ALT (SGPT) U/L 13 14 17  --   --  19   AMMONIA umol/L 37 44  --  41 86*  --      ABG      UA    Results from last 7 days   Lab Units 02/03/22 2011   NITRITE UA  Negative   WBC UA /HPF 6-12*   BACTERIA UA /HPF 3+*   SQUAM EPITHEL UA /HPF 0-2   URINECX  >100,000 CFU/mL Mixed Nancy Isolated     MARVA  No results found for: POCMETH, POCAMPHET, POCBARBITUR, POCBENZO, POCCOCAINE, POCOPIATES, POCOXYCODO, POCPHENCYC, POCPROPOXY, POCTHC, POCTRICYC  Lysis Labs   Results from last 7 days   Lab Units 02/06/22  0639 02/05/22  0723 02/04/22  0645 02/03/22  1943 02/03/22  1939   INR   --   --   --   --  1.03   APTT seconds  --   --   --   --  24.3   HEMOGLOBIN g/dL 8.1* 8.5* 8.6* 9.0*  --    PLATELETS 10*3/mm3 168 190 177 195  --    CREATININE mg/dL 0.97 0.95 0.81  --  1.06*     Radiology(recent) CT Head Without Contrast    Result Date: 2/4/2022  Negative. No intracranial hemorrhage or development of CT changes of brain ischemia  Electronically Signed By-Jesus Sparks On:2/4/2022 9:13 PM This report was finalized on [id number] by  Jesus Sparks, .    XR Chest 1 View    Result Date: 2/6/2022    1.  Stable cardiomegaly. 2.  No focal airspace consolidation or other acute process.   Electronically Signed By-Alfa Shaw MD On:2/6/2022 8:14 AM This report was finalized on [id number] by  Alfa Shaw MD.        Results from last 7 days   Lab Units 02/06/22  0639 02/04/22  0645 02/03/22  1939   CK TOTAL U/L  --   --  77   TROPONIN T ng/mL <0.010   < > 0.016    < > = values in this interval not displayed.       Imaging Results (Last 24 Hours)       Procedure Component Value Units Date/Time     XR Chest 1 View [[id number]] Collected: 02/06/22 0814     Updated: 02/06/22 0817    Narrative:      XR CHEST 1 VW-     Date of Exam: 2/6/2022 4:27 AM     Indication: Shortness of breath; I63.9-Cerebral infarction, unspecified;  R47.1-Dysarthria and anarthria; N39.0-Urinary tract infection, site not  specified; Z87.898-Personal history of other specified conditions.     Comparison: 02/03/2022     Technique: A single view of the chest was obtained.     FINDINGS:      Left subclavian transvenous pacemaker remains in place and  unchanged in position.  Cardiomegaly is stable.  Pulmonary vessels are  normal.  Calcified granuloma seen within the right lung base.  Lungs are  otherwise clear.  No evidence pneumothorax.  No pleural effusion.             Impression:            1.  Stable cardiomegaly.  2.  No focal airspace consolidation or other acute process.        Electronically Signed By-Alfa Shaw MD On:2/6/2022 8:14 AM  This report was finalized on [id number] by  Alfa Shaw MD.            Cardiac Studies:  Echo- Results for orders placed during the hospital encounter of 02/03/22    Adult Transthoracic Echo Complete W/ Cont if Necessary Per Protocol    Interpretation Summary  · Estimated left ventricular EF = 60% Left ventricular systolic function is normal.    Indications  Recent stroke    Technically satisfactory study.  Mitral valve is structurally normal.  Tricuspid valve is structurally normal.  Aortic valve is structurally normal.  Pulmonic valve could not be well visualized.  No evidence for mitral tricuspid or aortic regurgitation is seen by Doppler study.  Left atrium is normal in size.  Right atrium is normal in size.  Left ventricle is normal in size and contractility with ejection fraction of 60%.  Right ventricle is normal in size.  Atrial septum is intact.  Aorta is normal.  No pericardial effusion or intracardiac thrombus is seen.  Bubble study is negative for PFO.    Impression  Structurally  and functionally normal cardiac valves.  Left ventricular size and contractility is normal with ejection fraction of 60%.  Bubble study negative for PFO.    Stress Myoview-  Cath-        Tha Wahl DO  02/06/22  10:38 EST

## 2022-02-07 ENCOUNTER — TRANSITIONAL CARE MANAGEMENT TELEPHONE ENCOUNTER (OUTPATIENT)
Dept: CALL CENTER | Facility: HOSPITAL | Age: 87
End: 2022-02-07

## 2022-02-07 ENCOUNTER — TELEPHONE (OUTPATIENT)
Dept: NEUROLOGY | Facility: CLINIC | Age: 87
End: 2022-02-07

## 2022-02-07 DIAGNOSIS — G47.33 OSA (OBSTRUCTIVE SLEEP APNEA): Primary | ICD-10-CM

## 2022-02-07 NOTE — OUTREACH NOTE
Call Center TCM Note      Responses   [facility] patient discharged from? [facility]   Does the patient have one of the following disease processes/diagnoses(primary or secondary)? Stroke (TIA)   TCM attempt successful? Yes   Call start time 0819   Call end time 0828   Discharge diagnosis Dysarthria: TIA versus CVA,    Acute UTI   Meds reviewed with patient/caregiver? Yes   Is the patient having any side effects they believe may be caused by any medication additions or changes? No   Does the patient have all medications ordered at discharge? No   Is the patient taking all medications as directed (includes completed medication regime)? No   Medication comments States needs to  today.   Does the patient have a primary care provider?  Yes   Does the patient have an appointment with their PCP within 7 days of discharge? No   What is preventing the patient from scheduling follow up appointments within 7 days of discharge? Haven't had time   Nursing Interventions Advised patient to make appointment   Urgent appointment interventions Facilitated patient appointment   Has the patient kept scheduled appointments due by today? N/A   Comments Appt made with Shara Anderson for Feb 15 @ 3:15   What is the Home health agency?  [facility]   Has home health visited the patient within 72 hours of discharge? Call prior to 72 hours   Psychosocial issues? No   Does the patient require any assistance with activities of daily living such as eating, bathing, dressing, walking, etc.? No   Does the patient have any residual symptoms from stroke/TIA? No   Does the patient understand the diet ordered at discharge? Yes   Did the patient receive a copy of their discharge instructions? Yes   Nursing interventions Reviewed instructions with patient   What is the patient's perception of their health status since discharge? Improving   Nursing interventions Nurse provided patient education   Is the patient able to teach back FAST for Stroke? Yes    Is the patient/caregiver able to teach back the risk factors for a stroke? High blood pressure-goal below 120/80,  History of TIAs,  History of Afib,  Sleep apnea,  Smoking,  Diabetes,  High Cholesterol,  Physical inactivity and obesity,  Carotid or other artery disease   Is the patient/caregiver able to teach back signs and symptoms related to disease process for when to call PCP? Yes   Is the patient/caregiver able to teach back signs and symptoms related to disease process for when to call 911? Yes   If the patient is a current smoker, are they able to teach back resources for cessation? Not a smoker   Is the patient/caregiver able to teach back the hierarchy of who to call/visit for symptoms/problems? PCP, Specialist, Home health nurse, Urgent Care, ED, 911 Yes   Additional teach back comments States she is doing well.  Is wanting to have a sleep study done at home.  Will message PCP office regarding this.   TCM call completed? Yes   Wrap up additional comments Appt made with PCP with will message regarding pt wanting to have home sleep study done.          Ange Pope LPN    2/7/2022, 08:30 EST

## 2022-02-07 NOTE — PROGRESS NOTES
Order put in for pt to see Dr. Pineda (pulmonology) and he will need to order the home sleep study since he will need to interpret.

## 2022-02-07 NOTE — PROGRESS NOTES
"PULMONARY CRITICAL CARE Progress  NOTE      PATIENT IDENTIFICATION:  Name: Aracelis Vargas  MRN: [id number]  :  1935     Age: 86 y.o.  Sex: female    DATE OF Note:  2022   Referring Physician: Dwain Campos MD                  Subjective:   Feeling better,    No SOB no chest pain, no nausea or vomiting, no change in bowel habit, no dysuria,  no new  skin rash or itching.      Objective:  tMax 24 hrs: Temp (24hrs), Av.5 °F (36.9 °C), Min:98.2 °F (36.8 °C), Max:98.8 °F (37.1 °C)      Vitals Ranges:   Temp:  [98.2 °F (36.8 °C)-98.8 °F (37.1 °C)] 98.2 °F (36.8 °C)  Heart Rate:  [77-88] 80  Resp:  [9-12] 12  BP: (114-160)/(50-67) 144/56    Intake and Output Last 3 Shifts:   I/O last 3 completed shifts:  In: 1200 [P.O.:1200]  Out: 1500 [Urine:1500]    Exam:  /56 (BP Location: Right arm, Patient Position: Sitting)   Pulse 80   Temp 98.2 °F (36.8 °C) (Oral)   Resp 12   Ht 157.5 cm (62\")   Wt 107 kg (236 lb)   SpO2 95%   BMI 43.16 kg/m²     General Appearance:   AA oriented  HEENT:  Normocephalic, without obvious abnormality, Conjunctiva/corneas clear,.  Normal external ear canals, Nares normal, no drainage     Neck:  Supple, symmetrical, trachea midline. No JVD.  Lungs /Chest wall:   Bilateral basal rhonchi, respirations unlabored symmetrical wall movement.     Heart:  Regular rate and rhythm, systolic murmur PMI left sternal border  Abdomen: Soft, non-tender, no masses, no organomegaly.    Extremities: Trace edema no clubbing or Cyanosis        Medications:  No current facility-administered medications for this encounter.    Current Outpatient Medications:   •  amLODIPine (NORVASC) 5 MG tablet, Take 5 mg by mouth Every Morning., Disp: , Rfl:   •  atorvastatin (LIPITOR) 10 MG tablet, Take 1 tablet by mouth Daily., Disp: 90 tablet, Rfl: 1  •  furosemide (LASIX) 20 MG tablet, Take 1 tablet by mouth 2 (Two) Times a Day., Disp: 180 tablet, Rfl: 1  •  gabapentin (NEURONTIN) 300 MG capsule, Take 300 mg " by mouth 2 (two) times a day., Disp: , Rfl:   •  insulin NPH-insulin regular (humuLIN 70/30,novoLIN 70/30) (70-30) 100 UNIT/ML injection, Inject 30 Units under the skin into the appropriate area as directed 2 (Two) Times a Day With Meals., Disp: , Rfl:   •  losartan (COZAAR) 50 MG tablet, TAKE ONE TABLET BY MOUTH DAILY, Disp: 90 tablet, Rfl: 3  •  omeprazole (priLOSEC) 40 MG capsule, Take 40 mg by mouth Every Morning., Disp: , Rfl:   •  spironolactone (ALDACTONE) 25 MG tablet, Take 1 tablet by mouth Every Morning., Disp: , Rfl:   •  topiramate (TOPAMAX) 25 MG tablet, Take 12.5 mg by mouth 2 (Two) Times a Day., Disp: , Rfl:   •  [START ON 2/7/2022] aspirin 325 MG tablet, Take 1 tablet by mouth Daily., Disp: 30 tablet, Rfl: 0  •  cefdinir (OMNICEF) 300 MG capsule, Take 1 capsule by mouth 2 (Two) Times a Day., Disp: 14 capsule, Rfl: 0  •  HYDROcodone-acetaminophen (NORCO) 7.5-325 MG per tablet, Take 1 tablet by mouth Every 8 (Eight) Hours As Needed for Moderate Pain  or Severe Pain ., Disp: 20 tablet, Rfl: 0  •  levETIRAcetam (KEPPRA) 500 MG tablet, Take 1 tablet by mouth Every 12 (Twelve) Hours., Disp: 60 tablet, Rfl: 0  •  polyethylene glycol (MIRALAX) 17 g packet, Take 17 g by mouth Daily As Needed (Use if senna-docusate is ineffective)., Disp: 30 each, Rfl: 0  •  sennosides-docusate (PERICOLACE) 8.6-50 MG per tablet, Take 2 tablets by mouth 2 (Two) Times a Day., Disp: 30 tablet, Rfl: 0    Data Review:  All labs (24hrs):   Recent Results (from the past 24 hour(s))   POC Glucose Once    Collection Time: 02/06/22 12:16 AM    Specimen: Blood   Result Value Ref Range    Glucose 125 (H) 70 - 105 mg/dL   POC Glucose Once    Collection Time: 02/06/22  6:12 AM    Specimen: Blood   Result Value Ref Range    Glucose 73 70 - 105 mg/dL   Magnesium    Collection Time: 02/06/22  6:39 AM    Specimen: Blood   Result Value Ref Range    Magnesium 2.1 1.6 - 2.4 mg/dL   Phosphorus    Collection Time: 02/06/22  6:39 AM    Specimen:  Blood   Result Value Ref Range    Phosphorus 3.4 2.5 - 4.5 mg/dL   Comprehensive Metabolic Panel    Collection Time: 02/06/22  6:39 AM    Specimen: Blood   Result Value Ref Range    Glucose 139 (H) 65 - 99 mg/dL    BUN 12 8 - 23 mg/dL    Creatinine 0.97 0.57 - 1.00 mg/dL    Sodium 142 136 - 145 mmol/L    Potassium 4.2 3.5 - 5.2 mmol/L    Chloride 109 (H) 98 - 107 mmol/L    CO2 25.0 22.0 - 29.0 mmol/L    Calcium 8.2 (L) 8.6 - 10.5 mg/dL    Total Protein 6.5 6.0 - 8.5 g/dL    Albumin 3.30 (L) 3.50 - 5.20 g/dL    ALT (SGPT) 13 1 - 33 U/L    AST (SGOT) 26 1 - 32 U/L    Alkaline Phosphatase 68 39 - 117 U/L    Total Bilirubin 0.4 0.0 - 1.2 mg/dL    eGFR Non African Amer 55 (L) >60 mL/min/1.73    Globulin 3.2 gm/dL    A/G Ratio 1.0 g/dL    BUN/Creatinine Ratio 12.4 7.0 - 25.0    Anion Gap 8.0 5.0 - 15.0 mmol/L   Ammonia    Collection Time: 02/06/22  6:39 AM    Specimen: Blood   Result Value Ref Range    Ammonia 37 11 - 51 umol/L   Troponin    Collection Time: 02/06/22  6:39 AM    Specimen: Blood   Result Value Ref Range    Troponin T <0.010 0.000 - 0.030 ng/mL   CBC Auto Differential    Collection Time: 02/06/22  6:39 AM    Specimen: Blood   Result Value Ref Range    WBC 3.60 3.40 - 10.80 10*3/mm3    RBC 3.01 (L) 3.77 - 5.28 10*6/mm3    Hemoglobin 8.1 (L) 12.0 - 15.9 g/dL    Hematocrit 26.5 (L) 34.0 - 46.6 %    MCV 88.1 79.0 - 97.0 fL    MCH 27.0 26.6 - 33.0 pg    MCHC 30.6 (L) 31.5 - 35.7 g/dL    RDW 20.1 (H) 12.3 - 15.4 %    RDW-SD 64.3 (H) 37.0 - 54.0 fl    MPV 6.5 6.0 - 12.0 fL    Platelets 168 140 - 450 10*3/mm3    Neutrophil % 58.8 42.7 - 76.0 %    Lymphocyte % 24.3 19.6 - 45.3 %    Monocyte % 9.2 5.0 - 12.0 %    Eosinophil % 6.7 (H) 0.3 - 6.2 %    Basophil % 1.0 0.0 - 1.5 %    Neutrophils, Absolute 2.10 1.70 - 7.00 10*3/mm3    Lymphocytes, Absolute 0.90 0.70 - 3.10 10*3/mm3    Monocytes, Absolute 0.30 0.10 - 0.90 10*3/mm3    Eosinophils, Absolute 0.20 0.00 - 0.40 10*3/mm3    Basophils, Absolute 0.00 0.00 - 0.20  10*3/mm3    nRBC 0.1 0.0 - 0.2 /100 WBC   POC Glucose Once    Collection Time: 02/06/22 12:25 PM    Specimen: Blood   Result Value Ref Range    Glucose 141 (H) 70 - 105 mg/dL   Troponin    Collection Time: 02/06/22 12:55 PM    Specimen: Blood   Result Value Ref Range    Troponin T <0.010 0.000 - 0.030 ng/mL        Imaging:  XR Chest 1 View  Narrative: XR CHEST 1 VW-     Date of Exam: 2/6/2022 4:27 AM     Indication: Shortness of breath; I63.9-Cerebral infarction, unspecified;  R47.1-Dysarthria and anarthria; N39.0-Urinary tract infection, site not  specified; Z87.898-Personal history of other specified conditions.     Comparison: 02/03/2022     Technique: A single view of the chest was obtained.     FINDINGS:      Left subclavian transvenous pacemaker remains in place and  unchanged in position.  Cardiomegaly is stable.  Pulmonary vessels are  normal.  Calcified granuloma seen within the right lung base.  Lungs are  otherwise clear.  No evidence pneumothorax.  No pleural effusion.           Impression:       1.  Stable cardiomegaly.  2.  No focal airspace consolidation or other acute process.        Electronically Signed By-Alfa Shaw MD On:2/6/2022 8:14 AM  This report was finalized on [id number] by  Alfa Shaw MD.       ASSESSMENT:    Cerebrovascular accident (CVA) (HCC)    Fall    Secondary hyperaldosteronism (HCC)    Stage 3b chronic kidney disease (CMS/HCC)    Hypertension associated with stage 3 chronic kidney disease due to type 2 diabetes mellitus (HCC)    Type 2 diabetes mellitus with diabetic neuropathy, with long-term current use of insulin (HCC)    Morbid obesity (HCC)    Atherosclerosis of native coronary artery of native heart without angina pectoris    Hyperlipidemia    Primary hypertension    Acute UTI    GERD (gastroesophageal reflux disease)    Cardiac pacemaker in situ    Hyperammonemia (HCC)    Weakness       PLAN:  F/u as out pt for ROSSY  Electrolytes/ glycemic control  DVT and GI  prophylaxis.    Total Critical care time in direct medical management (   ) minutes, This time specifically excludes time spent performing procedures.  Chevy Pineda MD. D, ABSM.     2/6/2022  22:34 EST

## 2022-02-07 NOTE — PROGRESS NOTES
Spoke to patient   Demographics verified   Agrees to [facility] services     Dr Goss will follow for [facility]

## 2022-02-07 NOTE — TELEPHONE ENCOUNTER
Caller: JUAN JOSÉ    Relationship to patient: SELF   Best call back number: [phone]    New or established patient?  [x] New  [] Established    Date of discharge :2/6/22    Facility discharged from: [facility]     Diagnosis/Symptoms: CVA     Length of stay (If applicable): 3 DAYS       NEEDING FOLLOW UP WITHIN 2 WKS       Specialty Only: Did you see a [employer] health provider?    [x] Yes  [] No  If so, who? Saskia Mccall MD

## 2022-02-08 ENCOUNTER — HOME CARE VISIT (OUTPATIENT)
Dept: HOME HEALTH SERVICES | Facility: HOME HEALTHCARE | Age: 87
End: 2022-02-08

## 2022-02-08 PROCEDURE — G0299 HHS/HOSPICE OF RN EA 15 MIN: HCPCS

## 2022-02-14 VITALS
OXYGEN SATURATION: 97 % | DIASTOLIC BLOOD PRESSURE: 72 MMHG | HEART RATE: 83 BPM | RESPIRATION RATE: 19 BRPM | TEMPERATURE: 98 F | SYSTOLIC BLOOD PRESSURE: 130 MMHG

## 2022-02-14 NOTE — HOME HEALTH
Patient is an 86 year old female referred to [facility] for dysarthria following cerebral infarction. She lives in a single dwelling home with her . Ambulates with cane and is known to our service. She declines PT/OT/SLP at this time.

## 2022-02-15 ENCOUNTER — OFFICE VISIT (OUTPATIENT)
Dept: FAMILY MEDICINE CLINIC | Facility: CLINIC | Age: 87
End: 2022-02-15

## 2022-02-15 ENCOUNTER — READMISSION MANAGEMENT (OUTPATIENT)
Dept: CALL CENTER | Facility: HOSPITAL | Age: 87
End: 2022-02-15

## 2022-02-15 VITALS
DIASTOLIC BLOOD PRESSURE: 74 MMHG | SYSTOLIC BLOOD PRESSURE: 132 MMHG | TEMPERATURE: 97.7 F | OXYGEN SATURATION: 99 % | HEART RATE: 84 BPM | HEIGHT: 62 IN | BODY MASS INDEX: 42.69 KG/M2 | RESPIRATION RATE: 16 BRPM | WEIGHT: 232 LBS

## 2022-02-15 DIAGNOSIS — R56.9 WITNESSED SEIZURE-LIKE ACTIVITY: ICD-10-CM

## 2022-02-15 DIAGNOSIS — R47.1 DYSARTHRIA: Primary | ICD-10-CM

## 2022-02-15 DIAGNOSIS — R79.89 INCREASED AMMONIA LEVEL: ICD-10-CM

## 2022-02-15 DIAGNOSIS — N39.0 ACUTE UTI: ICD-10-CM

## 2022-02-15 PROCEDURE — 1111F DSCHRG MED/CURRENT MED MERGE: CPT | Performed by: PHYSICIAN ASSISTANT

## 2022-02-15 PROCEDURE — 99495 TRANSJ CARE MGMT MOD F2F 14D: CPT | Performed by: PHYSICIAN ASSISTANT

## 2022-02-15 NOTE — PROGRESS NOTES
Transitional Care Follow Up Visit  Subjective     Aracelis Vargas is a 86 y.o. female who presents for a transitional care management visit.    Within 48 business hours after discharge our office contacted her via telephone to coordinate her care and needs.      I reviewed and discussed the details of that call along with the discharge summary, hospital problems, inpatient lab results, inpatient diagnostic studies, and consultation reports with Aracelis.     Current outpatient and discharge medications have been reconciled for the patient.  Reviewed by: BUNNY Morse      Date of TCM Phone Call 2/6/2022   [facility]   Date of Admission 2/3/2022   Date of Discharge 2/6/2022   Discharge Disposition Home or Self Care     Risk for Readmission (LACE) Score: 15 (2/6/2022  6:00 AM)      Pt is here to follow up from recent hospitalization due to fall due to possible seizure vs abortive stroke .  She was found to have UTI and elevated ammonia levels but liver ultrasound was unremarkable.  No falls or seizures since she has been out of the hospital.  Bowel movements have been fine.  Still taking antibiotic for UTI.  Drinking water.  No fevers.  She has follow up with neurology in April.  She is taking Keppra twice daily.  Blood glucose levels have been fine at home.   Blood pressure has been fluctuating with occasional highs.  She has an appointment with Dr. Plaza in March.  She had pacemaker check while in hospital and it was fine.    Reasons For Change In Medications and Indications for New Medications:  START taking:  aspirin   Start taking on: February 7, 2022     This replaces a similar medication. See the full medication list for instructions.  cefdinir (OMNICEF)      levETIRAcetam (KEPPRA)      polyethylene glycol (MIRALAX)      sennosides-docusate (PERICOLACE           Course During Hospital Stay:  Aracelis Vargas is a 86 y.o. female with PMH of CAD, wenckebach, HTN, HLD, DM type II who presented to [facility]  ED 2/3/2022 after a fall at home and difficulty speaking approximately 40 minutes prior to EMS arrival. There was no report of facial droop or lateralizing weakness. The patient does not recall falling at home.      In the ED the patient was a Code Stroke.  CT head showed no acute intracranial hemorrhage, CTA head/neck showed no significant carotid or vertebral basilar stenosis or occlusion.  No large intracranial vessel occlusion..  She was determined to be a tPA candidate and received tPA at 20:05. She was admitted to the ICU for close neuro monitoring.      2/4/2022: The patient had NIH of 0.  She was felt stable for transfer out of ICU and hospitalist group consulted for medical management.   2/5/22 patient seen and examined in bed no acute distress, doing better today, awaiting physical therapy to evaluate. Awaiting MRI, discussed with RN.             The following portions of the patient's history were reviewed and updated as appropriate: allergies, current medications, past family history, past medical history, past social history, past surgical history and problem list.    Review of Systems   Constitutional: Positive for activity change and fatigue. Negative for appetite change, chills, diaphoresis, fever and unexpected weight change.   Respiratory: Negative for chest tightness, shortness of breath and wheezing.    Cardiovascular: Negative for chest pain, palpitations and leg swelling.   Gastrointestinal: Negative for abdominal pain, constipation, diarrhea, nausea and vomiting.   Genitourinary: Positive for frequency. Negative for decreased urine volume, difficulty urinating, dysuria and flank pain.   Musculoskeletal: Positive for gait problem.   Neurological: Positive for weakness. Negative for dizziness, tremors, seizures, syncope, light-headedness and headaches.   Psychiatric/Behavioral: Negative for confusion.       Objective   Physical Exam  Vitals and nursing note reviewed.   Constitutional:        Appearance: Normal appearance. She is obese.   HENT:      Head: Normocephalic and atraumatic.   Eyes:      Pupils: Pupils are equal, round, and reactive to light.   Neck:      Vascular: No carotid bruit.   Cardiovascular:      Rate and Rhythm: Normal rate and regular rhythm.      Heart sounds: Normal heart sounds.   Pulmonary:      Effort: Pulmonary effort is normal.      Breath sounds: Normal breath sounds.   Abdominal:      General: Abdomen is flat. Bowel sounds are normal.      Palpations: Abdomen is soft.      Tenderness: There is no right CVA tenderness or left CVA tenderness.   Musculoskeletal:      Cervical back: Normal range of motion and neck supple.   Skin:     General: Skin is warm and dry.   Neurological:      General: No focal deficit present.      Mental Status: She is alert and oriented to person, place, and time.   Psychiatric:         Mood and Affect: Mood normal.         Behavior: Behavior normal.         Thought Content: Thought content normal.         Assessment/Plan   Diagnoses and all orders for this visit:    1. Dysarthria (Primary)  Comments:  Stroke vs. TIA.  Pt to follow up with neurology as scheduled. Keep blood pressure 130/70 or lower;  LDL less than 70; Hemoglobin a1c less than 6.5 and b12 >500.    2. Acute UTI  Comments:  Pt to finish antibiotic until gone. Follow up if any continued symptoms.    3. Increased ammonia level  Comments:  Liver ultrasound normal in the hospital.    4. Witnessed seizure-like activity (HCC)  Comments:  Pt to continue Keppra and follow up with neurology as scheduled.

## 2022-02-15 NOTE — OUTREACH NOTE
Stroke Week 2 Survey      Responses   [facility] patient discharged from? [facility]   Does the patient have one of the following disease processes/diagnoses(primary or secondary)? Stroke (TIA)   Week 2 attempt successful? Yes   Call start time 1614   Call end time 1616   Discharge diagnosis Dysarthria: TIA versus CVA,    Acute UTI   Meds reviewed with patient/caregiver? Yes   Is the patient having any side effects they believe may be caused by any medication additions or changes? No   Does the patient have all medications ordered at discharge? Yes   Is the patient taking all medications as directed (includes completed medication regime)? Yes   Does the patient have a primary care provider?  Yes   Does the patient have an appointment with their PCP within 7 days of discharge? Yes   Has the patient kept scheduled appointments due by today? Yes   What is the Home health agency?  [facility]   Has home health visited the patient within 72 hours of discharge? Yes   Psychosocial issues? No   Does the patient require any assistance with activities of daily living such as eating, bathing, dressing, walking, etc.? No   Does the patient have any residual symptoms from stroke/TIA? No   Does the patient understand the diet ordered at discharge? Yes   Did the patient receive a copy of their discharge instructions? Yes   Nursing interventions Reviewed instructions with patient   What is the patient's perception of their health status since discharge? Improving   Nursing interventions Nurse provided patient education   Is the patient able to teach back FAST for Stroke? Yes   Is the patient/caregiver able to teach back the risk factors for a stroke? High blood pressure-goal below 120/80,  History of TIAs,  History of Afib,  Sleep apnea,  Smoking,  Diabetes,  High Cholesterol,  Physical inactivity and obesity,  Carotid or other artery disease   Is the patient/caregiver able to teach back signs and symptoms related to disease process  for when to call PCP? Yes   Is the patient/caregiver able to teach back signs and symptoms related to disease process for when to call 911? Yes   If the patient is a current smoker, are they able to teach back resources for cessation? Not a smoker   Is the patient/caregiver able to teach back the hierarchy of who to call/visit for symptoms/problems? PCP, Specialist, Home health nurse, Urgent Care, ED, 911 Yes   Week 2 call completed? Yes   Wrap up additional comments pt stated she is doing well.          Nichelle Philip, RN

## 2022-02-16 ENCOUNTER — HOME CARE VISIT (OUTPATIENT)
Dept: HOME HEALTH SERVICES | Facility: HOME HEALTHCARE | Age: 87
End: 2022-02-16

## 2022-02-16 PROCEDURE — G0299 HHS/HOSPICE OF RN EA 15 MIN: HCPCS

## 2022-02-17 VITALS
BODY MASS INDEX: 42.43 KG/M2 | OXYGEN SATURATION: 94 % | SYSTOLIC BLOOD PRESSURE: 150 MMHG | RESPIRATION RATE: 18 BRPM | DIASTOLIC BLOOD PRESSURE: 70 MMHG | TEMPERATURE: 96.7 F | HEART RATE: 74 BPM | WEIGHT: 232 LBS

## 2022-02-17 NOTE — HOME HEALTH
Pt to MD yesterday.   wt. 232   BS today 137.  running between 118-137.  Pt is logging BS and BP.  BP has been running high at times.    Pt instructed on UTI prevention.  No signs of UTI.   Just finished antibx.   Drinking plenty of fluid.s  6-8  8oz glasses of fluids daily.    Pt states recent Stroke, seizure and UTI.  Pt states has been doing exercises given by PT in the past. .    Plan for next visit: assess CP , GI DM status. Assess for signs of UTI

## 2022-02-22 PROCEDURE — G0180 MD CERTIFICATION HHA PATIENT: HCPCS | Performed by: FAMILY MEDICINE

## 2022-02-23 ENCOUNTER — HOME CARE VISIT (OUTPATIENT)
Dept: HOME HEALTH SERVICES | Facility: HOME HEALTHCARE | Age: 87
End: 2022-02-23

## 2022-02-23 ENCOUNTER — READMISSION MANAGEMENT (OUTPATIENT)
Dept: CALL CENTER | Facility: HOSPITAL | Age: 87
End: 2022-02-23

## 2022-02-23 VITALS
TEMPERATURE: 98 F | DIASTOLIC BLOOD PRESSURE: 82 MMHG | OXYGEN SATURATION: 97 % | SYSTOLIC BLOOD PRESSURE: 138 MMHG | HEART RATE: 90 BPM | RESPIRATION RATE: 19 BRPM

## 2022-02-23 PROCEDURE — G0299 HHS/HOSPICE OF RN EA 15 MIN: HCPCS

## 2022-02-23 NOTE — OUTREACH NOTE
Stroke Week 3 Survey      Responses   [facility] patient discharged from? [facility]   Does the patient have one of the following disease processes/diagnoses(primary or secondary)? Stroke (TIA)   Week 3 attempt successful? Yes   Call start time 1805   Call end time 1822   Discharge diagnosis Dysarthria: TIA versus CVA,    Acute UTI   Meds reviewed with patient/caregiver? Yes   Is the patient having any side effects they believe may be caused by any medication additions or changes? No   Does the patient have all medications ordered at discharge? Yes   Is the patient taking all medications as directed (includes completed medication regime)? Yes   Does the patient have a primary care provider?  Yes   Does the patient have an appointment with their PCP within 7 days of discharge? Yes   Has the patient kept scheduled appointments due by today? Yes   What is the Home health agency?  [facility]   Psychosocial issues? No   Does the patient require any assistance with activities of daily living such as eating, bathing, dressing, walking, etc.? No   Does the patient have any residual symptoms from stroke/TIA? No   Does the patient understand the diet ordered at discharge? No   Did the patient receive a copy of their discharge instructions? Yes   Nursing interventions Reviewed instructions with patient   What is the patient's perception of their health status since discharge? Improving   Nursing interventions Nurse provided patient education   Is the patient able to teach back FAST for Stroke? Yes   Is the patient/caregiver able to teach back the risk factors for a stroke? High blood pressure-goal below 120/80,  History of TIAs,  History of Afib,  Sleep apnea,  Smoking,  Diabetes,  High Cholesterol,  Physical inactivity and obesity,  Carotid or other artery disease   Is the patient/caregiver able to teach back signs and symptoms related to disease process for when to call PCP? Yes   Is the patient/caregiver able to teach back  signs and symptoms related to disease process for when to call 911? Yes   If the patient is a current smoker, are they able to teach back resources for cessation? Not a smoker   Is the patient/caregiver able to teach back the hierarchy of who to call/visit for symptoms/problems? PCP, Specialist, Home health nurse, Urgent Care, ED, 911 Yes   Week 3 call completed? Yes   Wrap up additional comments pt stated she is doing well.          Nichelle Philip, RN

## 2022-02-23 NOTE — CASE COMMUNICATION
Patient had an acute UTI during recent hosptialization, she completed course of ABT but now states she is having frequency issues and having to go much more than her usual. She denies any other symptoms. Do you want me to check a UA or anything?    Thanks,  Swapna Chan RN  [facility]  [phone]

## 2022-02-24 ENCOUNTER — HOME CARE VISIT (OUTPATIENT)
Dept: HOME HEALTH SERVICES | Facility: HOME HEALTHCARE | Age: 87
End: 2022-02-24

## 2022-02-24 PROCEDURE — G0299 HHS/HOSPICE OF RN EA 15 MIN: HCPCS

## 2022-02-24 NOTE — HOME HEALTH
Patient seated in recliner upon arrival of SN. States she is doing well but has been urinating more frequently than usual. Denies any other UTI S/S, states she has been drinking more but is concerned about the frequency and small amount. Had follow up MD appt, no medications were changed. Denies any other new symptoms.    Plan for next visit: CP assess, assess pain/falls/edema, BS log, assess for new UA orders r/t UTI symptoms

## 2022-02-25 ENCOUNTER — APPOINTMENT (OUTPATIENT)
Dept: CT IMAGING | Facility: HOSPITAL | Age: 87
End: 2022-02-25

## 2022-02-25 ENCOUNTER — HOSPITAL ENCOUNTER (INPATIENT)
Facility: HOSPITAL | Age: 87
LOS: 1 days | Discharge: HOME HEALTH | End: 2022-03-01
Attending: EMERGENCY MEDICINE | Admitting: FAMILY MEDICINE

## 2022-02-25 ENCOUNTER — LAB REQUISITION (OUTPATIENT)
Dept: LAB | Facility: HOSPITAL | Age: 87
End: 2022-02-25

## 2022-02-25 ENCOUNTER — HOME CARE VISIT (OUTPATIENT)
Dept: HOME HEALTH SERVICES | Facility: HOME HEALTHCARE | Age: 87
End: 2022-02-25

## 2022-02-25 DIAGNOSIS — E72.20 HYPERAMMONEMIA: ICD-10-CM

## 2022-02-25 DIAGNOSIS — Z86.73 HISTORY OF STROKE: ICD-10-CM

## 2022-02-25 DIAGNOSIS — N39.0 URINARY TRACT INFECTION, SITE NOT SPECIFIED: ICD-10-CM

## 2022-02-25 DIAGNOSIS — R55 SYNCOPE AND COLLAPSE: Primary | ICD-10-CM

## 2022-02-25 LAB
ALBUMIN SERPL-MCNC: 3.6 G/DL (ref 3.5–5.2)
ALBUMIN/GLOB SERPL: 1.2 G/DL
ALP SERPL-CCNC: 76 U/L (ref 39–117)
ALT SERPL W P-5'-P-CCNC: 21 U/L (ref 1–33)
AMMONIA BLD-SCNC: 57 UMOL/L (ref 11–51)
ANION GAP SERPL CALCULATED.3IONS-SCNC: 12 MMOL/L (ref 5–15)
AST SERPL-CCNC: 42 U/L (ref 1–32)
BASOPHILS # BLD AUTO: 0 10*3/MM3 (ref 0–0.2)
BASOPHILS NFR BLD AUTO: 1.2 % (ref 0–1.5)
BILIRUB SERPL-MCNC: 0.4 MG/DL (ref 0–1.2)
BILIRUB UR QL STRIP: NEGATIVE
BUN SERPL-MCNC: 25 MG/DL (ref 8–23)
BUN/CREAT SERPL: 21 (ref 7–25)
CALCIUM SPEC-SCNC: 8.1 MG/DL (ref 8.6–10.5)
CHLORIDE SERPL-SCNC: 106 MMOL/L (ref 98–107)
CK SERPL-CCNC: 99 U/L (ref 20–180)
CLARITY UR: CLEAR
CO2 SERPL-SCNC: 21 MMOL/L (ref 22–29)
COLOR UR: YELLOW
CREAT SERPL-MCNC: 1.19 MG/DL (ref 0.57–1)
DEPRECATED RDW RBC AUTO: 59.1 FL (ref 37–54)
EOSINOPHIL # BLD AUTO: 0.2 10*3/MM3 (ref 0–0.4)
EOSINOPHIL NFR BLD AUTO: 5.7 % (ref 0.3–6.2)
ERYTHROCYTE [DISTWIDTH] IN BLOOD BY AUTOMATED COUNT: 19.4 % (ref 12.3–15.4)
GFR SERPL CREATININE-BSD FRML MDRD: 43 ML/MIN/1.73
GLOBULIN UR ELPH-MCNC: 3 GM/DL
GLUCOSE SERPL-MCNC: 162 MG/DL (ref 65–99)
GLUCOSE UR STRIP-MCNC: NEGATIVE MG/DL
HCT VFR BLD AUTO: 26.1 % (ref 34–46.6)
HGB BLD-MCNC: 8.1 G/DL (ref 12–15.9)
HGB UR QL STRIP.AUTO: NEGATIVE
KETONES UR QL STRIP: NEGATIVE
LEUKOCYTE ESTERASE UR QL STRIP.AUTO: NEGATIVE
LYMPHOCYTES # BLD AUTO: 1.3 10*3/MM3 (ref 0.7–3.1)
LYMPHOCYTES NFR BLD AUTO: 38 % (ref 19.6–45.3)
MCH RBC QN AUTO: 26.8 PG (ref 26.6–33)
MCHC RBC AUTO-ENTMCNC: 31.2 G/DL (ref 31.5–35.7)
MCV RBC AUTO: 86 FL (ref 79–97)
MONOCYTES # BLD AUTO: 0.3 10*3/MM3 (ref 0.1–0.9)
MONOCYTES NFR BLD AUTO: 7.4 % (ref 5–12)
NEUTROPHILS NFR BLD AUTO: 1.6 10*3/MM3 (ref 1.7–7)
NEUTROPHILS NFR BLD AUTO: 47.7 % (ref 42.7–76)
NITRITE UR QL STRIP: NEGATIVE
NRBC BLD AUTO-RTO: 0.1 /100 WBC (ref 0–0.2)
NT-PROBNP SERPL-MCNC: 299.9 PG/ML (ref 0–1800)
PH UR STRIP.AUTO: 6 [PH] (ref 5–8)
PLATELET # BLD AUTO: 201 10*3/MM3 (ref 140–450)
PMV BLD AUTO: 6.6 FL (ref 6–12)
POTASSIUM SERPL-SCNC: 4.5 MMOL/L (ref 3.5–5.2)
PROT SERPL-MCNC: 6.6 G/DL (ref 6–8.5)
PROT UR QL STRIP: NEGATIVE
RBC # BLD AUTO: 3.04 10*6/MM3 (ref 3.77–5.28)
SARS-COV-2 RNA PNL SPEC NAA+PROBE: NOT DETECTED
SODIUM SERPL-SCNC: 139 MMOL/L (ref 136–145)
SP GR UR STRIP: 1.01 (ref 1–1.03)
TROPONIN T SERPL-MCNC: 0.02 NG/ML (ref 0–0.03)
UROBILINOGEN UR QL STRIP: NORMAL
WBC NRBC COR # BLD: 3.4 10*3/MM3 (ref 3.4–10.8)

## 2022-02-25 PROCEDURE — 85025 COMPLETE CBC W/AUTO DIFF WBC: CPT | Performed by: EMERGENCY MEDICINE

## 2022-02-25 PROCEDURE — 99284 EMERGENCY DEPT VISIT MOD MDM: CPT

## 2022-02-25 PROCEDURE — 70450 CT HEAD/BRAIN W/O DYE: CPT

## 2022-02-25 PROCEDURE — 82140 ASSAY OF AMMONIA: CPT | Performed by: EMERGENCY MEDICINE

## 2022-02-25 PROCEDURE — 84484 ASSAY OF TROPONIN QUANT: CPT | Performed by: EMERGENCY MEDICINE

## 2022-02-25 PROCEDURE — 83880 ASSAY OF NATRIURETIC PEPTIDE: CPT | Performed by: EMERGENCY MEDICINE

## 2022-02-25 PROCEDURE — 99222 1ST HOSP IP/OBS MODERATE 55: CPT | Performed by: NURSE PRACTITIONER

## 2022-02-25 PROCEDURE — 83874 ASSAY OF MYOGLOBIN: CPT | Performed by: EMERGENCY MEDICINE

## 2022-02-25 PROCEDURE — G0378 HOSPITAL OBSERVATION PER HR: HCPCS

## 2022-02-25 PROCEDURE — 81003 URINALYSIS AUTO W/O SCOPE: CPT | Performed by: FAMILY MEDICINE

## 2022-02-25 PROCEDURE — G0299 HHS/HOSPICE OF RN EA 15 MIN: HCPCS

## 2022-02-25 PROCEDURE — 36415 COLL VENOUS BLD VENIPUNCTURE: CPT

## 2022-02-25 PROCEDURE — 87635 SARS-COV-2 COVID-19 AMP PRB: CPT | Performed by: INTERNAL MEDICINE

## 2022-02-25 PROCEDURE — 80053 COMPREHEN METABOLIC PANEL: CPT | Performed by: EMERGENCY MEDICINE

## 2022-02-25 PROCEDURE — 82550 ASSAY OF CK (CPK): CPT | Performed by: EMERGENCY MEDICINE

## 2022-02-25 PROCEDURE — 93005 ELECTROCARDIOGRAM TRACING: CPT | Performed by: EMERGENCY MEDICINE

## 2022-02-26 ENCOUNTER — APPOINTMENT (OUTPATIENT)
Dept: CARDIOLOGY | Facility: HOSPITAL | Age: 87
End: 2022-02-26

## 2022-02-26 ENCOUNTER — READMISSION MANAGEMENT (OUTPATIENT)
Dept: CALL CENTER | Facility: HOSPITAL | Age: 87
End: 2022-02-26

## 2022-02-26 PROBLEM — E72.20 HYPERAMMONEMIA: Chronic | Status: ACTIVE | Noted: 2022-02-04

## 2022-02-26 LAB
ANION GAP SERPL CALCULATED.3IONS-SCNC: 12 MMOL/L (ref 5–15)
BASOPHILS # BLD AUTO: 0 10*3/MM3 (ref 0–0.2)
BASOPHILS NFR BLD AUTO: 0.9 % (ref 0–1.5)
BH CV LOWER VASCULAR LEFT COMMON FEMORAL AUGMENT: NORMAL
BH CV LOWER VASCULAR LEFT COMMON FEMORAL COMPETENT: NORMAL
BH CV LOWER VASCULAR LEFT COMMON FEMORAL COMPRESS: NORMAL
BH CV LOWER VASCULAR LEFT COMMON FEMORAL PHASIC: NORMAL
BH CV LOWER VASCULAR LEFT COMMON FEMORAL SPONT: NORMAL
BH CV LOWER VASCULAR LEFT DISTAL FEMORAL COMPRESS: NORMAL
BH CV LOWER VASCULAR LEFT GASTRONEMIUS COMPRESS: NORMAL
BH CV LOWER VASCULAR LEFT GREATER SAPH AK COMPRESS: NORMAL
BH CV LOWER VASCULAR LEFT GREATER SAPH BK COMPRESS: NORMAL
BH CV LOWER VASCULAR LEFT LESSER SAPH COMPRESS: NORMAL
BH CV LOWER VASCULAR LEFT MID FEMORAL AUGMENT: NORMAL
BH CV LOWER VASCULAR LEFT MID FEMORAL COMPETENT: NORMAL
BH CV LOWER VASCULAR LEFT MID FEMORAL COMPRESS: NORMAL
BH CV LOWER VASCULAR LEFT MID FEMORAL PHASIC: NORMAL
BH CV LOWER VASCULAR LEFT MID FEMORAL SPONT: NORMAL
BH CV LOWER VASCULAR LEFT PERONEAL COMPRESS: NORMAL
BH CV LOWER VASCULAR LEFT POPLITEAL AUGMENT: NORMAL
BH CV LOWER VASCULAR LEFT POPLITEAL COMPETENT: NORMAL
BH CV LOWER VASCULAR LEFT POPLITEAL COMPRESS: NORMAL
BH CV LOWER VASCULAR LEFT POPLITEAL PHASIC: NORMAL
BH CV LOWER VASCULAR LEFT POPLITEAL SPONT: NORMAL
BH CV LOWER VASCULAR LEFT POSTERIOR TIBIAL COMPRESS: NORMAL
BH CV LOWER VASCULAR LEFT PROXIMAL FEMORAL COMPRESS: NORMAL
BH CV LOWER VASCULAR LEFT SAPHENOFEMORAL JUNCTION COMPRESS: NORMAL
BH CV LOWER VASCULAR RIGHT COMMON FEMORAL AUGMENT: NORMAL
BH CV LOWER VASCULAR RIGHT COMMON FEMORAL COMPETENT: NORMAL
BH CV LOWER VASCULAR RIGHT COMMON FEMORAL COMPRESS: NORMAL
BH CV LOWER VASCULAR RIGHT COMMON FEMORAL PHASIC: NORMAL
BH CV LOWER VASCULAR RIGHT COMMON FEMORAL SPONT: NORMAL
BH CV LOWER VASCULAR RIGHT DISTAL FEMORAL COMPRESS: NORMAL
BH CV LOWER VASCULAR RIGHT GASTRONEMIUS COMPRESS: NORMAL
BH CV LOWER VASCULAR RIGHT GREATER SAPH AK COMPRESS: NORMAL
BH CV LOWER VASCULAR RIGHT GREATER SAPH BK COMPRESS: NORMAL
BH CV LOWER VASCULAR RIGHT LESSER SAPH COMPRESS: NORMAL
BH CV LOWER VASCULAR RIGHT MID FEMORAL AUGMENT: NORMAL
BH CV LOWER VASCULAR RIGHT MID FEMORAL COMPETENT: NORMAL
BH CV LOWER VASCULAR RIGHT MID FEMORAL COMPRESS: NORMAL
BH CV LOWER VASCULAR RIGHT MID FEMORAL PHASIC: NORMAL
BH CV LOWER VASCULAR RIGHT MID FEMORAL SPONT: NORMAL
BH CV LOWER VASCULAR RIGHT PERONEAL COMPRESS: NORMAL
BH CV LOWER VASCULAR RIGHT POPLITEAL AUGMENT: NORMAL
BH CV LOWER VASCULAR RIGHT POPLITEAL COMPETENT: NORMAL
BH CV LOWER VASCULAR RIGHT POPLITEAL COMPRESS: NORMAL
BH CV LOWER VASCULAR RIGHT POPLITEAL PHASIC: NORMAL
BH CV LOWER VASCULAR RIGHT POPLITEAL SPONT: NORMAL
BH CV LOWER VASCULAR RIGHT POSTERIOR TIBIAL COMPRESS: NORMAL
BH CV LOWER VASCULAR RIGHT PROXIMAL FEMORAL COMPRESS: NORMAL
BH CV LOWER VASCULAR RIGHT SAPHENOFEMORAL JUNCTION COMPRESS: NORMAL
BH CV XLRA MEAS LEFT DIST CCA EDV: 22 CM/SEC
BH CV XLRA MEAS LEFT DIST CCA PSV: 87 CM/SEC
BH CV XLRA MEAS LEFT DIST ICA EDV: -14.6 CM/SEC
BH CV XLRA MEAS LEFT DIST ICA PSV: -87.2 CM/SEC
BH CV XLRA MEAS LEFT ICA/CCA RATIO: -0.85
BH CV XLRA MEAS LEFT PROX CCA EDV: 18 CM/SEC
BH CV XLRA MEAS LEFT PROX CCA PSV: 102 CM/SEC
BH CV XLRA MEAS LEFT PROX ECA PSV: -104 CM/SEC
BH CV XLRA MEAS LEFT PROX ICA EDV: -17.2 CM/SEC
BH CV XLRA MEAS LEFT PROX ICA PSV: -78.4 CM/SEC
BH CV XLRA MEAS LEFT PROX SCLA PSV: 222 CM/SEC
BH CV XLRA MEAS LEFT VERTEBRAL A EDV: -13.3 CM/SEC
BH CV XLRA MEAS LEFT VERTEBRAL A PSV: -66.6 CM/SEC
BH CV XLRA MEAS RIGHT DIST CCA EDV: 14.3 CM/SEC
BH CV XLRA MEAS RIGHT DIST CCA PSV: 89.5 CM/SEC
BH CV XLRA MEAS RIGHT DIST ICA EDV: -15.3 CM/SEC
BH CV XLRA MEAS RIGHT DIST ICA PSV: -81.7 CM/SEC
BH CV XLRA MEAS RIGHT ICA/CCA RATIO: -0.8
BH CV XLRA MEAS RIGHT PROX CCA EDV: 16.8 CM/SEC
BH CV XLRA MEAS RIGHT PROX CCA PSV: 102 CM/SEC
BH CV XLRA MEAS RIGHT PROX ECA PSV: -131 CM/SEC
BH CV XLRA MEAS RIGHT PROX ICA EDV: -14 CM/SEC
BH CV XLRA MEAS RIGHT PROX ICA PSV: -58.8 CM/SEC
BH CV XLRA MEAS RIGHT PROX SCLA PSV: 224 CM/SEC
BH CV XLRA MEAS RIGHT VERTEBRAL A EDV: -10.5 CM/SEC
BH CV XLRA MEAS RIGHT VERTEBRAL A PSV: -43.4 CM/SEC
BUN SERPL-MCNC: 23 MG/DL (ref 8–23)
BUN/CREAT SERPL: 20 (ref 7–25)
CALCIUM SPEC-SCNC: 8.4 MG/DL (ref 8.6–10.5)
CHLORIDE SERPL-SCNC: 106 MMOL/L (ref 98–107)
CO2 SERPL-SCNC: 21 MMOL/L (ref 22–29)
CREAT SERPL-MCNC: 1.15 MG/DL (ref 0.57–1)
D DIMER PPP FEU-MCNC: 0.88 MG/L (FEU) (ref 0–0.59)
DEPRECATED RDW RBC AUTO: 57.3 FL (ref 37–54)
EOSINOPHIL # BLD AUTO: 0.2 10*3/MM3 (ref 0–0.4)
EOSINOPHIL NFR BLD AUTO: 5.9 % (ref 0.3–6.2)
ERYTHROCYTE [DISTWIDTH] IN BLOOD BY AUTOMATED COUNT: 18.8 % (ref 12.3–15.4)
FERRITIN SERPL-MCNC: 20.04 NG/ML (ref 13–150)
GFR SERPL CREATININE-BSD FRML MDRD: 45 ML/MIN/1.73
GLUCOSE BLDC GLUCOMTR-MCNC: 121 MG/DL (ref 70–105)
GLUCOSE BLDC GLUCOMTR-MCNC: 127 MG/DL (ref 70–105)
GLUCOSE BLDC GLUCOMTR-MCNC: 152 MG/DL (ref 70–105)
GLUCOSE SERPL-MCNC: 171 MG/DL (ref 65–99)
HCT VFR BLD AUTO: 24.8 % (ref 34–46.6)
HGB BLD-MCNC: 8.1 G/DL (ref 12–15.9)
IRON 24H UR-MRATE: 67 MCG/DL (ref 37–145)
LEFT ARM BP: NORMAL MMHG
LYMPHOCYTES # BLD AUTO: 0.9 10*3/MM3 (ref 0.7–3.1)
LYMPHOCYTES NFR BLD AUTO: 27 % (ref 19.6–45.3)
MAGNESIUM SERPL-MCNC: 2.3 MG/DL (ref 1.6–2.4)
MAXIMAL PREDICTED HEART RATE: 136 BPM
MAXIMAL PREDICTED HEART RATE: 136 BPM
MCH RBC QN AUTO: 27.9 PG (ref 26.6–33)
MCHC RBC AUTO-ENTMCNC: 32.6 G/DL (ref 31.5–35.7)
MCV RBC AUTO: 85.7 FL (ref 79–97)
MONOCYTES # BLD AUTO: 0.3 10*3/MM3 (ref 0.1–0.9)
MONOCYTES NFR BLD AUTO: 8.3 % (ref 5–12)
MYOGLOBIN SERPL-MCNC: 88.1 NG/ML (ref 25–58)
NEUTROPHILS NFR BLD AUTO: 1.8 10*3/MM3 (ref 1.7–7)
NEUTROPHILS NFR BLD AUTO: 57.9 % (ref 42.7–76)
NRBC BLD AUTO-RTO: 0.1 /100 WBC (ref 0–0.2)
PLATELET # BLD AUTO: 183 10*3/MM3 (ref 140–450)
PMV BLD AUTO: 6.7 FL (ref 6–12)
POTASSIUM SERPL-SCNC: 4.9 MMOL/L (ref 3.5–5.2)
QT INTERVAL: 369 MS
RBC # BLD AUTO: 2.89 10*6/MM3 (ref 3.77–5.28)
RIGHT ARM BP: NORMAL MMHG
SODIUM SERPL-SCNC: 139 MMOL/L (ref 136–145)
STRESS TARGET HR: 116 BPM
STRESS TARGET HR: 116 BPM
WBC NRBC COR # BLD: 3.2 10*3/MM3 (ref 3.4–10.8)

## 2022-02-26 PROCEDURE — 80048 BASIC METABOLIC PNL TOTAL CA: CPT | Performed by: NURSE PRACTITIONER

## 2022-02-26 PROCEDURE — 93005 ELECTROCARDIOGRAM TRACING: CPT | Performed by: INTERNAL MEDICINE

## 2022-02-26 PROCEDURE — G0378 HOSPITAL OBSERVATION PER HR: HCPCS

## 2022-02-26 PROCEDURE — 99222 1ST HOSP IP/OBS MODERATE 55: CPT | Performed by: PSYCHIATRY & NEUROLOGY

## 2022-02-26 PROCEDURE — 36415 COLL VENOUS BLD VENIPUNCTURE: CPT | Performed by: NURSE PRACTITIONER

## 2022-02-26 PROCEDURE — 82746 ASSAY OF FOLIC ACID SERUM: CPT | Performed by: INTERNAL MEDICINE

## 2022-02-26 PROCEDURE — 85379 FIBRIN DEGRADATION QUANT: CPT | Performed by: NURSE PRACTITIONER

## 2022-02-26 PROCEDURE — 85025 COMPLETE CBC W/AUTO DIFF WBC: CPT | Performed by: NURSE PRACTITIONER

## 2022-02-26 PROCEDURE — 99233 SBSQ HOSP IP/OBS HIGH 50: CPT | Performed by: INTERNAL MEDICINE

## 2022-02-26 PROCEDURE — 93880 EXTRACRANIAL BILAT STUDY: CPT

## 2022-02-26 PROCEDURE — 83036 HEMOGLOBIN GLYCOSYLATED A1C: CPT | Performed by: INTERNAL MEDICINE

## 2022-02-26 PROCEDURE — 82607 VITAMIN B-12: CPT | Performed by: INTERNAL MEDICINE

## 2022-02-26 PROCEDURE — 82728 ASSAY OF FERRITIN: CPT | Performed by: INTERNAL MEDICINE

## 2022-02-26 PROCEDURE — 83540 ASSAY OF IRON: CPT | Performed by: INTERNAL MEDICINE

## 2022-02-26 PROCEDURE — 93970 EXTREMITY STUDY: CPT

## 2022-02-26 PROCEDURE — 99214 OFFICE O/P EST MOD 30 MIN: CPT | Performed by: INTERNAL MEDICINE

## 2022-02-26 PROCEDURE — 83735 ASSAY OF MAGNESIUM: CPT | Performed by: NURSE PRACTITIONER

## 2022-02-26 PROCEDURE — 82962 GLUCOSE BLOOD TEST: CPT

## 2022-02-26 PROCEDURE — 25010000002 LEVETIRACETAM IN NACL 0.82% 500 MG/100ML SOLUTION: Performed by: NURSE PRACTITIONER

## 2022-02-26 PROCEDURE — 93010 ELECTROCARDIOGRAM REPORT: CPT | Performed by: INTERNAL MEDICINE

## 2022-02-26 RX ORDER — ONDANSETRON 4 MG/1
4 TABLET, FILM COATED ORAL EVERY 6 HOURS PRN
Status: DISCONTINUED | OUTPATIENT
Start: 2022-02-26 | End: 2022-03-01 | Stop reason: HOSPADM

## 2022-02-26 RX ORDER — SODIUM CHLORIDE 0.9 % (FLUSH) 0.9 %
10 SYRINGE (ML) INJECTION EVERY 12 HOURS SCHEDULED
Status: DISCONTINUED | OUTPATIENT
Start: 2022-02-26 | End: 2022-03-01 | Stop reason: HOSPADM

## 2022-02-26 RX ORDER — SODIUM CHLORIDE 0.9 % (FLUSH) 0.9 %
10 SYRINGE (ML) INJECTION AS NEEDED
Status: DISCONTINUED | OUTPATIENT
Start: 2022-02-26 | End: 2022-03-01 | Stop reason: HOSPADM

## 2022-02-26 RX ORDER — GABAPENTIN 300 MG/1
300 CAPSULE ORAL EVERY 12 HOURS SCHEDULED
Status: DISCONTINUED | OUTPATIENT
Start: 2022-02-26 | End: 2022-03-01 | Stop reason: HOSPADM

## 2022-02-26 RX ORDER — POLYETHYLENE GLYCOL 3350 17 G/17G
17 POWDER, FOR SOLUTION ORAL DAILY PRN
Status: DISCONTINUED | OUTPATIENT
Start: 2022-02-26 | End: 2022-03-01 | Stop reason: HOSPADM

## 2022-02-26 RX ORDER — ATORVASTATIN CALCIUM 10 MG/1
10 TABLET, FILM COATED ORAL NIGHTLY
Status: DISCONTINUED | OUTPATIENT
Start: 2022-02-26 | End: 2022-03-01 | Stop reason: HOSPADM

## 2022-02-26 RX ORDER — OLANZAPINE 10 MG/2ML
1 INJECTION, POWDER, LYOPHILIZED, FOR SOLUTION INTRAMUSCULAR
Status: DISCONTINUED | OUTPATIENT
Start: 2022-02-26 | End: 2022-03-01 | Stop reason: HOSPADM

## 2022-02-26 RX ORDER — AMOXICILLIN 250 MG
2 CAPSULE ORAL 2 TIMES DAILY
Status: DISCONTINUED | OUTPATIENT
Start: 2022-02-26 | End: 2022-03-01 | Stop reason: HOSPADM

## 2022-02-26 RX ORDER — CHOLECALCIFEROL (VITAMIN D3) 125 MCG
5 CAPSULE ORAL NIGHTLY PRN
Status: DISCONTINUED | OUTPATIENT
Start: 2022-02-26 | End: 2022-03-01 | Stop reason: HOSPADM

## 2022-02-26 RX ORDER — LEVETIRACETAM 5 MG/ML
500 INJECTION INTRAVASCULAR EVERY 12 HOURS SCHEDULED
Status: DISCONTINUED | OUTPATIENT
Start: 2022-02-26 | End: 2022-02-28

## 2022-02-26 RX ORDER — INSULIN LISPRO 100 [IU]/ML
0-9 INJECTION, SOLUTION INTRAVENOUS; SUBCUTANEOUS
Status: DISCONTINUED | OUTPATIENT
Start: 2022-02-26 | End: 2022-03-01 | Stop reason: HOSPADM

## 2022-02-26 RX ORDER — SPIRONOLACTONE 25 MG/1
25 TABLET ORAL DAILY
Status: DISCONTINUED | OUTPATIENT
Start: 2022-02-26 | End: 2022-03-01 | Stop reason: HOSPADM

## 2022-02-26 RX ORDER — AMLODIPINE BESYLATE 5 MG/1
5 TABLET ORAL DAILY
Status: DISCONTINUED | OUTPATIENT
Start: 2022-02-26 | End: 2022-03-01 | Stop reason: HOSPADM

## 2022-02-26 RX ORDER — NITROGLYCERIN 0.4 MG/1
0.4 TABLET SUBLINGUAL
Status: DISCONTINUED | OUTPATIENT
Start: 2022-02-26 | End: 2022-03-01 | Stop reason: HOSPADM

## 2022-02-26 RX ORDER — ONDANSETRON 2 MG/ML
4 INJECTION INTRAMUSCULAR; INTRAVENOUS EVERY 6 HOURS PRN
Status: DISCONTINUED | OUTPATIENT
Start: 2022-02-26 | End: 2022-03-01 | Stop reason: HOSPADM

## 2022-02-26 RX ORDER — ALUMINA, MAGNESIA, AND SIMETHICONE 2400; 2400; 240 MG/30ML; MG/30ML; MG/30ML
15 SUSPENSION ORAL EVERY 6 HOURS PRN
Status: DISCONTINUED | OUTPATIENT
Start: 2022-02-26 | End: 2022-03-01 | Stop reason: HOSPADM

## 2022-02-26 RX ORDER — DEXTROSE MONOHYDRATE 25 G/50ML
25 INJECTION, SOLUTION INTRAVENOUS
Status: DISCONTINUED | OUTPATIENT
Start: 2022-02-26 | End: 2022-03-01 | Stop reason: HOSPADM

## 2022-02-26 RX ORDER — ACETAMINOPHEN 160 MG/5ML
650 SOLUTION ORAL EVERY 4 HOURS PRN
Status: DISCONTINUED | OUTPATIENT
Start: 2022-02-26 | End: 2022-03-01 | Stop reason: HOSPADM

## 2022-02-26 RX ORDER — LOSARTAN POTASSIUM 50 MG/1
50 TABLET ORAL DAILY
Status: DISCONTINUED | OUTPATIENT
Start: 2022-02-26 | End: 2022-03-01 | Stop reason: HOSPADM

## 2022-02-26 RX ORDER — ACETAMINOPHEN 650 MG/1
650 SUPPOSITORY RECTAL EVERY 4 HOURS PRN
Status: DISCONTINUED | OUTPATIENT
Start: 2022-02-26 | End: 2022-03-01 | Stop reason: HOSPADM

## 2022-02-26 RX ORDER — ACETAMINOPHEN 325 MG/1
650 TABLET ORAL EVERY 4 HOURS PRN
Status: DISCONTINUED | OUTPATIENT
Start: 2022-02-26 | End: 2022-03-01 | Stop reason: HOSPADM

## 2022-02-26 RX ORDER — PANTOPRAZOLE SODIUM 40 MG/1
40 TABLET, DELAYED RELEASE ORAL EVERY MORNING
Status: DISCONTINUED | OUTPATIENT
Start: 2022-02-26 | End: 2022-03-01 | Stop reason: HOSPADM

## 2022-02-26 RX ORDER — TOPIRAMATE 25 MG/1
12.5 TABLET ORAL 2 TIMES DAILY
Status: DISCONTINUED | OUTPATIENT
Start: 2022-02-26 | End: 2022-03-01 | Stop reason: HOSPADM

## 2022-02-26 RX ORDER — INSULIN LISPRO 100 [IU]/ML
0-9 INJECTION, SOLUTION INTRAVENOUS; SUBCUTANEOUS AS NEEDED
Status: DISCONTINUED | OUTPATIENT
Start: 2022-02-26 | End: 2022-03-01 | Stop reason: HOSPADM

## 2022-02-26 RX ORDER — ASPIRIN 325 MG
325 TABLET ORAL DAILY
Status: DISCONTINUED | OUTPATIENT
Start: 2022-02-26 | End: 2022-03-01 | Stop reason: HOSPADM

## 2022-02-26 RX ORDER — NICOTINE POLACRILEX 4 MG
15 LOZENGE BUCCAL
Status: DISCONTINUED | OUTPATIENT
Start: 2022-02-26 | End: 2022-03-01 | Stop reason: HOSPADM

## 2022-02-26 RX ADMIN — TOPIRAMATE 12.5 MG: 25 TABLET, FILM COATED ORAL at 21:23

## 2022-02-26 RX ADMIN — LEVETIRACETAM 500 MG: 5 INJECTION, SOLUTION INTRAVENOUS at 04:46

## 2022-02-26 RX ADMIN — SENNOSIDES AND DOCUSATE SODIUM 2 TABLET: 50; 8.6 TABLET ORAL at 21:23

## 2022-02-26 RX ADMIN — TOPIRAMATE 12.5 MG: 25 TABLET, FILM COATED ORAL at 10:49

## 2022-02-26 RX ADMIN — Medication 10 ML: at 08:00

## 2022-02-26 RX ADMIN — GABAPENTIN 300 MG: 300 CAPSULE ORAL at 10:49

## 2022-02-26 RX ADMIN — LOSARTAN POTASSIUM 50 MG: 50 TABLET, FILM COATED ORAL at 10:49

## 2022-02-26 RX ADMIN — ASPIRIN 325 MG ORAL TABLET 325 MG: 325 PILL ORAL at 08:00

## 2022-02-26 RX ADMIN — PANTOPRAZOLE SODIUM 40 MG: 40 TABLET, DELAYED RELEASE ORAL at 10:50

## 2022-02-26 RX ADMIN — GABAPENTIN 300 MG: 300 CAPSULE ORAL at 21:23

## 2022-02-26 RX ADMIN — Medication 10 ML: at 21:23

## 2022-02-26 RX ADMIN — SPIRONOLACTONE 25 MG: 25 TABLET ORAL at 10:49

## 2022-02-26 RX ADMIN — Medication 5 MG: at 21:35

## 2022-02-26 RX ADMIN — ATORVASTATIN CALCIUM 10 MG: 10 TABLET, FILM COATED ORAL at 21:23

## 2022-02-26 RX ADMIN — LEVETIRACETAM 500 MG: 5 INJECTION, SOLUTION INTRAVENOUS at 21:23

## 2022-02-26 RX ADMIN — SENNOSIDES AND DOCUSATE SODIUM 2 TABLET: 50; 8.6 TABLET ORAL at 10:49

## 2022-02-26 RX ADMIN — AMLODIPINE BESYLATE 5 MG: 5 TABLET ORAL at 10:50

## 2022-02-26 NOTE — OUTREACH NOTE
Stroke Week 4 Survey      Responses   [facility] facility patient discharged from? [facility]   Does the patient have one of the following disease processes/diagnoses(primary or secondary)? Stroke (TIA)   Week 4 attempt successful? No   Revoke Readmitted          Adele Moura RN

## 2022-02-27 VITALS
RESPIRATION RATE: 18 BRPM | TEMPERATURE: 98 F | DIASTOLIC BLOOD PRESSURE: 70 MMHG | OXYGEN SATURATION: 95 % | HEART RATE: 83 BPM | SYSTOLIC BLOOD PRESSURE: 128 MMHG

## 2022-02-27 VITALS
DIASTOLIC BLOOD PRESSURE: 70 MMHG | HEART RATE: 73 BPM | RESPIRATION RATE: 18 BRPM | SYSTOLIC BLOOD PRESSURE: 132 MMHG | TEMPERATURE: 98.2 F | OXYGEN SATURATION: 96 %

## 2022-02-27 LAB
ALBUMIN SERPL-MCNC: 3.3 G/DL (ref 3.5–5.2)
ALBUMIN/GLOB SERPL: 1 G/DL
ALP SERPL-CCNC: 79 U/L (ref 39–117)
ALT SERPL W P-5'-P-CCNC: 17 U/L (ref 1–33)
ANION GAP SERPL CALCULATED.3IONS-SCNC: 10 MMOL/L (ref 5–15)
AST SERPL-CCNC: 29 U/L (ref 1–32)
BASOPHILS # BLD AUTO: 0 10*3/MM3 (ref 0–0.2)
BASOPHILS NFR BLD AUTO: 0.8 % (ref 0–1.5)
BILIRUB SERPL-MCNC: 0.8 MG/DL (ref 0–1.2)
BUN SERPL-MCNC: 15 MG/DL (ref 8–23)
BUN/CREAT SERPL: 15.3 (ref 7–25)
CALCIUM SPEC-SCNC: 8.2 MG/DL (ref 8.6–10.5)
CHLORIDE SERPL-SCNC: 107 MMOL/L (ref 98–107)
CO2 SERPL-SCNC: 22 MMOL/L (ref 22–29)
CREAT SERPL-MCNC: 0.98 MG/DL (ref 0.57–1)
DEPRECATED RDW RBC AUTO: 58.2 FL (ref 37–54)
EOSINOPHIL # BLD AUTO: 0.2 10*3/MM3 (ref 0–0.4)
EOSINOPHIL NFR BLD AUTO: 5.6 % (ref 0.3–6.2)
ERYTHROCYTE [DISTWIDTH] IN BLOOD BY AUTOMATED COUNT: 19 % (ref 12.3–15.4)
FOLATE SERPL-MCNC: 2.68 NG/ML (ref 4.78–24.2)
GFR SERPL CREATININE-BSD FRML MDRD: 54 ML/MIN/1.73
GLOBULIN UR ELPH-MCNC: 3.2 GM/DL
GLUCOSE BLDC GLUCOMTR-MCNC: 143 MG/DL (ref 70–105)
GLUCOSE BLDC GLUCOMTR-MCNC: 150 MG/DL (ref 70–105)
GLUCOSE BLDC GLUCOMTR-MCNC: 164 MG/DL (ref 70–105)
GLUCOSE SERPL-MCNC: 145 MG/DL (ref 65–99)
HCT VFR BLD AUTO: 25 % (ref 34–46.6)
HGB BLD-MCNC: 7.8 G/DL (ref 12–15.9)
IRON 24H UR-MRATE: 147 MCG/DL (ref 37–145)
IRON SATN MFR SERPL: 31 % (ref 20–50)
LDH SERPL-CCNC: 262 U/L (ref 135–214)
LYMPHOCYTES # BLD AUTO: 1.2 10*3/MM3 (ref 0.7–3.1)
LYMPHOCYTES NFR BLD AUTO: 29.5 % (ref 19.6–45.3)
MCH RBC QN AUTO: 27 PG (ref 26.6–33)
MCHC RBC AUTO-ENTMCNC: 31.3 G/DL (ref 31.5–35.7)
MCV RBC AUTO: 86.2 FL (ref 79–97)
MONOCYTES # BLD AUTO: 0.3 10*3/MM3 (ref 0.1–0.9)
MONOCYTES NFR BLD AUTO: 7.5 % (ref 5–12)
NEUTROPHILS NFR BLD AUTO: 2.3 10*3/MM3 (ref 1.7–7)
NEUTROPHILS NFR BLD AUTO: 56.6 % (ref 42.7–76)
NRBC BLD AUTO-RTO: 0.1 /100 WBC (ref 0–0.2)
PLATELET # BLD AUTO: 161 10*3/MM3 (ref 140–450)
PMV BLD AUTO: 6.4 FL (ref 6–12)
POTASSIUM SERPL-SCNC: 4.4 MMOL/L (ref 3.5–5.2)
PROT SERPL-MCNC: 6.5 G/DL (ref 6–8.5)
QT INTERVAL: 361 MS
RBC # BLD AUTO: 2.9 10*6/MM3 (ref 3.77–5.28)
RETICS # AUTO: 0.07 10*6/MM3 (ref 0.02–0.13)
RETICS/RBC NFR AUTO: 2.15 % (ref 0.7–1.9)
SODIUM SERPL-SCNC: 139 MMOL/L (ref 136–145)
TIBC SERPL-MCNC: 478 MCG/DL (ref 298–536)
TRANSFERRIN SERPL-MCNC: 321 MG/DL (ref 200–360)
VIT B12 BLD-MCNC: 177 PG/ML (ref 211–946)
WBC NRBC COR # BLD: 4.1 10*3/MM3 (ref 3.4–10.8)

## 2022-02-27 PROCEDURE — 99233 SBSQ HOSP IP/OBS HIGH 50: CPT | Performed by: INTERNAL MEDICINE

## 2022-02-27 PROCEDURE — 99233 SBSQ HOSP IP/OBS HIGH 50: CPT | Performed by: PSYCHIATRY & NEUROLOGY

## 2022-02-27 PROCEDURE — 83615 LACTATE (LD) (LDH) ENZYME: CPT | Performed by: INTERNAL MEDICINE

## 2022-02-27 PROCEDURE — G0378 HOSPITAL OBSERVATION PER HR: HCPCS

## 2022-02-27 PROCEDURE — 83010 ASSAY OF HAPTOGLOBIN QUANT: CPT | Performed by: INTERNAL MEDICINE

## 2022-02-27 PROCEDURE — 80053 COMPREHEN METABOLIC PANEL: CPT | Performed by: INTERNAL MEDICINE

## 2022-02-27 PROCEDURE — 97162 PT EVAL MOD COMPLEX 30 MIN: CPT

## 2022-02-27 PROCEDURE — 99204 OFFICE O/P NEW MOD 45 MIN: CPT | Performed by: INTERNAL MEDICINE

## 2022-02-27 PROCEDURE — 83540 ASSAY OF IRON: CPT | Performed by: INTERNAL MEDICINE

## 2022-02-27 PROCEDURE — 25010000002 LEVETIRACETAM IN NACL 0.82% 500 MG/100ML SOLUTION: Performed by: NURSE PRACTITIONER

## 2022-02-27 PROCEDURE — 84466 ASSAY OF TRANSFERRIN: CPT | Performed by: INTERNAL MEDICINE

## 2022-02-27 PROCEDURE — 85025 COMPLETE CBC W/AUTO DIFF WBC: CPT | Performed by: INTERNAL MEDICINE

## 2022-02-27 PROCEDURE — 82962 GLUCOSE BLOOD TEST: CPT

## 2022-02-27 PROCEDURE — 85045 AUTOMATED RETICULOCYTE COUNT: CPT | Performed by: INTERNAL MEDICINE

## 2022-02-27 PROCEDURE — 63710000001 INSULIN LISPRO (HUMAN) PER 5 UNITS: Performed by: INTERNAL MEDICINE

## 2022-02-27 RX ORDER — LANOLIN ALCOHOL/MO/W.PET/CERES
1000 CREAM (GRAM) TOPICAL DAILY
Status: DISCONTINUED | OUTPATIENT
Start: 2022-02-27 | End: 2022-03-01 | Stop reason: HOSPADM

## 2022-02-27 RX ORDER — FOLIC ACID 1 MG/1
1 TABLET ORAL DAILY
Status: DISCONTINUED | OUTPATIENT
Start: 2022-02-27 | End: 2022-03-01 | Stop reason: HOSPADM

## 2022-02-27 RX ADMIN — LEVETIRACETAM 500 MG: 5 INJECTION, SOLUTION INTRAVENOUS at 20:48

## 2022-02-27 RX ADMIN — Medication 10 ML: at 09:00

## 2022-02-27 RX ADMIN — SENNOSIDES AND DOCUSATE SODIUM 2 TABLET: 50; 8.6 TABLET ORAL at 09:00

## 2022-02-27 RX ADMIN — TOPIRAMATE 12.5 MG: 25 TABLET, FILM COATED ORAL at 08:59

## 2022-02-27 RX ADMIN — LEVETIRACETAM 500 MG: 5 INJECTION, SOLUTION INTRAVENOUS at 09:00

## 2022-02-27 RX ADMIN — Medication 10 ML: at 20:50

## 2022-02-27 RX ADMIN — SPIRONOLACTONE 25 MG: 25 TABLET ORAL at 09:00

## 2022-02-27 RX ADMIN — SENNOSIDES AND DOCUSATE SODIUM 2 TABLET: 50; 8.6 TABLET ORAL at 20:48

## 2022-02-27 RX ADMIN — INSULIN LISPRO 2 UNITS: 100 INJECTION, SOLUTION INTRAVENOUS; SUBCUTANEOUS at 09:00

## 2022-02-27 RX ADMIN — PANTOPRAZOLE SODIUM 40 MG: 40 TABLET, DELAYED RELEASE ORAL at 08:59

## 2022-02-27 RX ADMIN — LOSARTAN POTASSIUM 50 MG: 50 TABLET, FILM COATED ORAL at 08:59

## 2022-02-27 RX ADMIN — ATORVASTATIN CALCIUM 10 MG: 10 TABLET, FILM COATED ORAL at 20:48

## 2022-02-27 RX ADMIN — AMLODIPINE BESYLATE 5 MG: 5 TABLET ORAL at 09:00

## 2022-02-27 RX ADMIN — GABAPENTIN 300 MG: 300 CAPSULE ORAL at 20:48

## 2022-02-27 RX ADMIN — GABAPENTIN 300 MG: 300 CAPSULE ORAL at 08:59

## 2022-02-27 RX ADMIN — ASPIRIN 325 MG ORAL TABLET 325 MG: 325 PILL ORAL at 09:00

## 2022-02-27 RX ADMIN — TOPIRAMATE 12.5 MG: 25 TABLET, FILM COATED ORAL at 20:48

## 2022-02-27 RX ADMIN — INSULIN LISPRO 2 UNITS: 100 INJECTION, SOLUTION INTRAVENOUS; SUBCUTANEOUS at 20:51

## 2022-02-27 NOTE — HOME HEALTH
Patient c/o frequency with urination, SN communicated concerns with Dr. ABNER Goss requesting order to check UA with C&S if indicated.

## 2022-02-28 ENCOUNTER — APPOINTMENT (OUTPATIENT)
Dept: CARDIOLOGY | Facility: HOSPITAL | Age: 87
End: 2022-02-28

## 2022-02-28 ENCOUNTER — ANESTHESIA EVENT (OUTPATIENT)
Dept: CARDIOLOGY | Facility: HOSPITAL | Age: 87
End: 2022-02-28

## 2022-02-28 ENCOUNTER — ANESTHESIA (OUTPATIENT)
Dept: CARDIOLOGY | Facility: HOSPITAL | Age: 87
End: 2022-02-28

## 2022-02-28 VITALS — DIASTOLIC BLOOD PRESSURE: 35 MMHG | OXYGEN SATURATION: 100 % | SYSTOLIC BLOOD PRESSURE: 94 MMHG

## 2022-02-28 LAB
ALBUMIN SERPL-MCNC: 3.3 G/DL (ref 3.5–5.2)
ALBUMIN/GLOB SERPL: 1 G/DL
ALP SERPL-CCNC: 82 U/L (ref 39–117)
ALT SERPL W P-5'-P-CCNC: 14 U/L (ref 1–33)
ANION GAP SERPL CALCULATED.3IONS-SCNC: 10 MMOL/L (ref 5–15)
AST SERPL-CCNC: 30 U/L (ref 1–32)
BASOPHILS # BLD AUTO: 0 10*3/MM3 (ref 0–0.2)
BASOPHILS NFR BLD AUTO: 0.8 % (ref 0–1.5)
BH CV ECHO MEAS - RAP SYSTOLE: 3 MMHG
BH CV ECHO MEAS - RVSP: 29.9 MMHG
BH CV ECHO MEAS - TR MAX PG: 26.9 MMHG
BH CV ECHO MEAS - TR MAX VEL: 259.2 CM/SEC
BILIRUB SERPL-MCNC: 0.6 MG/DL (ref 0–1.2)
BUN SERPL-MCNC: 17 MG/DL (ref 8–23)
BUN/CREAT SERPL: 16.3 (ref 7–25)
CALCIUM SPEC-SCNC: 8.2 MG/DL (ref 8.6–10.5)
CHLORIDE SERPL-SCNC: 108 MMOL/L (ref 98–107)
CO2 SERPL-SCNC: 20 MMOL/L (ref 22–29)
CREAT SERPL-MCNC: 1.04 MG/DL (ref 0.57–1)
DEPRECATED RDW RBC AUTO: 58.6 FL (ref 37–54)
EOSINOPHIL # BLD AUTO: 0.3 10*3/MM3 (ref 0–0.4)
EOSINOPHIL NFR BLD AUTO: 6.4 % (ref 0.3–6.2)
ERYTHROCYTE [DISTWIDTH] IN BLOOD BY AUTOMATED COUNT: 19.1 % (ref 12.3–15.4)
GFR SERPL CREATININE-BSD FRML MDRD: 50 ML/MIN/1.73
GLOBULIN UR ELPH-MCNC: 3.3 GM/DL
GLUCOSE BLDC GLUCOMTR-MCNC: 125 MG/DL (ref 70–105)
GLUCOSE BLDC GLUCOMTR-MCNC: 147 MG/DL (ref 70–105)
GLUCOSE BLDC GLUCOMTR-MCNC: 152 MG/DL (ref 70–105)
GLUCOSE SERPL-MCNC: 131 MG/DL (ref 65–99)
HAPTOGLOB SERPL-MCNC: 79 MG/DL (ref 30–200)
HBA1C MFR BLD: 7 % (ref 3.5–5.6)
HCT VFR BLD AUTO: 25.5 % (ref 34–46.6)
HGB BLD-MCNC: 7.9 G/DL (ref 12–15.9)
LYMPHOCYTES # BLD AUTO: 1 10*3/MM3 (ref 0.7–3.1)
LYMPHOCYTES NFR BLD AUTO: 24.5 % (ref 19.6–45.3)
MAXIMAL PREDICTED HEART RATE: 136 BPM
MCH RBC QN AUTO: 27 PG (ref 26.6–33)
MCHC RBC AUTO-ENTMCNC: 31.1 G/DL (ref 31.5–35.7)
MCV RBC AUTO: 86.7 FL (ref 79–97)
MONOCYTES # BLD AUTO: 0.3 10*3/MM3 (ref 0.1–0.9)
MONOCYTES NFR BLD AUTO: 7.5 % (ref 5–12)
NEUTROPHILS NFR BLD AUTO: 2.4 10*3/MM3 (ref 1.7–7)
NEUTROPHILS NFR BLD AUTO: 60.8 % (ref 42.7–76)
NRBC BLD AUTO-RTO: 0.1 /100 WBC (ref 0–0.2)
PLATELET # BLD AUTO: 158 10*3/MM3 (ref 140–450)
PMV BLD AUTO: 6.5 FL (ref 6–12)
POTASSIUM SERPL-SCNC: 4.6 MMOL/L (ref 3.5–5.2)
PROT SERPL-MCNC: 6.6 G/DL (ref 6–8.5)
RBC # BLD AUTO: 2.94 10*6/MM3 (ref 3.77–5.28)
SODIUM SERPL-SCNC: 138 MMOL/L (ref 136–145)
STRESS TARGET HR: 116 BPM
WBC NRBC COR # BLD: 3.9 10*3/MM3 (ref 3.4–10.8)

## 2022-02-28 PROCEDURE — 93325 DOPPLER ECHO COLOR FLOW MAPG: CPT

## 2022-02-28 PROCEDURE — 93312 ECHO TRANSESOPHAGEAL: CPT

## 2022-02-28 PROCEDURE — 85025 COMPLETE CBC W/AUTO DIFF WBC: CPT | Performed by: FAMILY MEDICINE

## 2022-02-28 PROCEDURE — 93320 DOPPLER ECHO COMPLETE: CPT

## 2022-02-28 PROCEDURE — 25010000002 LEVETIRACETAM IN NACL 0.82% 500 MG/100ML SOLUTION: Performed by: NURSE PRACTITIONER

## 2022-02-28 PROCEDURE — 99232 SBSQ HOSP IP/OBS MODERATE 35: CPT | Performed by: INTERNAL MEDICINE

## 2022-02-28 PROCEDURE — 82962 GLUCOSE BLOOD TEST: CPT

## 2022-02-28 PROCEDURE — 93320 DOPPLER ECHO COMPLETE: CPT | Performed by: INTERNAL MEDICINE

## 2022-02-28 PROCEDURE — 99233 SBSQ HOSP IP/OBS HIGH 50: CPT | Performed by: INTERNAL MEDICINE

## 2022-02-28 PROCEDURE — 99233 SBSQ HOSP IP/OBS HIGH 50: CPT | Performed by: FAMILY MEDICINE

## 2022-02-28 PROCEDURE — 93312 ECHO TRANSESOPHAGEAL: CPT | Performed by: INTERNAL MEDICINE

## 2022-02-28 PROCEDURE — 25010000002 PROPOFOL 10 MG/ML EMULSION: Performed by: ANESTHESIOLOGY

## 2022-02-28 PROCEDURE — 93325 DOPPLER ECHO COLOR FLOW MAPG: CPT | Performed by: INTERNAL MEDICINE

## 2022-02-28 PROCEDURE — 80053 COMPREHEN METABOLIC PANEL: CPT | Performed by: INTERNAL MEDICINE

## 2022-02-28 PROCEDURE — 63710000001 INSULIN LISPRO (HUMAN) PER 5 UNITS: Performed by: INTERNAL MEDICINE

## 2022-02-28 RX ORDER — SODIUM CHLORIDE 9 MG/ML
INJECTION, SOLUTION INTRAVENOUS CONTINUOUS PRN
Status: DISCONTINUED | OUTPATIENT
Start: 2022-02-28 | End: 2022-02-28 | Stop reason: SURG

## 2022-02-28 RX ORDER — LEVETIRACETAM 500 MG/1
500 TABLET ORAL EVERY 12 HOURS SCHEDULED
Status: DISCONTINUED | OUTPATIENT
Start: 2022-02-28 | End: 2022-03-01 | Stop reason: HOSPADM

## 2022-02-28 RX ORDER — PROPOFOL 10 MG/ML
VIAL (ML) INTRAVENOUS AS NEEDED
Status: DISCONTINUED | OUTPATIENT
Start: 2022-02-28 | End: 2022-02-28 | Stop reason: SURG

## 2022-02-28 RX ADMIN — LEVETIRACETAM 500 MG: 500 TABLET, FILM COATED ORAL at 20:55

## 2022-02-28 RX ADMIN — LEVETIRACETAM 500 MG: 5 INJECTION, SOLUTION INTRAVENOUS at 09:58

## 2022-02-28 RX ADMIN — AMLODIPINE BESYLATE 5 MG: 5 TABLET ORAL at 09:59

## 2022-02-28 RX ADMIN — GABAPENTIN 300 MG: 300 CAPSULE ORAL at 20:55

## 2022-02-28 RX ADMIN — SPIRONOLACTONE 25 MG: 25 TABLET ORAL at 09:59

## 2022-02-28 RX ADMIN — ASPIRIN 325 MG ORAL TABLET 325 MG: 325 PILL ORAL at 09:58

## 2022-02-28 RX ADMIN — Medication 10 ML: at 20:56

## 2022-02-28 RX ADMIN — Medication 5 MG: at 20:56

## 2022-02-28 RX ADMIN — SODIUM CHLORIDE: 0.9 INJECTION, SOLUTION INTRAVENOUS at 08:05

## 2022-02-28 RX ADMIN — CYANOCOBALAMIN TAB 1000 MCG 1000 MCG: 1000 TAB at 09:59

## 2022-02-28 RX ADMIN — INSULIN LISPRO 2 UNITS: 100 INJECTION, SOLUTION INTRAVENOUS; SUBCUTANEOUS at 13:09

## 2022-02-28 RX ADMIN — TOPIRAMATE 12.5 MG: 25 TABLET, FILM COATED ORAL at 09:59

## 2022-02-28 RX ADMIN — TOPIRAMATE 12.5 MG: 25 TABLET, FILM COATED ORAL at 20:55

## 2022-02-28 RX ADMIN — Medication 10 ML: at 09:59

## 2022-02-28 RX ADMIN — GABAPENTIN 300 MG: 300 CAPSULE ORAL at 09:58

## 2022-02-28 RX ADMIN — LOSARTAN POTASSIUM 50 MG: 50 TABLET, FILM COATED ORAL at 09:58

## 2022-02-28 RX ADMIN — FOLIC ACID 1 MG: 1 TABLET ORAL at 10:00

## 2022-02-28 RX ADMIN — ATORVASTATIN CALCIUM 10 MG: 10 TABLET, FILM COATED ORAL at 20:55

## 2022-02-28 RX ADMIN — PROPOFOL 220 MG: 10 INJECTION, EMULSION INTRAVENOUS at 08:13

## 2022-02-28 NOTE — ANESTHESIA PREPROCEDURE EVALUATION
Anesthesia Evaluation     Patient summary reviewed and Nursing notes reviewed   NPO Solid Status: > 8 hours  NPO Liquid Status: > 8 hours           Airway   Mallampati: II  TM distance: >3 FB  Neck ROM: full  No difficulty expected  Dental - normal exam     Pulmonary - normal exam   Cardiovascular - normal exam    ECG reviewed    (+) hypertension, CAD, dysrhythmias, hyperlipidemia,       Neuro/Psych  (+) CVA, syncope, weakness,    GI/Hepatic/Renal/Endo    (+) obesity, morbid obesity, GERD,  renal disease, diabetes mellitus,     Musculoskeletal     Abdominal  - normal exam    Bowel sounds: normal.   Substance History      OB/GYN          Other        ROS/Med Hx Other: Sinus rhythm  Prolonged GA interval  Anteroseptal infarct, age indeterminate  Nonspecific ST-T wave changes and no significant change compared to previous EKG    Structurally and functionally normal cardiac valves.  Left ventricular size and contractility is normal with ejection fraction of 60%.  Bubble study negative for PFO.       1.  Stable cardiomegaly.  2.  No focal airspace consolidation or other acute process.                       Anesthesia Plan    ASA 3     MAC     intravenous induction     Anesthetic plan, all risks, benefits, and alternatives have been provided, discussed and informed consent has been obtained with: patient.        CODE STATUS:    Code Status (Patient has no pulse and is not breathing): CPR (Attempt to Resuscitate)  Medical Interventions (Patient has pulse or is breathing): Full Support

## 2022-02-28 NOTE — ADDENDUM NOTE
Addendum  created 02/28/22 0845 by Celestino Ellis MD    Clinical Note Signed, Intraprocedure Event edited

## 2022-02-28 NOTE — HOME HEALTH
Plan for next viit: CP assess, assess pain/falls/safety, f/u on UA, medication education, s.sx UTI, F/U on MD appt

## 2022-02-28 NOTE — ANESTHESIA POSTPROCEDURE EVALUATION
Patient: Aracelis Vargas    Procedure Summary     Date: 02/28/22 Room / Location: [facility]    Anesthesia Start: 0810 Anesthesia Stop: 0822    Procedure: ADULT TRANSESOPHAGEAL ECHO (ALEYDA) W/ CONT IF NECESSARY PER PROTOCOL Diagnosis: (Cardiac Source of Emboli)    Scheduled Providers: Wang Plaza MD Provider: Celestino Ellis MD    Anesthesia Type: MAC ASA Status: 3          Anesthesia Type: MAC    Vitals  Vitals Value Taken Time   /70 02/28/22 0841   Temp     Pulse 75 02/28/22 0844   Resp 18 02/28/22 0823   SpO2 100 % 02/28/22 0844   Vitals shown include unvalidated device data.        Post Anesthesia Care and Evaluation    Patient location during evaluation: PACU  Patient participation: complete - patient participated  Level of consciousness: awake  Pain scale: See nurse's notes for pain score.  Pain management: adequate  Airway patency: patent  Anesthetic complications: No anesthetic complications  PONV Status: none  Cardiovascular status: acceptable  Respiratory status: acceptable  Hydration status: acceptable    Comments: Patient seen and examined postoperatively; vital signs stable; SpO2 greater than or equal to 90%; cardiopulmonary status stable; nausea/vomiting adequately controlled; pain adequately controlled; no apparent anesthesia complications; patient discharged from anesthesia care when discharge criteria were met

## 2022-03-01 ENCOUNTER — TELEPHONE (OUTPATIENT)
Dept: ONCOLOGY | Facility: CLINIC | Age: 87
End: 2022-03-01

## 2022-03-01 ENCOUNTER — HOME CARE VISIT (OUTPATIENT)
Dept: HOME HEALTH SERVICES | Facility: HOME HEALTHCARE | Age: 87
End: 2022-03-01

## 2022-03-01 ENCOUNTER — READMISSION MANAGEMENT (OUTPATIENT)
Dept: CALL CENTER | Facility: HOSPITAL | Age: 87
End: 2022-03-01

## 2022-03-01 VITALS
BODY MASS INDEX: 37.5 KG/M2 | OXYGEN SATURATION: 96 % | DIASTOLIC BLOOD PRESSURE: 54 MMHG | HEIGHT: 65 IN | WEIGHT: 225.09 LBS | HEART RATE: 77 BPM | TEMPERATURE: 98.3 F | RESPIRATION RATE: 11 BRPM | SYSTOLIC BLOOD PRESSURE: 132 MMHG

## 2022-03-01 LAB — GLUCOSE BLDC GLUCOMTR-MCNC: 154 MG/DL (ref 70–105)

## 2022-03-01 PROCEDURE — 86340 INTRINSIC FACTOR ANTIBODY: CPT | Performed by: INTERNAL MEDICINE

## 2022-03-01 PROCEDURE — 82962 GLUCOSE BLOOD TEST: CPT

## 2022-03-01 PROCEDURE — 99232 SBSQ HOSP IP/OBS MODERATE 35: CPT | Performed by: INTERNAL MEDICINE

## 2022-03-01 PROCEDURE — 63710000001 INSULIN LISPRO (HUMAN) PER 5 UNITS: Performed by: INTERNAL MEDICINE

## 2022-03-01 PROCEDURE — 99239 HOSP IP/OBS DSCHRG MGMT >30: CPT | Performed by: FAMILY MEDICINE

## 2022-03-01 RX ORDER — FOLIC ACID 1 MG/1
1 TABLET ORAL DAILY
Qty: 30 TABLET | Refills: 0 | Status: SHIPPED | OUTPATIENT
Start: 2022-03-02 | End: 2022-04-01

## 2022-03-01 RX ADMIN — SPIRONOLACTONE 25 MG: 25 TABLET ORAL at 08:27

## 2022-03-01 RX ADMIN — FOLIC ACID 1 MG: 1 TABLET ORAL at 08:27

## 2022-03-01 RX ADMIN — LEVETIRACETAM 500 MG: 500 TABLET, FILM COATED ORAL at 08:27

## 2022-03-01 RX ADMIN — AMLODIPINE BESYLATE 5 MG: 5 TABLET ORAL at 08:27

## 2022-03-01 RX ADMIN — TOPIRAMATE 12.5 MG: 25 TABLET, FILM COATED ORAL at 08:27

## 2022-03-01 RX ADMIN — LOSARTAN POTASSIUM 50 MG: 50 TABLET, FILM COATED ORAL at 08:27

## 2022-03-01 RX ADMIN — ASPIRIN 325 MG ORAL TABLET 325 MG: 325 PILL ORAL at 08:27

## 2022-03-01 RX ADMIN — GABAPENTIN 300 MG: 300 CAPSULE ORAL at 08:27

## 2022-03-01 RX ADMIN — Medication 10 ML: at 08:27

## 2022-03-01 RX ADMIN — INSULIN LISPRO 2 UNITS: 100 INJECTION, SOLUTION INTRAVENOUS; SUBCUTANEOUS at 08:27

## 2022-03-01 RX ADMIN — PANTOPRAZOLE SODIUM 40 MG: 40 TABLET, DELAYED RELEASE ORAL at 08:27

## 2022-03-01 RX ADMIN — CYANOCOBALAMIN TAB 1000 MCG 1000 MCG: 1000 TAB at 08:27

## 2022-03-01 NOTE — CASE MANAGEMENT/SOCIAL WORK
Case Management Discharge Note      Final Note: [facility]         Selected Continued Care - Discharged on 3/1/2022 Admission date: 2/25/2022 - Discharge disposition: Home or Self Care                                  Final Discharge Disposition Code: 06 - home with home health care

## 2022-03-01 NOTE — OUTREACH NOTE
Prep Survey      Responses   [facility] patient discharged from? [facility]   Is LACE score < 7 ? No   Emergency Room discharge w/ pulse ox? No   Eligibility [facility]   Date of Admission 02/25/22   Date of Discharge 03/01/22   Discharge Disposition Home or Self Care   Discharge diagnosis Syncope   Does the patient have one of the following disease processes/diagnoses(primary or secondary)? Other   Does the patient have Home health ordered? Yes   What is the Home health agency?  [facility]   Is there a DME ordered? No   Prep survey completed? Yes          Shara Crane RN

## 2022-03-01 NOTE — DISCHARGE SUMMARY
[facility] Medicine Services  DISCHARGE SUMMARY    Patient Name: Aracelis Vargas  : 1935  MRN: [id number]    Date of Admission: 2022  Discharge Diagnosis: Syncopal episode possibly due to seizure activity  Date of Discharge: 3/1/2022  Primary Care Physician: Gabriel Goss MD      Presenting Problem:   Syncope and collapse [R55]  Hyperammonemia (HCC) [E72.20]  History of stroke [Z86.73]    Active and Resolved Hospital Problems:  Active Hospital Problems    Diagnosis POA   • **Syncope and collapse [R55] Yes   • Hyperammonemia (HCC) [E72.20] Yes   • Cardiac pacemaker in situ [Z95.0] Yes   • GERD (gastroesophageal reflux disease) [K21.9] Yes   • Hyperlipidemia [E78.5] Yes   • Primary hypertension [I10] Yes   • Atherosclerosis of native coronary artery of native heart without angina pectoris [I25.10] Yes   • Hypertension associated with stage 3 chronic kidney disease due to type 2 diabetes mellitus (HCC) [E11.22, I12.9, N18.30] Yes   • Morbid obesity (HCC) [E66.01] Yes   • Secondary hyperaldosteronism (HCC) [E26.1] Yes   • Stage 3b chronic kidney disease (CMS/HCC) [N18.32] Yes   • Fall [W19.XXXA] Yes   • Type 2 diabetes mellitus with diabetic neuropathy, with long-term current use of insulin (HCC) [E11.40, Z79.4] Not Applicable      Resolved Hospital Problems   No resolved problems to display.     Syncope-initial concern for neurological versus cardiogenic patient with previous history of CVA/TIA in 2022 after receiving TPA, given patient's pacemaker electrophysiology consulted, no further events  -Orthostatic vital signs  -Patient missed doses of her home antiepileptic, now resumed  -Neurology evaluated patient has signed off  -ALEYDA performed by cardiology showing no signs of ventricular clot  -Patient had discussion with cardiology given no clot noted would like to defer anticoagulation at this time  -Carotid duplex no significant stenosis  -PT recommending  home health     TIA-patient with recent hospitalization for focal neurological deficits received TPA  -Continue antiplatelet and statin  -Neurochecks  -Neuro has signed off     Anemia-patient being worked up by hematology at this time  -Anticoagulation held off at this time  -Further work-up will be followed up outpatient     CAD-patient denies any chest pain at this time  -Patient with underlying pacemaker evaluated by electrophysiology  -Cardiac monitoring  -Continue home goal-directed therapy  -ALEYDA ordered no PFO  -Outpatient cardiology follow-up organized     Intermittent previous Mobitz 2 and 1 block-patient with underlying pacemaker  -Management per EP/cardiology     Atrial fibrillation-concern for further possible neurological events  -Anticoagulation recommended  -Rate control medication  -Cardiac monitoring  -Cardiology consulted, cleared for discharge     Hyperammonemia-most likely secondary to underlying seizure activity  -Patient not encephalopathic     Seizure disorder-patient reports she missed a few days of her antiepileptic medication which may be an additional source for the syncopal episode  -Neurology consulted cleared patient  -Antiepileptic resumed     Renal insufficiency-patient with history of chronic disease  -Monitor daily  -Strict I's and O's     Type 2 diabetes-aim to keep blood sugars less than 200  -Sliding scale  -Long-acting insulin  -Diabetic diet     Hypertension/hyperlipidemia/GERD/chronic pain-chronic in nature  -Resume home medication as clinically appropriate    Hospital Course     Hospital Course:    Aracelis Vargas is a 86 y.o. female who presented after a syncopal event and collapse at home, she denied any dizziness prior to the fall and denied any head injury in the fall.  She does not know how long she lost consciousness, but recalls walking into the room and then suddenly waking up on the floor.  This is the first syncopal event since she had her pacemaker placed.  Cardiology  is consulted to follow.     2/26/2022-2/27/2022  Patient continuing cardiogenic work-up.  Patient's pacemaker interrogated.  Electrophysiology evaluated patient discussed with neurology recommending anticoagulation given risk of stroke.  Hematology consulted given patient's anemia, work-up initiated.    2/28/2022:Patient seen after ALEYDA, no intraventricular clot no PFO.  Specialist recommendations to resume anticoagulation.  Patient denies any new symptoms.  Transition from IV to oral Keppra.  Patient denies dizziness.    3/1/2022: Patient cleared by specialist services for discharge.  Patient asymptomatic.  Follow-up organizer primary care, cardiology, hematology and neurology.  Patient able to be discharged home with home health in good condition strict return precautions given.      DISCHARGE Follow Up Recommendations for labs and diagnostics:     Anemia work-up with hematology      Reasons For Change In Medications and Indications for New Medications:    Folic acid 1 mg daily for folate deficiency  B12 1000 mcg daily for B12 deficiency    Day of Discharge     Vital Signs:  Temp:  [98.1 °F (36.7 °C)-99.4 °F (37.4 °C)] 98.3 °F (36.8 °C)  Heart Rate:  [77-85] 77  Resp:  [11-22] 11  BP: (104-136)/(45-75) 132/54  Flow (L/min):  [2] 2    Physical Exam:  Physical Exam     General: Morbidly obese elderly female sitting up in bed breathing comfortably on 2 L via nasal cannula no acute distress  HEENT: NC/AT, EOMI, mucosa moist  Heart: Irregular, rate controlled  Chest: Normal work of breathing moving air well no wheeze  Abdominal: Soft. NT/ND.  Musculoskeletal: Normal ROM.  No cyanosis. No calf tenderness.  Neurological: Awake and alert, no focal deficits  Skin: Skin is warm and dry. No rash  Psychiatric: Normal mood and affect.      Pertinent  and/or Most Recent Results     LAB RESULTS:      Lab 02/28/22  0950 02/27/22  1711 02/27/22  0535 02/26/22  0539 02/25/22 1957   WBC 3.90  --  4.10 3.20* 3.40   HEMOGLOBIN 7.9*   --  7.8* 8.1* 8.1*   HEMATOCRIT 25.5*  --  25.0* 24.8* 26.1*   PLATELETS 158  --  161 183 201   NEUTROS ABS 2.40  --  2.30 1.80 1.60*   LYMPHS ABS 1.00  --  1.20 0.90 1.30   MONOS ABS 0.30  --  0.30 0.30 0.30   EOS ABS 0.30  --  0.20 0.20 0.20   MCV 86.7  --  86.2 85.7 86.0   LDH  --  262*  --   --   --          Lab 02/28/22  0230 02/27/22  0535 02/26/22  1044 02/26/22  0539 02/25/22 1957   SODIUM 138 139  --  139 139   POTASSIUM 4.6 4.4  --  4.9 4.5   CHLORIDE 108* 107  --  106 106   CO2 20.0* 22.0  --  21.0* 21.0*   ANION GAP 10.0 10.0  --  12.0 12.0   BUN 17 15  --  23 25*   CREATININE 1.04* 0.98  --  1.15* 1.19*   GLUCOSE 131* 145*  --  171* 162*   CALCIUM 8.2* 8.2*  --  8.4* 8.1*   MAGNESIUM  --   --   --  2.3  --    HEMOGLOBIN A1C  --   --  7.0*  --   --          Lab 02/28/22 0230 02/27/22  0535 02/25/22 1957   TOTAL PROTEIN 6.6 6.5 6.6   ALBUMIN 3.30* 3.30* 3.60   GLOBULIN 3.3 3.2 3.0   ALT (SGPT) 14 17 21   AST (SGOT) 30 29 42*   BILIRUBIN 0.6 0.8 0.4   ALK PHOS 82 79 76         Lab 02/25/22 1957   PROBNP 299.9   TROPONIN T 0.023             Lab 02/27/22  1711 02/26/22  1514 02/26/22  1514   IRON 147*   < > 67   IRON SATURATION 31  --   --    TIBC 478  --   --    TRANSFERRIN 321  --   --    FERRITIN  --   --  20.04   FOLATE  --   --  2.68*   VITAMIN B 12  --   --  177*    < > = values in this interval not displayed.         Brief Urine Lab Results  (Last result in the past 365 days)      Color   Clarity   Blood   Leuk Est   Nitrite   Protein   CREAT   Urine HCG        02/25/22 0930 Yellow   Clear   Negative   Negative   Negative   Negative               Microbiology Results (last 10 days)     Procedure Component Value - Date/Time    COVID PRE-OP / PRE-PROCEDURE SCREENING ORDER (NO ISOLATION) - Swab, Nasopharynx [[id number]]  (Normal) Collected: 02/25/22 2315    Lab Status: Final result Specimen: Swab from Nasopharynx Updated: 02/25/22 2340    Narrative:      The following orders were created for panel  order COVID PRE-OP / PRE-PROCEDURE SCREENING ORDER (NO ISOLATION) - Swab, Nasopharynx.  Procedure                               Abnormality         Status                     ---------                               -----------         ------                     COVID-19,CEPHEID/SHAHLA,CO...[[id number]]  Normal              Final result                 Please view results for these tests on the individual orders.    COVID-19,CEPHEID/SHAHLA,COR/[facility]/PAD/MARTHA IN-HOUSE(OR EMERGENT/ADD-ON),NP SWAB IN TRANSPORT MEDIA 3-4 HR TAT, RT-PCR - Swab, Nasopharynx [[id number]]  (Normal) Collected: 02/25/22 2315    Lab Status: Final result Specimen: Swab from Nasopharynx Updated: 02/25/22 2340     COVID19 Not Detected    Narrative:      Fact sheet for providers: https://www.fda.gov/media/550085/download     Fact sheet for patients: https://www.fda.gov/media/715964/download  Fact sheet for providers: https://www.fda.gov/media/587107/download    Fact sheet for patients: https://www.fda.gov/media/940344/download    Test performed by PCR.          CT Head Without Contrast    Result Date: 2/25/2022  Impression: No acute intracranial abnormality. No change in the CT appearance of the brain 02/04/2022.  Electronically Signed By-Aaron Mattson DO On:2/25/2022 8:52 PM This report was finalized on [id number] by  Aaron Mattson DO.    CT Head Without Contrast    Result Date: 2/4/2022  Impression: Negative. No intracranial hemorrhage or development of CT changes of brain ischemia  Electronically Signed By-Jesus Sparks On:2/4/2022 9:13 PM This report was finalized on [id number] by  Jesus Sparks, .    CT Angiogram Neck    Result Date: 2/3/2022  Impression: 1. No significant carotid or vertebral basilar stenosis or occlusion 2. No large intracranial vessel occlusion Electronically Signed: Jesus Sparks MD 2/3/2022 20:34 EST    US Liver    Result Date: 2/4/2022  Impression: 1. Mild enlargement of the liver. No evidence of liver mass. 2. The  gallbladder and bile ducts are normal. 3. No evidence of ascites Electronically Signed: Jaylon Viramontes MD 2/4/2022 9:27 EST    XR Chest 1 View    Result Date: 2/6/2022  Impression:   1.  Stable cardiomegaly. 2.  No focal airspace consolidation or other acute process.   Electronically Signed By-Alfa Shaw MD On:2/6/2022 8:14 AM This report was finalized on [id number] by  Alfa Shaw MD.    XR Chest 1 View    Result Date: 2/3/2022  Impression: Cardiomegaly with pulmonary venous hypertension. No evidence of pulmonary edema. No suspicious infiltrate. Atelectasis in the bases Electronically Signed: Jesus Sparks MD 2/3/2022 20:41 EST    CT Head Without Contrast Stroke Protocol    Result Date: 2/3/2022  Impression: 1. No acute intracranial hemorrhage. 2. Mild cerebral atrophy and white matter findings of chronic small vessel disease similar to prior study. Electronically Signed: Benjamín Padron MD 2/3/2022 19:45 EST    CT Angiogram Head w AI Analysis of LVO    Result Date: 2/3/2022  Impression: 1. No significant carotid or vertebral basilar stenosis or occlusion 2. No large intracranial vessel occlusion Electronically Signed: Jesus Sparks MD 2/3/2022 20:34 EST      Results for orders placed during the hospital encounter of 02/25/22    Duplex Venous Lower Extremity - Bilateral CAR    Interpretation Summary  · Normal bilateral lower extremity venous duplex scan.      Results for orders placed during the hospital encounter of 02/25/22    Duplex Venous Lower Extremity - Bilateral CAR    Interpretation Summary  · Normal bilateral lower extremity venous duplex scan.      Results for orders placed during the hospital encounter of 02/25/22    Adult Transesophageal Echo (ALEYDA) W/ Cont if Necessary Per Protocol (Cardiology Department)    Interpretation Summary  Date of study  2/28/2022.    Indications  Recent TIA.    Procedure performed  Transesophageal echocardiogram and Doppler study.    Procedure  Anesthesia  was provided by anesthesiologist with intravenous Diprivan.  ALEYDA probe could be passed without difficulty.  Patient tolerated the procedure well.  No complications were noted.    Results  Technically satisfactory study.  Mitral valve is structurally normal.  Mild mitral regurgitation is present.  Aortic valve is tricuspid.  Thickened with adequate opening motion.  Mild to moderate aortic regurgitation is present.  Tricuspid valve is normal.  Mild tricuspid regurgitation is present.  Calculated pulmonary artery pressure is 28 mmHg.  Mild biatrial enlargement is present.  Left atrial appendage is very small.  Left ventricle is normal in size and contractility with ejection fraction of 60%.  Atrial septum is intact.  No evidence for intracardiac thrombus or smoking effect is present.  No pericardial effusion is present.  Aorta is normal.    Impression  Mild mitral regurgitation and mild to moderate aortic regurgitation.  Mild tricuspid regurgitation  Calculated pulmonary artery pressure is 28 mmHg.  Mild biatrial enlargement.  Left atrial appendage is small.  Left ventricle is normal in size and contractility with ejection fraction of 60%.      Labs Pending at Discharge:  Pending Labs     Order Current Status    Intrinsic Factor Ab In process          Procedures Performed           Consults:   Consults     Date and Time Order Name Status Description    2/26/2022  2:03 PM Hematology & Oncology Inpatient Consult Completed     2/26/2022  2:03 PM Inpatient Neurology Consult Other (see comments) Completed     2/26/2022  9:49 AM Inpatient Cardiology Consult      2/4/2022  8:47 AM Inpatient Cardiology Consult Completed     2/3/2022  7:29 PM Inpatient Neurology Consult Stroke Completed             Discharge Details        Discharge Medications      New Medications      Instructions Start Date   cyanocobalamin 1000 MCG tablet  Commonly known as: VITAMIN B-12   1,000 mcg, Oral, Daily   Start Date: March 2, 2022     folic acid 1  MG tablet  Commonly known as: FOLVITE   1 mg, Oral, Daily   Start Date: March 2, 2022        Continue These Medications      Instructions Start Date   amLODIPine 5 MG tablet  Commonly known as: NORVASC   5 mg, Oral, Every Morning      aspirin 325 MG tablet   325 mg, Oral, Daily      atorvastatin 10 MG tablet  Commonly known as: LIPITOR   10 mg, Oral, Daily      furosemide 20 MG tablet  Commonly known as: LASIX   20 mg, Oral, 2 Times Daily      gabapentin 300 MG capsule  Commonly known as: NEURONTIN   300 mg, Oral, 2 times daily      HYDROcodone-acetaminophen 7.5-325 MG per tablet  Commonly known as: NORCO   1 tablet, Oral, Every 8 Hours PRN      insulin NPH-insulin regular (70-30) 100 UNIT/ML injection  Commonly known as: humuLIN 70/30,novoLIN 70/30   30 Units, Subcutaneous, 2 Times Daily With Meals      levETIRAcetam 500 MG tablet  Commonly known as: KEPPRA   500 mg, Oral, Every 12 Hours Scheduled      losartan 50 MG tablet  Commonly known as: COZAAR   TAKE ONE TABLET BY MOUTH DAILY      omeprazole 40 MG capsule  Commonly known as: priLOSEC   40 mg, Oral, Every Morning      polyethylene glycol 17 g packet  Commonly known as: MIRALAX   17 g, Oral, Daily PRN      sennosides-docusate 8.6-50 MG per tablet  Commonly known as: PERICOLACE   2 tablets, Oral, 2 Times Daily      spironolactone 25 MG tablet  Commonly known as: ALDACTONE   1 tablet, Oral, Every Morning      topiramate 25 MG tablet  Commonly known as: TOPAMAX   12.5 mg, Oral, 2 Times Daily             Allergies   Allergen Reactions   • Latex, Natural Rubber Rash   • Adhesive Tape Rash         Discharge Disposition: Good  Home or Self Care    Diet:  Hospital:  Diet Order   Procedures   • Diet Diabetic/Consistent Carbs; Diabetic - Consistent Carb         Discharge Activity:   Activity Instructions     Activity as Tolerated              CODE STATUS:  Code Status and Medical Interventions:   Ordered at: 02/26/22 0259     Code Status (Patient has no pulse and is not  breathing):    CPR (Attempt to Resuscitate)     Medical Interventions (Patient has pulse or is breathing):    Full Support         Future Appointments   Date Time Provider Department Center   3/11/2022 11:30 AM Seipel, Joseph F, MD [facility]   3/24/2022  3:00 PM EDER MENDES, MARYELLEN CAMARA [place] [facility]   3/24/2022  3:30 PM Wang Plaza MD [facility]   4/11/2022  2:00 PM Gabriel Goss MD [facility]   5/5/2022  2:45 PM Chevy Pineda MD [facility] None       Additional Instructions for the Follow-ups that You Need to Schedule     Discharge Follow-up with PCP   As directed       Currently Documented PCP:    Gabriel Goss MD    PCP Phone Number:    [phone]     Follow Up Details: Please follow-up with primary care within a week         Discharge Follow-up with Specified Provider: Please follow-up with cardiology in 2 to 4 weeks   As directed      To: Please follow-up with cardiology in 2 to 4 weeks         Discharge Follow-up with Specified Provider: Please follow-up with your hematologist in 1 to 2 weeks   As directed      To: Please follow-up with your hematologist in 1 to 2 weeks         Discharge Follow-up with Specified Provider: Please follow-up with your neurologist in 4 to 6 weeks   As directed      To: Please follow-up with your neurologist in 4 to 6 weeks               Time spent on Discharge including face to face service:  35 minutes    This patient has been examined wearing appropriate Personal Protective Equipment and discussed with hospital infection control department. 03/01/22      Signature: Electronically signed by Farhad Peoples MD, 03/01/22, 11:02 AM EST.

## 2022-03-01 NOTE — PLAN OF CARE
Patient's vitals stable.  She is a high falls risk, bed alarm set.  She remains on the turn team.  No complaints per patient.  All needs met.   Problem: Fall Injury Risk  Goal: Absence of Fall and Fall-Related Injury  Intervention: Identify and Manage Contributors to Fall Injury Risk  Recent Flowsheet Documentation  Taken 2/28/2022 2000 by Gunjan Love RN  Medication Review/Management: medications reviewed  Intervention: Promote Injury-Free Environment  Recent Flowsheet Documentation  Taken 2/28/2022 2312 by Gunjan Love RN  Safety Promotion/Fall Prevention:   room organization consistent   safety round/check completed   nonskid shoes/slippers when out of bed   activity supervised   assistive device/personal items within reach   clutter free environment maintained   elopement precautions   fall prevention program maintained  Taken 2/28/2022 2200 by Gunjan Love RN  Safety Promotion/Fall Prevention:   safety round/check completed   room organization consistent   nonskid shoes/slippers when out of bed   activity supervised   assistive device/personal items within reach   clutter free environment maintained   elopement precautions   fall prevention program maintained  Taken 2/28/2022 2000 by Gunjan Love RN  Safety Promotion/Fall Prevention:   safety round/check completed   room organization consistent   nonskid shoes/slippers when out of bed   activity supervised   assistive device/personal items within reach   clutter free environment maintained   elopement precautions   fall prevention program maintained     Problem: Adult Inpatient Plan of Care  Goal: Absence of Hospital-Acquired Illness or Injury  Intervention: Identify and Manage Fall Risk  Recent Flowsheet Documentation  Taken 2/28/2022 2312 by Gunjan Love RN  Safety Promotion/Fall Prevention:   room organization consistent   safety round/check completed   nonskid shoes/slippers when out of bed   activity supervised    assistive device/personal items within reach   clutter free environment maintained   elopement precautions   fall prevention program maintained  Taken 2/28/2022 2200 by Gunjan Love RN  Safety Promotion/Fall Prevention:   safety round/check completed   room organization consistent   nonskid shoes/slippers when out of bed   activity supervised   assistive device/personal items within reach   clutter free environment maintained   elopement precautions   fall prevention program maintained  Taken 2/28/2022 2000 by Gunjan Love RN  Safety Promotion/Fall Prevention:   safety round/check completed   room organization consistent   nonskid shoes/slippers when out of bed   activity supervised   assistive device/personal items within reach   clutter free environment maintained   elopement precautions   fall prevention program maintained  Intervention: Prevent Skin Injury  Recent Flowsheet Documentation  Taken 2/28/2022 2312 by Gunjan Love RN  Skin Protection:   adhesive use limited   incontinence pads utilized  Taken 2/28/2022 2130 by Gunjan Love RN  Body Position:   turned   side-lying, right  Taken 2/28/2022 2000 by Gunjan Love RN  Skin Protection:   adhesive use limited   incontinence pads utilized  Goal: Optimal Comfort and Wellbeing  Intervention: Provide Person-Centered Care  Recent Flowsheet Documentation  Taken 2/28/2022 2312 by Gunjan Love RN  Trust Relationship/Rapport:   care explained   questions answered   questions encouraged   thoughts/feelings acknowledged  Taken 2/28/2022 2000 by Gunjan Love RN  Trust Relationship/Rapport:   care explained   questions answered   questions encouraged   thoughts/feelings acknowledged     Problem: Hypertension Comorbidity  Goal: Blood Pressure in Desired Range  Intervention: Maintain Hypertension-Management Strategies  Recent Flowsheet Documentation  Taken 2/28/2022 2000 by Gunjan Love RN  Medication Review/Management:  medications reviewed     Problem: Skin Injury Risk Increased  Goal: Skin Health and Integrity  Intervention: Optimize Skin Protection  Recent Flowsheet Documentation  Taken 2/28/2022 2312 by Gunjan Love RN  Pressure Reduction Techniques:   frequent weight shift encouraged   weight shift assistance provided  Head of Bed ([facility] [place] [facility] [place] elevated  Pressure Reduction Devices: pressure-redistributing mattress utilized  Skin Protection:   adhesive use limited   incontinence pads utilized  Taken 2/28/2022 2130 by Gunjan Love RN  Head of Bed (HOB): [facility] [place] lowered  Taken 2/28/2022 2000 by Gunjan Love RN  Pressure Reduction Techniques:   frequent weight shift encouraged   weight shift assistance provided  Head of Bed ([facility] [place]): [facility] [place] elevated  Pressure Reduction Devices: pressure-redistributing mattress utilized  Skin Protection:   adhesive use limited   incontinence pads utilized   Goal Outcome Evaluation:

## 2022-03-01 NOTE — PLAN OF CARE
Problem: Fall Injury Risk  Goal: Absence of Fall and Fall-Related Injury  3/1/2022 1101 by Lyn Gotti RN  Outcome: Ongoing, Progressing  3/1/2022 1101 by Lyn Gotti RN  Outcome: Ongoing, Progressing  Intervention: Identify and Manage Contributors to Fall Injury Risk  Recent Flowsheet Documentation  Taken 3/1/2022 0830 by Lyn Gotti RN  Medication Review/Management: medications reviewed  Intervention: Promote Injury-Free Environment  Recent Flowsheet Documentation  Taken 3/1/2022 1000 by Lyn Gotti RN  Safety Promotion/Fall Prevention: safety round/check completed  Taken 3/1/2022 0830 by Lyn Gotti RN  Safety Promotion/Fall Prevention:   assistive device/personal items within reach   clutter free environment maintained   nonskid shoes/slippers when out of bed   room organization consistent   safety round/check completed   fall prevention program maintained  Taken 3/1/2022 0800 by Lyn Gotti RN  Safety Promotion/Fall Prevention: safety round/check completed  Goal: Absence of Fall and Fall-Related Injury  3/1/2022 1101 by Lyn Gotti RN  Outcome: Ongoing, Progressing  3/1/2022 1101 by Lyn Gotti RN  Outcome: Ongoing, Progressing  Intervention: Identify and Manage Contributors to Fall Injury Risk  Recent Flowsheet Documentation  Taken 3/1/2022 0830 by Lyn Gotti RN  Medication Review/Management: medications reviewed  Intervention: Promote Injury-Free Environment  Recent Flowsheet Documentation  Taken 3/1/2022 1000 by Lyn Gotti RN  Safety Promotion/Fall Prevention: safety round/check completed  Taken 3/1/2022 0830 by Lyn Gotti RN  Safety Promotion/Fall Prevention:   assistive device/personal items within reach   clutter free environment maintained   nonskid shoes/slippers when out of bed   room organization consistent   safety round/check completed   fall prevention program maintained  Taken 3/1/2022 0800 by Lyn Gotti  RN  Safety Promotion/Fall Prevention: safety round/check completed     Problem: Adult Inpatient Plan of Care  Goal: Plan of Care Review  3/1/2022 1101 by Lyn Gotti RN  Outcome: Ongoing, Progressing  3/1/2022 1101 by Lyn Gotti RN  Outcome: Ongoing, Progressing  Goal: Patient-Specific Goal (Individualized)  3/1/2022 1101 by Lyn Gotti RN  Outcome: Ongoing, Progressing  3/1/2022 1101 by Lyn Gotti RN  Outcome: Ongoing, Progressing  Goal: Absence of Hospital-Acquired Illness or Injury  3/1/2022 1101 by Lyn Gotti RN  Outcome: Ongoing, Progressing  3/1/2022 1101 by Lyn Gotti RN  Outcome: Ongoing, Progressing  Intervention: Identify and Manage Fall Risk  Recent Flowsheet Documentation  Taken 3/1/2022 1000 by Lyn Gotti RN  Safety Promotion/Fall Prevention: safety round/check completed  Taken 3/1/2022 0830 by Lyn Gotti RN  Safety Promotion/Fall Prevention:   assistive device/personal items within reach   clutter free environment maintained   nonskid shoes/slippers when out of bed   room organization consistent   safety round/check completed   fall prevention program maintained  Taken 3/1/2022 0800 by Lyn Gotti RN  Safety Promotion/Fall Prevention: safety round/check completed  Intervention: Prevent Skin Injury  Recent Flowsheet Documentation  Taken 3/1/2022 0830 by Lyn Gotti RN  Body Position: position maintained  Skin Protection:   adhesive use limited   tubing/devices free from skin contact  Intervention: Prevent Infection  Recent Flowsheet Documentation  Taken 3/1/2022 0830 by Lyn Gotti RN  Infection Prevention: hand hygiene promoted  Goal: Optimal Comfort and Wellbeing  3/1/2022 1101 by Lyn Gotti RN  Outcome: Ongoing, Progressing  3/1/2022 1101 by Lyn Gotti RN  Outcome: Ongoing, Progressing  Goal: Readiness for Transition of Care  3/1/2022 1101 by Lyn Gotti RN  Outcome: Ongoing,  Progressing  3/1/2022 1101 by Lyn Gotti RN  Outcome: Ongoing, Progressing     Problem: Diabetes Comorbidity  Goal: Blood Glucose Level Within Desired Range  3/1/2022 1101 by Lyn Gotti RN  Outcome: Ongoing, Progressing  3/1/2022 1101 by Lyn Gotti RN  Outcome: Ongoing, Progressing     Problem: Hypertension Comorbidity  Goal: Blood Pressure in Desired Range  3/1/2022 1101 by Lyn Gotti RN  Outcome: Ongoing, Progressing  3/1/2022 1101 by Lyn Gotti RN  Outcome: Ongoing, Progressing  Intervention: Maintain Hypertension-Management Strategies  Recent Flowsheet Documentation  Taken 3/1/2022 0830 by Lyn Gotti RN  Medication Review/Management: medications reviewed     Problem: Skin Injury Risk Increased  Goal: Skin Health and Integrity  3/1/2022 1101 by Lyn Gotti RN  Outcome: Ongoing, Progressing  3/1/2022 1101 by Lyn Gotti RN  Outcome: Ongoing, Progressing  Intervention: Optimize Skin Protection  Recent Flowsheet Documentation  Taken 3/1/2022 0830 by Lyn Gotti RN  Pressure Reduction Techniques:   frequent weight shift encouraged   weight shift assistance provided  Head of Bed (HOB): HOB at 30-45 degrees  Pressure Reduction Devices: pressure-redistributing mattress utilized  Skin Protection:   adhesive use limited   tubing/devices free from skin contact   Goal Outcome Evaluation:      Patient did not have any new tests or procedures today. The patient is on room air and did not have any complaints. The patient is discharging home with family and is refusing inpatient rehab. Will continue to monitor.

## 2022-03-01 NOTE — TELEPHONE ENCOUNTER
Caller:  SARAH    Relationship to patient: NURSE    Best call back number: [phone]    New or established patient?  [x] New  [] Established    Date of discharge: 3-1    Facility discharged from:     Diagnosis/Symptoms: HAD STROKE, MONITORING BLOOD    Length of stay (If applicable): 3 DAYS     Specialty Only: Did you see a [facility] provider?    [x] Yes  [] No  If so, who? DR WINTER      CALL PATIENT WITH APPT

## 2022-03-01 NOTE — CASE COMMUNICATION
Admitted to the hospital @ [facility] on 2/28/22 for syncopy    Transfer [facility] completed      Transfer Summary Sent    Orders Discontinued    POC resolved

## 2022-03-01 NOTE — PROGRESS NOTES
Referring Provider: Farhad Peoples,*    Reason for follow-up:  Status post pacemaker  Possible seizure versus syncope.     Patient Care Team:  Gabriel Goss MD as PCP - General (Family Medicine)  Gregory iPnedo MD (Family Medicine)  Wang Plaza MD as Consulting Physician (Cardiology)    Subjective .  Feeling okay     ROS    The patient has been without any chest discomfort ,shortness of breath, palpitations, dizziness or syncope.  Denies having any headache ,abdominal pain ,nausea, vomiting , diarrhea constipation, loss of weight or loss of appetite.  Denies having any excessive bruising ,hematuria or blood in the stool.    Review of all systems negative except as indicated    History  Past Medical History:   Diagnosis Date   • Atherosclerosis of native coronary artery of native heart without angina pectoris 10/22/2020   • Atrioventricular block, Mobitz type 1, Mitch 10/21/2020    Added automatically from request for surgery [id number]   • Hypertension associated with stage 3 chronic kidney disease due to type 2 diabetes mellitus ([facility]) 10/22/2020   • Morbid obesity ([facility]) 10/22/2020   • Secondary hyperaldosteronism ([facility]) 10/22/2020   • Stage 3b chronic kidney disease ([facility]) 10/22/2020   • Type 2 diabetes mellitus with diabetic neuropathy, with long-term current use of insulin ([facility]) 10/22/2020   • Type 2 diabetes mellitus with diabetic neuropathy, with long-term current use of insulin ([facility]) 10/22/2020       Past Surgical History:   Procedure Laterality Date   • CARDIAC CATHETERIZATION N/A 10/23/2020    Procedure: Left Heart Cath and coronary angiogram;  Surgeon: Wang Plaza MD;  Location: [facility] CATH INVASIVE LOCATION;  Service: Cardiovascular;  Laterality: N/A;   • CARDIAC ELECTROPHYSIOLOGY PROCEDURE Left 11/3/2021    Procedure: Pacemaker DC new;  Surgeon: Wang Plaza MD;  Location: [facility] CATH INVASIVE LOCATION;  Service: Cardiovascular;  Laterality: Left;   • CARDIAC  ELECTROPHYSIOLOGY PROCEDURE N/A 11/3/2021    Procedure: LOOP RECORDER REMOVAL;  Surgeon: Wang Plaza MD;  Location: [facility] INVASIVE LOCATION;  Service: Cardiovascular;  Laterality: N/A;   • HYSTERECTOMY     • JOINT REPLACEMENT Right     Hip   • JOINT REPLACEMENT Left     hip   • JOINT REPLACEMENT Left     hip (for second time)   • JOINT REPLACEMENT Right     knee   • OOPHORECTOMY         Family History   Problem Relation Age of Onset   • Heart disease Mother        Social History     Tobacco Use   • Smoking status: Never Smoker   • Smokeless tobacco: Never Used   Vaping Use   • Vaping Use: Never used   Substance Use Topics   • Alcohol use: Not Currently   • Drug use: Never        Medications Prior to Admission   Medication Sig Dispense Refill Last Dose   • amLODIPine (NORVASC) 5 MG tablet Take 5 mg by mouth Every Morning.   2/25/2022 at Unknown time   • aspirin 325 MG tablet Take 1 tablet by mouth Daily. 30 tablet 0 2/25/2022 at Unknown time   • atorvastatin (LIPITOR) 10 MG tablet Take 1 tablet by mouth Daily. 90 tablet 1 2/25/2022 at Unknown time   • furosemide (LASIX) 20 MG tablet Take 1 tablet by mouth 2 (Two) Times a Day. 180 tablet 1 2/25/2022 at Unknown time   • gabapentin (NEURONTIN) 300 MG capsule Take 300 mg by mouth 2 (two) times a day.   2/25/2022 at Unknown time   • HYDROcodone-acetaminophen (NORCO) 7.5-325 MG per tablet Take 1 tablet by mouth Every 8 (Eight) Hours As Needed for Moderate Pain  or Severe Pain . 20 tablet 0 2/25/2022 at Unknown time   • insulin NPH-insulin regular (humuLIN 70/30,novoLIN 70/30) (70-30) 100 UNIT/ML injection Inject 30 Units under the skin into the appropriate area as directed 2 (Two) Times a Day With Meals.   2/25/2022 at Unknown time   • levETIRAcetam (KEPPRA) 500 MG tablet Take 1 tablet by mouth Every 12 (Twelve) Hours. 60 tablet 0 2/25/2022 at Unknown time   • losartan (COZAAR) 50 MG tablet TAKE ONE TABLET BY MOUTH DAILY 90 tablet 3 2/25/2022 at Unknown time    • omeprazole (priLOSEC) 40 MG capsule Take 40 mg by mouth Every Morning.   2/25/2022 at Unknown time   • polyethylene glycol (MIRALAX) 17 g packet Take 17 g by mouth Daily As Needed (Use if senna-docusate is ineffective). 30 each 0 2/25/2022 at Unknown time   • sennosides-docusate (PERICOLACE) 8.6-50 MG per tablet Take 2 tablets by mouth 2 (Two) Times a Day. 30 tablet 0 2/25/2022 at Unknown time   • spironolactone (ALDACTONE) 25 MG tablet Take 1 tablet by mouth Every Morning.   2/25/2022 at Unknown time   • topiramate (TOPAMAX) 25 MG tablet Take 12.5 mg by mouth 2 (Two) Times a Day.   2/25/2022 at Unknown time       Allergies  Latex, natural rubber and Adhesive tape    Scheduled Meds:amLODIPine, 5 mg, Oral, Daily  aspirin, 325 mg, Oral, Daily  atorvastatin, 10 mg, Oral, Nightly  folic acid, 1 mg, Oral, Daily  gabapentin, 300 mg, Oral, Q12H  insulin lispro, 0-9 Units, Subcutaneous, TID AC  levETIRAcetam, 500 mg, Oral, Q12H  losartan, 50 mg, Oral, Daily  pantoprazole, 40 mg, Oral, QAM  sennosides-docusate, 2 tablet, Oral, BID  sodium chloride, 10 mL, Intravenous, Q12H  spironolactone, 25 mg, Oral, Daily  topiramate, 12.5 mg, Oral, BID  vitamin B-12, 1,000 mcg, Oral, Daily      Continuous Infusions:   PRN Meds:.•  acetaminophen **OR** acetaminophen **OR** acetaminophen  •  aluminum-magnesium hydroxide-simethicone  •  dextrose  •  dextrose  •  glucagon (human recombinant)  •  insulin lispro **AND** insulin lispro  •  melatonin  •  nitroglycerin  •  ondansetron **OR** ondansetron  •  polyethylene glycol  •  sodium chloride    Objective     VITAL SIGNS  Vitals:    02/28/22 2319 03/01/22 0238 03/01/22 0355 03/01/22 0556   BP: 104/48 136/45  123/75   BP Location: Left arm Left arm  Left arm   Patient Position: Lying Lying  Lying   Pulse: 83 80 77    Resp: 22 11  14   Temp: 98.5 °F (36.9 °C) 98.6 °F (37 °C)  99.4 °F (37.4 °C)   TempSrc: Oral Oral  Oral   SpO2: 96%  97%    Weight:    102 kg (225 lb 1.4 oz)   Height:      "      Flowsheet Rows      First Filed Value   Admission Height 165.1 cm (65\") Documented at 02/25/2022 1925   Admission Weight 113 kg (250 lb) Documented at 02/25/2022 1925            Intake/Output Summary (Last 24 hours) at 3/1/2022 0631  Last data filed at 3/1/2022 0556  Gross per 24 hour   Intake 340 ml   Output 250 ml   Net 90 ml        TELEMETRY: Pacemaker rhythm.    Physical Exam:  The patient is alert, oriented and in no distress.  Vital signs as noted above.  Exogenous obesity.  Head and neck revealed no carotid bruits or jugular venous distention.  No thyromegaly or lymphadenopathy is present  Lungs clear.  No wheezing.  Breath sounds are normal bilaterally.  Heart normal first and second heart sounds.  No murmur. No precordial rub is present.  No gallop is present.  Abdomen soft and nontender.  No organomegaly is present.  Extremities with good peripheral pulses without any pedal edema.  Skin warm and dry.  Pacemaker site looks normal.  Musculoskeletal system is grossly normal  CNS grossly normal      Results Review:   I reviewed the patient's new clinical results.  Lab Results (last 24 hours)     Procedure Component Value Units Date/Time    POC Glucose Once [[id number]]  (Abnormal) Collected: 02/28/22 2015    Specimen: Blood Updated: 02/28/22 2017     Glucose 147 mg/dL      Comment: Serial Number: [id number]:  [id number]       POC Glucose Once [[id number]]  (Abnormal) Collected: 02/28/22 1558    Specimen: Blood Updated: 02/28/22 1607     Glucose 125 mg/dL      Comment: Serial Number: [id number]:  [id number]       Haptoglobin [[id number]]  (Normal) Collected: 02/27/22 1711    Specimen: Blood Updated: 02/28/22 1350     Haptoglobin 79 mg/dL      Comment: Specimen hemolyzed. Results may be affected.       POC Glucose Once [[id number]]  (Abnormal) Collected: 02/28/22 1050    Specimen: Blood Updated: 02/28/22 1051     Glucose 152 mg/dL      Comment: Serial Number: [id number]:  [id number]       " Hemoglobin A1c [[id number]]  (Abnormal) Collected: 02/26/22 1044    Specimen: Blood Updated: 02/28/22 1024     Hemoglobin A1C 7.0 %     Narrative:      Hemoglobin A1C Reference Range:    <5.7 %        Normal  5.7-6.4 %     Increased risk for diabetes  > 6.4 %        Diabetes       These guidelines have been recommended by the American Diabetic Association for Hgb A1c.      The following 2010 guidelines have been recommended by the American Diabetes Association for Hemoglobin A1c.    HBA1c 5.7-6.4% Increased risk for future diabetes (pre-diabetes)  HBA1c     >6.4% Diabetes      CBC & Differential [[id number]]  (Abnormal) Collected: 02/28/22 0950    Specimen: Blood Updated: 02/28/22 1010    Narrative:      The following orders were created for panel order CBC & Differential.  Procedure                               Abnormality         Status                     ---------                               -----------         ------                     CBC Auto Differential[[id number]]        Abnormal            Final result                 Please view results for these tests on the individual orders.    CBC Auto Differential [[id number]]  (Abnormal) Collected: 02/28/22 0950    Specimen: Blood Updated: 02/28/22 1010     WBC 3.90 10*3/mm3      RBC 2.94 10*6/mm3      Hemoglobin 7.9 g/dL      Hematocrit 25.5 %      MCV 86.7 fL      MCH 27.0 pg      MCHC 31.1 g/dL      RDW 19.1 %      RDW-SD 58.6 fl      MPV 6.5 fL      Platelets 158 10*3/mm3      Neutrophil % 60.8 %      Lymphocyte % 24.5 %      Monocyte % 7.5 %      Eosinophil % 6.4 %      Basophil % 0.8 %      Neutrophils, Absolute 2.40 10*3/mm3      Lymphocytes, Absolute 1.00 10*3/mm3      Monocytes, Absolute 0.30 10*3/mm3      Eosinophils, Absolute 0.30 10*3/mm3      Basophils, Absolute 0.00 10*3/mm3      nRBC 0.1 /100 WBC           Imaging Results (Last 24 Hours)     ** No results found for the last 24 hours. **      LAB RESULTS (LAST 7 DAYS)    CBC  Results from last 7 days    Lab Units 02/28/22  0950 02/27/22  0535 02/26/22  0539 02/25/22 1957   WBC 10*3/mm3 3.90 4.10 3.20* 3.40   RBC 10*6/mm3 2.94* 2.90* 2.89* 3.04*   HEMOGLOBIN g/dL 7.9* 7.8* 8.1* 8.1*   HEMATOCRIT % 25.5* 25.0* 24.8* 26.1*   MCV fL 86.7 86.2 85.7 86.0   PLATELETS 10*3/mm3 158 161 183 201       BMP  Results from last 7 days   Lab Units 02/28/22 0230 02/27/22  0535 02/26/22  0539 02/25/22 1957   SODIUM mmol/L 138 139 139 139   POTASSIUM mmol/L 4.6 4.4 4.9 4.5   CHLORIDE mmol/L 108* 107 106 106   CO2 mmol/L 20.0* 22.0 21.0* 21.0*   BUN mg/dL 17 15 23 25*   CREATININE mg/dL 1.04* 0.98 1.15* 1.19*   GLUCOSE mg/dL 131* 145* 171* 162*   MAGNESIUM mg/dL  --   --  2.3  --        CMP   Results from last 7 days   Lab Units 02/28/22 0230 02/27/22  0535 02/26/22  0539 02/25/22 1957   SODIUM mmol/L 138 139 139 139   POTASSIUM mmol/L 4.6 4.4 4.9 4.5   CHLORIDE mmol/L 108* 107 106 106   CO2 mmol/L 20.0* 22.0 21.0* 21.0*   BUN mg/dL 17 15 23 25*   CREATININE mg/dL 1.04* 0.98 1.15* 1.19*   GLUCOSE mg/dL 131* 145* 171* 162*   ALBUMIN g/dL 3.30* 3.30*  --  3.60   BILIRUBIN mg/dL 0.6 0.8  --  0.4   ALK PHOS U/L 82 79  --  76   AST (SGOT) U/L 30 29  --  42*   ALT (SGPT) U/L 14 17  --  21   AMMONIA umol/L  --   --   --  57*         BNP        TROPONIN  Results from last 7 days   Lab Units 02/25/22  1957   CK TOTAL U/L 99   TROPONIN T ng/mL 0.023       CoAg        Creatinine Clearance  Estimated Creatinine Clearance: 47.7 mL/min (A) (by C-G formula based on SCr of 1.04 mg/dL (H)).    ABG        Radiology  No radiology results for the last day            EKG                  I personally viewed and interpreted the patient's EKG/Telemetry data: Sinus rhythm    ECHOCARDIOGRAM:    Results for orders placed during the hospital encounter of 02/25/22    Adult Transesophageal Echo (ALEYDA) W/ Cont if Necessary Per Protocol (Cardiology Department)    Interpretation Summary  Date of study  2/28/2022.    Indications  Recent TIA.    Procedure  performed  Transesophageal echocardiogram and Doppler study.    Procedure  Anesthesia was provided by anesthesiologist with intravenous Diprivan.  ALEYDA probe could be passed without difficulty.  Patient tolerated the procedure well.  No complications were noted.    Results  Technically satisfactory study.  Mitral valve is structurally normal.  Mild mitral regurgitation is present.  Aortic valve is tricuspid.  Thickened with adequate opening motion.  Mild to moderate aortic regurgitation is present.  Tricuspid valve is normal.  Mild tricuspid regurgitation is present.  Calculated pulmonary artery pressure is 28 mmHg.  Mild biatrial enlargement is present.  Left atrial appendage is very small.  Left ventricle is normal in size and contractility with ejection fraction of 60%.  Atrial septum is intact.  No evidence for intracardiac thrombus or smoking effect is present.  No pericardial effusion is present.  Aorta is normal.    Impression  Mild mitral regurgitation and mild to moderate aortic regurgitation.  Mild tricuspid regurgitation  Calculated pulmonary artery pressure is 28 mmHg.  Mild biatrial enlargement.  Left atrial appendage is small.  Left ventricle is normal in size and contractility with ejection fraction of 60%.          STRESS MYOVIEW:    Cardiolite (Tc-99m Sestamibi) stress test    CARDIAC CATHETERIZATION:            OTHER:         Assessment/Plan     Principal Problem:    Syncope and collapse  Active Problems:    Fall    Secondary hyperaldosteronism (HCC)    Stage 3b chronic kidney disease (CMS/HCC)    Hypertension associated with stage 3 chronic kidney disease due to type 2 diabetes mellitus (HCC)    Type 2 diabetes mellitus with diabetic neuropathy, with long-term current use of insulin (HCC)    Morbid obesity (HCC)    Atherosclerosis of native coronary artery of native heart without angina pectoris    Hyperlipidemia    Primary hypertension    GERD (gastroesophageal reflux disease)    Cardiac pacemaker  in situ    Hyperammonemia (HCC)      ]]]]]]]]]]]]]]]]]]]]  Impression  ========  -Syncope versus seizure.  Likely seizures since patient has not taken seizure medications for couple days prior to admission.    -Recent difficulty with speaking.  TIA/stroke  Status post TPA with complete resolution of symptoms 2/3/2022     -Status post permanent dual-chamber pacemaker implantation ([employer] MRI compatible) 11/3/2021     -Status post removal of loop recorder ([employer]tronic) 11/3/2021     -History of syncope  Intermittent Mobitz type II AV block  Patient is not on any medications to cause bradycardia     -Second-degree Mobitz type I AV block.  Narrow QRS complex.    Transesophageal echocardiogram 2/28/2022 revealed  Mild mitral regurgitation and mild to moderate aortic regurgitation.  Mild tricuspid regurgitation  Calculated pulmonary artery pressure is 28 mmHg.  Mild biatrial enlargement.  Left atrial appendage is small.  Left ventricle is normal in size and contractility with ejection fraction of 60%.     Echocardiogram-normal 10/22/2020     Cardiac catheterization 10/23/2020 revealed:  Left ventricular size and contractility is normal with ejection fraction of 60%.  Normal epicardial coronary arteries.     Status post loop recorder placement 10/23/2020 (Medtronic Linq)     -History of paroxysmal atrial fibrillation     -Hypertension dyslipidemia diabetes    -History of seizure disorder     -Renal dysfunction CKD 3  BUN 27 creatinine 1.35     -Exogenous obesity.     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     -No known allergies  ============  Plan  =========  Syncope versus seizure.  Likely seizure due to the fact the patient has not taken seizure medication for couple days.  Neurology feels patient had a seizure disorder.    Difficulty with speaking  TIA/stroke  Status post TPA with complete resolution of symptoms 2/3/2022.     Status post permanent dual-chamber  pacemaker implantation ([employer] MRI compatible) level 3/20/2021.  Status post loop recorder removal 11/3/2021 (Medtronic Linq)  Pacemaker site and function appears to be normal.  Interrogation of the pacemaker revealed excellent pacing parameters.  (2/4/2022)     Premature ventricular contractions-asymptomatic.     Paroxysmal atrial fibrillation.  Interrogation of the pacemaker revealed AF burden less than 1%.  With recent stroke history and history of paroxysmal atrial fibrillation patient would benefit from anticoagulation.  Patient just received TPA.  However concerned about patient having falling spells.  Consideration will be given for watchman procedure.  CHADS2 score is 7.  However left atrial appendage is very small in size.    Hypertension-well-controlled     Renal dysfunction  BUN 20 creatinine 1.41-4/7/2021.  29/1.45-11/1/2020  18/0.81-2/4/2022  17/1.04-2/28/2022     Anemia  Hemoglobin 8.6-12/4/2022  7.7-2/27/2022    Anticoagulation status was extensively discussed.  We will hold off on anticoagulation for multiple reasons including  Fall risk  Left atrial appendage is very small and no clots or smoking effect was seen on ALEYDA.  (Watchman is not an option due to left atrial appendage being very small.)  Patient has problems with significant anemia.  Family favors no anticoagulation.     Further plan will depend on patient's progress.  ]]]]]]]]]]]]]]               Wang Plaza MD  03/01/22  06:31 EST

## 2022-03-02 ENCOUNTER — TRANSITIONAL CARE MANAGEMENT TELEPHONE ENCOUNTER (OUTPATIENT)
Dept: CALL CENTER | Facility: HOSPITAL | Age: 87
End: 2022-03-02

## 2022-03-02 ENCOUNTER — TELEPHONE (OUTPATIENT)
Dept: ONCOLOGY | Facility: CLINIC | Age: 87
End: 2022-03-02

## 2022-03-02 ENCOUNTER — OFFICE VISIT (OUTPATIENT)
Dept: FAMILY MEDICINE CLINIC | Facility: CLINIC | Age: 87
End: 2022-03-02

## 2022-03-02 VITALS
HEIGHT: 65 IN | SYSTOLIC BLOOD PRESSURE: 135 MMHG | OXYGEN SATURATION: 97 % | BODY MASS INDEX: 37.46 KG/M2 | HEART RATE: 92 BPM | DIASTOLIC BLOOD PRESSURE: 73 MMHG

## 2022-03-02 DIAGNOSIS — T82.7XXA: Primary | ICD-10-CM

## 2022-03-02 LAB — GLUCOSE BLDC GLUCOMTR-MCNC: 150 MG/DL (ref 70–105)

## 2022-03-02 PROCEDURE — 99213 OFFICE O/P EST LOW 20 MIN: CPT | Performed by: NURSE PRACTITIONER

## 2022-03-02 RX ORDER — CEPHALEXIN 500 MG/1
500 CAPSULE ORAL 2 TIMES DAILY
Qty: 14 CAPSULE | Refills: 0 | Status: SHIPPED | OUTPATIENT
Start: 2022-03-02 | End: 2022-03-09

## 2022-03-02 NOTE — OUTREACH NOTE
Call Center TCM Note      Responses   [facility] patient discharged from? [facility]   Does the patient have one of the following disease processes/diagnoses(primary or secondary)? Other   TCM attempt successful? No   Unsuccessful attempts Attempt 1          Nika Douglass MA    3/2/2022, 13:40 EST

## 2022-03-02 NOTE — OUTREACH NOTE
Call Center TCM Note      Responses   [facility] patient discharged from? [facility]   Does the patient have one of the following disease processes/diagnoses(primary or secondary)? Other   TCM attempt successful? Yes   Discharge diagnosis Syncope   TCM call completed? Yes   Wrap up additional comments Pt d/c from [facility] on 03/01/2022 today completed FACE TO FACE TCM [facility] with PCP           Nika Douglass MA    3/2/2022, 16:29 EST      
30-Apr-2019 08:05

## 2022-03-02 NOTE — PROGRESS NOTES
Subjective   Aracelis Vargas is a 86 y.o. female.       HPI   Pt here today with concern of a rash on her left arm.    Recently had IV at hospital and they used tape; she is allergic to tape.  IV was removed yesterday.  She is also noting some yellowish drainage from the site with some redness around it.  She has not run any fevers.  She denies any pain in her arm.      The following portions of the patient's history were reviewed and updated as appropriate: allergies, current medications, past family history, past medical history, past social history, past surgical history and problem list.    Review of Systems   Constitutional: Negative for activity change, appetite change, chills, diaphoresis, fatigue and fever.   Respiratory: Negative for cough, chest tightness, shortness of breath and wheezing.    Cardiovascular: Negative for chest pain and palpitations.   Musculoskeletal: Negative for arthralgias and myalgias.   Skin: Positive for rash and skin lesions.   Neurological: Negative for dizziness, weakness and headache.   Psychiatric/Behavioral: Negative for depressed mood. The patient is not nervous/anxious.        Objective   Physical Exam  Vitals reviewed.   Constitutional:       General: She is not in acute distress.     Appearance: Normal appearance. She is obese.   Cardiovascular:      Rate and Rhythm: Normal rate and regular rhythm.      Pulses: Normal pulses.      Heart sounds: Normal heart sounds. No murmur heard.      Pulmonary:      Effort: Pulmonary effort is normal. No respiratory distress.      Breath sounds: Normal breath sounds. No wheezing.   Chest:      Chest wall: No tenderness.   Skin:     General: Skin is warm and dry.      Comments: IV site was in left AC space; slight thin, yellow drainage noted at puncture site.  Non-tender.  Minimal surrounding erythema.  Area is not warm to touch.  ROM in arm is normal.    Neurological:      General: No focal deficit present.      Mental Status: She is alert  and oriented to person, place, and time.   Psychiatric:         Mood and Affect: Mood normal.           Assessment/Plan   Diagnoses and all orders for this visit:    1. Infection of intravenous catheter, initial encounter (HCC) (Primary)  Comments:  Given Keflex.   Reviewed warning S/S and when to call or go to ER.   Cool compresses several times daily.    Orders:  -     cephalexin (Keflex) 500 MG capsule; Take 1 capsule by mouth 2 (Two) Times a Day for 7 days.  Dispense: 14 capsule; Refill: 0

## 2022-03-03 ENCOUNTER — TRANSCRIBE ORDERS (OUTPATIENT)
Dept: HOME HEALTH SERVICES | Facility: HOME HEALTHCARE | Age: 87
End: 2022-03-03

## 2022-03-03 DIAGNOSIS — R55 SYNCOPE AND COLLAPSE: Primary | ICD-10-CM

## 2022-03-03 LAB — IF BLOCK AB SER-ACNC: 1 AU/ML (ref 0–1.1)

## 2022-03-04 ENCOUNTER — HOME CARE VISIT (OUTPATIENT)
Dept: HOME HEALTH SERVICES | Facility: HOME HEALTHCARE | Age: 87
End: 2022-03-04

## 2022-03-04 PROCEDURE — G0299 HHS/HOSPICE OF RN EA 15 MIN: HCPCS

## 2022-03-06 VITALS
DIASTOLIC BLOOD PRESSURE: 78 MMHG | RESPIRATION RATE: 19 BRPM | OXYGEN SATURATION: 97 % | HEART RATE: 87 BPM | SYSTOLIC BLOOD PRESSURE: 138 MMHG | TEMPERATURE: 98.2 F

## 2022-03-07 NOTE — HOME HEALTH
"Resumption of Care for 86 year old female. Reports she ran out of keppra for a few days waiting on the medication to be refilled and then she fell in the floor having another \"spell\". Educated on importance of not stopping that medication. Patient started on keflex for IV site infection, no open areas just small amount of redness remains. She will follow up with Dr. Almeida for anemia. Appt with Dr. Seipel upcoming as well following CVA.     Plan for next visit: CP assess, assess pain/falls/safety, educate keppra, f/u on IV site/completion of abt, f/u on MD appt"

## 2022-03-09 RX ORDER — LEVETIRACETAM 500 MG/1
500 TABLET ORAL EVERY 12 HOURS SCHEDULED
Qty: 60 TABLET | Refills: 3 | Status: SHIPPED | OUTPATIENT
Start: 2022-03-09 | End: 2022-07-12

## 2022-03-11 ENCOUNTER — READMISSION MANAGEMENT (OUTPATIENT)
Dept: CALL CENTER | Facility: HOSPITAL | Age: 87
End: 2022-03-11

## 2022-03-11 ENCOUNTER — HOME CARE VISIT (OUTPATIENT)
Dept: HOME HEALTH SERVICES | Facility: HOME HEALTHCARE | Age: 87
End: 2022-03-11

## 2022-03-11 PROCEDURE — G0299 HHS/HOSPICE OF RN EA 15 MIN: HCPCS

## 2022-03-11 NOTE — OUTREACH NOTE
Medical Week 2 Survey    Flowsheet Row Responses   [facility] facility patient discharged from? [facility]   Does the patient have one of the following disease processes/diagnoses(primary or secondary)? Other   Week 2 attempt successful? No   Unsuccessful attempts Attempt 1          CAROLINA CLARK - Registered Nurse

## 2022-03-15 VITALS
HEART RATE: 76 BPM | DIASTOLIC BLOOD PRESSURE: 70 MMHG | OXYGEN SATURATION: 96 % | RESPIRATION RATE: 18 BRPM | TEMPERATURE: 98 F | SYSTOLIC BLOOD PRESSURE: 122 MMHG

## 2022-03-16 ENCOUNTER — APPOINTMENT (OUTPATIENT)
Dept: LAB | Facility: HOSPITAL | Age: 87
End: 2022-03-16

## 2022-03-16 ENCOUNTER — OFFICE VISIT (OUTPATIENT)
Dept: ONCOLOGY | Facility: CLINIC | Age: 87
End: 2022-03-16

## 2022-03-16 ENCOUNTER — READMISSION MANAGEMENT (OUTPATIENT)
Dept: CALL CENTER | Facility: HOSPITAL | Age: 87
End: 2022-03-16

## 2022-03-16 VITALS
SYSTOLIC BLOOD PRESSURE: 147 MMHG | BODY MASS INDEX: 37.49 KG/M2 | HEART RATE: 82 BPM | RESPIRATION RATE: 18 BRPM | OXYGEN SATURATION: 96 % | TEMPERATURE: 97.5 F | WEIGHT: 225 LBS | HEIGHT: 65 IN | DIASTOLIC BLOOD PRESSURE: 75 MMHG

## 2022-03-16 DIAGNOSIS — D64.9 ANEMIA, UNSPECIFIED TYPE: ICD-10-CM

## 2022-03-16 DIAGNOSIS — Z17.0 MALIGNANT NEOPLASM OF UPPER-OUTER QUADRANT OF LEFT BREAST IN FEMALE, ESTROGEN RECEPTOR POSITIVE: Primary | ICD-10-CM

## 2022-03-16 DIAGNOSIS — C50.412 MALIGNANT NEOPLASM OF UPPER-OUTER QUADRANT OF LEFT BREAST IN FEMALE, ESTROGEN RECEPTOR POSITIVE: Primary | ICD-10-CM

## 2022-03-16 LAB
BASOPHILS # BLD AUTO: 0.06 10*3/MM3 (ref 0–0.2)
BASOPHILS NFR BLD AUTO: 0.9 % (ref 0–1.5)
DEPRECATED RDW RBC AUTO: 63.2 FL (ref 37–54)
EOSINOPHIL # BLD AUTO: 0.3 10*3/MM3 (ref 0–0.4)
EOSINOPHIL NFR BLD AUTO: 4.6 % (ref 0.3–6.2)
ERYTHROCYTE [DISTWIDTH] IN BLOOD BY AUTOMATED COUNT: 19.1 % (ref 12.3–15.4)
HCT VFR BLD AUTO: 32.6 % (ref 34–46.6)
HGB BLD-MCNC: 9.6 G/DL (ref 12–15.9)
LYMPHOCYTES # BLD AUTO: 2.21 10*3/MM3 (ref 0.7–3.1)
LYMPHOCYTES NFR BLD AUTO: 33.9 % (ref 19.6–45.3)
MCH RBC QN AUTO: 27.8 PG (ref 26.6–33)
MCHC RBC AUTO-ENTMCNC: 29.4 G/DL (ref 31.5–35.7)
MCV RBC AUTO: 94.5 FL (ref 79–97)
MONOCYTES # BLD AUTO: 0.54 10*3/MM3 (ref 0.1–0.9)
MONOCYTES NFR BLD AUTO: 8.3 % (ref 5–12)
NEUTROPHILS NFR BLD AUTO: 3.4 10*3/MM3 (ref 1.7–7)
NEUTROPHILS NFR BLD AUTO: 52.3 % (ref 42.7–76)
PLATELET # BLD AUTO: 292 10*3/MM3 (ref 140–450)
PMV BLD AUTO: 9.5 FL (ref 6–12)
RBC # BLD AUTO: 3.45 10*6/MM3 (ref 3.77–5.28)
WBC NRBC COR # BLD: 6.51 10*3/MM3 (ref 3.4–10.8)

## 2022-03-16 PROCEDURE — 85025 COMPLETE CBC W/AUTO DIFF WBC: CPT | Performed by: INTERNAL MEDICINE

## 2022-03-16 PROCEDURE — 99214 OFFICE O/P EST MOD 30 MIN: CPT | Performed by: INTERNAL MEDICINE

## 2022-03-16 RX ORDER — ANASTROZOLE 1 MG/1
1 TABLET ORAL DAILY
Qty: 30 TABLET | Refills: 11 | Status: SHIPPED | OUTPATIENT
Start: 2022-03-16 | End: 2022-03-16

## 2022-03-16 NOTE — OUTREACH NOTE
Medical Week 2 Survey    Flowsheet Row Responses   [facility] facility patient discharged from? [facility]   Does the patient have one of the following disease processes/diagnoses(primary or secondary)? Other   Week 2 attempt successful? No   Unsuccessful attempts Attempt 2          SHAMA Ulrich Registered Nurse

## 2022-03-16 NOTE — PROGRESS NOTES
Hematology/Oncology Outpatient Follow Up    PATIENT NAME:Aracelis Vargas  :1935  MRN: [id number]  PRIMARY CARE PHYSICIAN: Gabirel Goss MD  REFERRING PHYSICIAN: Gabriel Goss*    No chief complaint on file.       HISTORY OF PRESENT ILLNESS:   Aracelis Vargas is a 86 y.o. female who presented to [facility] on 2022 with complaints of syncopal episode at home.      2022: Ms. Vargas was admitted after she had a syncopal episode at home. It apparently was not witnessed. Shortly before she had presented to the hospital with similar complaints. She was diagnosed with a seizure disorder. Levetiracetam was prescribed, but she was able to take it for a limited period of time, as she was not able to have the prescription refilled. She was found to have anemia at the time of admission. She did not have a history of anemia and she had never been investigated or treated for it. She carried no history of bleeding, including melena or hematochezia. She also denied hematuria or vaginal bleeding of any sort. She reported a usual diet. She had been afebrile. No history of nocturnal diaphoresis. No unintended weight loss.     2022: Laboratory investigations revealed that both low folic acid and low cyanocobalamin levels.  She was started on oral replacement therapy.  There was no further drop in her blood count.  She was discharged home.    3/16/2022: Ms. Brewer was back in the office for follow-up.  She reported feeling better, stronger and being more active.  She has been compliant with both the B12 and folic acid supplements.  Her laboratory exams revealed a significant improvement in her hemoglobin.  A decision was made to continue with the same approach and to see her 2 months later.     He/She  has a past medical history of Atherosclerosis of native coronary artery of native heart without angina pectoris (10/22/2020), Atrioventricular block, Mobitz type 1, Wenckebach (10/21/2020),  Hypertension associated with stage 3 chronic kidney disease due to type 2 diabetes mellitus (HCC) (10/22/2020), Morbid obesity (HCC) (10/22/2020), Secondary hyperaldosteronism (HCC) (10/22/2020), Stage 3b chronic kidney disease (HCC) (10/22/2020), Type 2 diabetes mellitus with diabetic neuropathy, with long-term current use of insulin (HCC) (10/22/2020), and Type 2 diabetes mellitus with diabetic neuropathy, with long-term current use of insulin (HCC) (10/22/2020).     PCP: Gabriel Goss MD     2/28/2022: Patient is feeling better, no syncopal episodes.    History of present illness was reviewed and is unchanged from the previous visit. 02/28/22    Past Medical History:   Diagnosis Date   • Atherosclerosis of native coronary artery of native heart without angina pectoris 10/22/2020   • Atrioventricular block, Mobitz type 1, Blairbach 10/21/2020    Added automatically from request for surgery [id number]   • Hypertension associated with stage 3 chronic kidney disease due to type 2 diabetes mellitus (HCC) 10/22/2020   • Morbid obesity (HCC) 10/22/2020   • Secondary hyperaldosteronism (HCC) 10/22/2020   • Stage 3b chronic kidney disease (HCC) 10/22/2020   • Type 2 diabetes mellitus with diabetic neuropathy, with long-term current use of insulin (HCC) 10/22/2020   • Type 2 diabetes mellitus with diabetic neuropathy, with long-term current use of insulin ([facility]) 10/22/2020     Past Surgical History:   Procedure Laterality Date   • CARDIAC CATHETERIZATION N/A 10/23/2020    Procedure: Left Heart Cath and coronary angiogram;  Surgeon: Wang Plaza MD;  Location: [facility] CATH INVASIVE LOCATION;  Service: Cardiovascular;  Laterality: N/A;   • CARDIAC ELECTROPHYSIOLOGY PROCEDURE Left 11/3/2021    Procedure: Pacemaker DC new;  Surgeon: Wang Plaza MD;  Location: [facility] CATH INVASIVE LOCATION;  Service: Cardiovascular;  Laterality: Left;   • CARDIAC ELECTROPHYSIOLOGY PROCEDURE N/A 11/3/2021    Procedure: LOOP  RECORDER REMOVAL;  Surgeon: Wang Plaza MD;  Location:  INVASIVE LOCATION;  Service: Cardiovascular;  Laterality: N/A;   • HYSTERECTOMY     • JOINT REPLACEMENT Right     Hip   • JOINT REPLACEMENT Left     hip   • JOINT REPLACEMENT Left     hip (for second time)   • JOINT REPLACEMENT Right     knee   • OOPHORECTOMY         Current Outpatient Medications:   •  amLODIPine (NORVASC) 5 MG tablet, Take 5 mg by mouth Every Morning., Disp: , Rfl:   •  aspirin 325 MG tablet, Take 1 tablet by mouth Daily., Disp: 30 tablet, Rfl: 0  •  atorvastatin (LIPITOR) 10 MG tablet, Take 1 tablet by mouth Daily., Disp: 90 tablet, Rfl: 1  •  cyanocobalamin (VITAMIN B-12) 500 MCG tablet, Take 2 tablets (1000 mcg) by mouth Daily for 30 days., Disp: 60 tablet, Rfl: 0  •  folic acid (FOLVITE) 1 MG tablet, Take 1 tablet by mouth Daily for 30 days., Disp: 30 tablet, Rfl: 0  •  furosemide (LASIX) 20 MG tablet, Take 1 tablet by mouth 2 (Two) Times a Day., Disp: 180 tablet, Rfl: 1  •  gabapentin (NEURONTIN) 300 MG capsule, Take 300 mg by mouth 2 (two) times a day., Disp: , Rfl:   •  HYDROcodone-acetaminophen (NORCO) 7.5-325 MG per tablet, Take 1 tablet by mouth Every 8 (Eight) Hours As Needed for Moderate Pain  or Severe Pain ., Disp: 20 tablet, Rfl: 0  •  insulin NPH-insulin regular (humuLIN 70/30,novoLIN 70/30) (70-30) 100 UNIT/ML injection, Inject 30 Units under the skin into the appropriate area as directed 2 (Two) Times a Day With Meals., Disp: , Rfl:   •  levETIRAcetam (KEPPRA) 500 MG tablet, Take 1 tablet by mouth Every 12 (Twelve) Hours., Disp: 60 tablet, Rfl: 3  •  losartan (COZAAR) 50 MG tablet, TAKE ONE TABLET BY MOUTH DAILY, Disp: 90 tablet, Rfl: 3  •  omeprazole (priLOSEC) 40 MG capsule, Take 40 mg by mouth Every Morning., Disp: , Rfl:   •  polyethylene glycol (MIRALAX) 17 g packet, Take 17 g by mouth Daily As Needed (Use if senna-docusate is ineffective)., Disp: 30 each, Rfl: 0  •  sennosides-docusate (PERICOLACE)  8.6-50 MG per tablet, Take 2 tablets by mouth 2 (Two) Times a Day., Disp: 30 tablet, Rfl: 0  •  spironolactone (ALDACTONE) 25 MG tablet, Take 1 tablet by mouth Every Morning., Disp: , Rfl:   •  topiramate (TOPAMAX) 25 MG tablet, Take 12.5 mg by mouth 2 (Two) Times a Day., Disp: , Rfl:     Allergies   Allergen Reactions   • Latex, Natural Rubber Rash   • Adhesive Tape Rash       Family History   Problem Relation Age of Onset   • Heart disease Mother      Cancer-related family history is not on file.    Social History     Tobacco Use   • Smoking status: Never Smoker   • Smokeless tobacco: Never Used   Vaping Use   • Vaping Use: Never used   Substance Use Topics   • Alcohol use: Not Currently   • Drug use: Never     SUBJECTIVE:  3/16/2022 Ms. Vargas is in the office for follow-up.  She reports that since she left the hospital she has been feeling better.  She can only articulate feeling stronger as the reason for her to say that she feels better but she also says that she is at home and able to eat what she likes to eat.  She has been as active as before and she has not had any more dyspnea or chest pains.  She denies orthopnea.  She coughs only occasionally.  She has had no dysphagia or chest pains.  No abdominal pain or diarrhea.  She denies melena or hematochezia.  No dysuria or hematuria.  She persists with peripheral edema.        REVIEW OF SYSTEMS:  Review of Systems   Constitutional: Positive for fatigue. Negative for activity change, appetite change, chills, diaphoresis, fever and unexpected weight change.   HENT: Negative for congestion, dental problem, drooling, ear discharge, ear pain, facial swelling, hearing loss, mouth sores, nosebleeds, postnasal drip, rhinorrhea, sinus pressure, sinus pain, sneezing, sore throat, tinnitus, trouble swallowing and voice change.    Eyes: Negative for photophobia, pain, discharge, redness, itching and visual disturbance.   Respiratory: Negative for apnea, cough, choking,  chest tightness, shortness of breath, wheezing and stridor.    Cardiovascular: Positive for leg swelling. Negative for chest pain and palpitations.   Gastrointestinal: Negative for abdominal distention, abdominal pain, anal bleeding, blood in stool, constipation, diarrhea, nausea, rectal pain and vomiting.   Endocrine: Negative for cold intolerance, heat intolerance, polydipsia and polyuria.   Genitourinary: Negative for decreased urine volume, difficulty urinating, dysuria, flank pain, frequency, genital sores, hematuria and urgency.   Musculoskeletal: Negative for arthralgias, back pain, gait problem, joint swelling, myalgias, neck pain and neck stiffness.   Skin: Negative for color change, pallor and rash.   Neurological: Negative for dizziness, tremors, seizures, syncope, facial asymmetry, speech difficulty, weakness, light-headedness, numbness and headaches.   Hematological: Negative for adenopathy. Does not bruise/bleed easily.   Psychiatric/Behavioral: Negative for agitation, behavioral problems, confusion, decreased concentration, hallucinations, self-injury, sleep disturbance and suicidal ideas. The patient is not nervous/anxious.        OBJECTIVE:    There were no vitals filed for this visit.  There is no height or weight on file to calculate BMI.    ECOG  (2) Ambulatory and capable of self care, unable to carry out work activity, up and about > 50% or waking hours    Physical Exam  Constitutional:       General: She is not in acute distress.     Appearance: She is obese. She is not ill-appearing, toxic-appearing or diaphoretic.      Comments: Well-built and well oriented, obese woman who does not seem in distress.   HENT:      Head: Normocephalic and atraumatic.      Right Ear: External ear normal.      Left Ear: External ear normal.      Nose: Nose normal. No congestion or rhinorrhea.      Mouth/Throat:      Mouth: Mucous membranes are moist.      Pharynx: Oropharynx is clear. No oropharyngeal exudate or  posterior oropharyngeal erythema.   Eyes:      General: No scleral icterus.        Right eye: No discharge.         Left eye: No discharge.      Conjunctiva/sclera: Conjunctivae normal.      Pupils: Pupils are equal, round, and reactive to light.   Cardiovascular:      Rate and Rhythm: Normal rate and regular rhythm.      Pulses: Normal pulses.      Heart sounds: No murmur heard.    No friction rub. No gallop.   Pulmonary:      Effort: No respiratory distress.      Breath sounds: No stridor. No wheezing, rhonchi or rales.   Abdominal:      General: Bowel sounds are normal. There is no distension.      Palpations: Abdomen is soft. There is no mass.      Tenderness: There is no abdominal tenderness. There is no right CVA tenderness, left CVA tenderness, guarding or rebound.      Hernia: No hernia is present.      Comments: Rounded, protuberant and soft.  No tender to palpation.  Liver and spleen do not seem enlarged.   Musculoskeletal:         General: No swelling, tenderness, deformity or signs of injury.      Cervical back: No rigidity.      Right lower leg: No edema.      Left lower leg: No edema.   Lymphadenopathy:      Cervical: No cervical adenopathy.   Skin:     Coloration: Skin is not jaundiced.      Findings: No bruising, lesion or rash.   Neurological:      General: No focal deficit present.      Mental Status: She is alert and oriented to person, place, and time.      Cranial Nerves: No cranial nerve deficit.      Coordination: Coordination normal.      Gait: Gait abnormal.   Psychiatric:         Mood and Affect: Mood normal.         Behavior: Behavior normal.         Thought Content: Thought content normal.         Judgment: Judgment normal.     OMAR Almeida MD performed a physical exam on 3/16/2022 as documented above.    RECENT LABS  WBC   Date Value Ref Range Status   02/28/2022 3.90 3.40 - 10.80 10*3/mm3 Final     RBC   Date Value Ref Range Status   02/28/2022 2.94 (L) 3.77 - 5.28 10*6/mm3 Final      Hemoglobin   Date Value Ref Range Status   02/28/2022 7.9 (L) 12.0 - 15.9 g/dL Final     Hematocrit   Date Value Ref Range Status   02/28/2022 25.5 (L) 34.0 - 46.6 % Final     MCV   Date Value Ref Range Status   02/28/2022 86.7 79.0 - 97.0 fL Final     MCH   Date Value Ref Range Status   02/28/2022 27.0 26.6 - 33.0 pg Final     MCHC   Date Value Ref Range Status   02/28/2022 31.1 (L) 31.5 - 35.7 g/dL Final     RDW   Date Value Ref Range Status   02/28/2022 19.1 (H) 12.3 - 15.4 % Final     RDW-SD   Date Value Ref Range Status   02/28/2022 58.6 (H) 37.0 - 54.0 fl Final     MPV   Date Value Ref Range Status   02/28/2022 6.5 6.0 - 12.0 fL Final     Platelets   Date Value Ref Range Status   02/28/2022 158 140 - 450 10*3/mm3 Final     Neutrophil %   Date Value Ref Range Status   02/28/2022 60.8 42.7 - 76.0 % Final     Lymphocyte %   Date Value Ref Range Status   02/28/2022 24.5 19.6 - 45.3 % Final     Monocyte %   Date Value Ref Range Status   02/28/2022 7.5 5.0 - 12.0 % Final     Eosinophil %   Date Value Ref Range Status   02/28/2022 6.4 (H) 0.3 - 6.2 % Final     Basophil %   Date Value Ref Range Status   02/28/2022 0.8 0.0 - 1.5 % Final     Immature Grans %   Date Value Ref Range Status   08/10/2021 0.4 0.0 - 0.5 % Final     Neutrophils, Absolute   Date Value Ref Range Status   02/28/2022 2.40 1.70 - 7.00 10*3/mm3 Final     Lymphocytes, Absolute   Date Value Ref Range Status   02/28/2022 1.00 0.70 - 3.10 10*3/mm3 Final     Monocytes, Absolute   Date Value Ref Range Status   02/28/2022 0.30 0.10 - 0.90 10*3/mm3 Final     Eosinophils, Absolute   Date Value Ref Range Status   02/28/2022 0.30 0.00 - 0.40 10*3/mm3 Final     Basophils, Absolute   Date Value Ref Range Status   02/28/2022 0.00 0.00 - 0.20 10*3/mm3 Final     Immature Grans, Absolute   Date Value Ref Range Status   08/10/2021 0.02 0.00 - 0.05 10*3/mm3 Final     nRBC   Date Value Ref Range Status   02/28/2022 0.1 0.0 - 0.2 /100 WBC Final       Lab Results    Component Value Date    GLUCOSE 131 (H) 02/28/2022    BUN 17 02/28/2022    CREATININE 1.04 (H) 02/28/2022    EGFRIFNONA 50 (L) 02/28/2022    EGFRIFAFRI 45 (L) 10/21/2020    BCR 16.3 02/28/2022    K 4.6 02/28/2022    CO2 20.0 (L) 02/28/2022    CALCIUM 8.2 (L) 02/28/2022    ALBUMIN 3.30 (L) 02/28/2022    AST 30 02/28/2022    ALT 14 02/28/2022       ASSESSMENT:    1. Normocytic anemia.  There has been clear improvement with the initiation of B12 and folic acid supplementation.  She still has normocytic anemia at this time, however.  This has happened in parallel with an improvement in her sense of wellbeing.  It is quite possible that indeed she had deficiency of both.  She will continue replacement for now and will see me with results.  2. Chronic kidney disease: This probably is also participating in her anemia.  I do not know how high she will go with the B12 and folic acid supplementation, however, erythropoietin may be a reasonable approach if no full correction after reasonable period of treatment.  3. Fatigue: Discussed with her the potential reasons for this symptom.  Her kidney dysfunction, the anemia and her excess weight are probably the main determinants of this.       Ralph Almeida MD on March 16, 2022 at 4:31 PM

## 2022-03-17 ENCOUNTER — HOME CARE VISIT (OUTPATIENT)
Dept: HOME HEALTH SERVICES | Facility: HOME HEALTHCARE | Age: 87
End: 2022-03-17

## 2022-03-17 PROCEDURE — G0299 HHS/HOSPICE OF RN EA 15 MIN: HCPCS

## 2022-03-20 VITALS
RESPIRATION RATE: 18 BRPM | HEART RATE: 76 BPM | DIASTOLIC BLOOD PRESSURE: 70 MMHG | SYSTOLIC BLOOD PRESSURE: 128 MMHG | OXYGEN SATURATION: 96 % | TEMPERATURE: 97.9 F

## 2022-03-23 RX ORDER — TOPIRAMATE 25 MG/1
12.5 TABLET ORAL 2 TIMES DAILY
Qty: 30 TABLET | Refills: 3 | Status: SHIPPED | OUTPATIENT
Start: 2022-03-23

## 2022-03-24 ENCOUNTER — OFFICE VISIT (OUTPATIENT)
Dept: CARDIOLOGY | Facility: CLINIC | Age: 87
End: 2022-03-24

## 2022-03-24 ENCOUNTER — CLINICAL SUPPORT NO REQUIREMENTS (OUTPATIENT)
Dept: CARDIOLOGY | Facility: CLINIC | Age: 87
End: 2022-03-24

## 2022-03-24 VITALS
SYSTOLIC BLOOD PRESSURE: 143 MMHG | BODY MASS INDEX: 37.99 KG/M2 | HEIGHT: 65 IN | WEIGHT: 228 LBS | OXYGEN SATURATION: 97 % | HEART RATE: 85 BPM | DIASTOLIC BLOOD PRESSURE: 78 MMHG

## 2022-03-24 DIAGNOSIS — Z95.0 STATUS POST PLACEMENT OF CARDIAC PACEMAKER: ICD-10-CM

## 2022-03-24 DIAGNOSIS — R55 SYNCOPE AND COLLAPSE: Primary | ICD-10-CM

## 2022-03-24 DIAGNOSIS — Z86.73 HISTORY OF STROKE: ICD-10-CM

## 2022-03-24 DIAGNOSIS — I44.1 AV BLOCK, MOBITZ II: ICD-10-CM

## 2022-03-24 DIAGNOSIS — I44.1 ATRIOVENTRICULAR BLOCK, MOBITZ TYPE 1, WENCKEBACH: ICD-10-CM

## 2022-03-24 DIAGNOSIS — Z95.0 CARDIAC PACEMAKER IN SITU: Primary | Chronic | ICD-10-CM

## 2022-03-24 DIAGNOSIS — I10 ESSENTIAL HYPERTENSION: ICD-10-CM

## 2022-03-24 PROCEDURE — 99214 OFFICE O/P EST MOD 30 MIN: CPT | Performed by: INTERNAL MEDICINE

## 2022-03-24 PROCEDURE — 93000 ELECTROCARDIOGRAM COMPLETE: CPT | Performed by: INTERNAL MEDICINE

## 2022-03-24 PROCEDURE — 93280 PM DEVICE PROGR EVAL DUAL: CPT | Performed by: INTERNAL MEDICINE

## 2022-03-24 RX ORDER — AMLODIPINE BESYLATE 5 MG/1
TABLET ORAL
Qty: 90 TABLET | Refills: 3 | Status: SHIPPED | OUTPATIENT
Start: 2022-03-24 | End: 2022-03-24 | Stop reason: SDUPTHER

## 2022-03-24 RX ORDER — AMLODIPINE BESYLATE 5 MG/1
5 TABLET ORAL EVERY MORNING
Qty: 90 TABLET | Refills: 3 | Status: SHIPPED | OUTPATIENT
Start: 2022-03-24 | End: 2022-07-03 | Stop reason: HOSPADM

## 2022-03-24 NOTE — PROGRESS NOTES
Encounter Date:03/24/2022  Last seen 3/1/2022      Patient ID: Aracelis Vargas is a 87 y.o. female.    Chief Complaint:  Status post pacemaker  History of syncope  History of seizure disorder.      History of Present Illness    Since I have last seen, the patient has been without any chest discomfort ,shortness of breath, palpitations, dizziness or syncope.  Denies having any headache ,abdominal pain ,nausea, vomiting , diarrhea constipation, loss of weight or loss of appetite.  Denies having any excessive bruising ,hematuria or blood in the stool.    Review of all systems negative except as indicated.    Reviewed ROS.  Assessment and Plan       ]]]]]]]]]]]]]]]]]]]]  Impression  ========   -Status post permanent dual-chamber pacemaker implantation ([employer] MRI compatible) 11/3/2021     -Status post removal of loop recorder (Medtronic) 11/3/2021     -History of syncope-no further problems  Intermittent Mobitz type II AV block  Patient is not on any medications to cause bradycardia     -Second-degree Mobitz type I AV block.  Narrow QRS complex.    -History of seizure-improved    -Recent difficulty with speaking.  TIA/stroke  Status post TPA with complete resolution of symptoms 2/3/2022    Transesophageal echocardiogram 2/28/2022 revealed  Mild mitral regurgitation and mild to moderate aortic regurgitation.  Mild tricuspid regurgitation  Calculated pulmonary artery pressure is 28 mmHg.  Mild biatrial enlargement.  Left atrial appendage is small.  Left ventricle is normal in size and contractility with ejection fraction of 60%.     Echocardiogram-normal 10/22/2020     Cardiac catheterization 10/23/2020 revealed:  Left ventricular size and contractility is normal with ejection fraction of 60%.  Normal epicardial coronary arteries.     Status post loop recorder placement 10/23/2020 (Medtronic Linq)     -History of paroxysmal atrial fibrillation     -Hypertension dyslipidemia diabetes     -History of seizure  disorder     -Renal dysfunction CKD 3  BUN 27 creatinine 1.35     -Exogenous obesity.     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     -No known allergies  ============  Plan  =========  Status post permanent dual-chamber pacemaker implantation ([employer] MRI compatible) level 3/20/2021.  Status post loop recorder removal 11/3/2021 (Medtronic Linq)  Pacemaker site and function is normal.  Interrogation of the pacemaker revealed excellent pacing parameters.  Battery status is 8 years.    EKG showed atrial sensed ventricular paced rhythm.-3/24/2022    History of seizure disorder-improved.  No further episodes.    History of TIA/stroke  No further problems.  Status post TPA with complete resolution of symptoms 2/3/2022 premature ventricular contractions-asymptomatic.     Paroxysmal atrial fibrillation.  Interrogation of the pacemaker revealed AF burden less than 1%.  With recent stroke history and history of paroxysmal atrial fibrillation patient would benefit from anticoagulation.  Patient just received TPA.  However concerned about patient having falling spells.  Consideration will be given for watchman procedure.  CHADS2 score is 7.  However left atrial appendage is very small in size.     Hypertension-well-controlled  143/78     Renal dysfunction  17/1.04-2/28/2022     Anemia  Hemoglobin 8.6-12/4/2022  7.7-2/27/2022     Anticoagulation status was extensively discussed.  We will hold off on anticoagulation for multiple reasons including  Fall risk  Left atrial appendage is very small and no clots or smoking effect was seen on ALEYDA.  (Watchman is not an option due to left atrial appendage being very small.)  Patient has problems with significant anemia.  Family favors no anticoagulation.    Follow-up in the office in 6 months with pacemaker interrogation.     Further plan will depend on patient's progress.  ]]]]]]]]]]]]]]            Diagnosis Plan   1. Syncope and  collapse     2. Status post placement of cardiac pacemaker     3. Atrioventricular block, Mobitz type 1, [place]bach     4. Essential hypertension     5. History of stroke     LAB RESULTS (LAST 7 DAYS)    CBC        BMP        CMP         BNP        TROPONIN        CoAg        Creatinine Clearance  Estimated Creatinine Clearance: 47.1 mL/min (A) (by C-G formula based on SCr of 1.04 mg/dL (H)).    ABG        Radiology  No radiology results for the last day                The following portions of the patient's history were reviewed and updated as appropriate: allergies, current medications, past family history, past medical history, past social history, past surgical history and problem list.    Review of Systems   Constitutional: Negative for malaise/fatigue.   Cardiovascular: Negative for chest pain, leg swelling, palpitations and syncope.   Respiratory: Negative for shortness of breath.    Skin: Negative for rash.   Gastrointestinal: Negative for nausea and vomiting.   Neurological: Negative for dizziness, light-headedness and numbness.   All other systems reviewed and are negative.        Current Outpatient Medications:   •  amLODIPine (NORVASC) 5 MG tablet, Take 1 tablet by mouth Every Morning., Disp: 90 tablet, Rfl: 3  •  aspirin 325 MG tablet, Take 1 tablet by mouth Daily., Disp: 30 tablet, Rfl: 0  •  atorvastatin (LIPITOR) 10 MG tablet, Take 1 tablet by mouth Daily., Disp: 90 tablet, Rfl: 1  •  cyanocobalamin (VITAMIN B-12) 500 MCG tablet, Take 2 tablets (1000 mcg) by mouth Daily for 30 days., Disp: 60 tablet, Rfl: 0  •  folic acid (FOLVITE) 1 MG tablet, Take 1 tablet by mouth Daily for 30 days., Disp: 30 tablet, Rfl: 0  •  furosemide (LASIX) 20 MG tablet, Take 1 tablet by mouth 2 (Two) Times a Day., Disp: 180 tablet, Rfl: 1  •  gabapentin (NEURONTIN) 300 MG capsule, Take 300 mg by mouth 2 (two) times a day., Disp: , Rfl:   •  HYDROcodone-acetaminophen (NORCO) 7.5-325 MG per tablet, Take 1 tablet by mouth  Every 8 (Eight) Hours As Needed for Moderate Pain  or Severe Pain ., Disp: 20 tablet, Rfl: 0  •  insulin NPH-insulin regular (humuLIN 70/30,novoLIN 70/30) (70-30) 100 UNIT/ML injection, Inject 30 Units under the skin into the appropriate area as directed 2 (Two) Times a Day With Meals., Disp: , Rfl:   •  levETIRAcetam (KEPPRA) 500 MG tablet, Take 1 tablet by mouth Every 12 (Twelve) Hours., Disp: 60 tablet, Rfl: 3  •  losartan (COZAAR) 50 MG tablet, TAKE ONE TABLET BY MOUTH DAILY, Disp: 90 tablet, Rfl: 3  •  omeprazole (priLOSEC) 40 MG capsule, Take 40 mg by mouth Every Morning., Disp: , Rfl:   •  sennosides-docusate (PERICOLACE) 8.6-50 MG per tablet, Take 2 tablets by mouth 2 (Two) Times a Day., Disp: 30 tablet, Rfl: 0  •  spironolactone (ALDACTONE) 25 MG tablet, Take 1 tablet by mouth Every Morning., Disp: , Rfl:   •  topiramate (TOPAMAX) 25 MG tablet, Take 0.5 tablets by mouth 2 (Two) Times a Day., Disp: 30 tablet, Rfl: 3    Allergies   Allergen Reactions   • Latex, Natural Rubber Rash   • Adhesive Tape Rash       Family History   Problem Relation Age of Onset   • Heart disease Mother        Past Surgical History:   Procedure Laterality Date   • CARDIAC CATHETERIZATION N/A 10/23/2020    Procedure: Left Heart Cath and coronary angiogram;  Surgeon: Wang Plaza MD;  Location: [facility] CATH INVASIVE LOCATION;  Service: Cardiovascular;  Laterality: N/A;   • CARDIAC ELECTROPHYSIOLOGY PROCEDURE Left 11/3/2021    Procedure: Pacemaker DC new;  Surgeon: Wang Plaza MD;  Location: [facility] CATH INVASIVE LOCATION;  Service: Cardiovascular;  Laterality: Left;   • CARDIAC ELECTROPHYSIOLOGY PROCEDURE N/A 11/3/2021    Procedure: LOOP RECORDER REMOVAL;  Surgeon: Wang Plaza MD;  Location: [facility] CATH INVASIVE LOCATION;  Service: Cardiovascular;  Laterality: N/A;   • HYSTERECTOMY     • JOINT REPLACEMENT Right     Hip   • JOINT REPLACEMENT Left     hip   • JOINT REPLACEMENT Left     hip (for second time)   • JOINT  "REPLACEMENT Right     knee   • OOPHORECTOMY         Past Medical History:   Diagnosis Date   • Atherosclerosis of native coronary artery of native heart without angina pectoris 10/22/2020   • Atrioventricular block, Mobitz type 1, [place] 10/21/2020    Added automatically from request for surgery [id number]   • Hypertension associated with stage 3 chronic kidney disease due to type 2 diabetes mellitus ([facility]) 10/22/2020   • Morbid obesity ([facility]) 10/22/2020   • Secondary hyperaldosteronism ([facility]) 10/22/2020   • Stage 3b chronic kidney disease ([facility]) 10/22/2020   • Type 2 diabetes mellitus with diabetic neuropathy, with long-term current use of insulin ([facility]) 10/22/2020   • Type 2 diabetes mellitus with diabetic neuropathy, with long-term current use of insulin ([facility]) 10/22/2020       Family History   Problem Relation Age of Onset   • Heart disease Mother        Social History     Socioeconomic History   • Marital status:    Tobacco Use   • Smoking status: Never Smoker   • Smokeless tobacco: Never Used   Vaping Use   • Vaping Use: Never used   Substance and Sexual Activity   • Alcohol use: Not Currently   • Drug use: Never   • Sexual activity: Defer           ECG 12 Lead    Date/Time: 3/24/2022 4:08 PM  Performed by: Wang Plaza MD  Authorized by: Wang Plaza MD   Comparison: compared with previous ECG   Similar to previous ECG  Comparison to previous ECG: Atrial sensed ventricular paced rhythm 82/min nonspecific ST-T wave changes no ectopy no significant change from 220 6/20/2010                Objective:       Physical Exam    /78 (BP Location: Left arm, Patient Position: Sitting, Cuff Size: Large Adult)   Pulse 85   Ht 165.1 cm (65\")   Wt 103 kg (228 lb)   SpO2 97%   BMI 37.94 kg/m²   The patient is alert, oriented and in no distress.    Vital signs as noted above.    Head and neck revealed no carotid bruits or jugular venous distension.  No thyromegaly or lymphadenopathy is present.    Lungs " clear.  No wheezing.  Breath sounds are normal bilaterally.    Heart normal first and second heart sounds.  No murmur..  No pericardial rub is present.  No gallop is present.    Abdomen soft and nontender.  No organomegaly is present.    Extremities revealed good peripheral pulses without any pedal edema.    Skin warm and dry.  Pacemaker site looks normal.    Musculoskeletal system is grossly normal.    CNS grossly normal.

## 2022-03-24 NOTE — TELEPHONE ENCOUNTER
Rx Refill Note  Requested Prescriptions     Pending Prescriptions Disp Refills   • amLODIPine (NORVASC) 5 MG tablet [Pharmacy Med Name: amLODIPine BESYLATE 5 MG TAB] 90 tablet      Sig: TAKE ONE TABLET BY MOUTH EVERY MORNING      Last office visit with prescribing clinician: 11/22/2021      Next office visit with prescribing clinician: 3/24/2022            Monse Purvis MA  03/24/22, 13:54 EDT

## 2022-03-25 ENCOUNTER — HOME CARE VISIT (OUTPATIENT)
Dept: HOME HEALTH SERVICES | Facility: HOME HEALTHCARE | Age: 87
End: 2022-03-25

## 2022-03-25 PROCEDURE — G0299 HHS/HOSPICE OF RN EA 15 MIN: HCPCS

## 2022-03-26 VITALS
TEMPERATURE: 97.6 F | DIASTOLIC BLOOD PRESSURE: 64 MMHG | SYSTOLIC BLOOD PRESSURE: 148 MMHG | OXYGEN SATURATION: 97 % | HEART RATE: 90 BPM | RESPIRATION RATE: 16 BRPM

## 2022-03-26 NOTE — HOME HEALTH
Patient reports she had a good report from Dr Chan yesterday and no medications have been changed.  Next follow up visit with physician will be in 6 weeks.  Patient denies any pain today. Reports pain level of a 2 in the past 24 hours with no need for pain medication

## 2022-03-31 ENCOUNTER — HOME CARE VISIT (OUTPATIENT)
Dept: HOME HEALTH SERVICES | Facility: HOME HEALTHCARE | Age: 87
End: 2022-03-31

## 2022-03-31 PROCEDURE — G0299 HHS/HOSPICE OF RN EA 15 MIN: HCPCS

## 2022-04-03 VITALS
HEART RATE: 87 BPM | DIASTOLIC BLOOD PRESSURE: 70 MMHG | TEMPERATURE: 98.2 F | RESPIRATION RATE: 18 BRPM | OXYGEN SATURATION: 95 % | SYSTOLIC BLOOD PRESSURE: 128 MMHG

## 2022-04-07 ENCOUNTER — HOME CARE VISIT (OUTPATIENT)
Dept: HOME HEALTH SERVICES | Facility: HOME HEALTHCARE | Age: 87
End: 2022-04-07

## 2022-04-07 VITALS
HEIGHT: 62 IN | WEIGHT: 192 LBS | SYSTOLIC BLOOD PRESSURE: 122 MMHG | TEMPERATURE: 97.6 F | OXYGEN SATURATION: 99 % | DIASTOLIC BLOOD PRESSURE: 82 MMHG | HEART RATE: 80 BPM | BODY MASS INDEX: 35.33 KG/M2 | RESPIRATION RATE: 18 BRPM

## 2022-04-07 PROCEDURE — G0299 HHS/HOSPICE OF RN EA 15 MIN: HCPCS

## 2022-04-07 NOTE — HOME HEALTH
pt. states she would like discharge from [facility], she is doing better, and no skilled need from .  Pt. is to follow up with her PCP for a wellness check next week.  Pt. states she has no pain at this time, aware of medication and how to take and has no further questions.

## 2022-04-28 ENCOUNTER — TELEPHONE (OUTPATIENT)
Dept: FAMILY MEDICINE CLINIC | Facility: CLINIC | Age: 87
End: 2022-04-28

## 2022-04-28 NOTE — TELEPHONE ENCOUNTER
Spoke to Gloria and gave her the information. She sees Neurology on June 13, 2022. Told her she would need to call them with any question and how to proceed.

## 2022-04-28 NOTE — TELEPHONE ENCOUNTER
PATIENT STATES THE HOSPITAL TOLD AS HER ON levETIRAcetam (KEPPRA) 500 MG tablet FOR SEIZURES AND INSTRUCTED HER NOT TO DRIVE WITH THIS MED HOWEVER SHE HAS NO ONE TO TAKE HER  TO HIS APTS, SO SHE NEEDS TO KNOW HOW LONG SHE NEEDS TO TAKE THIS MED OR IF SHE CAN ACTUALLY DRIVE ON IT.     PATIENT< [phone]

## 2022-04-28 NOTE — TELEPHONE ENCOUNTER
Please call patient and let her know that she will need to get clearance from neurology before she can start driving again.  When is she scheduled to follow-up with neurology?

## 2022-05-12 NOTE — PROGRESS NOTES
Hematology/Oncology Outpatient Follow Up    PATIENT NAME:Aracelis Vargas  :1935  MRN: [id number]  PRIMARY CARE PHYSICIAN: Gabriel Goss MD  REFERRING PHYSICIAN: Gabriel Goss*    No chief complaint on file.       HISTORY OF PRESENT ILLNESS:   Aracelis Vargas is a 86 y.o. female who presented to [facility] on 2022 with complaints of syncopal episode at home.      2022: Ms. Vargas was admitted after she had a syncopal episode at home. It apparently was not witnessed. Shortly before she had presented to the hospital with similar complaints. She was diagnosed with a seizure disorder. Levetiracetam was prescribed, but she was able to take it for a limited period of time, as she was not able to have the prescription refilled. She was found to have anemia at the time of admission. She did not have a history of anemia and she had never been investigated or treated for it. She carried no history of bleeding, including melena or hematochezia. She also denied hematuria or vaginal bleeding of any sort. She reported a usual diet. She had been afebrile. No history of nocturnal diaphoresis. No unintended weight loss.     2022: Laboratory investigations revealed that both low folic acid and low cyanocobalamin levels.  She was started on oral replacement therapy.  There was no further drop in her blood count.  She was discharged home.    3/16/2022: Ms. Brewer was back in the office for follow-up.  She reported feeling better, stronger and being more active.  She has been compliant with both the B12 and folic acid supplements.  Her laboratory exams revealed a significant improvement in her hemoglobin.  A decision was made to continue with the same approach and to see her 2 months later.    2022: Back in the office for follow up. Discontinued the B12 and folate supplementation because the prescription ran out. [place] well. Her anemia had worsened again. She was asked to resume the  supplements and new prescriptions were sent. She was asked to see me again 2 months later.      He/She  has a past medical history of Atherosclerosis of native coronary artery of native heart without angina pectoris (10/22/2020), Atrioventricular block, Mobitz type 1, Wenckebach (10/21/2020), Hypertension associated with stage 3 chronic kidney disease due to type 2 diabetes mellitus ([facility]) (10/22/2020), Morbid obesity (HCC) (10/22/2020), Secondary hyperaldosteronism (HCC) (10/22/2020), Stage 3b chronic kidney disease (HCC) (10/22/2020), Type 2 diabetes mellitus with diabetic neuropathy, with long-term current use of insulin (HCC) (10/22/2020), and Type 2 diabetes mellitus with diabetic neuropathy, with long-term current use of insulin ([facility]) (10/22/2020).     PCP: Gabriel Goss MD     2/28/2022: Patient is feeling better, no syncopal episodes.    History of present illness was reviewed and is unchanged from the previous visit. 02/28/22    Past Medical History:   Diagnosis Date   • Atherosclerosis of native coronary artery of native heart without angina pectoris 10/22/2020   • Atrioventricular block, Mobitz type 1, Wenckebach 10/21/2020    Added automatically from request for surgery [id number]   • Hypertension associated with stage 3 chronic kidney disease due to type 2 diabetes mellitus (HCC) 10/22/2020   • Morbid obesity ([facility]) 10/22/2020   • Secondary hyperaldosteronism (HCC) 10/22/2020   • Stage 3b chronic kidney disease ([facility]) 10/22/2020   • Type 2 diabetes mellitus with diabetic neuropathy, with long-term current use of insulin (HCC) 10/22/2020   • Type 2 diabetes mellitus with diabetic neuropathy, with long-term current use of insulin ([facility]) 10/22/2020     Past Surgical History:   Procedure Laterality Date   • CARDIAC CATHETERIZATION N/A 10/23/2020    Procedure: Left Heart Cath and coronary angiogram;  Surgeon: Wang Plaza MD;  Location: [facility] CATH INVASIVE LOCATION;  Service: Cardiovascular;   Laterality: N/A;   • CARDIAC ELECTROPHYSIOLOGY PROCEDURE Left 11/3/2021    Procedure: Pacemaker DC new;  Surgeon: Wang Plaza MD;  Location: [facility] CATH INVASIVE LOCATION;  Service: Cardiovascular;  Laterality: Left;   • CARDIAC ELECTROPHYSIOLOGY PROCEDURE N/A 11/3/2021    Procedure: LOOP RECORDER REMOVAL;  Surgeon: Wang Plaza MD;  Location: [facility] CATH INVASIVE LOCATION;  Service: Cardiovascular;  Laterality: N/A;   • HYSTERECTOMY     • JOINT REPLACEMENT Right     Hip   • JOINT REPLACEMENT Left     hip   • JOINT REPLACEMENT Left     hip (for second time)   • JOINT REPLACEMENT Right     knee   • OOPHORECTOMY         Current Outpatient Medications:   •  amLODIPine (NORVASC) 5 MG tablet, Take 1 tablet by mouth Every Morning., Disp: 90 tablet, Rfl: 3  •  aspirin 325 MG tablet, Take 1 tablet by mouth Daily., Disp: 30 tablet, Rfl: 0  •  atorvastatin (LIPITOR) 10 MG tablet, Take 1 tablet by mouth Daily., Disp: 90 tablet, Rfl: 1  •  furosemide (LASIX) 20 MG tablet, Take 1 tablet by mouth 2 (Two) Times a Day., Disp: 180 tablet, Rfl: 1  •  gabapentin (NEURONTIN) 300 MG capsule, Take 300 mg by mouth 2 (two) times a day., Disp: , Rfl:   •  HYDROcodone-acetaminophen (NORCO) 7.5-325 MG per tablet, Take 1 tablet by mouth Every 8 (Eight) Hours As Needed for Moderate Pain  or Severe Pain ., Disp: 20 tablet, Rfl: 0  •  insulin NPH-insulin regular (humuLIN 70/30,novoLIN 70/30) (70-30) 100 UNIT/ML injection, Inject 30 Units under the skin into the appropriate area as directed 2 (Two) Times a Day With Meals., Disp: , Rfl:   •  levETIRAcetam (KEPPRA) 500 MG tablet, Take 1 tablet by mouth Every 12 (Twelve) Hours., Disp: 60 tablet, Rfl: 3  •  losartan (COZAAR) 50 MG tablet, TAKE ONE TABLET BY MOUTH DAILY, Disp: 90 tablet, Rfl: 3  •  omeprazole (priLOSEC) 40 MG capsule, Take 40 mg by mouth Every Morning., Disp: , Rfl:   •  sennosides-docusate (PERICOLACE) 8.6-50 MG per tablet, Take 2 tablets by mouth 2 (Two) Times a Day.,  Disp: 30 tablet, Rfl: 0  •  spironolactone (ALDACTONE) 25 MG tablet, Take 1 tablet by mouth Every Morning., Disp: , Rfl:   •  topiramate (TOPAMAX) 25 MG tablet, Take 0.5 tablets by mouth 2 (Two) Times a Day., Disp: 30 tablet, Rfl: 3    Allergies   Allergen Reactions   • Latex, Natural Rubber Rash   • Adhesive Tape Rash       Family History   Problem Relation Age of Onset   • Heart disease Mother      Cancer-related family history is not on file.    Social History     Tobacco Use   • Smoking status: Never Smoker   • Smokeless tobacco: Never Used   Vaping Use   • Vaping Use: Never used   Substance Use Topics   • Alcohol use: Not Currently   • Drug use: Never     SUBJECTIVE:  5/18/2022: Back in the office for follow up. Feels well and has been more energetic. Has been afebrile and without weigh loss. No chest pain or dyspnea. Remains pain free and has not had palpitations. No seizures. She has been free of dysphagia. No abdominal pain or diarrhea and no skin rash. She also denied hematuria, hematochezia or melena. Persists with edema.         REVIEW OF SYSTEMS:  Review of Systems   Constitutional: Negative for activity change, appetite change, chills, diaphoresis, fatigue, fever and unexpected weight change.   HENT: Negative for congestion, dental problem, drooling, ear discharge, ear pain, facial swelling, hearing loss, mouth sores, nosebleeds, postnasal drip, rhinorrhea, sinus pressure, sinus pain, sneezing, sore throat, tinnitus, trouble swallowing and voice change.    Eyes: Negative for photophobia, pain, discharge, redness, itching and visual disturbance.   Respiratory: Negative for apnea, cough, choking, chest tightness, shortness of breath, wheezing and stridor.    Cardiovascular: Positive for leg swelling. Negative for chest pain and palpitations.   Gastrointestinal: Negative for abdominal distention, abdominal pain, anal bleeding, blood in stool, constipation, diarrhea, nausea, rectal pain and vomiting.    Endocrine: Negative for cold intolerance, heat intolerance, polydipsia and polyuria.   Genitourinary: Negative for decreased urine volume, difficulty urinating, dysuria, flank pain, frequency, genital sores, hematuria and urgency.   Musculoskeletal: Negative for arthralgias, back pain, gait problem, joint swelling, myalgias, neck pain and neck stiffness.   Skin: Negative for color change, pallor and rash.   Neurological: Negative for dizziness, tremors, seizures, syncope, facial asymmetry, speech difficulty, weakness, light-headedness, numbness and headaches.   Hematological: Negative for adenopathy. Does not bruise/bleed easily.   Psychiatric/Behavioral: Negative for agitation, behavioral problems, confusion, decreased concentration, hallucinations, self-injury, sleep disturbance and suicidal ideas. The patient is not nervous/anxious.        OBJECTIVE:    There were no vitals filed for this visit.  There is no height or weight on file to calculate BMI.    ECOG  (2) Ambulatory and capable of self care, unable to carry out work activity, up and about > 50% or waking hours    Physical Exam  Constitutional:       General: She is not in acute distress.     Appearance: She is obese. She is not ill-appearing, toxic-appearing or diaphoretic.      Comments: Oriented and conversant. No distress. Not pale or jaundiced. Obese.    HENT:      Head: Normocephalic and atraumatic.      Right Ear: External ear normal.      Left Ear: External ear normal.      Nose: Nose normal. No congestion or rhinorrhea.      Mouth/Throat:      Mouth: Mucous membranes are moist.      Pharynx: Oropharynx is clear. No oropharyngeal exudate or posterior oropharyngeal erythema.   Eyes:      General: No scleral icterus.        Right eye: No discharge.         Left eye: No discharge.      Conjunctiva/sclera: Conjunctivae normal.      Pupils: Pupils are equal, round, and reactive to light.   Cardiovascular:      Rate and Rhythm: Normal rate and regular  rhythm.      Pulses: Normal pulses.      Heart sounds: No murmur heard.    No friction rub. No gallop.   Pulmonary:      Effort: No respiratory distress.      Breath sounds: No stridor. No wheezing, rhonchi or rales.   Abdominal:      General: Bowel sounds are normal. There is no distension.      Palpations: Abdomen is soft. There is no mass.      Tenderness: There is no abdominal tenderness. There is no right CVA tenderness, left CVA tenderness, guarding or rebound.      Hernia: No hernia is present.      Comments: Rounded, protuberant and soft.  No tender to palpation.  Liver and spleen do not seem enlarged.   Musculoskeletal:         General: No swelling, tenderness, deformity or signs of injury.      Cervical back: No rigidity.      Right lower leg: Edema present.      Left lower leg: Edema present.   Lymphadenopathy:      Cervical: No cervical adenopathy.   Skin:     Coloration: Skin is not jaundiced.      Findings: No bruising, lesion or rash.   Neurological:      General: No focal deficit present.      Mental Status: She is alert and oriented to person, place, and time.      Cranial Nerves: No cranial nerve deficit.      Coordination: Coordination normal.      Gait: Gait abnormal.   Psychiatric:         Mood and Affect: Mood normal.         Behavior: Behavior normal.         Thought Content: Thought content normal.         Judgment: Judgment normal.     OMAR Almeida MD performed a physical exam on 5/18/2022 as documented above.     RECENT LABS  WBC   Date Value Ref Range Status   03/16/2022 6.51 3.40 - 10.80 10*3/mm3 Final     RBC   Date Value Ref Range Status   03/16/2022 3.45 (L) 3.77 - 5.28 10*6/mm3 Final     Hemoglobin   Date Value Ref Range Status   03/16/2022 9.6 (L) 12.0 - 15.9 g/dL Final     Hematocrit   Date Value Ref Range Status   03/16/2022 32.6 (L) 34.0 - 46.6 % Final     MCV   Date Value Ref Range Status   03/16/2022 94.5 79.0 - 97.0 fL Final     MCH   Date Value Ref Range Status    03/16/2022 27.8 26.6 - 33.0 pg Final     MCHC   Date Value Ref Range Status   03/16/2022 29.4 (L) 31.5 - 35.7 g/dL Final     RDW   Date Value Ref Range Status   03/16/2022 19.1 (H) 12.3 - 15.4 % Final     RDW-SD   Date Value Ref Range Status   03/16/2022 63.2 (H) 37.0 - 54.0 fl Final     MPV   Date Value Ref Range Status   03/16/2022 9.5 6.0 - 12.0 fL Final     Platelets   Date Value Ref Range Status   03/16/2022 292 140 - 450 10*3/mm3 Final     Neutrophil %   Date Value Ref Range Status   03/16/2022 52.3 42.7 - 76.0 % Final     Lymphocyte %   Date Value Ref Range Status   03/16/2022 33.9 19.6 - 45.3 % Final     Monocyte %   Date Value Ref Range Status   03/16/2022 8.3 5.0 - 12.0 % Final     Eosinophil %   Date Value Ref Range Status   03/16/2022 4.6 0.3 - 6.2 % Final     Basophil %   Date Value Ref Range Status   03/16/2022 0.9 0.0 - 1.5 % Final     Immature Grans %   Date Value Ref Range Status   08/10/2021 0.4 0.0 - 0.5 % Final     Neutrophils, Absolute   Date Value Ref Range Status   03/16/2022 3.40 1.70 - 7.00 10*3/mm3 Final     Lymphocytes, Absolute   Date Value Ref Range Status   03/16/2022 2.21 0.70 - 3.10 10*3/mm3 Final     Monocytes, Absolute   Date Value Ref Range Status   03/16/2022 0.54 0.10 - 0.90 10*3/mm3 Final     Eosinophils, Absolute   Date Value Ref Range Status   03/16/2022 0.30 0.00 - 0.40 10*3/mm3 Final     Basophils, Absolute   Date Value Ref Range Status   03/16/2022 0.06 0.00 - 0.20 10*3/mm3 Final     Immature Grans, Absolute   Date Value Ref Range Status   08/10/2021 0.02 0.00 - 0.05 10*3/mm3 Final     nRBC   Date Value Ref Range Status   02/28/2022 0.1 0.0 - 0.2 /100 WBC Final       Lab Results   Component Value Date    GLUCOSE 131 (H) 02/28/2022    BUN 17 02/28/2022    CREATININE 1.04 (H) 02/28/2022    EGFRIFNONA 50 (L) 02/28/2022    EGFRIFAFRI 45 (L) 10/21/2020    BCR 16.3 02/28/2022    K 4.6 02/28/2022    CO2 20.0 (L) 02/28/2022    CALCIUM 8.2 (L) 02/28/2022    ALBUMIN 3.30 (L)  02/28/2022    AST 30 02/28/2022    ALT 14 02/28/2022       ASSESSMENT:    1. Normocytic anemia.  After initial recovery has had a decline in the hemoglobin again. This could be related to the fact that she only took the supplementation for a short period of time. She will be followed closely and a new prescription was sent. Discussed with her.   2. Chronic kidney disease: Awaiting the report of today's chemistry. Likely the chronic renal disease participates in the anemia. Discussed with her.   3. She's to see me again in approximately 2 months.     Plan:  As above     Ralph Almeida MD on 5/18/2022 at 16:43

## 2022-05-18 ENCOUNTER — OFFICE VISIT (OUTPATIENT)
Dept: ONCOLOGY | Facility: CLINIC | Age: 87
End: 2022-05-18

## 2022-05-18 ENCOUNTER — LAB (OUTPATIENT)
Dept: LAB | Facility: HOSPITAL | Age: 87
End: 2022-05-18

## 2022-05-18 VITALS
WEIGHT: 229.2 LBS | BODY MASS INDEX: 38.19 KG/M2 | HEIGHT: 65 IN | SYSTOLIC BLOOD PRESSURE: 143 MMHG | TEMPERATURE: 96.6 F | HEART RATE: 76 BPM | OXYGEN SATURATION: 98 % | DIASTOLIC BLOOD PRESSURE: 77 MMHG

## 2022-05-18 DIAGNOSIS — R55 SYNCOPE AND COLLAPSE: Primary | ICD-10-CM

## 2022-05-18 DIAGNOSIS — E53.8 FOLIC ACID DEFICIENCY: ICD-10-CM

## 2022-05-18 DIAGNOSIS — C50.412 MALIGNANT NEOPLASM OF UPPER-OUTER QUADRANT OF LEFT BREAST IN FEMALE, ESTROGEN RECEPTOR POSITIVE: ICD-10-CM

## 2022-05-18 DIAGNOSIS — E53.8 VITAMIN B 12 DEFICIENCY: Primary | ICD-10-CM

## 2022-05-18 DIAGNOSIS — Z17.0 MALIGNANT NEOPLASM OF UPPER-OUTER QUADRANT OF LEFT BREAST IN FEMALE, ESTROGEN RECEPTOR POSITIVE: ICD-10-CM

## 2022-05-18 LAB
ALBUMIN SERPL-MCNC: 3.9 G/DL (ref 3.5–5.2)
ALBUMIN/GLOB SERPL: 1.2 G/DL
ALP SERPL-CCNC: 79 U/L (ref 39–117)
ALT SERPL W P-5'-P-CCNC: 20 U/L (ref 1–33)
ANION GAP SERPL CALCULATED.3IONS-SCNC: 11.3 MMOL/L (ref 5–15)
AST SERPL-CCNC: 30 U/L (ref 1–32)
BASOPHILS # BLD AUTO: 0.03 10*3/MM3 (ref 0–0.2)
BASOPHILS NFR BLD AUTO: 0.6 % (ref 0–1.5)
BILIRUB SERPL-MCNC: 0.3 MG/DL (ref 0–1.2)
BUN SERPL-MCNC: 41 MG/DL (ref 8–23)
BUN/CREAT SERPL: 29.5 (ref 7–25)
CALCIUM SPEC-SCNC: 9 MG/DL (ref 8.6–10.5)
CHLORIDE SERPL-SCNC: 103 MMOL/L (ref 98–107)
CO2 SERPL-SCNC: 22.7 MMOL/L (ref 22–29)
CREAT SERPL-MCNC: 1.39 MG/DL (ref 0.57–1)
DAT POLY-SP REAG RBC QL: NEGATIVE
DEPRECATED RDW RBC AUTO: 61.9 FL (ref 37–54)
EGFRCR SERPLBLD CKD-EPI 2021: 37.3 ML/MIN/1.73
EOSINOPHIL # BLD AUTO: 0.28 10*3/MM3 (ref 0–0.4)
EOSINOPHIL NFR BLD AUTO: 6 % (ref 0.3–6.2)
ERYTHROCYTE [DISTWIDTH] IN BLOOD BY AUTOMATED COUNT: 17.7 % (ref 12.3–15.4)
FOLATE SERPL-MCNC: 12 NG/ML (ref 4.78–24.2)
GLOBULIN UR ELPH-MCNC: 3.2 GM/DL
GLUCOSE SERPL-MCNC: 194 MG/DL (ref 65–99)
HAPTOGLOB SERPL-MCNC: 94 MG/DL (ref 30–200)
HCT VFR BLD AUTO: 29.9 % (ref 34–46.6)
HGB BLD-MCNC: 8.8 G/DL (ref 12–15.9)
HOLD SPECIMEN: NORMAL
HOLD SPECIMEN: NORMAL
LDH SERPL-CCNC: 205 U/L (ref 135–214)
LYMPHOCYTES # BLD AUTO: 1.36 10*3/MM3 (ref 0.7–3.1)
LYMPHOCYTES NFR BLD AUTO: 29.1 % (ref 19.6–45.3)
MCH RBC QN AUTO: 29.2 PG (ref 26.6–33)
MCHC RBC AUTO-ENTMCNC: 29.4 G/DL (ref 31.5–35.7)
MCV RBC AUTO: 99.3 FL (ref 79–97)
MONOCYTES # BLD AUTO: 0.41 10*3/MM3 (ref 0.1–0.9)
MONOCYTES NFR BLD AUTO: 8.8 % (ref 5–12)
NEUTROPHILS NFR BLD AUTO: 2.59 10*3/MM3 (ref 1.7–7)
NEUTROPHILS NFR BLD AUTO: 55.5 % (ref 42.7–76)
PLATELET # BLD AUTO: 190 10*3/MM3 (ref 140–450)
PMV BLD AUTO: 8.2 FL (ref 6–12)
POTASSIUM SERPL-SCNC: 4.7 MMOL/L (ref 3.5–5.2)
PROT SERPL-MCNC: 7.1 G/DL (ref 6–8.5)
RBC # BLD AUTO: 3.01 10*6/MM3 (ref 3.77–5.28)
RETICS # AUTO: 0.09 10*6/MM3 (ref 0.02–0.13)
RETICS/RBC NFR AUTO: 2.87 % (ref 0.7–1.9)
SODIUM SERPL-SCNC: 137 MMOL/L (ref 136–145)
VIT B12 BLD-MCNC: 316 PG/ML (ref 211–946)
WBC NRBC COR # BLD: 4.67 10*3/MM3 (ref 3.4–10.8)

## 2022-05-18 PROCEDURE — 82746 ASSAY OF FOLIC ACID SERUM: CPT | Performed by: INTERNAL MEDICINE

## 2022-05-18 PROCEDURE — 85025 COMPLETE CBC W/AUTO DIFF WBC: CPT

## 2022-05-18 PROCEDURE — 82607 VITAMIN B-12: CPT | Performed by: INTERNAL MEDICINE

## 2022-05-18 PROCEDURE — 83010 ASSAY OF HAPTOGLOBIN QUANT: CPT | Performed by: INTERNAL MEDICINE

## 2022-05-18 PROCEDURE — 85045 AUTOMATED RETICULOCYTE COUNT: CPT | Performed by: INTERNAL MEDICINE

## 2022-05-18 PROCEDURE — 99213 OFFICE O/P EST LOW 20 MIN: CPT | Performed by: INTERNAL MEDICINE

## 2022-05-18 PROCEDURE — 83615 LACTATE (LD) (LDH) ENZYME: CPT | Performed by: INTERNAL MEDICINE

## 2022-05-18 PROCEDURE — 80053 COMPREHEN METABOLIC PANEL: CPT | Performed by: INTERNAL MEDICINE

## 2022-05-18 PROCEDURE — 36415 COLL VENOUS BLD VENIPUNCTURE: CPT | Performed by: INTERNAL MEDICINE

## 2022-05-18 PROCEDURE — 86880 COOMBS TEST DIRECT: CPT | Performed by: INTERNAL MEDICINE

## 2022-05-18 RX ORDER — FOLIC ACID 1 MG/1
1 TABLET ORAL DAILY
Qty: 30 TABLET | Refills: 11 | Status: SHIPPED | OUTPATIENT
Start: 2022-05-18

## 2022-05-18 RX ORDER — LANOLIN ALCOHOL/MO/W.PET/CERES
1000 CREAM (GRAM) TOPICAL DAILY
Qty: 30 TABLET | Refills: 11 | Status: SHIPPED | OUTPATIENT
Start: 2022-05-18 | End: 2023-01-10 | Stop reason: SDUPTHER

## 2022-05-27 RX ORDER — ATORVASTATIN CALCIUM 10 MG/1
TABLET, FILM COATED ORAL
Qty: 90 TABLET | Refills: 1 | Status: ON HOLD | OUTPATIENT
Start: 2022-05-27 | End: 2022-08-21

## 2022-06-09 ENCOUNTER — TELEPHONE (OUTPATIENT)
Dept: ONCOLOGY | Facility: CLINIC | Age: 87
End: 2022-06-09

## 2022-06-13 ENCOUNTER — OFFICE VISIT (OUTPATIENT)
Dept: PULMONOLOGY | Facility: HOSPITAL | Age: 87
End: 2022-06-13

## 2022-06-13 VITALS
DIASTOLIC BLOOD PRESSURE: 68 MMHG | OXYGEN SATURATION: 95 % | HEART RATE: 70 BPM | BODY MASS INDEX: 38.15 KG/M2 | HEIGHT: 65 IN | SYSTOLIC BLOOD PRESSURE: 114 MMHG | RESPIRATION RATE: 14 BRPM | WEIGHT: 229 LBS

## 2022-06-13 DIAGNOSIS — I63.9 CEREBROVASCULAR ACCIDENT (CVA), UNSPECIFIED MECHANISM: ICD-10-CM

## 2022-06-13 DIAGNOSIS — K21.9 GASTROESOPHAGEAL REFLUX DISEASE, UNSPECIFIED WHETHER ESOPHAGITIS PRESENT: Chronic | ICD-10-CM

## 2022-06-13 DIAGNOSIS — G47.33 OBSTRUCTIVE SLEEP APNEA: Primary | ICD-10-CM

## 2022-06-13 PROBLEM — R55 SYNCOPE AND COLLAPSE: Status: RESOLVED | Noted: 2022-02-25 | Resolved: 2022-06-13

## 2022-06-13 PROCEDURE — G0463 HOSPITAL OUTPT CLINIC VISIT: HCPCS

## 2022-06-13 NOTE — PROGRESS NOTES
SLEEP/PULMONARY  CLINIC NOTE      PATIENT IDENTIFICATION:  Name: Aracelis Vargas  Age: 87 y.o.  Sex: female  :  1935  MRN: [id number]    DATE OF CONSULTATION:  2022                     CHIEF COMPLAINT: ROSSY    History of Present Illness:   Aracelis Vargas is a 87 y.o. female Pt with still multiple wakening up at night with sleepiness fatigue and snoring, witnessed apnea, Hard  to get up in the morning. Daytime fatigue sleepiness loss of energy, Ledger score of (14 )   Patient has hospitalization and found the patient has seizure at that time also and possible small vessels CVA patient discharged home discharged home and came for follow-up    Review of Systems:   Constitutional: As above    Eyes: negative   ENT/oropharynx: negative   Cardiovascular: negative   Respiratory: As above    Gastrointestinal: negative   Genitourinary: negative   Neurological: negative   Musculoskeletal: negative   Integument/breast: negative   Endocrine: negative   Allergic/Immunologic: negative     Past Medical History:  Past Medical History:   Diagnosis Date   • Atherosclerosis of native coronary artery of native heart without angina pectoris 10/22/2020   • Atrioventricular block, Mobitz type 1, [place] 10/21/2020    Added automatically from request for surgery [id number]   • Hypertension associated with stage 3 chronic kidney disease due to type 2 diabetes mellitus ([facility]) 10/22/2020   • Morbid obesity ([facility]) 10/22/2020   • Secondary hyperaldosteronism ([facility]) 10/22/2020   • Stage 3b chronic kidney disease ([facility]) 10/22/2020   • Type 2 diabetes mellitus with diabetic neuropathy, with long-term current use of insulin ([facility]) 10/22/2020   • Type 2 diabetes mellitus with diabetic neuropathy, with long-term current use of insulin ([facility]) 10/22/2020       Past Surgical History:  Past Surgical History:   Procedure Laterality Date   • CARDIAC CATHETERIZATION N/A 10/23/2020    Procedure: Left Heart Cath and coronary angiogram;  Surgeon: Mela  MD Wang;  Location: [facility] CATH INVASIVE LOCATION;  Service: Cardiovascular;  Laterality: N/A;   • CARDIAC ELECTROPHYSIOLOGY PROCEDURE Left 11/3/2021    Procedure: Pacemaker DC new;  Surgeon: Wang Plaza MD;  Location: [facility] CATH INVASIVE LOCATION;  Service: Cardiovascular;  Laterality: Left;   • CARDIAC ELECTROPHYSIOLOGY PROCEDURE N/A 11/3/2021    Procedure: LOOP RECORDER REMOVAL;  Surgeon: Wang Plaza MD;  Location: [facility] CATH INVASIVE LOCATION;  Service: Cardiovascular;  Laterality: N/A;   • HYSTERECTOMY     • JOINT REPLACEMENT Right     Hip   • JOINT REPLACEMENT Left     hip   • JOINT REPLACEMENT Left     hip (for second time)   • JOINT REPLACEMENT Right     knee   • OOPHORECTOMY          Family History:  Family History   Problem Relation Age of Onset   • Heart disease Mother    • Heart disease Father         Social History:   Social History     Tobacco Use   • Smoking status: Never Smoker   • Smokeless tobacco: Never Used   Substance Use Topics   • Alcohol use: Not Currently      Second hand smoking   Allergies:  Allergies   Allergen Reactions   • Latex, Natural Rubber Rash   • Adhesive Tape Rash       Home Meds:  (Not in a hospital admission)      Objective:    Vitals Ranges:   Heart Rate:  [70] 70  Resp:  [14] 14  BP: (114)/(68) 114/68  Body mass index is 38.11 kg/m².     Exam:  General Appearance:  WDWN    HEENT:   without obvious abnormality,  Conjunctiva/corneas clear,  Normal external ear canals, no drainage    Clear orsalmucosa,  Mallampati score 3    Neck:  Supple, symmetrical, trachea midline. No JVD.  Lungs:   Bilateral basal rhonchi bilaterally, respirations unlabored symmetrical wall movement.    Chest wall:  No tenderness or deformity.    Heart:  Regular rate and rhythm, S1 and S2 normal.  Extremities: Trace edema no clubbing or Cyanosis        Data Review:  All labs (24hrs): No results found for this or any previous visit (from the past 24 hour(s)).     Imaging:  Adult  Transesophageal Echo (ALEYDA) W/ Cont if Necessary Per Protocol (Cardiology Department)  Date of study  2/28/2022.    Indications  Recent TIA.    Procedure performed  Transesophageal echocardiogram and Doppler study.    Procedure  Anesthesia was provided by anesthesiologist with intravenous Diprivan.  ALEYDA probe could be passed without difficulty.  Patient tolerated the procedure well.  No complications were noted.    Results  Technically satisfactory study.  Mitral valve is structurally normal.  Mild mitral regurgitation is present.  Aortic valve is tricuspid.  Thickened with adequate opening motion.  Mild to moderate aortic regurgitation is present.  Tricuspid valve is normal.  Mild tricuspid regurgitation is present.  Calculated pulmonary artery pressure is 28 mmHg.  Mild biatrial enlargement is present.  Left atrial appendage is very small.  Left ventricle is normal in size and contractility with ejection fraction   of 60%.  Atrial septum is intact.  No evidence for intracardiac thrombus or smoking effect is present.  No pericardial effusion is present.  Aorta is normal.    Impression  Mild mitral regurgitation and mild to moderate aortic regurgitation.  Mild tricuspid regurgitation  Calculated pulmonary artery pressure is 28 mmHg.  Mild biatrial enlargement.  Left atrial appendage is small.  Left ventricle is normal in size and contractility with ejection fraction   of 60%.       ASSESSMENT:    Obstructive sleep apnea    Gastroesophageal reflux disease, unspecified whether esophagitis present    Cerebrovascular accident (CVA), unspecified mechanism (HCC)    PLAN:   This is patient with symptoms of obstructive sleep apnea, NPSG study ASAP / split night study, Avoid supine avoid sedative meds in pm, weight loss, Avoid driving. Long discussion with patient about the physiology of ROSSY, and long term and short term   benefit of treating ROSSY     Treating ROSSY will improve GERD symptoms control  And pt with small vessel  brain damag      Follow-up after study    Chevy Pineda MD. D, ABSM.  6/13/2022  13:19 EDT

## 2022-06-14 DIAGNOSIS — Z01.812 PRE-PROCEDURE LAB EXAM: Primary | ICD-10-CM

## 2022-06-15 ENCOUNTER — TELEPHONE (OUTPATIENT)
Dept: FAMILY MEDICINE CLINIC | Facility: CLINIC | Age: 87
End: 2022-06-15

## 2022-06-15 DIAGNOSIS — R56.9 WITNESSED SEIZURE-LIKE ACTIVITY: Primary | ICD-10-CM

## 2022-06-15 NOTE — TELEPHONE ENCOUNTER
Caller: Aracelis Vargas    Relationship: Self    Best call back number: [phone]     What is the medical concern/diagnosis: SEIZURES    What specialty or service is being requested: NEUROLOGY    What is the provider, practice or medical service name: DR. JOSEPH SEIPEL    What is the office location: [address] [place] [address]    What is the office phone number: [phone]    Any additional details: PATIENT HAD A REFERRAL, BUT DUE TO ILLNESSES AND HER  BEING HOSPITALIZED, SHE HAS BEEN UNABLE TO ATTEND AN APPOINTMENT.

## 2022-06-15 NOTE — TELEPHONE ENCOUNTER
Please call patient and let her know that referral has been placed.  Please anticipate a phone call from neurologist office to schedule appointment at her convenience.

## 2022-06-29 ENCOUNTER — APPOINTMENT (OUTPATIENT)
Dept: GENERAL RADIOLOGY | Facility: HOSPITAL | Age: 87
End: 2022-06-29

## 2022-06-29 ENCOUNTER — APPOINTMENT (OUTPATIENT)
Dept: CT IMAGING | Facility: HOSPITAL | Age: 87
End: 2022-06-29

## 2022-06-29 ENCOUNTER — HOSPITAL ENCOUNTER (OUTPATIENT)
Facility: HOSPITAL | Age: 87
Setting detail: OBSERVATION
Discharge: HOME HEALTH | End: 2022-07-03
Attending: EMERGENCY MEDICINE | Admitting: INTERNAL MEDICINE

## 2022-06-29 DIAGNOSIS — R41.82 ALTERED MENTAL STATUS, UNSPECIFIED ALTERED MENTAL STATUS TYPE: Primary | ICD-10-CM

## 2022-06-29 DIAGNOSIS — N39.0 ACUTE URINARY TRACT INFECTION: ICD-10-CM

## 2022-06-29 LAB
BACTERIA UR QL AUTO: ABNORMAL /HPF
BASOPHILS # BLD AUTO: 0.1 10*3/MM3 (ref 0–0.2)
BASOPHILS NFR BLD AUTO: 1.2 % (ref 0–1.5)
BILIRUB UR QL STRIP: NEGATIVE
CLARITY UR: CLEAR
COLOR UR: YELLOW
DEPRECATED RDW RBC AUTO: 56.9 FL (ref 37–54)
EOSINOPHIL # BLD AUTO: 0.3 10*3/MM3 (ref 0–0.4)
EOSINOPHIL NFR BLD AUTO: 5.5 % (ref 0.3–6.2)
ERYTHROCYTE [DISTWIDTH] IN BLOOD BY AUTOMATED COUNT: 17.9 % (ref 12.3–15.4)
GLUCOSE UR STRIP-MCNC: NEGATIVE MG/DL
HCT VFR BLD AUTO: 27.9 % (ref 34–46.6)
HGB BLD-MCNC: 8.8 G/DL (ref 12–15.9)
HGB UR QL STRIP.AUTO: NEGATIVE
HYALINE CASTS UR QL AUTO: ABNORMAL /LPF
KETONES UR QL STRIP: NEGATIVE
LEUKOCYTE ESTERASE UR QL STRIP.AUTO: ABNORMAL
LYMPHOCYTES # BLD AUTO: 1.6 10*3/MM3 (ref 0.7–3.1)
LYMPHOCYTES NFR BLD AUTO: 31.1 % (ref 19.6–45.3)
MCH RBC QN AUTO: 27.6 PG (ref 26.6–33)
MCHC RBC AUTO-ENTMCNC: 31.4 G/DL (ref 31.5–35.7)
MCV RBC AUTO: 88.1 FL (ref 79–97)
MONOCYTES # BLD AUTO: 0.4 10*3/MM3 (ref 0.1–0.9)
MONOCYTES NFR BLD AUTO: 7.1 % (ref 5–12)
NEUTROPHILS NFR BLD AUTO: 2.8 10*3/MM3 (ref 1.7–7)
NEUTROPHILS NFR BLD AUTO: 55.1 % (ref 42.7–76)
NITRITE UR QL STRIP: NEGATIVE
NRBC BLD AUTO-RTO: 0 /100 WBC (ref 0–0.2)
PH UR STRIP.AUTO: <=5 [PH] (ref 5–8)
PLATELET # BLD AUTO: 276 10*3/MM3 (ref 140–450)
PMV BLD AUTO: 6.4 FL (ref 6–12)
PROT UR QL STRIP: NEGATIVE
RBC # BLD AUTO: 3.17 10*6/MM3 (ref 3.77–5.28)
RBC # UR STRIP: ABNORMAL /HPF
REF LAB TEST METHOD: ABNORMAL
SP GR UR STRIP: 1.01 (ref 1–1.03)
SQUAMOUS #/AREA URNS HPF: ABNORMAL /HPF
UROBILINOGEN UR QL STRIP: ABNORMAL
WBC # UR STRIP: ABNORMAL /HPF
WBC NRBC COR # BLD: 5.1 10*3/MM3 (ref 3.4–10.8)

## 2022-06-29 PROCEDURE — 82550 ASSAY OF CK (CPK): CPT | Performed by: EMERGENCY MEDICINE

## 2022-06-29 PROCEDURE — 87186 SC STD MICRODIL/AGAR DIL: CPT | Performed by: EMERGENCY MEDICINE

## 2022-06-29 PROCEDURE — 84484 ASSAY OF TROPONIN QUANT: CPT | Performed by: EMERGENCY MEDICINE

## 2022-06-29 PROCEDURE — 99284 EMERGENCY DEPT VISIT MOD MDM: CPT

## 2022-06-29 PROCEDURE — 80053 COMPREHEN METABOLIC PANEL: CPT | Performed by: EMERGENCY MEDICINE

## 2022-06-29 PROCEDURE — 71045 X-RAY EXAM CHEST 1 VIEW: CPT

## 2022-06-29 PROCEDURE — 99285 EMERGENCY DEPT VISIT HI MDM: CPT

## 2022-06-29 PROCEDURE — 85025 COMPLETE CBC W/AUTO DIFF WBC: CPT | Performed by: EMERGENCY MEDICINE

## 2022-06-29 PROCEDURE — 87088 URINE BACTERIA CULTURE: CPT | Performed by: EMERGENCY MEDICINE

## 2022-06-29 PROCEDURE — 96375 TX/PRO/DX INJ NEW DRUG ADDON: CPT

## 2022-06-29 PROCEDURE — 83880 ASSAY OF NATRIURETIC PEPTIDE: CPT | Performed by: EMERGENCY MEDICINE

## 2022-06-29 PROCEDURE — P9612 CATHETERIZE FOR URINE SPEC: HCPCS

## 2022-06-29 PROCEDURE — 87086 URINE CULTURE/COLONY COUNT: CPT | Performed by: EMERGENCY MEDICINE

## 2022-06-29 PROCEDURE — 0202U NFCT DS 22 TRGT SARS-COV-2: CPT | Performed by: EMERGENCY MEDICINE

## 2022-06-29 PROCEDURE — 81001 URINALYSIS AUTO W/SCOPE: CPT | Performed by: EMERGENCY MEDICINE

## 2022-06-29 PROCEDURE — 70450 CT HEAD/BRAIN W/O DYE: CPT

## 2022-06-29 PROCEDURE — 82140 ASSAY OF AMMONIA: CPT | Performed by: EMERGENCY MEDICINE

## 2022-06-29 PROCEDURE — 25010000002 ONDANSETRON PER 1 MG: Performed by: EMERGENCY MEDICINE

## 2022-06-29 RX ORDER — ONDANSETRON 2 MG/ML
4 INJECTION INTRAMUSCULAR; INTRAVENOUS ONCE
Status: COMPLETED | OUTPATIENT
Start: 2022-06-29 | End: 2022-06-29

## 2022-06-29 RX ADMIN — SODIUM CHLORIDE 500 ML: 9 INJECTION, SOLUTION INTRAVENOUS at 23:31

## 2022-06-29 RX ADMIN — ONDANSETRON 4 MG: 2 INJECTION INTRAMUSCULAR; INTRAVENOUS at 23:32

## 2022-06-30 PROBLEM — R79.89 ELEVATED LACTIC ACID LEVEL: Status: ACTIVE | Noted: 2022-06-30

## 2022-06-30 PROBLEM — R41.82 ALTERED MENTAL STATUS: Status: ACTIVE | Noted: 2022-06-30

## 2022-06-30 PROBLEM — G40.909 SEIZURE DISORDER: Status: ACTIVE | Noted: 2022-06-30

## 2022-06-30 PROBLEM — E11.9 TYPE 2 DIABETES MELLITUS: Status: ACTIVE | Noted: 2022-06-30

## 2022-06-30 LAB
ALBUMIN SERPL-MCNC: 4 G/DL (ref 3.5–5.2)
ALBUMIN/GLOB SERPL: 1.1 G/DL
ALP SERPL-CCNC: 95 U/L (ref 39–117)
ALT SERPL W P-5'-P-CCNC: 18 U/L (ref 1–33)
AMMONIA BLD-SCNC: 47 UMOL/L (ref 11–51)
ANION GAP SERPL CALCULATED.3IONS-SCNC: 12 MMOL/L (ref 5–15)
ANION GAP SERPL CALCULATED.3IONS-SCNC: 14 MMOL/L (ref 5–15)
AST SERPL-CCNC: 25 U/L (ref 1–32)
B PARAPERT DNA SPEC QL NAA+PROBE: NOT DETECTED
B PERT DNA SPEC QL NAA+PROBE: NOT DETECTED
BASOPHILS # BLD AUTO: 0.1 10*3/MM3 (ref 0–0.2)
BASOPHILS NFR BLD AUTO: 1.1 % (ref 0–1.5)
BILIRUB SERPL-MCNC: 0.3 MG/DL (ref 0–1.2)
BUN SERPL-MCNC: 36 MG/DL (ref 8–23)
BUN SERPL-MCNC: 38 MG/DL (ref 8–23)
BUN/CREAT SERPL: 27.7 (ref 7–25)
BUN/CREAT SERPL: 27.7 (ref 7–25)
C PNEUM DNA NPH QL NAA+NON-PROBE: NOT DETECTED
CALCIUM SPEC-SCNC: 8 MG/DL (ref 8.6–10.5)
CALCIUM SPEC-SCNC: 8.7 MG/DL (ref 8.6–10.5)
CHLORIDE SERPL-SCNC: 106 MMOL/L (ref 98–107)
CHLORIDE SERPL-SCNC: 110 MMOL/L (ref 98–107)
CK SERPL-CCNC: 82 U/L (ref 20–180)
CO2 SERPL-SCNC: 19 MMOL/L (ref 22–29)
CO2 SERPL-SCNC: 20 MMOL/L (ref 22–29)
CREAT SERPL-MCNC: 1.3 MG/DL (ref 0.57–1)
CREAT SERPL-MCNC: 1.37 MG/DL (ref 0.57–1)
D-LACTATE SERPL-SCNC: 1.9 MMOL/L (ref 0.5–2)
D-LACTATE SERPL-SCNC: 2.8 MMOL/L (ref 0.5–2)
D-LACTATE SERPL-SCNC: 2.8 MMOL/L (ref 0.5–2)
DEPRECATED RDW RBC AUTO: 55.1 FL (ref 37–54)
EGFRCR SERPLBLD CKD-EPI 2021: 37.9 ML/MIN/1.73
EGFRCR SERPLBLD CKD-EPI 2021: 40.4 ML/MIN/1.73
EOSINOPHIL # BLD AUTO: 0.3 10*3/MM3 (ref 0–0.4)
EOSINOPHIL NFR BLD AUTO: 4.9 % (ref 0.3–6.2)
ERYTHROCYTE [DISTWIDTH] IN BLOOD BY AUTOMATED COUNT: 17.5 % (ref 12.3–15.4)
FLUAV SUBTYP SPEC NAA+PROBE: NOT DETECTED
FLUBV RNA ISLT QL NAA+PROBE: NOT DETECTED
GLOBULIN UR ELPH-MCNC: 3.5 GM/DL
GLUCOSE BLDC GLUCOMTR-MCNC: 138 MG/DL (ref 70–105)
GLUCOSE BLDC GLUCOMTR-MCNC: 140 MG/DL (ref 70–105)
GLUCOSE BLDC GLUCOMTR-MCNC: 171 MG/DL (ref 70–105)
GLUCOSE BLDC GLUCOMTR-MCNC: 190 MG/DL (ref 70–105)
GLUCOSE SERPL-MCNC: 163 MG/DL (ref 65–99)
GLUCOSE SERPL-MCNC: 164 MG/DL (ref 65–99)
HADV DNA SPEC NAA+PROBE: NOT DETECTED
HBA1C MFR BLD: 8.2 % (ref 3.5–5.6)
HCOV 229E RNA SPEC QL NAA+PROBE: NOT DETECTED
HCOV HKU1 RNA SPEC QL NAA+PROBE: NOT DETECTED
HCOV NL63 RNA SPEC QL NAA+PROBE: NOT DETECTED
HCOV OC43 RNA SPEC QL NAA+PROBE: NOT DETECTED
HCT VFR BLD AUTO: 25.4 % (ref 34–46.6)
HGB BLD-MCNC: 7.8 G/DL (ref 12–15.9)
HMPV RNA NPH QL NAA+NON-PROBE: NOT DETECTED
HPIV1 RNA ISLT QL NAA+PROBE: NOT DETECTED
HPIV2 RNA SPEC QL NAA+PROBE: NOT DETECTED
HPIV3 RNA NPH QL NAA+PROBE: NOT DETECTED
HPIV4 P GENE NPH QL NAA+PROBE: NOT DETECTED
LYMPHOCYTES # BLD AUTO: 1.3 10*3/MM3 (ref 0.7–3.1)
LYMPHOCYTES NFR BLD AUTO: 23.3 % (ref 19.6–45.3)
M PNEUMO IGG SER IA-ACNC: NOT DETECTED
MCH RBC QN AUTO: 27.3 PG (ref 26.6–33)
MCHC RBC AUTO-ENTMCNC: 30.7 G/DL (ref 31.5–35.7)
MCV RBC AUTO: 88.9 FL (ref 79–97)
MONOCYTES # BLD AUTO: 0.5 10*3/MM3 (ref 0.1–0.9)
MONOCYTES NFR BLD AUTO: 8.5 % (ref 5–12)
NEUTROPHILS NFR BLD AUTO: 3.5 10*3/MM3 (ref 1.7–7)
NEUTROPHILS NFR BLD AUTO: 62.2 % (ref 42.7–76)
NRBC BLD AUTO-RTO: 0 /100 WBC (ref 0–0.2)
NT-PROBNP SERPL-MCNC: 331.8 PG/ML (ref 0–1800)
PLATELET # BLD AUTO: 242 10*3/MM3 (ref 140–450)
PMV BLD AUTO: 6.7 FL (ref 6–12)
POTASSIUM SERPL-SCNC: 4.9 MMOL/L (ref 3.5–5.2)
POTASSIUM SERPL-SCNC: 5.2 MMOL/L (ref 3.5–5.2)
PROT SERPL-MCNC: 7.5 G/DL (ref 6–8.5)
RBC # BLD AUTO: 2.86 10*6/MM3 (ref 3.77–5.28)
RHINOVIRUS RNA SPEC NAA+PROBE: NOT DETECTED
RSV RNA NPH QL NAA+NON-PROBE: NOT DETECTED
SARS-COV-2 RNA NPH QL NAA+NON-PROBE: NOT DETECTED
SODIUM SERPL-SCNC: 139 MMOL/L (ref 136–145)
SODIUM SERPL-SCNC: 142 MMOL/L (ref 136–145)
TROPONIN T SERPL-MCNC: 0.03 NG/ML (ref 0–0.03)
TROPONIN T SERPL-MCNC: 0.03 NG/ML (ref 0–0.03)
WBC NRBC COR # BLD: 5.6 10*3/MM3 (ref 3.4–10.8)

## 2022-06-30 PROCEDURE — 83605 ASSAY OF LACTIC ACID: CPT | Performed by: STUDENT IN AN ORGANIZED HEALTH CARE EDUCATION/TRAINING PROGRAM

## 2022-06-30 PROCEDURE — 82962 GLUCOSE BLOOD TEST: CPT

## 2022-06-30 PROCEDURE — 84484 ASSAY OF TROPONIN QUANT: CPT | Performed by: NURSE PRACTITIONER

## 2022-06-30 PROCEDURE — 96361 HYDRATE IV INFUSION ADD-ON: CPT

## 2022-06-30 PROCEDURE — 83036 HEMOGLOBIN GLYCOSYLATED A1C: CPT | Performed by: NURSE PRACTITIONER

## 2022-06-30 PROCEDURE — G0378 HOSPITAL OBSERVATION PER HR: HCPCS

## 2022-06-30 PROCEDURE — 25010000002 CEFTRIAXONE PER 250 MG: Performed by: EMERGENCY MEDICINE

## 2022-06-30 PROCEDURE — 83605 ASSAY OF LACTIC ACID: CPT

## 2022-06-30 PROCEDURE — 85025 COMPLETE CBC W/AUTO DIFF WBC: CPT | Performed by: NURSE PRACTITIONER

## 2022-06-30 PROCEDURE — 36415 COLL VENOUS BLD VENIPUNCTURE: CPT | Performed by: NURSE PRACTITIONER

## 2022-06-30 PROCEDURE — 80048 BASIC METABOLIC PNL TOTAL CA: CPT | Performed by: NURSE PRACTITIONER

## 2022-06-30 PROCEDURE — 99220 PR INITIAL OBSERVATION CARE/DAY 70 MINUTES: CPT | Performed by: NURSE PRACTITIONER

## 2022-06-30 PROCEDURE — 63710000001 INSULIN LISPRO (HUMAN) PER 5 UNITS: Performed by: NURSE PRACTITIONER

## 2022-06-30 PROCEDURE — 87040 BLOOD CULTURE FOR BACTERIA: CPT | Performed by: EMERGENCY MEDICINE

## 2022-06-30 PROCEDURE — 96365 THER/PROPH/DIAG IV INF INIT: CPT

## 2022-06-30 RX ORDER — GABAPENTIN 300 MG/1
300 CAPSULE ORAL EVERY 8 HOURS SCHEDULED
Status: DISCONTINUED | OUTPATIENT
Start: 2022-06-30 | End: 2022-07-03 | Stop reason: HOSPADM

## 2022-06-30 RX ORDER — TOPIRAMATE 25 MG/1
12.5 TABLET ORAL 2 TIMES DAILY
Status: DISCONTINUED | OUTPATIENT
Start: 2022-06-30 | End: 2022-07-03 | Stop reason: HOSPADM

## 2022-06-30 RX ORDER — ONDANSETRON 4 MG/1
4 TABLET, FILM COATED ORAL EVERY 6 HOURS PRN
Status: DISCONTINUED | OUTPATIENT
Start: 2022-06-30 | End: 2022-07-03 | Stop reason: HOSPADM

## 2022-06-30 RX ORDER — SODIUM CHLORIDE, SODIUM LACTATE, POTASSIUM CHLORIDE, CALCIUM CHLORIDE 600; 310; 30; 20 MG/100ML; MG/100ML; MG/100ML; MG/100ML
75 INJECTION, SOLUTION INTRAVENOUS CONTINUOUS
Status: DISCONTINUED | OUTPATIENT
Start: 2022-06-30 | End: 2022-07-01

## 2022-06-30 RX ORDER — LEVETIRACETAM 500 MG/1
500 TABLET ORAL EVERY 12 HOURS SCHEDULED
Status: DISCONTINUED | OUTPATIENT
Start: 2022-06-30 | End: 2022-07-03 | Stop reason: HOSPADM

## 2022-06-30 RX ORDER — NICOTINE POLACRILEX 4 MG
15 LOZENGE BUCCAL
Status: DISCONTINUED | OUTPATIENT
Start: 2022-06-30 | End: 2022-07-03 | Stop reason: HOSPADM

## 2022-06-30 RX ORDER — ONDANSETRON 2 MG/ML
4 INJECTION INTRAMUSCULAR; INTRAVENOUS EVERY 6 HOURS PRN
Status: DISCONTINUED | OUTPATIENT
Start: 2022-06-30 | End: 2022-07-03 | Stop reason: HOSPADM

## 2022-06-30 RX ORDER — LEVETIRACETAM 500 MG/1
500 TABLET ORAL ONCE
Status: COMPLETED | OUTPATIENT
Start: 2022-06-30 | End: 2022-06-30

## 2022-06-30 RX ORDER — HYDROCODONE BITARTRATE AND ACETAMINOPHEN 7.5; 325 MG/1; MG/1
1 TABLET ORAL EVERY 8 HOURS PRN
Status: DISCONTINUED | OUTPATIENT
Start: 2022-06-30 | End: 2022-07-03 | Stop reason: HOSPADM

## 2022-06-30 RX ORDER — SODIUM CHLORIDE 9 MG/ML
75 INJECTION, SOLUTION INTRAVENOUS CONTINUOUS
Status: DISCONTINUED | OUTPATIENT
Start: 2022-06-30 | End: 2022-06-30

## 2022-06-30 RX ORDER — ASPIRIN 325 MG
325 TABLET ORAL DAILY
Status: DISCONTINUED | OUTPATIENT
Start: 2022-06-30 | End: 2022-07-03 | Stop reason: HOSPADM

## 2022-06-30 RX ORDER — SODIUM CHLORIDE 0.9 % (FLUSH) 0.9 %
3-10 SYRINGE (ML) INJECTION AS NEEDED
Status: DISCONTINUED | OUTPATIENT
Start: 2022-06-30 | End: 2022-07-03 | Stop reason: HOSPADM

## 2022-06-30 RX ORDER — ACETAMINOPHEN 160 MG/5ML
650 SOLUTION ORAL EVERY 4 HOURS PRN
Status: DISCONTINUED | OUTPATIENT
Start: 2022-06-30 | End: 2022-07-03 | Stop reason: HOSPADM

## 2022-06-30 RX ORDER — ACETAMINOPHEN 325 MG/1
650 TABLET ORAL EVERY 4 HOURS PRN
Status: DISCONTINUED | OUTPATIENT
Start: 2022-06-30 | End: 2022-07-03 | Stop reason: HOSPADM

## 2022-06-30 RX ORDER — NITROGLYCERIN 0.4 MG/1
0.4 TABLET SUBLINGUAL
Status: DISCONTINUED | OUTPATIENT
Start: 2022-06-30 | End: 2022-07-03 | Stop reason: HOSPADM

## 2022-06-30 RX ORDER — DEXTROSE MONOHYDRATE 25 G/50ML
25 INJECTION, SOLUTION INTRAVENOUS
Status: DISCONTINUED | OUTPATIENT
Start: 2022-06-30 | End: 2022-07-03 | Stop reason: HOSPADM

## 2022-06-30 RX ORDER — ACETAMINOPHEN 650 MG/1
650 SUPPOSITORY RECTAL EVERY 4 HOURS PRN
Status: DISCONTINUED | OUTPATIENT
Start: 2022-06-30 | End: 2022-07-03 | Stop reason: HOSPADM

## 2022-06-30 RX ORDER — PANTOPRAZOLE SODIUM 40 MG/1
40 TABLET, DELAYED RELEASE ORAL EVERY MORNING
Status: DISCONTINUED | OUTPATIENT
Start: 2022-07-01 | End: 2022-07-03 | Stop reason: HOSPADM

## 2022-06-30 RX ORDER — ATORVASTATIN CALCIUM 10 MG/1
10 TABLET, FILM COATED ORAL NIGHTLY
Status: DISCONTINUED | OUTPATIENT
Start: 2022-06-30 | End: 2022-07-03 | Stop reason: HOSPADM

## 2022-06-30 RX ORDER — INSULIN LISPRO 100 [IU]/ML
0-7 INJECTION, SOLUTION INTRAVENOUS; SUBCUTANEOUS AS NEEDED
Status: DISCONTINUED | OUTPATIENT
Start: 2022-06-30 | End: 2022-07-03 | Stop reason: HOSPADM

## 2022-06-30 RX ORDER — TOPIRAMATE 25 MG/1
12.5 TABLET ORAL ONCE
Status: COMPLETED | OUTPATIENT
Start: 2022-06-30 | End: 2022-06-30

## 2022-06-30 RX ORDER — AMOXICILLIN 250 MG
2 CAPSULE ORAL 2 TIMES DAILY
Status: DISCONTINUED | OUTPATIENT
Start: 2022-06-30 | End: 2022-07-03 | Stop reason: HOSPADM

## 2022-06-30 RX ORDER — OLANZAPINE 10 MG/2ML
1 INJECTION, POWDER, LYOPHILIZED, FOR SOLUTION INTRAMUSCULAR AS NEEDED
Status: DISCONTINUED | OUTPATIENT
Start: 2022-06-30 | End: 2022-07-03 | Stop reason: HOSPADM

## 2022-06-30 RX ORDER — SODIUM CHLORIDE 0.9 % (FLUSH) 0.9 %
3 SYRINGE (ML) INJECTION EVERY 12 HOURS SCHEDULED
Status: DISCONTINUED | OUTPATIENT
Start: 2022-06-30 | End: 2022-07-03 | Stop reason: HOSPADM

## 2022-06-30 RX ORDER — FUROSEMIDE 20 MG/1
TABLET ORAL
Qty: 90 TABLET | Refills: 1 | OUTPATIENT
Start: 2022-06-30 | End: 2022-07-03 | Stop reason: HOSPADM

## 2022-06-30 RX ORDER — INSULIN LISPRO 100 [IU]/ML
0-7 INJECTION, SOLUTION INTRAVENOUS; SUBCUTANEOUS
Status: DISCONTINUED | OUTPATIENT
Start: 2022-06-30 | End: 2022-07-03 | Stop reason: HOSPADM

## 2022-06-30 RX ADMIN — Medication 3 ML: at 08:43

## 2022-06-30 RX ADMIN — SODIUM CHLORIDE, POTASSIUM CHLORIDE, SODIUM LACTATE AND CALCIUM CHLORIDE 75 ML/HR: 600; 310; 30; 20 INJECTION, SOLUTION INTRAVENOUS at 10:28

## 2022-06-30 RX ADMIN — ATORVASTATIN CALCIUM 10 MG: 10 TABLET, FILM COATED ORAL at 21:50

## 2022-06-30 RX ADMIN — INSULIN LISPRO 2 UNITS: 100 INJECTION, SOLUTION INTRAVENOUS; SUBCUTANEOUS at 08:43

## 2022-06-30 RX ADMIN — SODIUM CHLORIDE 500 ML: 9 INJECTION, SOLUTION INTRAVENOUS at 01:48

## 2022-06-30 RX ADMIN — TOPIRAMATE 12.5 MG: 25 TABLET, FILM COATED ORAL at 21:50

## 2022-06-30 RX ADMIN — SENNOSIDES AND DOCUSATE SODIUM 2 TABLET: 50; 8.6 TABLET ORAL at 21:49

## 2022-06-30 RX ADMIN — Medication 3 ML: at 21:52

## 2022-06-30 RX ADMIN — LEVETIRACETAM 500 MG: 500 TABLET, FILM COATED ORAL at 08:42

## 2022-06-30 RX ADMIN — SODIUM CHLORIDE 75 ML/HR: 9 INJECTION, SOLUTION INTRAVENOUS at 05:22

## 2022-06-30 RX ADMIN — Medication 3 ML: at 05:22

## 2022-06-30 RX ADMIN — TOPIRAMATE 12.5 MG: 25 TABLET, FILM COATED ORAL at 08:42

## 2022-06-30 RX ADMIN — GABAPENTIN 300 MG: 300 CAPSULE ORAL at 21:52

## 2022-06-30 RX ADMIN — LEVETIRACETAM 500 MG: 500 TABLET, FILM COATED ORAL at 21:50

## 2022-06-30 RX ADMIN — GABAPENTIN 300 MG: 300 CAPSULE ORAL at 17:03

## 2022-06-30 RX ADMIN — ASPIRIN 325 MG ORAL TABLET 325 MG: 325 PILL ORAL at 17:03

## 2022-06-30 RX ADMIN — CEFTRIAXONE 2 G: 2 INJECTION, POWDER, FOR SOLUTION INTRAMUSCULAR; INTRAVENOUS at 01:49

## 2022-06-30 RX ADMIN — SODIUM CHLORIDE 700 ML: 9 INJECTION, SOLUTION INTRAVENOUS at 01:55

## 2022-06-30 RX ADMIN — SODIUM CHLORIDE, POTASSIUM CHLORIDE, SODIUM LACTATE AND CALCIUM CHLORIDE 1000 ML: 600; 310; 30; 20 INJECTION, SOLUTION INTRAVENOUS at 08:48

## 2022-07-01 ENCOUNTER — APPOINTMENT (OUTPATIENT)
Dept: CARDIOLOGY | Facility: HOSPITAL | Age: 87
End: 2022-07-01

## 2022-07-01 LAB
ANION GAP SERPL CALCULATED.3IONS-SCNC: 12 MMOL/L (ref 5–15)
BH CV LOWER VASCULAR LEFT COMMON FEMORAL AUGMENT: NORMAL
BH CV LOWER VASCULAR LEFT COMMON FEMORAL COMPETENT: NORMAL
BH CV LOWER VASCULAR LEFT COMMON FEMORAL COMPRESS: NORMAL
BH CV LOWER VASCULAR LEFT COMMON FEMORAL PHASIC: NORMAL
BH CV LOWER VASCULAR LEFT COMMON FEMORAL SPONT: NORMAL
BH CV LOWER VASCULAR LEFT DISTAL FEMORAL COMPRESS: NORMAL
BH CV LOWER VASCULAR LEFT GASTRONEMIUS COMPRESS: NORMAL
BH CV LOWER VASCULAR LEFT GREATER SAPH AK COMPRESS: NORMAL
BH CV LOWER VASCULAR LEFT GREATER SAPH BK COMPRESS: NORMAL
BH CV LOWER VASCULAR LEFT LESSER SAPH COMPRESS: NORMAL
BH CV LOWER VASCULAR LEFT MID FEMORAL AUGMENT: NORMAL
BH CV LOWER VASCULAR LEFT MID FEMORAL COMPETENT: NORMAL
BH CV LOWER VASCULAR LEFT MID FEMORAL COMPRESS: NORMAL
BH CV LOWER VASCULAR LEFT MID FEMORAL PHASIC: NORMAL
BH CV LOWER VASCULAR LEFT MID FEMORAL SPONT: NORMAL
BH CV LOWER VASCULAR LEFT PERONEAL COMPRESS: NORMAL
BH CV LOWER VASCULAR LEFT POPLITEAL AUGMENT: NORMAL
BH CV LOWER VASCULAR LEFT POPLITEAL COMPETENT: NORMAL
BH CV LOWER VASCULAR LEFT POPLITEAL COMPRESS: NORMAL
BH CV LOWER VASCULAR LEFT POPLITEAL PHASIC: NORMAL
BH CV LOWER VASCULAR LEFT POPLITEAL SPONT: NORMAL
BH CV LOWER VASCULAR LEFT POSTERIOR TIBIAL COMPRESS: NORMAL
BH CV LOWER VASCULAR LEFT PROXIMAL FEMORAL COMPRESS: NORMAL
BH CV LOWER VASCULAR LEFT SAPHENOFEMORAL JUNCTION COMPRESS: NORMAL
BH CV LOWER VASCULAR RIGHT COMMON FEMORAL AUGMENT: NORMAL
BH CV LOWER VASCULAR RIGHT COMMON FEMORAL COMPETENT: NORMAL
BH CV LOWER VASCULAR RIGHT COMMON FEMORAL COMPRESS: NORMAL
BH CV LOWER VASCULAR RIGHT COMMON FEMORAL PHASIC: NORMAL
BH CV LOWER VASCULAR RIGHT COMMON FEMORAL SPONT: NORMAL
BH CV LOWER VASCULAR RIGHT DISTAL FEMORAL COMPRESS: NORMAL
BH CV LOWER VASCULAR RIGHT GASTRONEMIUS COMPRESS: NORMAL
BH CV LOWER VASCULAR RIGHT GREATER SAPH AK COMPRESS: NORMAL
BH CV LOWER VASCULAR RIGHT GREATER SAPH BK COMPRESS: NORMAL
BH CV LOWER VASCULAR RIGHT LESSER SAPH COMPRESS: NORMAL
BH CV LOWER VASCULAR RIGHT MID FEMORAL AUGMENT: NORMAL
BH CV LOWER VASCULAR RIGHT MID FEMORAL COMPETENT: NORMAL
BH CV LOWER VASCULAR RIGHT MID FEMORAL COMPRESS: NORMAL
BH CV LOWER VASCULAR RIGHT MID FEMORAL PHASIC: NORMAL
BH CV LOWER VASCULAR RIGHT MID FEMORAL SPONT: NORMAL
BH CV LOWER VASCULAR RIGHT PERONEAL COMPRESS: NORMAL
BH CV LOWER VASCULAR RIGHT POPLITEAL AUGMENT: NORMAL
BH CV LOWER VASCULAR RIGHT POPLITEAL COMPETENT: NORMAL
BH CV LOWER VASCULAR RIGHT POPLITEAL COMPRESS: NORMAL
BH CV LOWER VASCULAR RIGHT POPLITEAL PHASIC: NORMAL
BH CV LOWER VASCULAR RIGHT POPLITEAL SPONT: NORMAL
BH CV LOWER VASCULAR RIGHT POSTERIOR TIBIAL COMPRESS: NORMAL
BH CV LOWER VASCULAR RIGHT PROXIMAL FEMORAL COMPRESS: NORMAL
BH CV LOWER VASCULAR RIGHT SAPHENOFEMORAL JUNCTION COMPRESS: NORMAL
BUN SERPL-MCNC: 25 MG/DL (ref 8–23)
BUN/CREAT SERPL: 24.5 (ref 7–25)
CALCIUM SPEC-SCNC: 7.9 MG/DL (ref 8.6–10.5)
CHLORIDE SERPL-SCNC: 111 MMOL/L (ref 98–107)
CO2 SERPL-SCNC: 19 MMOL/L (ref 22–29)
CREAT SERPL-MCNC: 1.02 MG/DL (ref 0.57–1)
DEPRECATED RDW RBC AUTO: 56.4 FL (ref 37–54)
EGFRCR SERPLBLD CKD-EPI 2021: 54 ML/MIN/1.73
ERYTHROCYTE [DISTWIDTH] IN BLOOD BY AUTOMATED COUNT: 17.9 % (ref 12.3–15.4)
GLUCOSE BLDC GLUCOMTR-MCNC: 166 MG/DL (ref 70–105)
GLUCOSE BLDC GLUCOMTR-MCNC: 199 MG/DL (ref 70–105)
GLUCOSE BLDC GLUCOMTR-MCNC: 218 MG/DL (ref 70–105)
GLUCOSE BLDC GLUCOMTR-MCNC: 227 MG/DL (ref 70–105)
GLUCOSE SERPL-MCNC: 116 MG/DL (ref 65–99)
HCT VFR BLD AUTO: 24.5 % (ref 34–46.6)
HGB BLD-MCNC: 7.6 G/DL (ref 12–15.9)
MAXIMAL PREDICTED HEART RATE: 135 BPM
MCH RBC QN AUTO: 27.5 PG (ref 26.6–33)
MCHC RBC AUTO-ENTMCNC: 31 G/DL (ref 31.5–35.7)
MCV RBC AUTO: 88.7 FL (ref 79–97)
NT-PROBNP SERPL-MCNC: 1145 PG/ML (ref 0–1800)
PLATELET # BLD AUTO: 198 10*3/MM3 (ref 140–450)
PMV BLD AUTO: 6.9 FL (ref 6–12)
POTASSIUM SERPL-SCNC: 4.7 MMOL/L (ref 3.5–5.2)
RBC # BLD AUTO: 2.77 10*6/MM3 (ref 3.77–5.28)
SODIUM SERPL-SCNC: 142 MMOL/L (ref 136–145)
STRESS TARGET HR: 115 BPM
WBC NRBC COR # BLD: 5.7 10*3/MM3 (ref 3.4–10.8)

## 2022-07-01 PROCEDURE — 97161 PT EVAL LOW COMPLEX 20 MIN: CPT

## 2022-07-01 PROCEDURE — 83880 ASSAY OF NATRIURETIC PEPTIDE: CPT | Performed by: STUDENT IN AN ORGANIZED HEALTH CARE EDUCATION/TRAINING PROGRAM

## 2022-07-01 PROCEDURE — 82962 GLUCOSE BLOOD TEST: CPT

## 2022-07-01 PROCEDURE — 97166 OT EVAL MOD COMPLEX 45 MIN: CPT

## 2022-07-01 PROCEDURE — G0378 HOSPITAL OBSERVATION PER HR: HCPCS

## 2022-07-01 PROCEDURE — 96366 THER/PROPH/DIAG IV INF ADDON: CPT

## 2022-07-01 PROCEDURE — 25010000002 CEFTRIAXONE PER 250 MG: Performed by: EMERGENCY MEDICINE

## 2022-07-01 PROCEDURE — 80048 BASIC METABOLIC PNL TOTAL CA: CPT | Performed by: STUDENT IN AN ORGANIZED HEALTH CARE EDUCATION/TRAINING PROGRAM

## 2022-07-01 PROCEDURE — 93970 EXTREMITY STUDY: CPT

## 2022-07-01 PROCEDURE — 99225 PR SBSQ OBSERVATION CARE/DAY 25 MINUTES: CPT | Performed by: STUDENT IN AN ORGANIZED HEALTH CARE EDUCATION/TRAINING PROGRAM

## 2022-07-01 PROCEDURE — 96361 HYDRATE IV INFUSION ADD-ON: CPT

## 2022-07-01 PROCEDURE — 63710000001 INSULIN LISPRO (HUMAN) PER 5 UNITS: Performed by: NURSE PRACTITIONER

## 2022-07-01 PROCEDURE — 96375 TX/PRO/DX INJ NEW DRUG ADDON: CPT

## 2022-07-01 PROCEDURE — 25010000002 FUROSEMIDE PER 20 MG: Performed by: STUDENT IN AN ORGANIZED HEALTH CARE EDUCATION/TRAINING PROGRAM

## 2022-07-01 PROCEDURE — 85027 COMPLETE CBC AUTOMATED: CPT | Performed by: STUDENT IN AN ORGANIZED HEALTH CARE EDUCATION/TRAINING PROGRAM

## 2022-07-01 RX ORDER — FUROSEMIDE 10 MG/ML
20 INJECTION INTRAMUSCULAR; INTRAVENOUS EVERY 12 HOURS
Status: DISCONTINUED | OUTPATIENT
Start: 2022-07-01 | End: 2022-07-03 | Stop reason: HOSPADM

## 2022-07-01 RX ADMIN — PANTOPRAZOLE SODIUM 40 MG: 40 TABLET, DELAYED RELEASE ORAL at 06:14

## 2022-07-01 RX ADMIN — INSULIN LISPRO 3 UNITS: 100 INJECTION, SOLUTION INTRAVENOUS; SUBCUTANEOUS at 16:38

## 2022-07-01 RX ADMIN — TOPIRAMATE 12.5 MG: 25 TABLET, FILM COATED ORAL at 08:28

## 2022-07-01 RX ADMIN — HYDROCODONE BITARTRATE AND ACETAMINOPHEN 1 TABLET: 7.5; 325 TABLET ORAL at 03:41

## 2022-07-01 RX ADMIN — HYDROCODONE BITARTRATE AND ACETAMINOPHEN 1 TABLET: 7.5; 325 TABLET ORAL at 20:49

## 2022-07-01 RX ADMIN — LEVETIRACETAM 500 MG: 500 TABLET, FILM COATED ORAL at 08:28

## 2022-07-01 RX ADMIN — SENNOSIDES AND DOCUSATE SODIUM 2 TABLET: 50; 8.6 TABLET ORAL at 08:28

## 2022-07-01 RX ADMIN — INSULIN LISPRO 2 UNITS: 100 INJECTION, SOLUTION INTRAVENOUS; SUBCUTANEOUS at 08:28

## 2022-07-01 RX ADMIN — CEFTRIAXONE 2 G: 2 INJECTION, POWDER, FOR SOLUTION INTRAMUSCULAR; INTRAVENOUS at 01:18

## 2022-07-01 RX ADMIN — ASPIRIN 325 MG ORAL TABLET 325 MG: 325 PILL ORAL at 08:28

## 2022-07-01 RX ADMIN — ATORVASTATIN CALCIUM 10 MG: 10 TABLET, FILM COATED ORAL at 20:49

## 2022-07-01 RX ADMIN — INSULIN LISPRO 2 UNITS: 100 INJECTION, SOLUTION INTRAVENOUS; SUBCUTANEOUS at 12:10

## 2022-07-01 RX ADMIN — GABAPENTIN 300 MG: 300 CAPSULE ORAL at 06:14

## 2022-07-01 RX ADMIN — SODIUM CHLORIDE, POTASSIUM CHLORIDE, SODIUM LACTATE AND CALCIUM CHLORIDE 75 ML/HR: 600; 310; 30; 20 INJECTION, SOLUTION INTRAVENOUS at 01:14

## 2022-07-01 RX ADMIN — Medication 3 ML: at 08:29

## 2022-07-01 RX ADMIN — GABAPENTIN 300 MG: 300 CAPSULE ORAL at 20:49

## 2022-07-01 RX ADMIN — TOPIRAMATE 12.5 MG: 25 TABLET, FILM COATED ORAL at 20:49

## 2022-07-01 RX ADMIN — SENNOSIDES AND DOCUSATE SODIUM 2 TABLET: 50; 8.6 TABLET ORAL at 20:50

## 2022-07-01 RX ADMIN — FUROSEMIDE 20 MG: 10 INJECTION, SOLUTION INTRAMUSCULAR; INTRAVENOUS at 12:10

## 2022-07-01 RX ADMIN — GABAPENTIN 300 MG: 300 CAPSULE ORAL at 15:08

## 2022-07-01 RX ADMIN — Medication 3 ML: at 20:50

## 2022-07-01 RX ADMIN — LEVETIRACETAM 500 MG: 500 TABLET, FILM COATED ORAL at 20:49

## 2022-07-01 NOTE — PROGRESS NOTES
[facility] Medicine Services Daily Progress Note    Patient Name: Aracelis Vargas  : 1935  MRN: [id number]  Primary Care Physician:  Gabriel Goss MD  Date of admission: 2022      Subjective      Chief Complaint: AMS      2022  Patient seen and examined at bedside, she admits to weakness but does not appear to be confused or in distress.  Continue treatment for UTI.    2022  Patient sitting up in chair, much improved from yesterday.  Complains of b/l leg swelling, left worse than right.  Will start lasix and check duplex.  Possible home tomorrow    ROS   Constitutional: Negative.   HENT: Negative.    Eyes: Negative.    Cardiovascular: Negative.    Respiratory: Negative.    Endocrine: Negative.    Hematologic/Lymphatic: Negative.    Skin: Negative.    Musculoskeletal: Positive for falls.   Gastrointestinal: Negative.    Genitourinary: Negative.    Neurological: Negative.    Psychiatric/Behavioral: Positive for altered mental status.   Allergic/Immunologic: Negative.    All other systems reviewed and are negative.       Objective      Vitals:   Temp:  [97.8 °F (36.6 °C)-99.5 °F (37.5 °C)] 99.2 °F (37.3 °C)  Heart Rate:  [86-91] 86  Resp:  [14-19] 17  BP: (136-169)/(69-77) 163/69    Physical Exam  Constitutional:       General: She is not in acute distress.     Appearance: She is obese. She is not toxic-appearing.      Comments: AAOx3, appears to be at baseline   HENT:      Head: Normocephalic and atraumatic.      Nose: Nose normal. No congestion.      Mouth/Throat:      Pharynx: Oropharynx is clear. No oropharyngeal exudate.   Eyes:      General: No scleral icterus.  Cardiovascular:      Rate and Rhythm: Normal rate and regular rhythm.      Heart sounds: No murmur heard.    No friction rub. No gallop.   Pulmonary:      Effort: No respiratory distress.      Breath sounds: No wheezing or rales.   Abdominal:      General: There is no distension.      Tenderness:  There is no abdominal tenderness. There is no guarding.   Musculoskeletal:         General: No swelling or deformity.      Cervical back: Normal range of motion. No rigidity.      Right lower leg: Edema present.      Left lower leg: Edema present.      Comments: LLE edema greater than right   Skin:     Coloration: Skin is not jaundiced.      Findings: No bruising or lesion.   Neurological:      General: No focal deficit present.      Mental Status: She is alert and oriented to person, place, and time.      Motor: No weakness.   Psychiatric:         Mood and Affect: Mood normal.         Behavior: Behavior normal.         Thought Content: Thought content normal.         Judgment: Judgment normal.             Result Review    Result Review:  I have personally reviewed the results from the time of this admission to 7/1/2022 11:09 EDT and agree with these findings:  [x]  Laboratory  []  Microbiology  []  Radiology  []  EKG/Telemetry   []  Cardiology/Vascular   []  Pathology  []  Old records  []  Other:          Assessment & Plan      Brief Patient Summary:  Aracelis Vargas is a 87 y.o. female who presented due to AMS and fall 2/2 UTI.        aspirin, 325 mg, Oral, Daily  atorvastatin, 10 mg, Oral, Nightly  cefTRIAXone, 2 g, Intravenous, Q24H  furosemide, 20 mg, Intravenous, Q12H  gabapentin, 300 mg, Oral, Q8H  insulin lispro, 0-7 Units, Subcutaneous, TID AC  levETIRAcetam, 500 mg, Oral, Q12H  pantoprazole, 40 mg, Oral, QAM  sennosides-docusate, 2 tablet, Oral, BID  sodium chloride, 3 mL, Intravenous, Q12H  topiramate, 12.5 mg, Oral, BID             Active Hospital Problems:  Active Hospital Problems    Diagnosis    • Altered mental status    • Type 2 diabetes mellitus (HCC)    • Seizure disorder (HCC)    • Elevated lactic acid level    • Obstructive sleep apnea    • Cardiac pacemaker in situ    • GERD (gastroesophageal reflux disease)    • Acute UTI    • Primary hypertension    • Hyperlipidemia    • Stage 3b chronic kidney  disease (CMS/HCC)    • Morbid obesity (HCC)    • Secondary hyperaldosteronism (HCC)    • Atherosclerosis of native coronary artery of native heart without angina pectoris      Plan:     #UTI    - Ua suspicious for UTI    - nursing to contact micro for culture    - blood cultures    - Rocephin daily    - lactic 2.8 > 2.8 > 1.9    - stop IVF due to LE swelling    - PT cleared for discharge home    #Fluid overload  #LE swelling    - fluids given for lactic acidosis have caused b/l LE edema    - start lasix 20mg IV bid    - intake and output    - check duplex    - if improves can discharge home tomorrow    #Acute Metabolic encephalopathy  #Fall    - suspect 2/2 UTI    - PT cleared for discharge home    - tx as above    - resolved with tx of UTI    #CAD    -continue home aspirin    - s/p pacemaker    #KYRA on CKD3b    -Cr 1.3 > 1.0 on admission , base 1.0    - hold nephrotoxic meds    - IVF stopped due to fluid overload    #Seizure disorder    -restart home Keppra    #DM    -a1c 8.2    -ISS    #HTN    -hold home anti-hypertensives    #Anemia    -hgb 7.8 > 7.6    - suspect 2/2 renal disease, no overt bleeding    - follow labs    #HLD    -resume home statin    #GERD    - PPI    #Morbid Obesity    -BMI 46.97, weight loss encouraged      DVT prophylaxis:  Mechanical DVT prophylaxis orders are present.    CODE STATUS:    Code Status (Patient has no pulse and is not breathing): CPR (Attempt to Resuscitate)  Medical Interventions (Patient has pulse or is breathing): Full Support      Disposition:  I expect patient to be discharged in 1-2 days.    This patient has been examined wearing appropriate Personal Protective Equipment and discussed with PAtient. 07/01/22      Electronically signed by Ponce Aburto DO, 07/01/22, 11:09 EDT.  [facility] Hospitalist Team

## 2022-07-01 NOTE — PLAN OF CARE
Goal Outcome Evaluation:  Plan of Care Reviewed With: patient           Outcome Evaluation: 86 y/o female brought in hospital on 6/29 due to falls and change in mental status, +UTI. PMH includes CKD, htn, DM with neuropathy, Pacemaker. Patient resides with spouse who was recently in hospital. Patient is independent using cane for household mobility at baseline per her report. At time of eval, patient able to perform supine to sit activity with SBA using bed rail and head of bed elevated. Patient needed min/mod A to come to standing due to painful L ankle. Patient ambulated 20 ft using rw with CG/SBA ; gait slow but steady. Recommend patient continue using rw at discharge , should be safe to return home.

## 2022-07-01 NOTE — CASE MANAGEMENT/SOCIAL WORK
Continued Stay Note  [facility]     Patient Name: Aracelis Vargas  MRN: [id number]  Today's Date: 7/1/2022    Admit Date: 6/29/2022     Discharge Plan     Row Name 07/01/22 1346       Plan    Plan DC plan: return home with spouse and [facility] (accepted, order in).    Plan Comments Confirmed [facility] services with [facility] liamone Nicholson. Order placed. DC barriers: pending final urine culture.              Phone communication or documentation only - no physical contact with patient or family.      Megan Naegele, RN      Office Phone: [phone]  Office Cell: [phone]

## 2022-07-01 NOTE — THERAPY EVALUATION
Patient Name: Aracelis Vargas  : 1935    MRN: [id number]                              Today's Date: 2022       Admit Date: 2022    Visit Dx:     ICD-10-CM ICD-9-CM   1. Altered mental status, unspecified altered mental status type  R41.82 780.97   2. Acute urinary tract infection  N39.0 599.0     Patient Active Problem List   Diagnosis   • Fall   • Secondary hyperaldosteronism (HCC)   • Stage 3b chronic kidney disease (CMS/HCC)   • Hypertension associated with stage 3 chronic kidney disease due to type 2 diabetes mellitus (HCC)   • Morbid obesity (HCC)   • Atherosclerosis of native coronary artery of native heart without angina pectoris   • AV block, Mobitz II   • Hyperlipidemia   • Primary hypertension   • Acute UTI   • GERD (gastroesophageal reflux disease)   • Cardiac pacemaker in situ   • Cerebrovascular accident (CVA) (HCC)   • Hyperammonemia (HCC)   • Obstructive sleep apnea   • Altered mental status   • Type 2 diabetes mellitus (HCC)   • Seizure disorder (HCC)   • Elevated lactic acid level     Past Medical History:   Diagnosis Date   • Atherosclerosis of native coronary artery of native heart without angina pectoris 10/22/2020   • Atrioventricular block, Mobitz type 1, Wenckebach 10/21/2020    Added automatically from request for surgery [id number]   • Hypertension associated with stage 3 chronic kidney disease due to type 2 diabetes mellitus (HCC) 10/22/2020   • Morbid obesity (HCC) 10/22/2020   • Secondary hyperaldosteronism (HCC) 10/22/2020   • Stage 3b chronic kidney disease (HCC) 10/22/2020   • Type 2 diabetes mellitus with diabetic neuropathy, with long-term current use of insulin (HCC) 10/22/2020   • Type 2 diabetes mellitus with diabetic neuropathy, with long-term current use of insulin (HCC) 10/22/2020     Past Surgical History:   Procedure Laterality Date   • CARDIAC CATHETERIZATION N/A 10/23/2020    Procedure: Left Heart Cath and coronary angiogram;  Surgeon: Wang Plaza MD;   Location: [facility] CATH INVASIVE LOCATION;  Service: Cardiovascular;  Laterality: N/A;   • CARDIAC ELECTROPHYSIOLOGY PROCEDURE Left 11/3/2021    Procedure: Pacemaker DC new;  Surgeon: Wang Plaza MD;  Location: [facility] CATH INVASIVE LOCATION;  Service: Cardiovascular;  Laterality: Left;   • CARDIAC ELECTROPHYSIOLOGY PROCEDURE N/A 11/3/2021    Procedure: LOOP RECORDER REMOVAL;  Surgeon: Wang Plaza MD;  Location: [facility] CATH INVASIVE LOCATION;  Service: Cardiovascular;  Laterality: N/A;   • HYSTERECTOMY     • JOINT REPLACEMENT Right     Hip   • JOINT REPLACEMENT Left     hip   • JOINT REPLACEMENT Left     hip (for second time)   • JOINT REPLACEMENT Right     knee   • OOPHORECTOMY        General Information     Row Name 07/01/22 0941          Physical Therapy Time and Intention    Document Type evaluation  -CR     Mode of Treatment physical therapy  -CR     Row Name 07/01/22 0941          General Information    Patient Profile Reviewed yes  -CR     Prior Level of Function independent:;all household mobility  assist with ADL's  -CR     Existing Precautions/Restrictions fall  -CR     Barriers to Rehab medically complex;previous functional deficit  -CR     Row Name 07/01/22 0941          Living Environment    People in Home spouse;other relative(s)  -CR     Row Name 07/01/22 0941          Home Main Entrance    Number of Stairs, Main Entrance three  -CR     Row Name 07/01/22 0941          Stairs Within Home, Primary    Stairs, Within Home, Primary 1 flight to basement but has stair lift  -CR     Row Name 07/01/22 0941          Cognition    Orientation Status (Cognition) oriented to;person;place  -CR     Row Name 07/01/22 0941          Safety Issues, Functional Mobility    Safety Issues Affecting Function (Mobility) at risk behavior observed;friction/shear risk;insight into deficits/self-awareness  -CR     Impairments Affecting Function (Mobility) pain;endurance/activity tolerance;cognition;strength  -CR            User Key  (r) = Recorded By, (t) = Taken By, (c) = Cosigned By    Initials Name Provider Type    CR Reyes, Carmela, PT Physical Therapist               Mobility     Row Name 07/01/22 0945          Bed Mobility    Bed Mobility supine-sit  -CR     Supine-Sit [place] (Bed Mobility) standby assist  -CR     Assistive Device (Bed Mobility) bed rails;head of bed elevated  -CR     Row Name 07/01/22 0945          Bed-Chair Transfer    Bed-Chair [place] (Transfers) standby assist  -CR     Assistive Device (Bed-Chair Transfers) walker, front-wheeled  -CR     Row Name 07/01/22 0945          Sit-Stand Transfer    Sit-Stand [place]owell (Transfers) minimum assist (75% patient effort);moderate assist (50% patient effort)  -CR     Assistive Device (Sit-Stand Transfers) walker, front-wheeled  -CR     Row Name 07/01/22 0945          Gait/Stairs (Locomotion)    [place]owell Level (Gait) contact guard  -CR     Assistive Device (Gait) walker, front-wheeled  -CR     Distance in Feet (Gait) 20  -CR     Deviations/Abnormal Patterns (Gait) gait speed decreased  -CR     Left Sided Gait Deviations weight shift ability decreased  -CR           User Key  (r) = Recorded By, (t) = Taken By, (c) = Cosigned By    Initials Name Provider Type    CR Reyes, Carmela, PT Physical Therapist               Obj/Interventions     Row Name 07/01/22 0946          Range of Motion Comprehensive    General Range of Motion bilateral lower extremity ROM WFL  -CR     Comment, General Range of Motion pain with AROM L ankle  -CR     Row Name 07/01/22 0946          Strength Comprehensive (MMT)    Comment, General Manual Muscle Testing (MMT) Assessment BLE grossly wfl  -CR     Row Name 07/01/22 0946          Balance    Balance Assessment sitting static balance;sitting dynamic balance;sit to stand dynamic balance;standing dynamic balance;standing static balance  -CR     Static Sitting Balance independent  -CR     Dynamic Sitting Balance standby assist  -CR      Position, Sitting Balance sitting edge of bed  -CR     Sit to Stand Dynamic Balance minimal assist;moderate assist  -CR     Static Standing Balance contact guard  -CR     Dynamic Standing Balance contact guard  -CR     Position/Device Used, Standing Balance walker, front-wheeled  -CR     Row Name 07/01/22 0946          Sensory Assessment (Somatosensory)    Sensory Assessment (Somatosensory) sensation intact  -CR           User Key  (r) = Recorded By, (t) = Taken By, (c) = Cosigned By    Initials Name Provider Type    CR Reyes, Carmela, PT Physical Therapist               Goals/Plan     Row Name 07/01/22 0951          Gait Training Goal 1 (PT)    Activity/Assistive Device (Gait Training Goal 1, PT) gait (walking locomotion);assistive device use;decrease fall risk;diminish gait deviation;increase endurance/gait distance;walker, rolling  -CR     [place]ia Level (Gait Training Goal 1, PT) standby assist  -CR     Distance (Gait Training Goal 1, PT) 50 ft x 2  -CR     Time Frame (Gait Training Goal 1, PT) 1 week  -CR     Row Name 07/01/22 0951          Stairs Goal 1 (PT)    Activity/Assistive Device (Stairs Goal 1, PT) stairs, all skills  -CR     [place]ia Level/Cues Needed (Stairs Goal 1, PT) standby assist  -CR     Number of Stairs (Stairs Goal 1, PT) 3  -CR     Time Frame (Stairs Goal 1, PT) 1 week  -CR     Row Name 07/01/22 0951          Patient Education Goal (PT)    Activity (Patient Education Goal, PT) HEP  -CR     [place]/Cues/Accuracy (Memory Goal 2, PT) demonstrates adequately  -CR     Time Frame (Patient Education Goal, PT) 1 week  -CR     Row Name 07/01/22 0951          Therapy Assessment/Plan (PT)    Planned Therapy Interventions (PT) gait training;home exercise program;patient/family education;stair training  -CR           User Key  (r) = Recorded By, (t) = Taken By, (c) = Cosigned By    Initials Name Provider Type    CR Reyes, Carmela, PT Physical Therapist               Clinical Impression      Row Name 07/01/22 0947          Pain    Pain Intervention(s) Medication (See MAR);Cold applied  -CR     Additional Documentation Pain Scale: FACES Pre/Post-Treatment (Group)  -CR     Row Name 07/01/22 0947          Pain Scale: FACES Pre/Post-Treatment    Pain: FACES Scale, Pretreatment 2-->hurts little bit  -CR     Posttreatment Pain Rating 6-->hurts even more  -CR     Pain Location - Side/Orientation Left  -CR     Pain Location - ankle  -CR     Row Name 07/01/22 0947          Plan of Care Review    Plan of Care Reviewed With patient  -CR     Outcome Evaluation 86 y/o female brought in hospital on 6/29 due to falls and change in mental status, +UTI. PMH includes CKD, htn, DM with neuropathy, Pacemaker. Patient resides with spouse who was recently in hospital. Patient is independent using cane for household mobility at baseline per her report. At time of eval, patient able to perform supine to sit activity with SBA using bed rail and head of bed elevated. Patient needed min/mod A to come to standing due to painful L ankle. Patient ambulated 20 ft using rw with CG/SBA ; gait slow but steady. Recommend patient continue using rw at discharge , should be safe to return home.  -CR     Row Name 07/01/22 0947          Therapy Assessment/Plan (PT)    Patient/Family Therapy Goals Statement (PT) home  -CR     Rehab Potential (PT) good, to achieve stated therapy goals  -CR     Criteria for Skilled Interventions Met (PT) yes;skilled treatment is necessary  -CR     Therapy Frequency (PT) 3 times/wk  -CR     Predicted Duration of Therapy Intervention (PT) dc  -CR           User Key  (r) = Recorded By, (t) = Taken By, (c) = Cosigned By    Initials Name Provider Type    CR Reyes, Carmela, PT Physical Therapist               Outcome Measures     Row Name 07/01/22 0952          How much help from another person do you currently need...    Turning from your back to your side while in flat bed without using bedrails? 3  -CR     Moving  from lying on back to sitting on the side of a flat bed without bedrails? 3  -CR     Moving to and from a bed to a chair (including a wheelchair)? 3  -CR     Standing up from a chair using your arms (e.g., wheelchair, bedside chair)? 2  -CR     Climbing 3-5 steps with a railing? 3  -CR     To walk in hospital room? 3  -CR     AM-PAC 6 Clicks Score (PT) 17  -CR     Highest level of mobility 5 --> Static standing  -CR           User Key  (r) = Recorded By, (t) = Taken By, (c) = Cosigned By    Initials Name Provider Type    CR Reyes, Carmela, PT Physical Therapist                             Physical Therapy Education                 Title: PT OT SLP Therapies (In Progress)     Topic: Physical Therapy (In Progress)     Point: Mobility training (Done)     Learning Progress Summary           Patient Acceptance, E, VU by CR at 7/1/2022 0984                   Point: Home exercise program (Not Started)     Learner Progress:  Not documented in this visit.                      User Key     Initials Effective Dates Name Provider Type Discipline    CR 06/16/21 -  Reyes, Carmela, PT Physical Therapist PT              PT Recommendation and Plan  Planned Therapy Interventions (PT): gait training, home exercise program, patient/family education, stair training  Plan of Care Reviewed With: patient  Outcome Evaluation: 88 y/o female brought in hospital on 6/29 due to falls and change in mental status, +UTI. PMH includes CKD, htn, DM with neuropathy, Pacemaker. Patient resides with spouse who was recently in hospital. Patient is independent using cane for household mobility at baseline per her report. At time of eval, patient able to perform supine to sit activity with SBA using bed rail and head of bed elevated. Patient needed min/mod A to come to standing due to painful L ankle. Patient ambulated 20 ft using rw with CG/SBA ; gait slow but steady. Recommend patient continue using rw at discharge , should be safe to return home.      Time Calculation:    PT Charges     Row Name 07/01/22 0953             Time Calculation    Start Time 0840  -CR      Stop Time 0901  -CR      Time Calculation (min) 21 min  -CR      PT Received On 07/01/22  -CR      PT - Next Appointment 07/03/22  -CR      PT Goal Re-Cert Due Date 07/15/22  -CR              Time Calculation- PT    Total Timed Code Minutes- PT 0 minute(s)  -CR            User Key  (r) = Recorded By, (t) = Taken By, (c) = Cosigned By    Initials Name Provider Type    CR Reyes, Carmela, PT Physical Therapist              Therapy Charges for Today     Code Description Service Date Service Provider Modifiers Qty    86955996204 HC PT EVAL LOW COMPLEXITY 4 7/1/2022 Reyes, Carmela, PT GP 1          PT G-Codes  AM-PAC 6 Clicks Score (PT): 17    Carmela Reyes, PT  7/1/2022

## 2022-07-01 NOTE — PROGRESS NOTES
Spoke to spouse today    Agrees to [facility] services  Demographics verified   Dr ABNER De Leon will follow via Secure chat 6.30.22   SN and PT

## 2022-07-01 NOTE — PLAN OF CARE
Goal Outcome Evaluation:  Plan of Care Reviewed With: patient        Progress: improving  Outcome Evaluation: 87 y.o. female who presented to [facility] on 6/29/2022 via EMS from home.  Per ED report EMS was initially called because she had fallen however  reported she had altered mental status as well.  She is currently awake and alert oriented to self and place and answers appropriately however she is a poor historian for details.  She reports she takes care of her  at home who was recently hospitalized. Upon further questioning it is found that her spouse assists her w/ dressing & bathing & household mgmt, driving. Pt had another relative assisting her while spouse was hospitalized. Pt has ankle injury from a fall but is able to walk short distance on RW w/ CGA. Pt appears ner her functional baseline but with deficits in cognition & pain, endurance. Recommend home with HHC if she still has family assist; however depending on the current ability of her spouse to assist her with basic care and the presence of other family at home she may require addittional level of care at d/c. Spouse is currently receiving HHC.

## 2022-07-01 NOTE — PLAN OF CARE
Goal Outcome Evaluation:  Plan of Care Reviewed With: patient        Progress: improving  Outcome Evaluation: Patient has been resting well throughout the shift. Has had some complaints of pain in left foot. Worked with PT/OT today and sat up in the chair for a bit. IVF's D/C'd. Remains on IV rocephin and IV lasix. Urine culture positive for e.coli. Will continue to monitor.

## 2022-07-01 NOTE — THERAPY EVALUATION
Patient Name: Aracelis Vargas  : 1935    MRN: [id number]                              Today's Date: 2022       Admit Date: 2022    Visit Dx:     ICD-10-CM ICD-9-CM   1. Altered mental status, unspecified altered mental status type  R41.82 780.97   2. Acute urinary tract infection  N39.0 599.0     Patient Active Problem List   Diagnosis   • Fall   • Secondary hyperaldosteronism (HCC)   • Stage 3b chronic kidney disease (CMS/HCC)   • Hypertension associated with stage 3 chronic kidney disease due to type 2 diabetes mellitus (HCC)   • Morbid obesity (HCC)   • Atherosclerosis of native coronary artery of native heart without angina pectoris   • AV block, Mobitz II   • Hyperlipidemia   • Primary hypertension   • Acute UTI   • GERD (gastroesophageal reflux disease)   • Cardiac pacemaker in situ   • Cerebrovascular accident (CVA) (HCC)   • Hyperammonemia (HCC)   • Obstructive sleep apnea   • Altered mental status   • Type 2 diabetes mellitus (HCC)   • Seizure disorder (HCC)   • Elevated lactic acid level     Past Medical History:   Diagnosis Date   • Atherosclerosis of native coronary artery of native heart without angina pectoris 10/22/2020   • Atrioventricular block, Mobitz type 1, Wenckebach 10/21/2020    Added automatically from request for surgery [id number]   • Hypertension associated with stage 3 chronic kidney disease due to type 2 diabetes mellitus (HCC) 10/22/2020   • Morbid obesity (HCC) 10/22/2020   • Secondary hyperaldosteronism (HCC) 10/22/2020   • Stage 3b chronic kidney disease (HCC) 10/22/2020   • Type 2 diabetes mellitus with diabetic neuropathy, with long-term current use of insulin (HCC) 10/22/2020   • Type 2 diabetes mellitus with diabetic neuropathy, with long-term current use of insulin (HCC) 10/22/2020     Past Surgical History:   Procedure Laterality Date   • CARDIAC CATHETERIZATION N/A 10/23/2020    Procedure: Left Heart Cath and coronary angiogram;  Surgeon: Wang Plaaz MD;   "Location: [facility] CATH INVASIVE LOCATION;  Service: Cardiovascular;  Laterality: N/A;   • CARDIAC ELECTROPHYSIOLOGY PROCEDURE Left 11/3/2021    Procedure: Pacemaker DC new;  Surgeon: Wang Plaza MD;  Location: [facility] CATH INVASIVE LOCATION;  Service: Cardiovascular;  Laterality: Left;   • CARDIAC ELECTROPHYSIOLOGY PROCEDURE N/A 11/3/2021    Procedure: LOOP RECORDER REMOVAL;  Surgeon: Wang Plaza MD;  Location: [facility] CATH INVASIVE LOCATION;  Service: Cardiovascular;  Laterality: N/A;   • HYSTERECTOMY     • JOINT REPLACEMENT Right     Hip   • JOINT REPLACEMENT Left     hip   • JOINT REPLACEMENT Left     hip (for second time)   • JOINT REPLACEMENT Right     knee   • OOPHORECTOMY        General Information     Row Name 07/01/22 1031          OT Time and Intention    Document Type evaluation  -     Mode of Treatment co-treatment;physical therapy;occupational therapy  -     Row Name 07/01/22 1031          General Information    Prior Level of Function independent:;all household mobility;min assist:;ADL's;dependent:;cooking;cleaning;driving;home management  Pt initially states she is (I), but upon careful questioning states she has not driven in years, uses cane, and spouse assists w/ LBD and LBB.  -     Existing Precautions/Restrictions fall  had fall at home & injured Lt ankle. Swelling noticeable 7 vascular changes visible in both calves w/ red, dry skin. Pt able to tolerate bearing weight on ankle, though it was sore.  -     Barriers to Rehab medically complex;previous functional deficit;cognitive status  -     Row Name 07/01/22 1031          Living Environment    People in Home spouse  spouse is retired. He was just d/c from a hospital stay & was having [facility]. Another person had been at home helping pt but unknown if this other person left when spouse came home.  -     Row Name 07/01/22 1031          Home Main Entrance    Number of Stairs, Main Entrance other (see comments)  Pt states, \"None to " "speak of\" but is vague.  -     Row Name 07/01/22 1031          Stairs Within Home, Primary    Stairs, Within Home, Primary stair lift to basement living quarters, bed/bath  -     Number of Stairs, Within Home, Primary other (see comments)  -     Row Name 07/01/22 1031          Cognition    Orientation Status (Cognition) oriented to;person;place;disoriented to;situation;time;verbal cues/prompts needed for orientation  -     Row Name 07/01/22 1031          Safety Issues, Functional Mobility    Safety Issues Affecting Function (Mobility) insight into deficits/self-awareness;at risk behavior observed;judgment;problem-solving;safety precaution awareness;sequencing abilities  -     Impairments Affecting Function (Mobility) pain;endurance/activity tolerance;cognition;strength;range of motion (ROM);balance  -           User Key  (r) = Recorded By, (t) = Taken By, (c) = Cosigned By    Initials Name Provider Type     Lyla Hanna OT Occupational Therapist                 Mobility/ADL's     Row Name 07/01/22 1038          Bed Mobility    Bed Mobility supine-sit  -     Supine-Sit [place] (Bed Mobility) verbal cues;nonverbal cues (demo/gesture)  -     Assistive Device (Bed Mobility) head of bed elevated;bed rails  -     Row Name 07/01/22 1038          Transfers    Bed-Chair [place] (Transfers) standby assist;verbal cues  -     Assistive Device (Bed-Chair Transfers) walker, front-wheeled  -     Sit-Stand [place] (Transfers) minimum assist (75% patient effort)  -     Row Name 07/01/22 1038          Sit-Stand Transfer    Assistive Device (Sit-Stand Transfers) walker, front-wheeled  -     Row Name 07/01/22 1038          Functional Mobility    Functional Mobility- Ind. Level contact guard assist  -     Functional Mobility- Device walker, front-wheeled  -     Functional Mobility-Distance (Feet) 15  -     Functional Mobility- Safety Issues step length decreased;weight-shifting ability " decreased  -[facility] Name 07/01/22 1038          Activities of Daily Living    BADL Assessment/Intervention lower body dressing;grooming;feeding;toileting  -[facility] Name 07/01/22 1038          Lower Body Dressing Assessment/Training    [place] Level (Lower Body Dressing) don;socks;dependent (less than 25% patient effort)  -     Position (Lower Body Dressing) edge of bed sitting  -[facility] Name 07/01/22 1038          Grooming Assessment/Training    [place] Level (Grooming) wash face, hands;set up  -     Position (Grooming) supported sitting  -MH     Row Name 07/01/22 1038          Self-Feeding Assessment/Training    [place] Level (Feeding) feeding skills;modified independence  -     Position (Self-Feeding) supported sitting  -[facility] Name 07/01/22 1038          Toileting Assessment/Training    [place] Level (Toileting) toileting skills;dependent (less than 25% patient effort)  -     Comment, (Toileting) pt using periwick catheter  -           User Key  (r) = Recorded By, (t) = Taken By, (c) = Cosigned By    Initials Name Provider Type     Lyla Hanna OT Occupational Therapist               Obj/Interventions     [facility] Name 07/01/22 1044          Vision Assessment/Intervention    Visual Impairment/Limitations corrective lenses full-time  -MH     Row Name 07/01/22 1044          Range of Motion Comprehensive    Comment, General Range of Motion Rt shld flex 50% PROM; AROM shld flex 50%; Lt shld flex PROM 75%; trunk flex 75%  -[facility] Name 07/01/22 1044          Strength Comprehensive (MMT)    Comment, General Manual Muscle Testing (MMT) Assessment  4-/5; shld flex 3+/5 in limited range.  -[facility] Name 07/01/22 1044          Balance    Balance Assessment sitting static balance;sitting dynamic balance;standing static balance;standing dynamic balance  -     Static Sitting Balance independent  -     Dynamic Sitting Balance standby assist  -     Position, Sitting  Balance sitting edge of bed  -     Static Standing Balance standby assist  -MH     Dynamic Standing Balance minimal assist  -MH     Position/Device Used, Standing Balance walker, front-wheeled  -           User Key  (r) = Recorded By, (t) = Taken By, (c) = Cosigned By    Initials Name Provider Type     Lyla Hanna, OT Occupational Therapist               Goals/Plan     Row Name 07/01/22 1051          Bed Mobility Goal 1 (OT)    Activity/Assistive Device (Bed Mobility Goal 1, OT) bed mobility activities, all  -MH     [place] Level/Cues Needed (Bed Mobility Goal 1, OT) independent  -     Time Frame (Bed Mobility Goal 1, OT) 2 weeks  -     Row Name 07/01/22 1051          Transfer Goal 1 (OT)    Activity/Assistive Device (Transfer Goal 1, OT) transfers, all  -MH     [place] Level/Cues Needed (Transfer Goal 1, OT) modified independence  -     Time Frame (Transfer Goal 1, OT) 2 weeks  -     Row Name 07/01/22 1051          Bathing Goal 1 (OT)    Activity/Device (Bathing Goal 1, OT) bathing skills, all  -MH     [place] Level/Cues Needed (Bathing Goal 1, OT) moderate assist (50-74% patient effort)  -     Time Frame (Bathing Goal 1, OT) 2 weeks  -     Row Name 07/01/22 1051          Dressing Goal 1 (OT)    Activity/Device (Dressing Goal 1, OT) dressing skills, all  -MH     [place]/Cues Needed (Dressing Goal 1, OT) moderate assist (50-74% patient effort)  -     Time Frame (Dressing Goal 1, OT) 2 weeks  -     Row Name 07/01/22 1051          Toileting Goal 1 (OT)    Activity/Device (Toileting Goal 1, OT) toileting skills, all  -MH     [place] Level/Cues Needed (Toileting Goal 1, OT) set-up required  -     Time Frame (Toileting Goal 1, OT) 2 weeks  -     Row Name 07/01/22 1051          Therapy Assessment/Plan (OT)    Planned Therapy Interventions (OT) activity tolerance training;adaptive equipment training;BADL retraining;cognitive/visual perception retraining;edema  control/reduction;functional balance retraining;occupation/activity based interventions;patient/caregiver education/training;transfer/mobility retraining;ROM/therapeutic exercise  -           User Key  (r) = Recorded By, (t) = Taken By, (c) = Cosigned By    Initials Name Provider Type    Lyla Busch OT Occupational Therapist               Clinical Impression     Row Name 07/01/22 1046          Pain Assessment    Pre/Posttreatment Pain Comment pt reported her ankle felt a little better after walking on it for a few minutes.  -     Row Name 07/01/22 1046          Pain Scale: FACES Pre/Post-Treatment    Pain: FACES Scale, Pretreatment 4-->hurts little more  -     Posttreatment Pain Rating 2-->hurts little bit  -     Row Name 07/01/22 1046          Plan of Care Review    Plan of Care Reviewed With patient  -     Progress improving  -     Outcome Evaluation 87 y.o. female who presented [facility] on 6/29/[facility] via EMS from home.  Per ED report EMS was initially called because she had fallen however  reported she had altered mental status as well.  She is currently awake and alert oriented to self and place and answers appropriately however she is a poor historian for details.  She reports she takes care of her  at home who was recently hospitalized. Upon further questioning it is found that her spouse assists her w/ dressing & bathing & household mgmt, driving. Pt had another relative assisting her while spouse was hospitalized. Pt has ankle injury from a fall but is able to walk short distance on RW w/ CGA. Pt appears ner her functional baseline but with deficits in cognition & pain, endurance. Recommend home with C if she still has family assist; however depending on the current ability of her spouse to assist her with basic care and the presence of other family at home she may require addittional level of care at d/c. Spouse is currently receiving C.  -     Row Name  07/01/22 1046          Therapy Assessment/Plan (OT)    Rehab Potential (OT) good, to achieve stated therapy goals  -     Criteria for Skilled Therapeutic Interventions Met (OT) skilled treatment is necessary  -     Therapy Frequency (OT) 5 times/wk  -     Predicted Duration of Therapy Intervention (OT) until d/c  -     Row Name 07/01/22 1046          Therapy Plan Review/Discharge Plan (OT)    Anticipated Discharge Disposition (OT) home with 24/7 care;home with home health  pending assist levels available at home  -     Row Name 07/01/22 1046          Vital Signs    O2 Delivery Pre Treatment room air  -     Pre Patient Position Supine  -     Intra Patient Position Standing  -     Post Patient Position Sitting  -     Row Name 07/01/22 1046          Positioning and Restraints    Pre-Treatment Position in bed  -     Post Treatment Position chair  -     In Chair notified nsg;sitting;call light within reach;encouraged to call for assist;exit alarm on  -           User Key  (r) = Recorded By, (t) = Taken By, (c) = Cosigned By    Initials Name Provider Type     Lyla Hanna, OT Occupational Therapist               Outcome Measures     Row Name 07/01/22 1052          How much help from another is currently needed...    Putting on and taking off regular lower body clothing? 2  -MH     Bathing (including washing, rinsing, and drying) 2  -MH     Toileting (which includes using toilet bed pan or urinal) 1  -MH     Putting on and taking off regular upper body clothing 2  -MH     Taking care of personal grooming (such as brushing teeth) 3  -MH     Eating meals 4  -MH     AM-PAC 6 Clicks Score (OT) 14  -     Row Name 07/01/22 1052 07/01/22 0952       How much help from another person do you currently need...    Turning from your back to your side while in flat bed without using bedrails? 3  - 3  -CR    Moving from lying on back to sitting on the side of a flat bed without bedrails? 3  - 3  -CR     Moving to and from a bed to a chair (including a wheelchair)? 3  - 3  -CR    Standing up from a chair using your arms (e.g., wheelchair, bedside chair)? 2  - 2  -CR    Climbing 3-5 steps with a railing? 2  - 3  -CR    To walk in hospital room? 3  - 3  -CR    AM-PAC 6 Clicks Score (PT) 16  - 17  -CR    Highest level of mobility 5 --> Static standing  - 5 --> Static standing  -    Row Name 07/01/22 1052          Modified [place] Scale    Pre-Stroke Modified [place] Scale 3 - Moderate disability.  Requiring some help, but able to walk without assistance.  -     Modified [place] Scale 4 - Moderately severe disability.  Unable to walk without assistance, and unable to attend to own bodily needs without assistance.  -     Row Name 07/01/22 1052          Functional Assessment    Outcome Measure Options AM-PAC 6 Clicks Daily Activity (OT);Modified [place]  -           User Key  (r) = Recorded By, (t) = Taken By, (c) = Cosigned By    Initials Name Provider Type     Lyla Hanna, OT Occupational Therapist    CR Reyes, Carmela, PT Physical Therapist                Occupational Therapy Education                 Title: PT OT SLP Therapies (In Progress)     Topic: Occupational Therapy (In Progress)     Point: ADL training (Done)     Description:   Instruct learner(s) on proper safety adaptation and remediation techniques during self care or transfers.   Instruct in proper use of assistive devices.              Learning Progress Summary           Patient Acceptance, E,TB, VU,NR by  at 7/1/2022 1053                   Point: Home exercise program (Not Started)     Description:   Instruct learner(s) on appropriate technique for monitoring, assisting and/or progressing therapeutic exercises/activities.              Learner Progress:  Not documented in this visit.          Point: Precautions (Done)     Description:   Instruct learner(s) on prescribed precautions during self-care and functional transfers.               Learning Progress Summary           Patient Acceptance, E,TB, VU,NR by  at 7/1/2022 1053                   Point: Body mechanics (Done)     Description:   Instruct learner(s) on proper positioning and spine alignment during self-care, functional mobility activities and/or exercises.              Learning Progress Summary           Patient Acceptance, E,TB, VU,NR by  at 7/1/2022 1053                               User Key     Initials Effective Dates Name Provider Type Discipline     06/16/21 -  Lyla Hanna, DONAVAN Occupational Therapist OT              OT Recommendation and Plan  Planned Therapy Interventions (OT): activity tolerance training, adaptive equipment training, BADL retraining, cognitive/visual perception retraining, edema control/reduction, functional balance retraining, occupation/activity based interventions, patient/caregiver education/training, transfer/mobility retraining, ROM/therapeutic exercise  Therapy Frequency (OT): 5 times/wk  Plan of Care Review  Plan of Care Reviewed With: patient  Progress: improving  Outcome Evaluation: 87 y.o. female who presented to [facility] on 6/29/2022 via EMS from home.  Per ED report EMS was initially called because she had fallen however  reported she had altered mental status as well.  She is currently awake and alert oriented to self and place and answers appropriately however she is a poor historian for details.  She reports she takes care of her  at home who was recently hospitalized. Upon further questioning it is found that her spouse assists her w/ dressing & bathing & household mgmt, driving. Pt had another relative assisting her while spouse was hospitalized. Pt has ankle injury from a fall but is able to walk short distance on RW w/ CGA. Pt appears ner her functional baseline but with deficits in cognition & pain, endurance. Recommend home with [facility] if she still has family assist; however depending on the current ability  of her spouse to assist her with basic care and the presence of other family at home she may require addittional level of care at d/c. Spouse is currently receiving [facility].     Time Calculation:    Time Calculation- OT     Row Name 07/01/22 1053             Time Calculation-     OT Start Time 0843  -      OT Stop Time 0903  -      OT Time Calculation (min) 20 min  -      Total Timed Code Minutes- OT 0 minute(s)  -      OT Received On 07/01/22  -      OT - Next Appointment 07/05/22  -      OT Goal Re-Cert Due Date 07/15/22  -            User Key  (r) = Recorded By, (t) = Taken By, (c) = Cosigned By    Initials Name Provider Type     Lyla Hanna OT Occupational Therapist              Therapy Charges for Today     Code Description Service Date Service Provider Modifiers Qty    [id number]  OT EVAL MOD COMPLEXITY 3 7/1/2022 Lyla Hanna OT GO 1               Lyla Hanna OT  7/1/2022

## 2022-07-01 NOTE — CONSULTS
"Diabetes Education  Assessment/Teaching    Patient Name:  Aracelis Vargas  YOB: 1935  MRN: [id number]  Admit Date:  6/29/2022      Assessment Date:  7/1/2022  Flowsheet Row Most Recent Value   General Information     Referral From: A1c  [On 6/30/2022 A1c was 8.2%.]   Height 152.4 cm (60\")   Height Method Stated   Weight 113 kg (249 lb 9 oz)   Weight Method Bed scale  [patient refused standing scale]   Pregnancy Assessment    Diabetes History    What type of diabetes do you have? Type 2   Current DM knowledge fair   Have you had diabetes education/teaching in the past? yes   When and where was your diabetes education? Inpatient hospitalizatin at [facility] on 2/4/2022   Do you test your blood sugar at home? yes   Frequency of checks 3X a week   Meter type Accu-chek 1 year old   Who performs the test? patient   Typical readings 150-200   Have you had high blood sugar? (>140mg/dl) yes   How often do you have high blood sugar? frequently   When was your last high blood sugar? Admission blood sugar 164   Education Preferences    What areas of diabetes would you like to learn about? diabetes complications, avoiding high blood sugar   Nutrition Information    Assessment Topics    Problem Solving - Assessment Needs education   Reducing Risk - Assessment Needs education   DM Goals    Problem Solving - Goal Today   Reducing Risk - Goal Today          Flowsheet Row Most Recent Value   DM Education Needs    Meter Has own   Meter Type Accuchek   Frequency of Testing 3 times a week   Medication Insulin, Vial  [At home patient stated she takes Novolin 70/30 70 units in the monring and 30 units before supper.]   Problem Solving Hyperglycemia, Signs, Symptoms, Treatment   Reducing Risks A1C testing  [On 6/30/2022 A1c was 8.2%.]   Discharge Plan Home   Motivation Not interested   Teaching Method Discussion, Handouts   Patient Response Verbalized understanding            Other Comments:  A1c info sheet given with discussion on " A1c target and healthy blood sugar range. Patient stated she was on Metformin but it was d'kasey. Patient stated the doctor told her to call if her blood sugar is >200. Patient has no further questions or concerns related to diabetes at this time.        Electronically signed by:  Nika Tsai RN  07/01/22 17:43 EDT

## 2022-07-02 LAB
ANION GAP SERPL CALCULATED.3IONS-SCNC: 10 MMOL/L (ref 5–15)
BACTERIA SPEC AEROBE CULT: ABNORMAL
BUN SERPL-MCNC: 20 MG/DL (ref 8–23)
BUN/CREAT SERPL: 18.3 (ref 7–25)
CALCIUM SPEC-SCNC: 7.8 MG/DL (ref 8.6–10.5)
CHLORIDE SERPL-SCNC: 107 MMOL/L (ref 98–107)
CO2 SERPL-SCNC: 21 MMOL/L (ref 22–29)
CREAT SERPL-MCNC: 1.09 MG/DL (ref 0.57–1)
DEPRECATED RDW RBC AUTO: 56.9 FL (ref 37–54)
EGFRCR SERPLBLD CKD-EPI 2021: 49.9 ML/MIN/1.73
ERYTHROCYTE [DISTWIDTH] IN BLOOD BY AUTOMATED COUNT: 17.9 % (ref 12.3–15.4)
GLUCOSE BLDC GLUCOMTR-MCNC: 152 MG/DL (ref 70–105)
GLUCOSE BLDC GLUCOMTR-MCNC: 172 MG/DL (ref 70–105)
GLUCOSE BLDC GLUCOMTR-MCNC: 175 MG/DL (ref 70–105)
GLUCOSE BLDC GLUCOMTR-MCNC: 202 MG/DL (ref 70–105)
GLUCOSE SERPL-MCNC: 148 MG/DL (ref 65–99)
HCT VFR BLD AUTO: 24.1 % (ref 34–46.6)
HGB BLD-MCNC: 7.4 G/DL (ref 12–15.9)
MCH RBC QN AUTO: 27.6 PG (ref 26.6–33)
MCHC RBC AUTO-ENTMCNC: 30.8 G/DL (ref 31.5–35.7)
MCV RBC AUTO: 89.5 FL (ref 79–97)
PLATELET # BLD AUTO: 162 10*3/MM3 (ref 140–450)
PMV BLD AUTO: 6.7 FL (ref 6–12)
POTASSIUM SERPL-SCNC: 4.5 MMOL/L (ref 3.5–5.2)
RBC # BLD AUTO: 2.69 10*6/MM3 (ref 3.77–5.28)
SODIUM SERPL-SCNC: 138 MMOL/L (ref 136–145)
WBC NRBC COR # BLD: 5.1 10*3/MM3 (ref 3.4–10.8)

## 2022-07-02 PROCEDURE — 82962 GLUCOSE BLOOD TEST: CPT

## 2022-07-02 PROCEDURE — 96366 THER/PROPH/DIAG IV INF ADDON: CPT

## 2022-07-02 PROCEDURE — 96376 TX/PRO/DX INJ SAME DRUG ADON: CPT

## 2022-07-02 PROCEDURE — 94618 PULMONARY STRESS TESTING: CPT

## 2022-07-02 PROCEDURE — 25010000002 FUROSEMIDE PER 20 MG: Performed by: STUDENT IN AN ORGANIZED HEALTH CARE EDUCATION/TRAINING PROGRAM

## 2022-07-02 PROCEDURE — 99225 PR SBSQ OBSERVATION CARE/DAY 25 MINUTES: CPT | Performed by: INTERNAL MEDICINE

## 2022-07-02 PROCEDURE — 25010000002 CEFTRIAXONE PER 250 MG: Performed by: EMERGENCY MEDICINE

## 2022-07-02 PROCEDURE — 94799 UNLISTED PULMONARY SVC/PX: CPT

## 2022-07-02 PROCEDURE — G0378 HOSPITAL OBSERVATION PER HR: HCPCS

## 2022-07-02 PROCEDURE — 85027 COMPLETE CBC AUTOMATED: CPT | Performed by: STUDENT IN AN ORGANIZED HEALTH CARE EDUCATION/TRAINING PROGRAM

## 2022-07-02 PROCEDURE — 63710000001 INSULIN LISPRO (HUMAN) PER 5 UNITS: Performed by: NURSE PRACTITIONER

## 2022-07-02 PROCEDURE — 97530 THERAPEUTIC ACTIVITIES: CPT

## 2022-07-02 PROCEDURE — 80048 BASIC METABOLIC PNL TOTAL CA: CPT | Performed by: STUDENT IN AN ORGANIZED HEALTH CARE EDUCATION/TRAINING PROGRAM

## 2022-07-02 PROCEDURE — 94760 N-INVAS EAR/PLS OXIMETRY 1: CPT

## 2022-07-02 RX ORDER — LANOLIN ALCOHOL/MO/W.PET/CERES
1000 CREAM (GRAM) TOPICAL DAILY
Status: DISCONTINUED | OUTPATIENT
Start: 2022-07-02 | End: 2022-07-03 | Stop reason: HOSPADM

## 2022-07-02 RX ORDER — FOLIC ACID 1 MG/1
1 TABLET ORAL DAILY
Status: DISCONTINUED | OUTPATIENT
Start: 2022-07-02 | End: 2022-07-03 | Stop reason: HOSPADM

## 2022-07-02 RX ADMIN — HYDROCODONE BITARTRATE AND ACETAMINOPHEN 1 TABLET: 7.5; 325 TABLET ORAL at 11:54

## 2022-07-02 RX ADMIN — SENNOSIDES AND DOCUSATE SODIUM 2 TABLET: 50; 8.6 TABLET ORAL at 07:58

## 2022-07-02 RX ADMIN — ATORVASTATIN CALCIUM 10 MG: 10 TABLET, FILM COATED ORAL at 22:01

## 2022-07-02 RX ADMIN — INSULIN LISPRO 2 UNITS: 100 INJECTION, SOLUTION INTRAVENOUS; SUBCUTANEOUS at 16:54

## 2022-07-02 RX ADMIN — FUROSEMIDE 20 MG: 10 INJECTION, SOLUTION INTRAMUSCULAR; INTRAVENOUS at 00:59

## 2022-07-02 RX ADMIN — INSULIN LISPRO 2 UNITS: 100 INJECTION, SOLUTION INTRAVENOUS; SUBCUTANEOUS at 07:58

## 2022-07-02 RX ADMIN — INSULIN LISPRO 2 UNITS: 100 INJECTION, SOLUTION INTRAVENOUS; SUBCUTANEOUS at 11:53

## 2022-07-02 RX ADMIN — CEFTRIAXONE 2 G: 2 INJECTION, POWDER, FOR SOLUTION INTRAMUSCULAR; INTRAVENOUS at 00:59

## 2022-07-02 RX ADMIN — CYANOCOBALAMIN TAB 1000 MCG 1000 MCG: 1000 TAB at 11:54

## 2022-07-02 RX ADMIN — GABAPENTIN 300 MG: 300 CAPSULE ORAL at 16:54

## 2022-07-02 RX ADMIN — FOLIC ACID 1 MG: 1 TABLET ORAL at 11:54

## 2022-07-02 RX ADMIN — SENNOSIDES AND DOCUSATE SODIUM 2 TABLET: 50; 8.6 TABLET ORAL at 22:01

## 2022-07-02 RX ADMIN — LEVETIRACETAM 500 MG: 500 TABLET, FILM COATED ORAL at 22:01

## 2022-07-02 RX ADMIN — Medication 3 ML: at 22:03

## 2022-07-02 RX ADMIN — TOPIRAMATE 12.5 MG: 25 TABLET, FILM COATED ORAL at 22:01

## 2022-07-02 RX ADMIN — GABAPENTIN 300 MG: 300 CAPSULE ORAL at 05:41

## 2022-07-02 RX ADMIN — GABAPENTIN 300 MG: 300 CAPSULE ORAL at 22:01

## 2022-07-02 RX ADMIN — TOPIRAMATE 12.5 MG: 25 TABLET, FILM COATED ORAL at 07:58

## 2022-07-02 RX ADMIN — FUROSEMIDE 20 MG: 10 INJECTION, SOLUTION INTRAMUSCULAR; INTRAVENOUS at 23:29

## 2022-07-02 RX ADMIN — HYDROCODONE BITARTRATE AND ACETAMINOPHEN 1 TABLET: 7.5; 325 TABLET ORAL at 22:01

## 2022-07-02 RX ADMIN — FUROSEMIDE 20 MG: 10 INJECTION, SOLUTION INTRAMUSCULAR; INTRAVENOUS at 11:53

## 2022-07-02 RX ADMIN — LEVETIRACETAM 500 MG: 500 TABLET, FILM COATED ORAL at 07:58

## 2022-07-02 RX ADMIN — ASPIRIN 325 MG ORAL TABLET 325 MG: 325 PILL ORAL at 07:58

## 2022-07-02 RX ADMIN — PANTOPRAZOLE SODIUM 40 MG: 40 TABLET, DELAYED RELEASE ORAL at 05:41

## 2022-07-02 NOTE — THERAPY TREATMENT NOTE
"Subjective: Pt agreeable to therapeutic plan of care.    Objective:     Bed mobility - SBA     Transfers - Min-A and with rolling walker. Additional time required to come to full standing and transition hands from EOB to RW.     Ambulation - 50 feet CGA and with rolling walker. Slow may with short stride lengths.     Vitals: WNL    Pain: 0 VAS  Education: Provided education on importance of mobility and skilled verbal / tactile cueing throughout intervention.     Assessment: Aracelis Vargas presents with functional mobility impairments which indicate the need for skilled intervention. Tolerating session today without incident. Pt mobilizes well with min A during STS and appears to be near her functional baseline. Pt is appropriate to safely return home upon discharge and would benefit from Home Health Services. Will continue to follow and progress as tolerated.     Plan/Recommendations:   Low Intensity Therapy recommended post-acute care - This is recommended as therapy feels this patient would require 2-3 visits per week. OP or HH would be the best option depending on patient's home bound status. Consider, if the patient has other  \"skilled\" needs such as wounds, IV antibiotics, etc. Combined with \"low intensity\" could also equate to a SNF. If patient is medically complex, consider LTAC.. Pt requires no DME at discharge.     Pt desires Home with family assist and and Home Health at discharge. Pt cooperative; agreeable to therapeutic recommendations and plan of care.         Basic Mobility 6-click:  Rollin = Total, A lot = 2, A little = 3; 4 = None  Supine>Sit:   1 = Total, A lot = 2, A little = 3; 4 = None   Sit>Stand with arms:  1 = Total, A lot = 2, A little = 3; 4 = None  Bed>Chair:   1 = Total, A lot = 2, A little = 3; 4 = None  Ambulate in room:  1 = Total, A lot = 2, A little = 3; 4 = None  3-5 Steps with railin = Total, A lot = 2, A little = 3; 4 = None  Score: 19    Modified [place]: N/A = " No pre-op stroke/TIA    Post-Tx Position: Up in Chair, Alarms activated and Call light and personal items within reach  PPE: gloves, surgical mask, eyewear protection

## 2022-07-02 NOTE — PLAN OF CARE
Goal Outcome Evaluation:  Plan of Care Reviewed With: patient        Progress: improving  Outcome Evaluation: Patient rested well over night. Urine culture is showing ecoli. Patient will d/c home with spouse and home health.

## 2022-07-02 NOTE — PROGRESS NOTES
[facility] Medicine Services Daily Progress Note    Patient Name: Aracelis Vargas  : 1935  MRN: [id number]  Primary Care Physician:  Gabriel Goss MD  Date of admission: 2022      Subjective      Chief Complaint: AMS      2022  Patient seen and examined at bedside, she admits to weakness but does not appear to be confused or in distress.  Continue treatment for UTI.    2022  Patient sitting up in chair, much improved from yesterday.  Complains of b/l leg swelling, left worse than right.  Will start lasix and check duplex.  Possible home tomorrow          Hb trending down  check occult blood  Add retacrit        Review of Systems   Endocrine: Polyuria: .      Constitutional: Negative.   HENT: Negative.    Eyes: Negative.    Cardiovascular: Negative.    Respiratory: Negative.    Endocrine: Negative.    Hematologic/Lymphatic: Negative.    Skin: Negative.    Musculoskeletal: Positive for falls.   Gastrointestinal: Negative.    Genitourinary: Negative.    Neurological: Negative.    Psychiatric/Behavioral: Positive for altered mental status.   Allergic/Immunologic: Negative.    All other systems reviewed and are negative.       Objective      Vitals:   Temp:  [98.5 °F (36.9 °C)-99.5 °F (37.5 °C)] 99.5 °F (37.5 °C)  Heart Rate:  [81-87] 86  Resp:  [17-18] 17  BP: (143-174)/(66-75) 148/75    Physical Exam  Constitutional:       General: She is not in acute distress.     Appearance: She is obese. She is not toxic-appearing.      Comments: AAOx3, appears to be at baseline   HENT:      Head: Normocephalic and atraumatic.      Nose: Nose normal. No congestion.      Mouth/Throat:      Pharynx: Oropharynx is clear. No oropharyngeal exudate.   Eyes:      General: No scleral icterus.  Cardiovascular:      Rate and Rhythm: Normal rate and regular rhythm.      Heart sounds: No murmur heard.    No friction rub. No gallop.   Pulmonary:      Effort: No respiratory distress.       Breath sounds: No wheezing or rales.   Abdominal:      General: There is no distension.      Tenderness: There is no abdominal tenderness. There is no guarding.   Musculoskeletal:         General: No swelling or deformity.      Cervical back: Normal range of motion. No rigidity.      Right lower leg: Edema present.      Left lower leg: Edema present.      Comments: LLE edema greater than right   Skin:     Coloration: Skin is not jaundiced.      Findings: No bruising or lesion.   Neurological:      General: No focal deficit present.      Mental Status: She is alert and oriented to person, place, and time.      Motor: No weakness.   Psychiatric:         Mood and Affect: Mood normal.         Behavior: Behavior normal.         Thought Content: Thought content normal.         Judgment: Judgment normal.             Result Review    Result Review:  I have personally reviewed the results from the time of this admission to 7/2/2022 10:29 EDT and agree with these findings:  [x]  Laboratory  []  Microbiology  []  Radiology  []  EKG/Telemetry   []  Cardiology/Vascular   []  Pathology  []  Old records  []  Other:          Assessment & Plan      Brief Patient Summary:  Aracelis Vargas is a 87 y.o. female who presented due to AMS and fall 2/2 UTI.        aspirin, 325 mg, Oral, Daily  atorvastatin, 10 mg, Oral, Nightly  [START ON 7/4/2022] Epoetin [facility]-epbx, 20,000 Units, Subcutaneous, Once per day on Mon Wed Fri  furosemide, 20 mg, Intravenous, Q12H  gabapentin, 300 mg, Oral, Q8H  insulin lispro, 0-7 Units, Subcutaneous, TID AC  levETIRAcetam, 500 mg, Oral, Q12H  pantoprazole, 40 mg, Oral, QAM  sennosides-docusate, 2 tablet, Oral, BID  sodium chloride, 3 mL, Intravenous, Q12H  topiramate, 12.5 mg, Oral, BID             Active Hospital Problems:  Active Hospital Problems    Diagnosis    • Altered mental status    • Type 2 diabetes mellitus (HCC)    • Seizure disorder (HCC)    • Elevated lactic acid level    • Obstructive sleep apnea     • Cardiac pacemaker in situ    • GERD (gastroesophageal reflux disease)    • Acute UTI    • Primary hypertension    • Hyperlipidemia    • Stage 3b chronic kidney disease (CMS/HCC)    • Morbid obesity (HCC)    • Secondary hyperaldosteronism (HCC)    • Atherosclerosis of native coronary artery of native heart without angina pectoris      Plan:     #UTI    - Ua suspicious for UTI    - nursing to contact micro for culture    - blood cultures    - Rocephin daily    - lactic 2.8 > 2.8 > 1.9    - stop IVF due to LE swelling    - PT cleared for discharge home    #Fluid overload  #LE swelling    - fluids given for lactic acidosis have caused b/l LE edema    - start lasix 20mg IV bid    - intake and output    - check duplex    - if improves can discharge home tomorrow    #Acute Metabolic encephalopathy  #Fall    - suspect 2/2 UTI    - PT cleared for discharge home    - tx as above    - resolved with tx of UTI    #CAD    -continue home aspirin    - s/p pacemaker    #KYRA on CKD3b    -Cr 1.3 > 1.0 on admission , base 1.0    - hold nephrotoxic meds    - IVF stopped due to fluid overload    ANEMIA  OF CKD AND HX OF B12 AND FOILATE DEF  ADD B12 AND FOLATYE  ADD RETACRIT  CHECK OCCULT  BLOOD  MONITOR CBC    #Seizure disorder    -restart home Keppra    #DM    -a1c 8.2    -ISS    #HTN    -hold home anti-hypertensives    #Anemia    -hgb 7.8 > 7.6    - suspect 2/2 renal disease, no overt bleeding    - follow labs    #HLD    -resume home statin    #GERD    - PPI    #Morbid Obesity    -BMI 46.97, weight loss encouraged      DVT prophylaxis:  Mechanical DVT prophylaxis orders are present.    CODE STATUS:    Code Status (Patient has no pulse and is not breathing): CPR (Attempt to Resuscitate)  Medical Interventions (Patient has pulse or is breathing): Full Support      Disposition:  I expect patient to be discharged in 1-2 days.    This patient has been examined wearing appropriate Personal Protective Equipment and discussed with  PAtient. 07/02/22      Electronically signed by Lg Aguilar MD, 07/02/22, 10:29 EDT.  [facility] Hospitalist Team

## 2022-07-02 NOTE — PROGRESS NOTES
Exercise Oximetry    Patient Name:Aracelis Vargas   MRN: [id number]   Date: 07/02/22             ROOM AIR BASELINE   SpO2% 87   Heart Rate 84   Blood Pressure      EXERCISE ON ROOM AIR SpO2% EXERCISE ON O2 @ 2 lpm SpO2%   1 MINUTE  1 MINUTE    2 MINUTES  2 MINUTES    3 MINUTES  3 MINUTES    4 MINUTES  4 MINUTES    5 MINUTES  5 MINUTES    6 MINUTES  6 MINUTES               Distance Walked   Distance Walked   Dyspnea (Haley Scale)   Dyspnea (Haley Scale)   Fatigue (Haley Scale)   Fatigue (Haley Scale)   SpO2% Post Exercise   SpO2% Post Exercise   HR Post Exercise   HR Post Exercise   Time to Recovery   Time to Recovery     Comments: 02 SAT 87% ON ROOM AIR AT REST.  PLACED ON 2 LITERS, SAT INCREASED TO 93%

## 2022-07-02 NOTE — PLAN OF CARE
"Aracelis Vargas presents with functional mobility impairments which indicate the need for skilled intervention. Tolerating session today without incident. Pt mobilizes well with min A during STS and appears to be near her functional baseline. Pt is appropriate to safely return home upon discharge and would benefit from Home Health Services. Will continue to follow and progress as tolerated.     Plan/Recommendations:   Low Intensity Therapy recommended post-acute care - This is recommended as therapy feels this patient would require 2-3 visits per week. OP or HH would be the best option depending on patient's home bound status. Consider, if the patient has other  \"skilled\" needs such as wounds, IV antibiotics, etc. Combined with \"low intensity\" could also equate to a SNF. If patient is medically complex, consider LTAC.. Pt requires no DME at discharge.     Pt desires Home with family assist and and Home Health at discharge. Pt cooperative; agreeable to therapeutic recommendations and plan of care.   "

## 2022-07-03 ENCOUNTER — HOME HEALTH ADMISSION (OUTPATIENT)
Dept: HOME HEALTH SERVICES | Facility: HOME HEALTHCARE | Age: 87
End: 2022-07-03

## 2022-07-03 ENCOUNTER — READMISSION MANAGEMENT (OUTPATIENT)
Dept: CALL CENTER | Facility: HOSPITAL | Age: 87
End: 2022-07-03

## 2022-07-03 VITALS
HEIGHT: 60 IN | OXYGEN SATURATION: 94 % | SYSTOLIC BLOOD PRESSURE: 132 MMHG | HEART RATE: 90 BPM | WEIGHT: 241.18 LBS | TEMPERATURE: 98.1 F | DIASTOLIC BLOOD PRESSURE: 75 MMHG | RESPIRATION RATE: 17 BRPM | BODY MASS INDEX: 47.35 KG/M2

## 2022-07-03 LAB
ANION GAP SERPL CALCULATED.3IONS-SCNC: 10 MMOL/L (ref 5–15)
BASOPHILS # BLD AUTO: 0 10*3/MM3 (ref 0–0.2)
BASOPHILS NFR BLD AUTO: 0.9 % (ref 0–1.5)
BUN SERPL-MCNC: 20 MG/DL (ref 8–23)
BUN/CREAT SERPL: 19 (ref 7–25)
CALCIUM SPEC-SCNC: 8 MG/DL (ref 8.6–10.5)
CHLORIDE SERPL-SCNC: 105 MMOL/L (ref 98–107)
CO2 SERPL-SCNC: 23 MMOL/L (ref 22–29)
CREAT SERPL-MCNC: 1.05 MG/DL (ref 0.57–1)
DEPRECATED RDW RBC AUTO: 54.3 FL (ref 37–54)
EGFRCR SERPLBLD CKD-EPI 2021: 52.2 ML/MIN/1.73
EOSINOPHIL # BLD AUTO: 0.2 10*3/MM3 (ref 0–0.4)
EOSINOPHIL NFR BLD AUTO: 4.5 % (ref 0.3–6.2)
ERYTHROCYTE [DISTWIDTH] IN BLOOD BY AUTOMATED COUNT: 17.2 % (ref 12.3–15.4)
GLUCOSE BLDC GLUCOMTR-MCNC: 170 MG/DL (ref 70–105)
GLUCOSE BLDC GLUCOMTR-MCNC: 184 MG/DL (ref 70–105)
GLUCOSE SERPL-MCNC: 171 MG/DL (ref 65–99)
HCT VFR BLD AUTO: 23.9 % (ref 34–46.6)
HCT VFR BLD AUTO: 24 % (ref 34–46.6)
HEMOCCULT STL QL IA: NEGATIVE
HGB BLD-MCNC: 7.6 G/DL (ref 12–15.9)
HGB BLD-MCNC: 7.6 G/DL (ref 12–15.9)
LYMPHOCYTES # BLD AUTO: 1.1 10*3/MM3 (ref 0.7–3.1)
LYMPHOCYTES NFR BLD AUTO: 21.5 % (ref 19.6–45.3)
MCH RBC QN AUTO: 27.9 PG (ref 26.6–33)
MCHC RBC AUTO-ENTMCNC: 31.5 G/DL (ref 31.5–35.7)
MCV RBC AUTO: 88.6 FL (ref 79–97)
MONOCYTES # BLD AUTO: 0.4 10*3/MM3 (ref 0.1–0.9)
MONOCYTES NFR BLD AUTO: 7.7 % (ref 5–12)
NEUTROPHILS NFR BLD AUTO: 3.4 10*3/MM3 (ref 1.7–7)
NEUTROPHILS NFR BLD AUTO: 65.4 % (ref 42.7–76)
NRBC BLD AUTO-RTO: 0 /100 WBC (ref 0–0.2)
PLATELET # BLD AUTO: 174 10*3/MM3 (ref 140–450)
PMV BLD AUTO: 6.8 FL (ref 6–12)
POTASSIUM SERPL-SCNC: 4.8 MMOL/L (ref 3.5–5.2)
RBC # BLD AUTO: 2.71 10*6/MM3 (ref 3.77–5.28)
SODIUM SERPL-SCNC: 138 MMOL/L (ref 136–145)
WBC NRBC COR # BLD: 5.2 10*3/MM3 (ref 3.4–10.8)

## 2022-07-03 PROCEDURE — 82274 ASSAY TEST FOR BLOOD FECAL: CPT | Performed by: INTERNAL MEDICINE

## 2022-07-03 PROCEDURE — 96376 TX/PRO/DX INJ SAME DRUG ADON: CPT

## 2022-07-03 PROCEDURE — 85025 COMPLETE CBC W/AUTO DIFF WBC: CPT | Performed by: INTERNAL MEDICINE

## 2022-07-03 PROCEDURE — G0378 HOSPITAL OBSERVATION PER HR: HCPCS

## 2022-07-03 PROCEDURE — 99217 PR OBSERVATION CARE DISCHARGE MANAGEMENT: CPT | Performed by: INTERNAL MEDICINE

## 2022-07-03 PROCEDURE — 85014 HEMATOCRIT: CPT | Performed by: INTERNAL MEDICINE

## 2022-07-03 PROCEDURE — 85018 HEMOGLOBIN: CPT | Performed by: INTERNAL MEDICINE

## 2022-07-03 PROCEDURE — 25010000002 NA FERRIC GLUC CPLX PER 12.5 MG: Performed by: INTERNAL MEDICINE

## 2022-07-03 PROCEDURE — 63710000001 INSULIN LISPRO (HUMAN) PER 5 UNITS: Performed by: NURSE PRACTITIONER

## 2022-07-03 PROCEDURE — 80048 BASIC METABOLIC PNL TOTAL CA: CPT | Performed by: INTERNAL MEDICINE

## 2022-07-03 PROCEDURE — 25010000002 FUROSEMIDE PER 20 MG: Performed by: STUDENT IN AN ORGANIZED HEALTH CARE EDUCATION/TRAINING PROGRAM

## 2022-07-03 PROCEDURE — 82962 GLUCOSE BLOOD TEST: CPT

## 2022-07-03 RX ORDER — GABAPENTIN 300 MG/1
300 CAPSULE ORAL EVERY 8 HOURS SCHEDULED
Qty: 90 CAPSULE | Refills: 0 | Status: ON HOLD | OUTPATIENT
Start: 2022-07-03 | End: 2022-08-21

## 2022-07-03 RX ORDER — FERROUS SULFATE 325(65) MG
325 TABLET ORAL
Qty: 30 TABLET | Refills: 0 | Status: SHIPPED | OUTPATIENT
Start: 2022-07-03 | End: 2022-08-02

## 2022-07-03 RX ORDER — FUROSEMIDE 40 MG/1
40 TABLET ORAL DAILY
Qty: 30 TABLET | Refills: 0 | Status: ON HOLD | OUTPATIENT
Start: 2022-07-03 | End: 2022-08-21

## 2022-07-03 RX ADMIN — GABAPENTIN 300 MG: 300 CAPSULE ORAL at 05:19

## 2022-07-03 RX ADMIN — LEVETIRACETAM 500 MG: 500 TABLET, FILM COATED ORAL at 09:26

## 2022-07-03 RX ADMIN — FUROSEMIDE 20 MG: 10 INJECTION, SOLUTION INTRAMUSCULAR; INTRAVENOUS at 12:14

## 2022-07-03 RX ADMIN — INSULIN LISPRO 2 UNITS: 100 INJECTION, SOLUTION INTRAVENOUS; SUBCUTANEOUS at 09:25

## 2022-07-03 RX ADMIN — SENNOSIDES AND DOCUSATE SODIUM 2 TABLET: 50; 8.6 TABLET ORAL at 09:25

## 2022-07-03 RX ADMIN — Medication 3 ML: at 12:14

## 2022-07-03 RX ADMIN — FOLIC ACID 1 MG: 1 TABLET ORAL at 09:26

## 2022-07-03 RX ADMIN — PANTOPRAZOLE SODIUM 40 MG: 40 TABLET, DELAYED RELEASE ORAL at 05:19

## 2022-07-03 RX ADMIN — CYANOCOBALAMIN TAB 1000 MCG 1000 MCG: 1000 TAB at 09:28

## 2022-07-03 RX ADMIN — TOPIRAMATE 12.5 MG: 25 TABLET, FILM COATED ORAL at 09:26

## 2022-07-03 RX ADMIN — INSULIN LISPRO 2 UNITS: 100 INJECTION, SOLUTION INTRAVENOUS; SUBCUTANEOUS at 12:14

## 2022-07-03 RX ADMIN — SODIUM CHLORIDE 125 MG: 9 INJECTION, SOLUTION INTRAVENOUS at 12:13

## 2022-07-03 RX ADMIN — ASPIRIN 325 MG ORAL TABLET 325 MG: 325 PILL ORAL at 09:26

## 2022-07-03 NOTE — DISCHARGE INSTRUCTIONS
Follow-up with PCP soon as possible  Monitors CBC and BMP with PCP next week  Follow-up with GI as needed if evidence of iron losses or occult blood     Detail Level: Detailed Size Of Lesion: 7mm

## 2022-07-03 NOTE — OUTREACH NOTE
Prep Survey    Flowsheet Row Responses   [facility] facility patient discharged from? [facility]   Is LACE score < 7 ? No   Emergency Room discharge w/ pulse ox? No   Eligibility [facility]   Date of Admission 06/29/22   Date of Discharge 07/03/22   Discharge Disposition Home-Health Care Svc   Discharge diagnosis Acute urinary tract infection  Altered mental status   Does the patient have one of the following disease processes/diagnoses(primary or secondary)? Other   Does the patient have Home health ordered? Yes   What is the Home health agency?   [facility]    Is there a DME ordered? No   Prep survey completed? Yes          SHAUN EDWARD - Registered Nurse

## 2022-07-03 NOTE — PLAN OF CARE
Goal Outcome Evaluation:  Plan of Care Reviewed With: patient        Progress: improving  Outcome Evaluation: Patient rested well over night. She did say that her foot feels much better. Will d/c home with spouse and home health.

## 2022-07-03 NOTE — PROGRESS NOTES
[facility] Medicine Services Daily Progress Note    Patient Name: Aracelis Vargas  : 1935  MRN: [id number]  Primary Care Physician:  Gabriel Goss MD  Date of admission: 2022      Subjective      Chief Complaint: AMS      2022  Patient seen and examined at bedside, she admits to weakness but does not appear to be confused or in distress.  Continue treatment for UTI.    2022  Patient sitting up in chair, much improved from yesterday.  Complains of b/l leg swelling, left worse than right.  Will start lasix and check duplex.  Possible home tomorrow        /  Hb trending down  check occult blood  Add retacrit    7/3  Hemoglobin relatively stable  Will add IV iron  Repeat H&H      ROS   Constitutional: Negative.   HENT: Negative.    Eyes: Negative.    Cardiovascular: Negative.    Respiratory: Negative.    Endocrine: Negative.    Hematologic/Lymphatic: Negative.    Skin: Negative.    Musculoskeletal: Positive for falls.   Gastrointestinal: Negative.    Genitourinary: Negative.    Neurological: Negative.    Psychiatric/Behavioral: Positive for altered mental status.   Allergic/Immunologic: Negative.    All other systems reviewed and are negative.       Objective      Vitals:   Temp:  [98.4 °F (36.9 °C)-98.9 °F (37.2 °C)] 98.9 °F (37.2 °C)  Heart Rate:  [82-87] 82  Resp:  [16-17] 17  BP: (128-156)/(71-78) 128/71  Flow (L/min):  [2] 2    Physical Exam  Constitutional:       General: She is not in acute distress.     Appearance: She is obese. She is not toxic-appearing.      Comments: AAOx3, appears to be at baseline   HENT:      Head: Normocephalic and atraumatic.      Nose: Nose normal. No congestion.      Mouth/Throat:      Pharynx: Oropharynx is clear. No oropharyngeal exudate.   Eyes:      General: No scleral icterus.  Cardiovascular:      Rate and Rhythm: Normal rate and regular rhythm.      Heart sounds: No murmur heard.    No friction rub. No gallop.    Pulmonary:      Effort: No respiratory distress.      Breath sounds: No wheezing or rales.   Abdominal:      General: There is no distension.      Tenderness: There is no abdominal tenderness. There is no guarding.   Musculoskeletal:         General: No swelling or deformity.      Cervical back: Normal range of motion. No rigidity.      Right lower leg: Edema present.      Left lower leg: Edema present.      Comments: LLE edema greater than right   Skin:     Coloration: Skin is not jaundiced.      Findings: No bruising or lesion.   Neurological:      General: No focal deficit present.      Mental Status: She is alert and oriented to person, place, and time.      Motor: No weakness.   Psychiatric:         Mood and Affect: Mood normal.         Behavior: Behavior normal.         Thought Content: Thought content normal.         Judgment: Judgment normal.             Result Review    Result Review:  I have personally reviewed the results from the time of this admission to 7/3/2022 10:18 EDT and agree with these findings:  [x]  Laboratory  []  Microbiology  []  Radiology  []  EKG/Telemetry   []  Cardiology/Vascular   []  Pathology  []  Old records  []  Other:          Assessment & Plan      Brief Patient Summary:  Aracelis Vargas is a 87 y.o. female who presented due to AMS and fall 2/2 UTI.        aspirin, 325 mg, Oral, Daily  atorvastatin, 10 mg, Oral, Nightly  [START ON 7/4/2022] Epoetin [facility]-epbx, 20,000 Units, Subcutaneous, Once per day on Mon Wed Fri  ferric gluconate, 125 mg, Intravenous, Once  folic acid, 1 mg, Oral, Daily  furosemide, 20 mg, Intravenous, Q12H  gabapentin, 300 mg, Oral, Q8H  insulin lispro, 0-7 Units, Subcutaneous, TID AC  levETIRAcetam, 500 mg, Oral, Q12H  pantoprazole, 40 mg, Oral, QAM  sennosides-docusate, 2 tablet, Oral, BID  sodium chloride, 3 mL, Intravenous, Q12H  topiramate, 12.5 mg, Oral, BID  vitamin B-12, 1,000 mcg, Oral, Daily             Active Hospital Problems:  Active Hospital  Problems    Diagnosis    • Altered mental status    • Type 2 diabetes mellitus (HCC)    • Seizure disorder (HCC)    • Elevated lactic acid level    • Obstructive sleep apnea    • Cardiac pacemaker in situ    • GERD (gastroesophageal reflux disease)    • Acute UTI    • Primary hypertension    • Hyperlipidemia    • Stage 3b chronic kidney disease (CMS/HCC)    • Morbid obesity (HCC)    • Secondary hyperaldosteronism (HCC)    • Atherosclerosis of native coronary artery of native heart without angina pectoris      Plan:     #UTI    - Ua suspicious for UTI    - nursing to contact micro for culture    - blood cultures    - Rocephin daily    - lactic 2.8 > 2.8 > 1.9    - stop IVF due to LE swelling    - PT cleared for discharge home    #Fluid overload  #LE swelling    - fluids given for lactic acidosis have caused b/l LE edema    - start lasix 20mg IV bid    - intake and output    - check duplex    - if improves can discharge home tomorrow    #Acute Metabolic encephalopathy  #Fall    - suspect 2/2 UTI    - PT cleared for discharge home    - tx as above    - resolved with tx of UTI    #CAD    -continue home aspirin    - s/p pacemaker    #KYRA on CKD3b    -Cr 1.3 > 1.0 on admission , base 1.0    - hold nephrotoxic meds    - IVF stopped due to fluid overload    ANEMIA  OF CKD AND HX OF B12 AND FOILATE DEF  ADD B12 AND FOLATYE  ADD RETACRIT  CHECK OCCULT  BLOOD  MONITOR CBC    #Seizure disorder    -restart home Keppra    #DM    -a1c 8.2    -ISS    #HTN    -hold home anti-hypertensives    #Anemia    -hgb 7.8 > 7.6    - suspect 2/2 renal disease, no overt bleeding    - follow labs    #HLD    -resume home statin    #GERD    - PPI    #Morbid Obesity    -BMI 46.97, weight loss encouraged      DVT prophylaxis:  Mechanical DVT prophylaxis orders are present.    CODE STATUS:    Code Status (Patient has no pulse and is not breathing): CPR (Attempt to Resuscitate)  Medical Interventions (Patient has pulse or is breathing): Full  Support      Disposition:  I expect patient to be discharged in 1-2 days.    This patient has been examined wearing appropriate Personal Protective Equipment and discussed with PAtient. 07/03/22      Electronically signed by Lg Aguilar MD, 07/03/22, 10:18 EDT.  [facility] Hospitalist Team

## 2022-07-03 NOTE — DISCHARGE SUMMARY
[facility] Medicine Services  DISCHARGE SUMMARY    Patient Name: Aracelis Vargas  : 1935  MRN: [id number]    Date of Admission: 2022  Discharge Diagnosis: UTI sepsis mental status changes  Condition stable, anemia    Date of Discharge: 7/3/2022  Primary Care Physician: Gabriel Goss MD      Presenting Problem:   Acute urinary tract infection [N39.0]  Altered mental status, unspecified altered mental status type [R41.82]    Active and Resolved Hospital Problems:  Active Hospital Problems    Diagnosis POA   • Altered mental status [R41.82] Yes   • Type 2 diabetes mellitus (HCC) [E11.9] Yes   • Seizure disorder (HCC) [G40.909] Yes   • Elevated lactic acid level [R79.89] Yes   • Obstructive sleep apnea [G47.33] Yes   • Cardiac pacemaker in situ [Z95.0] Yes   • GERD (gastroesophageal reflux disease) [K21.9] Yes   • Acute UTI [N39.0] Yes   • Primary hypertension [I10] Yes   • Hyperlipidemia [E78.5] Yes   • Stage 3b chronic kidney disease (CMS/HCC) [N18.32] Yes   • Morbid obesity (HCC) [E66.01] Yes   • 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         Hospital Course     Hospital Course:  Aracelis Vargas is a 87 y.o. female           History of Present Illness: Aracelis Vargas is a 87 y.o. female who presented to [facility] on 2022 via EMS from home.  Per ED report EMS was initially called because she had fallen however  reported she had altered mental status and they brought her to the hospital.  She is currently awake and alert oriented to self and place and answers appropriately however she is a poor historian for details.  She reports she takes care of her  at home who was recently hospitalized.  She reports she has help with a daughter-in-law and niece.  She denies hitting  her head nor any other injury with the falls.  She denied any chest pain dyspnea fevers chills,dysuria, dizziness cough congestion or fever.  No family at bedside to assist with history.  Labs today showed a troponin of 0.028 proBNP 331.8 glucose 164 creatinine 1.37 BUN 38 GFR 37.9 ammonia 47 lactate 2.8 serum WBC not elevated at 5.10, hemoglobin 8.8 urinalysis showed 4+ bacteria 6-12 WBCs and 1+ leukocytes.  CT head per radiology report showed no acute intracranial abnormality and chest x-ray per radiology showed stable exam with cardiomegaly and no evidence of active lung disease.  She was started on IV ceftriaxone in ED and given an IV fluid bolus.  She is now on maintenance fluids and mentation appears to be improving.  Past medical history includes stage IIIb chronic kidney disease, hypertension, morbid obesity, hyperlipidemia, GERD, coronary artery disease, she has a pacemaker in situ, obstructive sleep apnea type 2 diabetes mellitus and seizure disorder.  She will continue to be evaluated.  06/30/2022  Patient seen and examined at bedside, she admits to weakness but does not appear to be confused or in distress.  Continue treatment for UTI.     07/1/2022  Patient sitting up in chair, much improved from yesterday.  Complains of b/l leg swelling, left worse than right.  Will start lasix and check duplex.  Possible home tomorrow           7/2  Hb trending down  check occult blood  Add retacrit     7/3  Hemoglobin relatively stable  Will add IV iron  Repeat H&H shows stable hemoglobin  Patient is very eager to go home from yesterday and does not want to wait anymore as she is concerned about her    She is advised to follow closely with primary care physician to monitor her CBC and may need referral for GI evaluation  I have refrained from giving her any blood products given hemoglobin stable and has been relatively asymptomatic     .      Assessment/plan    #UTI    - Ua suspicious for UTI    - Urine  culture showing E. coli pansensitive    - blood cultures negative to date    -Finished Rocephin course    - lactic 2.8 > 2.8 > 1.9    - stop IVF due to LE swelling    - PT cleared for discharge home with home health     #Fluid overload  #LE swelling    - fluids given for lactic acidosis have caused b/l LE edema    - start lasix 20mg IV bid-switch to oral Lasix 40 mg daily on discharge    - intake and output    - check duplex-negative for DVT         #Acute Metabolic encephalopathy  #Fall    - suspect 2/2 UTI    - PT cleared for discharge home    - tx as above    - resolved with tx of UTI-       #CAD    -continue home aspirin    - s/p pacemaker     #KYRA on CKD3b    -Cr 1.3 > 1.0 on admission , base 1.0    - hold nephrotoxic meds    - IVF stopped due to fluid overload  -Holding off on losartan for now  - Consider restarting once renal function stable and blood pressure allows  Consider avoiding Norvasc given lower extremity edema      ANEMIA  OF CKD AND HX OF B12 AND FOILATE DEF  ADD B12 AND FOLATYE  ADD RETACRIT  CHECK OCCULT  BLOOD  MONITOR CBC  Hemoglobin stable  No active bleeding noted  IV iron added  Oral iron on discharge added  Patient will benefit from GI evaluation  Patient did not want to wait until further evaluation of her anemia is completed here  She has been following with hematologist as wellOn outpatient basis    #Seizure disorder    -restart home Keppra     #DM    -a1c 8.2    -ISS  Resume smaller dose of NPH premixed insulin     #HTN    -As outlined above.  Consider starting losartan on follow-up with primary care physician ASAP     #Anemia    -hgb 7.8 > 7.6    - suspect 2/2 renal disease, no overt bleeding    - follow labs     #HLD    -resume home statin     #GERD    - PPI     #Morbid Obesity    -BMI 46.97, weight loss encouraged          DISCHARGE Follow Up Recommendations for labs and diagnostics:-Follow-up with PCP soon as possible  Monitors CBC and BMP with PCP next week  Follow-up with GI as  needed if evidence of iron losses or occult blood  Consider avoiding Norvasc given lower extremity edema        Reasons For Change In Medications and Indications for New Medications:      Day of Discharge     Vital Signs:  Temp:  [98.1 °F (36.7 °C)-98.9 °F (37.2 °C)] 98.1 °F (36.7 °C)  Heart Rate:  [82-90] 90  Resp:  [17] 17  BP: (128-153)/(71-75) 132/75  Flow (L/min):  [2] 2    Physical Exam:  Physical Exam  Awake alert oriented x3 without distress  Pallor improved  No focal deficit  Lower extremity swelling improved        Pertinent  and/or Most Recent Results     LAB RESULTS:      Lab 07/03/22  1044 07/03/22  0600 07/02/22  0424 07/01/22  0128 06/30/22  1539 06/30/22  0453 06/30/22  0115 06/29/22  2327   WBC  --  5.20 5.10 5.70  --  5.60  --  5.10   HEMOGLOBIN 7.6* 7.6* 7.4* 7.6*  --  7.8*  --  8.8*   HEMATOCRIT 23.9* 24.0* 24.1* 24.5*  --  25.4*  --  27.9*   PLATELETS  --  174 162 198  --  242  --  276   NEUTROS ABS  --  3.40  --   --   --  3.50  --  2.80   LYMPHS ABS  --  1.10  --   --   --  1.30  --  1.60   MONOS ABS  --  0.40  --   --   --  0.50  --  0.40   EOS ABS  --  0.20  --   --   --  0.30  --  0.30   MCV  --  88.6 89.5 88.7  --  88.9  --  88.1   LACTATE  --   --   --   --  1.9 2.8* 2.8*  --          Lab 07/03/22  0600 07/02/22  0424 07/01/22  0128 06/30/22  0453 06/30/22  0137 06/29/22  2327   SODIUM 138 138 142 142  --  139   POTASSIUM 4.8 4.5 4.7 5.2  --  4.9   CHLORIDE 105 107 111* 110*  --  106   CO2 23.0 21.0* 19.0* 20.0*  --  19.0*   ANION GAP 10.0 10.0 12.0 12.0  --  14.0   BUN 20 20 25* 36*  --  38*   CREATININE 1.05* 1.09* 1.02* 1.30*  --  1.37*   EGFR 52.2* 49.9* 54.0* 40.4*  --  37.9*   GLUCOSE 171* 148* 116* 163*  --  164*   CALCIUM 8.0* 7.8* 7.9* 8.0*  --  8.7   HEMOGLOBIN A1C  --   --   --   --  8.2*  --          Lab 06/29/22  2327   TOTAL PROTEIN 7.5   ALBUMIN 4.00   GLOBULIN 3.5   ALT (SGPT) 18   AST (SGOT) 25   BILIRUBIN 0.3   ALK PHOS 95         Lab 07/01/22  0128 06/30/22  0453  06/29/22 2327   PROBNP 1,145.0  --  331.8   TROPONIN T  --  0.027 0.028                 Brief Urine Lab Results  (Last result in the past 365 days)      Color   Clarity   Blood   Leuk Est   Nitrite   Protein   CREAT   Urine HCG        06/29/22 2329 Yellow   Clear   Negative   Small (1+)   Negative   Negative               Microbiology Results (last 10 days)     Procedure Component Value - Date/Time    Blood Culture - Blood, Arm, Right [[id number]]  (Normal) Collected: 06/30/22 0113    Lab Status: Preliminary result Specimen: Blood from Arm, Right Updated: 07/03/22 0132     Blood Culture No growth at 3 days    Blood Culture - Blood, Arm, Left [[id number]]  (Normal) Collected: 06/30/22 0113    Lab Status: Preliminary result Specimen: Blood from Arm, Left Updated: 07/03/22 0132     Blood Culture No growth at 3 days    Urine Culture - Urine, Urine, Catheter In/Out [[id number]]  (Abnormal)  (Susceptibility) Collected: 06/29/22 2329    Lab Status: Final result Specimen: Urine, Catheter In/Out Updated: 07/02/22 1248     Urine Culture >100,000 CFU/mL Escherichia coli    Narrative:      Colonization of the urinary tract without infection is common. Treatment is discouraged unless the patient is symptomatic, pregnant, or undergoing an invasive urologic procedure.    Susceptibility      Escherichia coli      XU      Ampicillin Susceptible      Ampicillin + Sulbactam Susceptible      Cefazolin Susceptible      Cefepime Susceptible      Ceftazidime Susceptible      Ceftriaxone Susceptible      Gentamicin Susceptible      Levofloxacin Susceptible      Nitrofurantoin Susceptible      Piperacillin + Tazobactam Susceptible      Trimethoprim + Sulfamethoxazole Susceptible                           Respiratory Panel PCR w/COVID-19(SARS-CoV-2) CHERYL/KARTHIKEYAN/[facility]/PAD/COR/MAD/LUZ In-House, NP Swab in UTM/VTM, 3-4 HR TAT - Swab, Nasopharynx [[id number]]  (Normal) Collected: 06/29/22 2328    Lab Status: Final result Specimen: Swab from Nasopharynx  Updated: 06/30/22 0034     ADENOVIRUS, PCR Not Detected     Coronavirus 229E Not Detected     Coronavirus HKU1 Not Detected     Coronavirus NL63 Not Detected     Coronavirus OC43 Not Detected     COVID19 Not Detected     Human Metapneumovirus Not Detected     Human Rhinovirus/Enterovirus Not Detected     Influenza A PCR Not Detected     Influenza B PCR Not Detected     Parainfluenza Virus 1 Not Detected     Parainfluenza Virus 2 Not Detected     Parainfluenza Virus 3 Not Detected     Parainfluenza Virus 4 Not Detected     RSV, PCR Not Detected     Bordetella pertussis pcr Not Detected     Bordetella parapertussis PCR Not Detected     Chlamydophila pneumoniae PCR Not Detected     Mycoplasma pneumo by PCR Not Detected    Narrative:      In the setting of a positive respiratory panel with a viral infection PLUS a negative procalcitonin without other underlying concern for bacterial infection, consider observing off antibiotics or discontinuation of antibiotics and continue supportive care. If the respiratory panel is positive for atypical bacterial infection (Bordetella pertussis, Chlamydophila pneumoniae, or Mycoplasma pneumoniae), consider antibiotic de-escalation to target atypical bacterial infection.          CT Head Without Contrast    Result Date: 6/29/2022  Impression: 1.  No acute intracranial abnormality. 2.  Age commensurate senescent changes. Atherosclerosis. Electronically signed by:  Flynn Craig  6/29/2022 9:44 PM    XR Chest 1 View    Result Date: 6/29/2022  Impression: 1. Stable exam. There is cardiomegaly and no evidence of active lung disease.  Electronically Signed By-Mila Will MD On:6/29/2022 10:41 PM This report was finalized on [id number] by  Mila Will MD.      Results for orders placed during the hospital encounter of 06/29/22    Duplex Venous Lower Extremity - Bilateral CAR    Interpretation Summary  · Normal bilateral lower extremity venous duplex scan.      Results for orders  placed during the hospital encounter of 06/29/22    Duplex Venous Lower Extremity - Bilateral CAR    Interpretation Summary  · Normal bilateral lower extremity venous duplex scan.      Results for orders placed during the hospital encounter of 02/25/22    Adult Transesophageal Echo (ALEYDA) W/ Cont if Necessary Per Protocol (Cardiology Department)    Interpretation Summary  Date of study  2/28/2022.    Indications  Recent TIA.    Procedure performed  Transesophageal echocardiogram and Doppler study.    Procedure  Anesthesia was provided by anesthesiologist with intravenous Diprivan.  ALEYDA probe could be passed without difficulty.  Patient tolerated the procedure well.  No complications were noted.    Results  Technically satisfactory study.  Mitral valve is structurally normal.  Mild mitral regurgitation is present.  Aortic valve is tricuspid.  Thickened with adequate opening motion.  Mild to moderate aortic regurgitation is present.  Tricuspid valve is normal.  Mild tricuspid regurgitation is present.  Calculated pulmonary artery pressure is 28 mmHg.  Mild biatrial enlargement is present.  Left atrial appendage is very small.  Left ventricle is normal in size and contractility with ejection fraction of 60%.  Atrial septum is intact.  No evidence for intracardiac thrombus or smoking effect is present.  No pericardial effusion is present.  Aorta is normal.    Impression  Mild mitral regurgitation and mild to moderate aortic regurgitation.  Mild tricuspid regurgitation  Calculated pulmonary artery pressure is 28 mmHg.  Mild biatrial enlargement.  Left atrial appendage is small.  Left ventricle is normal in size and contractility with ejection fraction of 60%.      Labs Pending at Discharge:  Pending Labs     Order Current Status    Occult Blood X 1, Stool - Stool, Per Rectum Collected (07/03/22 3596)    Blood Culture - Blood, Arm, Left Preliminary result    Blood Culture - Blood, Arm, Right Preliminary result           Procedures Performed           Consults:   Consults     No orders found from 5/31/2022 to 6/30/2022.            Discharge Details        Discharge Medications      New Medications      Instructions Start Date   ferrous sulfate 325 (65 FE) MG tablet   325 mg, Oral, Daily With Breakfast         Changes to Medications      Instructions Start Date   furosemide 40 MG tablet  Commonly known as: Lasix  What changed:   · medication strength  · how much to take  · when to take this   40 mg, Oral, Daily      gabapentin 300 MG capsule  Commonly known as: NEURONTIN  What changed: when to take this   300 mg, Oral, Every 8 Hours Scheduled      insulin NPH-insulin regular (70-30) 100 UNIT/ML injection  Commonly known as: humuLIN 70/30,novoLIN 70/30  What changed: how much to take   5 Units, Subcutaneous, 2 Times Daily With Meals         Continue These Medications      Instructions Start Date   aspirin 325 MG tablet   325 mg, Oral, Daily      atorvastatin 10 MG tablet  Commonly known as: LIPITOR   TAKE ONE TABLET BY MOUTH DAILY      folic acid 1 MG tablet  Commonly known as: FOLVITE   1 mg, Oral, Daily      HYDROcodone-acetaminophen 7.5-325 MG per tablet  Commonly known as: NORCO   1 tablet, Oral, Every 8 Hours PRN      levETIRAcetam 500 MG tablet  Commonly known as: KEPPRA   500 mg, Oral, Every 12 Hours Scheduled      losartan 50 MG tablet  Commonly known as: COZAAR   TAKE ONE TABLET BY MOUTH DAILY      omeprazole 40 MG capsule  Commonly known as: priLOSEC   40 mg, Oral, Every Morning      sennosides-docusate 8.6-50 MG per tablet  Commonly known as: PERICOLACE   2 tablets, Oral, 2 Times Daily      topiramate 25 MG tablet  Commonly known as: TOPAMAX   12.5 mg, Oral, 2 Times Daily      vitamin B-12 1000 MCG tablet  Commonly known as: CYANOCOBALAMIN   1,000 mcg, Oral, Daily         Stop These Medications    amLODIPine 5 MG tablet  Commonly known as: NORVASC     spironolactone 25 MG tablet  Commonly known as: ALDACTONE             Allergies   Allergen Reactions   • Latex, Natural Rubber Rash   • Adhesive Tape Rash         Discharge Disposition: Home with home health  Home-Health Care Sv    Diet:  Hospital:  Diet Order   Procedures   • Diet Cardiac, Diabetic/Consistent Carbs; Healthy Heart; Diabetic - Consistent Carb         Discharge Activity:         CODE STATUS:  Code Status and Medical Interventions:   Ordered at: 06/30/22 0116     Code Status (Patient has no pulse and is not breathing):    CPR (Attempt to Resuscitate)     Medical Interventions (Patient has pulse or is breathing):    Full Support         Future Appointments   Date Time Provider Department Center   7/28/2022  1:20 PM Chevy Pineda MD [facility] None   10/10/2022  1:30 PM [facility] [place] DEVICE CHECK [facility]   10/10/2022  1:50 PM Wang Plaza MD [facility]       Additional Instructions for the Follow-ups that You Need to Schedule     Ambulatory Referral to Home Health   As directed      Face to Face Visit Date: 7/1/2022    Follow-up provider for Plan of Care?: I treated the patient in an acute care facility and will not continue treatment after discharge.    Follow-up provider: SIMBA ODONNELL [[id number]]    Reason/Clinical Findings: post-hospitalization eval    Describe mobility limitations that make leaving home difficult: weakness, tires easily    Nursing/Therapeutic Services Requested: Skilled Nursing Physical Therapy    Skilled nursing orders: Other (UTI, AMS, fluid overload)    PT orders: Other (add comment) (Eval and tx)    Frequency: 1 Week 1               Time spent on Discharge including face to face service:  32 minutes    This patient has been examined wearing appropriate Personal Protective Equipment and discussed with hospital infection control department. 07/03/22      Signature:    Electronically signed by Lg Aguilar MD, 07/03/22, 2:55 PM EDT.  [facility]ist Team

## 2022-07-04 ENCOUNTER — HOME CARE VISIT (OUTPATIENT)
Dept: HOME HEALTH SERVICES | Facility: HOME HEALTHCARE | Age: 87
End: 2022-07-04

## 2022-07-04 NOTE — CASE MANAGEMENT/SOCIAL WORK
Case Management Discharge Note      Final Note: formerly [facility].      Selected Continued Care - Discharged on 7/3/2022 Admission date: 6/29/2022 - Discharge disposition: Home-Health Care Svc       Home Medical Care Coordination complete.    Service Provider Selected Services Address Phone Fax Patient Preferred    [facility]  Home Health Services [address] [facility] [place] [address] [phone] --           Transportation Services  Private: Car    Final Discharge Disposition Code: 06 - home with home health care

## 2022-07-05 ENCOUNTER — TRANSITIONAL CARE MANAGEMENT TELEPHONE ENCOUNTER (OUTPATIENT)
Dept: CALL CENTER | Facility: HOSPITAL | Age: 87
End: 2022-07-05

## 2022-07-05 ENCOUNTER — HOME CARE VISIT (OUTPATIENT)
Dept: HOME HEALTH SERVICES | Facility: HOME HEALTHCARE | Age: 87
End: 2022-07-05

## 2022-07-05 LAB
BACTERIA SPEC AEROBE CULT: NORMAL
BACTERIA SPEC AEROBE CULT: NORMAL

## 2022-07-05 NOTE — OUTREACH NOTE
Call Center TCM Note    Flowsheet Row Responses   [facility] patient discharged from? [facility]   Does the patient have one of the following disease processes/diagnoses(primary or secondary)? Other   TCM attempt successful? Yes   Call start time 1621   Call end time 1626   Discharge diagnosis Acute urinary tract infection  Altered mental status   Meds reviewed with patient/caregiver? Yes   Is the patient having any side effects they believe may be caused by any medication additions or changes? No   Does the patient have all medications ordered at discharge? Yes   Is the patient taking all medications as directed (includes completed medication regime)? Yes   Does the patient have a primary care provider?  Yes   Does the patient have an appointment with their PCP within 7 days of discharge? No   Comments regarding PCP NO available HOSP [place] FU appts with PCP that meets TCM guidelines.    What is preventing the patient from scheduling follow up appointments within 7 days of discharge? Earlier appointment not available   Nursing Interventions --  [Route to office]   What is the Home health agency?   [facility]    Has home health visited the patient within 72 hours of discharge? Call prior to 72 hours   Has all DME been delivered? No   Psychosocial issues? No   Did the patient receive a copy of their discharge instructions? Yes   Nursing interventions Reviewed instructions with patient   What is the patient's perception of their health status since discharge? Improving  [Pt is weak]   Is the patient/caregiver able to teach back signs and symptoms related to disease process for when to call PCP? Yes   Is the patient/caregiver able to teach back signs and symptoms related to disease process for when to call 911? Yes   Is the patient/caregiver able to teach back the hierarchy of who to call/visit for symptoms/problems? PCP, Specialist, Home health nurse, Urgent Care, ED, 911 Yes   TCM call completed? Yes   Wrap up additional  comments Pt reports she is improving at this time.           Tiera Pickering RN    7/5/2022, 16:27 EDT       Acitretin Pregnancy And Lactation Text: This medication is Pregnancy Category X and should not be given to women who are pregnant or may become pregnant in the future. This medication is excreted in breast milk.

## 2022-07-05 NOTE — OUTREACH NOTE
Call Center TCM Note    Flowsheet Row Responses   [facility] patient discharged from? [facility]   Does the patient have one of the following disease processes/diagnoses(primary or secondary)? Other   TCM attempt successful? No   Unsuccessful attempts Attempt 1   Does the patient have a primary care provider?  Yes   Comments regarding PCP NO available HOSP [place] FU appts with PCP that meets TCM guidelines.           Tiera Pickering RN    7/5/2022, 14:26 EDT

## 2022-07-08 ENCOUNTER — HOME CARE VISIT (OUTPATIENT)
Dept: HOME HEALTH SERVICES | Facility: HOME HEALTHCARE | Age: 87
End: 2022-07-08

## 2022-07-08 VITALS
DIASTOLIC BLOOD PRESSURE: 53 MMHG | OXYGEN SATURATION: 95 % | HEART RATE: 75 BPM | SYSTOLIC BLOOD PRESSURE: 107 MMHG | BODY MASS INDEX: 32.64 KG/M2 | HEIGHT: 72 IN | WEIGHT: 241 LBS | TEMPERATURE: 98 F

## 2022-07-08 PROCEDURE — G0299 HHS/HOSPICE OF RN EA 15 MIN: HCPCS

## 2022-07-08 NOTE — HOME HEALTH
"Patient had recent hospital stay due to altered mental status related to UTI.  Patient also found to be anemic.  Patient states she is not having any burning with urintation, no urgency or frequency, and no foul odor.  Patient states her goal is to \"get feeling better\".  She states she feels worn out all the time.  SN discussed foods high in iron as well as the purpose and side effects of her ferrous sulfate that was prescribed. Vitals WNL.      Patient states she will make her follow up appointment with Dr Goss and will discuss with him which GI doctor he would like her to go to regarding her anemia.    Primary focus of home health will be assessment of s/s anemia, and education regarding anemia and medication/diet to help improve.    Next visit:  CP assess  Assess for s/s anemia  Anemia education"

## 2022-07-12 PROCEDURE — G0180 MD CERTIFICATION HHA PATIENT: HCPCS | Performed by: FAMILY MEDICINE

## 2022-07-12 RX ORDER — LEVETIRACETAM 500 MG/1
TABLET ORAL
Qty: 60 TABLET | Refills: 3 | Status: ON HOLD | OUTPATIENT
Start: 2022-07-12 | End: 2022-08-21

## 2022-07-15 ENCOUNTER — HOME CARE VISIT (OUTPATIENT)
Dept: HOME HEALTH SERVICES | Facility: HOME HEALTHCARE | Age: 87
End: 2022-07-15

## 2022-07-15 VITALS
OXYGEN SATURATION: 94 % | TEMPERATURE: 97.4 F | HEART RATE: 73 BPM | DIASTOLIC BLOOD PRESSURE: 61 MMHG | SYSTOLIC BLOOD PRESSURE: 142 MMHG

## 2022-07-15 PROCEDURE — G0299 HHS/HOSPICE OF RN EA 15 MIN: HCPCS

## 2022-07-15 NOTE — HOME HEALTH
SN educate on s/s of UTI and type of foods which is high in Iron, She told me her iron was low.  VS stable. medications reviewed with no changes.

## 2022-07-22 ENCOUNTER — HOME CARE VISIT (OUTPATIENT)
Dept: HOME HEALTH SERVICES | Facility: HOME HEALTHCARE | Age: 87
End: 2022-07-22

## 2022-07-22 VITALS
DIASTOLIC BLOOD PRESSURE: 59 MMHG | HEART RATE: 83 BPM | RESPIRATION RATE: 18 BRPM | TEMPERATURE: 97.9 F | OXYGEN SATURATION: 97 % | SYSTOLIC BLOOD PRESSURE: 107 MMHG

## 2022-07-22 PROCEDURE — G0299 HHS/HOSPICE OF RN EA 15 MIN: HCPCS

## 2022-07-23 NOTE — HOME HEALTH
Patient states she is starting to feel better.  She has been eating as many foods high in iron as she can find.  She states she hasn't tried liver yet, but does have it available now.      Next visit:  CP assess  Assess s/s UTI  Asses s/s anemia  Education on UTI prevention  Anemia education

## 2022-07-29 ENCOUNTER — HOME CARE VISIT (OUTPATIENT)
Dept: HOME HEALTH SERVICES | Facility: HOME HEALTHCARE | Age: 87
End: 2022-07-29

## 2022-07-29 VITALS
OXYGEN SATURATION: 98 % | RESPIRATION RATE: 20 BRPM | TEMPERATURE: 97.8 F | DIASTOLIC BLOOD PRESSURE: 62 MMHG | HEART RATE: 83 BPM | SYSTOLIC BLOOD PRESSURE: 133 MMHG

## 2022-07-29 PROCEDURE — G0299 HHS/HOSPICE OF RN EA 15 MIN: HCPCS

## 2022-07-29 NOTE — HOME HEALTH
Pt was sitting in reclining chair. Vital WNL. Her lower extremities was swollen and red on the anterior of the legs, heels, and foot. Reviewed medications. educated on Cardiopulmonary  disease and how to assess. overview on anemia and UTI prevention. She understands her medications and was engaged in learning more about the cardiopulmonary with diet control.

## 2022-08-05 ENCOUNTER — HOME CARE VISIT (OUTPATIENT)
Dept: HOME HEALTH SERVICES | Facility: HOME HEALTHCARE | Age: 87
End: 2022-08-05

## 2022-08-05 VITALS
DIASTOLIC BLOOD PRESSURE: 56 MMHG | HEART RATE: 88 BPM | SYSTOLIC BLOOD PRESSURE: 102 MMHG | TEMPERATURE: 97.3 F | OXYGEN SATURATION: 95 % | RESPIRATION RATE: 18 BRPM

## 2022-08-05 PROCEDURE — G0299 HHS/HOSPICE OF RN EA 15 MIN: HCPCS

## 2022-08-05 NOTE — HOME HEALTH
Patient will be discharged today due to home. UTI has cleared up and patient states, feeling fine and back to normal.

## 2022-08-17 ENCOUNTER — LAB (OUTPATIENT)
Dept: FAMILY MEDICINE CLINIC | Facility: CLINIC | Age: 87
End: 2022-08-17

## 2022-08-17 ENCOUNTER — OFFICE VISIT (OUTPATIENT)
Dept: FAMILY MEDICINE CLINIC | Facility: CLINIC | Age: 87
End: 2022-08-17

## 2022-08-17 VITALS
OXYGEN SATURATION: 95 % | BODY MASS INDEX: 35.92 KG/M2 | HEART RATE: 80 BPM | WEIGHT: 215.6 LBS | SYSTOLIC BLOOD PRESSURE: 110 MMHG | DIASTOLIC BLOOD PRESSURE: 65 MMHG | TEMPERATURE: 97.8 F | HEIGHT: 65 IN

## 2022-08-17 DIAGNOSIS — N18.30 HYPERTENSION ASSOCIATED WITH STAGE 3 CHRONIC KIDNEY DISEASE DUE TO TYPE 2 DIABETES MELLITUS: Chronic | ICD-10-CM

## 2022-08-17 DIAGNOSIS — E11.22 HYPERTENSION ASSOCIATED WITH STAGE 3 CHRONIC KIDNEY DISEASE DUE TO TYPE 2 DIABETES MELLITUS: Chronic | ICD-10-CM

## 2022-08-17 DIAGNOSIS — Z79.4 TYPE 2 DIABETES MELLITUS WITH HYPERGLYCEMIA, WITH LONG-TERM CURRENT USE OF INSULIN: Primary | ICD-10-CM

## 2022-08-17 DIAGNOSIS — Z12.31 ENCOUNTER FOR SCREENING MAMMOGRAM FOR MALIGNANT NEOPLASM OF BREAST: ICD-10-CM

## 2022-08-17 DIAGNOSIS — I12.9 HYPERTENSION ASSOCIATED WITH STAGE 3 CHRONIC KIDNEY DISEASE DUE TO TYPE 2 DIABETES MELLITUS: Chronic | ICD-10-CM

## 2022-08-17 DIAGNOSIS — E11.65 TYPE 2 DIABETES MELLITUS WITH HYPERGLYCEMIA, WITH LONG-TERM CURRENT USE OF INSULIN: Primary | ICD-10-CM

## 2022-08-17 DIAGNOSIS — D64.9 ANEMIA, UNSPECIFIED TYPE: ICD-10-CM

## 2022-08-17 DIAGNOSIS — Z13.820 ENCOUNTER FOR OSTEOPOROSIS SCREENING IN ASYMPTOMATIC POSTMENOPAUSAL PATIENT: ICD-10-CM

## 2022-08-17 DIAGNOSIS — Z78.0 ENCOUNTER FOR OSTEOPOROSIS SCREENING IN ASYMPTOMATIC POSTMENOPAUSAL PATIENT: ICD-10-CM

## 2022-08-17 LAB
DEPRECATED RDW RBC AUTO: 63 FL (ref 37–54)
ERYTHROCYTE [DISTWIDTH] IN BLOOD BY AUTOMATED COUNT: 18.2 % (ref 12.3–15.4)
FERRITIN SERPL-MCNC: 132 NG/ML (ref 13–150)
FOLATE SERPL-MCNC: >20 NG/ML (ref 4.78–24.2)
HBA1C MFR BLD: 6.6 % (ref 3.5–5.6)
HCT VFR BLD AUTO: 31.5 % (ref 34–46.6)
HGB BLD-MCNC: 10.2 G/DL (ref 12–15.9)
MCH RBC QN AUTO: 31.3 PG (ref 26.6–33)
MCHC RBC AUTO-ENTMCNC: 32.4 G/DL (ref 31.5–35.7)
MCV RBC AUTO: 96.6 FL (ref 79–97)
PLATELET # BLD AUTO: 170 10*3/MM3 (ref 140–450)
PMV BLD AUTO: 9.8 FL (ref 6–12)
RBC # BLD AUTO: 3.26 10*6/MM3 (ref 3.77–5.28)
VIT B12 BLD-MCNC: >2000 PG/ML (ref 211–946)
WBC NRBC COR # BLD: 5.74 10*3/MM3 (ref 3.4–10.8)

## 2022-08-17 PROCEDURE — 36415 COLL VENOUS BLD VENIPUNCTURE: CPT | Performed by: FAMILY MEDICINE

## 2022-08-17 PROCEDURE — 85027 COMPLETE CBC AUTOMATED: CPT | Performed by: FAMILY MEDICINE

## 2022-08-17 PROCEDURE — 82728 ASSAY OF FERRITIN: CPT | Performed by: FAMILY MEDICINE

## 2022-08-17 PROCEDURE — 82746 ASSAY OF FOLIC ACID SERUM: CPT | Performed by: FAMILY MEDICINE

## 2022-08-17 PROCEDURE — 82607 VITAMIN B-12: CPT | Performed by: FAMILY MEDICINE

## 2022-08-17 PROCEDURE — 99214 OFFICE O/P EST MOD 30 MIN: CPT | Performed by: FAMILY MEDICINE

## 2022-08-17 PROCEDURE — 83036 HEMOGLOBIN GLYCOSYLATED A1C: CPT | Performed by: FAMILY MEDICINE

## 2022-08-17 RX ORDER — OMEPRAZOLE 40 MG/1
40 CAPSULE, DELAYED RELEASE ORAL
Qty: 90 CAPSULE | Refills: 1 | Status: SHIPPED | OUTPATIENT
Start: 2022-08-17 | End: 2023-01-10 | Stop reason: SDUPTHER

## 2022-08-17 NOTE — PROGRESS NOTES
Assessment and Plan     Problem List Items Addressed This Visit        Cardiac and Vasculature    Hypertension associated with stage 3 chronic kidney disease due to type 2 diabetes mellitus (HCC) (Chronic)    Overview     BP at goal, <140/90.  Encouraged low-Na+ diet & 150 min exercise/week.   Continue losartan 50 mg daily.  Monitoring renal function.         Relevant Orders    Hemoglobin A1c       Endocrine and Metabolic    Type 2 diabetes mellitus with hyperglycemia, with long-term current use of insulin (HCC) - Primary    Overview     HbA1c not at goal, < 8%.  Monitoring regularly.  Monitoring renal function.  Continue ARB for renal protection.  Consider increasing Novolin 70/30 to 7 units twice daily.  Eye & foot exam due.         Relevant Orders    Hemoglobin A1c    Vitamin B12    Microalbumin / Creatinine Urine Ratio - Urine, Clean Catch      Other Visit Diagnoses     Anemia, unspecified type        Continue daily iron supplement, folic acid, and vitamin B12.  Monitoring CBC, ferritin, and B vitamins.  Potentially exacerbated by CKD 3.    Relevant Orders    Vitamin B12    Ferritin    Folate    CBC No Differential    Encounter for screening mammogram for malignant neoplasm of breast        Relevant Orders    Mammo Screening Digital Tomosynthesis Bilateral With CAD    Encounter for osteoporosis screening in asymptomatic postmenopausal patient        Relevant Orders    DEXA Bone Density Axial        Return in about 4 months (around 12/17/2022) for Medicare Wellness.        Patient was given instructions and counseling regarding her condition or for health maintenance advice. Please see specific information pulled into the AVS if appropriate.        Aracelis is a 87 y.o. being seen today for  Anemia and Diabetes   Subjective   History of the Present Illness     Obese post menopausal previously controlled diabetic with CMHx HTN & CKD 3 presents for follow up of uncontrolled blood sugars.     Last HbA1c was 8.2%.  "Reports that glucose has been uncontrolled due to stress associated with mislabeling/misdiagnosis of breast cancer. Report compliance with insulin 70-30 5 units BID.     Patient was previously followed by hematology/oncology for anemia but would prefer to see PCP. Reports compliance with vitamin B12, folic acid, and iron supplement daily.     Normotensive in office.  Denies any chest pain or dyspnea.    Patient is due for mammogram and bone scan.    Social History  She  reports that she has never smoked. She has never used smokeless tobacco. She reports previous alcohol use. She reports that she does not use drugs.  Objective   Vital Signs          Vitals:    08/17/22 1257   BP: 110/65   BP Location: Left arm   Patient Position: Sitting   Cuff Size: Large Adult   Pulse: 80   Temp: 97.8 °F (36.6 °C)   TempSrc: Oral   SpO2: 95%   Weight: 97.8 kg (215 lb 9.6 oz)   Height: 196.9 cm (77.5\")     BP Readings from Last 1 Encounters:   08/17/22 110/65     Wt Readings from Last 3 Encounters:   08/17/22 97.8 kg (215 lb 9.6 oz)   07/08/22 109 kg (241 lb)   07/03/22 109 kg (241 lb 2.9 oz)   Body mass index is 25.24 kg/m².     Physical Exam  Vitals reviewed.   Constitutional:       General: She is not in acute distress.     Appearance: Normal appearance. She is obese.   HENT:      Head: Normocephalic and atraumatic.   Cardiovascular:      Rate and Rhythm: Normal rate.      Heart sounds: Murmur heard.   Pulmonary:      Effort: Pulmonary effort is normal.      Breath sounds: Normal breath sounds.   Musculoskeletal:      Right lower leg: Edema (+1) present.      Left lower leg: Edema (+1) present.      Comments: Ambulating with cane.   Neurological:      Mental Status: She is alert and oriented to person, place, and time.   Psychiatric:         Mood and Affect: Mood normal.         Behavior: Behavior normal.               Hemoglobin & Hematocrit, Blood (07/03/2022 10:44)  Basic Metabolic Panel (07/03/2022 06:00)  CBC & Differential " (07/03/2022 06:00)  Hemoglobin A1c (06/30/2022 01:37)  Ferritin (02/26/2022 15:14)  Lipid Panel (02/04/2022 06:45)

## 2022-08-20 ENCOUNTER — APPOINTMENT (OUTPATIENT)
Dept: CT IMAGING | Facility: HOSPITAL | Age: 87
End: 2022-08-20

## 2022-08-20 ENCOUNTER — HOSPITAL ENCOUNTER (INPATIENT)
Facility: HOSPITAL | Age: 87
LOS: 2 days | Discharge: HOME | End: 2022-08-22
Attending: EMERGENCY MEDICINE | Admitting: HOSPITALIST

## 2022-08-20 ENCOUNTER — APPOINTMENT (OUTPATIENT)
Dept: GENERAL RADIOLOGY | Facility: HOSPITAL | Age: 87
End: 2022-08-20

## 2022-08-20 DIAGNOSIS — E87.5 HYPERKALEMIA: ICD-10-CM

## 2022-08-20 DIAGNOSIS — R41.82 ALTERED MENTAL STATUS, UNSPECIFIED ALTERED MENTAL STATUS TYPE: Primary | ICD-10-CM

## 2022-08-20 DIAGNOSIS — N39.0 URINARY TRACT INFECTION WITHOUT HEMATURIA, SITE UNSPECIFIED: ICD-10-CM

## 2022-08-20 DIAGNOSIS — N28.9 RENAL INSUFFICIENCY: ICD-10-CM

## 2022-08-20 DIAGNOSIS — F10.929 ALCOHOLIC INTOXICATION WITH COMPLICATION: ICD-10-CM

## 2022-08-20 PROBLEM — R74.02 ELEVATED SERUM LACTATE DEHYDROGENASE: Status: ACTIVE | Noted: 2022-08-20

## 2022-08-20 PROBLEM — N17.9 ACUTE KIDNEY INJURY SUPERIMPOSED ON CHRONIC KIDNEY DISEASE: Status: ACTIVE | Noted: 2022-08-20

## 2022-08-20 PROBLEM — N18.9 ACUTE KIDNEY INJURY SUPERIMPOSED ON CHRONIC KIDNEY DISEASE: Status: ACTIVE | Noted: 2022-08-20

## 2022-08-20 PROBLEM — E11.40 TYPE 2 DIABETES MELLITUS WITH DIABETIC NEUROPATHY, WITH LONG-TERM CURRENT USE OF INSULIN: Chronic | Status: ACTIVE | Noted: 2022-08-20

## 2022-08-20 PROBLEM — G93.41 METABOLIC ENCEPHALOPATHY: Status: ACTIVE | Noted: 2022-06-30

## 2022-08-20 PROBLEM — Z79.4 TYPE 2 DIABETES MELLITUS WITH DIABETIC NEUROPATHY, WITH LONG-TERM CURRENT USE OF INSULIN: Chronic | Status: ACTIVE | Noted: 2022-08-20

## 2022-08-20 LAB
ABO GROUP BLD: NORMAL
ALBUMIN SERPL-MCNC: 3.8 G/DL (ref 3.5–5.2)
ALBUMIN/GLOB SERPL: 1.2 G/DL
ALP SERPL-CCNC: 82 U/L (ref 39–117)
ALT SERPL W P-5'-P-CCNC: 11 U/L (ref 1–33)
AMMONIA BLD-SCNC: 48 UMOL/L (ref 11–51)
AMPHET+METHAMPHET UR QL: NEGATIVE
ANION GAP SERPL CALCULATED.3IONS-SCNC: 13 MMOL/L (ref 5–15)
APTT PPP: 27.5 SECONDS (ref 61–76.5)
ARTERIAL PATENCY WRIST A: POSITIVE
ARTERIAL PATENCY WRIST A: POSITIVE
AST SERPL-CCNC: 15 U/L (ref 1–32)
ATMOSPHERIC PRESS: ABNORMAL MM[HG]
ATMOSPHERIC PRESS: ABNORMAL MM[HG]
BACTERIA UR QL AUTO: ABNORMAL /HPF
BARBITURATES UR QL SCN: NEGATIVE
BASE EXCESS BLDA CALC-SCNC: -6.3 MMOL/L (ref 0–3)
BASE EXCESS BLDA CALC-SCNC: -7.5 MMOL/L (ref 0–3)
BASOPHILS # BLD AUTO: 0 10*3/MM3 (ref 0–0.2)
BASOPHILS NFR BLD AUTO: 1 % (ref 0–1.5)
BDY SITE: ABNORMAL
BDY SITE: ABNORMAL
BENZODIAZ UR QL SCN: NEGATIVE
BILIRUB SERPL-MCNC: 0.3 MG/DL (ref 0–1.2)
BILIRUB UR QL STRIP: NEGATIVE
BLD GP AB SCN SERPL QL: NEGATIVE
BUN SERPL-MCNC: 64 MG/DL (ref 8–23)
BUN/CREAT SERPL: 34.8 (ref 7–25)
CALCIUM SPEC-SCNC: 8.6 MG/DL (ref 8.6–10.5)
CANNABINOIDS SERPL QL: NEGATIVE
CHLORIDE SERPL-SCNC: 107 MMOL/L (ref 98–107)
CLARITY UR: CLEAR
CO2 BLDA-SCNC: 18.6 MMOL/L (ref 22–29)
CO2 BLDA-SCNC: 20.5 MMOL/L (ref 22–29)
CO2 SERPL-SCNC: 19 MMOL/L (ref 22–29)
COCAINE UR QL: NEGATIVE
COLOR UR: YELLOW
CREAT SERPL-MCNC: 1.84 MG/DL (ref 0.57–1)
D-LACTATE SERPL-SCNC: 2.8 MMOL/L (ref 0.5–2)
D-LACTATE SERPL-SCNC: 3.1 MMOL/L (ref 0.5–2)
D-LACTATE SERPL-SCNC: 4.4 MMOL/L (ref 0.5–2)
DEPRECATED RDW RBC AUTO: 80.5 FL (ref 37–54)
EGFRCR SERPLBLD CKD-EPI 2021: 26.6 ML/MIN/1.73
EOSINOPHIL # BLD AUTO: 0.2 10*3/MM3 (ref 0–0.4)
EOSINOPHIL NFR BLD AUTO: 4.8 % (ref 0.3–6.2)
ERYTHROCYTE [DISTWIDTH] IN BLOOD BY AUTOMATED COUNT: 22.7 % (ref 12.3–15.4)
ETHANOL UR QL: 0.2 %
GLOBULIN UR ELPH-MCNC: 3.1 GM/DL
GLUCOSE BLDC GLUCOMTR-MCNC: 89 MG/DL (ref 70–105)
GLUCOSE SERPL-MCNC: 91 MG/DL (ref 65–99)
GLUCOSE UR STRIP-MCNC: NEGATIVE MG/DL
HCO3 BLDA-SCNC: 17.6 MMOL/L (ref 21–28)
HCO3 BLDA-SCNC: 19.3 MMOL/L (ref 21–28)
HCT VFR BLD AUTO: 33.8 % (ref 34–46.6)
HEMODILUTION: NO
HEMODILUTION: NO
HGB BLD-MCNC: 10.8 G/DL (ref 12–15.9)
HGB UR QL STRIP.AUTO: NEGATIVE
HYALINE CASTS UR QL AUTO: ABNORMAL /LPF
INHALED O2 CONCENTRATION: 21 %
INHALED O2 CONCENTRATION: 21 %
INR PPP: 1.1 (ref 0.93–1.1)
KETONES UR QL STRIP: NEGATIVE
LEUKOCYTE ESTERASE UR QL STRIP.AUTO: ABNORMAL
LYMPHOCYTES # BLD AUTO: 1.6 10*3/MM3 (ref 0.7–3.1)
LYMPHOCYTES NFR BLD AUTO: 42.8 % (ref 19.6–45.3)
MAGNESIUM SERPL-MCNC: 2.9 MG/DL (ref 1.6–2.4)
MCH RBC QN AUTO: 31.5 PG (ref 26.6–33)
MCHC RBC AUTO-ENTMCNC: 31.9 G/DL (ref 31.5–35.7)
MCV RBC AUTO: 98.7 FL (ref 79–97)
METHADONE UR QL SCN: NEGATIVE
MODALITY: ABNORMAL
MODALITY: ABNORMAL
MONOCYTES # BLD AUTO: 0.3 10*3/MM3 (ref 0.1–0.9)
MONOCYTES NFR BLD AUTO: 6.9 % (ref 5–12)
NEUTROPHILS NFR BLD AUTO: 1.7 10*3/MM3 (ref 1.7–7)
NEUTROPHILS NFR BLD AUTO: 44.5 % (ref 42.7–76)
NITRITE UR QL STRIP: NEGATIVE
NRBC BLD AUTO-RTO: 0 /100 WBC (ref 0–0.2)
OPIATES UR QL: NEGATIVE
OXYCODONE UR QL SCN: NEGATIVE
PCO2 BLDA: 33.2 MM HG (ref 35–48)
PCO2 BLDA: 37.8 MM HG (ref 35–48)
PH BLDA: 7.32 PH UNITS (ref 7.35–7.45)
PH BLDA: 7.33 PH UNITS (ref 7.35–7.45)
PH UR STRIP.AUTO: <=5 [PH] (ref 5–8)
PLATELET # BLD AUTO: 211 10*3/MM3 (ref 140–450)
PMV BLD AUTO: 7 FL (ref 6–12)
PO2 BLDA: 69.3 MM HG (ref 83–108)
PO2 BLDA: 77 MM HG (ref 83–108)
POTASSIUM SERPL-SCNC: 5.9 MMOL/L (ref 3.5–5.2)
PROT SERPL-MCNC: 6.9 G/DL (ref 6–8.5)
PROT UR QL STRIP: NEGATIVE
PROTHROMBIN TIME: 11.3 SECONDS (ref 9.6–11.7)
RBC # BLD AUTO: 3.42 10*6/MM3 (ref 3.77–5.28)
RBC # UR STRIP: ABNORMAL /HPF
REF LAB TEST METHOD: ABNORMAL
RH BLD: POSITIVE
SAO2 % BLDCOA: 92.6 % (ref 94–98)
SAO2 % BLDCOA: 94.1 % (ref 94–98)
SARS-COV-2 RNA RESP QL NAA+PROBE: NOT DETECTED
SODIUM SERPL-SCNC: 139 MMOL/L (ref 136–145)
SP GR UR STRIP: 1.01 (ref 1–1.03)
SQUAMOUS #/AREA URNS HPF: ABNORMAL /HPF
T&S EXPIRATION DATE: NORMAL
TROPONIN T SERPL-MCNC: 0.03 NG/ML (ref 0–0.03)
TSH SERPL DL<=0.05 MIU/L-ACNC: 2.33 UIU/ML (ref 0.27–4.2)
UROBILINOGEN UR QL STRIP: ABNORMAL
WBC # UR STRIP: ABNORMAL /HPF
WBC NRBC COR # BLD: 3.8 10*3/MM3 (ref 3.4–10.8)

## 2022-08-20 PROCEDURE — 25010000002 CEFTRIAXONE PER 250 MG: Performed by: EMERGENCY MEDICINE

## 2022-08-20 PROCEDURE — 82140 ASSAY OF AMMONIA: CPT | Performed by: EMERGENCY MEDICINE

## 2022-08-20 PROCEDURE — 25010000002 HEPARIN (PORCINE) PER 1000 UNITS

## 2022-08-20 PROCEDURE — P9612 CATHETERIZE FOR URINE SPEC: HCPCS

## 2022-08-20 PROCEDURE — 80053 COMPREHEN METABOLIC PANEL: CPT | Performed by: EMERGENCY MEDICINE

## 2022-08-20 PROCEDURE — 80307 DRUG TEST PRSMV CHEM ANLYZR: CPT | Performed by: EMERGENCY MEDICINE

## 2022-08-20 PROCEDURE — 83605 ASSAY OF LACTIC ACID: CPT

## 2022-08-20 PROCEDURE — 84443 ASSAY THYROID STIM HORMONE: CPT | Performed by: EMERGENCY MEDICINE

## 2022-08-20 PROCEDURE — 86901 BLOOD TYPING SEROLOGIC RH(D): CPT | Performed by: EMERGENCY MEDICINE

## 2022-08-20 PROCEDURE — 83735 ASSAY OF MAGNESIUM: CPT | Performed by: EMERGENCY MEDICINE

## 2022-08-20 PROCEDURE — 86900 BLOOD TYPING SEROLOGIC ABO: CPT | Performed by: EMERGENCY MEDICINE

## 2022-08-20 PROCEDURE — 85610 PROTHROMBIN TIME: CPT | Performed by: EMERGENCY MEDICINE

## 2022-08-20 PROCEDURE — U0003 INFECTIOUS AGENT DETECTION BY NUCLEIC ACID (DNA OR RNA); SEVERE ACUTE RESPIRATORY SYNDROME CORONAVIRUS 2 (SARS-COV-2) (CORONAVIRUS DISEASE [COVID-19]), AMPLIFIED PROBE TECHNIQUE, MAKING USE OF HIGH THROUGHPUT TECHNOLOGIES AS DESCRIBED BY CMS-2020-01-R: HCPCS | Performed by: EMERGENCY MEDICINE

## 2022-08-20 PROCEDURE — 82077 ASSAY SPEC XCP UR&BREATH IA: CPT | Performed by: EMERGENCY MEDICINE

## 2022-08-20 PROCEDURE — 82962 GLUCOSE BLOOD TEST: CPT

## 2022-08-20 PROCEDURE — U0005 INFEC AGEN DETEC AMPLI PROBE: HCPCS | Performed by: EMERGENCY MEDICINE

## 2022-08-20 PROCEDURE — 85730 THROMBOPLASTIN TIME PARTIAL: CPT | Performed by: EMERGENCY MEDICINE

## 2022-08-20 PROCEDURE — 36415 COLL VENOUS BLD VENIPUNCTURE: CPT | Performed by: EMERGENCY MEDICINE

## 2022-08-20 PROCEDURE — 87040 BLOOD CULTURE FOR BACTERIA: CPT | Performed by: EMERGENCY MEDICINE

## 2022-08-20 PROCEDURE — 36415 COLL VENOUS BLD VENIPUNCTURE: CPT

## 2022-08-20 PROCEDURE — 70450 CT HEAD/BRAIN W/O DYE: CPT

## 2022-08-20 PROCEDURE — 93005 ELECTROCARDIOGRAM TRACING: CPT | Performed by: EMERGENCY MEDICINE

## 2022-08-20 PROCEDURE — 81001 URINALYSIS AUTO W/SCOPE: CPT | Performed by: EMERGENCY MEDICINE

## 2022-08-20 PROCEDURE — 85025 COMPLETE CBC W/AUTO DIFF WBC: CPT | Performed by: EMERGENCY MEDICINE

## 2022-08-20 PROCEDURE — 86850 RBC ANTIBODY SCREEN: CPT | Performed by: EMERGENCY MEDICINE

## 2022-08-20 PROCEDURE — 36600 WITHDRAWAL OF ARTERIAL BLOOD: CPT

## 2022-08-20 PROCEDURE — 84484 ASSAY OF TROPONIN QUANT: CPT | Performed by: EMERGENCY MEDICINE

## 2022-08-20 PROCEDURE — 82803 BLOOD GASES ANY COMBINATION: CPT

## 2022-08-20 PROCEDURE — 99222 1ST HOSP IP/OBS MODERATE 55: CPT

## 2022-08-20 PROCEDURE — 99285 EMERGENCY DEPT VISIT HI MDM: CPT

## 2022-08-20 PROCEDURE — 71045 X-RAY EXAM CHEST 1 VIEW: CPT

## 2022-08-20 PROCEDURE — 94799 UNLISTED PULMONARY SVC/PX: CPT

## 2022-08-20 RX ORDER — ACETAMINOPHEN 650 MG/1
650 SUPPOSITORY RECTAL EVERY 4 HOURS PRN
Status: DISCONTINUED | OUTPATIENT
Start: 2022-08-20 | End: 2022-08-22 | Stop reason: HOSPADM

## 2022-08-20 RX ORDER — HEPARIN SODIUM 5000 [USP'U]/ML
5000 INJECTION, SOLUTION INTRAVENOUS; SUBCUTANEOUS EVERY 12 HOURS SCHEDULED
Status: DISCONTINUED | OUTPATIENT
Start: 2022-08-20 | End: 2022-08-22 | Stop reason: HOSPADM

## 2022-08-20 RX ORDER — ACETAMINOPHEN 160 MG/5ML
650 SOLUTION ORAL EVERY 4 HOURS PRN
Status: DISCONTINUED | OUTPATIENT
Start: 2022-08-20 | End: 2022-08-22 | Stop reason: HOSPADM

## 2022-08-20 RX ORDER — SODIUM CHLORIDE 0.9 % (FLUSH) 0.9 %
10 SYRINGE (ML) INJECTION AS NEEDED
Status: DISCONTINUED | OUTPATIENT
Start: 2022-08-20 | End: 2022-08-22 | Stop reason: HOSPADM

## 2022-08-20 RX ORDER — ACETAMINOPHEN 325 MG/1
650 TABLET ORAL EVERY 4 HOURS PRN
Status: DISCONTINUED | OUTPATIENT
Start: 2022-08-20 | End: 2022-08-22 | Stop reason: HOSPADM

## 2022-08-20 RX ORDER — CHOLECALCIFEROL (VITAMIN D3) 125 MCG
5 CAPSULE ORAL NIGHTLY PRN
Status: DISCONTINUED | OUTPATIENT
Start: 2022-08-20 | End: 2022-08-22 | Stop reason: HOSPADM

## 2022-08-20 RX ORDER — SODIUM CHLORIDE 9 MG/ML
100 INJECTION, SOLUTION INTRAVENOUS CONTINUOUS
Status: DISCONTINUED | OUTPATIENT
Start: 2022-08-20 | End: 2022-08-22

## 2022-08-20 RX ORDER — ALUMINA, MAGNESIA, AND SIMETHICONE 2400; 2400; 240 MG/30ML; MG/30ML; MG/30ML
15 SUSPENSION ORAL EVERY 6 HOURS PRN
Status: DISCONTINUED | OUTPATIENT
Start: 2022-08-20 | End: 2022-08-22 | Stop reason: HOSPADM

## 2022-08-20 RX ORDER — ONDANSETRON 2 MG/ML
4 INJECTION INTRAMUSCULAR; INTRAVENOUS EVERY 6 HOURS PRN
Status: DISCONTINUED | OUTPATIENT
Start: 2022-08-20 | End: 2022-08-22 | Stop reason: HOSPADM

## 2022-08-20 RX ORDER — ONDANSETRON 4 MG/1
4 TABLET, FILM COATED ORAL EVERY 6 HOURS PRN
Status: DISCONTINUED | OUTPATIENT
Start: 2022-08-20 | End: 2022-08-22 | Stop reason: HOSPADM

## 2022-08-20 RX ADMIN — SODIUM CHLORIDE 200 ML/HR: 9 INJECTION, SOLUTION INTRAVENOUS at 21:57

## 2022-08-20 RX ADMIN — HEPARIN SODIUM 5000 UNITS: 5000 INJECTION INTRAVENOUS; SUBCUTANEOUS at 22:06

## 2022-08-20 RX ADMIN — CEFTRIAXONE 2 G: 2 INJECTION, POWDER, FOR SOLUTION INTRAMUSCULAR; INTRAVENOUS at 19:31

## 2022-08-20 RX ADMIN — SODIUM ZIRCONIUM CYCLOSILICATE 10 G: 10 POWDER, FOR SUSPENSION ORAL at 19:31

## 2022-08-20 RX ADMIN — SODIUM CHLORIDE 500 ML: 9 INJECTION, SOLUTION INTRAVENOUS at 20:22

## 2022-08-20 RX ADMIN — SODIUM CHLORIDE 1000 ML: 9 INJECTION, SOLUTION INTRAVENOUS at 19:30

## 2022-08-20 RX ADMIN — SODIUM CHLORIDE 500 ML: 9 INJECTION, SOLUTION INTRAVENOUS at 17:05

## 2022-08-20 NOTE — ED PROVIDER NOTES
"Subjective   History of Present Illness  Confusion  87-year-old female presents by EMS with reports of confusion this morning.  She reportedly had \"thrown myself on the floor to get my 's attention \".   noted that she has been confused this morning.  There is no reported focal numbness or weakness.  She reports no head injury or neck or back pain.  Initial history and review of systems comes solely from the patient and the paramedics report.  Review of Systems   Unable to perform ROS: Mental status change       Past Medical History:   Diagnosis Date   • Atherosclerosis of native coronary artery of native heart without angina pectoris 10/22/2020   • Atrioventricular block, Mobitz type 1, Wenckebach 10/21/2020    Added automatically from request for surgery [id number]   • Hypertension associated with stage 3 chronic kidney disease due to type 2 diabetes mellitus ([facility]) 10/22/2020   • Morbid obesity ([facility]) 10/22/2020   • Secondary hyperaldosteronism ([facility]) 10/22/2020   • Stage 3b chronic kidney disease ([facility]) 10/22/2020   • Type 2 diabetes mellitus with diabetic neuropathy, with long-term current use of insulin ([facility]) 10/22/2020   • Type 2 diabetes mellitus with diabetic neuropathy, with long-term current use of insulin ([facility]) 10/22/2020       Allergies   Allergen Reactions   • Latex, Natural Rubber Rash   • Adhesive Tape Rash       Past Surgical History:   Procedure Laterality Date   • CARDIAC CATHETERIZATION N/A 10/23/2020    Procedure: Left Heart Cath and coronary angiogram;  Surgeon: Wang Plaza MD;  Location: [facility] CATH INVASIVE LOCATION;  Service: Cardiovascular;  Laterality: N/A;   • CARDIAC ELECTROPHYSIOLOGY PROCEDURE Left 11/3/2021    Procedure: Pacemaker DC new;  Surgeon: Wang Plaza MD;  Location: [facility] CATH INVASIVE LOCATION;  Service: Cardiovascular;  Laterality: Left;   • CARDIAC ELECTROPHYSIOLOGY PROCEDURE N/A 11/3/2021    Procedure: LOOP RECORDER REMOVAL;  Surgeon: Wang Plaza" MD;  Location: [facility] INVASIVE LOCATION;  Service: Cardiovascular;  Laterality: N/A;   • HYSTERECTOMY     • JOINT REPLACEMENT Right     Hip   • JOINT REPLACEMENT Left     hip   • JOINT REPLACEMENT Left     hip (for second time)   • JOINT REPLACEMENT Right     knee   • OOPHORECTOMY         Family History   Problem Relation Age of Onset   • Heart disease Mother    • Heart disease Father        Social History     Socioeconomic History   • Marital status:    Tobacco Use   • Smoking status: Never Smoker   • Smokeless tobacco: Never Used   Vaping Use   • Vaping Use: Never used   Substance and Sexual Activity   • Alcohol use: Not Currently   • Drug use: Never   • Sexual activity: Defer       Prior to Admission medications    Medication Sig Start Date End Date Taking? Authorizing Provider   aspirin 325 MG tablet Take 1 tablet by mouth Daily. 2/7/22   Yoel Baker MD   atorvastatin (LIPITOR) 10 MG tablet TAKE ONE TABLET BY MOUTH DAILY 5/27/22   Gabriel Goss MD   folic acid (FOLVITE) 1 MG tablet Take 1 tablet by mouth Daily. 5/18/22   Ralph Almeida MD   furosemide (Lasix) 40 MG tablet Take 1 tablet by mouth Daily for 30 days. 7/3/22 8/2/22  Lg Aguilar MD   gabapentin (NEURONTIN) 300 MG capsule Take 1 capsule by mouth Every 8 (Eight) Hours for 30 days. 7/3/22 8/2/22  Lg Aguilar MD   HYDROcodone-acetaminophen (NORCO) 7.5-325 MG per tablet Take 1 tablet by mouth Every 8 (Eight) Hours As Needed for Moderate Pain  or Severe Pain . 2/6/22   Yoel Baker MD   insulin NPH-insulin regular (humuLIN 70/30,novoLIN 70/30) (70-30) 100 UNIT/ML injection Inject 5 Units under the skin into the appropriate area as directed 2 (Two) Times a Day With Meals. 7/3/22   Lg Aguilar MD   levETIRAcetam (KEPPRA) 500 MG tablet TAKE ONE TABLET BY MOUTH EVERY 12 HOURS 7/12/22   Gabriel Goss MD   losartan (COZAAR) 50 MG tablet TAKE ONE  "TABLET BY MOUTH DAILY 12/30/21   Alan Park MD   omeprazole (priLOSEC) 40 MG capsule Take 1 capsule by mouth Every Morning Before Breakfast. Take on empty stomach! 8/17/22   Bria Salazar MD   sennosides-docusate (PERICOLACE) 8.6-50 MG per tablet Take 2 tablets by mouth 2 (Two) Times a Day. 2/6/22   Yoel Baker MD   topiramate (TOPAMAX) 25 MG tablet Take 0.5 tablets by mouth 2 (Two) Times a Day. 3/23/22   Gabriel Goss MD   vitamin B-12 (CYANOCOBALAMIN) 1000 MCG tablet Take 1 tablet by mouth Daily. 5/18/22   Ralph Almeida MD     /81   Pulse 63   Temp 97.6 °F (36.4 °C) (Oral)   Resp 18   Ht 165.1 cm (65\")   Wt 97.5 kg (215 lb)   SpO2 98%   BMI 35.78 kg/m²   I examined the patient using the appropriate personal protective equipment.        Objective   Physical Exam  General: Obese elderly female no acute distress, awake and alert  Eyes: Pupils midsized, round and reactive, sclera nonicteric  HEENT: Mucous membranes somewhat dry, no mucosal swelling, normocephalic, atraumatic  Neck: Supple, no nuchal rigidity, C-spine nontender  Respirations: Respirations nonlabored, equal breath sounds bilaterally, clear lungs  Heart regular rate and rhythm, no murmurs rubs or gallops,   Abdomen soft nontender nondistended, no hepatosplenomegaly, no hernia, no mass, normal bowel sounds, no CVA tenderness  Extremities no clubbing cyanosis or edema, calves are symmetric and nontender  Neuro confused, cranial nerves grossly intact, no focal limb deficits  Psych confused, follows simple commands and generally cooperative  Skin no rash, brisk cap refill  Procedures           ED Course  ED Course as of 08/20/22 1932   Sat Aug 20, 2022   1626 Patient told the nurse that she has drinking vodka this morning [SH]      ED Course User Index  [SH] Nestor Glaser MD            Results for orders placed or performed during the hospital encounter of 08/20/22 "   COVID-19,CEPHEID/SHAHLA,COR/RAYRAY/PAD/MARTHA IN-HOUSE(OR EMERGENT/ADD-ON),NP SWAB IN TRANSPORT MEDIA 3-4 HR TAT, RT-PCR - Swab, Nasopharynx    Specimen: Nasopharynx; Swab   Result Value Ref Range    COVID19 Not Detected Not Detected - Ref. Range   Comprehensive Metabolic Panel    Specimen: Blood   Result Value Ref Range    Glucose 91 65 - 99 mg/dL    BUN 64 (H) 8 - 23 mg/dL    Creatinine 1.84 (H) 0.57 - 1.00 mg/dL    Sodium 139 136 - 145 mmol/L    Potassium 5.9 (H) 3.5 - 5.2 mmol/L    Chloride 107 98 - 107 mmol/L    CO2 19.0 (L) 22.0 - 29.0 mmol/L    Calcium 8.6 8.6 - 10.5 mg/dL    Total Protein 6.9 6.0 - 8.5 g/dL    Albumin 3.80 3.50 - 5.20 g/dL    ALT (SGPT) 11 1 - 33 U/L    AST (SGOT) 15 1 - 32 U/L    Alkaline Phosphatase 82 39 - 117 U/L    Total Bilirubin 0.3 0.0 - 1.2 mg/dL    Globulin 3.1 gm/dL    A/G Ratio 1.2 g/dL    BUN/Creatinine Ratio 34.8 (H) 7.0 - 25.0    Anion Gap 13.0 5.0 - 15.0 mmol/L    eGFR 26.6 (L) >60.0 mL/min/1.73   Protime-INR    Specimen: Blood   Result Value Ref Range    Protime 11.3 9.6 - 11.7 Seconds    INR 1.10 0.93 - 1.10   aPTT    Specimen: Blood   Result Value Ref Range    PTT 27.5 (L) 61.0 - 76.5 seconds   Urinalysis With Culture If Indicated - Urine, Catheter    Specimen: Urine, Catheter   Result Value Ref Range    Color, UA Yellow Yellow, Straw    Appearance, UA Clear Clear    pH, UA <=5.0 5.0 - 8.0    Specific Gravity, UA 1.009 1.005 - 1.030    Glucose, UA Negative Negative    Ketones, UA Negative Negative    Bilirubin, UA Negative Negative    Blood, UA Negative Negative    Protein, UA Negative Negative    Leuk Esterase, UA Trace (A) Negative    Nitrite, UA Negative Negative    Urobilinogen, UA 0.2 E.U./dL 0.2 - 1.0 E.U./dL   Troponin    Specimen: Blood   Result Value Ref Range    Troponin T 0.029 0.000 - 0.030 ng/mL   Urine Drug Screen - Urine, Catheter    Specimen: Urine, Catheter   Result Value Ref Range    Amphet/Methamphet, Screen Negative Negative    Barbiturates Screen, Urine  Negative Negative    Benzodiazepine Screen, Urine Negative Negative    Cocaine Screen, Urine Negative Negative    Opiate Screen Negative Negative    THC, Screen, Urine Negative Negative    Methadone Screen, Urine Negative Negative    Oxycodone Screen, Urine Negative Negative   Ethanol    Specimen: Blood   Result Value Ref Range    Ethanol % 0.196 %   Magnesium    Specimen: Blood   Result Value Ref Range    Magnesium 2.9 (H) 1.6 - 2.4 mg/dL   TSH    Specimen: Blood   Result Value Ref Range    TSH 2.330 0.270 - 4.200 uIU/mL   Ammonia    Specimen: Blood   Result Value Ref Range    Ammonia 48 11 - 51 umol/L   CBC Auto Differential    Specimen: Blood   Result Value Ref Range    WBC 3.80 3.40 - 10.80 10*3/mm3    RBC 3.42 (L) 3.77 - 5.28 10*6/mm3    Hemoglobin 10.8 (L) 12.0 - 15.9 g/dL    Hematocrit 33.8 (L) 34.0 - 46.6 %    MCV 98.7 (H) 79.0 - 97.0 fL    MCH 31.5 26.6 - 33.0 pg    MCHC 31.9 31.5 - 35.7 g/dL    RDW 22.7 (H) 12.3 - 15.4 %    RDW-SD 80.5 (H) 37.0 - 54.0 fl    MPV 7.0 6.0 - 12.0 fL    Platelets 211 140 - 450 10*3/mm3    Neutrophil % 44.5 42.7 - 76.0 %    Lymphocyte % 42.8 19.6 - 45.3 %    Monocyte % 6.9 5.0 - 12.0 %    Eosinophil % 4.8 0.3 - 6.2 %    Basophil % 1.0 0.0 - 1.5 %    Neutrophils, Absolute 1.70 1.70 - 7.00 10*3/mm3    Lymphocytes, Absolute 1.60 0.70 - 3.10 10*3/mm3    Monocytes, Absolute 0.30 0.10 - 0.90 10*3/mm3    Eosinophils, Absolute 0.20 0.00 - 0.40 10*3/mm3    Basophils, Absolute 0.00 0.00 - 0.20 10*3/mm3    nRBC 0.0 0.0 - 0.2 /100 WBC   Urinalysis, Microscopic Only - Urine, Catheter    Specimen: Urine, Catheter   Result Value Ref Range    RBC, UA 0-2 (A) None Seen /HPF    WBC, UA 3-5 (A) None Seen /HPF    Bacteria, UA 1+ (A) None Seen /HPF    Squamous Epithelial Cells, UA 0-2 None Seen, 0-2 /HPF    Hyaline Casts, UA None Seen None Seen /LPF    Methodology Automated Microscopy    Blood Gas, Arterial -    Specimen: Arterial Blood   Result Value Ref Range    Site Left Radial     Andrew's Test  Positive     pH, Arterial 7.316 (L) 7.350 - 7.450 pH units    pCO2, Arterial 37.8 35.0 - 48.0 mm Hg    pO2, Arterial 77.0 (L) 83.0 - 108.0 mm Hg    HCO3, Arterial 19.3 (L) 21.0 - 28.0 mmol/L    Base Excess, Arterial -6.3 (L) 0.0 - 3.0 mmol/L    O2 Saturation, Arterial 94.1 94.0 - 98.0 %    CO2 Content 20.5 (L) 22 - 29 mmol/L    Barometric Pressure for Blood Gas      Modality Room Air     FIO2 21 %    Hemodilution No    POC Glucose Once    Specimen: Blood   Result Value Ref Range    Glucose 89 70 - 105 mg/dL   POC Lactate    Specimen: Blood   Result Value Ref Range    Lactate 3.1 (C) 0.5 - 2.0 mmol/L   Type & Screen    Specimen: Blood   Result Value Ref Range    ABO Type A     RH type Positive     Antibody Screen Negative     T&S Expiration Date 8/23/2022 11:59:59 PM      CT Head Without Contrast    Result Date: 8/20/2022  No evidence of hemorrhage, mass effect or midline shift. No acute process identified.  Electronically Signed By-Melissa Manriquez MD On:8/20/2022 6:00 PM This report was finalized on [id number] by  Melissa Manriquez MD.    XR Chest 1 View    Result Date: 8/20/2022  No acute cardiopulmonary process.  Electronically Signed By-Melissa Manriquez MD On:8/20/2022 4:45 PM This report was finalized on [id number] by  Melissa Manriquez MD.                                      MDM   My EKG interpretation atrial paced rhythm rate of 62, first-degree AV block, nonspecific T wave abnormalities  Patient presented with some reported confusion.  She later stated that she had drank some alcohol today and has an elevated alcohol level.  She does not have focal deficits to suggest CVA.  In work-up for altered mental status there was evidence of renal insufficiency probably from dehydration and did have some hyperkalemia.  She was ordered IV fluids.  She was ordered Lokelma for the hyperkalemia.  She was stable on the monitor.  The urinalysis does reveal bacteriuria.  She was ordered Rocephin for possible urinary tract infection.   Lactic acid was marginally elevated.  She is normotensive and afebrile, septic shock felt to be unlikely.  Patient mental status remained stable during the emergency room course.  I suspect her altered mental status is multifactorial including her alcohol intoxication.  Case discussed with the hospitalist service, Herlinda for admission.  Final diagnoses:   Altered mental status, unspecified altered mental status type   Alcoholic intoxication with complication (HCC)   Urinary tract infection without hematuria, site unspecified   Renal insufficiency   Hyperkalemia       ED Disposition  ED Disposition     ED Disposition   Decision to Admit    Condition   --    Comment   Level of Care: Telemetry [5]   Admitting Physician: SERA LOPEZ [[id number]]               No follow-up provider specified.       Medication List      No changes were made to your prescriptions during this visit.          Nestor Glaser MD  08/20/22 1955

## 2022-08-20 NOTE — H&P
"    [facility] Medicine Services      Patient Name: Aracelis Vargas  : 1935  MRN: [id number]  Primary Care Physician:  Gabriel Goss MD  Date of admission: 2022      Subjective      Chief Complaint: Altered mental status    History of Present Illness: Aracelis Vargas is a 87 y.o. female with a past medical history of diabetes mellitus type 2 with neuropathy, CKD stage III, hyperlipidemia,  hypertension, seizure disorder, frequent UTI, and falls who was brought to [facility] on 2022 by EMS due to altered mental status.  She admits to drinking vodka this morning after which she \"threw myself on the floor to get my 's attention\".  She denies any injuries, loss of consciousness, or pain.  She also denies dysuria, urinary frequency, and urinary urgency.  She denies daily alcohol intake.  She is alert to self and place.  However, she is unable to give any other details on what brought her to the hospital.    In the ED, ABG showed pH 7.31, CO2 37.8, PO2 77, HCO3 19.3.  Troponin is 0.029, potassium 5.9, creatinine 1.84 (baseline approximately 1.036), BUN 64, calcium 8.6, magnesium 2.9, ammonia 48, lactate 3.1, WBC 3.8, blood alcohol level is 0.196%.  All other labs are unremarkable.  Urine toxicity is negative, UA shows 1+ bacteria, trace leukocytes, no squamous epithelial cells. COVID-19 is negative, blood cultures were collected and are pending.  An x-ray of the chest shows no acute cardiopulmonary process.  CT of the head without contrast shows no evidence of hemorrhage, mass-effect, or midline shift.  No acute process identified.  She is afebrile, all vital signs of been stable.  She was treated with a 2 L bolus of normal saline, Lokelma, and Rocephin.  Hospitalist was consulted for further care and management.    On chart review, the patient was discharged on 7/3/2022 from [facility] after being treated for an acute UTI with altered mental status and " KYRA.  On that admission her urine culture grew E. coli.    Review of Systems   Unable to perform ROS: mental status change     Personal History     Past Medical History:   Diagnosis Date   • Atherosclerosis of native coronary artery of native heart without angina pectoris 10/22/2020   • Atrioventricular block, Mobitz type 1, [place] 10/21/2020    Added automatically from request for surgery [id number]   • Hypertension associated with stage 3 chronic kidney disease due to type 2 diabetes mellitus ([facility]) 10/22/2020   • Morbid obesity ([facility]) 10/22/2020   • Secondary hyperaldosteronism ([facility]) 10/22/2020   • Stage 3b chronic kidney disease ([facility]) 10/22/2020   • Type 2 diabetes mellitus with diabetic neuropathy, with long-term current use of insulin ([facility]) 10/22/2020   • Type 2 diabetes mellitus with diabetic neuropathy, with long-term current use of insulin ([facility]) 10/22/2020       Past Surgical History:   Procedure Laterality Date   • CARDIAC CATHETERIZATION N/A 10/23/2020    Procedure: Left Heart Cath and coronary angiogram;  Surgeon: Wang Plaza MD;  Location: [facility] CATH INVASIVE LOCATION;  Service: Cardiovascular;  Laterality: N/A;   • CARDIAC ELECTROPHYSIOLOGY PROCEDURE Left 11/3/2021    Procedure: Pacemaker DC new;  Surgeon: Wang Plaza MD;  Location: [facility] CATH INVASIVE LOCATION;  Service: Cardiovascular;  Laterality: Left;   • CARDIAC ELECTROPHYSIOLOGY PROCEDURE N/A 11/3/2021    Procedure: LOOP RECORDER REMOVAL;  Surgeon: Wang Plaza MD;  Location: [facility] CATH INVASIVE LOCATION;  Service: Cardiovascular;  Laterality: N/A;   • HYSTERECTOMY     • JOINT REPLACEMENT Right     Hip   • JOINT REPLACEMENT Left     hip   • JOINT REPLACEMENT Left     hip (for second time)   • JOINT REPLACEMENT Right     knee   • OOPHORECTOMY         Family History: family history includes Heart disease in her father and mother. Otherwise pertinent FHx was reviewed and not pertinent to current issue.    Social History:  reports  that she has never smoked. She has never used smokeless tobacco. She reports previous alcohol use. She reports that she does not use drugs.    Home Medications:  Prior to Admission Medications     Prescriptions Last Dose Informant Patient Reported? Taking?    aspirin 325 MG tablet   No No    Take 1 tablet by mouth Daily.    atorvastatin (LIPITOR) 10 MG tablet   No No    TAKE ONE TABLET BY MOUTH DAILY    folic acid (FOLVITE) 1 MG tablet   No No    Take 1 tablet by mouth Daily.    furosemide (Lasix) 40 MG tablet   No No    Take 1 tablet by mouth Daily for 30 days.    gabapentin (NEURONTIN) 300 MG capsule   No No    Take 1 capsule by mouth Every 8 (Eight) Hours for 30 days.    HYDROcodone-acetaminophen (NORCO) 7.5-325 MG per tablet   No No    Take 1 tablet by mouth Every 8 (Eight) Hours As Needed for Moderate Pain  or Severe Pain .    insulin NPH-insulin regular (humuLIN 70/30,novoLIN 70/30) (70-30) 100 UNIT/ML injection   No No    Inject 5 Units under the skin into the appropriate area as directed 2 (Two) Times a Day With Meals.    levETIRAcetam (KEPPRA) 500 MG tablet   No No    TAKE ONE TABLET BY MOUTH EVERY 12 HOURS    losartan (COZAAR) 50 MG tablet   No No    TAKE ONE TABLET BY MOUTH DAILY    omeprazole (priLOSEC) 40 MG capsule   No No    Take 1 capsule by mouth Every Morning Before Breakfast. Take on empty stomach!    sennosides-docusate (PERICOLACE) 8.6-50 MG per tablet   No No    Take 2 tablets by mouth 2 (Two) Times a Day.    topiramate (TOPAMAX) 25 MG tablet   No No    Take 0.5 tablets by mouth 2 (Two) Times a Day.    vitamin B-12 (CYANOCOBALAMIN) 1000 MCG tablet   No No    Take 1 tablet by mouth Daily.            Allergies:  Allergies   Allergen Reactions   • Latex, Natural Rubber Rash   • Adhesive Tape Rash       Objective      Vitals:   Temp:  [97.6 °F (36.4 °C)] 97.6 °F (36.4 °C)  Heart Rate:  [61-70] 63  Resp:  [18] 18  BP: (103-137)/(44-81) 103/81    Physical Exam  Vitals and nursing note reviewed.    Constitutional:       Appearance: Normal appearance. She is obese.   HENT:      Head: Normocephalic and atraumatic.      Nose: Nose normal.      Mouth/Throat:      Mouth: Mucous membranes are moist.   Eyes:      Extraocular Movements: Extraocular movements intact.      Pupils: Pupils are equal, round, and reactive to light.   Cardiovascular:      Rate and Rhythm: Normal rate and regular rhythm.      Pulses: Normal pulses.      Heart sounds: Normal heart sounds.   Pulmonary:      Effort: Pulmonary effort is normal.      Breath sounds: Normal breath sounds.   Abdominal:      General: Bowel sounds are normal.      Palpations: Abdomen is soft.   Musculoskeletal:         General: Normal range of motion.      Cervical back: Normal range of motion.   Skin:     General: Skin is warm and dry.   Neurological:      General: No focal deficit present.      Mental Status: She is alert. Mental status is at baseline. She is disoriented.      Comments: Alert to self and place   Psychiatric:         Mood and Affect: Mood normal.         Behavior: Behavior normal.       Result Review    Result Review:  I have personally reviewed the results from the time of this admission to 8/20/2022 20:31 EDT and agree with these findings:  [x]  Laboratory  [x]  Microbiology  [x]  Radiology  [x]  EKG/Telemetry   []  Cardiology/Vascular   []  Pathology  []  Old records  []  Other:  Most notable findings include: As above      Assessment & Plan        Active Hospital Problems:  Active Hospital Problems    Diagnosis    • **Acute kidney injury superimposed on chronic kidney disease (HCC)    • Metabolic encephalopathy    • Type 2 diabetes mellitus with diabetic neuropathy, with long-term current use of insulin ([facility])    • Acute UTI    • Hyperkalemia    • Elevated serum lactate dehydrogenase    • Seizure disorder ([facility])    • GERD (gastroesophageal reflux disease)    • Hyperlipidemia    • Essential hypertension    • Fall      Plan:     Medications not  verified at the time of assessment and plan    Acute kidney injury superimposed on CKD stage III  Hyperkalemia  Elevated lactate  -Creatinine 1.84 (baseline approximately 1.036), monitor  -Lactate 3.3, monitor  -Potassium 5.9, monitor  -Lokelma given in ED  -IV fluids ordered  -Avoid nephrotoxic medication and contrast dye  -Hold home losartan   -Consult nephrology    Acute UTI  -UA reviewed  -Urine culture pending  -Rocephin given in ED  -Continue Rocephin     Metabolic encephalopathy-improving  Falls  -Blood alcohol 0.196% in ED  -Monitor for alcohol withdrawal, patient denies daily drinking  -Fall precautions  -Neuro checks    Diabetes Mellitus Type 2 with neuropathy  -Hemoglobin A1c 6.6  -Blood glucose 91  -Accu-Cheks AC at bedtime  -SSI ordered  -Continue home prandial insulin  -Continue home gabapentin    Seizure disorder  -Chronic, stable  -Continue home Keppra, Topamax    GERD  -Continue home PPI    Hyperlipidemia  -Stable, chronic  -Continue home atorvastatin    Essential Hypertension  -Controlled  -Monitor BP  -Hold home losartan due to KYRA      DVT prophylaxis:  Medical DVT prophylaxis orders are present.    CODE STATUS:    Code Status (Patient has no pulse and is not breathing): CPR (Attempt to Resuscitate)  Medical Interventions (Patient has pulse or is breathing): Full Support    Admission Status:  I believe this patient meets inpatient status.    I discussed the patient's findings and my recommendations with patient.    This patient has been examined wearing appropriate Personal Protective Equipment  08/20/22      Signature: Electronically signed by Herlinda Foote DNP, APRN, 08/20/22, 7:10 PM EDT.

## 2022-08-21 LAB
ANION GAP SERPL CALCULATED.3IONS-SCNC: 14 MMOL/L (ref 5–15)
ARTERIAL PATENCY WRIST A: POSITIVE
ATMOSPHERIC PRESS: ABNORMAL MM[HG]
BASE EXCESS BLDA CALC-SCNC: -6.1 MMOL/L (ref 0–3)
BASOPHILS # BLD AUTO: 0 10*3/MM3 (ref 0–0.2)
BASOPHILS NFR BLD AUTO: 0.9 % (ref 0–1.5)
BDY SITE: ABNORMAL
BUN SERPL-MCNC: 53 MG/DL (ref 8–23)
BUN/CREAT SERPL: 34.9 (ref 7–25)
CALCIUM SPEC-SCNC: 8.1 MG/DL (ref 8.6–10.5)
CHLORIDE SERPL-SCNC: 110 MMOL/L (ref 98–107)
CHLORIDE UR-SCNC: 92 MMOL/L
CO2 BLDA-SCNC: 20.1 MMOL/L (ref 22–29)
CO2 SERPL-SCNC: 18 MMOL/L (ref 22–29)
CREAT SERPL-MCNC: 1.52 MG/DL (ref 0.57–1)
D-LACTATE SERPL-SCNC: 2.6 MMOL/L (ref 0.5–2)
D-LACTATE SERPL-SCNC: 3.6 MMOL/L (ref 0.5–2)
DEPRECATED RDW RBC AUTO: 72.2 FL (ref 37–54)
EGFRCR SERPLBLD CKD-EPI 2021: 33.5 ML/MIN/1.73
EOSINOPHIL # BLD AUTO: 0.1 10*3/MM3 (ref 0–0.4)
EOSINOPHIL NFR BLD AUTO: 3.9 % (ref 0.3–6.2)
ERYTHROCYTE [DISTWIDTH] IN BLOOD BY AUTOMATED COUNT: 21 % (ref 12.3–15.4)
GLUCOSE BLDC GLUCOMTR-MCNC: 164 MG/DL (ref 70–105)
GLUCOSE SERPL-MCNC: 87 MG/DL (ref 65–99)
HCO3 BLDA-SCNC: 19.1 MMOL/L (ref 21–28)
HCT VFR BLD AUTO: 30.8 % (ref 34–46.6)
HEMODILUTION: NO
HGB BLD-MCNC: 9.6 G/DL (ref 12–15.9)
LYMPHOCYTES # BLD AUTO: 1 10*3/MM3 (ref 0.7–3.1)
LYMPHOCYTES NFR BLD AUTO: 33.4 % (ref 19.6–45.3)
MCH RBC QN AUTO: 30.4 PG (ref 26.6–33)
MCHC RBC AUTO-ENTMCNC: 31.1 G/DL (ref 31.5–35.7)
MCV RBC AUTO: 97.8 FL (ref 79–97)
MODALITY: ABNORMAL
MONOCYTES # BLD AUTO: 0.2 10*3/MM3 (ref 0.1–0.9)
MONOCYTES NFR BLD AUTO: 7.9 % (ref 5–12)
NEUTROPHILS NFR BLD AUTO: 1.7 10*3/MM3 (ref 1.7–7)
NEUTROPHILS NFR BLD AUTO: 53.9 % (ref 42.7–76)
NRBC BLD AUTO-RTO: 0.1 /100 WBC (ref 0–0.2)
PCO2 BLDA: 35.3 MM HG (ref 35–48)
PH BLDA: 7.34 PH UNITS (ref 7.35–7.45)
PLATELET # BLD AUTO: 168 10*3/MM3 (ref 140–450)
PMV BLD AUTO: 7 FL (ref 6–12)
PO2 BLDA: 70 MM HG (ref 83–108)
POTASSIUM SERPL-SCNC: 5.3 MMOL/L (ref 3.5–5.2)
POTASSIUM SERPL-SCNC: 5.5 MMOL/L (ref 3.5–5.2)
RBC # BLD AUTO: 3.15 10*6/MM3 (ref 3.77–5.28)
SAO2 % BLDCOA: 92.9 % (ref 94–98)
SODIUM SERPL-SCNC: 142 MMOL/L (ref 136–145)
SODIUM UR-SCNC: 121 MMOL/L
URATE SERPL-MCNC: 10.4 MG/DL (ref 2.4–5.7)
WBC NRBC COR # BLD: 3.1 10*3/MM3 (ref 3.4–10.8)

## 2022-08-21 PROCEDURE — 63710000001 ONDANSETRON PER 8 MG

## 2022-08-21 PROCEDURE — 36600 WITHDRAWAL OF ARTERIAL BLOOD: CPT

## 2022-08-21 PROCEDURE — 63710000001 INSULIN LISPRO (HUMAN) PER 5 UNITS: Performed by: HOSPITALIST

## 2022-08-21 PROCEDURE — 25010000002 ONDANSETRON PER 1 MG

## 2022-08-21 PROCEDURE — 83605 ASSAY OF LACTIC ACID: CPT

## 2022-08-21 PROCEDURE — 82962 GLUCOSE BLOOD TEST: CPT

## 2022-08-21 PROCEDURE — 82436 ASSAY OF URINE CHLORIDE: CPT | Performed by: INTERNAL MEDICINE

## 2022-08-21 PROCEDURE — 99232 SBSQ HOSP IP/OBS MODERATE 35: CPT | Performed by: HOSPITALIST

## 2022-08-21 PROCEDURE — 84550 ASSAY OF BLOOD/URIC ACID: CPT | Performed by: HOSPITALIST

## 2022-08-21 PROCEDURE — 84300 ASSAY OF URINE SODIUM: CPT | Performed by: INTERNAL MEDICINE

## 2022-08-21 PROCEDURE — 97161 PT EVAL LOW COMPLEX 20 MIN: CPT

## 2022-08-21 PROCEDURE — 80048 BASIC METABOLIC PNL TOTAL CA: CPT

## 2022-08-21 PROCEDURE — 85025 COMPLETE CBC W/AUTO DIFF WBC: CPT

## 2022-08-21 PROCEDURE — 84132 ASSAY OF SERUM POTASSIUM: CPT

## 2022-08-21 PROCEDURE — 25010000002 HEPARIN (PORCINE) PER 1000 UNITS

## 2022-08-21 PROCEDURE — 82803 BLOOD GASES ANY COMBINATION: CPT

## 2022-08-21 RX ORDER — NICOTINE POLACRILEX 4 MG
15 LOZENGE BUCCAL
Status: DISCONTINUED | OUTPATIENT
Start: 2022-08-21 | End: 2022-08-22 | Stop reason: HOSPADM

## 2022-08-21 RX ORDER — FOLIC ACID 1 MG/1
1 TABLET ORAL DAILY
Status: DISCONTINUED | OUTPATIENT
Start: 2022-08-21 | End: 2022-08-22 | Stop reason: HOSPADM

## 2022-08-21 RX ORDER — INSULIN LISPRO 100 [IU]/ML
0-7 INJECTION, SOLUTION INTRAVENOUS; SUBCUTANEOUS
Status: DISCONTINUED | OUTPATIENT
Start: 2022-08-21 | End: 2022-08-22 | Stop reason: HOSPADM

## 2022-08-21 RX ORDER — ATORVASTATIN CALCIUM 10 MG/1
10 TABLET, FILM COATED ORAL DAILY
COMMUNITY
End: 2022-11-28

## 2022-08-21 RX ORDER — LEVETIRACETAM 500 MG/1
500 TABLET ORAL EVERY 12 HOURS
COMMUNITY
End: 2022-11-03 | Stop reason: HOSPADM

## 2022-08-21 RX ORDER — LEVETIRACETAM 500 MG/1
500 TABLET ORAL EVERY 12 HOURS
Status: DISCONTINUED | OUTPATIENT
Start: 2022-08-21 | End: 2022-08-22 | Stop reason: HOSPADM

## 2022-08-21 RX ORDER — GABAPENTIN 300 MG/1
300 CAPSULE ORAL 2 TIMES DAILY
Status: DISCONTINUED | OUTPATIENT
Start: 2022-08-21 | End: 2022-08-22 | Stop reason: HOSPADM

## 2022-08-21 RX ORDER — FUROSEMIDE 40 MG/1
40 TABLET ORAL DAILY
COMMUNITY
End: 2022-10-10 | Stop reason: DRUGHIGH

## 2022-08-21 RX ORDER — AMLODIPINE BESYLATE 5 MG/1
1 TABLET ORAL EVERY MORNING
COMMUNITY
End: 2023-02-28 | Stop reason: HOSPADM

## 2022-08-21 RX ORDER — PANTOPRAZOLE SODIUM 40 MG/1
40 TABLET, DELAYED RELEASE ORAL EVERY MORNING
Refills: 1 | Status: DISCONTINUED | OUTPATIENT
Start: 2022-08-22 | End: 2022-08-21

## 2022-08-21 RX ORDER — SPIRONOLACTONE 25 MG/1
1 TABLET ORAL EVERY MORNING
COMMUNITY
Start: 2022-08-21 | End: 2022-11-17

## 2022-08-21 RX ORDER — OLANZAPINE 10 MG/2ML
1 INJECTION, POWDER, LYOPHILIZED, FOR SOLUTION INTRAMUSCULAR AS NEEDED
Status: DISCONTINUED | OUTPATIENT
Start: 2022-08-21 | End: 2022-08-22 | Stop reason: HOSPADM

## 2022-08-21 RX ORDER — DEXTROSE MONOHYDRATE 25 G/50ML
25 INJECTION, SOLUTION INTRAVENOUS
Status: DISCONTINUED | OUTPATIENT
Start: 2022-08-21 | End: 2022-08-22 | Stop reason: HOSPADM

## 2022-08-21 RX ORDER — LOSARTAN POTASSIUM 50 MG/1
1 TABLET ORAL DAILY
COMMUNITY
Start: 2022-08-21 | End: 2022-11-17

## 2022-08-21 RX ORDER — TOPIRAMATE 25 MG/1
12.5 TABLET ORAL 2 TIMES DAILY
Status: DISCONTINUED | OUTPATIENT
Start: 2022-08-21 | End: 2022-08-22 | Stop reason: HOSPADM

## 2022-08-21 RX ORDER — FERROUS SULFATE 325(65) MG
1 TABLET ORAL
COMMUNITY

## 2022-08-21 RX ORDER — ATORVASTATIN CALCIUM 10 MG/1
10 TABLET, FILM COATED ORAL DAILY
Status: DISCONTINUED | OUTPATIENT
Start: 2022-08-21 | End: 2022-08-22 | Stop reason: HOSPADM

## 2022-08-21 RX ORDER — FERROUS SULFATE TAB EC 324 MG (65 MG FE EQUIVALENT) 324 (65 FE) MG
324 TABLET DELAYED RESPONSE ORAL
Status: DISCONTINUED | OUTPATIENT
Start: 2022-08-21 | End: 2022-08-22 | Stop reason: HOSPADM

## 2022-08-21 RX ORDER — ASPIRIN 325 MG
325 TABLET ORAL DAILY
Status: DISCONTINUED | OUTPATIENT
Start: 2022-08-21 | End: 2022-08-22 | Stop reason: HOSPADM

## 2022-08-21 RX ORDER — GABAPENTIN 300 MG/1
1 CAPSULE ORAL 2 TIMES DAILY
COMMUNITY

## 2022-08-21 RX ORDER — AMLODIPINE BESYLATE 5 MG/1
5 TABLET ORAL EVERY MORNING
Status: DISCONTINUED | OUTPATIENT
Start: 2022-08-21 | End: 2022-08-22 | Stop reason: HOSPADM

## 2022-08-21 RX ADMIN — FERROUS SULFATE TAB EC 324 MG (65 MG FE EQUIVALENT) 324 MG: 324 (65 FE) TABLET DELAYED RESPONSE at 17:34

## 2022-08-21 RX ADMIN — HEPARIN SODIUM 5000 UNITS: 5000 INJECTION INTRAVENOUS; SUBCUTANEOUS at 20:44

## 2022-08-21 RX ADMIN — AMLODIPINE BESYLATE 5 MG: 5 TABLET ORAL at 17:34

## 2022-08-21 RX ADMIN — GABAPENTIN 300 MG: 300 CAPSULE ORAL at 20:45

## 2022-08-21 RX ADMIN — ONDANSETRON HYDROCHLORIDE 4 MG: 4 TABLET, FILM COATED ORAL at 09:25

## 2022-08-21 RX ADMIN — ACETAMINOPHEN 650 MG: 325 TABLET, FILM COATED ORAL at 01:28

## 2022-08-21 RX ADMIN — ASPIRIN 325 MG ORAL TABLET 325 MG: 325 PILL ORAL at 17:34

## 2022-08-21 RX ADMIN — Medication 5 MG: at 20:45

## 2022-08-21 RX ADMIN — HEPARIN SODIUM 5000 UNITS: 5000 INJECTION INTRAVENOUS; SUBCUTANEOUS at 09:25

## 2022-08-21 RX ADMIN — LEVETIRACETAM 500 MG: 500 TABLET, FILM COATED ORAL at 17:34

## 2022-08-21 RX ADMIN — ONDANSETRON 4 MG: 2 INJECTION INTRAMUSCULAR; INTRAVENOUS at 02:18

## 2022-08-21 RX ADMIN — INSULIN LISPRO 2 UNITS: 100 INJECTION, SOLUTION INTRAVENOUS; SUBCUTANEOUS at 17:33

## 2022-08-21 RX ADMIN — FOLIC ACID 1 MG: 1 TABLET ORAL at 17:34

## 2022-08-21 RX ADMIN — TOPIRAMATE 12.5 MG: 25 TABLET, FILM COATED ORAL at 20:45

## 2022-08-21 RX ADMIN — SODIUM CHLORIDE 100 ML/HR: 9 INJECTION, SOLUTION INTRAVENOUS at 13:36

## 2022-08-21 RX ADMIN — SODIUM BICARBONATE 50 MEQ: 84 INJECTION, SOLUTION INTRAVENOUS at 05:35

## 2022-08-21 RX ADMIN — SODIUM CHLORIDE 100 ML/HR: 9 INJECTION, SOLUTION INTRAVENOUS at 02:53

## 2022-08-21 RX ADMIN — ATORVASTATIN CALCIUM 10 MG: 10 TABLET, FILM COATED ORAL at 17:34

## 2022-08-21 RX ADMIN — ACETAMINOPHEN 650 MG: 325 TABLET, FILM COATED ORAL at 06:56

## 2022-08-21 NOTE — THERAPY EVALUATION
Patient Name: Aracelis Vargas  : 1935    MRN: [id number]                              Today's Date: 2022       Admit Date: 2022    Visit Dx:     ICD-10-CM ICD-9-CM   1. Altered mental status, unspecified altered mental status type  R41.82 780.97   2. Alcoholic intoxication with complication (HCC)  F10.929 305.00   3. Urinary tract infection without hematuria, site unspecified  N39.0 599.0   4. Renal insufficiency  N28.9 593.9   5. Hyperkalemia  E87.5 276.7     Patient Active Problem List   Diagnosis   • Fall   • Secondary hyperaldosteronism (HCC)   • Stage 3b chronic kidney disease (CMS/HCC)   • Hypertension associated with stage 3 chronic kidney disease due to type 2 diabetes mellitus (HCC)   • Morbid obesity (HCC)   • Atherosclerosis of native coronary artery of native heart without angina pectoris   • AV block, Mobitz II   • Hyperlipidemia   • Essential hypertension   • Acute UTI   • GERD (gastroesophageal reflux disease)   • Cardiac pacemaker in situ   • Cerebrovascular accident (CVA) ([facility])   • Hyperammonemia (HCC)   • Obstructive sleep apnea   • Metabolic encephalopathy   • Type 2 diabetes mellitus with hyperglycemia, with long-term current use of insulin (HCC)   • Seizure disorder (HCC)   • Elevated lactic acid level   • Acute kidney injury superimposed on chronic kidney disease (HCC)   • Type 2 diabetes mellitus with diabetic neuropathy, with long-term current use of insulin (HCC)   • Hyperkalemia   • Elevated serum lactate dehydrogenase     Past Medical History:   Diagnosis Date   • Atherosclerosis of native coronary artery of native heart without angina pectoris 10/22/2020   • Atrioventricular block, Mobitz type 1, [place] 10/21/2020    Added automatically from request for surgery [id number]   • Hypertension associated with stage 3 chronic kidney disease due to type 2 diabetes mellitus (HCC) 10/22/2020   • Morbid obesity (HCC) 10/22/2020   • Secondary hyperaldosteronism (HCC) 10/22/2020   •  Stage 3b chronic kidney disease ([facility]) 10/22/2020   • Type 2 diabetes mellitus with diabetic neuropathy, with long-term current use of insulin ([facility]) 10/22/2020   • Type 2 diabetes mellitus with diabetic neuropathy, with long-term current use of insulin ([facility]) 10/22/2020     Past Surgical History:   Procedure Laterality Date   • CARDIAC CATHETERIZATION N/A 10/23/2020    Procedure: Left Heart Cath and coronary angiogram;  Surgeon: Wang Plaza MD;  Location: [facility] CATH INVASIVE LOCATION;  Service: Cardiovascular;  Laterality: N/A;   • CARDIAC ELECTROPHYSIOLOGY PROCEDURE Left 11/3/2021    Procedure: Pacemaker DC new;  Surgeon: Wang Plaza MD;  Location: [facility] CATH INVASIVE LOCATION;  Service: Cardiovascular;  Laterality: Left;   • CARDIAC ELECTROPHYSIOLOGY PROCEDURE N/A 11/3/2021    Procedure: LOOP RECORDER REMOVAL;  Surgeon: Wang Plaza MD;  Location: [facility] CATH INVASIVE LOCATION;  Service: Cardiovascular;  Laterality: N/A;   • HYSTERECTOMY     • JOINT REPLACEMENT Right     Hip   • JOINT REPLACEMENT Left     hip   • JOINT REPLACEMENT Left     hip (for second time)   • JOINT REPLACEMENT Right     knee   • OOPHORECTOMY        General Information     Row Name 08/21/22 1600          Physical Therapy Time and Intention    Document Type evaluation  -CR     Mode of Treatment physical therapy  -CR     Row Name 08/21/22 1600          General Information    Patient Profile Reviewed yes  -CR     Prior Level of Function independent:;all household mobility  using cane. patient reports no longer needing cane prior to this admit  -CR     Existing Precautions/Restrictions fall  CIWA protocol  -CR     Barriers to Rehab medically complex;ineffective coping  -CR     Row Name 08/21/22 1600          Living Environment    People in Home spouse  -CR     Row Name 08/21/22 1600          Home Main Entrance    Number of Stairs, Main Entrance three  -CR     Row Name 08/21/22 1600          Stairs Within Home, Primary    Stairs,  Within Home, Primary 1 flight to basement but has stair lift  -CR     Row Name 08/21/22 1600          Cognition    Orientation Status (Cognition) oriented x 3  -CR     Row Name 08/21/22 1600          Safety Issues, Functional Mobility    Safety Issues Affecting Function (Mobility) impulsivity  -CR           User Key  (r) = Recorded By, (t) = Taken By, (c) = Cosigned By    Initials Name Provider Type    CR Reyes, Carmela, PT Physical Therapist               Mobility     Row Name 08/21/22 1601          Bed Mobility    Bed Mobility supine-sit  -CR     Supine-Sit [place] (Bed Mobility) modified independence  -CR     Assistive Device (Bed Mobility) bed rails  -CR     Row Name 08/21/22 1601          Bed-Chair Transfer    Bed-Chair [place] (Transfers) standby assist  -CR     Assistive Device (Bed-Chair Transfers) walker, front-wheeled  -CR     Row Name 08/21/22 1601          Sit-Stand Transfer    Sit-Stand [place] (Transfers) contact guard  -CR     Assistive Device (Sit-Stand Transfers) walker, front-wheeled  -CR     Row Name 08/21/22 1601          Gait/Stairs (Locomotion)    [place] Level (Gait) standby assist  -CR     Assistive Device (Gait) walker, front-wheeled  -CR     Distance in Feet (Gait) 50 ft x 2  -CR     Deviations/Abnormal Patterns (Gait) gait speed decreased;antalgic  -CR     Bilateral Gait Deviations forward flexed posture  -CR           User Key  (r) = Recorded By, (t) = Taken By, (c) = Cosigned By    Initials Name Provider Type    CR Reyes, Carmela, PT Physical Therapist               Obj/Interventions     Row Name 08/21/22 1602          Range of Motion Comprehensive    General Range of Motion no range of motion deficits identified  -CR     Row Name 08/21/22 1602          Strength Comprehensive (MMT)    General Manual Muscle Testing (MMT) Assessment no strength deficits identified  -CR     Row Name 08/21/22 1602          Balance    Balance Assessment sitting static balance;sit to stand  dynamic balance;sitting dynamic balance;standing static balance;standing dynamic balance  -CR     Static Sitting Balance modified independence  -CR     Dynamic Sitting Balance standby assist  -CR     Position, Sitting Balance sitting edge of bed  -CR     Sit to Stand Dynamic Balance contact guard  -CR     Static Standing Balance contact guard  -CR     Dynamic Standing Balance standby assist  -CR     Position/Device Used, Standing Balance walker, rolling  -CR           User Key  (r) = Recorded By, (t) = Taken By, (c) = Cosigned By    Initials Name Provider Type    CR Reyes, Carmela, PT Physical Therapist               Goals/Plan     Row Name 08/21/22 1608          Gait Training Goal 1 (PT)    Activity/Assistive Device (Gait Training Goal 1, PT) gait (walking locomotion);decrease fall risk;increase endurance/gait distance  -CR     [place] Level (Gait Training Goal 1, PT) standby assist  -CR     Distance (Gait Training Goal 1, PT) 100 ft  -CR     Time Frame (Gait Training Goal 1, PT) 1 week  -CR     Row Name 08/21/22 1608          Stairs Goal 1 (PT)    Activity/Assistive Device (Stairs Goal 1, PT) stairs, all skills  -CR     [place] Level/Cues Needed (Stairs Goal 1, PT) standby assist  -CR     Number of Stairs (Stairs Goal 1, PT) 3  -CR     Time Frame (Stairs Goal 1, PT) 1 week  -CR     Row Name 08/21/22 1608          Patient Education Goal (PT)    Activity (Patient Education Goal, PT) HEP  -CR     Bland/Cues/Accuracy (Memory Goal 2, PT) demonstrates adequately  -CR     Time Frame (Patient Education Goal, PT) 1 week  -CR     Row Name 08/21/22 1608          Therapy Assessment/Plan (PT)    Planned Therapy Interventions (PT) home exercise program;gait training;patient/family education  -CR           User Key  (r) = Recorded By, (t) = Taken By, (c) = Cosigned By    Initials Name Provider Type    CR Reyes, Carmela, PT Physical Therapist               Clinical Impression     Row Name 08/21/22 0220           Pain    Pretreatment Pain Rating 1/10  -CR     Posttreatment Pain Rating 1/10  -CR     Pain Location - Side/Orientation Left  -CR     Pain Location - foot  -CR     Row Name 08/21/22 1600          Plan of Care Review    Plan of Care Reviewed With patient  -CR     Outcome Evaluation 88 y/o female admitted on 8/20 due to fall at home with confusion. Apparently drank vodka and wanted her spouse's attention so she threw herself on the floor. PMH includes HTN, DM, CKD. Patient reports to being independent with home mobility , using cane at times. Patient was just dc from  nursing for recent UTI. At time of eval, patient is SBA for supine to sit. CGA to come to standing using rw and ambulated 50 ft x 2 with SBA at end. Noted some SOA at end and report of fatigue. Patient is near her baseline level of mobility and should be safe to return home. Will follow while in hospital for mobility assessment without a.d.  -CR     Row Name 08/21/22 7460          Therapy Assessment/Plan (PT)    Patient/Family Therapy Goals Statement (PT) home  -CR     Rehab Potential (PT) good, to achieve stated therapy goals  -CR     Criteria for Skilled Interventions Met (PT) yes;skilled treatment is necessary  -CR     Therapy Frequency (PT) 3 times/wk  -CR     Predicted Duration of Therapy Intervention (PT) dc  -CR           User Key  (r) = Recorded By, (t) = Taken By, (c) = Cosigned By    Initials Name Provider Type    CR Reyes, Carmela, PT Physical Therapist               Outcome Measures     Row Name 08/21/22 0241          How much help from another person do you currently need...    Turning from your back to your side while in flat bed without using bedrails? 3  -CR     Moving from lying on back to sitting on the side of a flat bed without bedrails? 3  -CR     Moving to and from a bed to a chair (including a wheelchair)? 3  -CR     Standing up from a chair using your arms (e.g., wheelchair, bedside chair)? 3  -CR     Climbing 3-5 steps with a  railing? 3  -CR     To walk in hospital room? 3  -CR     AM-PAC 6 Clicks Score (PT) 18  -CR           User Key  (r) = Recorded By, (t) = Taken By, (c) = Cosigned By    Initials Name Provider Type    CR Reyes, Carmela, PT Physical Therapist                             Physical Therapy Education                 Title: PT OT SLP Therapies (In Progress)     Topic: Physical Therapy (In Progress)     Point: Mobility training (Done)     Learning Progress Summary           Patient Acceptance, E, VU by CR at 8/21/2022 1609                   Point: Home exercise program (Not Started)     Learner Progress:  Not documented in this visit.          Point: Body mechanics (Not Started)     Learner Progress:  Not documented in this visit.          Point: Precautions (Not Started)     Learner Progress:  Not documented in this visit.                      User Key     Initials Effective Dates Name Provider Type Discipline    CR 06/16/21 -  Reyes, Carmela, PT Physical Therapist PT              PT Recommendation and Plan  Planned Therapy Interventions (PT): home exercise program, gait training, patient/family education  Plan of Care Reviewed With: patient  Outcome Evaluation: 88 y/o female admitted on 8/20 due to fall at home with confusion. Apparently drank vodka and wanted her spouse's attention so she threw herself on the floor. PMH includes HTN, DM, CKD. Patient reports to being independent with home mobility , using cane at times. Patient was just dc from  nursing for recent UTI. At time of eval, patient is SBA for supine to sit. CGA to come to standing using rw and ambulated 50 ft x 2 with SBA at end. Noted some SOA at end and report of fatigue. Patient is near her baseline level of mobility and should be safe to return home. Will follow while in hospital for mobility assessment without a.d.     Time Calculation:    PT Charges     Row Name 08/21/22 1610             Time Calculation    Start Time 1518  -CR      Stop Time 1545  -CR       Time Calculation (min) 27 min  -CR      PT Received On 08/21/22  -CR      PT - Next Appointment 08/23/22  -CR      PT Goal Re-Cert Due Date 09/04/22  -CR              Time Calculation- PT    Total Timed Code Minutes- PT 0 minute(s)  -CR            User Key  (r) = Recorded By, (t) = Taken By, (c) = Cosigned By    Initials Name Provider Type    CR Reyes, Carmela, PT Physical Therapist              Therapy Charges for Today     Code Description Service Date Service Provider Modifiers Qty    35617997769 HC PT EVAL LOW COMPLEXITY 4 8/21/2022 Reyes, Carmela, PT GP 1          PT G-Codes  AM-PAC 6 Clicks Score (PT): 18    Carmela Reyes, PT  8/21/2022

## 2022-08-21 NOTE — NURSING NOTE
Called and spoke to provider Herlinda MCLEOD concerning ABG lab results. New lab orders placed for 4am

## 2022-08-21 NOTE — PLAN OF CARE
Goal Outcome Evaluation:  Plan of Care Reviewed With: patient           Outcome Evaluation: 88 y/o female admitted on 8/20 due to fall at home with confusion. Apparently drank vodka and wanted her spouse's attention so she threw herself on the floor. PMH includes HTN, DM, CKD. Patient reports to being independent with home mobility , using cane at times. Patient was just dc from  nursing for recent UTI. At time of eval, patient is SBA for supine to sit. CGA to come to standing using rw and ambulated 50 ft x 2 with SBA at end. Noted some SOA at end and report of fatigue. Patient is near her baseline level of mobility and should be safe to return home. Will follow while in hospital for mobility assessment without a.d.

## 2022-08-21 NOTE — PROGRESS NOTES
[facility] Medicine Services Daily Progress Note    Patient Name: Aracelis Vargas  : 1935  MRN: [id number]  Primary Care Physician:  Gabriel Goss MD  Date of admission: 2022      Subjective      Chief Complaint: Fell at home      Patient Reports     22: Spoke to patient's .  He was outside and hoarding wearing knee came into the house found his wife fallen unable to get up.   denies history of dementia.  Uses a walker to ambulate at baseline.  Elevated lactic acid and creatinine.  Nephrology consulted.  On IVF.  Recent discharge from hospital 7/3/2022 after treatment for UTI and KYRA.    Review of Systems   All other systems reviewed and are negative.         Objective      Vitals:   Temp:  [97.5 °F (36.4 °C)-98.8 °F (37.1 °C)] 98.8 °F (37.1 °C)  Heart Rate:  [] 106  Resp:  [16-18] 16  BP: (103-172)/(44-81) 164/79    Physical Exam  HENT:      Head: Normocephalic.      Nose: Nose normal.   Eyes:      Extraocular Movements: Extraocular movements intact.      Pupils: Pupils are equal, round, and reactive to light.   Cardiovascular:      Rate and Rhythm: Normal rate and regular rhythm.   Pulmonary:      Effort: Pulmonary effort is normal.   Abdominal:      General: Bowel sounds are normal.   Musculoskeletal:      Comments: Moves all extremities   Lymphadenopathy:      Cervical: No cervical adenopathy.   Skin:     General: Skin is warm.      Findings: No rash.   Neurological:      Mental Status: She is alert.   Psychiatric:         Behavior: Behavior is cooperative.             Result Review    Result Review:  I have personally reviewed the results from the time of this admission to 2022 13:38 EDT and agree with these findings:  [x]  Laboratory  []  Microbiology  [x]  Radiology  []  EKG/Telemetry   []  Cardiology/Vascular   []  Pathology  [x]  Old records  []  Other:            Assessment & Plan      Brief Patient Summary:    The patient is an  87-year-old female with history of insulin-dependent diabetes mellitus, seizure disorder, GERD, hypertension, hyperlipidemia, CKD IIIb, recurrent falls, recurrent UTI and baseline walker use.  The patient and her  are accustomed to drinking cocktails in the afternoon.  She was recently hospitalized in July 2022 for encephalopathy, UTI and KYRA    Apparently the patient fell at home and was found by her  unable to get up thus EMS was called.  ED work-up is significant for potassium 5.9, creatinine 1.84, BUN 64, ammonia 48 and elevated lactic acid level.  CT head ruled out acute intracranial pathology.    The patient was then admitted to the medical floor for treatment of KYRA on CKD stage III and was given IV fluids.  Nephrology has been consulted.  Lactic acid level has improved with IVF.  The patient has previous hospitalizations with elevated lactic acid.        cefTRIAXone, 2 g, Intravenous, Q24H  heparin (porcine), 5,000 Units, Subcutaneous, Q12H       sodium chloride, 100 mL/hr, Last Rate: 100 mL/hr (08/21/22 1336)         Active Hospital Problems:  Active Hospital Problems    Diagnosis    • **Acute kidney injury superimposed on chronic kidney disease (HCC)    • Type 2 diabetes mellitus with diabetic neuropathy, with long-term current use of insulin (HCC)    • Hyperkalemia    • Elevated serum lactate dehydrogenase    • Seizure disorder (HCC)    • Metabolic encephalopathy    • Acute UTI    • GERD (gastroesophageal reflux disease)    • Hyperlipidemia    • Essential hypertension    • Fall      KYRA on CKD III:  -Suspect prerenal causes  -Continue IVF  -Check uric acid level  -Patient on Lasix at home  -Hold home losartan  -Consult nephrology    Fall  -Check CK level  -Patient ambulates with a walker  -Patient not interested in going to rehab  -Patient lives with her 88-year-old       Elevated lactic acid level:  -Suspect due to dehydration  -Improving with IVF    Hyperkalemia:  -Given Lokelma and  sodium bicarb    Daily alcohol use:  -Monitor for withdrawal    CAD:  -Denies chest pain  -Continue aspirin, statin  -TTE 2/27/2022--> LVEF 60%.  Mild biatrial enlargement.  Mild TR, mild MR    Diabetes mellitus:  -Continue ISS   -hold home NPH due to low BS    SSS s/p pacemaker:  -Stable    GERD:  -Controlled  -Continue PPI    Hypertension:  -Hold home losartan  -Amlodipine and spironolactone discontinued last admission July 2022    Hyperlipidemia:  -Continue statin    Seizure disorder:  -Continue home Keppra and Topamax    Chronic pain:  -On Norco and gabapentin at home      DVT prophylaxis:  Medical DVT prophylaxis orders are present.    CODE STATUS:    Code Status (Patient has no pulse and is not breathing): CPR (Attempt to Resuscitate)  Medical Interventions (Patient has pulse or is breathing): Full Support      Disposition:  I expect patient to be discharged home.    This patient has been examined wearing appropriate Personal Protective Equipment and discussed with nursing. 08/21/22      Electronically signed by Gurdeep Cantrell DO, 08/21/22, 13:38 EDT.  [facility] Hospitalist Team

## 2022-08-21 NOTE — PLAN OF CARE
Problem: Adult Inpatient Plan of Care  Goal: Plan of Care Review  8/21/2022 0114 by Germain German RN  Outcome: Ongoing, Progressing  Flowsheets (Taken 8/21/2022 0114)  Plan of Care Reviewed With: patient  8/20/2022 2213 by Germain German RN  Outcome: Ongoing, Progressing  Flowsheets (Taken 8/20/2022 2213)  Progress: no change  Plan of Care Reviewed With: patient  Goal: Patient-Specific Goal (Individualized)  8/21/2022 0114 by Germain German RN  Outcome: Ongoing, Progressing  8/20/2022 2213 by Germain German RN  Outcome: Ongoing, Progressing  Goal: Absence of Hospital-Acquired Illness or Injury  8/21/2022 0114 by Germain German RN  Outcome: Ongoing, Progressing  8/20/2022 2213 by Germain German RN  Outcome: Ongoing, Progressing  Intervention: Identify and Manage Fall Risk  Recent Flowsheet Documentation  Taken 8/21/2022 0001 by Germain German RN  Safety Promotion/Fall Prevention: safety round/check completed  Taken 8/20/2022 2200 by Germain German RN  Safety Promotion/Fall Prevention: safety round/check completed  Taken 8/20/2022 2112 by Germain German RN  Safety Promotion/Fall Prevention:   safety round/check completed   room organization consistent   nonskid shoes/slippers when out of bed   assistive device/personal items within reach   fall prevention program maintained  Goal: Optimal Comfort and Wellbeing  8/21/2022 0114 by Germain German RN  Outcome: Ongoing, Progressing  8/20/2022 2213 by Germain German RN  Outcome: Ongoing, Progressing  Intervention: Provide Person-Centered Care  Recent Flowsheet Documentation  Taken 8/20/2022 2112 by Germain German RN  Trust Relationship/Rapport:   care explained   choices provided  Goal: Readiness for Transition of Care  8/21/2022 0114 by Germain German RN  Outcome: Ongoing, Progressing  8/20/2022 2213 by Germain German RN  Outcome: Ongoing, Progressing  Intervention: Mutually Develop Transition Plan  Recent Flowsheet Documentation  Taken 8/20/2022 2214 by  Germain German RN  Transportation Anticipated:   family or friend will provide   health plan transportation  Patient/Family Anticipated Services at Transition: home health care  Patient/Family Anticipates Transition to:   home with family   home with help/services  Taken 8/20/2022 2106 by Germain German RN  Equipment Currently Used at Home: none     Problem: Fall Injury Risk  Goal: Absence of Fall and Fall-Related Injury  8/21/2022 0114 by Germain German RN  Outcome: Ongoing, Progressing  8/20/2022 2213 by Germain German RN  Outcome: Ongoing, Progressing  Intervention: Promote Injury-Free Environment  Recent Flowsheet Documentation  Taken 8/21/2022 0001 by Germain German RN  Safety Promotion/Fall Prevention: safety round/check completed  Taken 8/20/2022 2200 by Germain German RN  Safety Promotion/Fall Prevention: safety round/check completed  Taken 8/20/2022 2112 by Germain German RN  Safety Promotion/Fall Prevention:   safety round/check completed   room organization consistent   nonskid shoes/slippers when out of bed   assistive device/personal items within reach   fall prevention program maintained     Problem: Skin Injury Risk Increased  Goal: Skin Health and Integrity  8/21/2022 0114 by Germain German RN  Outcome: Ongoing, Progressing  8/20/2022 2213 by Germain German RN  Outcome: Ongoing, Progressing     Problem: Diabetes Comorbidity  Goal: Blood Glucose Level Within Targeted Range  8/21/2022 0114 by Germain German RN  Outcome: Ongoing, Progressing  8/20/2022 2213 by Germain German RN  Outcome: Ongoing, Progressing     Problem: Hypertension Comorbidity  Goal: Blood Pressure in Desired Range  8/21/2022 0114 by Germain German RN  Outcome: Ongoing, Progressing  8/20/2022 2213 by Germain German RN  Outcome: Ongoing, Progressing   Goal Outcome Evaluation:  Plan of Care Reviewed With: patient        Progress: no change

## 2022-08-21 NOTE — NURSING NOTE
Called and talked to merlin Fall concerning Lactic Acid elevation. 3.1 to 4.4. Order to increase NS to 200 ml/hr x 1 liter.

## 2022-08-21 NOTE — CONSULTS
Thank you very much for the consult    Reason For The Consult: eddie on ckd    HPI: 87 year old white female with h/o; DMii , hypertension, hyperlipidemia , gerd , cad , and h/o; ckd-iii , the pt. Got admitted following fall at home , denies any preceeding symptoms , report she tripped .   Denies dysuria , fever or chills.  PMH:   Past Medical History:   Diagnosis Date   • Atherosclerosis of native coronary artery of native heart without angina pectoris 10/22/2020   • Atrioventricular block, Mobitz type 1, Mitch 10/21/2020    Added automatically from request for surgery [id number]   • Hypertension associated with stage 3 chronic kidney disease due to type 2 diabetes mellitus ([facility]) 10/22/2020   • Morbid obesity ([facility]) 10/22/2020   • Secondary hyperaldosteronism ([facility]) 10/22/2020   • Stage 3b chronic kidney disease ([facility]) 10/22/2020   • Type 2 diabetes mellitus with diabetic neuropathy, with long-term current use of insulin ([facility]) 10/22/2020   • Type 2 diabetes mellitus with diabetic neuropathy, with long-term current use of insulin ([facility]) 10/22/2020      Past Surgical History:   Procedure Laterality Date   • CARDIAC CATHETERIZATION N/A 10/23/2020    Procedure: Left Heart Cath and coronary angiogram;  Surgeon: Wang Plaza MD;  Location: [facility] CATH INVASIVE LOCATION;  Service: Cardiovascular;  Laterality: N/A;   • CARDIAC ELECTROPHYSIOLOGY PROCEDURE Left 11/3/2021    Procedure: Pacemaker DC new;  Surgeon: Wang Plaza MD;  Location: [facility] CATH INVASIVE LOCATION;  Service: Cardiovascular;  Laterality: Left;   • CARDIAC ELECTROPHYSIOLOGY PROCEDURE N/A 11/3/2021    Procedure: LOOP RECORDER REMOVAL;  Surgeon: Wang Plaza MD;  Location: [facility] CATH INVASIVE LOCATION;  Service: Cardiovascular;  Laterality: N/A;   • HYSTERECTOMY     • JOINT REPLACEMENT Right     Hip   • JOINT REPLACEMENT Left     hip   • JOINT REPLACEMENT Left     hip (for second time)   • JOINT REPLACEMENT Right     knee   • OOPHORECTOMY        FHX:   Family History   Problem Relation Age of Onset   • Heart disease Mother    • Heart disease Father      SHX:   Social History     Substance and Sexual Activity   Alcohol Use Yes   • Alcohol/week: 7.0 standard drinks   • Types: 7 Drinks containing 0.5 oz of alcohol per week      Social History     Tobacco Use   Smoking Status Never Smoker   Smokeless Tobacco Never Used      Social History     Substance and Sexual Activity   Drug Use Never    no    Home Medications:   Medications Prior to Admission   Medication Sig Dispense Refill Last Dose   • amLODIPine (NORVASC) 5 MG tablet Take 5 mg by mouth Every Morning.      • aspirin 325 MG tablet Take 1 tablet by mouth Daily. 30 tablet 0    • atorvastatin (LIPITOR) 10 MG tablet Take 10 mg by mouth Daily.      • ferrous sulfate 325 (65 FE) MG tablet Take 325 mg by mouth Daily With Breakfast.      • folic acid (FOLVITE) 1 MG tablet Take 1 tablet by mouth Daily. 30 tablet 11    • furosemide (LASIX) 40 MG tablet Take 40 mg by mouth Daily.      • gabapentin (NEURONTIN) 300 MG capsule Take 300 mg by mouth 2 (Two) Times a Day.      • insulin NPH-insulin regular (humuLIN 70/30,novoLIN 70/30) (70-30) 100 UNIT/ML injection Inject 5 Units under the skin into the appropriate area as directed 2 (Two) Times a Day With Meals.  12    • levETIRAcetam (KEPPRA) 500 MG tablet Take 500 mg by mouth Every 12 (Twelve) Hours.      • losartan (COZAAR) 50 MG tablet Take 50 mg by mouth Daily.      • omeprazole (priLOSEC) 40 MG capsule Take 1 capsule by mouth Every Morning Before Breakfast. Take on empty stomach! 90 capsule 1    • spironolactone (ALDACTONE) 25 MG tablet Take 25 mg by mouth Every Morning.      • topiramate (TOPAMAX) 25 MG tablet Take 0.5 tablets by mouth 2 (Two) Times a Day. 30 tablet 3    • vitamin B-12 (CYANOCOBALAMIN) 1000 MCG tablet Take 1 tablet by mouth Daily. 30 tablet 11        Allergies:   Allergies   Allergen Reactions   • Latex, Natural Rubber Rash   • Adhesive Tape  Rash       Physical Exam:  General: in NAD     Vital Signs  Vitals:    08/21/22 1207   BP: 164/79   Pulse: 106   Resp:    Temp:    SpO2: 95%     I/O this shift:  In: -   Out: 1150 [Urine:1150]  I/O last 3 completed shifts:  In: 600 [IV Piggyback:600]  Out: 1100 [Urine:1100]      HEENT:  Normocephalic, Atraumatic, EOMI, PERRLA, NO icterus,  Neck:  Supple, No JVD, No Carotid artery bruit, No lymphadenopathy  Chest: Clear to auscultation bilaterally  Cardiovascular: Regular rate and rhythm. Positive S1 & S2, No rub, murmur or gallop.  Abdominal: Soft, nontender, no palpable organomegaly, no abdominal bruit, positive bowel sounds  Musculoskeletal: No joint tenderness or swelling, good range of motion, Adequate muscle strength on both sides, no cyanosis, clubbing or edema on lower extremities.  Neuro: Alert oriented x3, CN1 - 12 intact, No focal sensory or motor deficit.      Review of Systems:   CNS: Denied headaches, blurred vision, tingling or numbness in any part of body. No weakness or any impaired speech.  CVS: No chest pain or shortness of breath, No orthopnea or PND or exertional dyspnea,  Pulmonary: Denies shortness of breath, coughs, hematemesis, night sweats or weight loss.  GI: Denies diarrhea, nausea, vomiting. Denies weight loss, hematemesis, melena.  : Denies dysuria, frequency or hesitancy of urination  Vascular: Denies claudication, resting pain, tingling numbness or weakness in any part of the body.  Musculoskeletal: Denies Joint tenderness or pain, denies stiffness in joints, denies fever or weight loss, or skin rashes.    Current Labs  Lab Results (last 24 hours)     Procedure Component Value Units Date/Time    Blood Gas, Arterial - [[id number]]  (Abnormal) Collected: 08/21/22 0349    Specimen: Arterial Blood Updated: 08/21/22 0402     Site Right Radial     Andrew's Test Positive     pH, Arterial 7.340 pH units      pCO2, Arterial 35.3 mm Hg      pO2, Arterial 70.0 mm Hg      HCO3, Arterial 19.1  mmol/L      Base Excess, Arterial -6.1 mmol/L      Comment: Serial Number: [id number]:  [id number]        O2 Saturation, Arterial 92.9 %      CO2 Content 20.1 mmol/L      Barometric Pressure for Blood Gas --     Comment: N/A        Modality Room Air     Hemodilution No    POC Lactate [[id number]]  (Abnormal) Collected: 08/21/22 0349    Specimen: Blood Updated: 08/21/22 0401     Lactate 2.6 mmol/L      Comment: Serial Number: [id number]:  [id number]       Lactic Acid, Plasma [[id number]]  (Abnormal) Collected: 08/21/22 0152    Specimen: Blood Updated: 08/21/22 0311     Lactate 3.6 mmol/L     Basic Metabolic Panel [[id number]]  (Abnormal) Collected: 08/21/22 0152    Specimen: Blood Updated: 08/21/22 0310     Glucose 87 mg/dL      BUN 53 mg/dL      Creatinine 1.52 mg/dL      Sodium 142 mmol/L      Potassium 5.5 mmol/L      Chloride 110 mmol/L      CO2 18.0 mmol/L      Calcium 8.1 mg/dL      BUN/Creatinine Ratio 34.9     Anion Gap 14.0 mmol/L      eGFR 33.5 mL/min/1.73      Comment: National Kidney Foundation and American Society of Nephrology (ASN) Task Force recommended calculation based on the Chronic Kidney Disease Epidemiology Collaboration (CKD-EPI) equation refit without adjustment for race.       Narrative:      GFR Normal >60  Chronic Kidney Disease <60  Kidney Failure <15      CBC Auto Differential [[id number]]  (Abnormal) Collected: 08/21/22 0152    Specimen: Blood Updated: 08/21/22 0247     WBC 3.10 10*3/mm3      RBC 3.15 10*6/mm3      Hemoglobin 9.6 g/dL      Hematocrit 30.8 %      MCV 97.8 fL      MCH 30.4 pg      MCHC 31.1 g/dL      RDW 21.0 %      RDW-SD 72.2 fl      MPV 7.0 fL      Platelets 168 10*3/mm3      Neutrophil % 53.9 %      Lymphocyte % 33.4 %      Monocyte % 7.9 %      Eosinophil % 3.9 %      Basophil % 0.9 %      Neutrophils, Absolute 1.70 10*3/mm3      Lymphocytes, Absolute 1.00 10*3/mm3      Monocytes, Absolute 0.20 10*3/mm3      Eosinophils, Absolute 0.10 10*3/mm3      Basophils, Absolute 0.00  10*3/mm3      nRBC 0.1 /100 WBC     POC Lactate [[id number]]  (Abnormal) Collected: 08/20/22 2232    Specimen: Blood Updated: 08/20/22 2333     Lactate 2.8 mmol/L      Comment: Serial Number: [id number]:  [id number]       Blood Gas, Arterial - [[id number]]  (Abnormal) Collected: 08/20/22 2232    Specimen: Arterial Blood Updated: 08/20/22 2332     Site Right Radial     Andrew's Test Positive     pH, Arterial 7.332 pH units      pCO2, Arterial 33.2 mm Hg      pO2, Arterial 69.3 mm Hg      HCO3, Arterial 17.6 mmol/L      Base Excess, Arterial -7.5 mmol/L      Comment: Serial Number: [id number]:  [id number]        O2 Saturation, Arterial 92.6 %      CO2 Content 18.6 mmol/L      Barometric Pressure for Blood Gas --     Comment: N/A        Modality Room Air     FIO2 21 %      Hemodilution No    STAT Lactic Acid, Reflex [[id number]]  (Abnormal) Collected: 08/20/22 2021    Specimen: Blood from Hand, Left Updated: 08/20/22 2047     Lactate 4.4 mmol/L     Urine Drug Screen - Urine, Catheter [[id number]]  (Normal) Collected: 08/20/22 1629    Specimen: Urine, Catheter Updated: 08/20/22 1813     Amphet/Methamphet, Screen Negative     Barbiturates Screen, Urine Negative     Benzodiazepine Screen, Urine Negative     Cocaine Screen, Urine Negative     Opiate Screen Negative     THC, Screen, Urine Negative     Methadone Screen, Urine Negative     Oxycodone Screen, Urine Negative    Narrative:      Negative Thresholds Per Drugs Screened:    Amphetamines                 500 ng/ml  Barbiturates                 200 ng/ml  Benzodiazepines              100 ng/ml  Cocaine                      300 ng/ml  Methadone                    300 ng/ml  Opiates                      300 ng/ml  Oxycodone                    100 ng/ml  THC                           50 ng/ml    The Normal Value for all drugs tested is negative. This report includes final unconfirmed screening results to be used for medical treatment purposes only. Unconfirmed results must not be  used for non-medical purposes such as employment or legal testing. Clinical consideration should be applied to any drug of abuse test, particularly when unconfirmed results are used.          All urine drugs of abuse requests without chain of custody are for medical screening purposes only.  False positives are possible.      Blood Gas, Arterial - [[id number]]  (Abnormal) Collected: 08/20/22 1637    Specimen: Arterial Blood Updated: 08/20/22 1718     Site Left Radial     Andrew's Test Positive     pH, Arterial 7.316 pH units      pCO2, Arterial 37.8 mm Hg      pO2, Arterial 77.0 mm Hg      HCO3, Arterial 19.3 mmol/L      Base Excess, Arterial -6.3 mmol/L      Comment: Serial Number: [id number]:  [id number]        O2 Saturation, Arterial 94.1 %      CO2 Content 20.5 mmol/L      Barometric Pressure for Blood Gas --     Comment: N/A        Modality Room Air     FIO2 21 %      Hemodilution No    Comprehensive Metabolic Panel [[id number]]  (Abnormal) Collected: 08/20/22 1650    Specimen: Blood Updated: 08/20/22 1713     Glucose 91 mg/dL      BUN 64 mg/dL      Creatinine 1.84 mg/dL      Sodium 139 mmol/L      Potassium 5.9 mmol/L      Chloride 107 mmol/L      CO2 19.0 mmol/L      Calcium 8.6 mg/dL      Total Protein 6.9 g/dL      Albumin 3.80 g/dL      ALT (SGPT) 11 U/L      AST (SGOT) 15 U/L      Alkaline Phosphatase 82 U/L      Total Bilirubin 0.3 mg/dL      Globulin 3.1 gm/dL      A/G Ratio 1.2 g/dL      BUN/Creatinine Ratio 34.8     Anion Gap 13.0 mmol/L      eGFR 26.6 mL/min/1.73      Comment: National Kidney Foundation and American Society of Nephrology (ASN) Task Force recommended calculation based on the Chronic Kidney Disease Epidemiology Collaboration (CKD-EPI) equation refit without adjustment for race.       Narrative:      GFR Normal >60  Chronic Kidney Disease <60  Kidney Failure <15      Troponin [[id number]]  (Normal) Collected: 08/20/22 1650    Specimen: Blood Updated: 08/20/22 1713     Troponin T 0.029 ng/mL      Narrative:      Troponin T Reference Range:  <= 0.03 ng/mL-   Negative for AMI  >0.03 ng/mL-     Abnormal for myocardial necrosis.  Clinicians would have to utilize clinical acumen, EKG, Troponin and serial changes to determine if it is an Acute Myocardial Infarction or myocardial injury due to an underlying chronic condition.       Results may be falsely decreased if patient taking Biotin.      COVID PRE-OP / PRE-PROCEDURE SCREENING ORDER (NO ISOLATION) - Swab, Nasopharynx [[id number]]  (Normal) Collected: 08/20/22 1620    Specimen: Swab from Nasopharynx Updated: 08/20/22 1710    Narrative:      The following orders were created for panel order COVID PRE-OP / PRE-PROCEDURE SCREENING ORDER (NO ISOLATION) - Swab, Nasopharynx.  Procedure                               Abnormality         Status                     ---------                               -----------         ------                     COVID-19,CEPHEID/SHAHLA,CO...[[id number]]  Normal              Final result                 Please view results for these tests on the individual orders.    COVID-19,CEPHEID/SHAHLA,COR/RAYRAY/PAD/MARTHA IN-HOUSE(OR EMERGENT/ADD-ON),NP SWAB IN TRANSPORT MEDIA 3-4 HR TAT, RT-PCR - Swab, Nasopharynx [[id number]]  (Normal) Collected: 08/20/22 1620    Specimen: Swab from Nasopharynx Updated: 08/20/22 1710     COVID19 Not Detected    Narrative:      Fact sheet for providers: https://www.fda.gov/media/867833/download     Fact sheet for patients: https://www.fda.gov/media/316691/download  Fact sheet for providers: https://www.fda.gov/media/052052/download     Fact sheet for patients: https://www.fda.gov/media/160648/download    Protime-INR [[id number]]  (Normal) Collected: 08/20/22 1650    Specimen: Blood Updated: 08/20/22 1706     Protime 11.3 Seconds      INR 1.10    aPTT [[id number]]  (Abnormal) Collected: 08/20/22 1650    Specimen: Blood Updated: 08/20/22 1706     PTT 27.5 seconds     Ammonia [[id number]]  (Normal) Collected: 08/20/22 1620     Specimen: Blood Updated: 08/20/22 1655     Ammonia 48 umol/L     Extra Tubes [[id number]] Collected: 08/20/22 1605    Specimen: Blood, Venous Line Updated: 08/20/22 1639    Narrative:      The following orders were created for panel order Extra Tubes.  Procedure                               Abnormality         Status                     ---------                               -----------         ------                     Gold Top - SST[[id number]]                                   Final result                 Please view results for these tests on the individual orders.    Gold Top - SST [[id number]] Collected: 08/20/22 1605    Specimen: Blood Updated: 08/20/22 1639    Urinalysis, Microscopic Only - Urine, Catheter [[id number]]  (Abnormal) Collected: 08/20/22 1629    Specimen: Urine, Catheter Updated: 08/20/22 1638     RBC, UA 0-2 /HPF      WBC, UA 3-5 /HPF      Comment: Urine culture not indicated.        Bacteria, UA 1+ /HPF      Squamous Epithelial Cells, UA 0-2 /HPF      Hyaline Casts, UA None Seen /LPF      Methodology Automated Microscopy    TSH [[id number]]  (Normal) Collected: 08/20/22 1605    Specimen: Blood Updated: 08/20/22 1636     TSH 2.330 uIU/mL     Urinalysis With Culture If Indicated - Urine, Catheter [[id number]]  (Abnormal) Collected: 08/20/22 1629    Specimen: Urine, Catheter Updated: 08/20/22 1635     Color, UA Yellow     Appearance, UA Clear     pH, UA <=5.0     Specific Gravity, UA 1.009     Glucose, UA Negative     Ketones, UA Negative     Bilirubin, UA Negative     Blood, UA Negative     Protein, UA Negative     Leuk Esterase, UA Trace     Nitrite, UA Negative     Urobilinogen, UA 0.2 E.U./dL    Narrative:      In absence of clinical symptoms, the presence of pyuria, bacteria, and/or nitrites on the urinalysis result does not correlate with infection.    Ethanol [[id number]] Collected: 08/20/22 1605    Specimen: Blood Updated: 08/20/22 1631     Ethanol % 0.196 %     Narrative:      Plasma  Ethanol Clinical Symptoms:    ETOH (%)               Clinical Symptom  .01-.05              No apparent influence  .03-.12              Euphoria, Diminished judgment and attention   .09-.25              Impaired comprehension, Muscle incoordination  .18-.30              Confusion, Staggered gait, Slurred speech  .25-.40              Markedly decreased response to stimuli, unable to stand or                        walk, vomitting, sleep or stupor  .35-.50              Comatose, Anesthesia, Subnormal body temperature        Magnesium [[id number]]  (Abnormal) Collected: 08/20/22 1605    Specimen: Blood Updated: 08/20/22 1631     Magnesium 2.9 mg/dL     Blood Culture - Blood, Arm, Left [[id number]] Collected: 08/20/22 1617    Specimen: Blood from Arm, Left Updated: 08/20/22 1625    Blood Culture - Blood, Arm, Right [[id number]] Collected: 08/20/22 1617    Specimen: Blood from Arm, Right Updated: 08/20/22 1625    POC Lactate [[id number]]  (Abnormal) Collected: 08/20/22 1622    Specimen: Blood Updated: 08/20/22 1623     Lactate 3.1 mmol/L      Comment: Serial Number: [id number]:  [id number]           Current Radiology  Imaging Results (Last 24 Hours)     Procedure Component Value Units Date/Time    CT Head Without Contrast [[id number]] Collected: 08/20/22 1759     Updated: 08/20/22 1802    Narrative:      CT HEAD WO CONTRAST-     Date of Exam: 8/20/2022 5:05 PM     Indication: confusion.     Comparison: CT 06/29/2022     Technique:  Without contrast, contiguous axial CT images of the head  were obtained from skull base to vertex.  Coronal and sagittal  reconstructions were performed.  Automated exposure control and  iterative reconstruction methods were used.     FINDINGS  No evidence of intracranial hemorrhage, mass, or midline shift. The  ventricles appear normal in size for the patient's age. No extra-axial  collections identified. The gray white matter differentiation is intact.  No air-fluid levels identified within  the paranasal sinuses. The  extra-axial structures demonstrate no acute process. Hyperostosis  frontalis interna present.       Impression:      No evidence of hemorrhage, mass effect or midline shift. No acute  process identified.     Electronically Signed By-Melissa Manriquez MD On:8/20/2022 6:00 PM  This report was finalized on [id number] by  Melissa Manriquez MD.    XR Chest 1 View [[id number]] Collected: 08/20/22 1645     Updated: 08/20/22 1647    Narrative:      Examination: XR CHEST 1 VW-     Date of Exam: 8/20/2022 4:39 PM     Indication: confusion.     Comparison: Radiograph 06/29/2022     Technique: 1 view of the chest      Findings:  There are no airspace consolidations. No pleural effusions. No  pneumothorax. Left subclavian AICD/pacemaker device in place. The heart  size is normal. The pulmonary vasculature appears within normal limits.  No acute osseous abnormality identified.       Impression:      No acute cardiopulmonary process.     Electronically Signed By-Melissa Manriquez MD On:8/20/2022 4:45 PM  This report was finalized on [id number] by  Melissa Manriquez MD.        Current Meds    Current Facility-Administered Medications:   •  acetaminophen (TYLENOL) tablet 650 mg, 650 mg, Oral, Q4H PRN, 650 mg at 08/21/22 0656 **OR** acetaminophen (TYLENOL) 160 MG/5ML solution 650 mg, 650 mg, Oral, Q4H PRN **OR** acetaminophen (TYLENOL) suppository 650 mg, 650 mg, Rectal, Q4H PRN, Herlinda Foote, APRN  •  aluminum-magnesium hydroxide-simethicone (MAALOX MAX) 400-400-40 MG/5ML suspension 15 mL, 15 mL, Oral, Q6H PRN, Herlinda Foote, APRN  •  [START ON 8/22/2022] amLODIPine (NORVASC) tablet 5 mg, 5 mg, Oral, QAM, Chuck-Egziabher, Gurdeep I, DO  •  aspirin tablet 325 mg, 325 mg, Oral, Daily, Chuck-Egziabher, Gurdeep I, DO  •  atorvastatin (LIPITOR) tablet 10 mg, 10 mg, Oral, Daily, Chuck-Egziabher, Gurdeep I, DO  •  dextrose (D50W) (25 g/50 mL) IV injection 25 g, 25 g, Intravenous, Q15 Min PRN, Chuck-Gurdeep Soto I, DO  •   dextrose (GLUTOSE) oral gel 15 g, 15 g, Oral, Q15 Min PRN, Gurdeep Cantrell I, DO  •  [START ON 8/22/2022] ferrous sulfate EC tablet 324 mg, 324 mg, Oral, Daily With Breakfast, Óscar Gurdeep I, DO  •  folic acid (FOLVITE) tablet 1 mg, 1 mg, Oral, Daily, Óscar Gurdeep I, DO  •  gabapentin (NEURONTIN) capsule 300 mg, 300 mg, Oral, BID, Chuck-Leab, Gurdeep I, DO  •  glucagon (human recombinant) (GLUCAGEN DIAGNOSTIC) 1 mg in sterile water (preservative free) 1 mL injection, 1 mg, Subcutaneous, PRN, Óscar Gurdeep I, DO  •  heparin (porcine) 5000 UNIT/ML injection 5,000 Units, 5,000 Units, Subcutaneous, Q12H, Herlinda Foote, APRN, 5,000 Units at 08/21/22 0925  •  insulin lispro (ADMELOG) injection 0-7 Units, 0-7 Units, Subcutaneous, TID AC, Óscar Gurdeep I, DO  •  levETIRAcetam (KEPPRA) tablet 500 mg, 500 mg, Oral, Q12H, Óscar Gurdeep I, DO  •  melatonin tablet 5 mg, 5 mg, Oral, Nightly PRN, Herlinda Foote, APRN  •  ondansetron (ZOFRAN) tablet 4 mg, 4 mg, Oral, Q6H PRN, 4 mg at 08/21/22 0925 **OR** ondansetron (ZOFRAN) injection 4 mg, 4 mg, Intravenous, Q6H PRN, Herlinda Foote, APRN, 4 mg at 08/21/22 0218  •  [START ON 8/22/2022] pantoprazole (PROTONIX) EC tablet 40 mg, 40 mg, Oral, QAM, Óscar, Gurdeep I, DO  •  [COMPLETED] Insert peripheral IV, , , Once **AND** sodium chloride 0.9 % flush 10 mL, 10 mL, Intravenous, PRN, Nestor Glaser MD  •  sodium chloride 0.9 % infusion, 100 mL/hr, Intravenous, Continuous, Herlinda Foote, APRN, Last Rate: 100 mL/hr at 08/21/22 1336, 100 mL/hr at 08/21/22 1336  •  topiramate (TOPAMAX) tablet 12.5 mg, 12.5 mg, Oral, BID, Gurdeep Cantrell DO              Patient Active Problem List   Diagnosis   • Fall   • Secondary hyperaldosteronism (HCC)   • Stage 3b chronic kidney disease (CMS/HCC)   • Hypertension associated with stage 3 chronic kidney disease due to type 2 diabetes mellitus (HCC)   • Morbid obesity (HCC)   •  Atherosclerosis of native coronary artery of native heart without angina pectoris   • AV block, Mobitz II   • Hyperlipidemia   • Essential hypertension   • Acute UTI   • GERD (gastroesophageal reflux disease)   • Cardiac pacemaker in situ   • Cerebrovascular accident (CVA) ([facility])   • Hyperammonemia ([facility])   • Obstructive sleep apnea   • Metabolic encephalopathy   • Type 2 diabetes mellitus with hyperglycemia, with long-term current use of insulin (HCC)   • Seizure disorder ([facility])   • Elevated lactic acid level   • Acute kidney injury superimposed on chronic kidney disease (HCC)   • Type 2 diabetes mellitus with diabetic neuropathy, with long-term current use of insulin ([facility])   • Hyperkalemia   • Elevated serum lactate dehydrogenase   KYRA ON CKD HEMODYNAMIC AND DUE TO INTRAVASCULAR VOLUME DEPLETION CONTINUE I/V FLUIDS AND AVOID NEPPHROTOXINS        Cathy Paez MD [facility], FASN.  08/21/22  16:17 EDT

## 2022-08-22 ENCOUNTER — READMISSION MANAGEMENT (OUTPATIENT)
Dept: CALL CENTER | Facility: HOSPITAL | Age: 87
End: 2022-08-22

## 2022-08-22 VITALS
RESPIRATION RATE: 18 BRPM | TEMPERATURE: 98.4 F | HEART RATE: 81 BPM | BODY MASS INDEX: 43.18 KG/M2 | HEIGHT: 61 IN | WEIGHT: 228.7 LBS | OXYGEN SATURATION: 93 % | DIASTOLIC BLOOD PRESSURE: 55 MMHG | SYSTOLIC BLOOD PRESSURE: 96 MMHG

## 2022-08-22 LAB
ANION GAP SERPL CALCULATED.3IONS-SCNC: 6 MMOL/L (ref 5–15)
BASOPHILS # BLD AUTO: 0 10*3/MM3 (ref 0–0.2)
BASOPHILS NFR BLD AUTO: 1.2 % (ref 0–1.5)
BUN SERPL-MCNC: 23 MG/DL (ref 8–23)
BUN/CREAT SERPL: 19.8 (ref 7–25)
CALCIUM SPEC-SCNC: 7.8 MG/DL (ref 8.6–10.5)
CHLORIDE SERPL-SCNC: 111 MMOL/L (ref 98–107)
CK SERPL-CCNC: 87 U/L (ref 20–180)
CO2 SERPL-SCNC: 21 MMOL/L (ref 22–29)
CREAT SERPL-MCNC: 1.16 MG/DL (ref 0.57–1)
DEPRECATED RDW RBC AUTO: 74.4 FL (ref 37–54)
EGFRCR SERPLBLD CKD-EPI 2021: 46.3 ML/MIN/1.73
EOSINOPHIL # BLD AUTO: 0.1 10*3/MM3 (ref 0–0.4)
EOSINOPHIL NFR BLD AUTO: 5 % (ref 0.3–6.2)
ERYTHROCYTE [DISTWIDTH] IN BLOOD BY AUTOMATED COUNT: 21.3 % (ref 12.3–15.4)
GLUCOSE BLDC GLUCOMTR-MCNC: 119 MG/DL (ref 70–105)
GLUCOSE BLDC GLUCOMTR-MCNC: 158 MG/DL (ref 70–105)
GLUCOSE SERPL-MCNC: 126 MG/DL (ref 65–99)
HCT VFR BLD AUTO: 30.6 % (ref 34–46.6)
HGB BLD-MCNC: 9.7 G/DL (ref 12–15.9)
LYMPHOCYTES # BLD AUTO: 1.1 10*3/MM3 (ref 0.7–3.1)
LYMPHOCYTES NFR BLD AUTO: 37.7 % (ref 19.6–45.3)
MAGNESIUM SERPL-MCNC: 2.2 MG/DL (ref 1.6–2.4)
MCH RBC QN AUTO: 31.6 PG (ref 26.6–33)
MCHC RBC AUTO-ENTMCNC: 31.6 G/DL (ref 31.5–35.7)
MCV RBC AUTO: 100.1 FL (ref 79–97)
MONOCYTES # BLD AUTO: 0.2 10*3/MM3 (ref 0.1–0.9)
MONOCYTES NFR BLD AUTO: 7.2 % (ref 5–12)
NEUTROPHILS NFR BLD AUTO: 1.5 10*3/MM3 (ref 1.7–7)
NEUTROPHILS NFR BLD AUTO: 48.9 % (ref 42.7–76)
NRBC BLD AUTO-RTO: 0 /100 WBC (ref 0–0.2)
PHOSPHATE SERPL-MCNC: 2.8 MG/DL (ref 2.5–4.5)
PLATELET # BLD AUTO: 112 10*3/MM3 (ref 140–450)
PMV BLD AUTO: 7.2 FL (ref 6–12)
POTASSIUM SERPL-SCNC: 5.3 MMOL/L (ref 3.5–5.2)
RBC # BLD AUTO: 3.06 10*6/MM3 (ref 3.77–5.28)
SODIUM SERPL-SCNC: 138 MMOL/L (ref 136–145)
WBC NRBC COR # BLD: 3 10*3/MM3 (ref 3.4–10.8)

## 2022-08-22 PROCEDURE — 82962 GLUCOSE BLOOD TEST: CPT

## 2022-08-22 PROCEDURE — 84100 ASSAY OF PHOSPHORUS: CPT | Performed by: HOSPITALIST

## 2022-08-22 PROCEDURE — 83735 ASSAY OF MAGNESIUM: CPT | Performed by: HOSPITALIST

## 2022-08-22 PROCEDURE — 25010000002 HEPARIN (PORCINE) PER 1000 UNITS

## 2022-08-22 PROCEDURE — 99239 HOSP IP/OBS DSCHRG MGMT >30: CPT | Performed by: INTERNAL MEDICINE

## 2022-08-22 PROCEDURE — 85025 COMPLETE CBC W/AUTO DIFF WBC: CPT | Performed by: HOSPITALIST

## 2022-08-22 PROCEDURE — 80048 BASIC METABOLIC PNL TOTAL CA: CPT | Performed by: INTERNAL MEDICINE

## 2022-08-22 PROCEDURE — 82550 ASSAY OF CK (CPK): CPT | Performed by: HOSPITALIST

## 2022-08-22 RX ADMIN — Medication 10 ML: at 08:34

## 2022-08-22 RX ADMIN — FERROUS SULFATE TAB EC 324 MG (65 MG FE EQUIVALENT) 324 MG: 324 (65 FE) TABLET DELAYED RESPONSE at 08:34

## 2022-08-22 RX ADMIN — HEPARIN SODIUM 5000 UNITS: 5000 INJECTION INTRAVENOUS; SUBCUTANEOUS at 08:34

## 2022-08-22 RX ADMIN — GABAPENTIN 300 MG: 300 CAPSULE ORAL at 08:34

## 2022-08-22 RX ADMIN — ASPIRIN 325 MG ORAL TABLET 325 MG: 325 PILL ORAL at 08:34

## 2022-08-22 RX ADMIN — FOLIC ACID 1 MG: 1 TABLET ORAL at 08:34

## 2022-08-22 RX ADMIN — AMLODIPINE BESYLATE 5 MG: 5 TABLET ORAL at 06:20

## 2022-08-22 RX ADMIN — ATORVASTATIN CALCIUM 10 MG: 10 TABLET, FILM COATED ORAL at 08:34

## 2022-08-22 RX ADMIN — TOPIRAMATE 12.5 MG: 25 TABLET, FILM COATED ORAL at 08:34

## 2022-08-22 RX ADMIN — LEVETIRACETAM 500 MG: 500 TABLET, FILM COATED ORAL at 02:01

## 2022-08-22 NOTE — PLAN OF CARE
Goal Outcome Evaluation:      Patient DCP home with spouse. Follow up with providers as directed. IV removed and belongings collected.

## 2022-08-22 NOTE — PLAN OF CARE
Goal Outcome Evaluation:              Outcome Evaluation: Pt resting in bed with no issues overnight.  Pt scores 0 on CIWA and on precaution for sz disorder.  DCP is home w/spouse.

## 2022-08-22 NOTE — PROGRESS NOTES
"RENAL/KCC:     LOS: 2 days    Patient Care Team:  Gabriel Goss MD as PCP - General (Family Medicine)  Gregory Pinedo MD (Family Medicine)  Wang Plaza MD as Consulting Physician (Cardiology)    Chief Complaint:  KYRA, Hyperkalemia    Subjective     Interval History:   Chart reviewed, feeling well, tolerating PO    Objective     Vital Sign Min/Max for last 24 hours  Temp  Min: 98.4 °F (36.9 °C)  Max: 99.1 °F (37.3 °C)   BP  Min: 96/55  Max: 172/70   Pulse  Min: 81  Max: 106   Resp  Min: 18  Max: 18   SpO2  Min: 93 %  Max: 96 %   No data recorded   No data recorded     Flowsheet Rows    Flowsheet Row First Filed Value   Admission Height 165.1 cm (65\") Documented at 08/20/2022 1529   Admission Weight 97.5 kg (215 lb) Documented at 08/20/2022 1529          I/O this shift:  In: 236 [P.O.:236]  Out: -   I/O last 3 completed shifts:  In: 600 [IV Piggyback:600]  Out: 4250 [Urine:4250]    Physical Exam:  GEN: Awake, NAD  ENT: PERRL, EOMI, MMM  NECK: Supple, no JVD  CHEST: CTAB, no W/R/C  CV: RRR, no M/G/R  ABD: Soft, NT, +BS  SKIN: Warm and Dry  NEURO: CN's intact      WBC WBC   Date Value Ref Range Status   08/22/2022 3.00 (L) 3.40 - 10.80 10*3/mm3 Final   08/21/2022 3.10 (L) 3.40 - 10.80 10*3/mm3 Final   08/20/2022 3.80 3.40 - 10.80 10*3/mm3 Final      HGB Hemoglobin   Date Value Ref Range Status   08/22/2022 9.7 (L) 12.0 - 15.9 g/dL Final   08/21/2022 9.6 (L) 12.0 - 15.9 g/dL Final   08/20/2022 10.8 (L) 12.0 - 15.9 g/dL Final      HCT Hematocrit   Date Value Ref Range Status   08/22/2022 30.6 (L) 34.0 - 46.6 % Final   08/21/2022 30.8 (L) 34.0 - 46.6 % Final   08/20/2022 33.8 (L) 34.0 - 46.6 % Final      Platlets No results found for: LABPLAT   MCV MCV   Date Value Ref Range Status   08/22/2022 100.1 (H) 79.0 - 97.0 fL Final   08/21/2022 97.8 (H) 79.0 - 97.0 fL Final   08/20/2022 98.7 (H) 79.0 - 97.0 fL Final          Sodium Sodium   Date Value Ref Range Status   08/22/2022 138 136 - 145 mmol/L Final "   08/21/2022 142 136 - 145 mmol/L Final   08/20/2022 139 136 - 145 mmol/L Final      Potassium Potassium   Date Value Ref Range Status   08/22/2022 5.3 (H) 3.5 - 5.2 mmol/L Final     Comment:     Slight hemolysis detected by analyzer. Results may be affected.   08/21/2022 5.3 (H) 3.5 - 5.2 mmol/L Final   08/21/2022 5.5 (H) 3.5 - 5.2 mmol/L Final   08/20/2022 5.9 (H) 3.5 - 5.2 mmol/L Final      Chloride Chloride   Date Value Ref Range Status   08/22/2022 111 (H) 98 - 107 mmol/L Final   08/21/2022 110 (H) 98 - 107 mmol/L Final   08/20/2022 107 98 - 107 mmol/L Final      CO2 CO2   Date Value Ref Range Status   08/22/2022 21.0 (L) 22.0 - 29.0 mmol/L Final   08/21/2022 18.0 (L) 22.0 - 29.0 mmol/L Final   08/20/2022 19.0 (L) 22.0 - 29.0 mmol/L Final      BUN BUN   Date Value Ref Range Status   08/22/2022 23 8 - 23 mg/dL Final   08/21/2022 53 (H) 8 - 23 mg/dL Final   08/20/2022 64 (H) 8 - 23 mg/dL Final      Creatinine Creatinine   Date Value Ref Range Status   08/22/2022 1.16 (H) 0.57 - 1.00 mg/dL Final   08/21/2022 1.52 (H) 0.57 - 1.00 mg/dL Final   08/20/2022 1.84 (H) 0.57 - 1.00 mg/dL Final      Calcium Calcium   Date Value Ref Range Status   08/22/2022 7.8 (L) 8.6 - 10.5 mg/dL Final   08/21/2022 8.1 (L) 8.6 - 10.5 mg/dL Final   08/20/2022 8.6 8.6 - 10.5 mg/dL Final      PO4 No results found for: CAPO4   Albumin Albumin   Date Value Ref Range Status   08/20/2022 3.80 3.50 - 5.20 g/dL Final      Magnesium Magnesium   Date Value Ref Range Status   08/22/2022 2.2 1.6 - 2.4 mg/dL Final   08/20/2022 2.9 (H) 1.6 - 2.4 mg/dL Final      Uric Acid Uric Acid   Date Value Ref Range Status   08/21/2022 10.4 (H) 2.4 - 5.7 mg/dL Final           Results Review:     I reviewed the patient's new clinical results.    amLODIPine, 5 mg, Oral, QAM  aspirin, 325 mg, Oral, Daily  atorvastatin, 10 mg, Oral, Daily  ferrous sulfate, 324 mg, Oral, Daily With Breakfast  folic acid, 1 mg, Oral, Daily  gabapentin, 300 mg, Oral, BID  heparin  (porcine), 5,000 Units, Subcutaneous, Q12H  insulin lispro, 0-7 Units, Subcutaneous, TID AC  levETIRAcetam, 500 mg, Oral, Q12H  topiramate, 12.5 mg, Oral, BID           Medication Review: Reviewed    Assessment & Plan       Acute kidney injury superimposed on chronic kidney disease (HCC)    Fall    Hyperlipidemia    Essential hypertension    Acute UTI    GERD (gastroesophageal reflux disease)    Metabolic encephalopathy    Seizure disorder (HCC)    Type 2 diabetes mellitus with diabetic neuropathy, with long-term current use of insulin (HCC)    Hyperkalemia    Elevated serum lactate dehydrogenase      Plan: Cr improved to 1.16 = near baseline.  HyperK resolving.  Volume OK.  D/C IVF.  Keep on low-K diet.  Avoid nephrotoxins.  Will follow.      Isac Olsen MD   [facility]  08/22/22  09:25 EDT

## 2022-08-22 NOTE — CASE MANAGEMENT/SOCIAL WORK
Social Work Assessment  [facility]     Patient Name: Aracelis Vargas  MRN: [id number]  Today's Date: 8/22/2022    Admit Date: 8/20/2022     Substance Abuse     Row Name 08/22/22 1156       Substance Use    Substance Use Status current alcohol use    Substance Use Comment SW reviewed chart and saw pt consumed unk amount of vodka just pta and etoh in ED was 0.196. SW met with pt in room 312 to offer resources, to which pt declined. She is unsure if she is going to stop drinking at this point in time.              Met with patient in room wearing PPE: mask.      Maintained distance greater than six feet and spent less than 15 minutes in the room.      ROD Rice, W  Medical Social Worker  Ph [phone]  Fax [phone]  [email or web address]

## 2022-08-22 NOTE — DISCHARGE SUMMARY
[facility] Medicine Services  DISCHARGE SUMMARY    Patient Name: Aracelis Vargas  : 1935  MRN: [id number]    Date of Admission: 2022  Discharge Diagnosis:  1.  Mechanical fall  2.  KYRA on CKD, stage 111  3.  Hyperkalemia  4.  Elevated lactic acid level  5.  Longstanding history of alcohol abuse /withdrawal symptoms  6.  CAD  7.  T2DM  8.   Hypertension  9.  Seizure disorder  10.  SSS, S/p PPM  11.  AMS/acute metabolic encephalopathy, resolved    Date of Discharge:   1.  2022  Primary Care Physician: Gabriel Goss MD      Presenting Problem:   Hyperkalemia [E87.5]  Renal insufficiency [N28.9]  Alcoholic intoxication with complication (HCC) [F10.929]  Urinary tract infection without hematuria, site unspecified [N39.0]  Altered mental status, unspecified altered mental status type [R41.82]  Acute kidney injury superimposed on chronic kidney disease (HCC) [N17.9, N18.9]    Active and Resolved Hospital Problems:  Active Hospital Problems    Diagnosis POA   • **Acute kidney injury superimposed on chronic kidney disease (HCC) [N17.9, N18.9] Yes   • Type 2 diabetes mellitus with diabetic neuropathy, with long-term current use of insulin (HCC) [E11.40, Z79.4] Not Applicable   • Hyperkalemia [E87.5] Unknown   • Elevated serum lactate dehydrogenase [R74.02] Yes   • Seizure disorder (HCC) [G40.909] Yes   • Metabolic encephalopathy [G93.41] Yes   • Acute UTI [N39.0] Yes   • GERD (gastroesophageal reflux disease) [K21.9] Yes   • Hyperlipidemia [E78.5] Yes   • Essential hypertension [I10] Yes   • Fall [W19.XXXA] Yes      Resolved Hospital Problems   No resolved problems to display.         Hospital Course     Hospital Course:  88 Y/o  female with a past medical history of T2DM, with neuropathy , CKD stage III, HLD, hypertension, seizure disorder, hypertension, seizure disorder, frequent UTI, and falls.  According to admission note, patient presented to [facility] ED on 2022  "via EMS with altered mental status changes/encephalopathy she admitted to alcohol abuse, and drinks vodka daily, and after which she \"threw myself on the floor to get my 's attention\".  She denies any injuries, no LOC, or traumatic head injury.  She also denies dysuria, urinary frequency, and urinary urgency.    No chest pain, no fever or chills and no abdominal pain, nausea or vomiting.       In the ED, ABG showed pH 7.31, CO2 37.8, PO2 77, HCO3 19.3.  Troponin is 0.029, potassium 5.9, creatinine 1.84 (baseline approximately 1.036), BUN 64, calcium 8.6, magnesium 2.9, ammonia 48, lactate 3.1, WBC 3.8, blood alcohol level is 0.196%.  All other labs are unremarkable.  Urine toxicity is negative, UA shows 1+ bacteria, trace leukocytes, no squamous epithelial cells.  Patient tested negative for COVID-19.  On lactic acid level is minimally elevated, and this was felt to be most likely due to dehydration in the setting of KYRA/CKD.  X-ray of the chest shows no acute cardiopulmonary process.  CT of the head without contrast shows no evidence of hemorrhage, mass-effect, or midline shift.  No acute process identified.  Patient was admitted and treated for a mechanical fall at home likely due to inebriation, hyperkalemia, treated with IV fluids saline, and Lokelma.so far, no convincing evidence of evolving sepsis.  Blood cultures on 8/20/2022, showed no growth.  She was initially treated with Rocephin, which was later discontinued.  She was seen in consultation by nephrologist regarding KYRA on CKD with recommendations.  Prior to discharge home today, she reports feeling better and requesting to be discharged home.  I would have  prefer patient to go to rehab, but she declined.  Discharge, she is afebrile, with stable hemodynamics, and oxygenating well on room air.  Patient to follow-up with her nephrologist as scheduled.          DISCHARGE Follow Up Recommendations for labs and diagnostics:  1.  Follow-up PCP and " nephrologist as scheduled  2.  Counseled patient regarding alcohol abuse and to seek outpatient treatment with [facility] meeting.        Reasons For Change In Medications and Indications for New Medications:      Day of Discharge     Vital Signs:  Temp:  [98.4 °F (36.9 °C)-99.1 °F (37.3 °C)] 98.4 °F (36.9 °C)  Heart Rate:  [] 81  Resp:  [18] 18  BP: ()/(55-81) 96/55    Physical Exam  Constitutional:       General: She is not in acute distress.     Appearance: She is obese. She is not ill-appearing.   HENT:      Head: Normocephalic.   Eyes:      Pupils: Pupils are equal, round, and reactive to light.   Cardiovascular:      Rate and Rhythm: Normal rate.      Pulses: Normal pulses.   Pulmonary:      Effort: Pulmonary effort is normal.   Abdominal:      Palpations: Abdomen is soft.   Musculoskeletal:         General: Normal range of motion.      Cervical back: Neck supple.   Skin:     General: Skin is warm.   Neurological:      General: No focal deficit present.      Mental Status: She is alert.   Psychiatric:         Mood and Affect: Mood normal.            Pertinent  and/or Most Recent Results     LAB RESULTS:      Lab 08/22/22  0156 08/21/22  0349 08/21/22  0152 08/20/22  2232 08/20/22  2021 08/20/22  1650 08/20/22  1622 08/20/22  1607 08/17/22  1409   WBC 3.00*  --  3.10*  --   --   --   --  3.80 5.74   HEMOGLOBIN 9.7*  --  9.6*  --   --   --   --  10.8* 10.2*   HEMATOCRIT 30.6*  --  30.8*  --   --   --   --  33.8* 31.5*   PLATELETS 112*  --  168  --   --   --   --  211 170   NEUTROS ABS 1.50*  --  1.70  --   --   --   --  1.70  --    LYMPHS ABS 1.10  --  1.00  --   --   --   --  1.60  --    MONOS ABS 0.20  --  0.20  --   --   --   --  0.30  --    EOS ABS 0.10  --  0.10  --   --   --   --  0.20  --    .1*  --  97.8*  --   --   --   --  98.7* 96.6   LACTATE  --  2.6* 3.6* 2.8* 4.4*  --  3.1*  --   --    PROTIME  --   --   --   --   --  11.3  --   --   --    APTT  --   --   --   --   --  27.5*  --   --    --          Lab 08/22/22  0711 08/21/22  1440 08/21/22  0152 08/20/22  1650 08/20/22  1605 08/17/22  1409   SODIUM 138  --  142 139  --   --    POTASSIUM 5.3* 5.3* 5.5* 5.9*  --   --    CHLORIDE 111*  --  110* 107  --   --    CO2 21.0*  --  18.0* 19.0*  --   --    ANION GAP 6.0  --  14.0 13.0  --   --    BUN 23  --  53* 64*  --   --    CREATININE 1.16*  --  1.52* 1.84*  --   --    EGFR 46.3*  --  33.5* 26.6*  --   --    GLUCOSE 126*  --  87 91  --   --    CALCIUM 7.8*  --  8.1* 8.6  --   --    MAGNESIUM 2.2  --   --   --  2.9*  --    PHOSPHORUS 2.8  --   --   --   --   --    HEMOGLOBIN A1C  --   --   --   --   --  6.6*   TSH  --   --   --   --  2.330  --          Lab 08/20/22  1650   TOTAL PROTEIN 6.9   ALBUMIN 3.80   GLOBULIN 3.1   ALT (SGPT) 11   AST (SGOT) 15   BILIRUBIN 0.3   ALK PHOS 82         Lab 08/20/22  1650   TROPONIN T 0.029   PROTIME 11.3   INR 1.10             Lab 08/20/22  1620 08/17/22  1409   FERRITIN  --  132.00   FOLATE  --  >20.00   VITAMIN B 12  --  >2,000*   ABO TYPING A  --    RH TYPING Positive  --    ANTIBODY SCREEN Negative  --          Lab 08/21/22  0349 08/20/22  2232 08/20/22  1637   PH, ARTERIAL 7.340* 7.332* 7.316*   PCO2, ARTERIAL 35.3 33.2* 37.8   PO2 ART 70.0* 69.3* 77.0*   O2 SATURATION ART 92.9* 92.6* 94.1   FIO2  --  21 21   HCO3 ART 19.1* 17.6* 19.3*   BASE EXCESS ART -6.1* -7.5* -6.3*     Brief Urine Lab Results  (Last result in the past 365 days)      Color   Clarity   Blood   Leuk Est   Nitrite   Protein   CREAT   Urine HCG        08/20/22 1629 Yellow   Clear   Negative   Trace   Negative   Negative               Microbiology Results (last 10 days)     Procedure Component Value - Date/Time    COVID PRE-OP / PRE-PROCEDURE SCREENING ORDER (NO ISOLATION) - Swab, Nasopharynx [[id number]]  (Normal) Collected: 08/20/22 1620    Lab Status: Final result Specimen: Swab from Nasopharynx Updated: 08/20/22 1710    Narrative:      The following orders were created for panel order COVID  PRE-OP / PRE-PROCEDURE SCREENING ORDER (NO ISOLATION) - Swab, Nasopharynx.  Procedure                               Abnormality         Status                     ---------                               -----------         ------                     COVID-19,CEPHEID/SHAHLA,CO...[[id number]]  Normal              Final result                 Please view results for these tests on the individual orders.    COVID-19,CEPHEID/SHAHLA,COR/[facility]/PAD/MARTHA IN-HOUSE(OR EMERGENT/ADD-ON),NP SWAB IN TRANSPORT MEDIA 3-4 HR TAT, RT-PCR - Swab, Nasopharynx [[id number]]  (Normal) Collected: 08/20/22 1620    Lab Status: Final result Specimen: Swab from Nasopharynx Updated: 08/20/22 1710     COVID19 Not Detected    Narrative:      Fact sheet for providers: https://www.fda.gov/media/873308/download     Fact sheet for patients: https://www.fda.gov/media/574002/download  Fact sheet for providers: https://www.fda.gov/media/828715/download     Fact sheet for patients: https://www.fda.gov/media/344359/download    Blood Culture - Blood, Arm, Left [[id number]]  (Normal) Collected: 08/20/22 1617    Lab Status: Preliminary result Specimen: Blood from Arm, Left Updated: 08/21/22 1633     Blood Culture No growth at 24 hours    Blood Culture - Blood, Arm, Right [[id number]]  (Normal) Collected: 08/20/22 1617    Lab Status: Preliminary result Specimen: Blood from Arm, Right Updated: 08/21/22 1633     Blood Culture No growth at 24 hours          CT Head Without Contrast    Result Date: 8/20/2022  Impression: No evidence of hemorrhage, mass effect or midline shift. No acute process identified.  Electronically Signed By-Melissa Manriquez MD On:8/20/2022 6:00 PM This report was finalized on [id number] by  Melissa Manriquez MD.    XR Chest 1 View    Result Date: 8/20/2022  Impression: No acute cardiopulmonary process.  Electronically Signed By-Melissa Manriquez MD On:8/20/2022 4:45 PM This report was finalized on [id number] by  Melissa Manriquez MD.      Results for orders  placed during the hospital encounter of 06/29/22    Duplex Venous Lower Extremity - Bilateral CAR    Interpretation Summary  · Normal bilateral lower extremity venous duplex scan.      Results for orders placed during the hospital encounter of 06/29/22    Duplex Venous Lower Extremity - Bilateral CAR    Interpretation Summary  · Normal bilateral lower extremity venous duplex scan.      Results for orders placed during the hospital encounter of 02/25/22    Adult Transesophageal Echo (ALEYDA) W/ Cont if Necessary Per Protocol (Cardiology Department)    Interpretation Summary  Date of study  2/28/2022.    Indications  Recent TIA.    Procedure performed  Transesophageal echocardiogram and Doppler study.    Procedure  Anesthesia was provided by anesthesiologist with intravenous Diprivan.  ALEYDA probe could be passed without difficulty.  Patient tolerated the procedure well.  No complications were noted.    Results  Technically satisfactory study.  Mitral valve is structurally normal.  Mild mitral regurgitation is present.  Aortic valve is tricuspid.  Thickened with adequate opening motion.  Mild to moderate aortic regurgitation is present.  Tricuspid valve is normal.  Mild tricuspid regurgitation is present.  Calculated pulmonary artery pressure is 28 mmHg.  Mild biatrial enlargement is present.  Left atrial appendage is very small.  Left ventricle is normal in size and contractility with ejection fraction of 60%.  Atrial septum is intact.  No evidence for intracardiac thrombus or smoking effect is present.  No pericardial effusion is present.  Aorta is normal.    Impression  Mild mitral regurgitation and mild to moderate aortic regurgitation.  Mild tricuspid regurgitation  Calculated pulmonary artery pressure is 28 mmHg.  Mild biatrial enlargement.  Left atrial appendage is small.  Left ventricle is normal in size and contractility with ejection fraction of 60%.      Labs Pending at Discharge:  Pending Labs     Order Current  Status    Blood Culture - Blood, Arm, Left Preliminary result    Blood Culture - Blood, Arm, Right Preliminary result          Procedures Performed           Consults:   Consults     Date and Time Order Name Status Description    8/21/2022 12:33 AM Inpatient Nephrology Consult      8/20/2022  6:26 PM Hospitalist (on-call MD unless specified)              Discharge Details        Discharge Medications      Continue These Medications      Instructions Start Date   amLODIPine 5 MG tablet  Commonly known as: NORVASC   5 mg, Oral, Every Morning      aspirin 325 MG tablet   325 mg, Oral, Daily      atorvastatin 10 MG tablet  Commonly known as: LIPITOR   10 mg, Oral, Daily      ferrous sulfate 325 (65 FE) MG tablet   325 mg, Oral, Daily With Breakfast      folic acid 1 MG tablet  Commonly known as: FOLVITE   1 mg, Oral, Daily      furosemide 40 MG tablet  Commonly known as: LASIX   40 mg, Oral, Daily      gabapentin 300 MG capsule  Commonly known as: NEURONTIN   300 mg, Oral, 2 Times Daily      insulin NPH-insulin regular (70-30) 100 UNIT/ML injection  Commonly known as: humuLIN 70/30,novoLIN 70/30   5 Units, Subcutaneous, 2 Times Daily With Meals      levETIRAcetam 500 MG tablet  Commonly known as: KEPPRA   500 mg, Oral, Every 12 Hours      losartan 50 MG tablet  Commonly known as: COZAAR   50 mg, Oral, Daily      omeprazole 40 MG capsule  Commonly known as: priLOSEC   40 mg, Oral, Every Morning Before Breakfast, Take on empty stomach!      spironolactone 25 MG tablet  Commonly known as: ALDACTONE   25 mg, Oral, Every Morning      topiramate 25 MG tablet  Commonly known as: TOPAMAX   12.5 mg, Oral, 2 Times Daily      vitamin B-12 1000 MCG tablet  Commonly known as: CYANOCOBALAMIN   1,000 mcg, Oral, Daily             Allergies   Allergen Reactions   • Latex, Natural Rubber Rash   • Adhesive Tape Rash         Discharge Disposition:   Home or Self Care    Diet:  Hospital:  Diet Order   Procedures   • Diet Cardiac,  Diabetic/Consistent Carbs, Renal; Healthy Heart; Diabetic - Consistent Carb; 2gm Na+, 2gm K+         Discharge Activity: Ad sue.        CODE STATUS:  Code Status and Medical Interventions:   Ordered at: 08/20/22 2007     Code Status (Patient has no pulse and is not breathing):    CPR (Attempt to Resuscitate)     Medical Interventions (Patient has pulse or is breathing):    Full Support         Future Appointments   Date Time Provider Department Center   9/14/2022  2:30 PM [facility] LENNIE 3  [facility] MAMMO [facility]   9/14/2022  3:00 PM [facility] DEXA 1  [facility] DEXA [facility]   10/10/2022  1:30 PM [facility] [place] DEVICE CHECK [facility]   10/10/2022  1:50 PM Wang Plaza MD [facility]   10/25/2022  1:00 PM Alexsandra Jaime APRN [facility]   12/19/2022 12:45 PM Gabriel Goss MD [facility]           Time spent on Discharge including face to face service:  > 30  minutes

## 2022-08-22 NOTE — CASE MANAGEMENT/SOCIAL WORK
Case Management Discharge Note      Final Note: Home     Discharged prior to CM assessment.     Selected Continued Care - Discharged on 8/22/2022 Admission date: 8/20/2022 - Discharge disposition: Home or Self Care            Transportation Services  Private: Car    Final Discharge Disposition Code: 01 - home or self-care

## 2022-08-22 NOTE — OUTREACH NOTE
Prep Survey    Flowsheet Row Responses   [facility] facility patient discharged from? [facility]   Is LACE score < 7 ? No   Emergency Room discharge w/ pulse ox? No   Eligibility TCM   Hospital [facility]   Date of Admission 08/20/22   Date of Discharge 08/22/22   Discharge Disposition Home or Self Care   Discharge diagnosis Mechanical fall/Mundo   Does the patient have one of the following disease processes/diagnoses(primary or secondary)? Other   Does the patient have Home health ordered? No   Is there a DME ordered? No   Prep survey completed? Yes          ION[facility] A - Registered Nurse

## 2022-08-23 ENCOUNTER — TRANSITIONAL CARE MANAGEMENT TELEPHONE ENCOUNTER (OUTPATIENT)
Dept: CALL CENTER | Facility: HOSPITAL | Age: 87
End: 2022-08-23

## 2022-08-23 NOTE — OUTREACH NOTE
Call Center TCM Note    Flowsheet Row Responses   [facility] patient discharged from? [facility]   Does the patient have one of the following disease processes/diagnoses(primary or secondary)? Other   TCM attempt successful? Yes   Call start time 0943   Call end time 0945   Discharge diagnosis Mechanical fall/Mundo   Person spoke with today (if not patient) and relationship Patient   Meds reviewed with patient/caregiver? Yes   Is the patient having any side effects they believe may be caused by any medication additions or changes? No   Does the patient have all medications ordered at discharge? Yes   Is the patient taking all medications as directed (includes completed medication regime)? Yes   Does the patient have a primary care provider?  Yes   Does the patient have an appointment with their PCP within 7 days of discharge? Yes   Comments regarding PCP hospital fu appt on 8/26/22 at 2:00 PM   Has the patient kept scheduled appointments due by today? N/A   Psychosocial issues? No   Did the patient receive a copy of their discharge instructions? Yes   Nursing interventions Reviewed instructions with patient   What is the patient's perception of their health status since discharge? Improving   Is the patient/caregiver able to teach back signs and symptoms related to disease process for when to call PCP? Yes   Is the patient/caregiver able to teach back signs and symptoms related to disease process for when to call 911? Yes   Is the patient/caregiver able to teach back the hierarchy of who to call/visit for symptoms/problems? PCP, Specialist, Home health nurse, Urgent Care, ED, 911 Yes   If the patient is a current smoker, are they able to teach back resources for cessation? Not a smoker   TCM call completed? Yes   Wrap up additional comments Pt states she is doing ok. Pt verified PCP fu appt on 8/26/22. No questions/concerns.          Lisa Cheng RN    8/23/2022, 09:46 EDT

## 2022-08-25 LAB
BACTERIA SPEC AEROBE CULT: NORMAL
BACTERIA SPEC AEROBE CULT: NORMAL

## 2022-08-26 ENCOUNTER — OFFICE VISIT (OUTPATIENT)
Dept: FAMILY MEDICINE CLINIC | Facility: CLINIC | Age: 87
End: 2022-08-26

## 2022-08-26 VITALS
WEIGHT: 222 LBS | HEIGHT: 61 IN | OXYGEN SATURATION: 96 % | BODY MASS INDEX: 41.91 KG/M2 | HEART RATE: 93 BPM | SYSTOLIC BLOOD PRESSURE: 152 MMHG | DIASTOLIC BLOOD PRESSURE: 76 MMHG

## 2022-08-26 DIAGNOSIS — I10 ESSENTIAL HYPERTENSION: ICD-10-CM

## 2022-08-26 DIAGNOSIS — R79.89 ELEVATED SERUM CREATININE: ICD-10-CM

## 2022-08-26 DIAGNOSIS — N18.30 STAGE 3 CHRONIC KIDNEY DISEASE, UNSPECIFIED WHETHER STAGE 3A OR 3B CKD: Primary | ICD-10-CM

## 2022-08-26 DIAGNOSIS — R60.9 PERIPHERAL EDEMA: ICD-10-CM

## 2022-08-26 PROCEDURE — 99495 TRANSJ CARE MGMT MOD F2F 14D: CPT | Performed by: NURSE PRACTITIONER

## 2022-08-26 PROCEDURE — 1111F DSCHRG MED/CURRENT MED MERGE: CPT | Performed by: NURSE PRACTITIONER

## 2022-08-26 NOTE — PROGRESS NOTES
"Transitional Care Follow Up Visit  Subjective     Aracelis Vargas is a 87 y.o. female who presents for a transitional care management visit.    Within 48 business hours after discharge our office contacted her via telephone to coordinate her care and needs.      I reviewed and discussed the details of that call along with the discharge summary, hospital problems, inpatient lab results, inpatient diagnostic studies, and consultation reports with Aracelis.     Current outpatient and discharge medications have been reconciled for the patient.  Reviewed by: HSANI Mercado      Date of TCM Phone Call 8/22/2022   [facility]   Date of Admission 8/20/2022   Date of Discharge 8/22/2022   Discharge Disposition Home or Self Care     Risk for Readmission (LACE) Score: 13 (8/22/2022  6:01 AM)      History of Present Illness   Course During Hospital Stay:  Hospital course per hospital record: \"88 Y/o  female with a past medical history of T2DM, with neuropathy , CKD stage III, HLD, hypertension, seizure disorder, hypertension, seizure disorder, frequent UTI, and falls.  According to admission note, patient presented to [facility] ED on 8/20/2022 via EMS with altered mental status changes/encephalopathy she admitted to alcohol abuse, and drinks vodka daily, and after which she \"threw myself on the floor to get my 's attention\".  She denies any injuries, no LOC, or traumatic head injury.  She also denies dysuria, urinary frequency, and urinary urgency.    No chest pain, no fever or chills and no abdominal pain, nausea or vomiting.       In the ED, ABG showed pH 7.31, CO2 37.8, PO2 77, HCO3 19.3.  Troponin is 0.029, potassium 5.9, creatinine 1.84 (baseline approximately 1.036), BUN 64, calcium 8.6, magnesium 2.9, ammonia 48, lactate 3.1, WBC 3.8, blood alcohol level is 0.196%.  All other labs are unremarkable.  Urine toxicity is negative, UA shows 1+ bacteria, trace leukocytes, no squamous epithelial cells.  Patient tested " "negative for COVID-19.  On lactic acid level is minimally elevated, and this was felt to be most likely due to dehydration in the setting of KYRA/CKD.  X-ray of the chest shows no acute cardiopulmonary process.  CT of the head without contrast shows no evidence of hemorrhage, mass-effect, or midline shift.  No acute process identified.  Patient was admitted and treated for a mechanical fall at home likely due to inebriation, hyperkalemia, treated with IV fluids saline, and Lokelma.so far, no convincing evidence of evolving sepsis.  Blood cultures on 8/20/2022, showed no growth.  She was initially treated with Rocephin, which was later discontinued.  She was seen in consultation by nephrologist regarding KYRA on CKD with recommendations.  Prior to discharge home today, she reports feeling better and requesting to be discharged home.  I would have  prefer patient to go to rehab, but she declined.  Discharge, she is afebrile, with stable hemodynamics, and oxygenating well on room air.  Patient to follow-up with her nephrologist as scheduled.        DISCHARGE Follow Up Recommendations for labs and diagnostics:  1.  Follow-up PCP and nephrologist as scheduled  2.  Counseled patient regarding alcohol abuse and to seek outpatient treatment with AAA meeting.\"    Hasn't scheduled with nephrology yet.   Swelling in lower legs; taking furosemide 40 mg daily.   Worse at end of day; goes down over night.    Denies any CP; palpitations; SOA; trouble with breathing; dizziness; headache; trouble with vision.      The following portions of the patient's history were reviewed and updated as appropriate: allergies, current medications, past family history, past medical history, past social history, past surgical history and problem list.    Review of Systems   Constitutional: Negative for chills, fatigue and fever.   Respiratory: Negative for cough, chest tightness, shortness of breath and wheezing.    Cardiovascular: Positive for leg " swelling. Negative for chest pain and palpitations.   Gastrointestinal: Negative for constipation, nausea and vomiting.   Genitourinary: Negative for dysuria, flank pain, frequency, hematuria and urgency.   Skin: Negative for rash.   Neurological: Negative for dizziness, weakness, light-headedness and headaches.   Psychiatric/Behavioral: Negative for confusion and dysphoric mood. The patient is not nervous/anxious.        Objective   Physical Exam  Vitals reviewed.   Constitutional:       General: She is not in acute distress.     Appearance: Normal appearance. She is obese.   Cardiovascular:      Rate and Rhythm: Normal rate and regular rhythm.      Pulses: Normal pulses.           Dorsalis pedis pulses are 2+ on the right side and 2+ on the left side.      Heart sounds: Normal heart sounds.   Pulmonary:      Effort: Pulmonary effort is normal. No respiratory distress.      Breath sounds: Normal breath sounds. No wheezing.   Chest:      Chest wall: No tenderness.   Abdominal:      Tenderness: There is no right CVA tenderness or left CVA tenderness.   Musculoskeletal:      Right lower leg: Edema (2+) present.      Left lower leg: Edema (2+) present.   Skin:     General: Skin is warm and dry.      Findings: No erythema.   Neurological:      General: No focal deficit present.      Mental Status: She is alert and oriented to person, place, and time.         Assessment & Plan   Diagnoses and all orders for this visit:    1. Stage 3 chronic kidney disease, unspecified whether stage 3a or 3b CKD (HCC) (Primary)  Comments:  Labs reviewed.   Referral to nephrology.     Orders:  -     Ambulatory Referral to Nephrology    2. Elevated serum creatinine  Comments:  Labs reviewed.   Referral to nephrology.   Orders:  -     Ambulatory Referral to Nephrology    3. Peripheral edema  Comments:  Cont. diuretics.   Wear compression stockings.   Increase fluids.   Prop feet up when sitting.   Limit salt intake.   Limit long periods of  sitting or standing.  Orders:  -     Ambulatory Referral to Nephrology    4. Essential hypertension  Comments:  Stable.   Cont. current medication.   Schedule f/u with nephrology.

## 2022-09-02 LAB — QT INTERVAL: 425 MS

## 2022-09-02 NOTE — TELEPHONE ENCOUNTER
Caller: Aracelis Vargas    Relationship: Self    Best call back number: [phone]    Requested Prescriptions:   Requested Prescriptions     Pending Prescriptions Disp Refills   • insulin NPH-insulin regular (humuLIN 70/30,novoLIN 70/30) (70-30) 100 UNIT/ML injection  12     Sig: Inject 5 Units under the skin into the appropriate area as directed 2 (Two) Times a Day With Meals.        Pharmacy where request should be sent: [facility] [address] [place] [address], [place] - [address] [facility] - [phone]  - [phone] FX     Additional details provided by patient: PATIENT IS OUT    Does the patient have less than a 3 day supply:  [x] Yes  [] No    Ally OLIVEROS Rep   09/02/22 10:30 EDT

## 2022-09-06 NOTE — TELEPHONE ENCOUNTER
Caller: Aracelis Vargas    Relationship: Self    Best call back number: [phone]    Requested Prescriptions:   Requested Prescriptions     Pending Prescriptions Disp Refills   • insulin NPH-insulin regular (humuLIN 70/30,novoLIN 70/30) (70-30) 100 UNIT/ML injection  12     Sig: Inject 5 Units under the skin into the appropriate area as directed 2 (Two) Times a Day With Meals.        Pharmacy where request should be sent: [facility] - [place], [place] - [address] [facility] - [phone]  - [phone] FX     Additional details provided by patient: THE PATIENT IS CURRENTLY OUT OF THIS MEDICATION. SHE STATES THAT SHE SPOKE WITH THE PHARMACY, AND HER MEDICATION HAS NOT BEEN CALLED IN OR FILLED. PLEASE ADVISE.    Does the patient have less than a 3 day supply:  [x] Yes  [] No    Ally Baird Rep   09/06/22 11:39 EDT

## 2022-09-07 RX ORDER — HUMAN INSULIN 100 [USP'U]/ML
INJECTION, SUSPENSION SUBCUTANEOUS
Qty: 20 ML | Refills: 3 | Status: SHIPPED | OUTPATIENT
Start: 2022-09-07

## 2022-09-12 ENCOUNTER — TELEPHONE (OUTPATIENT)
Dept: FAMILY MEDICINE CLINIC | Facility: CLINIC | Age: 87
End: 2022-09-12

## 2022-09-12 NOTE — TELEPHONE ENCOUNTER
The swelling is complicated because of her chronic kidney disease  She really should ask her kidney specialist if she can take more of the furosemide  If they are comfortable with that I can make a dose change

## 2022-09-12 NOTE — TELEPHONE ENCOUNTER
Caller: Aracelis Vargas    Relationship to patient: Self    Best call back number: [phone]    Patient is needing: PATIENT STATE THAT HER LEGS, FEET AND ANKLES ARE SWOLLEN, SHE WOULD  LIKE TO SEE IF SHE NEEDS TO CHANGE ANY OF HER MEDICATIONS OR IF THERE IS SOMETHING SHE CAN TAKE FOR THE SWELLING. PLEASE REACH OUT AND ADVISE.

## 2022-09-13 NOTE — TELEPHONE ENCOUNTER
Spoke to Gloria and gave her the information. She wont see Kidney Specialist until next month. What should she do? She also wants to know if you want to check her labs?

## 2022-09-13 NOTE — TELEPHONE ENCOUNTER
No reason for us to check the labs when the kidney doctor is going to do it next month  She does not have to wait until seeing the kidney specialist, she needs to make this phone call to them and ask them the question she is asking me  This medicine falls under their jurisdiction at this point, not mine, as far as raising the dose  in any way

## 2022-09-14 ENCOUNTER — HOSPITAL ENCOUNTER (OUTPATIENT)
Dept: BONE DENSITY | Facility: HOSPITAL | Age: 87
End: 2022-09-14

## 2022-09-14 ENCOUNTER — TELEPHONE (OUTPATIENT)
Dept: FAMILY MEDICINE CLINIC | Facility: CLINIC | Age: 87
End: 2022-09-14

## 2022-09-14 ENCOUNTER — APPOINTMENT (OUTPATIENT)
Dept: MAMMOGRAPHY | Facility: HOSPITAL | Age: 87
End: 2022-09-14

## 2022-09-14 NOTE — TELEPHONE ENCOUNTER
Pt called stating that at her office visit she discussed about needing to stop one of her medication so that she can drive. She said that she has not heard back from the office on this.   I do see telephone note from 9/12 talking about her furosemide and patient was advised to contact her kidney doctor.

## 2022-09-14 NOTE — TELEPHONE ENCOUNTER
She is actually asking this time about the Keppra, levetiracetam that she was put on  She sees neurology in October and I want their opinion on whether she can safely stop this or not  Until she can stop this medication, I agree with prohibition on driving

## 2022-10-05 ENCOUNTER — FLU SHOT (OUTPATIENT)
Dept: FAMILY MEDICINE CLINIC | Facility: CLINIC | Age: 87
End: 2022-10-05

## 2022-10-05 DIAGNOSIS — Z23 NEED FOR IMMUNIZATION AGAINST INFLUENZA: Primary | ICD-10-CM

## 2022-10-05 PROCEDURE — 90662 IIV NO PRSV INCREASED AG IM: CPT | Performed by: FAMILY MEDICINE

## 2022-10-05 PROCEDURE — G0008 ADMIN INFLUENZA VIRUS VAC: HCPCS | Performed by: FAMILY MEDICINE

## 2022-10-10 ENCOUNTER — OFFICE VISIT (OUTPATIENT)
Dept: CARDIOLOGY | Facility: CLINIC | Age: 87
End: 2022-10-10

## 2022-10-10 ENCOUNTER — CLINICAL SUPPORT NO REQUIREMENTS (OUTPATIENT)
Dept: CARDIOLOGY | Facility: CLINIC | Age: 87
End: 2022-10-10

## 2022-10-10 ENCOUNTER — TELEPHONE (OUTPATIENT)
Dept: FAMILY MEDICINE CLINIC | Facility: CLINIC | Age: 87
End: 2022-10-10

## 2022-10-10 VITALS
OXYGEN SATURATION: 99 % | HEART RATE: 75 BPM | HEIGHT: 61 IN | DIASTOLIC BLOOD PRESSURE: 69 MMHG | WEIGHT: 226.6 LBS | BODY MASS INDEX: 42.78 KG/M2 | SYSTOLIC BLOOD PRESSURE: 122 MMHG

## 2022-10-10 DIAGNOSIS — I44.1 AV BLOCK, MOBITZ II: ICD-10-CM

## 2022-10-10 DIAGNOSIS — R55 SYNCOPE AND COLLAPSE: ICD-10-CM

## 2022-10-10 DIAGNOSIS — I10 ESSENTIAL HYPERTENSION: ICD-10-CM

## 2022-10-10 DIAGNOSIS — Z95.0 STATUS POST PLACEMENT OF CARDIAC PACEMAKER: ICD-10-CM

## 2022-10-10 DIAGNOSIS — I44.1 ATRIOVENTRICULAR BLOCK, MOBITZ TYPE 1, WENCKEBACH: ICD-10-CM

## 2022-10-10 DIAGNOSIS — Z95.0 CARDIAC PACEMAKER IN SITU: Primary | Chronic | ICD-10-CM

## 2022-10-10 DIAGNOSIS — Z86.73 HISTORY OF STROKE: ICD-10-CM

## 2022-10-10 DIAGNOSIS — Z95.0 CARDIAC PACEMAKER IN SITU: Primary | ICD-10-CM

## 2022-10-10 PROCEDURE — 93280 PM DEVICE PROGR EVAL DUAL: CPT | Performed by: INTERNAL MEDICINE

## 2022-10-10 PROCEDURE — 99214 OFFICE O/P EST MOD 30 MIN: CPT | Performed by: INTERNAL MEDICINE

## 2022-10-10 RX ORDER — FUROSEMIDE 40 MG/1
1 TABLET ORAL DAILY
COMMUNITY
Start: 2022-09-25 | End: 2022-11-17

## 2022-10-10 NOTE — PROGRESS NOTES
Encounter Date:10/10/2022    Last seen 3/24/2022      Patient ID: Aracelis Vargas is a 87 y.o. female.    Chief Complaint:  Status post pacemaker  History of syncope  History of seizure disorder.        History of Present Illness     Since I have last seen, the patient has been without any chest discomfort ,shortness of breath, palpitations, dizziness or syncope.  Denies having any headache ,abdominal pain ,nausea, vomiting , diarrhea constipation, loss of weight or loss of appetite.  Denies having any excessive bruising ,hematuria or blood in the stool.    Review of all systems negative except as indicated.    Reviewed ROS.  Assessment and Plan         ]]]]]]]]]]]]]]]]]]]]  Impression  ========   -Status post permanent dual-chamber pacemaker implantation ([employer] MRI compatible) 11/3/2021     -Status post removal of loop recorder (Medtronic) 11/3/2021     -History of syncope-no further problems  Intermittent Mobitz type II AV block  Patient is not on any medications to cause bradycardia     -Second-degree Mobitz type I AV block.  Narrow QRS complex.     -History of seizure-improved     -Recent difficulty with speaking.  TIA/stroke  Status post TPA with complete resolution of symptoms 2/3/2022     Transesophageal echocardiogram 2/28/2022 revealed  Mild mitral regurgitation and mild to moderate aortic regurgitation.  Mild tricuspid regurgitation  Calculated pulmonary artery pressure is 28 mmHg.  Mild biatrial enlargement.  Left atrial appendage is small.  Left ventricle is normal in size and contractility with ejection fraction of 60%.     Echocardiogram-normal 10/22/2020     Cardiac catheterization 10/23/2020 revealed:  Left ventricular size and contractility is normal with ejection fraction of 60%.  Normal epicardial coronary arteries.     Status post loop recorder placement 10/23/2020 (Medtronic Linq)     -History of paroxysmal atrial fibrillation     -Hypertension dyslipidemia diabetes     -History of  seizure disorder     -Renal dysfunction CKD 3  BUN 27 creatinine 1.35     -Exogenous obesity.     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     -No known allergies  ============  Plan  =========  Status post permanent dual-chamber pacemaker implantation ([employer] [employer] MRI compatible) level 3/20/2021.  Status post loop recorder removal 11/3/2021 (Medtronic Linq)  Pacemaker site and function is normal.  Interrogation of the pacemaker revealed excellent pacing parameters.  Battery status is 7.5 years.     History of seizure disorder-improved.  No further episodes.     History of TIA/stroke  No further problems.  Status post TPA with complete resolution of symptoms 2/3/2022 premature ventricular contractions-asymptomatic.     Paroxysmal atrial fibrillation.  Interrogation of the pacemaker revealed AF burden less than 1%.  With recent stroke history and history of paroxysmal atrial fibrillation patient would benefit from anticoagulation.  Patient just received TPA.  However concerned about patient having falling spells.  Consideration will be given for watchman procedure.  CHADS2 score is 7.  However left atrial appendage is very small in size.     Hypertension-well-controlled  122/69     Renal dysfunction  17/1.04-2/28/2022     Anemia  Hemoglobin 8.6-12/4/2022  7.7-2/27/2022     Anticoagulation status was extensively discussed.  We will hold off on anticoagulation for multiple reasons including  Fall risk  Left atrial appendage is very small and no clots or smoking effect was seen on ALEYDA.  (Watchman is not an option due to left atrial appendage being very small.)  Patient has problems with significant anemia.  Family favors no anticoagulation.     Follow-up in the office in 6 months with pacemaker interrogation.     Further plan will depend on patient's progress.  ]]]]]]]]]]]]]]                   Diagnosis Plan   1. Cardiac pacemaker in situ        2. Status post placement  of cardiac pacemaker        3. AV block, Mobitz II        4. Syncope and collapse        5. Atrioventricular block, Mobitz type 1, Wenckebach        6. Essential hypertension        7. History of stroke        LAB RESULTS (LAST 7 DAYS)    CBC        BMP        CMP         BNP        TROPONIN        CoAg        Creatinine Clearance  CrCl cannot be calculated (Patient's most recent lab result is older than the maximum 30 days allowed.).    ABG        Radiology  No radiology results for the last day                The following portions of the patient's history were reviewed and updated as appropriate: allergies, current medications, past family history, past medical history, past social history, past surgical history and problem list.    Review of Systems   Constitutional: Negative for fever and malaise/fatigue.   Cardiovascular: Negative for chest pain, dyspnea on exertion and palpitations.   Respiratory: Negative for cough and shortness of breath.    Skin: Negative for rash.   Gastrointestinal: Negative for abdominal pain, nausea and vomiting.   Neurological: Negative for focal weakness and headaches.   All other systems reviewed and are negative.        Current Outpatient Medications:   •  amLODIPine (NORVASC) 5 MG tablet, Take 5 mg by mouth Every Morning., Disp: , Rfl:   •  aspirin 325 MG tablet, Take 1 tablet by mouth Daily., Disp: 30 tablet, Rfl: 0  •  atorvastatin (LIPITOR) 10 MG tablet, Take 10 mg by mouth Daily., Disp: , Rfl:   •  ferrous sulfate 325 (65 FE) MG tablet, Take 325 mg by mouth Daily With Breakfast., Disp: , Rfl:   •  folic acid (FOLVITE) 1 MG tablet, Take 1 tablet by mouth Daily., Disp: 30 tablet, Rfl: 11  •  furosemide (LASIX) 20 MG tablet, , Disp: , Rfl:   •  gabapentin (NEURONTIN) 300 MG capsule, Take 300 mg by mouth 2 (Two) Times a Day., Disp: , Rfl:   •  levETIRAcetam (KEPPRA) 500 MG tablet, Take 500 mg by mouth Every 12 (Twelve) Hours., Disp: , Rfl:   •  losartan (COZAAR) 50 MG tablet, Take  50 mg by mouth Daily., Disp: , Rfl:   •  NovoLIN 70/30 (70-30) 100 UNIT/ML injection, INJECT 30 UNITS UNDER THE SKIN INTO THE APPROPRIATE AREA TWO TIMES A DAY WITH A MEAL, Disp: 20 mL, Rfl: 3  •  omeprazole (priLOSEC) 40 MG capsule, Take 1 capsule by mouth Every Morning Before Breakfast. Take on empty stomach!, Disp: 90 capsule, Rfl: 1  •  spironolactone (ALDACTONE) 25 MG tablet, Take 25 mg by mouth Every Morning., Disp: , Rfl:   •  topiramate (TOPAMAX) 25 MG tablet, Take 0.5 tablets by mouth 2 (Two) Times a Day., Disp: 30 tablet, Rfl: 3  •  vitamin B-12 (CYANOCOBALAMIN) 1000 MCG tablet, Take 1 tablet by mouth Daily., Disp: 30 tablet, Rfl: 11    Allergies   Allergen Reactions   • Latex, Natural Rubber Rash   • Adhesive Tape Rash       Family History   Problem Relation Age of Onset   • Heart disease Mother    • Heart disease Father        Past Surgical History:   Procedure Laterality Date   • CARDIAC CATHETERIZATION N/A 10/23/2020    Procedure: Left Heart Cath and coronary angiogram;  Surgeon: Wang Plaza MD;  Location: [facility] CATH INVASIVE LOCATION;  Service: Cardiovascular;  Laterality: N/A;   • CARDIAC ELECTROPHYSIOLOGY PROCEDURE Left 11/3/2021    Procedure: Pacemaker DC new;  Surgeon: Wang Plaza MD;  Location: [facility] CATH INVASIVE LOCATION;  Service: Cardiovascular;  Laterality: Left;   • CARDIAC ELECTROPHYSIOLOGY PROCEDURE N/A 11/3/2021    Procedure: LOOP RECORDER REMOVAL;  Surgeon: Wang Plaza MD;  Location: [facility] CATH INVASIVE LOCATION;  Service: Cardiovascular;  Laterality: N/A;   • HYSTERECTOMY     • JOINT REPLACEMENT Right     Hip   • JOINT REPLACEMENT Left     hip   • JOINT REPLACEMENT Left     hip (for second time)   • JOINT REPLACEMENT Right     knee   • OOPHORECTOMY         Past Medical History:   Diagnosis Date   • Atherosclerosis of native coronary artery of native heart without angina pectoris 10/22/2020   • Atrioventricular block, Mobitz type 1, Mitch 10/21/2020    Added  "automatically from request for surgery [id number]   • Hypertension associated with stage 3 chronic kidney disease due to type 2 diabetes mellitus ([facility]) 10/22/2020   • Morbid obesity ([facility]) 10/22/2020   • Secondary hyperaldosteronism ([facility]) 10/22/2020   • Stage 3b chronic kidney disease ([facility]) 10/22/2020   • Type 2 diabetes mellitus with diabetic neuropathy, with long-term current use of insulin ([facility]) 10/22/2020   • Type 2 diabetes mellitus with diabetic neuropathy, with long-term current use of insulin ([facility]) 10/22/2020       Family History   Problem Relation Age of Onset   • Heart disease Mother    • Heart disease Father        Social History     Socioeconomic History   • Marital status:    Tobacco Use   • Smoking status: Never   • Smokeless tobacco: Never   Vaping Use   • Vaping Use: Never used   Substance and Sexual Activity   • Alcohol use: Yes     Alcohol/week: 7.0 standard drinks     Types: 7 Drinks containing 0.5 oz of alcohol per week   • Drug use: Never   • Sexual activity: Defer         Procedures      Objective:       Physical Exam    /69 (BP Location: Right arm, Patient Position: Sitting, Cuff Size: Adult)   Pulse 75   Ht 154.9 cm (61\")   Wt 103 kg (226 lb 9.6 oz)   SpO2 99%   BMI 42.82 kg/m²   The patient is alert, oriented and in no distress.    Vital signs as noted above.  Exogenous obesity (BMI 43)    Head and neck revealed no carotid bruits or jugular venous distension.  No thyromegaly or lymphadenopathy is present.    Lungs clear.  No wheezing.  Breath sounds are normal bilaterally.    Heart normal first and second heart sounds.  No murmur..  No pericardial rub is present.  No gallop is present.    Abdomen soft and nontender.  No organomegaly is present.    Extremities revealed good peripheral pulses without any pedal edema.    Skin warm and dry.  Pacemaker site looks normal.    Musculoskeletal system is grossly normal.    CNS grossly normal.    Reviewed and updated.        "

## 2022-10-10 NOTE — TELEPHONE ENCOUNTER
Pt called and left a vm stating she needed us to call her back asap. I called her back there was no answer. I LVM for pt to call us back with information and we would be glad to help. (HUB OKAY TO READ)

## 2022-10-11 ENCOUNTER — TELEPHONE (OUTPATIENT)
Dept: FAMILY MEDICINE CLINIC | Facility: CLINIC | Age: 87
End: 2022-10-11

## 2022-10-11 NOTE — TELEPHONE ENCOUNTER
"    Caller: Aracelis Vargas    Relationship: Self    Best call back number: [phone]    What is the best time to reach you: ASAP    Who are you requesting to speak with (clinical staff, provider,  specific staff member): CLINICAL STAFF    What was the call regarding: PATIENT STATES IN January OF 2022 THE PATIENT HAD AN \"EPISODE\" AND WAS PUT ON THE MEDICATION KEPPRA. WHILE BEING ON THE MEDICATION THE PATIENT IS UNABLE TO DRIVE. THE PATIENTS  HAS BEEN RELIED ON TO BE THE , BUT WITH HIS HEALTH DECLINING (DEALING WITH A HEART CONDITION) HE IS NO LONGER ABLE TO CONTINUE DRIVING. THE PATIENT SPOKE WITH DR. ODONNELL ABOUT TAKING HER OFF OF THE KEPPRA AT A PAST APPOINTMENT, BUT SHE HAS SINCE NOT HEARD ANYTHING ABOUT IT. THE PATIENTS  IS SCHEDULED FOR SURGERY ON 10.20.22 AND THE PATIENT NEEDS TO BE ABLE TO DRIVE. THEY HAVE NOBODY THAT LIVES NEARBY THAT CAN HELP WITH TRANSPORTATION NEEDS. DR. ODONNELL HAD MENTIONED THAT THE PATIENT NEEDS TO SEE THE NEUROLOGIST BEFORE COMING OFF KEPPRA. THE PATIENT COULDN'T GET AN APPOINTMENT UNTIL 10.26.22, AND THAT IS TOO LATE BECAUSE SHE NEEDS TO BE ABLE TO DRIVE PRIOR TO 10.20.22.     Do you require a callback: YES                    "

## 2022-10-17 ENCOUNTER — APPOINTMENT (OUTPATIENT)
Dept: MAMMOGRAPHY | Facility: HOSPITAL | Age: 87
End: 2022-10-17

## 2022-10-17 ENCOUNTER — HOSPITAL ENCOUNTER (OUTPATIENT)
Dept: BONE DENSITY | Facility: HOSPITAL | Age: 87
End: 2022-10-17

## 2022-10-21 NOTE — PROGRESS NOTES
Subjective: Syncopal or seizure-like activity     Patient ID: Aracelis Vargas is a 87 y.o. female.    CHIEF COMPLAINT:Witnessed seizure-like activity     History of Present Illness Ms. Vargas is a very pleasant 87-year-old  female who presented today with her friend for an evaluation of possible seizure-like activity.  She reports she passed out in her living room floor and was assumed to have had a seizure.  No witnessed movements, no loss of bladder control, and new biphasic pacemaker.  The patient states that she had a pacemaker malfunction which made her pass out that it was not epileptic.      She has not had an EEG to determine that this was or was not epilepsy.  The patient would like to discontinue Keppra however I cautioned her that we should do the EEG first before we start to wean the medication.  She was most concerned that she would not be able to drive.  She was notified in the state of [place] as long as she went 3 months from the seizure with no further seizures you were released to drive and since the incident happened in February 2022 she is far beyond the 3-month interval.  The patient did agree to undergo an EEG.  I will also discussed this case with Dr. Seiple.      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 after receiving TPA, given patient's pacemaker electrophysiology consulted, no further events  -Orthostatic vital signs  -Patient missed doses of her home antiepileptic, now  resumed  -Neurology evaluated patient has signed off  -ALEYDA performed by cardiology showing no signs of ventricular clot  -Patient had discussion with cardiology given no clot noted would like to defer anticoagulation at this time  -Carotid duplex no significant stenosis  -PT recommending home health  TIA-patient with recent hospitalization for focal neurological deficits received TPA  -Continue antiplatelet and statin  -Neurochecks  -Neuro has signed off  Anemia-patient being worked up by hematology at this time  -Anticoagulation held off at this time  -Further work-up will be followed up outpatient   CAD-patient denies any chest pain at this time  -Patient with underlying pacemaker evaluated by electrophysiology  -Cardiac monitoring  -Continue home goal-directed therapy  -ALEYDA ordered no PFO  -Outpatient cardiology follow-up organized   Intermittent previous Mobitz 2 and 1 block-patient with underlying pacemaker  -Management per EP/cardiology  Atrial fibrillation-concern for further possible neurological events  -Anticoagulation recommended  -Rate control medication  -Cardiac monitoring  -Cardiology consulted, cleared for discharge  Hyperammonemia-most likely secondary to underlying seizure activity  -Patient not encephalopathic   Seizure disorder-patient reports she missed a few days of her antiepileptic medication which may be an additional source for the syncopal episode  -Neurology consulted cleared patient  -Antiepileptic resumed  Aracelis Vargas is a 86 y.o. female who presented after a syncopal event and collapse at home, she denied any dizziness prior to the fall and denied any head injury in the fall.  She does not know how long she lost consciousness, but recalls walking into the room and then suddenly waking up on the floor.  This is the first syncopal event since she had her pacemaker placed.  Cardiology is consulted to follow.     2/26/2022-2/27/2022  Patient continuing cardiogenic work-up.  Patient's  pacemaker interrogated.  Electrophysiology evaluated patient discussed with neurology recommending anticoagulation given risk of stroke.  Hematology consulted given patient's anemia, work-up initiated.     2/28/2022:Patient seen after ALEYDA, no intraventricular clot no PFO.  Specialist recommendations to resume anticoagulation.  Patient denies any new symptoms.  Transition from IV to oral Keppra.  Patient denies dizziness.     3/1/2022: Patient cleared by specialist services for discharge.  Patient asymptomatic.  Follow-up organizer primary care, cardiology, hematology and neurology.  Patient able to be discharged home with home health in good condition strict return precautions given.       The following portions of the patient's history were reviewed and updated as appropriate: allergies, current medications, past family history, past medical history, past social history, past surgical history and problem list.      Family History   Problem Relation Age of Onset   • Heart disease Mother    • Heart disease Father        Past Medical History:   Diagnosis Date   • Atherosclerosis of native coronary artery of native heart without angina pectoris 10/22/2020   • Atrioventricular block, Mobitz type 1, Wenckebach 10/21/2020    Added automatically from request for surgery [id number]   • Hypertension associated with stage 3 chronic kidney disease due to type 2 diabetes mellitus ([facility]) 10/22/2020   • Morbid obesity ([facility]) 10/22/2020   • Secondary hyperaldosteronism ([facility]) 10/22/2020   • Stage 3b chronic kidney disease ([facility]) 10/22/2020   • Type 2 diabetes mellitus with diabetic neuropathy, with long-term current use of insulin ([facility]) 10/22/2020   • Type 2 diabetes mellitus with diabetic neuropathy, with long-term current use of insulin ([facility]) 10/22/2020       Social History     Socioeconomic History   • Marital status:    Tobacco Use   • Smoking status: Never   • Smokeless tobacco: Never   Vaping Use   • Vaping Use: Never used    Substance and Sexual Activity   • Alcohol use: Yes     Alcohol/week: 7.0 standard drinks     Types: 7 Drinks containing 0.5 oz of alcohol per week   • Drug use: Never   • Sexual activity: Defer         Current Outpatient Medications:   •  amLODIPine (NORVASC) 5 MG tablet, Take 5 mg by mouth Every Morning., Disp: , Rfl:   •  aspirin 325 MG tablet, Take 1 tablet by mouth Daily., Disp: 30 tablet, Rfl: 0  •  atorvastatin (LIPITOR) 10 MG tablet, Take 10 mg by mouth Daily., Disp: , Rfl:   •  ferrous sulfate 325 (65 FE) MG tablet, Take 325 mg by mouth Daily With Breakfast., Disp: , Rfl:   •  folic acid (FOLVITE) 1 MG tablet, Take 1 tablet by mouth Daily., Disp: 30 tablet, Rfl: 11  •  furosemide (LASIX) 20 MG tablet, , Disp: , Rfl:   •  gabapentin (NEURONTIN) 300 MG capsule, Take 300 mg by mouth 2 (Two) Times a Day., Disp: , Rfl:   •  levETIRAcetam (KEPPRA) 500 MG tablet, Take 500 mg by mouth Every 12 (Twelve) Hours., Disp: , Rfl:   •  losartan (COZAAR) 50 MG tablet, Take 50 mg by mouth Daily., Disp: , Rfl:   •  NovoLIN 70/30 (70-30) 100 UNIT/ML injection, INJECT 30 UNITS UNDER THE SKIN INTO THE APPROPRIATE AREA TWO TIMES A DAY WITH A MEAL, Disp: 20 mL, Rfl: 3  •  omeprazole (priLOSEC) 40 MG capsule, Take 1 capsule by mouth Every Morning Before Breakfast. Take on empty stomach!, Disp: 90 capsule, Rfl: 1  •  spironolactone (ALDACTONE) 25 MG tablet, Take 25 mg by mouth Every Morning., Disp: , Rfl:   •  topiramate (TOPAMAX) 25 MG tablet, Take 0.5 tablets by mouth 2 (Two) Times a Day., Disp: 30 tablet, Rfl: 3  •  vitamin B-12 (CYANOCOBALAMIN) 1000 MCG tablet, Take 1 tablet by mouth Daily., Disp: 30 tablet, Rfl: 11    Review of Systems   Constitutional: Negative.    HENT: Negative.    Eyes: Negative.    Respiratory: Negative.    Cardiovascular: Positive for leg swelling.   Gastrointestinal: Negative.    Endocrine: Negative.    Genitourinary: Negative.    Musculoskeletal: Negative.    Skin: Negative.    Allergic/Immunologic:  Negative.    Neurological: Positive for seizures.   Hematological: Negative.    Psychiatric/Behavioral: Negative.         I have reviewed ROS completed by medical assistant.     Objective:    Neurologic Exam     Mental Status   Oriented to person, place, and time.   Speech: speech is normal     Cranial Nerves   Cranial nerves II through XII intact.     CN III, IV, VI   Pupils are equal, round, and reactive to light.    Gait, Coordination, and Reflexes     Coordination   Finger to nose coordination: normal  Tandem walking coordination: abnormal (Unable to complete heels toes or tandem)    Reflexes   Right brachioradialis: 2+  Left brachioradialis: 2+  Right biceps: 2+  Left biceps: 2+  Right triceps: 2+  Left triceps: 2+  Right patellar: 2+  Left patellar: 2+  Right achilles: 1+  Left achilles: 1+      Physical Exam  Vitals reviewed.   Constitutional:       Appearance: Normal appearance. She is overweight.   HENT:      Head: Normocephalic and atraumatic.      Right Ear: Hearing normal.      Left Ear: Hearing normal.      Nose: Nose normal.      Mouth/Throat:      Lips: Pink.      Mouth: Mucous membranes are moist.   Eyes:      General: Lids are normal. No visual field deficit.     Extraocular Movements: Extraocular movements intact.      Right eye: Normal extraocular motion and no nystagmus.      Left eye: Normal extraocular motion and no nystagmus.      Pupils: Pupils are equal, round, and reactive to light.   Cardiovascular:      Rate and Rhythm: Normal rate and regular rhythm.      Pulses: Normal pulses.      Heart sounds: Normal heart sounds, S1 normal and S2 normal.   Pulmonary:      Effort: Pulmonary effort is normal.      Breath sounds: Normal breath sounds and air entry.   Musculoskeletal:         General: Normal range of motion.      Cervical back: Full passive range of motion without pain and normal range of motion.        Feet:       Comments: BUE: 5/5 including   BLE: 5/5 including dorsiflexion  plantar flexion of feet  2+ pitting edema bilateral feet   Skin:     General: Skin is warm and dry.          Neurological:      General: No focal deficit present.      Mental Status: She is alert and oriented to person, place, and time.      Cranial Nerves: Cranial nerves 2-12 are intact. No cranial nerve deficit, dysarthria or facial asymmetry.      Sensory: Sensation is intact. No sensory deficit.      Motor: No weakness, tremor, atrophy, abnormal muscle tone, seizure activity or pronator drift.      Coordination: Romberg sign negative. Coordination normal. Heel to Shin Test abnormal (Unable to complete due to body habitus). Finger-Nose-Finger Test normal. Rapid alternating movements normal.      Gait: Gait abnormal (Ambulates with a tripod cane) and tandem walk abnormal (Unable to complete heels toes or tandem).      Deep Tendon Reflexes: Babinski sign absent on the right side. Babinski sign absent on the left side.      Reflex Scores:       Tricep reflexes are 2+ on the right side and 2+ on the left side.       Bicep reflexes are 2+ on the right side and 2+ on the left side.       Brachioradialis reflexes are 2+ on the right side and 2+ on the left side.       Patellar reflexes are 2+ on the right side and 2+ on the left side.       Achilles reflexes are 1+ on the right side and 1+ on the left side.  Psychiatric:         Attention and Perception: Attention and perception normal.         Mood and Affect: Mood normal.         Speech: Speech normal.         Behavior: Behavior normal. Behavior is cooperative.         Assessment/Plan:  The patient was told to observe all seizure precautions, including, but not limited to:   If a Seizures occurs: No driving until seizure free for more than 3 months- as per State driving regulation / law;   Avoid all high-risk activity, Take showers instead of baths, avoid swimming without observation, Avoid open heat sources, Avoid working at heights, and Avoid engaging in all  potentially hazardous activities.   Patient expressed clear understanding      Diagnoses and all orders for this visit:    1. Wound of left ankle, initial encounter (Primary)  Comments:  The patient's wound was dressed and a referral was made to the wound care center.  Orders:  -     Ambulatory Referral to Wound Clinic    2. Syncope and collapse  Comments:  The patient will be scheduled for a 2-hour sleep deprived EEG.  She will be continued on Keppra at this time.    Orders:  -     EEG; Future        Return in about 4 months (around 2/25/2023) for Dr Seipel .    I spent 39 minutes caring for Aracelis on this date of service. This time includes time spent by me in the following activities: reviewing tests, obtaining and/or reviewing a separately obtained history, performing a medically appropriate examination and/or evaluation, counseling and educating the patient/family/caregiver, ordering medications, tests, or procedures and documenting information in the medical record.      This document has been electronically signed by Alexsandra RAY on October 25, 2022 19:00 EDT

## 2022-10-25 ENCOUNTER — OFFICE VISIT (OUTPATIENT)
Dept: NEUROLOGY | Facility: CLINIC | Age: 87
End: 2022-10-25

## 2022-10-25 VITALS
TEMPERATURE: 97.8 F | SYSTOLIC BLOOD PRESSURE: 120 MMHG | BODY MASS INDEX: 42.67 KG/M2 | WEIGHT: 226 LBS | HEIGHT: 61 IN | DIASTOLIC BLOOD PRESSURE: 62 MMHG | HEART RATE: 82 BPM

## 2022-10-25 DIAGNOSIS — S91.002A WOUND OF LEFT ANKLE, INITIAL ENCOUNTER: Primary | ICD-10-CM

## 2022-10-25 DIAGNOSIS — R55 SYNCOPE AND COLLAPSE: ICD-10-CM

## 2022-10-25 PROCEDURE — 99214 OFFICE O/P EST MOD 30 MIN: CPT | Performed by: NURSE PRACTITIONER

## 2022-10-26 ENCOUNTER — OFFICE VISIT (OUTPATIENT)
Dept: WOUND CARE | Facility: HOSPITAL | Age: 87
End: 2022-10-26

## 2022-10-26 DIAGNOSIS — L97.821 NON-PRESSURE CHRONIC ULCER OF OTHER PART OF LEFT LOWER LEG LIMITED TO BREAKDOWN OF SKIN: ICD-10-CM

## 2022-10-26 DIAGNOSIS — I87.312 CHRONIC VENOUS HYPERTENSION (IDIOPATHIC) WITH ULCER OF LEFT LOWER EXTREMITY (CODE): ICD-10-CM

## 2022-10-26 PROCEDURE — 99203 OFFICE O/P NEW LOW 30 MIN: CPT | Performed by: PODIATRIST

## 2022-10-26 PROCEDURE — G0463 HOSPITAL OUTPT CLINIC VISIT: HCPCS

## 2022-10-28 ENCOUNTER — OFFICE VISIT (OUTPATIENT)
Dept: WOUND CARE | Facility: HOSPITAL | Age: 87
End: 2022-10-28

## 2022-10-31 ENCOUNTER — OFFICE VISIT (OUTPATIENT)
Dept: WOUND CARE | Facility: HOSPITAL | Age: 87
End: 2022-10-31

## 2022-10-31 DIAGNOSIS — L97.822 SKIN ULCER OF POPLITEAL REGION, LEFT, WITH FAT LAYER EXPOSED: Primary | ICD-10-CM

## 2022-10-31 PROCEDURE — 87147 CULTURE TYPE IMMUNOLOGIC: CPT

## 2022-10-31 PROCEDURE — 87205 SMEAR GRAM STAIN: CPT

## 2022-10-31 PROCEDURE — 87070 CULTURE OTHR SPECIMN AEROBIC: CPT

## 2022-10-31 PROCEDURE — 87186 SC STD MICRODIL/AGAR DIL: CPT

## 2022-11-01 ENCOUNTER — APPOINTMENT (OUTPATIENT)
Dept: CT IMAGING | Facility: HOSPITAL | Age: 87
End: 2022-11-01

## 2022-11-01 ENCOUNTER — APPOINTMENT (OUTPATIENT)
Dept: GENERAL RADIOLOGY | Facility: HOSPITAL | Age: 87
End: 2022-11-01

## 2022-11-01 ENCOUNTER — HOSPITAL ENCOUNTER (OUTPATIENT)
Facility: HOSPITAL | Age: 87
Setting detail: OBSERVATION
Discharge: HOME HEALTH | End: 2022-11-03
Attending: HOSPITALIST | Admitting: HOSPITALIST

## 2022-11-01 DIAGNOSIS — F10.929 ALCOHOLIC INTOXICATION WITH COMPLICATION: ICD-10-CM

## 2022-11-01 DIAGNOSIS — R41.82 ALTERED MENTAL STATUS, UNSPECIFIED ALTERED MENTAL STATUS TYPE: Primary | ICD-10-CM
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
## 2022-11-01 DIAGNOSIS — S01.01XA LACERATION OF SCALP, INITIAL ENCOUNTER: ICD-10-CM

## 2022-11-01 DIAGNOSIS — Z86.73 HISTORY OF CVA (CEREBROVASCULAR ACCIDENT): ICD-10-CM

## 2022-11-01 LAB
ALBUMIN SERPL-MCNC: 3.8 G/DL (ref 3.5–5.2)
ALBUMIN/GLOB SERPL: 1 G/DL
ALP SERPL-CCNC: 104 U/L (ref 39–117)
ALT SERPL W P-5'-P-CCNC: 14 U/L (ref 1–33)
ANION GAP SERPL CALCULATED.3IONS-SCNC: 12 MMOL/L (ref 5–15)
APTT PPP: 24.5 SECONDS (ref 61–76.5)
AST SERPL-CCNC: 33 U/L (ref 1–32)
BASOPHILS # BLD AUTO: 0 10*3/MM3 (ref 0–0.2)
BASOPHILS NFR BLD AUTO: 1 % (ref 0–1.5)
BILIRUB SERPL-MCNC: 0.3 MG/DL (ref 0–1.2)
BILIRUB UR QL STRIP: NEGATIVE
BUN SERPL-MCNC: 45 MG/DL (ref 8–23)
BUN/CREAT SERPL: 34.1 (ref 7–25)
CALCIUM SPEC-SCNC: 8.9 MG/DL (ref 8.6–10.5)
CHLORIDE SERPL-SCNC: 105 MMOL/L (ref 98–107)
CLARITY UR: CLEAR
CO2 SERPL-SCNC: 20 MMOL/L (ref 22–29)
COLOR UR: YELLOW
CREAT SERPL-MCNC: 1.32 MG/DL (ref 0.57–1)
DEPRECATED RDW RBC AUTO: 51.6 FL (ref 37–54)
EGFRCR SERPLBLD CKD-EPI 2021: 39.6 ML/MIN/1.73
EOSINOPHIL # BLD AUTO: 0.3 10*3/MM3 (ref 0–0.4)
EOSINOPHIL NFR BLD AUTO: 6.5 % (ref 0.3–6.2)
ERYTHROCYTE [DISTWIDTH] IN BLOOD BY AUTOMATED COUNT: 13.8 % (ref 12.3–15.4)
ETHANOL UR QL: 0.24 %
GLOBULIN UR ELPH-MCNC: 3.9 GM/DL
GLUCOSE SERPL-MCNC: 142 MG/DL (ref 65–99)
GLUCOSE UR STRIP-MCNC: NEGATIVE MG/DL
HCT VFR BLD AUTO: 33.2 % (ref 34–46.6)
HGB BLD-MCNC: 10.9 G/DL (ref 12–15.9)
HGB UR QL STRIP.AUTO: NEGATIVE
INR PPP: 1.05 (ref 0.93–1.1)
KETONES UR QL STRIP: NEGATIVE
LEUKOCYTE ESTERASE UR QL STRIP.AUTO: NEGATIVE
LYMPHOCYTES # BLD AUTO: 1.7 10*3/MM3 (ref 0.7–3.1)
LYMPHOCYTES NFR BLD AUTO: 38.4 % (ref 19.6–45.3)
MAGNESIUM SERPL-MCNC: 2.7 MG/DL (ref 1.6–2.4)
MCH RBC QN AUTO: 33 PG (ref 26.6–33)
MCHC RBC AUTO-ENTMCNC: 32.7 G/DL (ref 31.5–35.7)
MCV RBC AUTO: 100.8 FL (ref 79–97)
MONOCYTES # BLD AUTO: 0.5 10*3/MM3 (ref 0.1–0.9)
MONOCYTES NFR BLD AUTO: 10.1 % (ref 5–12)
NEUTROPHILS NFR BLD AUTO: 2 10*3/MM3 (ref 1.7–7)
NEUTROPHILS NFR BLD AUTO: 44 % (ref 42.7–76)
NITRITE UR QL STRIP: NEGATIVE
NRBC BLD AUTO-RTO: 0.1 /100 WBC (ref 0–0.2)
NT-PROBNP SERPL-MCNC: 461.3 PG/ML (ref 0–1800)
PH UR STRIP.AUTO: <=5 [PH] (ref 5–8)
PLATELET # BLD AUTO: 203 10*3/MM3 (ref 140–450)
PMV BLD AUTO: 7 FL (ref 6–12)
POTASSIUM SERPL-SCNC: 4.9 MMOL/L (ref 3.5–5.2)
PROT SERPL-MCNC: 7.7 G/DL (ref 6–8.5)
PROT UR QL STRIP: NEGATIVE
PROTHROMBIN TIME: 10.8 SECONDS (ref 9.6–11.7)
RBC # BLD AUTO: 3.29 10*6/MM3 (ref 3.77–5.28)
SODIUM SERPL-SCNC: 137 MMOL/L (ref 136–145)
SP GR UR STRIP: 1.01 (ref 1–1.03)
TROPONIN T SERPL-MCNC: 0.02 NG/ML (ref 0–0.03)
TSH SERPL DL<=0.05 MIU/L-ACNC: 1.41 UIU/ML (ref 0.27–4.2)
UROBILINOGEN UR QL STRIP: NORMAL
WBC NRBC COR # BLD: 4.5 10*3/MM3 (ref 3.4–10.8)

## 2022-11-01 PROCEDURE — 82077 ASSAY SPEC XCP UR&BREATH IA: CPT | Performed by: NURSE PRACTITIONER

## 2022-11-01 PROCEDURE — 85610 PROTHROMBIN TIME: CPT | Performed by: NURSE PRACTITIONER

## 2022-11-01 PROCEDURE — 83735 ASSAY OF MAGNESIUM: CPT | Performed by: NURSE PRACTITIONER

## 2022-11-01 PROCEDURE — 80053 COMPREHEN METABOLIC PANEL: CPT | Performed by: NURSE PRACTITIONER

## 2022-11-01 PROCEDURE — 85730 THROMBOPLASTIN TIME PARTIAL: CPT | Performed by: NURSE PRACTITIONER

## 2022-11-01 PROCEDURE — 70450 CT HEAD/BRAIN W/O DYE: CPT

## 2022-11-01 PROCEDURE — G0378 HOSPITAL OBSERVATION PER HR: HCPCS

## 2022-11-01 PROCEDURE — 93294 REM INTERROG EVL PM/LDLS PM: CPT | Performed by: INTERNAL MEDICINE

## 2022-11-01 PROCEDURE — P9612 CATHETERIZE FOR URINE SPEC: HCPCS

## 2022-11-01 PROCEDURE — 81003 URINALYSIS AUTO W/O SCOPE: CPT | Performed by: NURSE PRACTITIONER

## 2022-11-01 PROCEDURE — 99285 EMERGENCY DEPT VISIT HI MDM: CPT

## 2022-11-01 PROCEDURE — 93296 REM INTERROG EVL PM/IDS: CPT | Performed by: INTERNAL MEDICINE

## 2022-11-01 PROCEDURE — 84484 ASSAY OF TROPONIN QUANT: CPT | Performed by: NURSE PRACTITIONER

## 2022-11-01 PROCEDURE — 83880 ASSAY OF NATRIURETIC PEPTIDE: CPT | Performed by: NURSE PRACTITIONER

## 2022-11-01 PROCEDURE — 84443 ASSAY THYROID STIM HORMONE: CPT | Performed by: NURSE PRACTITIONER

## 2022-11-01 PROCEDURE — 71045 X-RAY EXAM CHEST 1 VIEW: CPT

## 2022-11-01 PROCEDURE — 85025 COMPLETE CBC W/AUTO DIFF WBC: CPT | Performed by: NURSE PRACTITIONER

## 2022-11-01 RX ORDER — SODIUM CHLORIDE 0.9 % (FLUSH) 0.9 %
10 SYRINGE (ML) INJECTION AS NEEDED
Status: DISCONTINUED | OUTPATIENT
Start: 2022-11-01 | End: 2022-11-03 | Stop reason: HOSPADM

## 2022-11-02 PROBLEM — F10.921 ALCOHOL INTOXICATION WITH DELIRIUM (HCC): Status: ACTIVE | Noted: 2022-11-02

## 2022-11-02 PROBLEM — E78.2 MIXED HYPERLIPIDEMIA: Status: ACTIVE | Noted: 2020-11-11

## 2022-11-02 PROBLEM — R56.9 SEIZURE: Status: ACTIVE | Noted: 2022-11-02

## 2022-11-02 PROBLEM — Z86.73 HISTORY OF CVA (CEREBROVASCULAR ACCIDENT): Status: ACTIVE | Noted: 2022-11-02

## 2022-11-02 PROBLEM — E11.21 DIABETES MELLITUS WITH NEPHROPATHY: Status: ACTIVE | Noted: 2022-08-20

## 2022-11-02 PROBLEM — F10.10 ETOH ABUSE: Status: ACTIVE | Noted: 2022-11-02

## 2022-11-02 PROBLEM — I25.10 CHRONIC CORONARY ARTERY DISEASE: Status: ACTIVE | Noted: 2020-10-22

## 2022-11-02 PROBLEM — R41.82 ALTERED MENTAL STATUS, UNSPECIFIED ALTERED MENTAL STATUS TYPE: Status: ACTIVE | Noted: 2022-11-02

## 2022-11-02 LAB
ANION GAP SERPL CALCULATED.3IONS-SCNC: 11 MMOL/L (ref 5–15)
BACTERIA SPEC AEROBE CULT: ABNORMAL
BUN SERPL-MCNC: 46 MG/DL (ref 8–23)
BUN/CREAT SERPL: 40.4 (ref 7–25)
CALCIUM SPEC-SCNC: 8.9 MG/DL (ref 8.6–10.5)
CHLORIDE SERPL-SCNC: 108 MMOL/L (ref 98–107)
CO2 SERPL-SCNC: 22 MMOL/L (ref 22–29)
CREAT SERPL-MCNC: 1.14 MG/DL (ref 0.57–1)
EGFRCR SERPLBLD CKD-EPI 2021: 47.3 ML/MIN/1.73
GLUCOSE BLDC GLUCOMTR-MCNC: 121 MG/DL (ref 70–105)
GLUCOSE BLDC GLUCOMTR-MCNC: 123 MG/DL (ref 70–105)
GLUCOSE BLDC GLUCOMTR-MCNC: 132 MG/DL (ref 70–105)
GLUCOSE SERPL-MCNC: 120 MG/DL (ref 65–99)
GRAM STN SPEC: ABNORMAL
PHOSPHATE SERPL-MCNC: 2.9 MG/DL (ref 2.5–4.5)
POTASSIUM SERPL-SCNC: 4.6 MMOL/L (ref 3.5–5.2)
QT INTERVAL: 410 MS
SODIUM SERPL-SCNC: 141 MMOL/L (ref 136–145)
TROPONIN T SERPL-MCNC: 0.02 NG/ML (ref 0–0.03)
TROPONIN T SERPL-MCNC: 0.04 NG/ML (ref 0–0.03)

## 2022-11-02 PROCEDURE — 93005 ELECTROCARDIOGRAM TRACING: CPT | Performed by: NURSE PRACTITIONER

## 2022-11-02 PROCEDURE — 84100 ASSAY OF PHOSPHORUS: CPT | Performed by: HOSPITALIST

## 2022-11-02 PROCEDURE — 99214 OFFICE O/P EST MOD 30 MIN: CPT | Performed by: PSYCHIATRY & NEUROLOGY

## 2022-11-02 PROCEDURE — P9612 CATHETERIZE FOR URINE SPEC: HCPCS

## 2022-11-02 PROCEDURE — 99214 OFFICE O/P EST MOD 30 MIN: CPT | Performed by: INTERNAL MEDICINE

## 2022-11-02 PROCEDURE — G0378 HOSPITAL OBSERVATION PER HR: HCPCS

## 2022-11-02 PROCEDURE — 97162 PT EVAL MOD COMPLEX 30 MIN: CPT

## 2022-11-02 PROCEDURE — 84484 ASSAY OF TROPONIN QUANT: CPT | Performed by: INTERNAL MEDICINE

## 2022-11-02 PROCEDURE — 84484 ASSAY OF TROPONIN QUANT: CPT | Performed by: HOSPITALIST

## 2022-11-02 PROCEDURE — 82962 GLUCOSE BLOOD TEST: CPT

## 2022-11-02 PROCEDURE — 80048 BASIC METABOLIC PNL TOTAL CA: CPT | Performed by: HOSPITALIST

## 2022-11-02 RX ORDER — LEVETIRACETAM 500 MG/1
500 TABLET ORAL ONCE
Status: COMPLETED | OUTPATIENT
Start: 2022-11-02 | End: 2022-11-02

## 2022-11-02 RX ORDER — DEXTROSE MONOHYDRATE 25 G/50ML
25 INJECTION, SOLUTION INTRAVENOUS
Status: DISCONTINUED | OUTPATIENT
Start: 2022-11-02 | End: 2022-11-03 | Stop reason: HOSPADM

## 2022-11-02 RX ORDER — OLANZAPINE 10 MG/2ML
1 INJECTION, POWDER, LYOPHILIZED, FOR SOLUTION INTRAMUSCULAR
Status: DISCONTINUED | OUTPATIENT
Start: 2022-11-02 | End: 2022-11-03 | Stop reason: HOSPADM

## 2022-11-02 RX ORDER — INSULIN LISPRO 100 [IU]/ML
2-9 INJECTION, SOLUTION INTRAVENOUS; SUBCUTANEOUS
Status: DISCONTINUED | OUTPATIENT
Start: 2022-11-02 | End: 2022-11-03 | Stop reason: HOSPADM

## 2022-11-02 RX ORDER — LEVETIRACETAM 500 MG/1
1000 TABLET ORAL NIGHTLY
Status: DISCONTINUED | OUTPATIENT
Start: 2022-11-02 | End: 2022-11-03 | Stop reason: HOSPADM

## 2022-11-02 RX ORDER — NICOTINE POLACRILEX 4 MG
15 LOZENGE BUCCAL
Status: DISCONTINUED | OUTPATIENT
Start: 2022-11-02 | End: 2022-11-03 | Stop reason: HOSPADM

## 2022-11-02 RX ORDER — LEVETIRACETAM 500 MG/1
500 TABLET ORAL EVERY MORNING
Status: DISCONTINUED | OUTPATIENT
Start: 2022-11-03 | End: 2022-11-03

## 2022-11-02 RX ORDER — OXYCODONE HYDROCHLORIDE 5 MG/1
5 TABLET ORAL EVERY 4 HOURS PRN
Status: DISCONTINUED | OUTPATIENT
Start: 2022-11-02 | End: 2022-11-03 | Stop reason: HOSPADM

## 2022-11-02 RX ORDER — OXYCODONE HYDROCHLORIDE 5 MG/1
10 TABLET ORAL EVERY 4 HOURS PRN
Status: DISCONTINUED | OUTPATIENT
Start: 2022-11-02 | End: 2022-11-03 | Stop reason: HOSPADM

## 2022-11-02 RX ADMIN — LEVETIRACETAM 1000 MG: 500 TABLET, FILM COATED ORAL at 20:12

## 2022-11-02 RX ADMIN — Medication 10 ML: at 08:42

## 2022-11-02 RX ADMIN — LEVETIRACETAM 500 MG: 500 TABLET, FILM COATED ORAL at 08:42

## 2022-11-02 RX ADMIN — OXYCODONE 10 MG: 5 TABLET ORAL at 20:12

## 2022-11-02 NOTE — CONSULTS
Referring Provider: Armin Juárez MD  Reason for Consultation:  Status post pacemaker.  Coronary artery disease  Atrial fibrillation  Weakness falls and altered mental status    Patient Care Team:  Gabriel Goss MD as PCP - General (Family Medicine)  Gregory Pinedo MD (Family Medicine)  Wang Plaza MD as Consulting Physician (Cardiology)  Rusty Don MD as Consulting Physician (Nephrology)    Chief complaint  Weakness and falls    Subjective .     History of present illness:  Aracelis Vargas is a 87 y.o. female who presents with history of multiple cardiac and noncardiac problems was admitted to the hospital with symptoms of weakness falls and altered mental status starting on 11/1/2022.  Patient has cellulitis of left lower extremity and is followed by wound care center.  Patient had a fall at home hitting the filing cabinet with her head.  Sustained head laceration.  No other associated aggravating or elevating factors.  Patient denies having any chest discomfort heaviness or tightness in the chest.  Cardiology consultation was requested.        ROS      The patient has been without any chest discomfort ,shortness of breath, palpitations, dizziness or syncope.  Denies having any headache ,abdominal pain ,nausea, vomiting , diarrhea constipation, loss of weight or loss of appetite.  Denies having any excessive bruising ,hematuria or blood in the stool.    Review of all systems negative except as indicated      History  Past Medical History:   Diagnosis Date   • Atherosclerosis of native coronary artery of native heart without angina pectoris 10/22/2020   • Atrioventricular block, Mobitz type 1, Sidneynckebach 10/21/2020    Added automatically from request for surgery [id number]   • Hypertension associated with stage 3 chronic kidney disease due to type 2 diabetes mellitus (HCC) 10/22/2020   • Morbid obesity (HCC) 10/22/2020   • Secondary hyperaldosteronism (HCC) 10/22/2020   • Stage 3b chronic  kidney disease ([facility]) 10/22/2020   • Type 2 diabetes mellitus with diabetic neuropathy, with long-term current use of insulin ([facility]) 10/22/2020   • Type 2 diabetes mellitus with diabetic neuropathy, with long-term current use of insulin ([facility]) 10/22/2020       Past Surgical History:   Procedure Laterality Date   • CARDIAC CATHETERIZATION N/A 10/23/2020    Procedure: Left Heart Cath and coronary angiogram;  Surgeon: Wang Plaza MD;  Location: [facility] CATH INVASIVE LOCATION;  Service: Cardiovascular;  Laterality: N/A;   • CARDIAC ELECTROPHYSIOLOGY PROCEDURE Left 11/3/2021    Procedure: Pacemaker DC new;  Surgeon: Wang Plaza MD;  Location: [facility] CATH INVASIVE LOCATION;  Service: Cardiovascular;  Laterality: Left;   • CARDIAC ELECTROPHYSIOLOGY PROCEDURE N/A 11/3/2021    Procedure: LOOP RECORDER REMOVAL;  Surgeon: Wang Plaza MD;  Location: [facility] CATH INVASIVE LOCATION;  Service: Cardiovascular;  Laterality: N/A;   • HYSTERECTOMY     • JOINT REPLACEMENT Right     Hip   • JOINT REPLACEMENT Left     hip   • JOINT REPLACEMENT Left     hip (for second time)   • JOINT REPLACEMENT Right     knee   • OOPHORECTOMY         Family History   Problem Relation Age of Onset   • Heart disease Mother    • Heart disease Father        Social History     Tobacco Use   • Smoking status: Never   • Smokeless tobacco: Never   Vaping Use   • Vaping Use: Never used   Substance Use Topics   • Alcohol use: Yes     Alcohol/week: 7.0 standard drinks     Types: 7 Drinks containing 0.5 oz of alcohol per week   • Drug use: Never        (Not in a hospital admission)        Latex, natural rubber and Adhesive tape    Scheduled Meds:insulin lispro, 2-9 Units, Subcutaneous, TID With Meals      Continuous Infusions:   PRN Meds:.•  dextrose  •  dextrose  •  glucagon (human recombinant)  •  [COMPLETED] Insert peripheral IV **AND** sodium chloride    Objective     VITAL SIGNS  Vitals:    11/02/22 0554 11/02/22 0559 11/02/22 0730 11/02/22 0846  "  BP:  121/43 137/58 147/68   Pulse:  81 84 90   Resp:       Temp:       TempSrc:       SpO2: 96%  97% 98%   Weight:       Height:           Flowsheet Rows    Flowsheet Row First Filed Value   Admission Height 154.9 cm (61\") Documented at 11/01/2022 2137   Admission Weight 102 kg (225 lb) Documented at 11/01/2022 2137          No intake or output data in the 24 hours ending 11/02/22 0909     TELEMETRY: Pacemaker rhythm    Physical Exam:  The patient is alert, oriented and in no distress.  Vital signs as noted above.  Exogenous obesity.  Head and neck revealed no carotid bruits or jugular venous distention.  No thyromegaly or lymph adenopathy is present  Lungs clear.  No wheezing.  Breath sounds are normal bilaterally.  Heart normal first and second heart sounds.No murmur.  No precordial rub is present.  No gallop is present.  Abdomen soft and nontender.  No organomegaly is present.  Extremities with good peripheral pulses without any pedal edema.  Skin warm and dry.  Pacemaker site looks normal.  Musculoskeletal system is grossly normal  CNS grossly normal      Results Review:   I reviewed the patient's new clinical results.  Lab Results (last 24 hours)     Procedure Component Value Units Date/Time    POC Glucose Once [[id number]]  (Abnormal) Collected: 11/02/22 0843    Specimen: Blood Updated: 11/02/22 0847     Glucose 121 mg/dL      Comment: Serial Number: [id number]:  [id number]       Basic Metabolic Panel [[id number]]  (Abnormal) Collected: 11/02/22 0543    Specimen: Blood Updated: 11/02/22 0618     Glucose 120 mg/dL      BUN 46 mg/dL      Creatinine 1.14 mg/dL      Sodium 141 mmol/L      Potassium 4.6 mmol/L      Chloride 108 mmol/L      CO2 22.0 mmol/L      Calcium 8.9 mg/dL      BUN/Creatinine Ratio 40.4     Anion Gap 11.0 mmol/L      eGFR 47.3 mL/min/1.73      Comment: National Kidney Foundation and American Society of Nephrology (ASN) Task Force recommended calculation based on the Chronic Kidney " Disease Epidemiology Collaboration (CKD-EPI) equation refit without adjustment for race.       Narrative:      GFR Normal >60  Chronic Kidney Disease <60  Kidney Failure <15    The GFR formula is only valid for adults with stable renal function between ages 18 and 70.    Phosphorus [[id number]]  (Normal) Collected: 11/02/22 0543    Specimen: Blood Updated: 11/02/22 0618     Phosphorus 2.9 mg/dL     Troponin [[id number]]  (Normal) Collected: 11/02/22 0543    Specimen: Blood Updated: 11/02/22 0618     Troponin T 0.018 ng/mL     Narrative:      Troponin T Reference Range:  <= 0.03 ng/mL-   Negative for AMI  >0.03 ng/mL-     Abnormal for myocardial necrosis.  Clinicians would have to utilize clinical acumen, EKG, Troponin and serial changes to determine if it is an Acute Myocardial Infarction or myocardial injury due to an underlying chronic condition.       Results may be falsely decreased if patient taking Biotin.      Urinalysis With Culture If Indicated - Urine, Catheter In/Out [[id number]]  (Normal) Collected: 11/01/22 2317    Specimen: Urine, Catheter In/Out Updated: 11/01/22 2325     Color, UA Yellow     Appearance, UA Clear     pH, UA <=5.0     Specific Gravity, UA 1.011     Glucose, UA Negative     Ketones, UA Negative     Bilirubin, UA Negative     Blood, UA Negative     Protein, UA Negative     Leuk Esterase, UA Negative     Nitrite, UA Negative     Urobilinogen, UA 0.2 E.U./dL    Narrative:      In absence of clinical symptoms, the presence of pyuria, bacteria, and/or nitrites on the urinalysis result does not correlate with infection.  Urine microscopic not indicated.    BNP [[id number]]  (Normal) Collected: 11/01/22 2205    Specimen: Blood from Arm, Right Updated: 11/01/22 2243     proBNP 461.3 pg/mL     Narrative:      Among patients with dyspnea, NT-proBNP is highly sensitive for the detection of acute congestive heart failure. In addition NT-proBNP of <300 pg/ml effectively rules out acute congestive heart  failure with 99% negative predictive value.    Results may be falsely decreased if patient taking Biotin.      Troponin [[id number]]  (Normal) Collected: 11/01/22 2205    Specimen: Blood from Arm, Right Updated: 11/01/22 2243     Troponin T 0.020 ng/mL     Narrative:      Troponin T Reference Range:  <= 0.03 ng/mL-   Negative for AMI  >0.03 ng/mL-     Abnormal for myocardial necrosis.  Clinicians would have to utilize clinical acumen, EKG, Troponin and serial changes to determine if it is an Acute Myocardial Infarction or myocardial injury due to an underlying chronic condition.       Results may be falsely decreased if patient taking Biotin.      TSH [[id number]]  (Normal) Collected: 11/01/22 2205    Specimen: Blood from Arm, Right Updated: 11/01/22 2243     TSH 1.410 uIU/mL     Comprehensive Metabolic Panel [[id number]]  (Abnormal) Collected: 11/01/22 2205    Specimen: Blood from Arm, Right Updated: 11/01/22 2239     Glucose 142 mg/dL      BUN 45 mg/dL      Creatinine 1.32 mg/dL      Sodium 137 mmol/L      Potassium 4.9 mmol/L      Comment: Slight hemolysis detected by analyzer. Results may be affected.        Chloride 105 mmol/L      CO2 20.0 mmol/L      Calcium 8.9 mg/dL      Total Protein 7.7 g/dL      Albumin 3.80 g/dL      ALT (SGPT) 14 U/L      AST (SGOT) 33 U/L      Comment: Slight hemolysis detected by analyzer. Results may be affected.        Alkaline Phosphatase 104 U/L      Total Bilirubin 0.3 mg/dL      Globulin 3.9 gm/dL      A/G Ratio 1.0 g/dL      BUN/Creatinine Ratio 34.1     Anion Gap 12.0 mmol/L      eGFR 39.6 mL/min/1.73      Comment: National Kidney Foundation and American Society of Nephrology (ASN) Task Force recommended calculation based on the Chronic Kidney Disease Epidemiology Collaboration (CKD-EPI) equation refit without adjustment for race.       Narrative:      GFR Normal >60  Chronic Kidney Disease <60  Kidney Failure <15    The GFR formula is only valid for adults with stable renal  function between ages 18 and 70.    Magnesium [[id number]]  (Abnormal) Collected: 11/01/22 2205    Specimen: Blood from Arm, Right Updated: 11/01/22 2238     Magnesium 2.7 mg/dL     Ethanol [[id number]] Collected: 11/01/22 2205    Specimen: Blood from Arm, Right Updated: 11/01/22 2238     Ethanol % 0.243 %     Narrative:      Plasma Ethanol Clinical Symptoms:    ETOH (%)               Clinical Symptom  .01-.05              No apparent influence  .03-.12              Euphoria, Diminished judgment and attention   .09-.25              Impaired comprehension, Muscle incoordination  .18-.30              Confusion, Staggered gait, Slurred speech  .25-.40              Markedly decreased response to stimuli, unable to stand or                        walk, vomitting, sleep or stupor  .35-.50              Comatose, Anesthesia, Subnormal body temperature        Protime-INR [[id number]]  (Normal) Collected: 11/01/22 2205    Specimen: Blood from Arm, Right Updated: 11/01/22 2238     Protime 10.8 Seconds      INR 1.05    aPTT [[id number]]  (Abnormal) Collected: 11/01/22 2205    Specimen: Blood from Arm, Right Updated: 11/01/22 2238     PTT 24.5 seconds     CBC & Differential [[id number]]  (Abnormal) Collected: 11/01/22 2205    Specimen: Blood from Arm, Right Updated: 11/01/22 2219    Narrative:      The following orders were created for panel order CBC & Differential.  Procedure                               Abnormality         Status                     ---------                               -----------         ------                     CBC Auto Differential[[id number]]        Abnormal            Final result                 Please view results for these tests on the individual orders.    CBC Auto Differential [[id number]]  (Abnormal) Collected: 11/01/22 2205    Specimen: Blood from Arm, Right Updated: 11/01/22 2219     WBC 4.50 10*3/mm3      RBC 3.29 10*6/mm3      Hemoglobin 10.9 g/dL      Hematocrit 33.2 %      .8 fL       MCH 33.0 pg      MCHC 32.7 g/dL      RDW 13.8 %      RDW-SD 51.6 fl      MPV 7.0 fL      Platelets 203 10*3/mm3      Neutrophil % 44.0 %      Lymphocyte % 38.4 %      Monocyte % 10.1 %      Eosinophil % 6.5 %      Basophil % 1.0 %      Neutrophils, Absolute 2.00 10*3/mm3      Lymphocytes, Absolute 1.70 10*3/mm3      Monocytes, Absolute 0.50 10*3/mm3      Eosinophils, Absolute 0.30 10*3/mm3      Basophils, Absolute 0.00 10*3/mm3      nRBC 0.1 /100 WBC           Imaging Results (Last 24 Hours)     Procedure Component Value Units Date/Time    CT Head Without Contrast [[id number]] Collected: 11/01/22 2247     Updated: 11/01/22 2252    Narrative:         DATE OF EXAM:  11/1/2022 10:16 PM     PROCEDURE:   CT HEAD WO CONTRAST-     INDICATIONS:   multiple falls, lac to scalp, confused     COMPARISON:  Head CT without contrast dated 08/20/2022     TECHNIQUE:   Routine transaxial cuts were obtained through the head without the  administration of contrast. Automated exposure control and iterative  reconstruction methods were used.     FINDINGS:   There is no CT evidence of acute hemorrhage, infarct, mass, mass  effect, or midline shift. No hydrocephalus. No extra-axial fluid  collections. The globes and orbital contents are normal and symmetric.  The mastoid air cells and visualized paranasal sinuses are clear. No  skull fractures or calvarial lesions. There is a left parietal scalp  laceration and hematoma with small foci of air within the subcutaneous  tissues suggestive of a associated laceration. No radiopaque foreign  body..          Impression:         1. Left parietal scalp hematoma and laceration. No acute intracranial  abnormality.  2. Generalized age-related parenchymal volume loss.     Electronically Signed By-Aaron Mattson DO On:11/1/2022 10:50 PM  This report was finalized on [id number] by  Aaron Mattson DO.    XR Chest 1 View [[id number]] Collected: 11/01/22 2239     Updated: 11/01/22 2243    Narrative:       XR CHEST 1 VW-     Date of Exam: 11/1/2022 10:03 PM     Indication: fall confusion  87-year-old, history of strokes, pacemaker,  seizure     Comparison: Chest radiograph dated 08/20/2022 6/29/2022 and 2/3/2022     Technique: 1 view(s) of the chest were obtained.     FINDINGS: There is evidence of prior granulomatous disease. Lungs  otherwise are grossly clear radiographically. Cardiac, hilar, and  mediastinal silhouettes are stable with enlargement of the cardiac  silhouette and a dual chambered pacemaker. Pulmonary vascularity is  within range of normal. No pneumothorax or pleural effusions. The  trachea is midline. Visualized bony structures are intact.          Impression:      Stable cardiomegaly dual chambered pacemaker. No active cardiopulmonary  disease.        Electronically Signed By-Aaron Mattson DO On:11/1/2022 10:41 PM  This report was finalized on [id number] by  Aaron Mattson DO.      LAB RESULTS (LAST 7 DAYS)    CBC  Results from last 7 days   Lab Units 11/01/22  2205   WBC 10*3/mm3 4.50   RBC 10*6/mm3 3.29*   HEMOGLOBIN g/dL 10.9*   HEMATOCRIT % 33.2*   MCV fL 100.8*   PLATELETS 10*3/mm3 203       BMP  Results from last 7 days   Lab Units 11/02/22  0543 11/01/22  2205   SODIUM mmol/L 141 137   POTASSIUM mmol/L 4.6 4.9   CHLORIDE mmol/L 108* 105   CO2 mmol/L 22.0 20.0*   BUN mg/dL 46* 45*   CREATININE mg/dL 1.14* 1.32*   GLUCOSE mg/dL 120* 142*   MAGNESIUM mg/dL  --  2.7*   PHOSPHORUS mg/dL 2.9  --        CMP   Results from last 7 days   Lab Units 11/02/22  0543 11/01/22  2205   SODIUM mmol/L 141 137   POTASSIUM mmol/L 4.6 4.9   CHLORIDE mmol/L 108* 105   CO2 mmol/L 22.0 20.0*   BUN mg/dL 46* 45*   CREATININE mg/dL 1.14* 1.32*   GLUCOSE mg/dL 120* 142*   ALBUMIN g/dL  --  3.80   BILIRUBIN mg/dL  --  0.3   ALK PHOS U/L  --  104   AST (SGOT) U/L  --  33*   ALT (SGPT) U/L  --  14         BNP        TROPONIN  Results from last 7 days   Lab Units 11/02/22  0543   TROPONIN T ng/mL 0.018        CoAg  Results from last 7 days   Lab Units 11/01/22  2205   INR  1.05   APTT seconds 24.5*       Creatinine Clearance  Estimated Creatinine Clearance: 39.6 mL/min (A) (by C-G formula based on SCr of 1.14 mg/dL (H)).    ABG        Radiology  CT Head Without Contrast    Result Date: 11/1/2022   1. Left parietal scalp hematoma and laceration. No acute intracranial abnormality. 2. Generalized age-related parenchymal volume loss.  Electronically Signed By-Aaron Mattson DO On:11/1/2022 10:50 PM This report was finalized on [id number] by  Aaron Mattson DO.    XR Chest 1 View    Result Date: 11/1/2022  Stable cardiomegaly dual chambered pacemaker. No active cardiopulmonary disease.   Electronically Signed By-Aaron Mattson DO On:11/1/2022 10:41 PM This report was finalized on [id number] by  Aaron Mattson DO.        EKG            I personally viewed and interpreted the patient's EKG/Telemetry data: Atrial fibrillation    ECHOCARDIOGRAM:    Results for orders placed during the hospital encounter of 02/25/22    Adult Transesophageal Echo (ALEYDA) W/ Cont if Necessary Per Protocol (Cardiology Department)    Interpretation Summary  Date of study  2/28/2022.    Indications  Recent TIA.    Procedure performed  Transesophageal echocardiogram and Doppler study.    Procedure  Anesthesia was provided by anesthesiologist with intravenous Diprivan.  ALEYDA probe could be passed without difficulty.  Patient tolerated the procedure well.  No complications were noted.    Results  Technically satisfactory study.  Mitral valve is structurally normal.  Mild mitral regurgitation is present.  Aortic valve is tricuspid.  Thickened with adequate opening motion.  Mild to moderate aortic regurgitation is present.  Tricuspid valve is normal.  Mild tricuspid regurgitation is present.  Calculated pulmonary artery pressure is 28 mmHg.  Mild biatrial enlargement is present.  Left atrial appendage is very small.  Left ventricle is normal in  size and contractility with ejection fraction of 60%.  Atrial septum is intact.  No evidence for intracardiac thrombus or smoking effect is present.  No pericardial effusion is present.  Aorta is normal.    Impression  Mild mitral regurgitation and mild to moderate aortic regurgitation.  Mild tricuspid regurgitation  Calculated pulmonary artery pressure is 28 mmHg.  Mild biatrial enlargement.  Left atrial appendage is small.  Left ventricle is normal in size and contractility with ejection fraction of 60%.              Cardiolite (Tc-99m Sestamibi) stress test      OTHER:     Assessment & Plan     Principal Problem:    Altered mental status, unspecified altered mental status type  Active Problems:    Acute kidney failure (HCC)    Diabetes mellitus with nephropathy (HCC)    ETOH abuse    Seizure (HCC)    ]]]]]]]]]]]]]]]]]]]]  Impression  ========  - Recent weakness falls and altered mental status.    -Status post permanent dual-chamber pacemaker implantation ([employer] MRI compatible) 11/3/2021     -Status post removal of loop recorder ([employer]) 11/3/2021     -History of syncope-no further problems  Intermittent Mobitz type II AV block  Patient is not on any medications to cause bradycardia     -Second-degree Mobitz type I AV block.  Narrow QRS complex.     -History of seizure-improved     -Recent difficulty with speaking.  TIA/stroke  Status post TPA with complete resolution of symptoms 2/3/2022     Transesophageal echocardiogram 2/28/2022 revealed  Mild mitral regurgitation and mild to moderate aortic regurgitation.  Mild tricuspid regurgitation  Calculated pulmonary artery pressure is 28 mmHg.  Mild biatrial enlargement.  Left atrial appendage is small.  Left ventricle is normal in size and contractility with ejection fraction of 60%.     Echocardiogram-normal 10/22/2020     Cardiac catheterization 10/23/2020 revealed:  Left ventricular size and contractility is normal with ejection fraction of  60%.  Normal epicardial coronary arteries.     Status post loop recorder placement 10/23/2020 ([employer] Linq)     -History of paroxysmal atrial fibrillation     -Hypertension dyslipidemia diabetes     -History of seizure disorder     -Renal dysfunction CKD 3  BUN 27 creatinine 1.35     -Exogenous obesity.     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     -No known allergies  ============  Plan  =========  Recent falls weakness and altered mental status.  Patient appears to be alert oriented and not in distress at the time of my examination.    Status post permanent dual-chamber pacemaker implantation ([employer] MRI compatible) level 3/20/2021.  Status post loop recorder removal 11/3/2021 ([employer] Linq)  Pacemaker site and function is normal.  Interrogation of the pacemaker revealed excellent pacing parameters.  Battery status is 7.5 years.     History of seizure disorder-improved.  No further episodes.     History of TIA/stroke  No further problems.  Status post TPA with complete resolution of symptoms 2/3/2022 premature ventricular contractions-asymptomatic.     Paroxysmal atrial fibrillation.  Interrogation of the pacemaker revealed AF burden less than 1%.  With recent stroke history and history of paroxysmal atrial fibrillation patient would benefit from anticoagulation.  Patient just received TPA.  However concerned about patient having falling spells.  Consideration will be given for watchman procedure.  CHADS2 score is 7.  However left atrial appendage is very small in size.     Hypertension-well-controlled  122/69     Renal dysfunction  17/1.04-2/28/2022  BUN/creatinine   45/1.32- 11/1/2022  46/1.1-11/2/2022     Anemia  Hemoglobin 8.6-12/4/2022  7.7-2/27/2022  Improved hemoglobin at 10.9-11/2/2022     Anticoagulation status was extensively discussed.  We will hold off on anticoagulation for multiple reasons including  Fall risk  Left atrial appendage is  very small and no clots or smoking effect was seen on ALEYDA.  (Watchman is not an option due to left atrial appendage being very small.)  Patient has problems with significant anemia.  Family favors no anticoagulation.     Close cardiac monitoring.     Further plan will depend on patient's progress.  ]]]]]]]]]]]]]]          Wang Plaza MD  11/02/22  09:09 EDT

## 2022-11-02 NOTE — CONSULTS
Primary Care Provider: Gabriel Goss*     Consult requested by:  Dr. Juárez     Reason for Consultation: Neurological evaluation, AMS/confusion     History taken from: patient chart RN    Chief complaint: passed out        SUBJECTIVE:    History of present illness: Background per H&P: Aracelis Vargas is a 87 y.o. female with past medical history of diabetes, coronary artery disease, pacemaker placement, atrial fibrillation, COPD, osteoarthritis, hypertension, chronic kidney disease stage III, and chronic congestive heart heart failure Who presented to [facility] on 11/1/2022 complaining of weakness, falls and altered mental status.  Patient reports she is normally followed by [facility] wound care and was being treated for cellulitis to the left lower extremity.  She reports her spouse had gone to the pharmacy to get her prescription when she had a fall at home hitting the filing cabinet with her head.  She sustained a head laceration that was stapled in the ER.  INR 1.0.  Work-up is essentially negative except for EtOH of 0.24.  She has been placed on CIWA protocol and admitted for further observation.     - Portions of the above HPI were copied from previous encounters and edited as appropriate. PMH as detailed below.     Patient has history of seizure disorder and is on Keppra 500 mg twice daily, her last episode was in February 2022.  She recently saw neurology outpatient and said that she likely missed a few doses of her Keppra prior to that spell in February.  Patient is reluctant to stay on Keppra but neurology outpatient recommended EEG prior to weaning off the Keppra.    Patient is not a great historian of the event.  She states she was in the kitchen coming in after making her  to drink and that is the last thing she remembers before going down.  She denied any presyncopal type of symptoms including being lightheaded or dizzy.  The next thing she remembered was being in the hospital.   Patient is a daily drinker and she states she also had more alcohol than she normally does yesterday.  There was no focal weakness, no vision changes prior to the episode or after.    Review of Systems   HENT: Negative.    Eyes: Negative for visual disturbance.   Respiratory: Positive for cough and shortness of breath.    Cardiovascular: Negative.    Gastrointestinal: Negative for nausea and vomiting.   Endocrine: Negative.    Genitourinary: Negative.    Musculoskeletal: Negative.    Skin: Negative.    Allergic/Immunologic: Negative.    Neurological: Positive for seizures and syncope. Negative for dizziness, tremors, facial asymmetry, speech difficulty, weakness, light-headedness, numbness and headaches.   Hematological: Negative.           PATIENT HISTORY:  Past Medical History:   Diagnosis Date   • Atherosclerosis of native coronary artery of native heart without angina pectoris 10/22/2020   • Atrioventricular block, Mobitz type 1, Mitch 10/21/2020    Added automatically from request for surgery [id number]   • Hypertension associated with stage 3 chronic kidney disease due to type 2 diabetes mellitus ([facility]) 10/22/2020   • Morbid obesity ([facility]) 10/22/2020   • Secondary hyperaldosteronism ([facility]) 10/22/2020   • Stage 3b chronic kidney disease ([facility]) 10/22/2020   • Type 2 diabetes mellitus with diabetic neuropathy, with long-term current use of insulin ([facility]) 10/22/2020   • Type 2 diabetes mellitus with diabetic neuropathy, with long-term current use of insulin ([facility]) 10/22/2020   ,   Past Surgical History:   Procedure Laterality Date   • CARDIAC CATHETERIZATION N/A 10/23/2020    Procedure: Left Heart Cath and coronary angiogram;  Surgeon: Wang Plaza MD;  Location: [facility] CATH INVASIVE LOCATION;  Service: Cardiovascular;  Laterality: N/A;   • CARDIAC ELECTROPHYSIOLOGY PROCEDURE Left 11/3/2021    Procedure: Pacemaker DC new;  Surgeon: Wnag Plaza MD;  Location: [facility] CATH INVASIVE LOCATION;  Service:  Cardiovascular;  Laterality: Left;   • CARDIAC ELECTROPHYSIOLOGY PROCEDURE N/A 11/3/2021    Procedure: LOOP RECORDER REMOVAL;  Surgeon: Wang Plaza MD;  Location: [facility] INVASIVE LOCATION;  Service: Cardiovascular;  Laterality: N/A;   • HYSTERECTOMY     • JOINT REPLACEMENT Right     Hip   • JOINT REPLACEMENT Left     hip   • JOINT REPLACEMENT Left     hip (for second time)   • JOINT REPLACEMENT Right     knee   • OOPHORECTOMY     ,   Family History   Problem Relation Age of Onset   • Heart disease Mother    • Heart disease Father    ,   Social History     Tobacco Use   • Smoking status: Never   • Smokeless tobacco: Never   Vaping Use   • Vaping Use: Never used   Substance Use Topics   • Alcohol use: Yes     Alcohol/week: 7.0 standard drinks     Types: 7 Drinks containing 0.5 oz of alcohol per week   • Drug use: Never   ,   Prior to Admission medications    Medication Sig Start Date End Date Taking? Authorizing Provider   amLODIPine (NORVASC) 5 MG tablet Take 5 mg by mouth Every Morning.    Lucas Arreguin MD   aspirin 325 MG tablet Take 1 tablet by mouth Daily. 2/7/22   Yoel Baker MD   atorvastatin (LIPITOR) 10 MG tablet Take 10 mg by mouth Daily.    Lucas Arreguin MD   ferrous sulfate 325 (65 FE) MG tablet Take 325 mg by mouth Daily With Breakfast.    Lucas Arreguin MD   folic acid (FOLVITE) 1 MG tablet Take 1 tablet by mouth Daily. 5/18/22   Ralph Almeida MD   furosemide (LASIX) 40 MG tablet Take 1 tablet by mouth Daily. 9/25/22   Lucas Arreguin MD   gabapentin (NEURONTIN) 300 MG capsule Take 300 mg by mouth 2 (Two) Times a Day.    Lucas Arreguin MD   levETIRAcetam (KEPPRA) 500 MG tablet Take 500 mg by mouth Every 12 (Twelve) Hours.    Lucas Arreguin MD   losartan (COZAAR) 50 MG tablet Take 50 mg by mouth Daily.    Lucas Arreguin MD   NovoLIN 70/30 (70-30) 100 UNIT/ML injection INJECT 30 UNITS UNDER THE SKIN INTO THE APPROPRIATE AREA TWO  TIMES A DAY WITH A MEAL 9/7/22   Gabriel Goss MD   omeprazole (priLOSEC) 40 MG capsule Take 1 capsule by mouth Every Morning Before Breakfast. Take on empty stomach! 8/17/22   Bria Salazar MD   spironolactone (ALDACTONE) 25 MG tablet Take 25 mg by mouth Every Morning.    Provider, MD Lucas   topiramate (TOPAMAX) 25 MG tablet Take 0.5 tablets by mouth 2 (Two) Times a Day. 3/23/22   Gabriel Goss MD   vitamin B-12 (CYANOCOBALAMIN) 1000 MCG tablet Take 1 tablet by mouth Daily. 5/18/22   Ralph Almeida MD    Allergies:  Latex, natural rubber and Adhesive tape    Current Facility-Administered Medications   Medication Dose Route Frequency Provider Last Rate Last Admin   • dextrose (D50W) (25 g/50 mL) IV injection 25 g  25 g Intravenous Q15 Min PRN Cristina Wells APRN       • dextrose (GLUTOSE) oral gel 15 g  15 g Oral Q15 Min PRN Cristina Wells APRN       • glucagon (human recombinant) (GLUCAGEN DIAGNOSTIC) 1 mg in sterile water (preservative free) 1 mL injection  1 mg Intramuscular Q15 Min PRN Cristina Wells APRN       • insulin lispro (ADMELOG) injection 2-9 Units  2-9 Units Subcutaneous TID With Meals Cristina Wells APRN       • sodium chloride 0.9 % flush 10 mL  10 mL Intravenous PRN Niurka Melchor APRN   10 mL at 11/02/22 0842     Current Outpatient Medications   Medication Sig Dispense Refill   • amLODIPine (NORVASC) 5 MG tablet Take 5 mg by mouth Every Morning.     • aspirin 325 MG tablet Take 1 tablet by mouth Daily. 30 tablet 0   • atorvastatin (LIPITOR) 10 MG tablet Take 10 mg by mouth Daily.     • ferrous sulfate 325 (65 FE) MG tablet Take 325 mg by mouth Daily With Breakfast.     • folic acid (FOLVITE) 1 MG tablet Take 1 tablet by mouth Daily. 30 tablet 11   • furosemide (LASIX) 40 MG tablet Take 1 tablet by mouth Daily.     • gabapentin (NEURONTIN) 300 MG capsule Take 300 mg by mouth 2 (Two) Times a Day.     • levETIRAcetam (KEPPRA) 500 MG tablet Take  500 mg by mouth Every 12 (Twelve) Hours.     • losartan (COZAAR) 50 MG tablet Take 50 mg by mouth Daily.     • NovoLIN 70/30 (70-30) 100 UNIT/ML injection INJECT 30 UNITS UNDER THE SKIN INTO THE APPROPRIATE AREA TWO TIMES A DAY WITH A MEAL 20 mL 3   • omeprazole (priLOSEC) 40 MG capsule Take 1 capsule by mouth Every Morning Before Breakfast. Take on empty stomach! 90 capsule 1   • spironolactone (ALDACTONE) 25 MG tablet Take 25 mg by mouth Every Morning.     • topiramate (TOPAMAX) 25 MG tablet Take 0.5 tablets by mouth 2 (Two) Times a Day. 30 tablet 3   • vitamin B-12 (CYANOCOBALAMIN) 1000 MCG tablet Take 1 tablet by mouth Daily. 30 tablet 11        ________________________________________________________        OBJECTIVE:    PHYSICAL EXAM:    Constitutional: The patient is in no apparent distress, bright awake and alert. There is no shortness of breath.     PSYCHIATRIC: Mood/affect normal, judgement normal, appropriate    HEENT: Normocephalic, atraumatic.     Chest: Short of breath when speaking for long periods of time.     Cardiac: Regular rate and rhythm.     Extremities:  No clubbing, cyanosis or edema.    NEUROLOGICAL:    Cognition:   Fully oriented.  Fund of knowledge excellent.  Concentration and attention normal.   Language normal with normal comprehension, fluent speech, intact repetition and naming.   Short and long term memory appears intact    Cranial nerves;    II - pupils bilaterally equal reacting to light,  No new Visual field deficits;  Fundoscopic exam- Not able to be done, non-dilated exam  III,IV,VI: EOMI with no diplopia  V: Normal facial sensations  VII: No facial asymmetry,  VIII: No New hearing abnormality  IX, X, XI: normal gag and shoulder shrug;  XII: tongue is in the midline.    Sensory:  Intact to light touch in all extremities.     Motor: Strength 5/5 bilaterally upper and lower extremities. No involuntary movements present. Normal tone and bulk.  Deep tendon reflexes: 2/4 and  symmetrical in biceps, brachioradialis, triceps, bilateral 2/4 knees and ankles. Both plantars are flexor.    Cerebellar: Finger to nose and mirror movements normal bilaterally.    Gait and balance: Deferred.     Physical exam performed by FLOR Reynaga.  ________________________________________________________   RESULTS REVIEW:    VITAL SIGNS:   Temp:  [97.4 °F (36.3 °C)] 97.4 °F (36.3 °C)  Heart Rate:  [69-92] 91  Resp:  [20] 20  BP: ()/(43-76) 117/66     LABS:      Lab 11/01/22 2205   WBC 4.50   HEMOGLOBIN 10.9*   HEMATOCRIT 33.2*   PLATELETS 203   NEUTROS ABS 2.00   LYMPHS ABS 1.70   MONOS ABS 0.50   EOS ABS 0.30   .8*   PROTIME 10.8   APTT 24.5*         Lab 11/02/22  0543 11/01/22  2205   SODIUM 141 137   POTASSIUM 4.6 4.9   CHLORIDE 108* 105   CO2 22.0 20.0*   ANION GAP 11.0 12.0   BUN 46* 45*   CREATININE 1.14* 1.32*   EGFR 47.3* 39.6*   GLUCOSE 120* 142*   CALCIUM 8.9 8.9   MAGNESIUM  --  2.7*   PHOSPHORUS 2.9  --    TSH  --  1.410         Lab 11/01/22 2205   TOTAL PROTEIN 7.7   ALBUMIN 3.80   GLOBULIN 3.9   ALT (SGPT) 14   AST (SGOT) 33*   BILIRUBIN 0.3   ALK PHOS 104         Lab 11/02/22  1025 11/02/22  0543 11/01/22  2205   PROBNP  --   --  461.3   TROPONIN T 0.036* 0.018 0.020   PROTIME  --   --  10.8   INR  --   --  1.05                 UA    Urinalysis 6/29/22 6/29/22 8/20/22 8/20/22 11/1/22    2329 2329 1629 1629    Squamous Epithelial Cells, UA  0-2  0-2    Specific Gravity, UA 1.011  1.009  1.011   Ketones, UA Negative  Negative  Negative   Blood, UA Negative  Negative  Negative   Leukocytes, UA Small (1+) (A)  Trace (A)  Negative   Nitrite, UA Negative  Negative  Negative   RBC, UA  0-2 (A)  0-2 (A)    WBC, UA  6-12 (A)  3-5 (A)    Bacteria, UA  4+ (A)  1+ (A)    (A) Abnormal value       Comments are available for some flowsheets but are not being displayed.             Lab Results   Component Value Date    TSH 1.410 11/01/2022    LDL 72 02/04/2022    HGBA1C 6.6 (H) 08/17/2022     SIHGNSPR03 >2,000 (H) 2022       IMAGING STUDIES:  CT Head Without Contrast    Result Date: 2022   1. Left parietal scalp hematoma and laceration. No acute intracranial abnormality. 2. Generalized age-related parenchymal volume loss.  Electronically Signed By-Aaron Mattson DO On:2022 10:50 PM This report was finalized on [id number] by  Aaron Mattson DO.    XR Chest 1 View    Result Date: 2022  Stable cardiomegaly dual chambered pacemaker. No active cardiopulmonary disease.   Electronically Signed By-Aaron Mattson DO On:2022 10:41 PM This report was finalized on [id number] by  Aaron Mattson DO.      I reviewed the patient's new clinical results.    ________________________________________________________     PROBLEM LIST:    Altered mental status, unspecified altered mental status type    Stage 3b chronic kidney disease (CMS/HCC)    Hypertension associated with stage 3 chronic kidney disease due to type 2 diabetes mellitus (HCC)    Morbid obesity (HCC)    Chronic coronary artery disease    Mixed hyperlipidemia    Primary hypertension    GERD without esophagitis    Cardiac pacemaker in situ    Obstructive sleep apnea    Type 2 diabetes mellitus with hyperglycemia, with long-term current use of insulin (HCC)    Seizure disorder (HCC)    Acute kidney failure (HCC)    Diabetes mellitus with nephropathy (HCC)    ETOH abuse    Seizure (HCC)    Alcohol intoxication with delirium (HCC)    History of CVA (cerebrovascular accident)            ASSESSMENT/PLAN:  1. Likely breakthrough seizure 2/2 alcohol (alcohol level 0.243%). Patient has history of seizures.   - CT head: Left parietal scalp hematoma and laceration. No acute intracranial  abnormality.  - Outpatient EEG *already ordered by patient's established Neurology NP.   - EKG: Atrial fibrillation, rate 79   - Labs: TSH: 1.410, Ma.7, Phos: 2.9, Na: 141 , Ca: 8.9  -  Increase Keppra to 500 mg QAM and 1000 mg QHS.   - Follow up  with Neurology outpatient as planned   - Seizure precuations (see below)   - Maintain healthy lifestyle including stress management, routine sleep schedule, and a well balanced diet.  Alcohol and drugs lowers the seizure threshold and should be avoided. Take all medications as prescribed.    2. Atrial fibrillation   -[id number] score 8-anticoagulation recommended- will defer to patient's established Cardiologist.     SEIZURE/SYNCOPE INSTRUCTIONS:  -Recommended to observe all seizure precautions, including, but not limited to:   -No driving until seizure free for more than 3 months- as per State driving regulation / law;   -Avoid all high-risk activity, Take showers instead of baths, Avoid swimming without observation, Avoid open heat sources, Avoid working at heights, and Avoid engaging in all potentially hazardous activities.   -Patient expressed clear understanding.    There are no further recommendations. Will sign off, please call with any questions or concerns.    I discussed the patient's findings and my recommendations with patient and nursing staff    SHANI Velasquez  11/02/22  11:43 EDT

## 2022-11-02 NOTE — PLAN OF CARE
Goal Outcome Evaluation:  Plan of Care Reviewed With: patient        Progress: improving  Outcome Evaluation: 86 yo female adm 11/1/22 late in day for altered mental status following fall at home. CT of head (-) for acute changes except (+) for L parietal laceration & scalp hematoma. Pt's etoh level was 0.24 at admission.  Hx of etoh abuse, ckd3, seizures, copd, chf, dm, LLE cellulitis. Still being treated for LLE cellulitis through wound care center. Pt lives w/  at baseline, who is currently  recovering from open heart sx last week. Pt reports she normally ambulates short community distances w/ a cane for support, but she does have a rw at home. Lives in single level home w/ one stair to enter. Today, pt is alert and oriented x 4 She comes to sit well, and comes to stand w/ min assist. She needed assist in standing to transfer on/off bsc. She then required min assist to stand, and min to mod assist to amb x 15 ft w/ 2 hands held. Pt not safe for home today. However, expect that as etoh level drops, her balance will improve. Recommend HH at d/c w/ pt to use rw at all times. Will reassess in AM to see if ready for home.  PPE: gloves, mask, eyewear.

## 2022-11-02 NOTE — CASE MANAGEMENT/SOCIAL WORK
Discharge Planning Assessment  [facility]     Patient Name: Aracelis Vargas  MRN: [id number]  Today's Date: 11/2/2022    Admit Date: 11/1/2022    Plan: Home with , Declined SNF, Referral to [facility] pending acceptance   Discharge Needs Assessment     Row Name 11/02/22 1547       Living Environment    People in Home spouse    Current Living Arrangements home    Primary Care Provided by self    Able to Return to Prior Arrangements yes       Resource/Environmental Concerns    Resource/Environmental Concerns none    Transportation Concerns none       Transition Planning    Patient/Family Anticipates Transition to home with family    Patient/Family Anticipated Services at Transition home health care    Transportation Anticipated family or friend will provide       Discharge Needs Assessment    Readmission Within the Last 30 Days no previous admission in last 30 days    Equipment Currently Used at Home cane, quad tip    Concerns to be Addressed denies needs/concerns at this time    Anticipated Changes Related to Illness none    Equipment Needed After Discharge none    Discharge Facility/Level of Care Needs home with home health;nursing facility, skilled    Provided Post Acute Provider List? Yes    Post Acute Provider List Home Health;Nursing Home    Provided Post Acute Provider Quality & Resource List? Yes    Post Acute Provider Quality and Resource List Home Health;Nursing Home    Delivered To Patient    Method of Delivery In person    Patient's Choice of Community Agency(s) Declined SNF requested [facility] referral               Discharge Plan     Row Name 11/02/22 1547       Plan    Plan Home with , Declined SNF, Referral to [facility] pending acceptance    Plan Comments Met with Patient at bedside Lives at home with . AIDL's. Reports family will provide transportation at discharge. [facility]  as her  is current with them. Referral sent pending accetpance. PCP and Pharmacy verified, able to afford medciations. D/C Barriers:Cardiology and Neuro following              Continued Care and Services - Admitted Since 11/1/2022     Home Medical Care     Service Provider Request Status Selected Services Address Phone Fax Patient Preferred     [facility] Pending - Request Sent N/A [address] [facility] [place] [id number] [phone] --              Expected Discharge Date and Time     Expected Discharge Date Expected Discharge Time    Nov 3, 2022          Demographic Summary     Row Name 11/02/22 1541       General Information    Admission Type observation    Arrived From emergency department    Required Notices Provided Observation Status Notice    Referral Source admission list    Reason for Consult discharge planning    Preferred Language English               Functional Status     Row Name 11/02/22 1546       Functional Status    Usual Activity Tolerance moderate    Current Activity Tolerance moderate       Functional Status, IADL    Medications independent    Meal Preparation independent    Housekeeping independent    Laundry independent    Shopping independent       Mental Status    General Appearance WDL WDL       Mental Status Summary    Recent Changes in Mental Status/Cognitive Functioning no changes                      Patient Forms       Row Name 11/02/22 1546       Patient Forms    Important Message from Medicare (IMM) --  [place] 11/2 per reg            Met with patient at bedside wearing mask and goggles, Spent less than 15 minutes in room at greater than 6 feet distance.         Crystal Powell RN

## 2022-11-02 NOTE — ED PROVIDER NOTES
Subjective   History of Present Illness  Chief complaint: Altered mental status, fall      Context: Patient is an 87-year-old female who comes in by EMS with 2 reported falls today and noted altered mental status.  There is no family at bedside to provide additional history.  Unknown last known well.  Patient does have a obvious wound to her scalp but denies any other injuries and denies any loss of consciousness.  It is unclear if she is currently on warfarin.  Per EMS she is reportedly alert and oriented x3 per the  at home.  No recent illness reported.    Location: [place]  Duration: Unknown  Timing:  Quality/Severity: Denies  Modifying factors: Denies  Associated symptoms: Denies        Additional hx provided by: ems    PCP: robby               Review of Systems   Unable to perform ROS: Mental status change       Past Medical History:   Diagnosis Date   • Atherosclerosis of native coronary artery of native heart without angina pectoris 10/22/2020   • Atrioventricular block, Mobitz type 1, [place] 10/21/2020    Added automatically from request for surgery [id number]   • Hypertension associated with stage 3 chronic kidney disease due to type 2 diabetes mellitus ([facility]) 10/22/2020   • Morbid obesity ([facility]) 10/22/2020   • Secondary hyperaldosteronism ([facility]) 10/22/2020   • Stage 3b chronic kidney disease ([facility]) 10/22/2020   • Type 2 diabetes mellitus with diabetic neuropathy, with long-term current use of insulin ([facility]) 10/22/2020   • Type 2 diabetes mellitus with diabetic neuropathy, with long-term current use of insulin ([facility]) 10/22/2020       Allergies   Allergen Reactions   • Latex, Natural Rubber Rash   • Adhesive Tape Rash       Past Surgical History:   Procedure Laterality Date   • CARDIAC CATHETERIZATION N/A 10/23/2020    Procedure: Left Heart Cath and coronary angiogram;  Surgeon: Wang Plaza MD;  Location: [facility] CATH INVASIVE LOCATION;  Service: Cardiovascular;  Laterality: N/A;   • CARDIAC  ELECTROPHYSIOLOGY PROCEDURE Left 11/3/2021    Procedure: Pacemaker DC new;  Surgeon: Wang Plaza MD;  Location: [facility] CATH INVASIVE LOCATION;  Service: Cardiovascular;  Laterality: Left;   • CARDIAC ELECTROPHYSIOLOGY PROCEDURE N/A 11/3/2021    Procedure: LOOP RECORDER REMOVAL;  Surgeon: Wang Plaza MD;  Location: [facility] CATH INVASIVE LOCATION;  Service: Cardiovascular;  Laterality: N/A;   • HYSTERECTOMY     • JOINT REPLACEMENT Right     Hip   • JOINT REPLACEMENT Left     hip   • JOINT REPLACEMENT Left     hip (for second time)   • JOINT REPLACEMENT Right     knee   • OOPHORECTOMY         Family History   Problem Relation Age of Onset   • Heart disease Mother    • Heart disease Father        Social History     Socioeconomic History   • Marital status:    Tobacco Use   • Smoking status: Never   • Smokeless tobacco: Never   Vaping Use   • Vaping Use: Never used   Substance and Sexual Activity   • Alcohol use: Yes     Alcohol/week: 7.0 standard drinks     Types: 7 Drinks containing 0.5 oz of alcohol per week   • Drug use: Never   • Sexual activity: Defer           Objective   Physical Exam     Vital signs in triage nurse note reviewed.  Constitutional: Awake, alert.  Smells of alcohol  HEENT: Normocephalic, 5 cm laceration noted to the occiput with scant active bleeding; pupils are PERRL with intact EOM; oropharynx is pink and moist without exudate or erythema.  Neck: Supple, full range of motion without pain;   Cardiovascular: Regular rate and rhythm, normal S1-S2.    Pulmonary: Respiratory effort regular, nonlabored; breath sounds clear to auscultation all fields.  Abdomen: Soft, nontender, nondistended with normoactive bowel sounds; no rebound or guarding.  Musculoskeletal: Independent range of motion of all extremities, no palpable tenderness; wraps noted to her bilateral lower extremities without any new injuries. Spine is midline without obvious curvature scoliosis. No bony tenderness, soft  tissue swelling, deformity is noted. Paraspinal musculature is soft, nontender.  Neuro: Alert oriented x2, speech is clear but occasionally repetitive.  Normal shoulder shrug, equal palate rise, no peripheral vision loss, no facial asymmetry,no pronator drift, normal coordination.      Laceration Repair    Date/Time: 11/2/2022 1:00 AM  Performed by: Niurka Melchor APRN  Authorized by: Niurka Melchor APRN     Consent:     Consent obtained:  Verbal    Consent given by:  Patient    Risks, benefits, and alternatives were discussed: yes      Risks discussed:  Need for additional repair, infection, pain, poor cosmetic result and retained foreign body  Universal protocol:     Procedure explained and questions answered to patient or proxy's satisfaction: yes      Site/side marked: yes      Immediately prior to procedure, a time out was called: yes      Patient identity confirmed:  Verbally with patient and arm band  Anesthesia:     Anesthesia method:  Local infiltration    Local anesthetic:  Lidocaine 1% w/o epi  Laceration details:     Location:  Scalp    Scalp location:  Occipital    Length (cm):  5  Pre-procedure details:     Preparation:  Patient was prepped and draped in usual sterile fashion and imaging obtained to evaluate for foreign bodies  Exploration:     Wound exploration: wound explored through full range of motion    Treatment:     Area cleansed with:  Shur-Clens  Skin repair:     Repair method:  Staples  Repair type:     Repair type:  Simple  Post-procedure details:     Dressing:  Open (no dressing)               ED Course      Labs Reviewed   COMPREHENSIVE METABOLIC PANEL - Abnormal; Notable for the following components:       Result Value    Glucose 142 (*)     BUN 45 (*)     Creatinine 1.32 (*)     CO2 20.0 (*)     AST (SGOT) 33 (*)     BUN/Creatinine Ratio 34.1 (*)     eGFR 39.6 (*)     All other components within normal limits    Narrative:     GFR Normal >60  Chronic Kidney Disease <60  Kidney  Failure <15    The GFR formula is only valid for adults with stable renal function between ages 18 and 70.   APTT - Abnormal; Notable for the following components:    PTT 24.5 (*)     All other components within normal limits   CBC WITH AUTO DIFFERENTIAL - Abnormal; Notable for the following components:    RBC 3.29 (*)     Hemoglobin 10.9 (*)     Hematocrit 33.2 (*)     .8 (*)     Eosinophil % 6.5 (*)     All other components within normal limits   MAGNESIUM - Abnormal; Notable for the following components:    Magnesium 2.7 (*)     All other components within normal limits   PROTIME-INR - Normal   URINALYSIS W/ CULTURE IF INDICATED - Normal    Narrative:     In absence of clinical symptoms, the presence of pyuria, bacteria, and/or nitrites on the urinalysis result does not correlate with infection.  Urine microscopic not indicated.   TROPONIN (IN-HOUSE) - Normal    Narrative:     Troponin T Reference Range:  <= 0.03 ng/mL-   Negative for AMI  >0.03 ng/mL-     Abnormal for myocardial necrosis.  Clinicians would have to utilize clinical acumen, EKG, Troponin and serial changes to determine if it is an Acute Myocardial Infarction or myocardial injury due to an underlying chronic condition.       Results may be falsely decreased if patient taking Biotin.     BNP (IN-HOUSE) - Normal    Narrative:     Among patients with dyspnea, NT-proBNP is highly sensitive for the detection of acute congestive heart failure. In addition NT-proBNP of <300 pg/ml effectively rules out acute congestive heart failure with 99% negative predictive value.    Results may be falsely decreased if patient taking Biotin.     TSH - Normal   ETHANOL    Narrative:     Plasma Ethanol Clinical Symptoms:    ETOH (%)               Clinical Symptom  .01-.05              No apparent influence  .03-.12              Euphoria, Diminished judgment and attention   .09-.25              Impaired comprehension, Muscle incoordination  .18-.30               Bowel obstruction    H/O abdominoplasty    H/O  section  X3 (,,)  H/O gastric bypass    H/O tubal ligation  2014 Confusion, Staggered gait, Slurred speech  .25-.40              Markedly decreased response to stimuli, unable to stand or                        walk, vomitting, sleep or stupor  .35-.50              Comatose, Anesthesia, Subnormal body temperature       CBC AND DIFFERENTIAL    Narrative:     The following orders were created for panel order CBC & Differential.  Procedure                               Abnormality         Status                     ---------                               -----------         ------                     CBC Auto Differential[[id number]]        Abnormal            Final result                 Please view results for these tests on the individual orders.     Medications   sodium chloride 0.9 % flush 10 mL (has no administration in time range)     CT Head Without Contrast    Result Date: 11/1/2022   1. Left parietal scalp hematoma and laceration. No acute intracranial abnormality. 2. Generalized age-related parenchymal volume loss.  Electronically Signed By-Aaron Mattson DO On:11/1/2022 10:50 PM This report was finalized on [id number] by  Aaron Mattson DO.    XR Chest 1 View    Result Date: 11/1/2022  Stable cardiomegaly dual chambered pacemaker. No active cardiopulmonary disease.   Electronically Signed By-Aaron Mattson DO On:11/1/2022 10:41 PM This report was finalized on [id number] by  Aaron Mattson DO.    Prior to Admission medications    Medication Sig Start Date End Date Taking? Authorizing Provider   amLODIPine (NORVASC) 5 MG tablet Take 5 mg by mouth Every Morning.    ProviderLucas MD   aspirin 325 MG tablet Take 1 tablet by mouth Daily. 2/7/22   Yoel Baker MD   atorvastatin (LIPITOR) 10 MG tablet Take 10 mg by mouth Daily.    ProvideruLcas MD   ferrous sulfate 325 (65 FE) MG tablet Take 325 mg by mouth Daily With Breakfast.    ProviderLucas MD   folic acid (FOLVITE) 1 MG tablet Take 1 tablet by mouth Daily. 5/18/22   Ralph Almeida,  "MD   furosemide (LASIX) 20 MG tablet  9/25/22   Lucas Arreguin MD   gabapentin (NEURONTIN) 300 MG capsule Take 300 mg by mouth 2 (Two) Times a Day.    Lucas Arreguin MD   levETIRAcetam (KEPPRA) 500 MG tablet Take 500 mg by mouth Every 12 (Twelve) Hours.    Lucas Arreguin MD   losartan (COZAAR) 50 MG tablet Take 50 mg by mouth Daily.    Lucas Arreguin MD   NovoLIN 70/30 (70-30) 100 UNIT/ML injection INJECT 30 UNITS UNDER THE SKIN INTO THE APPROPRIATE AREA TWO TIMES A DAY WITH A MEAL 9/7/22   Gabriel Goss MD   omeprazole (priLOSEC) 40 MG capsule Take 1 capsule by mouth Every Morning Before Breakfast. Take on empty stomach! 8/17/22   Bria Salazar MD   spironolactone (ALDACTONE) 25 MG tablet Take 25 mg by mouth Every Morning.    Lucas Arreguin MD   topiramate (TOPAMAX) 25 MG tablet Take 0.5 tablets by mouth 2 (Two) Times a Day. 3/23/22   Gabriel Goss MD   vitamin B-12 (CYANOCOBALAMIN) 1000 MCG tablet Take 1 tablet by mouth Daily. 5/18/22   Ralph Almeida MD                                          MDM  Number of Diagnoses or Management Options  Alcoholic intoxication with complication (HCC)  Altered mental status, unspecified altered mental status type  Laceration of scalp, initial encounter  Diagnosis management comments: /52   Pulse 70   Temp 97.4 °F (36.3 °C) (Oral)   Resp 20   Ht 154.9 cm (61\")   Wt 102 kg (225 lb)   SpO2 97%   BMI 42.51 kg/m²       Comorbidities:  has a past medical history of Atherosclerosis of native coronary artery of native heart without angina pectoris (10/22/2020), Atrioventricular block, Mobitz type 1, [place] (10/21/2020), Hypertension associated with stage 3 chronic kidney disease due to type 2 diabetes mellitus (HCC) (10/22/2020), Morbid obesity (HCC) (10/22/2020), Secondary hyperaldosteronism (HCC) (10/22/2020), Stage 3b chronic kidney disease (HCC) (10/22/2020), Type 2 diabetes mellitus with diabetic " neuropathy, with long-term current use of insulin ([facility]) (10/22/2020), and Type 2 diabetes mellitus with diabetic neuropathy, with long-term current use of insulin ([facility]) (10/22/2020).  Differentials: Infection and electrolyte abnormalities ICH TIA not all inclusive of differentials considered  Discussion with provider: Hospitalist  Radiology interpretation:  X-rays reviewed by me and interpreted by radiologist,   CT Head Without Contrast    Result Date: 11/1/2022   1. Left parietal scalp hematoma and laceration. No acute intracranial abnormality. 2. Generalized age-related parenchymal volume loss.  Electronically Signed By-Aaron Mattson DO On:11/1/2022 10:50 PM This report was finalized on [id number] by  Aaron Mattson DO.    XR Chest 1 View    Result Date: 11/1/2022  Stable cardiomegaly dual chambered pacemaker. No active cardiopulmonary disease.   Electronically Signed By-Aaron Mattson DO On:11/1/2022 10:41 PM This report was finalized on [id number] by  Aaron Mattson DO.    Lab interpretation:  Labs viewed by me significant for, creatinine is 1.32 which is baseline for her, sugar 142, troponin 0.020, EtOH 0.243    Appropriate PPE worn during exam.  Patient has an unknown last known well time is not a tPA candidate.  It is unclear if she is on warfarin.  She states she does think she has had a tetanus shot last 5 years.  See above for wound repair  Pacer was interrogated noted to have no V. tach or SVT episodes since October 10, 2022  Will be admitted for further observation    i discussed findings with patient who voices understanding of admission    This document is intended for medical expert use only. Reading of this document by patients and/or patient's family without participating medical staff guidance may result in misinterpretation and unintended morbidity.  Any interpretation of such data is the responsibility of the patient and/or family member responsible for the patient in concert with their  primary or specialist providers, not to be left for sources of online searches such as [employer], [employer] or similar queries. Relying on these approaches to knowledge may result in misinterpretation, misguided goals of care and even death should patients or family members try recommendations outside of the realm of professional medical care in a supervised inpatient environment.         Final diagnoses:   Altered mental status, unspecified altered mental status type   Alcoholic intoxication with complication (HCC)   Laceration of scalp, initial encounter       ED Disposition  ED Disposition     ED Disposition   Decision to Admit    Condition   --    Comment   Level of Care: Telemetry [5]   Admitting Physician: KELLY HERNANDEZ [[id number]]   Attending Physician: KELLY HERNANDEZ [[id number]]   Bed Request Comments: mendez               No follow-up provider specified.       Medication List      No changes were made to your prescriptions during this visit.          Niurka Melchor, APRN  11/02/22 0132

## 2022-11-02 NOTE — PROGRESS NOTES
Synopsis  Nursing report ED to floor  Aracelis Vargas  87 y.o.  female    HPI:   Chief Complaint   Patient presents with    Fall       Admitting doctor:   Armin Juárez MD    Admitting diagnosis:   The primary encounter diagnosis was Altered mental status, unspecified altered mental status type. Diagnoses of Alcoholic intoxication with complication (HCC) and Laceration of scalp, initial encounter were also pertinent to this visit.    Code status:   Current Code Status       Date Active Code Status Order ID Comments User Context       11/2/2022 0149 CPR (Attempt to Resuscitate) [id number]  Cristina Wells APRN ED        Question Answer    Code Status (Patient has no pulse and is not breathing) CPR (Attempt to Resuscitate)    Medical Interventions (Patient has pulse or is breathing) Full Support                    Allergies:   Latex, natural rubber and Adhesive tape    Isolation:  No active isolations     Fall Risk:  Fall Risk Assessment was completed, and patient is at high risk for falls.   Predictive Model Details         52 (High) Factor Value    Calculated 11/2/2022 14:08 Age 87    Risk of Fall Model Active Peripheral IV Present     Imaging order in this encounter Present     Respiratory Rate 20     Musculoskeletal Assessment X     Magnesium 2.7 mg/dL     Skin Assessment WDL     Diastolic BP 62     Financial Class Medicare     Number of administrations of Anti-Convulsants 1     Drug Use No     Theo Scale 19     Number of Distinct Medication Classes administered 2     Peripheral Vascular Assessment WDL     Calcium 8.9 mg/dL     Creatinine 1.14 mg/dL     Albumin 3.8 g/dL     Chloride 108 mmol/L     Days after Admission 0.69     Gastrointestinal Assessment WDL     Cardiac Assessment WDL     Potassium 4.6 mmol/L     Total Bilirubin 0.3 mg/dL     ALT 14 U/L         Weight:       11/01/22 2137   Weight: 102 kg (225 lb)       Intake and Output  No intake or output data in the 24 hours ending 11/02/22 1544    Diet:    Dietary Orders (From admission, onward)       Start     Ordered    11/02/22 0145  Diet Regular  Diet Effective Now        Question:  Diet / Texture / Consistency  Answer:  Regular    11/02/22 0149                     Most recent vitals:   Vitals:    11/02/22 1201 11/02/22 1310 11/02/22 1445 11/02/22 1501   BP: 139/57 140/62 151/65 152/58   Pulse: 88 91 78 91   Resp:       Temp:       TempSrc:       SpO2: 97% 95% 96% 97%   Weight:       Height:           Active LDAs/IV Access:   Lines, Drains & Airways       Active LDAs       Name Placement date Placement time Site Days    Peripheral IV 11/01/22 2223 Posterior;Right Hand 11/01/22 2223  Hand  less than 1                    Skin Condition:   Skin Assessments (last day)       Date/Time Sensory Perception Moisture Activity Mobility Nutrition Friction and Shear Theo Score    11/02/22 0800 4-->no impairment 3-->occasionally moist 3-->walks occasionally 3-->slightly limited 3-->adequate 3-->no apparent problem 19             Labs (abnormal labs have a star):   Labs Reviewed   COMPREHENSIVE METABOLIC PANEL - Abnormal; Notable for the following components:       Result Value    Glucose 142 (*)     BUN 45 (*)     Creatinine 1.32 (*)     CO2 20.0 (*)     AST (SGOT) 33 (*)     BUN/Creatinine Ratio 34.1 (*)     eGFR 39.6 (*)     All other components within normal limits    Narrative:     GFR Normal >60  Chronic Kidney Disease <60  Kidney Failure <15    The GFR formula is only valid for adults with stable renal function between ages 18 and 70.   APTT - Abnormal; Notable for the following components:    PTT 24.5 (*)     All other components within normal limits   CBC WITH AUTO DIFFERENTIAL - Abnormal; Notable for the following components:    RBC 3.29 (*)     Hemoglobin 10.9 (*)     Hematocrit 33.2 (*)     .8 (*)     Eosinophil % 6.5 (*)     All other components within normal limits   MAGNESIUM - Abnormal; Notable for the following components:    Magnesium 2.7 (*)      All other components within normal limits   BASIC METABOLIC PANEL - Abnormal; Notable for the following components:    Glucose 120 (*)     BUN 46 (*)     Creatinine 1.14 (*)     Chloride 108 (*)     BUN/Creatinine Ratio 40.4 (*)     eGFR 47.3 (*)     All other components within normal limits    Narrative:     GFR Normal >60  Chronic Kidney Disease <60  Kidney Failure <15    The GFR formula is only valid for adults with stable renal function between ages 18 and 70.   TROPONIN (IN-HOUSE) - Abnormal; Notable for the following components:    Troponin T 0.036 (*)     All other components within normal limits    Narrative:     Troponin T Reference Range:  <= 0.03 ng/mL-   Negative for AMI  >0.03 ng/mL-     Abnormal for myocardial necrosis.  Clinicians would have to utilize clinical acumen, EKG, Troponin and serial changes to determine if it is an Acute Myocardial Infarction or myocardial injury due to an underlying chronic condition.       Results may be falsely decreased if patient taking Biotin.     POCT GLUCOSE FINGERSTICK - Abnormal; Notable for the following components:    Glucose 121 (*)     All other components within normal limits   POCT GLUCOSE FINGERSTICK - Abnormal; Notable for the following components:    Glucose 132 (*)     All other components within normal limits   PROTIME-INR - Normal   URINALYSIS W/ CULTURE IF INDICATED - Normal    Narrative:     In absence of clinical symptoms, the presence of pyuria, bacteria, and/or nitrites on the urinalysis result does not correlate with infection.  Urine microscopic not indicated.   TROPONIN (IN-HOUSE) - Normal    Narrative:     Troponin T Reference Range:  <= 0.03 ng/mL-   Negative for AMI  >0.03 ng/mL-     Abnormal for myocardial necrosis.  Clinicians would have to utilize clinical acumen, EKG, Troponin and serial changes to determine if it is an Acute Myocardial Infarction or myocardial injury due to an underlying chronic condition.       Results may be falsely  decreased if patient taking Biotin.     BNP (IN-HOUSE) - Normal    Narrative:     Among patients with dyspnea, NT-proBNP is highly sensitive for the detection of acute congestive heart failure. In addition NT-proBNP of <300 pg/ml effectively rules out acute congestive heart failure with 99% negative predictive value.    Results may be falsely decreased if patient taking Biotin.     TSH - Normal   PHOSPHORUS - Normal   TROPONIN (IN-HOUSE) - Normal    Narrative:     Troponin T Reference Range:  <= 0.03 ng/mL-   Negative for AMI  >0.03 ng/mL-     Abnormal for myocardial necrosis.  Clinicians would have to utilize clinical acumen, EKG, Troponin and serial changes to determine if it is an Acute Myocardial Infarction or myocardial injury due to an underlying chronic condition.       Results may be falsely decreased if patient taking Biotin.     ETHANOL    Narrative:     Plasma Ethanol Clinical Symptoms:    ETOH (%)               Clinical Symptom  .01-.05              No apparent influence  .03-.12              Euphoria, Diminished judgment and attention   .09-.25              Impaired comprehension, Muscle incoordination  .18-.30              Confusion, Staggered gait, Slurred speech  .25-.40              Markedly decreased response to stimuli, unable to stand or                        walk, vomitting, sleep or stupor  .35-.50              Comatose, Anesthesia, Subnormal body temperature       POCT GLUCOSE FINGERSTICK   POCT GLUCOSE FINGERSTICK   POCT GLUCOSE FINGERSTICK   POCT GLUCOSE FINGERSTICK   POCT GLUCOSE FINGERSTICK   CBC AND DIFFERENTIAL    Narrative:     The following orders were created for panel order CBC & Differential.  Procedure                               Abnormality         Status                     ---------                               -----------         ------                     CBC Auto Differential[[id number]]        Abnormal            Final result                 Please view results for these  tests on the individual orders.       LOC: Person, Place, Time, and Situation    Telemetry:  Med/Surg    Cardiac Monitoring Ordered:     EKG:   ECG 12 Lead Altered Mental Status   Preliminary Result   HEART RATE= 79  bpm   RR Interval= 756  ms   NM Interval=   ms   P Horizontal Axis=   deg   P Front Axis=   deg   QRSD Interval= 107  ms   QT Interval= 410  ms   QRS Axis= 69  deg   T Wave Axis= -15  deg   - ABNORMAL ECG -   Atrial fibrillation   Multiple ventricular premature complexes   Electronically Signed By:    Date and Time of Study: 2022-11-02 02:30:51          Medications Given in the ED:   Medications   sodium chloride 0.9 % flush 10 mL (10 mL Intravenous Given 11/2/22 0842)   dextrose (GLUTOSE) oral gel 15 g (has no administration in time range)   dextrose (D50W) (25 g/50 mL) IV injection 25 g (has no administration in time range)   glucagon (human recombinant) (GLUCAGEN DIAGNOSTIC) 1 mg in sterile water (preservative free) 1 mL injection (has no administration in time range)   insulin lispro (ADMELOG) injection 2-9 Units (2 Units Subcutaneous Not Given 11/2/22 1201)   levETIRAcetam (KEPPRA) tablet 500 mg (has no administration in time range)   levETIRAcetam (KEPPRA) tablet 1,000 mg (has no administration in time range)   levETIRAcetam (KEPPRA) tablet 500 mg (500 mg Oral Given 11/2/22 0842)       Imaging results:  CT Head Without Contrast    Result Date: 11/1/2022   1. Left parietal scalp hematoma and laceration. No acute intracranial abnormality. 2. Generalized age-related parenchymal volume loss.  Electronically Signed By-Aaron Mattson DO On:11/1/2022 10:50 PM This report was finalized on [id number] by  Aaron Mattson DO.    XR Chest 1 View    Result Date: 11/1/2022  Stable cardiomegaly dual chambered pacemaker. No active cardiopulmonary disease.   Electronically Signed By-Aaron Mattson DO On:11/1/2022 10:41 PM This report was finalized on [id number] by  Aaron Mattson DO.     Social issues:    Social History     Socioeconomic History    Marital status:    Tobacco Use    Smoking status: Never    Smokeless tobacco: Never   Vaping Use    Vaping Use: Never used   Substance and Sexual Activity    Alcohol use: Yes     Alcohol/week: 7.0 standard drinks     Types: 7 Drinks containing 0.5 oz of alcohol per week    Drug use: Never    Sexual activity: Defer       NIH Stroke Scale:  Interval: (not recorded)  1a. Level of Consciousness: (not recorded)  1b. LOC Questions: (not recorded)  1c. LOC Commands: (not recorded)  2. Best Gaze: (not recorded)  3. Visual: (not recorded)  4. Facial Palsy: (not recorded)  5a. Motor Arm, Left: (not recorded)  5b. Motor Arm, Right: (not recorded)  6a. Motor Leg, Left: (not recorded)  6b. Motor Leg, Right: (not recorded)  7. Limb Ataxia: (not recorded)  8. Sensory: (not recorded)  9. Best Language: (not recorded)  10. Dysarthria: (not recorded)  11. Extinction and Inattention (formerly Neglect): (not recorded)    Total (NIH Stroke Scale): (not recorded)     Additional notable assessment information:     Nursing report ED to floor:      Candace Coates RN   11/02/22 15:44 EDT        Other

## 2022-11-02 NOTE — DISCHARGE PLACEMENT REQUEST
"Sean Vargas (87 y.o. Female)     Date of Birth   1935    Social Security Number       Address   [address] [place] [address] [place] [address]    Home Phone   [phone]    MRN   [id number]       [facility]    Marital Status                               Admission Date   11/1/22    Admission Type   Emergency    Admitting Provider   Armin Juárez MD    Attending Provider   Armin Juárez MD    Department, Room/Bed   Psychiatric EMERGENCY DEPARTMENT, 11/11       Discharge Date       Discharge Disposition       Discharge Destination                               Attending Provider: Armin Juárez MD    Allergies: Latex, Natural Rubber, Adhesive Tape    Isolation: None   Infection: None   Code Status: CPR    Ht: 154.9 cm (61\")   Wt: 102 kg (225 lb)    Admission Cmt: None   Principal Problem: Altered mental status, unspecified altered mental status type [R41.82]                 Active Insurance as of 11/1/2022     Primary Coverage     Payor Plan Insurance Group Employer/Plan Group    MEDICARE MEDICARE A & B      Payor Plan Address Payor Plan Phone Number Payor Plan Fax Number Effective Dates    [address] [phone]  3/1/2002 - None Entered    [facility] [id number]       Subscriber Name Subscriber Birth Date Member ID       SEAN VARGAS 1935 [id number]           Secondary Coverage     Payor Plan Insurance Group Employer/Plan Group    [facility] SUPP [id number]     Payor Plan Address Payor Plan Phone Number Payor Plan Fax Number Effective Dates    [address]   12/1/2016 - None Entered    [facility] [id number]       Subscriber Name Subscriber Birth Date Member ID       SEAN VARGAS 1935 [id number]                 Emergency Contacts      (Rel.) Home Phone Work Phone Mobile Phone    GEORGIE VARGAS (Spouse) [phone] -- --    OVERFESHAUN FIGUEROA (Relative) [phone] -- [phone]              "

## 2022-11-02 NOTE — H&P
[facility] Medicine Services  History & Physical    Patient Name: Aracelis Vargas  : 1935  MRN: [id number]  Primary Care Physician:  Gabriel Goss MD  Date of admission: 2022  Date and Time of Service: 22 at 0500    Subjective      Chief Complaint: AMS    History of Present Illness: Aracelis Vargas is a 87 y.o. female with past medical history of diabetes, coronary artery disease, pacemaker placement, atrial fibrillation, COPD, osteoarthritis, hypertension, chronic kidney disease stage III, and chronic congestive heart heart failure Who presented to [facility] on 2022 complaining of weakness, falls and altered mental status.  Patient reports she is normally followed by [facility] wound care and was being treated for cellulitis to the left lower extremity.  She reports her spouse had gone to the pharmacy to get her prescription when she had a fall at home hitting the filing cabinet with her head.  She sustained a head laceration that was stapled in the ER.  INR 1.0.  Work-up is essentially negative except for EtOH of 0.24.  She has been placed on CIWA protocol and admitted for further observation.      Review of Systems   Constitutional:        Disheveled female   HENT: Negative.    Cardiovascular:        History of A. fib status post pacemaker   Respiratory: Negative.    Skin:        Recent wound to left lower extremity being followed by [facility] wound clinic   Musculoskeletal: Positive for falls.   Gastrointestinal: Negative.    Genitourinary: Negative.    Neurological: Positive for weakness.   Psychiatric/Behavioral: Positive for altered mental status.        Personal History     Past Medical History:   Diagnosis Date   • Atherosclerosis of native coronary artery of native heart without angina pectoris 10/22/2020   • Atrioventricular block, Mobitz type 1, Mitch 10/21/2020    Added automatically from request for surgery [id number]   • Hypertension associated  with stage 3 chronic kidney disease due to type 2 diabetes mellitus ([facility]) 10/22/2020   • Morbid obesity ([facility]) 10/22/2020   • Secondary hyperaldosteronism ([facility]) 10/22/2020   • Stage 3b chronic kidney disease ([facility]) 10/22/2020   • Type 2 diabetes mellitus with diabetic neuropathy, with long-term current use of insulin ([facility]) 10/22/2020   • Type 2 diabetes mellitus with diabetic neuropathy, with long-term current use of insulin ([facility]) 10/22/2020       Past Surgical History:   Procedure Laterality Date   • CARDIAC CATHETERIZATION N/A 10/23/2020    Procedure: Left Heart Cath and coronary angiogram;  Surgeon: Wang Plaza MD;  Location: [facility] CATH INVASIVE LOCATION;  Service: Cardiovascular;  Laterality: N/A;   • CARDIAC ELECTROPHYSIOLOGY PROCEDURE Left 11/3/2021    Procedure: Pacemaker DC new;  Surgeon: Wang Plaza MD;  Location: [facility] CATH INVASIVE LOCATION;  Service: Cardiovascular;  Laterality: Left;   • CARDIAC ELECTROPHYSIOLOGY PROCEDURE N/A 11/3/2021    Procedure: LOOP RECORDER REMOVAL;  Surgeon: Wang Plaza MD;  Location: [facility] CATH INVASIVE LOCATION;  Service: Cardiovascular;  Laterality: N/A;   • HYSTERECTOMY     • JOINT REPLACEMENT Right     Hip   • JOINT REPLACEMENT Left     hip   • JOINT REPLACEMENT Left     hip (for second time)   • JOINT REPLACEMENT Right     knee   • OOPHORECTOMY         Family History: family history includes Heart disease in her father and mother. Otherwise pertinent FHx was reviewed and not pertinent to current issue.    Social History:  reports that she has never smoked. She has never used smokeless tobacco. She reports current alcohol use of about 7.0 standard drinks per week. She reports that she does not use drugs.    Home Medications:  Prior to Admission Medications     Prescriptions Last Dose Informant Patient Reported? Taking?    amLODIPine (NORVASC) 5 MG tablet   Yes No    Take 5 mg by mouth Every Morning.    aspirin 325 MG tablet   No No    Take 1 tablet by  mouth Daily.    atorvastatin (LIPITOR) 10 MG tablet   Yes No    Take 10 mg by mouth Daily.    ferrous sulfate 325 (65 FE) MG tablet   Yes No    Take 325 mg by mouth Daily With Breakfast.    folic acid (FOLVITE) 1 MG tablet   No No    Take 1 tablet by mouth Daily.    furosemide (LASIX) 20 MG tablet   Yes No    gabapentin (NEURONTIN) 300 MG capsule   Yes No    Take 300 mg by mouth 2 (Two) Times a Day.    levETIRAcetam (KEPPRA) 500 MG tablet   Yes No    Take 500 mg by mouth Every 12 (Twelve) Hours.    losartan (COZAAR) 50 MG tablet   Yes No    Take 50 mg by mouth Daily.    NovoLIN 70/30 (70-30) 100 UNIT/ML injection   No No    INJECT 30 UNITS UNDER THE SKIN INTO THE APPROPRIATE AREA TWO TIMES A DAY WITH A MEAL    omeprazole (priLOSEC) 40 MG capsule   No No    Take 1 capsule by mouth Every Morning Before Breakfast. Take on empty stomach!    spironolactone (ALDACTONE) 25 MG tablet   Yes No    Take 25 mg by mouth Every Morning.    topiramate (TOPAMAX) 25 MG tablet   No No    Take 0.5 tablets by mouth 2 (Two) Times a Day.    vitamin B-12 (CYANOCOBALAMIN) 1000 MCG tablet   No No    Take 1 tablet by mouth Daily.            Allergies:  Allergies   Allergen Reactions   • Latex, Natural Rubber Rash   • Adhesive Tape Rash       Objective      Vitals:   Temp:  [97.4 °F (36.3 °C)] 97.4 °F (36.3 °C)  Heart Rate:  [69-84] 81  Resp:  [20] 20  BP: ()/(45-76) 125/60    Physical Exam  Vitals reviewed: Disheveled.   HENT:      Head: Normocephalic.   Eyes:      Pupils: Pupils are equal, round, and reactive to light.   Pulmonary:      Effort: Pulmonary effort is normal.   Abdominal:      General: Abdomen is flat.      Palpations: Abdomen is soft.   Musculoskeletal:      Cervical back: Normal range of motion.      Right lower leg: Edema present.      Left lower leg: Edema present.   Skin:     Findings: Lesion present.      Comments: 1. 5 staples to left side of scalp    2. Open wound to LLE.    Neurological:      Mental Status: She is  alert.      Comments: Slurred speech, difficulty with word find    Psychiatric:      Comments: Calm and cooperative          Result Review    Result Review:  I have personally reviewed the results from the time of this admission to 11/2/2022 05:58 EDT and agree with these findings:  [x]  Laboratory  [x]  Microbiology  [x]  Radiology  [x]  EKG/Telemetry   []  Cardiology/Vascular   [x]  Pathology  [x]  Old records  []  Other:  Most notable findings include: , scr 1.3, bun 45, ast 33, hgb 10.9 , ethanol 0.243  10/21- wound culture with staph aureus left leg       Assessment & Plan        Active Hospital Problems:  Active Hospital Problems    Diagnosis    • **Altered mental status, unspecified altered mental status type    • ETOH abuse    • Acute kidney failure (HCC)    • Diabetes mellitus with nephropathy (HCC)      Plan:    AMS/ falls   - Possibly 2/2 ETOH abuse   -r/o infectious etiology with negative UA and CXR  -r/o acute CVA  -PT eval for safety to return home    KYRA on CKD  - possibly 2/2 dehydration  - IVF X2  - repeat bmp     Hx seizure disorder  -Home medication rec not complete  -resume home keppra 500mg q12 and topamax after pharmacy review  -will give 1X  Dose Keppra this AM       DM with neuropathy  - initiate insulin SS   - pt reports on 70/30 at home but could not verify dose     Hx CHF, unspecified  Resume home diuretics of aldactone and lasix once home med rec verified    HTN  -continue home norvasc once home meds verified  -PRN HTN medication    Impaired skin integrity  -has laceration to left side of scalp with 5 staples  - monitor for infection  -plan to remove staples in 10-14 days  - wound to LLE that grew staph aureus on 10/31  - wound care consult as pt reports current patient with [facility] wound care                    DVT prophylaxis:  Mechanical DVT prophylaxis orders are present.    CODE STATUS:    Code Status (Patient has no pulse and is not breathing): CPR (Attempt to  Resuscitate)  Medical Interventions (Patient has pulse or is breathing): Full Support    Admission Status:  I believe this patient meets observation status.    I discussed the patient's findings and my recommendations with patient.    This patient has been examined wearing appropriate Personal Protective Equipment   Signature: Electronically signed by SHANI Gomez, 11/02/22, 05:58 EDT.  [facility] Hospitalist Team

## 2022-11-02 NOTE — PLAN OF CARE
Goal Outcome Evaluation:Patient calm and cooperative. Just came up from ER. Sleeping. Will continue to monitor.

## 2022-11-02 NOTE — THERAPY EVALUATION
Patient Name: Aracelis Vargas  : 1935    MRN: [id number]                              Today's Date: 2022       Admit Date: 2022    Visit Dx:     ICD-10-CM ICD-9-CM   1. Altered mental status, unspecified altered mental status type  R41.82 780.97   2. Alcoholic intoxication with complication (HCC)  F10.929 305.00   3. Laceration of scalp, initial encounter  S01.01XA 873.0     Patient Active Problem List   Diagnosis   • Fall   • Secondary hyperaldosteronism (HCC)   • Stage 3b chronic kidney disease (CMS/HCC)   • Hypertension associated with stage 3 chronic kidney disease due to type 2 diabetes mellitus (HCC)   • Morbid obesity (HCC)   • Chronic coronary artery disease   • AV block, Mobitz II   • Mixed hyperlipidemia   • Primary hypertension   • Acute UTI   • GERD without esophagitis   • Cardiac pacemaker in situ   • Cerebrovascular accident (CVA) (HCC)   • Hyperammonemia (HCC)   • Obstructive sleep apnea   • Metabolic encephalopathy   • Type 2 diabetes mellitus with hyperglycemia, with long-term current use of insulin (HCC)   • Seizure disorder (HCC)   • Elevated lactic acid level   • Acute kidney failure (HCC)   • Diabetes mellitus with nephropathy (HCC)   • Hyperkalemia   • Elevated serum lactate dehydrogenase   • Altered mental status, unspecified altered mental status type   • ETOH abuse   • Seizure (HCC)   • Alcohol intoxication with delirium (HCC)   • History of CVA (cerebrovascular accident)     Past Medical History:   Diagnosis Date   • Atherosclerosis of native coronary artery of native heart without angina pectoris 10/22/2020   • Atrioventricular block, Mobitz type 1, [place] 10/21/2020    Added automatically from request for surgery [id number]   • Hypertension associated with stage 3 chronic kidney disease due to type 2 diabetes mellitus (HCC) 10/22/2020   • Morbid obesity (HCC) 10/22/2020   • Secondary hyperaldosteronism (HCC) 10/22/2020   • Stage 3b chronic kidney disease (HCC) 10/22/2020    • Type 2 diabetes mellitus with diabetic neuropathy, with long-term current use of insulin ([facility]) 10/22/2020   • Type 2 diabetes mellitus with diabetic neuropathy, with long-term current use of insulin ([facility]) 10/22/2020     Past Surgical History:   Procedure Laterality Date   • CARDIAC CATHETERIZATION N/A 10/23/2020    Procedure: Left Heart Cath and coronary angiogram;  Surgeon: Wang Plaza MD;  Location: [facility] CATH INVASIVE LOCATION;  Service: Cardiovascular;  Laterality: N/A;   • CARDIAC ELECTROPHYSIOLOGY PROCEDURE Left 11/3/2021    Procedure: Pacemaker DC new;  Surgeon: Wang Plaza MD;  Location: [facility] CATH INVASIVE LOCATION;  Service: Cardiovascular;  Laterality: Left;   • CARDIAC ELECTROPHYSIOLOGY PROCEDURE N/A 11/3/2021    Procedure: LOOP RECORDER REMOVAL;  Surgeon: Wang Plaza MD;  Location: [facility] CATH INVASIVE LOCATION;  Service: Cardiovascular;  Laterality: N/A;   • HYSTERECTOMY     • JOINT REPLACEMENT Right     Hip   • JOINT REPLACEMENT Left     hip   • JOINT REPLACEMENT Left     hip (for second time)   • JOINT REPLACEMENT Right     knee   • OOPHORECTOMY        General Information     Row Name 11/02/22 1156          Physical Therapy Time and Intention    Document Type evaluation  -CM     Mode of Treatment physical therapy  -CM     Row Name 11/02/22 1156          General Information    Patient Profile Reviewed yes  -CM     Prior Level of Function independent:;all household mobility;community mobility;gait;driving;shopping;ADL's  uses cane; uses scooter in large stores; no home O2  -CM     Row Name 11/02/22 1156          Living Environment    People in Home spouse;other (see comments)   spouse currently recovering from cardiac sx last week.  -CM     Row Name 11/02/22 1156          Home Main Entrance    Number of Stairs, Main Entrance one  -CM     Row Name 11/02/22 1156          Stairs Within Home, Primary    Number of Stairs, Within Home, Primary none  -CM     Row Name 11/02/22 1156           Cognition    Orientation Status (Cognition) oriented x 4  -CM     Row Name 11/02/22 1259          Safety Issues, Functional Mobility    Impairments Affecting Function (Mobility) balance;endurance/activity tolerance  -CM           User Key  (r) = Recorded By, (t) = Taken By, (c) = Cosigned By    Initials Name Provider Type    Senait Kay, PT Physical Therapist               Mobility     Row Name 11/02/22 1259          Bed Mobility    Bed Mobility bed mobility (all) activities  -CM     All Activities, [place] (Bed Mobility) independent  -CM     Row Name 11/02/22 1259          Bed-Chair Transfer    Bed-Chair [place] (Transfers) minimum assist (75% patient effort)  -CM     Comment, (Bed-Chair Transfer) on/off bsc w/ min assist for balance  -CM     Row Name 11/02/22 1259          Sit-Stand Transfer    Sit-Stand [place] (Transfers) minimum assist (75% patient effort)  -CM     Row Name 11/02/22 1259          Gait/Stairs (Locomotion)    [place] Level (Gait) minimum assist (75% patient effort);moderate assist (50% patient effort)  -CM     Assistive Device (Gait) other (see comments)  on skid socks; 2 hand held  -CM     Distance in Feet (Gait) 15 ft in room; marked lateral sway w/ mild posterior loss of balance while amb.  -CM           User Key  (r) = Recorded By, (t) = Taken By, (c) = Cosigned By    Initials Name Provider Type    Senait Kay, PT Physical Therapist               Obj/Interventions     Row Name 11/02/22 1302          Range of Motion Comprehensive    General Range of Motion bilateral upper extremity ROM WFL;bilateral lower extremity ROM WFL  -CM     Row Name 11/02/22 1302          Strength Comprehensive (MMT)    General Manual Muscle Testing (MMT) Assessment no strength deficits identified  -CM     Row Name 11/02/22 1302          Balance    Balance Assessment sitting static balance;sitting dynamic balance;standing static balance;standing dynamic balance  -CM      Static Sitting Balance independent  -CM     Dynamic Sitting Balance standby assist  -CM     Position, Sitting Balance unsupported;sitting edge of bed  -CM     Static Standing Balance minimal assist  -CM     Dynamic Standing Balance moderate assist  -CM     Position/Device Used, Standing Balance supported  -CM     Row Name 11/02/22 1302          Sensory Assessment (Somatosensory)    Sensory Assessment (Somatosensory) sensation intact  -CM           User Key  (r) = Recorded By, (t) = Taken By, (c) = Cosigned By    Initials Name Provider Type    Senait Kay, PT Physical Therapist               Goals/Plan     Row Name 11/02/22 1310          Bed Mobility Goal 1 (PT)    Activity/Assistive Device (Bed Mobility Goal 1, PT) bed mobility activities, all  -CM     [place] Level/Cues Needed (Bed Mobility Goal 1, PT) independent  -CM     Time Frame (Bed Mobility Goal 1, PT) 2 days  -CM     Row Name 11/02/22 1310          Transfer Goal 1 (PT)    Activity/Assistive Device (Transfer Goal 1, PT) transfers, all;walker, rolling  -CM     [place] Level/Cues Needed (Transfer Goal 1, PT) modified independence  -CM     Time Frame (Transfer Goal 1, PT) 2 days  -CM     Row Name 11/02/22 1310          Gait Training Goal 1 (PT)    Activity/Assistive Device (Gait Training Goal 1, PT) gait (walking locomotion);walker, rolling  -CM     [place] Level (Gait Training Goal 1, PT) modified independence  -CM     Distance (Gait Training Goal 1, PT) 100 ft w/o loss of balance or soa  -CM     Time Frame (Gait Training Goal 1, PT) 3 days  -CM     Row Name 11/02/22 1310          Therapy Assessment/Plan (PT)    Planned Therapy Interventions (PT) balance training;bed mobility training;gait training;home exercise program;patient/family education;orthotic fitting/training;strengthening;ROM (range of motion);postural re-education  -CM           User Key  (r) = Recorded By, (t) = Taken By, (c) = Cosigned By    Initials Name Provider Type     CM Senait Nguyen, PT Physical Therapist               Clinical Impression     Row Name 11/02/22 1302          Pain    Pretreatment Pain Rating 0/10 - no pain  -CM     Posttreatment Pain Rating 0/10 - no pain  -CM     Pain Intervention(s) Distraction;Emotional support;Repositioned;Ambulation/increased activity  -CM     Row Name 11/02/22 1302          Plan of Care Review    Plan of Care Reviewed With patient  -CM     Progress improving  -CM     Outcome Evaluation 88 yo female adm 11/1/22 late in day for altered mental status following fall at home. CT of head (-) for acute changes except (+) for L parietal laceration & scalp hematoma. Pt's etoh level was 0.24 at admission.  Hx of etoh abuse, ckd3, seizures, copd, chf, dm, LLE cellulitis. Still being treated for LLE cellulitis through wound care center. Pt lives w/  at baseline, who is currently  recovering from open heart sx last week. Pt reports she normally ambulates short community distances w/ a cane for support, but she does have a rw at home. Lives in single level home w/ one stair to enter. Today, pt is alert and oriented x 4 She comes to sit well, and comes to stand w/ min assist. She needed assist in standing to transfer on/off bsc. She then required min assist to stand, and min to mod assist to amb x 15 ft w/ 2 hands held. Pt not safe for home today. However, expect that as etoh level drops, her balance will improve. Recommend HH at d/c w/ pt to use rw at all times. Will reassess in AM to see if ready for home.  PPE: gloves, mask, eyewear.  -CM     Row Name 11/02/22 1304          Therapy Assessment/Plan (PT)    Patient/Family Therapy Goals Statement (PT) agreeable to plan as stated  -CM     Rehab Potential (PT) good, to achieve stated therapy goals  -CM     Criteria for Skilled Interventions Met (PT) yes;meets criteria;skilled treatment is necessary  -CM     Therapy Frequency (PT) 5 times/wk  -CM     Predicted Duration of Therapy Intervention (PT)  until d/c  -CM     Row Name 11/02/22 1308          Vital Signs    Pre SpO2 (%) 97  -CM     O2 Delivery Pre Treatment room air  -CM     Intra SpO2 (%) 97  -CM     O2 Delivery Intra Treatment room air  -CM     Post SpO2 (%) 97  -CM     O2 Delivery Post Treatment room air  -CM     Row Name 11/02/22 1308          Positioning and Restraints    Pre-Treatment Position in bed  -CM     Post Treatment Position bsc  -CM     On BS commode notified nsg;sitting;call light within reach;encouraged to call for assist  -CM           User Key  (r) = Recorded By, (t) = Taken By, (c) = Cosigned By    Initials Name Provider Type    Senait Kay, PT Physical Therapist               Outcome Measures     Row Name 11/02/22 1310          How much help from another person do you currently need...    Turning from your back to your side while in flat bed without using bedrails? 4  -CM     Moving from lying on back to sitting on the side of a flat bed without bedrails? 4  -CM     Moving to and from a bed to a chair (including a wheelchair)? 3  -CM     Standing up from a chair using your arms (e.g., wheelchair, bedside chair)? 3  -CM     Climbing 3-5 steps with a railing? 2  -CM     To walk in hospital room? 2  -CM     AM-PAC 6 Clicks Score (PT) 18  -CM     Highest level of mobility 6 --> Walked 10 steps or more  -CM     Row Name 11/02/22 1310          Modified [place] Scale    Pre-Stroke Modified [place] Scale 0 - No Symptoms at all.  -CM     Modified [place] Scale 4 - Moderately severe disability.  Unable to walk without assistance, and unable to attend to own bodily needs without assistance.  -CM     Row Name 11/02/22 1310          T[place]etti Assessment    [place] Assessment yes  -CM     Sitting Balance 1  -CM     Arises 1  -CM     Attempts to Rise 2  -CM     Immediate Standing Balance (first 5 sec) 0  -CM     Standing Balance 1  -CM     Sternal Nudge (feet close together) 0  -CM     Eyes Closed (feet close together) 0  -CM     Turning 360  "Degrees- Steps 1  -CM     Turning 360 Degrees- Steadiness 0  -CM     Sitting Down 1  -CM     [place] Balance Score 7  -CM     Gait Initiation (immediate after told \"go\") 1  -CM     Step Length- Right Swing 0  -CM     Step Length- Left Swing 0  -CM     Foot Clearance- Right Foot 1  -CM     Foot Clearance- Left Foot 1  -CM     Step Symmetry 0  -CM     Step Continuity 0  -CM     Path (excursion) 1  -CM     Trunk 0  -CM     Base of Support 0  -CM     Gait Score 4  -CM     [place] Total Score 11  -CM     Row Name 11/02/22 1310          Functional Assessment    Outcome Measure Options AM-PAC 6 Clicks Basic Mobility (PT);Modified [place]  -CM           User Key  (r) = Recorded By, (t) = Taken By, (c) = Cosigned By    Initials Name Provider Type    Senait Kay, PT Physical Therapist                             Physical Therapy Education     Title: PT OT SLP Therapies (Done)     Topic: Physical Therapy (Done)     Point: Mobility training (Done)     Learning Progress Summary           Patient Acceptance, E,TB, VU by WALDO at 11/2/2022 1320                               User Key     Initials Effective Dates Name Provider Type Discipline     06/16/21 -  Senait Nguyen, PT Physical Therapist PT              PT Recommendation and Plan  Planned Therapy Interventions (PT): balance training, bed mobility training, gait training, home exercise program, patient/family education, orthotic fitting/training, strengthening, ROM (range of motion), postural re-education  Plan of Care Reviewed With: patient  Progress: improving  Outcome Evaluation: 88 yo female adm 11/1/22 late in day for altered mental status following fall at home. CT of head (-) for acute changes except (+) for L parietal laceration & scalp hematoma. Pt's etoh level was 0.24 at admission.  Hx of etoh abuse, ckd3, seizures, copd, chf, dm, LLE cellulitis. Still being treated for LLE cellulitis through wound care center. Pt lives w/  at baseline, who " is currently  recovering from open heart sx last week. Pt reports she normally ambulates short community distances w/ a cane for support, but she does have a rw at home. Lives in single level home w/ one stair to enter. Today, pt is alert and oriented x 4 She comes to sit well, and comes to stand w/ min assist. She needed assist in standing to transfer on/off bsc. She then required min assist to stand, and min to mod assist to amb x 15 ft w/ 2 hands held. Pt not safe for home today. However, expect that as etoh level drops, her balance will improve. Recommend HH at d/c w/ pt to use rw at all times. Will reassess in AM to see if ready for home.  PPE: gloves, mask, eyewear.     Time Calculation:    PT Charges     Row Name 11/02/22 1322             Time Calculation    Start Time 0850  -CM      Stop Time 0921  -CM      Time Calculation (min) 31 min  -CM      PT Received On 11/02/22  -CM      PT - Next Appointment 11/03/22  -CM      PT Goal Re-Cert Due Date 11/16/22  -CM         Time Calculation- PT    Total Timed Code Minutes- PT 0 minute(s)  -CM            User Key  (r) = Recorded By, (t) = Taken By, (c) = Cosigned By    Initials Name Provider Type    Senait Kay, PT Physical Therapist              Therapy Charges for Today     Code Description Service Date Service Provider Modifiers Qty    [id number]  PT EVAL MOD COMPLEXITY 4 11/2/2022 Senait Nguyen, PT GP 1          PT G-Codes  Outcome Measure Options: AM-PAC 6 Clicks Basic Mobility (PT), Modified Gretel Nunn  AM-PAC 6 Clicks Score (PT): 18  Modified [place] Scale: 4 - Moderately severe disability.  Unable to walk without assistance, and unable to attend to own bodily needs without assistance.  Gretel Total Score: 11    Senait Nguyen PT  11/2/2022

## 2022-11-02 NOTE — ED NOTES
EMS reports  said the pt had a couple falls today and the most recent one she cut open her head and then became confused. Pt said she didn't fall and that she's got nothing wrong with her. Pt said she had drank some bloody 's this morning.

## 2022-11-02 NOTE — NURSING NOTE
WOCN note:    87 yr old female admitted 11/1/22 after a fall at home. WOCN consult received for LLE wound.     Patient presents with a dry crusted wound measuring 2.5x1cm to her left lower leg. There is watson-wound blanchable erythema with no pain reported. Pedal pulses are palpable.    Patient reports the wound has been present for approximately 2 weeks after hitting her leg on a magazine rack at home. She has been seen by Dr. Carty at [facility] wound center and had local wound care and compression wraps initiated.    The wound was cleansed with NS and covered with a silver impregnated foam dressing (Optifoam Ag). The leg was wrapped with kerlix and ACE from the base of the toes to below the knee.   Recommend patient continue follow up at the wound center after discharge.

## 2022-11-03 ENCOUNTER — READMISSION MANAGEMENT (OUTPATIENT)
Dept: CALL CENTER | Facility: HOSPITAL | Age: 87
End: 2022-11-03

## 2022-11-03 ENCOUNTER — TRANSCRIBE ORDERS (OUTPATIENT)
Dept: HOME HEALTH SERVICES | Facility: HOME HEALTHCARE | Age: 87
End: 2022-11-03

## 2022-11-03 ENCOUNTER — HOME HEALTH ADMISSION (OUTPATIENT)
Dept: HOME HEALTH SERVICES | Facility: HOME HEALTHCARE | Age: 87
End: 2022-11-03
Payer: MEDICARE

## 2022-11-03 VITALS
OXYGEN SATURATION: 97 % | BODY MASS INDEX: 42.48 KG/M2 | RESPIRATION RATE: 15 BRPM | HEIGHT: 61 IN | TEMPERATURE: 98.4 F | WEIGHT: 225 LBS | HEART RATE: 87 BPM | SYSTOLIC BLOOD PRESSURE: 163 MMHG | DIASTOLIC BLOOD PRESSURE: 69 MMHG

## 2022-11-03 DIAGNOSIS — Z51.89 ENCOUNTER FOR WOUND CARE: ICD-10-CM

## 2022-11-03 DIAGNOSIS — G40.909 SEIZURE DISORDER: Primary | ICD-10-CM

## 2022-11-03 DIAGNOSIS — R29.6 FALLS FREQUENTLY: ICD-10-CM

## 2022-11-03 LAB
ANION GAP SERPL CALCULATED.3IONS-SCNC: 9 MMOL/L (ref 5–15)
BUN SERPL-MCNC: 34 MG/DL (ref 8–23)
BUN/CREAT SERPL: 32.1 (ref 7–25)
CALCIUM SPEC-SCNC: 8.6 MG/DL (ref 8.6–10.5)
CHLORIDE SERPL-SCNC: 105 MMOL/L (ref 98–107)
CO2 SERPL-SCNC: 24 MMOL/L (ref 22–29)
CREAT SERPL-MCNC: 1.06 MG/DL (ref 0.57–1)
EGFRCR SERPLBLD CKD-EPI 2021: 51.6 ML/MIN/1.73
GLUCOSE BLDC GLUCOMTR-MCNC: 142 MG/DL (ref 70–105)
GLUCOSE BLDC GLUCOMTR-MCNC: 170 MG/DL (ref 70–105)
GLUCOSE SERPL-MCNC: 133 MG/DL (ref 65–99)
PHOSPHATE SERPL-MCNC: 3.7 MG/DL (ref 2.5–4.5)
POTASSIUM SERPL-SCNC: 4.1 MMOL/L (ref 3.5–5.2)
SODIUM SERPL-SCNC: 138 MMOL/L (ref 136–145)

## 2022-11-03 PROCEDURE — 82962 GLUCOSE BLOOD TEST: CPT

## 2022-11-03 PROCEDURE — 36415 COLL VENOUS BLD VENIPUNCTURE: CPT | Performed by: HOSPITALIST

## 2022-11-03 PROCEDURE — 80048 BASIC METABOLIC PNL TOTAL CA: CPT | Performed by: HOSPITALIST

## 2022-11-03 PROCEDURE — G0378 HOSPITAL OBSERVATION PER HR: HCPCS

## 2022-11-03 PROCEDURE — 84100 ASSAY OF PHOSPHORUS: CPT | Performed by: HOSPITALIST

## 2022-11-03 PROCEDURE — 99214 OFFICE O/P EST MOD 30 MIN: CPT | Performed by: INTERNAL MEDICINE

## 2022-11-03 PROCEDURE — 97116 GAIT TRAINING THERAPY: CPT

## 2022-11-03 RX ORDER — LEVETIRACETAM 500 MG/1
500 TABLET ORAL DAILY
Status: DISCONTINUED | OUTPATIENT
Start: 2022-11-03 | End: 2022-11-03 | Stop reason: HOSPADM

## 2022-11-03 RX ORDER — LEVETIRACETAM 1000 MG/1
1000 TABLET ORAL NIGHTLY
Qty: 30 TABLET | Refills: 0 | Status: SHIPPED | OUTPATIENT
Start: 2022-11-03 | End: 2022-11-05 | Stop reason: SDUPTHER

## 2022-11-03 RX ORDER — FERROUS SULFATE TAB EC 324 MG (65 MG FE EQUIVALENT) 324 (65 FE) MG
324 TABLET DELAYED RESPONSE ORAL
Status: DISCONTINUED | OUTPATIENT
Start: 2022-11-03 | End: 2022-11-03 | Stop reason: HOSPADM

## 2022-11-03 RX ORDER — ASPIRIN 325 MG
325 TABLET ORAL DAILY
Status: DISCONTINUED | OUTPATIENT
Start: 2022-11-03 | End: 2022-11-03 | Stop reason: HOSPADM

## 2022-11-03 RX ORDER — LEVETIRACETAM 500 MG/1
500 TABLET ORAL EVERY 12 HOURS
Status: DISCONTINUED | OUTPATIENT
Start: 2022-11-03 | End: 2022-11-03

## 2022-11-03 RX ORDER — PANTOPRAZOLE SODIUM 40 MG/1
40 TABLET, DELAYED RELEASE ORAL EVERY MORNING
Status: DISCONTINUED | OUTPATIENT
Start: 2022-11-03 | End: 2022-11-03 | Stop reason: HOSPADM

## 2022-11-03 RX ORDER — LEVETIRACETAM 500 MG/1
500 TABLET ORAL DAILY
Qty: 30 TABLET | Refills: 0 | Status: SHIPPED | OUTPATIENT
Start: 2022-11-04 | End: 2022-11-14

## 2022-11-03 RX ORDER — GABAPENTIN 300 MG/1
300 CAPSULE ORAL 2 TIMES DAILY
Status: DISCONTINUED | OUTPATIENT
Start: 2022-11-03 | End: 2022-11-03 | Stop reason: HOSPADM

## 2022-11-03 RX ORDER — SPIRONOLACTONE 25 MG/1
25 TABLET ORAL EVERY MORNING
Status: DISCONTINUED | OUTPATIENT
Start: 2022-11-03 | End: 2022-11-03 | Stop reason: HOSPADM

## 2022-11-03 RX ORDER — LOSARTAN POTASSIUM 50 MG/1
50 TABLET ORAL DAILY
Status: DISCONTINUED | OUTPATIENT
Start: 2022-11-03 | End: 2022-11-03 | Stop reason: HOSPADM

## 2022-11-03 RX ORDER — AMLODIPINE BESYLATE 5 MG/1
5 TABLET ORAL EVERY MORNING
Status: DISCONTINUED | OUTPATIENT
Start: 2022-11-03 | End: 2022-11-03 | Stop reason: HOSPADM

## 2022-11-03 RX ORDER — LANOLIN ALCOHOL/MO/W.PET/CERES
1000 CREAM (GRAM) TOPICAL DAILY
Status: DISCONTINUED | OUTPATIENT
Start: 2022-11-03 | End: 2022-11-03 | Stop reason: HOSPADM

## 2022-11-03 RX ORDER — TOPIRAMATE 25 MG/1
12.5 TABLET ORAL 2 TIMES DAILY
Status: DISCONTINUED | OUTPATIENT
Start: 2022-11-03 | End: 2022-11-03 | Stop reason: HOSPADM

## 2022-11-03 RX ORDER — FOLIC ACID 1 MG/1
1 TABLET ORAL DAILY
Status: DISCONTINUED | OUTPATIENT
Start: 2022-11-03 | End: 2022-11-03 | Stop reason: HOSPADM

## 2022-11-03 RX ORDER — ATORVASTATIN CALCIUM 10 MG/1
10 TABLET, FILM COATED ORAL DAILY
Status: DISCONTINUED | OUTPATIENT
Start: 2022-11-03 | End: 2022-11-03 | Stop reason: HOSPADM

## 2022-11-03 RX ADMIN — SPIRONOLACTONE 25 MG: 25 TABLET ORAL at 10:19

## 2022-11-03 RX ADMIN — GABAPENTIN 300 MG: 300 CAPSULE ORAL at 10:19

## 2022-11-03 RX ADMIN — FOLIC ACID 1 MG: 1 TABLET ORAL at 10:19

## 2022-11-03 RX ADMIN — TOPIRAMATE 12.5 MG: 25 TABLET, FILM COATED ORAL at 10:19

## 2022-11-03 RX ADMIN — FERROUS SULFATE TAB EC 324 MG (65 MG FE EQUIVALENT) 324 MG: 324 (65 FE) TABLET DELAYED RESPONSE at 10:19

## 2022-11-03 RX ADMIN — CYANOCOBALAMIN TAB 1000 MCG 1000 MCG: 1000 TAB at 10:19

## 2022-11-03 RX ADMIN — LOSARTAN POTASSIUM 50 MG: 50 TABLET, FILM COATED ORAL at 10:19

## 2022-11-03 RX ADMIN — AMLODIPINE BESYLATE 5 MG: 5 TABLET ORAL at 10:19

## 2022-11-03 RX ADMIN — LEVETIRACETAM 500 MG: 500 TABLET, FILM COATED ORAL at 07:38

## 2022-11-03 RX ADMIN — PANTOPRAZOLE SODIUM 40 MG: 40 TABLET, DELAYED RELEASE ORAL at 10:19

## 2022-11-03 RX ADMIN — ATORVASTATIN CALCIUM 10 MG: 10 TABLET, FILM COATED ORAL at 10:19

## 2022-11-03 RX ADMIN — ASPIRIN 325 MG ORAL TABLET 325 MG: 325 PILL ORAL at 10:19

## 2022-11-03 NOTE — PROGRESS NOTES
Spoke to patient   She is agreeable to [facility] services    Demographics verified    We have her spouse on service with [facility] as well    No other agencies       Patient has wound care See Wound care note  Due every 72 hrs per CM   Patient was going to [facility] and is to follow up with Dr Carty     SN   PT    Dr RAMBO Goss

## 2022-11-03 NOTE — CASE MANAGEMENT/SOCIAL WORK
Case Management Discharge Note      Final Note: [facility]    Provided Post Acute Provider List?: Yes  Post Acute Provider List: Home Health, Nursing Home  Provided Post Acute Provider Quality & Resource List?: Yes  Post Acute Provider Quality and Resource List: Home Health, Nursing Home  Delivered To: Patient  Method of Delivery: In person        Home Medical Care Coordination complete.    Service Provider Selected Services Address Phone Fax Patient Preferred    [facility] [address] [facility] [place] [address] [phone] --                  Transportation Services  Private: Car    Final Discharge Disposition Code: 06 - home with home health care

## 2022-11-03 NOTE — CASE MANAGEMENT/SOCIAL WORK
Continued Stay Note  BRIAN Stephens     Patient Name: Aracelis Vargas  MRN: [id number]  Today's Date: 11/3/2022    Admit Date: 11/1/2022    Plan: Home with  Referral pending with [facility] JENNY ALVARADO order requested   Discharge Plan     Row Name 11/03/22 1133       Plan    Plan Home with  Referral pending with [facility]EN[facility] ALVARADO order requested    Plan Comments [facility]  liaison Lyn notified of referral pending acceptance. Order requested from Hospitalist                    Crystal Powell RN

## 2022-11-03 NOTE — THERAPY DISCHARGE NOTE
"Subjective: Pt agreeable to therapeutic plan of care. Feels better today.     Objective:     Bed mobility - Modified-Independent  Transfers - SBA  Ambulation - 30', 50 feet SBA and with rolling walker   Used straight cane initially, required min A and slow gait speed with LOB.  Used RW and significant improvement in gait/balance with use of RW.      Vitals: WNL    Pain: 0 VAS   Location:   Intervention for pain: N/A    Education: Provided education on the importance of mobility in the acute care setting    Assessment: Aracelis Vargas presents with functional mobility impairments which indicate the need for skilled intervention. Pt does well ambulating with a RW.  Has a rollator at home.  Safe for d/c home with agreement to use rollator for mobility.  Recommend HHPT to follow. Tolerating session today without incident. Will continue to follow and progress as tolerated.     Plan/Recommendations:   Low Intensity Therapy recommended post-acute care - This is recommended as therapy feels this patient would require 2-3 visits per week. OP or HH would be the best option depending on patient's home bound status. Consider, if the patient has other  \"skilled\" needs such as wounds, IV antibiotics, etc. Combined with \"low intensity\" could also equate to a SNF. If patient is medically complex, consider LTAC.. Pt requires no DME at discharge.     Pt desires Home at discharge. Pt cooperative; agreeable to therapeutic recommendations and plan of care.         Basic Mobility 6-click:  Rollin = Total, A lot = 2, A little = 3; 4 = None  Supine>Sit:   1 = Total, A lot = 2, A little = 3; 4 = None   Sit>Stand with arms:  1 = Total, A lot = 2, A little = 3; 4 = None  Bed>Chair:   1 = Total, A lot = 2, A little = 3; 4 = None  Ambulate in room:  1 = Total, A lot = 2, A little = 3; 4 = None  3-5 Steps with railin = Total, A lot = 2, A little = 3; 4 = None  Score: 22    Modified [place]: 4 = Moderately severe disability (Unable " to attend to own bodily needs without assistance, and unable to walk unassisted)     Post-Tx Position: Up in Chair and Call light and personal items within reach  PPE: gloves and surgical mask

## 2022-11-03 NOTE — DISCHARGE SUMMARY
[facility]         DISCHARGE SUMMARY    Patient Name: Aracelis Vargas  : 1935  MRN: [id number]    Date of Admission: 2022  Date of Discharge:  11/3/22  Primary Care Physician: Gabriel Goss MD    Consults     Date and Time Order Name Status Description    2022  8:56 AM Inpatient Cardiology Consult Completed     2022  8:54 AM Inpatient Neurology Consult General Completed           Presenting Problem:   Laceration of scalp, initial encounter [S01.01XA]  Alcoholic intoxication with complication (HCC) [F10.929]  Altered mental status, unspecified altered mental status type [R41.82]    Active and Resolved Hospital Problems:  Active Hospital Problems    Diagnosis POA   • **Alcohol intoxication with delirium (HCC) [F10.921] Yes   • Altered mental status, unspecified altered mental status type [R41.82] Yes   • ETOH abuse [F10.10] Yes   • Seizure (HCC) [R56.9] Yes   • History of CVA (cerebrovascular accident) [Z86.73] Not Applicable   • Acute kidney failure (HCC) [N17.9] Yes   • Diabetes mellitus with nephropathy (HCC) [E11.21] Yes   • Seizure disorder (HCC) [G40.909] Yes   • Type 2 diabetes mellitus with hyperglycemia, with long-term current use of insulin (HCC) [E11.65, Z79.4] Not Applicable   • Obstructive sleep apnea [G47.33] Yes   • Cardiac pacemaker in situ [Z95.0] Yes   • GERD without esophagitis [K21.9] Yes   • Mixed hyperlipidemia [E78.2] No   • Primary hypertension [I10] Yes   • Hypertension associated with stage 3 chronic kidney disease due to type 2 diabetes mellitus (HCC) [E11.22, I12.9, N18.30] Yes   • Morbid obesity (HCC) [E66.01] Yes   • Stage 3b chronic kidney disease (CMS/HCC) [N18.32] Yes   • Chronic coronary artery disease [I25.10] Yes      Resolved Hospital Problems   No resolved problems to display.         Hospital Course     Hospital Course:  Aracelis Vargas is a 87 y.o. female admitted with EtOH intoxication. It was worried that she had a neurological  issue so Neurology was consulted and they adjusted her keppra. She required staple in the ER after hitting her head from fall. She worked with PT/OT who recommended SNF but patient refused so she will go home with home health which was setup by [facility]. She has a walker and cane at home. She was counseled on EtOH cessation.         DISCHARGE Follow Up Recommendations for labs and diagnostics: PCP and neurology      Day of Discharge     Vital Signs:  Temp:  [98 °F (36.7 °C)-98.4 °F (36.9 °C)] 98.4 °F (36.9 °C)  Heart Rate:  [78-96] 87  Resp:  [12-15] 15  BP: (118-170)/(57-73) 163/69  Physical Exam:  Constitutional: Awake, alert, obese   Eyes: PERRLA, sclerae anicteric, no conjunctival injection   HENT: NCAT, mucous membranes moist   Neck: Supple, no thyromegaly, no lymphadenopathy, trachea midline   Respiratory: Clear to auscultation bilaterally, nonlabored respirations    Cardiovascular: RRR, no murmurs, rubs, or gallops, palpable pedal pulses bilaterally   Gastrointestinal: Positive bowel sounds, soft, nontender, nondistended   Musculoskeletal: dressing on left leg, no clubbing or cyanosis to extremities   Psychiatric: Appropriate affect, cooperative   Neurologic: Oriented x 3, strength symmetric in all extremities, Cranial Nerves grossly intact to confrontation, speech clear   Skin: No rashes     Pertinent  and/or Most Recent Results     LAB RESULTS:      Lab 11/01/22  2205   WBC 4.50   HEMOGLOBIN 10.9*   HEMATOCRIT 33.2*   PLATELETS 203   NEUTROS ABS 2.00   LYMPHS ABS 1.70   MONOS ABS 0.50   EOS ABS 0.30   .8*   PROTIME 10.8   APTT 24.5*         Lab 11/03/22  0504 11/02/22  0543 11/01/22  2205   SODIUM 138 141 137   POTASSIUM 4.1 4.6 4.9   CHLORIDE 105 108* 105   CO2 24.0 22.0 20.0*   ANION GAP 9.0 11.0 12.0   BUN 34* 46* 45*   CREATININE 1.06* 1.14* 1.32*   EGFR 51.6* 47.3* 39.6*   GLUCOSE 133* 120* 142*   CALCIUM 8.6 8.9 8.9   MAGNESIUM  --   --  2.7*   PHOSPHORUS 3.7 2.9  --    TSH  --   --  1.410          Lab 11/01/22 2205   TOTAL PROTEIN 7.7   ALBUMIN 3.80   GLOBULIN 3.9   ALT (SGPT) 14   AST (SGOT) 33*   BILIRUBIN 0.3   ALK PHOS 104         Lab 11/02/22  1025 11/02/22  0543 11/01/22 2205   PROBNP  --   --  461.3   TROPONIN T 0.036* 0.018 0.020   PROTIME  --   --  10.8   INR  --   --  1.05                 Brief Urine Lab Results  (Last result in the past 365 days)      Color   Clarity   Blood   Leuk Est   Nitrite   Protein   CREAT   Urine HCG        11/01/22 2317 Yellow   Clear   Negative   Negative   Negative   Negative               Microbiology Results (last 10 days)     Procedure Component Value - Date/Time    Wound Culture - Wound, Leg, Left [[id number]]  (Abnormal)  (Susceptibility) Collected: 10/31/22 1627    Lab Status: Final result Specimen: Wound from Leg, Left Updated: 11/02/22 1047     Wound Culture Light growth (2+) Staphylococcus aureus     Gram Stain No WBCs or organisms seen    Susceptibility      Staphylococcus aureus      XU      Clindamycin Susceptible      Erythromycin Resistant     Inducible Clindamycin Resistance Negative      Oxacillin Susceptible      Rifampin Susceptible      Tetracycline Susceptible      Trimethoprim + Sulfamethoxazole Susceptible      Vancomycin Susceptible                       Susceptibility Comments     Staphylococcus aureus    This isolate does not demonstrate inducible clindamycin resistance in vitro.                     CT Head Without Contrast    Result Date: 11/1/2022  Impression:  1. Left parietal scalp hematoma and laceration. No acute intracranial abnormality. 2. Generalized age-related parenchymal volume loss.  Electronically Signed By-Aaron Mattson DO On:11/1/2022 10:50 PM This report was finalized on [id number] by  Aaron Mattson DO.    XR Chest 1 View    Result Date: 11/1/2022  Impression: Stable cardiomegaly dual chambered pacemaker. No active cardiopulmonary disease.   Electronically Signed By-Aaron Mattson DO On:11/1/2022 10:41 PM This report  was finalized on [id number] by  Aaron Mattson DO.      Results for orders placed during the hospital encounter of 06/29/22    Duplex Venous Lower Extremity - Bilateral CAR    Interpretation Summary  · Normal bilateral lower extremity venous duplex scan.      Results for orders placed during the hospital encounter of 06/29/22    Duplex Venous Lower Extremity - Bilateral CAR    Interpretation Summary  · Normal bilateral lower extremity venous duplex scan.      Results for orders placed during the hospital encounter of 02/25/22    Adult Transesophageal Echo (ALEYDA) W/ Cont if Necessary Per Protocol (Cardiology Department)    Interpretation Summary  Date of study  2/28/2022.    Indications  Recent TIA.    Procedure performed  Transesophageal echocardiogram and Doppler study.    Procedure  Anesthesia was provided by anesthesiologist with intravenous Diprivan.  ALEYDA probe could be passed without difficulty.  Patient tolerated the procedure well.  No complications were noted.    Results  Technically satisfactory study.  Mitral valve is structurally normal.  Mild mitral regurgitation is present.  Aortic valve is tricuspid.  Thickened with adequate opening motion.  Mild to moderate aortic regurgitation is present.  Tricuspid valve is normal.  Mild tricuspid regurgitation is present.  Calculated pulmonary artery pressure is 28 mmHg.  Mild biatrial enlargement is present.  Left atrial appendage is very small.  Left ventricle is normal in size and contractility with ejection fraction of 60%.  Atrial septum is intact.  No evidence for intracardiac thrombus or smoking effect is present.  No pericardial effusion is present.  Aorta is normal.    Impression  Mild mitral regurgitation and mild to moderate aortic regurgitation.  Mild tricuspid regurgitation  Calculated pulmonary artery pressure is 28 mmHg.  Mild biatrial enlargement.  Left atrial appendage is small.  Left ventricle is normal in size and contractility with ejection  fraction of 60%.      Labs Pending at Discharge: none        Discharge Details        Discharge Medications      Changes to Medications      Instructions Start Date   levETIRAcetam 1000 MG tablet  Commonly known as: KEPPRA  What changed:   · medication strength  · how much to take  · when to take this   1,000 mg, Oral, Nightly      levETIRAcetam 500 MG tablet  Commonly known as: KEPPRA  What changed: You were already taking a medication with the same name, and this prescription was added. Make sure you understand how and when to take each.   500 mg, Oral, Daily   Start Date: November 4, 2022        Continue These Medications      Instructions Start Date   amLODIPine 5 MG tablet  Commonly known as: NORVASC   5 mg, Oral, Every Morning      aspirin 325 MG tablet   325 mg, Oral, Daily      atorvastatin 10 MG tablet  Commonly known as: LIPITOR   10 mg, Oral, Daily      ferrous sulfate 325 (65 FE) MG tablet   325 mg, Oral, Daily With Breakfast      folic acid 1 MG tablet  Commonly known as: FOLVITE   1 mg, Oral, Daily      furosemide 40 MG tablet  Commonly known as: LASIX   40 mg, Oral, Daily      gabapentin 300 MG capsule  Commonly known as: NEURONTIN   300 mg, Oral, 2 Times Daily      losartan 50 MG tablet  Commonly known as: COZAAR   50 mg, Oral, Daily      NovoLIN 70/30 (70-30) 100 UNIT/ML injection  Generic drug: insulin NPH-insulin regular   INJECT 30 UNITS UNDER THE SKIN INTO THE APPROPRIATE AREA TWO TIMES A DAY WITH A MEAL      omeprazole 40 MG capsule  Commonly known as: priLOSEC   40 mg, Oral, Every Morning Before Breakfast, Take on empty stomach!      spironolactone 25 MG tablet  Commonly known as: ALDACTONE   25 mg, Oral, Every Morning      topiramate 25 MG tablet  Commonly known as: TOPAMAX   12.5 mg, Oral, 2 Times Daily      vitamin B-12 1000 MCG tablet  Commonly known as: CYANOCOBALAMIN   1,000 mcg, Oral, Daily             Allergies   Allergen Reactions   • Latex, Natural Rubber Rash   • Adhesive Tape Rash          [place] [facility] [place]:  Hospital:  Diet Order   Procedures   • Diet Regular         Discharge Activity: as tolerated with walker and cane        CODE STATUS:  Code Status and Medical Interventions:   Ordered at: 11/02/22 0149     Code Status (Patient has no pulse and is not breathing):    CPR (Attempt to Resuscitate)     Medical Interventions (Patient has pulse or is breathing):    Full Support         Future Appointments   Date Time Provider Department Center   11/7/2022  2:00 PM [facility]  [facility]   11/7/2022  2:45 PM [facility] LENNIE 1  [facility]   12/19/2022 12:45 PM Gabriel Goss MD [facility]   4/18/2023  2:45 PM Seipel, Joseph F, MD [facility]   4/24/2023  2:30 PM [facility]ICE [facility]   4/24/2023  3:10 PM Wang Plaza MD [facility]           Time spent on Discharge including face to face service:  25 minutes    Stuart Chew MD

## 2022-11-03 NOTE — PLAN OF CARE
Problem: Fall Injury Risk  Goal: Absence of Fall and Fall-Related Injury  Outcome: Ongoing, Progressing  Intervention: Identify and Manage Contributors  Recent Flowsheet Documentation  Taken 11/3/2022 0000 by Sanjiv Vega RN  Medication Review/Management: medications reviewed  Taken 11/2/2022 2200 by Sanjiv Vega RN  Medication Review/Management: medications reviewed  Taken 11/2/2022 2002 by Sanjiv Vega RN  Medication Review/Management: medications reviewed  Intervention: Promote Injury-Free Environment  Recent Flowsheet Documentation  Taken 11/3/2022 0000 by Sanjiv Vega RN  Safety Promotion/Fall Prevention:   safety round/check completed   room organization consistent   nonskid shoes/slippers when out of bed   lighting adjusted   fall prevention program maintained   clutter free environment maintained   assistive device/personal items within reach  Taken 11/2/2022 2200 by Sanjiv Vega RN  Safety Promotion/Fall Prevention:   safety round/check completed   room organization consistent   nonskid shoes/slippers when out of bed   mobility aid in reach   fall prevention program maintained   clutter free environment maintained   assistive device/personal items within reach  Taken 11/2/2022 2002 by Sanjiv Vega RN  Safety Promotion/Fall Prevention:   safety round/check completed   room organization consistent   nonskid shoes/slippers when out of bed   lighting adjusted   fall prevention program maintained   clutter free environment maintained   assistive device/personal items within reach     Problem: Skin Injury Risk Increased  Goal: Skin Health and Integrity  Outcome: Ongoing, Progressing   Goal Outcome Evaluation:         This nurse has educated the patient regarding her night medication. This nurse also has educated the patient regarding the hospitals safety protocol.

## 2022-11-03 NOTE — PLAN OF CARE
Goal Outcome Evaluation:   Assessment: Aracelis Vargas presents with functional mobility impairments which indicate the need for skilled intervention. Pt does well ambulating with a RW.  Has a rollator at home.  Safe for d/c home with agreement to use rollator for mobility.  Recommend HHPT to follow. Tolerating session today without incident. Will continue to follow and progress as tolerated.

## 2022-11-03 NOTE — PROGRESS NOTES
Referring Provider: Armin Juárez MD    Reason for follow-up:  Status post pacemaker  Coronary artery disease  Atrial fibrillation  Weakness falls and altered mental status.     Patient Care Team:  Gabriel Goss MD as PCP - General (Family Medicine)  Gregory Pinedo MD (Family Medicine)  Wang Plaza MD as Consulting Physician (Cardiology)  Rusty Don MD as Consulting Physician (Nephrology)    Subjective .      ROS    Since I have last seen her yesterday, the patient has been without any chest discomfort ,shortness of breath, palpitations, dizziness or syncope.  Denies having any headache ,abdominal pain ,nausea, vomiting , diarrhea constipation, loss of weight or loss of appetite.  Denies having any excessive bruising ,hematuria or blood in the stool.    Review of all systems negative except as indicated    History  Past Medical History:   Diagnosis Date   • Atherosclerosis of native coronary artery of native heart without angina pectoris 10/22/2020   • Atrioventricular block, Mobitz type 1, Johnkebach 10/21/2020    Added automatically from request for surgery [id number]   • Hypertension associated with stage 3 chronic kidney disease due to type 2 diabetes mellitus ([facility]) 10/22/2020   • Morbid obesity ([facility]) 10/22/2020   • Secondary hyperaldosteronism ([facility]) 10/22/2020   • Stage 3b chronic kidney disease ([facility]) 10/22/2020   • Type 2 diabetes mellitus with diabetic neuropathy, with long-term current use of insulin ([facility]) 10/22/2020   • Type 2 diabetes mellitus with diabetic neuropathy, with long-term current use of insulin ([facility]) 10/22/2020       Past Surgical History:   Procedure Laterality Date   • CARDIAC CATHETERIZATION N/A 10/23/2020    Procedure: Left Heart Cath and coronary angiogram;  Surgeon: Wang Plaza MD;  Location: [facility] CATH INVASIVE LOCATION;  Service: Cardiovascular;  Laterality: N/A;   • CARDIAC ELECTROPHYSIOLOGY PROCEDURE Left 11/3/2021    Procedure: Pacemaker DC new;   Surgeon: Wang Plaza MD;  Location: [facility] CATH INVASIVE LOCATION;  Service: Cardiovascular;  Laterality: Left;   • CARDIAC ELECTROPHYSIOLOGY PROCEDURE N/A 11/3/2021    Procedure: LOOP RECORDER REMOVAL;  Surgeon: Wang Plaza MD;  Location: [facility] CATH INVASIVE LOCATION;  Service: Cardiovascular;  Laterality: N/A;   • HYSTERECTOMY     • JOINT REPLACEMENT Right     Hip   • JOINT REPLACEMENT Left     hip   • JOINT REPLACEMENT Left     hip (for second time)   • JOINT REPLACEMENT Right     knee   • OOPHORECTOMY         Family History   Problem Relation Age of Onset   • Heart disease Mother    • Heart disease Father        Social History     Tobacco Use   • Smoking status: Never   • Smokeless tobacco: Never   Vaping Use   • Vaping Use: Never used   Substance Use Topics   • Alcohol use: Yes     Alcohol/week: 7.0 standard drinks     Types: 7 Drinks containing 0.5 oz of alcohol per week   • Drug use: Never        Medications Prior to Admission   Medication Sig Dispense Refill Last Dose   • amLODIPine (NORVASC) 5 MG tablet Take 5 mg by mouth Every Morning.      • aspirin 325 MG tablet Take 1 tablet by mouth Daily. 30 tablet 0    • atorvastatin (LIPITOR) 10 MG tablet Take 10 mg by mouth Daily.      • ferrous sulfate 325 (65 FE) MG tablet Take 325 mg by mouth Daily With Breakfast.      • folic acid (FOLVITE) 1 MG tablet Take 1 tablet by mouth Daily. 30 tablet 11    • furosemide (LASIX) 40 MG tablet Take 1 tablet by mouth Daily.      • gabapentin (NEURONTIN) 300 MG capsule Take 300 mg by mouth 2 (Two) Times a Day.      • levETIRAcetam (KEPPRA) 500 MG tablet Take 500 mg by mouth Every 12 (Twelve) Hours.      • losartan (COZAAR) 50 MG tablet Take 50 mg by mouth Daily.      • NovoLIN 70/30 (70-30) 100 UNIT/ML injection INJECT 30 UNITS UNDER THE SKIN INTO THE APPROPRIATE AREA TWO TIMES A DAY WITH A MEAL 20 mL 3    • omeprazole (priLOSEC) 40 MG capsule Take 1 capsule by mouth Every Morning Before Breakfast. Take on empty  "stomach! 90 capsule 1    • spironolactone (ALDACTONE) 25 MG tablet Take 25 mg by mouth Every Morning.      • topiramate (TOPAMAX) 25 MG tablet Take 0.5 tablets by mouth 2 (Two) Times a Day. 30 tablet 3    • vitamin B-12 (CYANOCOBALAMIN) 1000 MCG tablet Take 1 tablet by mouth Daily. 30 tablet 11        Allergies  Latex, natural rubber and Adhesive tape    Scheduled Meds:insulin lispro, 2-9 Units, Subcutaneous, TID With Meals  levETIRAcetam, 1,000 mg, Oral, Nightly  levETIRAcetam, 500 mg, Oral, QAM      Continuous Infusions:   PRN Meds:.•  dextrose  •  dextrose  •  glucagon (human recombinant)  •  oxyCODONE  •  oxyCODONE  •  [COMPLETED] Insert peripheral IV **AND** sodium chloride    Objective     VITAL SIGNS  Vitals:    11/03/22 0400 11/03/22 0500 11/03/22 0540 11/03/22 0600   BP: 159/73  163/72 168/63   BP Location:   Left arm    Patient Position:   Lying    Pulse: 84 79 84 84   Resp:   12    Temp:       TempSrc:       SpO2: 97% 96% 97% 96%   Weight:       Height:           Flowsheet Rows    Flowsheet Row First Filed Value   Admission Height 154.9 cm (61\") Documented at 11/01/2022 2137   Admission Weight 102 kg (225 lb) Documented at 11/01/2022 2137            Intake/Output Summary (Last 24 hours) at 11/3/2022 0625  Last data filed at 11/2/2022 2002  Gross per 24 hour   Intake 220 ml   Output 100 ml   Net 120 ml        TELEMETRY: Pacemaker rhythm.    Physical Exam:  The patient is alert, oriented and in no distress.  Vital signs as noted above.  Exogenous obesity.  Head and neck revealed no carotid bruits or jugular venous distention.  No thyromegaly or lymphadenopathy is present  Lungs clear.  No wheezing.  Breath sounds are normal bilaterally.  Heart normal first and second heart sounds.  No murmur. No precordial rub is present.  No gallop is present.  Abdomen soft and nontender.  No organomegaly is present.  Extremities with good peripheral pulses without any pedal edema.  Skin warm and dry.  Pacemaker site looks " normal.  Musculoskeletal system is grossly normal  CNS grossly normal      Results Review:   I reviewed the patient's new clinical results.  Lab Results (last 24 hours)     Procedure Component Value Units Date/Time    Phosphorus [[id number]]  (Normal) Collected: 11/03/22 0504    Specimen: Blood Updated: 11/03/22 0601     Phosphorus 3.7 mg/dL     Basic Metabolic Panel [[id number]]  (Abnormal) Collected: 11/03/22 0504    Specimen: Blood Updated: 11/03/22 0558     Glucose 133 mg/dL      BUN 34 mg/dL      Creatinine 1.06 mg/dL      Sodium 138 mmol/L      Potassium 4.1 mmol/L      Chloride 105 mmol/L      CO2 24.0 mmol/L      Calcium 8.6 mg/dL      BUN/Creatinine Ratio 32.1     Anion Gap 9.0 mmol/L      eGFR 51.6 mL/min/1.73      Comment: National Kidney Foundation and American Society of Nephrology (ASN) Task Force recommended calculation based on the Chronic Kidney Disease Epidemiology Collaboration (CKD-EPI) equation refit without adjustment for race.       Narrative:      GFR Normal >60  Chronic Kidney Disease <60  Kidney Failure <15    The GFR formula is only valid for adults with stable renal function between ages 18 and 70.    POC Glucose Once [[id number]]  (Abnormal) Collected: 11/02/22 1743    Specimen: Blood Updated: 11/02/22 1746     Glucose 123 mg/dL      Comment: Serial Number: [id number]:  [id number]       POC Glucose Once [[id number]]  (Abnormal) Collected: 11/02/22 1150    Specimen: Blood Updated: 11/02/22 1151     Glucose 132 mg/dL      Comment: Serial Number: [id number]:  [id number]       Troponin [[id number]]  (Abnormal) Collected: 11/02/22 1025    Specimen: Blood Updated: 11/02/22 1049     Troponin T 0.036 ng/mL     Narrative:      Troponin T Reference Range:  <= 0.03 ng/mL-   Negative for AMI  >0.03 ng/mL-     Abnormal for myocardial necrosis.  Clinicians would have to utilize clinical acumen, EKG, Troponin and serial changes to determine if it is an Acute Myocardial Infarction or myocardial  injury due to an underlying chronic condition.       Results may be falsely decreased if patient taking Biotin.      POC Glucose Once [[id number]]  (Abnormal) Collected: 11/02/22 0843    Specimen: Blood Updated: 11/02/22 0847     Glucose 121 mg/dL      Comment: Serial Number: [id number]:  [id number]             Imaging Results (Last 24 Hours)     ** No results found for the last 24 hours. **      LAB RESULTS (LAST 7 DAYS)    CBC  Results from last 7 days   Lab Units 11/01/22 2205   WBC 10*3/mm3 4.50   RBC 10*6/mm3 3.29*   HEMOGLOBIN g/dL 10.9*   HEMATOCRIT % 33.2*   MCV fL 100.8*   PLATELETS 10*3/mm3 203       BMP  Results from last 7 days   Lab Units 11/03/22  0504 11/02/22 0543 11/01/22 2205   SODIUM mmol/L 138 141 137   POTASSIUM mmol/L 4.1 4.6 4.9   CHLORIDE mmol/L 105 108* 105   CO2 mmol/L 24.0 22.0 20.0*   BUN mg/dL 34* 46* 45*   CREATININE mg/dL 1.06* 1.14* 1.32*   GLUCOSE mg/dL 133* 120* 142*   MAGNESIUM mg/dL  --   --  2.7*   PHOSPHORUS mg/dL 3.7 2.9  --        CMP   Results from last 7 days   Lab Units 11/03/22 0504 11/02/22 0543 11/01/22 2205   SODIUM mmol/L 138 141 137   POTASSIUM mmol/L 4.1 4.6 4.9   CHLORIDE mmol/L 105 108* 105   CO2 mmol/L 24.0 22.0 20.0*   BUN mg/dL 34* 46* 45*   CREATININE mg/dL 1.06* 1.14* 1.32*   GLUCOSE mg/dL 133* 120* 142*   ALBUMIN g/dL  --   --  3.80   BILIRUBIN mg/dL  --   --  0.3   ALK PHOS U/L  --   --  104   AST (SGOT) U/L  --   --  33*   ALT (SGPT) U/L  --   --  14         BNP        TROPONIN  Results from last 7 days   Lab Units 11/02/22  1025   TROPONIN T ng/mL 0.036*       CoAg  Results from last 7 days   Lab Units 11/01/22  2205   INR  1.05   APTT seconds 24.5*       Creatinine Clearance  Estimated Creatinine Clearance: 42.6 mL/min (A) (by C-G formula based on SCr of 1.06 mg/dL (H)).    ABG        Radiology  CT Head Without Contrast    Result Date: 11/1/2022   1. Left parietal scalp hematoma and laceration. No acute intracranial abnormality. 2. Generalized  age-related parenchymal volume loss.  Electronically Signed By-Aaron Mattson DO On:11/1/2022 10:50 PM This report was finalized on [id number] by  Aaron Mattson DO.    XR Chest 1 View    Result Date: 11/1/2022  Stable cardiomegaly dual chambered pacemaker. No active cardiopulmonary disease.   Electronically Signed By-Aaron Mattson DO On:11/1/2022 10:41 PM This report was finalized on [id number] by  Aaron Mattson DO.          EKG            I personally viewed and interpreted the patient's EKG/Telemetry data:    ECHOCARDIOGRAM:    Results for orders placed during the hospital encounter of 02/25/22    Adult Transesophageal Echo (ALEYDA) W/ Cont if Necessary Per Protocol (Cardiology Department)    Interpretation Summary  Date of study  2/28/2022.    Indications  Recent TIA.    Procedure performed  Transesophageal echocardiogram and Doppler study.    Procedure  Anesthesia was provided by anesthesiologist with intravenous Diprivan.  ALEYDA probe could be passed without difficulty.  Patient tolerated the procedure well.  No complications were noted.    Results  Technically satisfactory study.  Mitral valve is structurally normal.  Mild mitral regurgitation is present.  Aortic valve is tricuspid.  Thickened with adequate opening motion.  Mild to moderate aortic regurgitation is present.  Tricuspid valve is normal.  Mild tricuspid regurgitation is present.  Calculated pulmonary artery pressure is 28 mmHg.  Mild biatrial enlargement is present.  Left atrial appendage is very small.  Left ventricle is normal in size and contractility with ejection fraction of 60%.  Atrial septum is intact.  No evidence for intracardiac thrombus or smoking effect is present.  No pericardial effusion is present.  Aorta is normal.    Impression  Mild mitral regurgitation and mild to moderate aortic regurgitation.  Mild tricuspid regurgitation  Calculated pulmonary artery pressure is 28 mmHg.  Mild biatrial enlargement.  Left atrial  appendage is small.  Left ventricle is normal in size and contractility with ejection fraction of 60%.          STRESS MYOVIEW:    Cardiolite (Tc-99m Sestamibi) stress test    CARDIAC CATHETERIZATION:            OTHER:         Assessment & Plan     Principal Problem:    Altered mental status, unspecified altered mental status type  Active Problems:    Stage 3b chronic kidney disease (CMS/HCC)    Hypertension associated with stage 3 chronic kidney disease due to type 2 diabetes mellitus (HCC)    Morbid obesity (HCC)    Chronic coronary artery disease    Mixed hyperlipidemia    Primary hypertension    GERD without esophagitis    Cardiac pacemaker in situ    Obstructive sleep apnea    Type 2 diabetes mellitus with hyperglycemia, with long-term current use of insulin (HCC)    Seizure disorder (HCC)    Acute kidney failure (HCC)    Diabetes mellitus with nephropathy (HCC)    ETOH abuse    Seizure (HCC)    Alcohol intoxication with delirium (HCC)    History of CVA (cerebrovascular accident)        ]]]]]]]]]]]]]]]]]]]]  Impression  ========  - Recent weakness falls and altered mental status.     -Status post permanent dual-chamber pacemaker implantation ([employer] #2 Scientific MRI compatible) 11/3/2021     -Status post removal of loop recorder ([employer]) 11/3/2021     -History of syncope-no further problems  Intermittent Mobitz type II AV block  Patient is not on any medications to cause bradycardia     -Second-degree Mobitz type I AV block.  Narrow QRS complex.     -History of seizure-improved     -Recent difficulty with speaking.  TIA/stroke  Status post TPA with complete resolution of symptoms 2/3/2022     Transesophageal echocardiogram 2/28/2022 revealed  Mild mitral regurgitation and mild to moderate aortic regurgitation.  Mild tricuspid regurgitation  Calculated pulmonary artery pressure is 28 mmHg.  Mild biatrial enlargement.  Left atrial appendage is small.  Left ventricle is normal in size and contractility with  ejection fraction of 60%.     Echocardiogram-normal 10/22/2020     Cardiac catheterization 10/23/2020 revealed:  Left ventricular size and contractility is normal with ejection fraction of 60%.  Normal epicardial coronary arteries.     Status post loop recorder placement 10/23/2020 ([employer] Linq)     -History of paroxysmal atrial fibrillation     -Hypertension dyslipidemia diabetes     -History of seizure disorder     -Renal dysfunction CKD 3  BUN 27 creatinine 1.35     -Exogenous obesity.     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     -No known allergies  ============  Plan  =========  Recent falls weakness and altered mental status.  Patient appears to be alert oriented and not in distress at the time of my examination.     Status post permanent dual-chamber pacemaker implantation ([employer] MRI compatible) level 3/20/2021.  Status post loop recorder removal 11/3/2021 ([employer] Linq)  Pacemaker site and function is normal.  Interrogation of the pacemaker revealed excellent pacing parameters.  Battery status is 7.5 years.     History of seizure disorder-improved.  No further episodes.     History of TIA/stroke  No further problems.  Status post TPA with complete resolution of symptoms 2/3/2022 premature ventricular contractions-asymptomatic.     Paroxysmal atrial fibrillation.  Interrogation of the pacemaker revealed AF burden less than 1%.  With recent stroke history and history of paroxysmal atrial fibrillation patient would benefit from anticoagulation.  Patient just received TPA.  However concerned about patient having falling spells.  Consideration will be given for watchman procedure.  CHADS2 score is 7.  However left atrial appendage is very small in size.     Hypertension-well-controlled  122/69     Renal dysfunction  17/1.04-2/28/2022  BUN/creatinine   45/1.32- 11/1/2022  46/1.1-11/2/2022   34/1.06-11/3/2022    Anemia  Hemoglobin  8.6-12/4/2022  7.7-2/27/2022  Improved hemoglobin at 10.9-11/2/2022     Anticoagulation status was extensively discussed.  We will hold off on anticoagulation for multiple reasons including  Fall risk  Left atrial appendage is very small and no clots or smoking effect was seen on ALEYDA.  (Watchman is not an option due to left atrial appendage being very small.)  Patient has problems with significant anemia.  Family favors no anticoagulation.     Close cardiac monitoring.     Further plan will depend on patient's progress.  ]]]]]]]]]]]]]]         Wang Plaza MD  11/03/22  06:25 EDT

## 2022-11-04 ENCOUNTER — TRANSITIONAL CARE MANAGEMENT TELEPHONE ENCOUNTER (OUTPATIENT)
Dept: CALL CENTER | Facility: HOSPITAL | Age: 87
End: 2022-11-04

## 2022-11-04 NOTE — OUTREACH NOTE
Prep Survey    Flowsheet Row Responses   [facility] patient discharged from? [facility]   Is LACE score < 7 ? No   Emergency Room discharge w/ pulse ox? No   Eligibility [facility]   Hospital  [facility]   Date of Admission 11/01/22   Date of Discharge 11/03/22   Discharge Disposition Home-Health Care Sv   Discharge diagnosis Alcohol intoxication with delirium Acute kidney failure    Does the patient have one of the following disease processes/diagnoses(primary or secondary)? Other   Does the patient have Home health ordered? Yes   What is the Home health agency?  [facility]     Is there a DME ordered? No   Prep survey completed? Yes          SHAUN EDWARD - Registered Nurse

## 2022-11-04 NOTE — OUTREACH NOTE
Call Center TCM Note    Flowsheet Row Responses   [facility] patient discharged from? [facility]   Does the patient have one of the following disease processes/diagnoses(primary or secondary)? Other   TCM attempt successful? Yes   Call start time 0845   Call end time 0851   Discharge diagnosis Alcohol intoxication with delirium Acute kidney failure    Person spoke with today (if not patient) and relationship patient   Meds reviewed with patient/caregiver? Yes   Is the patient having any side effects they believe may be caused by any medication additions or changes? No   Does the patient have all medications ordered at discharge? Yes   Is the patient taking all medications as directed (includes completed medication regime)? Yes   Comments Patient prefers to make her own followup appt .    Does the patient have an appointment with their PCP within 7 days of discharge? No   Nursing Interventions Patient declined scheduling/rescheduling appointment at this time   What is the Home health agency?  [facility]     Has home health visited the patient within 72 hours of discharge? Yes   Psychosocial issues? No   Did the patient receive a copy of their discharge instructions? Yes   Nursing interventions Reviewed instructions with patient   What is the patient's perception of their health status since discharge? Improving   Is the patient/caregiver able to teach back signs and symptoms related to disease process for when to call PCP? Yes   Is the patient/caregiver able to teach back signs and symptoms related to disease process for when to call 911? Yes   Is the patient/caregiver able to teach back the hierarchy of who to call/visit for symptoms/problems? PCP, Specialist, Home health nurse, Urgent Care, ED, 911 Yes   If the patient is a current smoker, are they able to teach back resources for cessation? Not a smoker   TCM call completed? Yes   Wrap up additional comments Patient reports she is doing well.    Call end time 0851    Would this patient benefit from a Referral to [facility] Social Work? No   Is the patient interested in additional calls from an ambulatory ?  NOTE:  applies to high risk patients requiring additional follow-up. No          Julian Bertrand RN    11/4/2022, 08:52 EDT

## 2022-11-05 ENCOUNTER — HOME CARE VISIT (OUTPATIENT)
Dept: HOME HEALTH SERVICES | Facility: HOME HEALTHCARE | Age: 87
End: 2022-11-05
Payer: MEDICARE

## 2022-11-05 VITALS
HEIGHT: 64 IN | DIASTOLIC BLOOD PRESSURE: 83 MMHG | TEMPERATURE: 97.7 F | RESPIRATION RATE: 18 BRPM | HEART RATE: 80 BPM | SYSTOLIC BLOOD PRESSURE: 126 MMHG | OXYGEN SATURATION: 99 % | WEIGHT: 221 LBS | BODY MASS INDEX: 37.73 KG/M2

## 2022-11-05 PROCEDURE — G0299 HHS/HOSPICE OF RN EA 15 MIN: HCPCS

## 2022-11-06 NOTE — HOME HEALTH
87 y.o.female lives with spouse. recently hospitalized due to fall at home. hospital reports pt was intoxicated on arrival, she does admit to this but says she doesnt normally have more clifford 2 drinks a day. Patient has 5 staples to left side of her head.after review of hospital dociumentation it was noted to have staples removed in 10-14 days. advised her to make appt with PCP. She has also recently been treated at the [facility] for a laceration to left lower leg from her report she hit it on a magazine rack in the home. a mepilex border was placed 11/3/22 while in the hospital. PMH includes. ETOH abuse. DM. HTN. Epilepsy. CKD stage 3b. Heart failure. a Fib. COPD. OA. HLD. morbid obesity. GERD. TIA. pacemaker. ROSSY.  PT ordered at referral.    FOC laceration of scalp.    PLAN FOR NEXT VISIT    CP assessment  assess safety and pain  assess bs  encourage to abstain from ETOH   assess if fu with PCP has been scheduled

## 2022-11-07 ENCOUNTER — APPOINTMENT (OUTPATIENT)
Dept: MAMMOGRAPHY | Facility: HOSPITAL | Age: 87
End: 2022-11-07

## 2022-11-07 ENCOUNTER — APPOINTMENT (OUTPATIENT)
Dept: BONE DENSITY | Facility: HOSPITAL | Age: 87
End: 2022-11-07

## 2022-11-07 ENCOUNTER — HOME CARE VISIT (OUTPATIENT)
Dept: HOME HEALTH SERVICES | Facility: HOME HEALTHCARE | Age: 87
End: 2022-11-07
Payer: MEDICARE

## 2022-11-08 ENCOUNTER — TELEPHONE (OUTPATIENT)
Dept: FAMILY MEDICINE CLINIC | Facility: CLINIC | Age: 87
End: 2022-11-08

## 2022-11-08 NOTE — TELEPHONE ENCOUNTER
Mari from  Home health is calling to ask if you would like them to take the 5 sutures out of pt scalp when it's time. They saw that this needs done but don't have an order to do so. They said they can do it. Let me know if this is okay. thanks

## 2022-11-09 ENCOUNTER — HOME CARE VISIT (OUTPATIENT)
Dept: HOME HEALTH SERVICES | Facility: HOME HEALTHCARE | Age: 87
End: 2022-11-09
Payer: MEDICARE

## 2022-11-09 ENCOUNTER — APPOINTMENT (OUTPATIENT)
Dept: NEUROLOGY | Facility: HOSPITAL | Age: 87
End: 2022-11-09

## 2022-11-09 VITALS
SYSTOLIC BLOOD PRESSURE: 131 MMHG | HEART RATE: 80 BPM | RESPIRATION RATE: 19 BRPM | OXYGEN SATURATION: 98 % | TEMPERATURE: 97.4 F | DIASTOLIC BLOOD PRESSURE: 80 MMHG

## 2022-11-09 PROCEDURE — G0299 HHS/HOSPICE OF RN EA 15 MIN: HCPCS

## 2022-11-10 PROCEDURE — G0180 MD CERTIFICATION HHA PATIENT: HCPCS | Performed by: FAMILY MEDICINE

## 2022-11-11 ENCOUNTER — HOME CARE VISIT (OUTPATIENT)
Dept: HOME HEALTH SERVICES | Facility: HOME HEALTHCARE | Age: 87
End: 2022-11-11
Payer: MEDICARE

## 2022-11-11 VITALS
DIASTOLIC BLOOD PRESSURE: 84 MMHG | HEART RATE: 87 BPM | RESPIRATION RATE: 18 BRPM | SYSTOLIC BLOOD PRESSURE: 118 MMHG | OXYGEN SATURATION: 97 % | TEMPERATURE: 95.4 F

## 2022-11-11 PROCEDURE — G0299 HHS/HOSPICE OF RN EA 15 MIN: HCPCS

## 2022-11-11 NOTE — HOME HEALTH
Routine Visit Note:    Skill/education provided: wound assessment, wound care, s/sx infection, diabetes review/diabetic foot care, medication review with update to med list    Patient/caregiver response: tolerated well, verbalized understanding    Plan for next visit: wound care/assess, staple removal, medication review **check for supplies    Other pertinent info: patient has orders to remove staples from scalp wound 10-14 days from placement which would be 11/12-11/16. Patient still requested visit today for assessment. No complications noted to wounds on assessment. Eduated on s/sx infection. Patient verbalized understanding that staples would be removed on 11/16 per orders.

## 2022-11-11 NOTE — HOME HEALTH
plan for next visit:  CP assess  falls/safety assess  medication teaching  remove staples  wound care as ordered  supplies have been ordered

## 2022-11-14 RX ORDER — LEVETIRACETAM 500 MG/1
TABLET ORAL
Qty: 60 TABLET | Refills: 3 | Status: SHIPPED | OUTPATIENT
Start: 2022-11-14 | End: 2022-11-17

## 2022-11-16 ENCOUNTER — HOME CARE VISIT (OUTPATIENT)
Dept: HOME HEALTH SERVICES | Facility: HOME HEALTHCARE | Age: 87
End: 2022-11-16
Payer: MEDICARE

## 2022-11-17 ENCOUNTER — HOME CARE VISIT (OUTPATIENT)
Dept: HOME HEALTH SERVICES | Facility: HOME HEALTHCARE | Age: 87
End: 2022-11-17
Payer: MEDICARE

## 2022-11-17 ENCOUNTER — OFFICE VISIT (OUTPATIENT)
Dept: FAMILY MEDICINE CLINIC | Facility: CLINIC | Age: 87
End: 2022-11-17

## 2022-11-17 VITALS
TEMPERATURE: 97.6 F | OXYGEN SATURATION: 99 % | DIASTOLIC BLOOD PRESSURE: 64 MMHG | RESPIRATION RATE: 16 BRPM | HEIGHT: 64 IN | HEART RATE: 65 BPM | SYSTOLIC BLOOD PRESSURE: 145 MMHG | BODY MASS INDEX: 37.22 KG/M2 | WEIGHT: 218 LBS

## 2022-11-17 VITALS
DIASTOLIC BLOOD PRESSURE: 70 MMHG | SYSTOLIC BLOOD PRESSURE: 138 MMHG | TEMPERATURE: 98.5 F | HEART RATE: 80 BPM | OXYGEN SATURATION: 98 % | RESPIRATION RATE: 20 BRPM

## 2022-11-17 DIAGNOSIS — L97.929 SKIN ULCER OF LEFT LOWER LEG, UNSPECIFIED ULCER STAGE: ICD-10-CM
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
## 2022-11-17 DIAGNOSIS — Z48.02 ENCOUNTER FOR STAPLE REMOVAL: ICD-10-CM

## 2022-11-17 DIAGNOSIS — G40.909 SEIZURE DISORDER: ICD-10-CM

## 2022-11-17 PROCEDURE — 1111F DSCHRG MED/CURRENT MED MERGE: CPT | Performed by: PHYSICIAN ASSISTANT

## 2022-11-17 PROCEDURE — G0299 HHS/HOSPICE OF RN EA 15 MIN: HCPCS

## 2022-11-17 PROCEDURE — 99213 OFFICE O/P EST LOW 20 MIN: CPT | Performed by: PHYSICIAN ASSISTANT

## 2022-11-17 NOTE — CASE COMMUNICATION
Visit moved to Thursday left 2 VM no answer or return call and I need to get back to [place] to do mine.

## 2022-11-17 NOTE — PROGRESS NOTES
Transitional Care Follow Up Visit  Subjective     Aracelis Vargas is a 87 y.o. female who presents for a transitional care management visit.    Within 48 business hours after discharge our office contacted her via telephone to coordinate her care and needs.      I reviewed and discussed the details of that call along with the discharge summary, hospital problems, inpatient lab results, inpatient diagnostic studies, and consultation reports with Aracelis.     Current outpatient and discharge medications have been reconciled for the patient.  Reviewed by: BUNNY Morse      Date of TCM Phone Call 11/3/2022   [facility]   Date of Admission 11/1/2022   Date of Discharge 11/3/2022   [place] [facility] [place] for Readmission (LACE) Score: 10 (11/3/2022  6:00 AM)      History of Present Illness  Pt presents to follow up from recent hospitalization due to altered mental status and fall from ETOH intoxication.  No further falls since.  No fevers.  She has staples in her scalp from laceration acquired from fall and needs them removed.  She also has wound on left lower leg that is red. She has wound on left lower leg since October 21st which happened after hitting her leg on a magazine rack. She was going to wound care center prior to hospitalization and feels like wound is about the same.  She has had home health nurse coming once per week.  No physical therapy. She is taking her insulin twice daily and checking her sugars regular.  No lows. Usually in 150s-170s.     Course During Hospital Stay:  Aracelis Vargas is a 87 y.o. female admitted with EtOH intoxication. It was worried that she had a neurological issue so Neurology was consulted and they adjusted her keppra. She required staple in the ER after hitting her head from fall. She worked with PT/OT who recommended SNF but patient refused so she will go home with home health which was setup by [facility]. She has a walker and cane at home. She was  counseled on EtOH cessation.            DISCHARGE Follow Up Recommendations for labs and diagnostics: PCP and neurology       The following portions of the patient's history were reviewed and updated as appropriate: allergies, current medications, past family history, past medical history, past social history, past surgical history and problem list.    Review of Systems   Constitutional: Positive for fatigue. Negative for activity change, appetite change, chills, diaphoresis, fever and unexpected weight change.   HENT: Negative for trouble swallowing.    Eyes: Negative for visual disturbance.   Respiratory: Negative for chest tightness and shortness of breath.    Cardiovascular: Negative for chest pain, palpitations and leg swelling.   Gastrointestinal: Negative for abdominal pain, diarrhea, nausea and vomiting.   Genitourinary: Negative for difficulty urinating and dysuria.   Musculoskeletal: Negative for gait problem.   Skin: Positive for wound.   Neurological: Negative for dizziness, tremors, syncope, weakness, light-headedness and headaches.   Psychiatric/Behavioral: Negative for sleep disturbance.       Objective   Physical Exam  Vitals and nursing note reviewed.   Constitutional:       Appearance: Normal appearance. She is obese.   HENT:      Head: Normocephalic and atraumatic.   Eyes:      Extraocular Movements: Extraocular movements intact.      Pupils: Pupils are equal, round, and reactive to light.   Neck:      Vascular: No carotid bruit.   Cardiovascular:      Rate and Rhythm: Normal rate and regular rhythm.      Heart sounds: Normal heart sounds.   Pulmonary:      Effort: Pulmonary effort is normal.      Breath sounds: Normal breath sounds.   Musculoskeletal:      Cervical back: Normal range of motion and neck supple.      Right lower leg: No edema.      Left lower leg: No edema.   Skin:     General: Skin is warm and dry.      Findings: Wound present.      Comments: Ulcerated lesion on left lower leg  with serosanguinous drainage.    Neurological:      General: No focal deficit present.      Mental Status: She is alert and oriented to person, place, and time.   Psychiatric:         Mood and Affect: Mood normal.         Behavior: Behavior normal.         Thought Content: Thought content normal.         Assessment & Plan   Diagnoses and all orders for this visit:    1. Fall, subsequent encounter (Primary)  Comments:  Pt denies any further falls and is doing well.    2. Encounter for staple removal  Comments:  staples removed from scalp without complications.    3. Seizure disorder (HCC)  Comments:  Continue Keppra and foll    4. Skin ulcer of left lower leg, unspecified ulcer stage (HCC)  Comments:  Home health monitoring.  If not improving over the next week or any worsening, will need to send back to wound care.

## 2022-11-18 ENCOUNTER — TELEPHONE (OUTPATIENT)
Dept: FAMILY MEDICINE CLINIC | Facility: CLINIC | Age: 87
End: 2022-11-18

## 2022-11-18 NOTE — HOME HEALTH
Routine Visit Note: Ambulated into kitchen for visit with SN gait steady. Reports feeling pretty good NP at MD office took staples out of head wound today pt reported but did not look at her leg wound. Reports nephrologist stopped some meds yesterday at his appt. reports she goes back for lab work next week to check status. Glucose this am 131. Denies pain. Reports does get short of breath when carrying something otherwise breathing ok. Noted edema of left ankle where ace wrap had left area exposed not compressed.     Skill/education provided: Cardiopulmonary assessment. Pain assess assess glucose. Removed dressing from left lower leg dried bloody drainage noted on old dressing. Cleansed with saline then betadine applied pt reports that is what they have been doing. Mepilex border dressing applied.     Patient/caregiver response: Tolerated well     Plan for next visit: Cardiopulmonary assessment assess glucose assess pain assess wound left lower leg. assess for falls

## 2022-11-18 NOTE — TELEPHONE ENCOUNTER
Pt called wanting to know if she is supposed to continue wound care. She said that the home health keeps asking her if she needs to do this. She said she is not sure if she does or not.

## 2022-11-23 ENCOUNTER — HOME CARE VISIT (OUTPATIENT)
Dept: HOME HEALTH SERVICES | Facility: HOME HEALTHCARE | Age: 87
End: 2022-11-23
Payer: MEDICARE

## 2022-11-28 RX ORDER — ATORVASTATIN CALCIUM 10 MG/1
TABLET, FILM COATED ORAL
Qty: 90 TABLET | Refills: 0 | Status: SHIPPED | OUTPATIENT
Start: 2022-11-28 | End: 2023-01-10 | Stop reason: SDUPTHER

## 2022-11-29 ENCOUNTER — HOME CARE VISIT (OUTPATIENT)
Dept: HOME HEALTH SERVICES | Facility: HOME HEALTHCARE | Age: 87
End: 2022-11-29
Payer: MEDICARE

## 2022-11-30 ENCOUNTER — HOME CARE VISIT (OUTPATIENT)
Dept: HOME HEALTH SERVICES | Facility: HOME HEALTHCARE | Age: 87
End: 2022-11-30
Payer: MEDICARE

## 2022-11-30 VITALS
HEART RATE: 92 BPM | DIASTOLIC BLOOD PRESSURE: 60 MMHG | OXYGEN SATURATION: 99 % | WEIGHT: 220 LBS | RESPIRATION RATE: 19 BRPM | BODY MASS INDEX: 37.76 KG/M2 | TEMPERATURE: 98.8 F | SYSTOLIC BLOOD PRESSURE: 144 MMHG

## 2022-11-30 PROCEDURE — G0299 HHS/HOSPICE OF RN EA 15 MIN: HCPCS

## 2022-11-30 NOTE — HOME HEALTH
Routine Visit Note:    Skill/education provided: wound care and assessment, medication education, fall prevention education, pain assessment    Patient/caregiver response: pt stated understanding    Plan for next visit: wound care and assessment, medication education, fall prevention education, pain assessment    Other pertinent info: monitor supplies for ordering

## 2022-12-06 ENCOUNTER — HOME CARE VISIT (OUTPATIENT)
Dept: HOME HEALTH SERVICES | Facility: HOME HEALTHCARE | Age: 87
End: 2022-12-06
Payer: MEDICARE

## 2022-12-06 PROCEDURE — G0299 HHS/HOSPICE OF RN EA 15 MIN: HCPCS

## 2022-12-11 VITALS
RESPIRATION RATE: 18 BRPM | HEART RATE: 76 BPM | TEMPERATURE: 97.7 F | OXYGEN SATURATION: 96 % | SYSTOLIC BLOOD PRESSURE: 132 MMHG | DIASTOLIC BLOOD PRESSURE: 78 MMHG

## 2022-12-13 ENCOUNTER — HOME CARE VISIT (OUTPATIENT)
Dept: HOME HEALTH SERVICES | Facility: HOME HEALTHCARE | Age: 87
End: 2022-12-13

## 2022-12-13 PROCEDURE — G0299 HHS/HOSPICE OF RN EA 15 MIN: HCPCS

## 2022-12-18 VITALS
DIASTOLIC BLOOD PRESSURE: 82 MMHG | RESPIRATION RATE: 18 BRPM | SYSTOLIC BLOOD PRESSURE: 138 MMHG | HEART RATE: 68 BPM | TEMPERATURE: 97.9 F | OXYGEN SATURATION: 94 %

## 2022-12-19 ENCOUNTER — OFFICE VISIT (OUTPATIENT)
Dept: FAMILY MEDICINE CLINIC | Facility: CLINIC | Age: 87
End: 2022-12-19

## 2022-12-19 VITALS
SYSTOLIC BLOOD PRESSURE: 130 MMHG | OXYGEN SATURATION: 99 % | WEIGHT: 232.2 LBS | DIASTOLIC BLOOD PRESSURE: 72 MMHG | BODY MASS INDEX: 39.86 KG/M2 | HEART RATE: 41 BPM | TEMPERATURE: 97.5 F

## 2022-12-19 DIAGNOSIS — E78.2 MIXED HYPERLIPIDEMIA: ICD-10-CM

## 2022-12-19 DIAGNOSIS — I10 ESSENTIAL HYPERTENSION: ICD-10-CM

## 2022-12-19 DIAGNOSIS — Z79.4 TYPE 2 DIABETES MELLITUS WITH HYPERGLYCEMIA, WITH LONG-TERM CURRENT USE OF INSULIN: ICD-10-CM

## 2022-12-19 DIAGNOSIS — Z00.00 MEDICARE ANNUAL WELLNESS VISIT, SUBSEQUENT: Primary | ICD-10-CM

## 2022-12-19 DIAGNOSIS — E11.65 TYPE 2 DIABETES MELLITUS WITH HYPERGLYCEMIA, WITH LONG-TERM CURRENT USE OF INSULIN: ICD-10-CM

## 2022-12-19 PROCEDURE — 1170F FXNL STATUS ASSESSED: CPT | Performed by: FAMILY MEDICINE

## 2022-12-19 PROCEDURE — 99213 OFFICE O/P EST LOW 20 MIN: CPT | Performed by: FAMILY MEDICINE

## 2022-12-19 PROCEDURE — G0439 PPPS, SUBSEQ VISIT: HCPCS | Performed by: FAMILY MEDICINE

## 2022-12-19 PROCEDURE — 1159F MED LIST DOCD IN RCRD: CPT | Performed by: FAMILY MEDICINE

## 2022-12-19 NOTE — PROGRESS NOTES
The ABCs of the Annual Wellness Visit  Subsequent Medicare Wellness Visit    Subjective    Aracelis Vargas is a 87 y.o. female who presents for a Subsequent Medicare Wellness Visit.    The following portions of the patient's history were reviewed and   updated as appropriate: allergies, current medications, past family history, past medical history, past social history, past surgical history and problem list.    Compared to one year ago, the patient feels her physical   health is worse.    Compared to one year ago, the patient feels her mental   health is the same.    Recent Hospitalizations:  This patient has had a [facility] admission record on file within the last 365 days.    Current Medical Providers:  Patient Care Team:  Gabriel Goss MD as PCP - General (Family Medicine)  Gregory Pinedo MD (Family Medicine)  Wang Plaza MD as Consulting Physician (Cardiology)  Rusty Don MD as Consulting Physician (Nephrology)    Outpatient Medications Prior to Visit   Medication Sig Dispense Refill   • amLODIPine (NORVASC) 5 MG tablet Take 1 tablet by mouth Every Morning. Indications: High Blood Pressure Disorder     • aspirin 325 MG tablet Take 1 tablet by mouth Daily. (Patient taking differently: Take 81 mg by mouth Daily.) 30 tablet 0   • atorvastatin (LIPITOR) 10 MG tablet TAKE ONE TABLET BY MOUTH DAILY 90 tablet 0   • ferrous sulfate 325 (65 FE) MG tablet Take 1 tablet by mouth Daily With Breakfast. Indications: Anemia From Inadequate Iron in the Body     • folic acid (FOLVITE) 1 MG tablet Take 1 tablet by mouth Daily. 30 tablet 11   • gabapentin (NEURONTIN) 300 MG capsule Take 1 capsule by mouth 2 (Two) Times a Day. Indications: Disease of the Peripheral Nerves     • LEVETIRACETAM PO Take 1,000 mg by mouth Every Night. Indications: seizures     • NovoLIN 70/30 (70-30) 100 UNIT/ML injection INJECT 30 UNITS UNDER THE SKIN INTO THE APPROPRIATE AREA TWO TIMES A DAY WITH A MEAL 20 mL 3   •  omeprazole (priLOSEC) 40 MG capsule Take 1 capsule by mouth Every Morning Before Breakfast. Take on empty stomach! 90 capsule 1   • topiramate (TOPAMAX) 25 MG tablet Take 0.5 tablets by mouth 2 (Two) Times a Day. 30 tablet 3   • vitamin B-12 (CYANOCOBALAMIN) 1000 MCG tablet Take 1 tablet by mouth Daily. 30 tablet 11     No facility-administered medications prior to visit.       No opioid medication identified on active medication list. I have reviewed chart for other potential  high risk medication/s and harmful drug interactions in the elderly.          Aspirin is on active medication list. Aspirin use is indicated based on review of current medical condition/s. Pros and cons of this therapy have been discussed today. Benefits of this medication outweigh potential harm.  Patient has been encouraged to continue taking this medication.  .      Patient Active Problem List   Diagnosis   • Fall   • Secondary hyperaldosteronism (HCC)   • Stage 3b chronic kidney disease (CMS/HCC)   • Hypertension associated with stage 3 chronic kidney disease due to type 2 diabetes mellitus (HCC)   • Morbid obesity (HCC)   • Chronic coronary artery disease   • AV block, Mobitz II   • Mixed hyperlipidemia   • Primary hypertension   • Acute UTI   • GERD without esophagitis   • Cardiac pacemaker in situ   • Cerebrovascular accident (CVA) (HCC)   • Hyperammonemia (HCC)   • Obstructive sleep apnea   • Metabolic encephalopathy   • Type 2 diabetes mellitus with hyperglycemia, with long-term current use of insulin (HCC)   • Seizure disorder (HCC)   • Elevated lactic acid level   • Acute kidney failure (HCC)   • Diabetes mellitus with nephropathy (HCC)   • Hyperkalemia   • Elevated serum lactate dehydrogenase   • Altered mental status, unspecified altered mental status type   • ETOH abuse   • Seizure (HCC)   • Alcohol intoxication with delirium (HCC)   • History of CVA (cerebrovascular accident)     Advance Care Planning  Advance Directive is on  "file.  ACP discussion was held with the patient during this visit. Patient has an advance directive in EMR which is still valid.      Objective    Vitals:    12/19/22 1240   BP: 130/72   BP Location: Right arm   Patient Position: Sitting   Cuff Size: Large Adult   Pulse: (!) 41   Temp: 97.5 °F (36.4 °C)   TempSrc: Temporal   SpO2: 99%   Weight: 105 kg (232 lb 3.2 oz)     Estimated body mass index is 39.86 kg/m² as calculated from the following:    Height as of 11/17/22: 162.6 cm (64\").    Weight as of this encounter: 105 kg (232 lb 3.2 oz).    Class 2 Severe Obesity (BMI >=35 and <=39.9). Obesity-related health conditions include the following: hypertension, coronary heart disease, diabetes mellitus, dyslipidemias, osteoarthritis and lower extremity venous stasis disease. Obesity is unchanged. BMI is is above average; BMI management plan is completed. We discussed portion control and increasing exercise.      Does the patient have evidence of cognitive impairment? No          HEALTH RISK ASSESSMENT    Smoking Status:  Social History     Tobacco Use   Smoking Status Never   Smokeless Tobacco Never     Alcohol Consumption:  Social History     Substance and Sexual Activity   Alcohol Use Yes   • Alcohol/week: 7.0 standard drinks   • Types: 7 Drinks containing 0.5 oz of alcohol per week     Fall Risk Screen:    HOANG Fall Risk Assessment was completed, and patient is at HIGH risk for falls. Assessment completed on:12/19/2022    Depression Screening:  PHQ-2/PHQ-9 Depression Screening 12/19/2022   Retired PHQ-9 Total Score -   Retired Total Score -   Little Interest or Pleasure in Doing Things 0-->not at all   Feeling Down, Depressed or Hopeless 0-->not at all   PHQ-9: Brief Depression Severity Measure Score 0       Health Habits and Functional and Cognitive Screening:  Functional & Cognitive Status 12/19/2022   Do you have difficulty preparing food and eating? No   Do you have difficulty bathing yourself, getting dressed " or grooming yourself? No   Do you have difficulty using the toilet? No   Do you have difficulty moving around from place to place? No   Do you have trouble with steps or getting out of a bed or a chair? No   Current Diet Well Balanced Diet   Dental Exam Up to date   Eye Exam Not up to date   Exercise (times per week) 0 times per week   Current Exercises Include No Regular Exercise   Do you need help using the phone?  No   Are you deaf or do you have serious difficulty hearing?  Yes   Do you need help with transportation? Yes   Do you need help shopping? Yes   Do you need help preparing meals?  Yes   Do you need help with housework?  No   Do you need help with laundry? No   Do you need help taking your medications? No   Do you need help managing money? No   Do you ever drive or ride in a car without wearing a seat belt? No   Have you felt unusual stress, anger or loneliness in the last month? No   Who do you live with? Spouse   If you need help, do you have trouble finding someone available to you? Yes   Have you been bothered in the last four weeks by sexual problems? No   Do you have difficulty concentrating, remembering or making decisions? Yes       Age-appropriate Screening Schedule:  Refer to the list below for future screening recommendations based on patient's age, sex and/or medical conditions. Orders for these recommended tests are listed in the plan section. The patient has been provided with a written plan.    Health Maintenance   Topic Date Due   • URINE MICROALBUMIN  Never done   • DXA SCAN  Never done   • TDAP/TD VACCINES (2 - Tdap) 01/16/2017   • DIABETIC EYE EXAM  10/25/2020   • MAMMOGRAM  09/16/2021   • LIPID PANEL  02/04/2023   • HEMOGLOBIN A1C  05/08/2023   • INFLUENZA VACCINE  Completed   • ZOSTER VACCINE  Completed                CMS Preventative Services Quick Reference  Risk Factors Identified During Encounter  Fall Risk-High or Moderate: Discussed Fall Prevention in the home  Immunizations  Discussed/Encouraged: COVID19  Inadequate Social Support, Isolation, Loneliness, Lack of Transportation, Financial Difficulties, or Caregiver Stress: advised to maintain contact with family and [place] friends  Inactivity/Sedentary: Patient was advised to exercise at least 150 minutes a week per CDC recommendations.  The above risks/problems have been discussed with the patient.  Pertinent information has been shared with the patient in the After Visit Summary.  An After Visit Summary and PPPS were made available to the patient.    Follow Up:   Next Medicare Wellness visit to be scheduled in 1 year.       Additional E&M Note during same encounter follows:  Patient has multiple medical problems which are significant and separately identifiable that require additional work above and beyond the Medicare Wellness Visit.      Chief Complaint  Medicare Wellness-subsequent    Subjective        HPI  Aracelis Vargas is also being seen today for her chronic issues with diabetes, high blood pressure and high cholesterol.  [facility] has been seeing her for some skin breakdown on her legs related to severe venous stasis disease.  She is wearing compression socks periodically.  She is still having a hard time diuresing despite restarting her diuretic.  She has worsening chronic renal insufficiency that contributes to that as well.  She is due to see nephrology again next month.  She has not had any fever or acute illness.  She has had her flu shot but has yet to take her COVID-vaccine booster.    Review of Systems   Constitutional: Positive for activity change and fatigue.   HENT: Negative for congestion, nosebleeds, postnasal drip and tinnitus.    Eyes: Negative for visual disturbance.   Respiratory: Negative for cough, shortness of breath and wheezing.    Cardiovascular: Positive for leg swelling. Negative for chest pain.   Gastrointestinal: Negative for constipation, diarrhea, nausea and vomiting.   Genitourinary: Negative for  dysuria, frequency and urgency.   Musculoskeletal: Positive for arthralgias and gait problem.   Neurological: Positive for weakness and numbness. Negative for dizziness and light-headedness.   Psychiatric/Behavioral: Negative for sleep disturbance. The patient is not nervous/anxious.        Objective   Vital Signs:  /72 (BP Location: Right arm, Patient Position: Sitting, Cuff Size: Large Adult)   Pulse (!) 41   Temp 97.5 °F (36.4 °C) (Temporal)   Wt 105 kg (232 lb 3.2 oz)   SpO2 99%   BMI 39.86 kg/m²     Physical Exam  Vitals and nursing note reviewed.   Constitutional:       Appearance: She is obese.   HENT:      Right Ear: Tympanic membrane and ear canal normal.      Left Ear: Tympanic membrane and ear canal normal.   Cardiovascular:      Rate and Rhythm: Normal rate and regular rhythm.      Heart sounds: Normal heart sounds. No murmur heard.  Pulmonary:      Effort: Pulmonary effort is normal.      Breath sounds: No wheezing or rales.   Abdominal:      General: Bowel sounds are normal.      Palpations: Abdomen is soft.      Tenderness: There is no abdominal tenderness. There is no guarding.   Musculoskeletal:      Cervical back: Neck supple.      Right lower leg: Edema present.      Left lower leg: Edema present.   Lymphadenopathy:      Cervical: No cervical adenopathy.   Skin:     Findings: Rash (Skin irritation related to severe venous stasis disease in the legs, right not as bad as the left) present.   Neurological:      General: No focal deficit present.      Mental Status: She is alert and oriented to person, place, and time.   Psychiatric:         Mood and Affect: Mood normal.          The following data was reviewed by: Gabriel Goss MD on 12/19/2022:  Common labs    Common Labs 11/1/22 11/1/22 11/2/22 11/3/22    2205 2205     Glucose  142 (A) 120 (A) 133 (A)   BUN  45 (A) 46 (A) 34 (A)   Creatinine  1.32 (A) 1.14 (A) 1.06 (A)   Sodium  137 141 138   Potassium  4.9 4.6 4.1   Chloride   105 108 (A) 105   Calcium  8.9 8.9 8.6   Albumin  3.80     Total Bilirubin  0.3     Alkaline Phosphatase  104     AST (SGOT)  33 (A)     ALT (SGPT)  14     WBC 4.50      Hemoglobin 10.9 (A)      Hematocrit 33.2 (A)      Platelets 203      (A) Abnormal value       Comments are available for some flowsheets but are not being displayed.           Data reviewed: Home health reports for skin care           Assessment and Plan   Diagnoses and all orders for this visit:    1. Medicare annual wellness visit, subsequent (Primary)    2. Essential hypertension    3. Mixed hyperlipidemia    4. Type 2 diabetes mellitus with hyperglycemia, with long-term current use of insulin (HCC)           I spent 35 minutes caring for Aracelis on this date of service. This time includes time spent by me in the following activities:preparing for the visit, obtaining and/or reviewing a separately obtained history, performing a medically appropriate examination and/or evaluation , counseling and educating the patient/family/caregiver, ordering medications, tests, or procedures and documenting information in the medical record  Follow Up   No follow-ups on file.  Patient was given instructions and counseling regarding her condition or for health maintenance advice. Please see specific information pulled into the AVS if appropriate.

## 2022-12-20 ENCOUNTER — HOME CARE VISIT (OUTPATIENT)
Dept: HOME HEALTH SERVICES | Facility: HOME HEALTHCARE | Age: 87
End: 2022-12-20

## 2022-12-20 PROCEDURE — G0299 HHS/HOSPICE OF RN EA 15 MIN: HCPCS

## 2022-12-21 ENCOUNTER — TELEPHONE (OUTPATIENT)
Dept: FAMILY MEDICINE CLINIC | Facility: CLINIC | Age: 87
End: 2022-12-21

## 2022-12-21 ENCOUNTER — APPOINTMENT (OUTPATIENT)
Dept: BONE DENSITY | Facility: HOSPITAL | Age: 87
End: 2022-12-21

## 2022-12-21 ENCOUNTER — APPOINTMENT (OUTPATIENT)
Dept: MAMMOGRAPHY | Facility: HOSPITAL | Age: 87
End: 2022-12-21

## 2022-12-21 VITALS
HEART RATE: 79 BPM | SYSTOLIC BLOOD PRESSURE: 132 MMHG | RESPIRATION RATE: 18 BRPM | DIASTOLIC BLOOD PRESSURE: 78 MMHG | TEMPERATURE: 98.6 F | OXYGEN SATURATION: 96 %

## 2022-12-21 RX ORDER — CLINDAMYCIN HYDROCHLORIDE 300 MG/1
300 CAPSULE ORAL 4 TIMES DAILY
Qty: 40 CAPSULE | Refills: 0 | Status: SHIPPED | OUTPATIENT
Start: 2022-12-21 | End: 2022-12-31

## 2022-12-21 NOTE — TELEPHONE ENCOUNTER
She said Gloria's left lower leg is bright red and hurts to touch. It was not that bad at her appt with us but now it is. They think its cellulitis.

## 2022-12-22 ENCOUNTER — APPOINTMENT (OUTPATIENT)
Dept: CT IMAGING | Facility: HOSPITAL | Age: 87
End: 2022-12-22

## 2022-12-22 ENCOUNTER — HOSPITAL ENCOUNTER (OUTPATIENT)
Facility: HOSPITAL | Age: 87
Setting detail: OBSERVATION
Discharge: HOME | End: 2022-12-23
Attending: EMERGENCY MEDICINE | Admitting: INTERNAL MEDICINE

## 2022-12-22 ENCOUNTER — APPOINTMENT (OUTPATIENT)
Dept: GENERAL RADIOLOGY | Facility: HOSPITAL | Age: 87
End: 2022-12-22
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
## 2022-12-22 DIAGNOSIS — F10.921 ALCOHOL INTOXICATION WITH DELIRIUM: Primary | ICD-10-CM

## 2022-12-22 DIAGNOSIS — R41.82 ALTERED MENTAL STATUS, UNSPECIFIED ALTERED MENTAL STATUS TYPE: ICD-10-CM

## 2022-12-22 LAB
ALBUMIN SERPL-MCNC: 3.8 G/DL (ref 3.5–5.2)
ALBUMIN/GLOB SERPL: 1 G/DL
ALP SERPL-CCNC: 98 U/L (ref 39–117)
ALT SERPL W P-5'-P-CCNC: 22 U/L (ref 1–33)
AMPHET+METHAMPHET UR QL: NEGATIVE
ANION GAP SERPL CALCULATED.3IONS-SCNC: 13 MMOL/L (ref 5–15)
APTT PPP: 28.3 SECONDS (ref 61–76.5)
AST SERPL-CCNC: 32 U/L (ref 1–32)
BARBITURATES UR QL SCN: NEGATIVE
BASOPHILS # BLD AUTO: 0.1 10*3/MM3 (ref 0–0.2)
BASOPHILS NFR BLD AUTO: 2 % (ref 0–1.5)
BENZODIAZ UR QL SCN: NEGATIVE
BILIRUB SERPL-MCNC: 0.4 MG/DL (ref 0–1.2)
BILIRUB UR QL STRIP: NEGATIVE
BUN SERPL-MCNC: 43 MG/DL (ref 8–23)
BUN/CREAT SERPL: 33.1 (ref 7–25)
CALCIUM SPEC-SCNC: 8.8 MG/DL (ref 8.6–10.5)
CANNABINOIDS SERPL QL: NEGATIVE
CHLORIDE SERPL-SCNC: 104 MMOL/L (ref 98–107)
CK SERPL-CCNC: 74 U/L (ref 20–180)
CLARITY UR: CLEAR
CO2 SERPL-SCNC: 22 MMOL/L (ref 22–29)
COCAINE UR QL: NEGATIVE
COLOR UR: YELLOW
CREAT SERPL-MCNC: 1.3 MG/DL (ref 0.57–1)
DEPRECATED RDW RBC AUTO: 57.3 FL (ref 37–54)
EGFRCR SERPLBLD CKD-EPI 2021: 40.4 ML/MIN/1.73
EOSINOPHIL # BLD AUTO: 0.1 10*3/MM3 (ref 0–0.4)
EOSINOPHIL NFR BLD AUTO: 3.5 % (ref 0.3–6.2)
ERYTHROCYTE [DISTWIDTH] IN BLOOD BY AUTOMATED COUNT: 16.1 % (ref 12.3–15.4)
ETHANOL UR QL: 0.26 %
GLOBULIN UR ELPH-MCNC: 3.7 GM/DL
GLUCOSE BLDC GLUCOMTR-MCNC: 89 MG/DL (ref 70–105)
GLUCOSE SERPL-MCNC: 115 MG/DL (ref 65–99)
GLUCOSE UR STRIP-MCNC: NEGATIVE MG/DL
HCT VFR BLD AUTO: 35 % (ref 34–46.6)
HGB BLD-MCNC: 10.6 G/DL (ref 12–15.9)
HGB UR QL STRIP.AUTO: NEGATIVE
INR PPP: 1.04 (ref 0.93–1.1)
KETONES UR QL STRIP: NEGATIVE
LEUKOCYTE ESTERASE UR QL STRIP.AUTO: NEGATIVE
LYMPHOCYTES # BLD AUTO: 0.9 10*3/MM3 (ref 0.7–3.1)
LYMPHOCYTES NFR BLD AUTO: 21 % (ref 19.6–45.3)
MAGNESIUM SERPL-MCNC: 2.5 MG/DL (ref 1.6–2.4)
MCH RBC QN AUTO: 30.9 PG (ref 26.6–33)
MCHC RBC AUTO-ENTMCNC: 30.4 G/DL (ref 31.5–35.7)
MCV RBC AUTO: 101.6 FL (ref 79–97)
METHADONE UR QL SCN: NEGATIVE
MONOCYTES # BLD AUTO: 0.2 10*3/MM3 (ref 0.1–0.9)
MONOCYTES NFR BLD AUTO: 4.4 % (ref 5–12)
NEUTROPHILS NFR BLD AUTO: 2.8 10*3/MM3 (ref 1.7–7)
NEUTROPHILS NFR BLD AUTO: 69.1 % (ref 42.7–76)
NITRITE UR QL STRIP: NEGATIVE
NRBC BLD AUTO-RTO: 0 /100 WBC (ref 0–0.2)
OPIATES UR QL: NEGATIVE
OXYCODONE UR QL SCN: NEGATIVE
PH UR STRIP.AUTO: 5.5 [PH] (ref 5–8)
PLATELET # BLD AUTO: 225 10*3/MM3 (ref 140–450)
PMV BLD AUTO: 6.5 FL (ref 6–12)
POTASSIUM SERPL-SCNC: 5.2 MMOL/L (ref 3.5–5.2)
PROT SERPL-MCNC: 7.5 G/DL (ref 6–8.5)
PROT UR QL STRIP: NEGATIVE
PROTHROMBIN TIME: 10.7 SECONDS (ref 9.6–11.7)
RBC # BLD AUTO: 3.44 10*6/MM3 (ref 3.77–5.28)
SODIUM SERPL-SCNC: 139 MMOL/L (ref 136–145)
SP GR UR STRIP: 1.01 (ref 1–1.03)
TROPONIN T SERPL-MCNC: 0.02 NG/ML (ref 0–0.03)
UROBILINOGEN UR QL STRIP: NORMAL
WBC NRBC COR # BLD: 4.1 10*3/MM3 (ref 3.4–10.8)

## 2022-12-22 PROCEDURE — 70450 CT HEAD/BRAIN W/O DYE: CPT

## 2022-12-22 PROCEDURE — 80307 DRUG TEST PRSMV CHEM ANLYZR: CPT

## 2022-12-22 PROCEDURE — 80177 DRUG SCRN QUAN LEVETIRACETAM: CPT

## 2022-12-22 PROCEDURE — 81003 URINALYSIS AUTO W/O SCOPE: CPT

## 2022-12-22 PROCEDURE — P9612 CATHETERIZE FOR URINE SPEC: HCPCS

## 2022-12-22 PROCEDURE — 84484 ASSAY OF TROPONIN QUANT: CPT

## 2022-12-22 PROCEDURE — 25010000002 ONDANSETRON PER 1 MG: Performed by: INTERNAL MEDICINE

## 2022-12-22 PROCEDURE — G0378 HOSPITAL OBSERVATION PER HR: HCPCS

## 2022-12-22 PROCEDURE — 99285 EMERGENCY DEPT VISIT HI MDM: CPT

## 2022-12-22 PROCEDURE — 96374 THER/PROPH/DIAG INJ IV PUSH: CPT

## 2022-12-22 PROCEDURE — 82077 ASSAY SPEC XCP UR&BREATH IA: CPT

## 2022-12-22 PROCEDURE — 71045 X-RAY EXAM CHEST 1 VIEW: CPT

## 2022-12-22 PROCEDURE — 72125 CT NECK SPINE W/O DYE: CPT

## 2022-12-22 PROCEDURE — 85730 THROMBOPLASTIN TIME PARTIAL: CPT

## 2022-12-22 PROCEDURE — 82550 ASSAY OF CK (CPK): CPT

## 2022-12-22 PROCEDURE — 85025 COMPLETE CBC W/AUTO DIFF WBC: CPT

## 2022-12-22 PROCEDURE — 83735 ASSAY OF MAGNESIUM: CPT

## 2022-12-22 PROCEDURE — 82962 GLUCOSE BLOOD TEST: CPT

## 2022-12-22 PROCEDURE — 93005 ELECTROCARDIOGRAM TRACING: CPT

## 2022-12-22 PROCEDURE — 85610 PROTHROMBIN TIME: CPT

## 2022-12-22 PROCEDURE — 80053 COMPREHEN METABOLIC PANEL: CPT

## 2022-12-22 RX ORDER — TOPIRAMATE 25 MG/1
12.5 TABLET ORAL 2 TIMES DAILY
Status: DISCONTINUED | OUTPATIENT
Start: 2022-12-22 | End: 2022-12-23 | Stop reason: HOSPADM

## 2022-12-22 RX ORDER — INSULIN LISPRO 100 [IU]/ML
2-9 INJECTION, SOLUTION INTRAVENOUS; SUBCUTANEOUS
Status: DISCONTINUED | OUTPATIENT
Start: 2022-12-22 | End: 2022-12-23 | Stop reason: HOSPADM

## 2022-12-22 RX ORDER — OLANZAPINE 10 MG/2ML
1 INJECTION, POWDER, LYOPHILIZED, FOR SOLUTION INTRAMUSCULAR
Status: DISCONTINUED | OUTPATIENT
Start: 2022-12-22 | End: 2022-12-23 | Stop reason: HOSPADM

## 2022-12-22 RX ORDER — ACETAMINOPHEN 325 MG/1
650 TABLET ORAL EVERY 4 HOURS PRN
Status: DISCONTINUED | OUTPATIENT
Start: 2022-12-22 | End: 2022-12-23 | Stop reason: HOSPADM

## 2022-12-22 RX ORDER — DEXTROSE MONOHYDRATE 25 G/50ML
25 INJECTION, SOLUTION INTRAVENOUS
Status: DISCONTINUED | OUTPATIENT
Start: 2022-12-22 | End: 2022-12-23 | Stop reason: HOSPADM

## 2022-12-22 RX ORDER — ATORVASTATIN CALCIUM 10 MG/1
10 TABLET, FILM COATED ORAL DAILY
Status: DISCONTINUED | OUTPATIENT
Start: 2022-12-22 | End: 2022-12-23 | Stop reason: HOSPADM

## 2022-12-22 RX ORDER — ACETAMINOPHEN 650 MG/1
650 SUPPOSITORY RECTAL EVERY 4 HOURS PRN
Status: DISCONTINUED | OUTPATIENT
Start: 2022-12-22 | End: 2022-12-23 | Stop reason: HOSPADM

## 2022-12-22 RX ORDER — ONDANSETRON 4 MG/1
4 TABLET, FILM COATED ORAL EVERY 6 HOURS PRN
Status: DISCONTINUED | OUTPATIENT
Start: 2022-12-22 | End: 2022-12-23 | Stop reason: HOSPADM

## 2022-12-22 RX ORDER — NICOTINE POLACRILEX 4 MG
15 LOZENGE BUCCAL
Status: DISCONTINUED | OUTPATIENT
Start: 2022-12-22 | End: 2022-12-23 | Stop reason: HOSPADM

## 2022-12-22 RX ORDER — AMLODIPINE BESYLATE 5 MG/1
5 TABLET ORAL EVERY MORNING
Status: DISCONTINUED | OUTPATIENT
Start: 2022-12-23 | End: 2022-12-23 | Stop reason: HOSPADM

## 2022-12-22 RX ORDER — SODIUM CHLORIDE 9 MG/ML
100 INJECTION, SOLUTION INTRAVENOUS CONTINUOUS
Status: DISCONTINUED | OUTPATIENT
Start: 2022-12-22 | End: 2022-12-23 | Stop reason: HOSPADM

## 2022-12-22 RX ORDER — LEVETIRACETAM 500 MG/1
1000 TABLET ORAL NIGHTLY
Status: DISCONTINUED | OUTPATIENT
Start: 2022-12-22 | End: 2022-12-22 | Stop reason: DRUGHIGH

## 2022-12-22 RX ORDER — SODIUM CHLORIDE 0.9 % (FLUSH) 0.9 %
10 SYRINGE (ML) INJECTION AS NEEDED
Status: DISCONTINUED | OUTPATIENT
Start: 2022-12-22 | End: 2022-12-23 | Stop reason: HOSPADM

## 2022-12-22 RX ORDER — CLINDAMYCIN HYDROCHLORIDE 300 MG/1
300 CAPSULE ORAL 4 TIMES DAILY
Status: DISCONTINUED | OUTPATIENT
Start: 2022-12-22 | End: 2022-12-23 | Stop reason: HOSPADM

## 2022-12-22 RX ORDER — SODIUM CHLORIDE 9 MG/ML
40 INJECTION, SOLUTION INTRAVENOUS AS NEEDED
Status: DISCONTINUED | OUTPATIENT
Start: 2022-12-22 | End: 2022-12-23 | Stop reason: HOSPADM

## 2022-12-22 RX ORDER — SODIUM CHLORIDE 0.9 % (FLUSH) 0.9 %
10 SYRINGE (ML) INJECTION EVERY 12 HOURS SCHEDULED
Status: DISCONTINUED | OUTPATIENT
Start: 2022-12-22 | End: 2022-12-23 | Stop reason: HOSPADM

## 2022-12-22 RX ORDER — ACETAMINOPHEN 160 MG/5ML
650 SOLUTION ORAL EVERY 4 HOURS PRN
Status: DISCONTINUED | OUTPATIENT
Start: 2022-12-22 | End: 2022-12-23 | Stop reason: HOSPADM

## 2022-12-22 RX ORDER — NITROGLYCERIN 0.4 MG/1
0.4 TABLET SUBLINGUAL
Status: DISCONTINUED | OUTPATIENT
Start: 2022-12-22 | End: 2022-12-23 | Stop reason: HOSPADM

## 2022-12-22 RX ORDER — ASPIRIN 81 MG/1
81 TABLET, CHEWABLE ORAL DAILY
COMMUNITY
End: 2023-01-03 | Stop reason: ALTCHOICE

## 2022-12-22 RX ORDER — ENOXAPARIN SODIUM 100 MG/ML
40 INJECTION SUBCUTANEOUS DAILY
Status: DISCONTINUED | OUTPATIENT
Start: 2022-12-22 | End: 2022-12-23 | Stop reason: HOSPADM

## 2022-12-22 RX ORDER — LEVETIRACETAM 500 MG/1
500 TABLET ORAL 2 TIMES DAILY
Status: DISCONTINUED | OUTPATIENT
Start: 2022-12-22 | End: 2022-12-23

## 2022-12-22 RX ORDER — ONDANSETRON 2 MG/ML
4 INJECTION INTRAMUSCULAR; INTRAVENOUS EVERY 6 HOURS PRN
Status: DISCONTINUED | OUTPATIENT
Start: 2022-12-22 | End: 2022-12-23 | Stop reason: HOSPADM

## 2022-12-22 RX ORDER — MAGNESIUM SULFATE HEPTAHYDRATE 40 MG/ML
2 INJECTION, SOLUTION INTRAVENOUS ONCE
Status: DISCONTINUED | OUTPATIENT
Start: 2022-12-22 | End: 2022-12-22

## 2022-12-22 RX ORDER — PANTOPRAZOLE SODIUM 40 MG/1
40 TABLET, DELAYED RELEASE ORAL EVERY MORNING
Status: DISCONTINUED | OUTPATIENT
Start: 2022-12-23 | End: 2022-12-23 | Stop reason: HOSPADM

## 2022-12-22 RX ORDER — FOLIC ACID 1 MG/1
1 TABLET ORAL DAILY
Status: DISCONTINUED | OUTPATIENT
Start: 2022-12-22 | End: 2022-12-23 | Stop reason: HOSPADM

## 2022-12-22 RX ORDER — FERROUS SULFATE TAB EC 324 MG (65 MG FE EQUIVALENT) 324 (65 FE) MG
324 TABLET DELAYED RESPONSE ORAL
Status: DISCONTINUED | OUTPATIENT
Start: 2022-12-23 | End: 2022-12-23 | Stop reason: HOSPADM

## 2022-12-22 RX ORDER — ASPIRIN 81 MG/1
81 TABLET ORAL DAILY
Status: DISCONTINUED | OUTPATIENT
Start: 2022-12-22 | End: 2022-12-23 | Stop reason: HOSPADM

## 2022-12-22 RX ADMIN — ONDANSETRON 4 MG: 2 INJECTION INTRAMUSCULAR; INTRAVENOUS at 20:32

## 2022-12-22 RX ADMIN — SODIUM CHLORIDE 1000 ML: 9 INJECTION, SOLUTION INTRAVENOUS at 13:16

## 2022-12-22 RX ADMIN — TOPIRAMATE 12.5 MG: 25 TABLET, FILM COATED ORAL at 21:13

## 2022-12-22 RX ADMIN — SODIUM CHLORIDE 100 ML/HR: 9 INJECTION, SOLUTION INTRAVENOUS at 16:26

## 2022-12-22 RX ADMIN — Medication 10 ML: at 21:14

## 2022-12-22 RX ADMIN — LEVETIRACETAM 500 MG: 500 TABLET, FILM COATED ORAL at 21:13

## 2022-12-22 RX ADMIN — CLINDAMYCIN HYDROCHLORIDE 300 MG: 300 CAPSULE ORAL at 21:13

## 2022-12-22 NOTE — HOME HEALTH
Patients BLE very edematous, red, and agitated with 'orange peel' consistency. Contacted Dr. ABNER Goss's office, awaiting call back.     Plan for next visit: CP assess, wound care, assess BLE, f/u on call to Dr. Goss and if he called in ABT

## 2022-12-22 NOTE — ED PROVIDER NOTES
Subjective   History of Present Illness  Chief Complaint: Fall, Altered Mental Status       HPI: Patient is an 87 year old female who presents to the ED by EMS from home.  Patient is a poor historian as she does not remember the events that led to her ER visit.  She is able to tell me that she has drank a bloody abdiel recently and that her  found her on the floor.  She is oriented to year and place but otherwise does not answer questions appropriately.  There is no one at bedside during my initial evaluation.  She has no complaints     PCP: Wolfgang         Review of Systems   Unable to perform ROS: Mental status change       Past Medical History:   Diagnosis Date   • Atherosclerosis of native coronary artery of native heart without angina pectoris 10/22/2020   • Atrioventricular block, Mobitz type 1, [place]enckebach 10/21/2020    Added automatically from request for surgery [id number]   • Hypertension associated with stage 3 chronic kidney disease due to type 2 diabetes mellitus ([facility]) 10/22/2020   • Morbid obesity ([facility]) 10/22/2020   • Secondary hyperaldosteronism ([facility]) 10/22/2020   • Stage 3b chronic kidney disease ([facility]) 10/22/2020   • Type 2 diabetes mellitus with diabetic neuropathy, with long-term current use of insulin ([facility]) 10/22/2020   • Type 2 diabetes mellitus with diabetic neuropathy, with long-term current use of insulin ([facility]) 10/22/2020       Allergies   Allergen Reactions   • Latex, Natural Rubber Rash   • Adhesive Tape Rash       Past Surgical History:   Procedure Laterality Date   • CARDIAC CATHETERIZATION N/A 10/23/2020    Procedure: Left Heart Cath and coronary angiogram;  Surgeon: Wang Plaza MD;  Location: [facility] CATH INVASIVE LOCATION;  Service: Cardiovascular;  Laterality: N/A;   • CARDIAC ELECTROPHYSIOLOGY PROCEDURE Left 11/3/2021    Procedure: Pacemaker DC new;  Surgeon: Wang Plaza MD;  Location: [facility] CATH INVASIVE LOCATION;  Service: Cardiovascular;  Laterality: Left;   •  CARDIAC ELECTROPHYSIOLOGY PROCEDURE N/A 11/3/2021    Procedure: LOOP RECORDER REMOVAL;  Surgeon: Wang Plaza MD;  Location: [facility] INVASIVE LOCATION;  Service: Cardiovascular;  Laterality: N/A;   • HYSTERECTOMY     • JOINT REPLACEMENT Right     Hip   • JOINT REPLACEMENT Left     hip   • JOINT REPLACEMENT Left     hip (for second time)   • JOINT REPLACEMENT Right     knee   • OOPHORECTOMY         Family History   Problem Relation Age of Onset   • Heart disease Mother    • Heart disease Father        Social History     Socioeconomic History   • Marital status:    Tobacco Use   • Smoking status: Never   • Smokeless tobacco: Never   Vaping Use   • Vaping Use: Never used   Substance and Sexual Activity   • Alcohol use: Yes     Alcohol/week: 7.0 standard drinks     Types: 7 Drinks containing 0.5 oz of alcohol per week   • Drug use: Never   • Sexual activity: Defer           Objective   Physical Exam  Vitals reviewed.   Constitutional:       Appearance: She is obese. She is ill-appearing.   HENT:      Head: Normocephalic. Lua's sign present. No raccoon eyes or masses.      Jaw: There is normal jaw occlusion.        Right Ear: Tympanic membrane normal.      Left Ear: Tympanic membrane normal.      Nose: Nose normal.      Comments: No bleeding or drainage from the ears or nose      Mouth/Throat:      Mouth: Mucous membranes are dry.      Pharynx: Oropharynx is clear.   Eyes:      Extraocular Movements: Extraocular movements intact.      Pupils: Pupils are equal, round, and reactive to light.   Cardiovascular:      Rate and Rhythm: Normal rate.      Pulses: Normal pulses.      Heart sounds: Normal heart sounds. No murmur heard.  Pulmonary:      Effort: Pulmonary effort is normal.      Breath sounds: Normal breath sounds. No wheezing.   Abdominal:      General: Bowel sounds are normal.      Palpations: Abdomen is soft.      Tenderness: There is no abdominal tenderness.   Musculoskeletal:      Cervical  "back: Neck supple. No rigidity.   Skin:     General: Skin is warm.      Capillary Refill: Capillary refill takes less than 2 seconds.      Coloration: Skin is pale. Skin is not jaundiced.   Neurological:      General: No focal deficit present.      Mental Status: She is easily aroused.      GCS: GCS eye subscore is 3. GCS verbal subscore is 3. GCS motor subscore is 6.         ECG 12 Lead      Date/Time: 12/22/2022 1:05 PM  Performed by: Milly Finnegan APRN  Authorized by: Armin Higgins MD   Interpreted by physician  Comparison: compared with previous ECG from 11/2/2022  Similar to previous ECG  Comparison to previous ECG: Atrial Fibrillation  Multiple ventricular PVC  Rhythm: paced  Rhythm comments: ventriculat paced  BPM: 83  Clinical impression: non-specific ECG                  ED Course  ED Course as of 12/22/22 1531   Thu Dec 22, 2022   1503 Patient case discussed with Dr. Krueger  []      ED Course User Index  [] Milly Finnegan APRN      /43   Pulse 84   Temp 98.2 °F (36.8 °C)   Resp 16   Ht 162.6 cm (64\")   Wt 104 kg (230 lb)   SpO2 96%   BMI 39.48 kg/m²   Labs Reviewed   COMPREHENSIVE METABOLIC PANEL - Abnormal; Notable for the following components:       Result Value    Glucose 115 (*)     BUN 43 (*)     Creatinine 1.30 (*)     BUN/Creatinine Ratio 33.1 (*)     eGFR 40.4 (*)     All other components within normal limits    Narrative:     GFR Normal >60  Chronic Kidney Disease <60  Kidney Failure <15    The GFR formula is only valid for adults with stable renal function between ages 18 and 70.   APTT - Abnormal; Notable for the following components:    PTT 28.3 (*)     All other components within normal limits   CBC WITH AUTO DIFFERENTIAL - Abnormal; Notable for the following components:    RBC 3.44 (*)     Hemoglobin 10.6 (*)     .6 (*)     MCHC 30.4 (*)     RDW 16.1 (*)     RDW-SD 57.3 (*)     Monocyte % 4.4 (*)     Basophil % 2.0 (*)     All other components within " normal limits   MAGNESIUM - Abnormal; Notable for the following components:    Magnesium 2.5 (*)     All other components within normal limits   PROTIME-INR - Normal   URINALYSIS W/ MICROSCOPIC IF INDICATED (NO CULTURE) - Normal    Narrative:     Urine microscopic not indicated.   TROPONIN (IN-HOUSE) - Normal    Narrative:     Troponin T Reference Range:  <= 0.03 ng/mL-   Negative for AMI  >0.03 ng/mL-     Abnormal for myocardial necrosis.  Clinicians would have to utilize clinical acumen, EKG, Troponin and serial changes to determine if it is an Acute Myocardial Infarction or myocardial injury due to an underlying chronic condition.       Results may be falsely decreased if patient taking Biotin.     URINE DRUG SCREEN - Normal    Narrative:     Negative Thresholds Per Drugs Screened:    Amphetamines                 500 ng/ml  Barbiturates                 200 ng/ml  Benzodiazepines              100 ng/ml  Cocaine                      300 ng/ml  Methadone                    300 ng/ml  Opiates                      300 ng/ml  Oxycodone                    100 ng/ml  THC                           50 ng/ml    The Normal Value for all drugs tested is negative. This report includes final unconfirmed screening results to be used for medical treatment purposes only. Unconfirmed results must not be used for non-medical purposes such as employment or legal testing. Clinical consideration should be applied to any drug of abuse test, particularly when unconfirmed results are used.          All urine drugs of abuse requests without chain of custody are for medical screening purposes only.  False positives are possible.     CK - Normal   ETHANOL    Narrative:     Plasma Ethanol Clinical Symptoms:    ETOH (%)               Clinical Symptom  .01-.05              No apparent influence  .03-.12              Euphoria, Diminished judgment and attention   .09-.25              Impaired comprehension, Muscle incoordination  .18-.30               Confusion, Staggered gait, Slurred speech  .25-.40              Markedly decreased response to stimuli, unable to stand or                        walk, vomitting, sleep or stupor  .35-.50              Comatose, Anesthesia, Subnormal body temperature       LEVETIRACETAM LEVEL   CBC AND DIFFERENTIAL    Narrative:     The following orders were created for panel order CBC & Differential.  Procedure                               Abnormality         Status                     ---------                               -----------         ------                     CBC Auto Differential[[id number]]        Abnormal            Final result                 Please view results for these tests on the individual orders.     Medications   sodium chloride 0.9 % flush 10 mL (has no administration in time range)   sodium chloride 0.9 % bolus 1,000 mL (0 mL Intravenous Stopped 12/22/22 1346)     CT Head Without Contrast    Result Date: 12/22/2022  1. No intracranial hemorrhage. 2. Chronic findings above.  Electronically Signed By-Benjamín Padron MD On:12/22/2022 2:06 PM This report was finalized on [id number] by  Benjamín Padron MD.    CT Cervical Spine Without Contrast    Result Date: 12/22/2022  1. Negative for cervical spine fracture. 2. Multilevel cervical degenerative findings above.  Electronically Signed By-Benjamín Padron MD On:12/22/2022 2:15 PM This report was finalized on [id number] by  Benjamín Padron MD.    XR Chest 1 View    Result Date: 12/22/2022  No acute process.  Electronically Signed By-Benjamín Padron MD On:12/22/2022 1:58 PM This report was finalized on [id number] by  Benjamín Padron MD.                                         MDM  Number of Diagnoses or Management Options  Alcohol intoxication with delirium (HCC)  Altered mental status, unspecified altered mental status type  Fall, initial encounter  Diagnosis management comments: While in the emergency room patient was placed on appropriate monitoring IV was  established and labs were obtained.  Patient is EtOH 0.256, consistent with alcohol intoxication and with her mentation delirium.  Magnesium 2.5 BUN 43 and a creat of 1.30 patient has a documented history of CKD stage III.  UDS negative UA negative.  CT of the head and neck was negative for acute findings. At reevaluation patient sleeping, arouses easily, remains confused.  Plan for admission.  Discussed with Dr. Lopez.      Radiology:  Reviewed by myself, Read by Radiologist    Laboratory:   ETOH 0.256  Mag 2.5  BUN 43  Creat 1.30  UDS neg  UA neg    Chart review: 11/3/2022 Altered LOC, Fall, Alcohol intoxication with delirium.      Comorbidities: see above, reviewed     Differentials: substance, alcohol intoxication, electrolyte imbalance, hypomagnesia, infection not all inclusive of differentials considered    Appropriate PPE worn throughout the care of this patient.    Part of this note may be an electronic transcription/translation of spoken language to printed text using the Dragon Dictation System.        Amount and/or Complexity of Data Reviewed  Clinical lab tests: reviewed  Tests in the radiology section of CPT®: reviewed  Tests in the medicine section of CPT®: reviewed    Risk of Complications, Morbidity, and/or Mortality  Presenting problems: high    Patient Progress  Patient progress: stable      Final diagnoses:   Alcohol intoxication with delirium (HCC)   Fall, initial encounter   Altered mental status, unspecified altered mental status type       ED Disposition  ED Disposition     ED Disposition   Decision to Admit    Condition   --    Comment   Level of Care: Telemetry [5]   Admitting Physician: SERA LOPEZ [[id number]]   Attending Physician: SERA LOPEZ [[id number]]               No follow-up provider specified.       Medication List      No changes were made to your prescriptions during this visit.          Milly Finnegan, APRN  12/22/22 1537

## 2022-12-22 NOTE — H&P
[facility] Medicine Services      Patient Name: Aracelis Vargas  : 1935  MRN: [id number]  Primary Care Physician:  Gabriel Goss MD  Date of admission: 2022      Subjective      Chief Complaint: AMS    History of Present Illness: Aracelis Vargas is a 87 y.o. female with past medical history of DM 2, alcohol abuse, coronary artery disease, pacemaker placement, atrial fibrillation, COPD, osteoarthritis, hypertension, chronic kidney disease stage IIIA who presented to [facility] on 2022 with complaints of confusion.  Patient alert and oriented to self and partially to place, poor historian.  States she passed out at home, no family at bedside to help with history.  According to ER reports patient reports drinking some alcohol and was found to be passed out at home by her .  No head injury or seizure activity reported.  Records show patient has had multiple admissions due to alcohol intoxication before.  Previously admitted last year for similar symptoms and underwent TIA work-up at that time including ALEYDA which was negative.  Her Keppra was increased by neurology during last admission.      ROS: Unable to obtain full review of systems due to patient's underlying mental status      Personal History     Past Medical History:   Diagnosis Date   • Atherosclerosis of native coronary artery of native heart without angina pectoris 10/22/2020   • Atrioventricular block, Mobitz type 1, [place] 10/21/2020    Added automatically from request for surgery [id number]   • Hypertension associated with stage 3 chronic kidney disease due to type 2 diabetes mellitus ([facility]) 10/22/2020   • Morbid obesity ([facility]) 10/22/2020   • Secondary hyperaldosteronism ([facility]) 10/22/2020   • Stage 3b chronic kidney disease ([facility]) 10/22/2020   • Type 2 diabetes mellitus with diabetic neuropathy, with long-term current use of insulin ([facility]) 10/22/2020   • Type 2 diabetes mellitus with diabetic  neuropathy, with long-term current use of insulin ([facility]) 10/22/2020       Past Surgical History:   Procedure Laterality Date   • CARDIAC CATHETERIZATION N/A 10/23/2020    Procedure: Left Heart Cath and coronary angiogram;  Surgeon: Wang Plaza MD;  Location: [facility] CATH INVASIVE LOCATION;  Service: Cardiovascular;  Laterality: N/A;   • CARDIAC ELECTROPHYSIOLOGY PROCEDURE Left 11/3/2021    Procedure: Pacemaker DC new;  Surgeon: Wang Plaza MD;  Location: [facility] CATH INVASIVE LOCATION;  Service: Cardiovascular;  Laterality: Left;   • CARDIAC ELECTROPHYSIOLOGY PROCEDURE N/A 11/3/2021    Procedure: LOOP RECORDER REMOVAL;  Surgeon: Wang Plaza MD;  Location: [facility] CATH INVASIVE LOCATION;  Service: Cardiovascular;  Laterality: N/A;   • HYSTERECTOMY     • JOINT REPLACEMENT Right     Hip   • JOINT REPLACEMENT Left     hip   • JOINT REPLACEMENT Left     hip (for second time)   • JOINT REPLACEMENT Right     knee   • OOPHORECTOMY         Family History: family history includes Heart disease in her father and mother. Otherwise pertinent FHx was reviewed and not pertinent to current issue.    Social History:  reports that she has never smoked. She has never used smokeless tobacco. She reports current alcohol use of about 7.0 standard drinks per week. She reports that she does not use drugs.    Home Medications:  Prior to Admission Medications     Prescriptions Last Dose Informant Patient Reported? Taking?    amLODIPine (NORVASC) 5 MG tablet   Yes No    Take 1 tablet by mouth Every Morning. Indications: High Blood Pressure Disorder    aspirin 325 MG tablet  Self No No    Take 1 tablet by mouth Daily.    Patient taking differently:  Take 81 mg by mouth Daily.    atorvastatin (LIPITOR) 10 MG tablet   No No    TAKE ONE TABLET BY MOUTH DAILY    clindamycin (CLEOCIN) 300 MG capsule   No No    Take 1 capsule by mouth 4 (Four) Times a Day for 10 days.    ferrous sulfate 325 (65 FE) MG tablet   Yes No    Take 1 tablet  by mouth Daily With Breakfast. Indications: Anemia From Inadequate Iron in the Body    folic acid (FOLVITE) 1 MG tablet   No No    Take 1 tablet by mouth Daily.    gabapentin (NEURONTIN) 300 MG capsule   Yes No    Take 1 capsule by mouth 2 (Two) Times a Day. Indications: Disease of the Peripheral Nerves    LEVETIRACETAM PO   Yes No    Take 1,000 mg by mouth Every Night. Indications: seizures    NovoLIN 70/30 (70-30) 100 UNIT/ML injection   No No    INJECT 30 UNITS UNDER THE SKIN INTO THE APPROPRIATE AREA TWO TIMES A DAY WITH A MEAL    omeprazole (priLOSEC) 40 MG capsule   No No    Take 1 capsule by mouth Every Morning Before Breakfast. Take on empty stomach!    topiramate (TOPAMAX) 25 MG tablet   No No    Take 0.5 tablets by mouth 2 (Two) Times a Day.    vitamin B-12 (CYANOCOBALAMIN) 1000 MCG tablet   No No    Take 1 tablet by mouth Daily.            Allergies:  Allergies   Allergen Reactions   • Latex, Natural Rubber Rash   • Adhesive Tape Rash       Objective      Vitals:   Temp:  [98.2 °F (36.8 °C)] 98.2 °F (36.8 °C)  Heart Rate:  [78-85] 84  Resp:  [15-20] 16  BP: (107-129)/(43-75) 107/43    Physical Exam  Constitutional:       General: She is not in acute distress.     Appearance: She is obese. She is not toxic-appearing.   HENT:      Head: Normocephalic and atraumatic.      Mouth/Throat:      Mouth: Mucous membranes are dry.      Pharynx: Oropharynx is clear.   Eyes:      Extraocular Movements: Extraocular movements intact.      Conjunctiva/sclera: Conjunctivae normal.      Pupils: Pupils are equal, round, and reactive to light.   Cardiovascular:      Rate and Rhythm: Normal rate and regular rhythm.      Pulses: Normal pulses.      Heart sounds: Normal heart sounds.   Pulmonary:      Effort: Pulmonary effort is normal. No respiratory distress.      Breath sounds: Normal breath sounds. No wheezing, rhonchi or rales.   Abdominal:      General: Bowel sounds are normal. There is no distension.      Palpations:  Abdomen is soft.      Tenderness: There is no abdominal tenderness. There is no guarding.   Musculoskeletal:         General: No tenderness or deformity.      Cervical back: Normal range of motion and neck supple.      Right lower leg: Edema present.      Left lower leg: Edema present.   Skin:     General: Skin is warm and dry.      Comments: Chronic bilateral extremity venous stasis changes noted   Neurological:      General: No focal deficit present.      Mental Status: She is disoriented.      Cranial Nerves: No cranial nerve deficit.      Sensory: No sensory deficit.         Result Review    Result Review:  I have personally reviewed the results from the time of this admission to 12/22/2022 15:34 EST and agree with these findings:  [x]  Laboratory  [x]  Microbiology  [x]  Radiology  [x]  EKG/Telemetry   [x]  Cardiology/Vascular   []  Pathology  [x]  Old records  []  Other:      Assessment & Plan        Active Hospital Problems:  Active Hospital Problems    Diagnosis    • **Alcohol intoxication with delirium (HCC)      Plan:   Acute metabolic encephalopathy likely secondary to alcohol intoxication  -Alcohol level mildly positive, reports of daily drinking and previous admissions with alcohol intoxication  -UDS otherwise negative  -CT head negative for acute findings  -Previously has had TIA work-up including ALEYDA with negative results  -PT eval, previously refused SNF  -Supportive care    CKD stage IIIa  -Creatinine appears to be close to baseline  -Patient appears clinically dry  -Continue low-dose IV fluids    PAF  S/p PPM  -EKG shows paced rhythm  -Previous cardiology note reviewed, patient not a candidate for anticoagulation due to high fall risk  -Monitor on telemetry    Chronic bilateral extremity edema  -Likely venous stasis  -Recent ultrasounds negative  -Appears to have some mild cellulitis changes, home medications so patient recently prescribed clindamycin, continue  -Continue outpatient follow-up with  PCP    DM2  -Continue basal insulin with sliding scale  -Monitor blood glucose, adjust as needed    Seizure disorder  -Had Keppra increased during last admission  -Continue Keppra 500 mg every morning and 1000 mg nightly  -Seizure precautions    Hyperlipidemia  -Continue statin    Hypertension  -Continue home meds, monitor     DVT prophylaxis  -Lovenox    CODE STATUS:    Code Status (Patient has no pulse and is not breathing): CPR (Attempt to Resuscitate)  Medical Interventions (Patient has pulse or is breathing): Full Support    Admission Status:  I believe this patient meets obs status.    I discussed the patient's findings and my recommendations with patient and nursing staff.    Signature:   Electronically signed by Lindsey Krueger DO, 12/22/22, 3:34 PM EST.    Part of this note may be an electronic transcription/translation of spoken language to printed text using the Dragon Dictation System.

## 2022-12-22 NOTE — PLAN OF CARE
Goal Outcome Evaluation:               Will continue to monitor goals. Admitting patient at this time.

## 2022-12-23 ENCOUNTER — READMISSION MANAGEMENT (OUTPATIENT)
Dept: CALL CENTER | Facility: HOSPITAL | Age: 87
End: 2022-12-23

## 2022-12-23 VITALS
OXYGEN SATURATION: 94 % | BODY MASS INDEX: 39.27 KG/M2 | SYSTOLIC BLOOD PRESSURE: 131 MMHG | TEMPERATURE: 98.5 F | HEIGHT: 64 IN | WEIGHT: 230 LBS | RESPIRATION RATE: 17 BRPM | DIASTOLIC BLOOD PRESSURE: 77 MMHG | HEART RATE: 99 BPM

## 2022-12-23 LAB
ANION GAP SERPL CALCULATED.3IONS-SCNC: 10 MMOL/L (ref 5–15)
BUN SERPL-MCNC: 32 MG/DL (ref 8–23)
BUN/CREAT SERPL: 28.8 (ref 7–25)
CALCIUM SPEC-SCNC: 8.5 MG/DL (ref 8.6–10.5)
CHLORIDE SERPL-SCNC: 108 MMOL/L (ref 98–107)
CO2 SERPL-SCNC: 23 MMOL/L (ref 22–29)
CREAT SERPL-MCNC: 1.11 MG/DL (ref 0.57–1)
DEPRECATED RDW RBC AUTO: 56.4 FL (ref 37–54)
EGFRCR SERPLBLD CKD-EPI 2021: 48.8 ML/MIN/1.73
ERYTHROCYTE [DISTWIDTH] IN BLOOD BY AUTOMATED COUNT: 16.1 % (ref 12.3–15.4)
GLUCOSE BLDC GLUCOMTR-MCNC: 108 MG/DL (ref 70–105)
GLUCOSE SERPL-MCNC: 116 MG/DL (ref 65–99)
HCT VFR BLD AUTO: 32.1 % (ref 34–46.6)
HGB BLD-MCNC: 10.1 G/DL (ref 12–15.9)
MAGNESIUM SERPL-MCNC: 2.1 MG/DL (ref 1.6–2.4)
MCH RBC QN AUTO: 31.6 PG (ref 26.6–33)
MCHC RBC AUTO-ENTMCNC: 31.5 G/DL (ref 31.5–35.7)
MCV RBC AUTO: 100.5 FL (ref 79–97)
PLATELET # BLD AUTO: 193 10*3/MM3 (ref 140–450)
PMV BLD AUTO: 6.6 FL (ref 6–12)
POTASSIUM SERPL-SCNC: 5.2 MMOL/L (ref 3.5–5.2)
QT INTERVAL: 418 MS
RBC # BLD AUTO: 3.2 10*6/MM3 (ref 3.77–5.28)
SODIUM SERPL-SCNC: 141 MMOL/L (ref 136–145)
WBC NRBC COR # BLD: 3.5 10*3/MM3 (ref 3.4–10.8)

## 2022-12-23 PROCEDURE — 97162 PT EVAL MOD COMPLEX 30 MIN: CPT | Performed by: PHYSICAL THERAPIST

## 2022-12-23 PROCEDURE — 25010000002 ONDANSETRON PER 1 MG: Performed by: NURSE PRACTITIONER

## 2022-12-23 PROCEDURE — 80048 BASIC METABOLIC PNL TOTAL CA: CPT | Performed by: INTERNAL MEDICINE

## 2022-12-23 PROCEDURE — 85027 COMPLETE CBC AUTOMATED: CPT | Performed by: INTERNAL MEDICINE

## 2022-12-23 PROCEDURE — 63710000001 INSULIN ISOPHANE & REGULAR PER 5 UNITS: Performed by: INTERNAL MEDICINE

## 2022-12-23 PROCEDURE — 96375 TX/PRO/DX INJ NEW DRUG ADDON: CPT

## 2022-12-23 PROCEDURE — 82962 GLUCOSE BLOOD TEST: CPT

## 2022-12-23 PROCEDURE — 83735 ASSAY OF MAGNESIUM: CPT | Performed by: INTERNAL MEDICINE

## 2022-12-23 PROCEDURE — 96376 TX/PRO/DX INJ SAME DRUG ADON: CPT

## 2022-12-23 PROCEDURE — 36415 COLL VENOUS BLD VENIPUNCTURE: CPT | Performed by: INTERNAL MEDICINE

## 2022-12-23 PROCEDURE — G0378 HOSPITAL OBSERVATION PER HR: HCPCS

## 2022-12-23 PROCEDURE — 25010000002 LORAZEPAM PER 2 MG: Performed by: NURSE PRACTITIONER

## 2022-12-23 RX ORDER — LORAZEPAM 0.5 MG/1
0.5 TABLET ORAL
Status: DISCONTINUED | OUTPATIENT
Start: 2022-12-23 | End: 2022-12-23 | Stop reason: HOSPADM

## 2022-12-23 RX ORDER — LORAZEPAM 2 MG/ML
1 INJECTION INTRAMUSCULAR
Status: DISCONTINUED | OUTPATIENT
Start: 2022-12-23 | End: 2022-12-23 | Stop reason: HOSPADM

## 2022-12-23 RX ORDER — LEVETIRACETAM 500 MG/1
1000 TABLET ORAL NIGHTLY
Status: DISCONTINUED | OUTPATIENT
Start: 2022-12-23 | End: 2022-12-23 | Stop reason: HOSPADM

## 2022-12-23 RX ORDER — LORAZEPAM 2 MG/ML
0.5 INJECTION INTRAMUSCULAR
Status: DISCONTINUED | OUTPATIENT
Start: 2022-12-23 | End: 2022-12-23 | Stop reason: HOSPADM

## 2022-12-23 RX ORDER — LEVETIRACETAM 500 MG/1
500 TABLET ORAL DAILY
Status: DISCONTINUED | OUTPATIENT
Start: 2022-12-23 | End: 2022-12-23 | Stop reason: HOSPADM

## 2022-12-23 RX ORDER — LORAZEPAM 1 MG/1
1 TABLET ORAL
Status: DISCONTINUED | OUTPATIENT
Start: 2022-12-23 | End: 2022-12-23 | Stop reason: HOSPADM

## 2022-12-23 RX ORDER — ONDANSETRON 2 MG/ML
4 INJECTION INTRAMUSCULAR; INTRAVENOUS ONCE
Status: COMPLETED | OUTPATIENT
Start: 2022-12-23 | End: 2022-12-23

## 2022-12-23 RX ORDER — LEVETIRACETAM 500 MG/1
500 TABLET ORAL DAILY
Qty: 30 TABLET | Refills: 0 | Status: SHIPPED | OUTPATIENT
Start: 2022-12-23 | End: 2023-02-24

## 2022-12-23 RX ADMIN — LORAZEPAM 1 MG: 2 INJECTION INTRAMUSCULAR; INTRAVENOUS at 01:36

## 2022-12-23 RX ADMIN — LEVETIRACETAM 500 MG: 500 TABLET, FILM COATED ORAL at 10:03

## 2022-12-23 RX ADMIN — FERROUS SULFATE TAB EC 324 MG (65 MG FE EQUIVALENT) 324 MG: 324 (65 FE) TABLET DELAYED RESPONSE at 10:03

## 2022-12-23 RX ADMIN — PANTOPRAZOLE SODIUM 40 MG: 40 TABLET, DELAYED RELEASE ORAL at 06:09

## 2022-12-23 RX ADMIN — ASPIRIN 81 MG: 81 TABLET, COATED ORAL at 10:03

## 2022-12-23 RX ADMIN — ATORVASTATIN CALCIUM 10 MG: 10 TABLET, FILM COATED ORAL at 10:03

## 2022-12-23 RX ADMIN — ONDANSETRON 4 MG: 2 INJECTION INTRAMUSCULAR; INTRAVENOUS at 01:37

## 2022-12-23 RX ADMIN — ACETAMINOPHEN 650 MG: 325 TABLET, FILM COATED ORAL at 06:11

## 2022-12-23 RX ADMIN — TOPIRAMATE 12.5 MG: 25 TABLET, FILM COATED ORAL at 10:03

## 2022-12-23 RX ADMIN — CLINDAMYCIN HYDROCHLORIDE 300 MG: 300 CAPSULE ORAL at 10:03

## 2022-12-23 RX ADMIN — FOLIC ACID 1 MG: 1 TABLET ORAL at 10:02

## 2022-12-23 RX ADMIN — INSULIN HUMAN 30 UNITS: 100 INJECTION, SUSPENSION SUBCUTANEOUS at 10:04

## 2022-12-23 RX ADMIN — AMLODIPINE BESYLATE 5 MG: 5 TABLET ORAL at 06:09

## 2022-12-23 NOTE — NURSING NOTE
WOCN note:    87 yr old female admitted 12/22/22 after spouse found her on the floor at home. WOCN consult received for pressure injury noted upon admission.     Patient presents with watson-rectal erythema consistent with moisture associated dermatitis. Calazime zinc based barrier paste was applied. There is a watson-wick device in place.   Patient also noted to have venous stasis skin changes to her bilateral lower legs. There are currently no open wounds or weeping noted. The skin is erythematous and dry. Patient denies using any compression garments at home. Hydraguard hydrating cream applied to rehydrate the skin.     Recommend zinc based barrier paste after gentle cleansing to the buttocks and perineum. And hydrating cream to the legs and feet avoiding the toe spaces.  We will continue to follow as needed.

## 2022-12-23 NOTE — DISCHARGE SUMMARY
[facility] Hospital Medicine Services  DISCHARGE SUMMARY    Patient Name: Aracelis Vargas  : 1935  MRN: [id number]    Date of Admission: 2022  Date of Discharge: 2022  Primary Care Physician: aGbriel Goss MD      Presenting Problem:   Fall, initial encounter [W19.XXXA]  Altered mental status, unspecified altered mental status type [R41.82]  Alcohol intoxication with delirium (HCC) [F10.921]    Active and Resolved Hospital Problems:  Active Hospital Problems    Diagnosis POA   • **Alcohol intoxication with delirium (HCC) [F10.921] Yes      Resolved Hospital Problems   No resolved problems to display.         Hospital Course     Hospital Course:  Aracelis Vargas is a 87 y.o. female with past medical history of DM 2, alcohol abuse, coronary artery disease, pacemaker placement, atrial fibrillation, COPD, osteoarthritis, hypertension, chronic kidney disease stage IIIA who presented to [facility] on 2022 with complaints of confusion.  Patient alert and oriented to self and partially to place, poor historian.  States she passed out at home, no family at bedside to help with history.  According to ER reports patient reports drinking some alcohol and was found to be passed out at home by her .  No head injury or seizure activity reported.  Records show patient has had multiple admissions due to alcohol intoxication before.  Previously admitted last year for similar symptoms and underwent TIA work-up at that time including ALEYDA which was negative.  Her Keppra was increased by neurology during last admission.  Patient was admitted for observation overnight and did well.  The next day patient feeling extremely better, altered x3 and at baseline now.  Wants to go home today.  PT/OT eval done however patient refusing any rehab placement.  Already has home health that comes to her house.  Her Keppra was increased during last admission by neurology with 500 mg  daily in the morning and 1000 mg at night.  Per her medication list, she was not taking the morning dose, prescription given.  Patient is stable to discharge home today with follow-up with PCP as an outpatient.        DISCHARGE Follow Up Recommendations for labs and diagnostics:   Follow-up with PCP within 1 week.    Reasons For Change In Medications and Indications for New Medications:  No significant changes.  Keppra was increased during last admission with new prescription given.    Day of Discharge     Vital Signs:  Temp:  [97.9 °F (36.6 °C)-99 °F (37.2 °C)] 99 °F (37.2 °C)  Heart Rate:  [] 103  Resp:  [15-20] 16  BP: (107-146)/(43-75) 146/75    Physical Exam:  General: Awake, alert, elderly female, lying in bed, NAD  Eyes: PERRL, EOMI, conjunctivae are clear  Cardiovascular: Regular rate and rhythm, no murmurs  Respiratory: Clear to auscultation bilaterally, no wheezing or rales, unlabored breathing  Abdomen: Soft, nontender, positive bowel sounds, no guarding  Neurologic: A&O, CN grossly intact, moves all extremities spontaneously  Musculoskeletal: Generalized weakness, no deformities  Skin: Warm, chronic bilateral lower extremity venous stasis changes with mild pitting edema noted        Pertinent  and/or Most Recent Results     LAB RESULTS:      Lab 12/23/22  0415 12/22/22  1314   WBC 3.50 4.10   HEMOGLOBIN 10.1* 10.6*   HEMATOCRIT 32.1* 35.0   PLATELETS 193 225   NEUTROS ABS  --  2.80   LYMPHS ABS  --  0.90   MONOS ABS  --  0.20   EOS ABS  --  0.10   .5* 101.6*   PROTIME  --  10.7   APTT  --  28.3*         Lab 12/23/22  0415 12/22/22  1314   SODIUM 141 139   POTASSIUM 5.2 5.2   CHLORIDE 108* 104   CO2 23.0 22.0   ANION GAP 10.0 13.0   BUN 32* 43*   CREATININE 1.11* 1.30*   EGFR 48.8* 40.4*   GLUCOSE 116* 115*   CALCIUM 8.5* 8.8   MAGNESIUM 2.1 2.5*         Lab 12/22/22  1314   TOTAL PROTEIN 7.5   ALBUMIN 3.80   GLOBULIN 3.7   ALT (SGPT) 22   AST (SGOT) 32   BILIRUBIN 0.4   ALK PHOS 98          Lab 12/22/22  1314   TROPONIN T 0.019   PROTIME 10.7   INR 1.04                 Brief Urine Lab Results  (Last result in the past 365 days)      Color   Clarity   Blood   Leuk Est   Nitrite   Protein   CREAT   Urine HCG        12/22/22 1341 Yellow   Clear   Negative   Negative   Negative   Negative               Microbiology Results (last 10 days)     ** No results found for the last 240 hours. **          CT Head Without Contrast    Result Date: 12/22/2022  Impression: 1. No intracranial hemorrhage. 2. Chronic findings above.  Electronically Signed By-Benjamín Padron MD On:12/22/2022 2:06 PM This report was finalized on [id number] by  Benjamín Padron MD.    CT Cervical Spine Without Contrast    Result Date: 12/22/2022  Impression: 1. Negative for cervical spine fracture. 2. Multilevel cervical degenerative findings above.  Electronically Signed By-Benjamín Padron MD On:12/22/2022 2:15 PM This report was finalized on [id number] by  Benjamín Padron MD.    XR Chest 1 View    Result Date: 12/22/2022  Impression: No acute process.  Electronically Signed By-Benjamín Padron MD On:12/22/2022 1:58 PM This report was finalized on [id number] by  Benjamín Padron MD.      Results for orders placed during the hospital encounter of 06/29/22    Duplex Venous Lower Extremity - Bilateral CAR    Interpretation Summary  · Normal bilateral lower extremity venous duplex scan.      Results for orders placed during the hospital encounter of 06/29/22    Duplex Venous Lower Extremity - Bilateral CAR    Interpretation Summary  · Normal bilateral lower extremity venous duplex scan.      Results for orders placed during the hospital encounter of 02/25/22    Adult Transesophageal Echo (ALEYDA) W/ Cont if Necessary Per Protocol (Cardiology Department)    Interpretation Summary  Date of study  2/28/2022.    Indications  Recent TIA.    Procedure performed  Transesophageal echocardiogram and Doppler study.    Procedure  Anesthesia was provided by  anesthesiologist with intravenous Diprivan.  ALEYDA probe could be passed without difficulty.  Patient tolerated the procedure well.  No complications were noted.    Results  Technically satisfactory study.  Mitral valve is structurally normal.  Mild mitral regurgitation is present.  Aortic valve is tricuspid.  Thickened with adequate opening motion.  Mild to moderate aortic regurgitation is present.  Tricuspid valve is normal.  Mild tricuspid regurgitation is present.  Calculated pulmonary artery pressure is 28 mmHg.  Mild biatrial enlargement is present.  Left atrial appendage is very small.  Left ventricle is normal in size and contractility with ejection fraction of 60%.  Atrial septum is intact.  No evidence for intracardiac thrombus or smoking effect is present.  No pericardial effusion is present.  Aorta is normal.    Impression  Mild mitral regurgitation and mild to moderate aortic regurgitation.  Mild tricuspid regurgitation  Calculated pulmonary artery pressure is 28 mmHg.  Mild biatrial enlargement.  Left atrial appendage is small.  Left ventricle is normal in size and contractility with ejection fraction of 60%.      Labs Pending at Discharge:  Pending Labs     Order Current Status    Levetiracetam Level (Keppra) In process          Procedures Performed           Consults:   Consults     No orders found for last 30 day(s).            Discharge Details        Discharge Medications      Changes to Medications      Instructions Start Date   LEVETIRACETAM PO  What changed: Another medication with the same name was added. Make sure you understand how and when to take each.   1,000 mg, Oral, Nightly      levETIRAcetam 500 MG tablet  Commonly known as: KEPPRA  What changed: You were already taking a medication with the same name, and this prescription was added. Make sure you understand how and when to take each.   500 mg, Oral, Daily         Continue These Medications      Instructions Start Date   amLODIPine 5  MG tablet  Commonly known as: NORVASC   1 tablet, Oral, Every Morning      aspirin 81 MG chewable tablet   81 mg, Oral, Daily      atorvastatin 10 MG tablet  Commonly known as: LIPITOR   TAKE ONE TABLET BY MOUTH DAILY      clindamycin 300 MG capsule  Commonly known as: CLEOCIN   300 mg, Oral, 4 Times Daily      ferrous sulfate 325 (65 FE) MG tablet   1 tablet, Oral, Daily With Breakfast      folic acid 1 MG tablet  Commonly known as: FOLVITE   1 mg, Oral, Daily      gabapentin 300 MG capsule  Commonly known as: NEURONTIN   1 capsule, Oral, 2 Times Daily      NovoLIN 70/30 (70-30) 100 UNIT/ML injection  Generic drug: insulin NPH-insulin regular   INJECT 30 UNITS UNDER THE SKIN INTO THE APPROPRIATE AREA TWO TIMES A DAY WITH A MEAL      omeprazole 40 MG capsule  Commonly known as: priLOSEC   40 mg, Oral, Every Morning Before Breakfast, Take on empty stomach!      topiramate 25 MG tablet  Commonly known as: TOPAMAX   12.5 mg, Oral, 2 Times Daily      vitamin B-12 1000 MCG tablet  Commonly known as: CYANOCOBALAMIN   1,000 mcg, Oral, Daily             Allergies   Allergen Reactions   • Latex, Natural Rubber Rash   • Adhesive Tape Rash         Discharge Disposition:  [place] [facility] [place]    Diet:  Hospital:  Diet Order   Procedures   • Diet: Diabetic Diets; Consistent Carbohydrate; Texture: Regular Texture (IDDSI 7); Fluid Consistency: Thin (IDDSI 0)         Discharge Activity:   Activity Instructions    As tolerated.             CODE STATUS:  Code Status and Medical Interventions:   Ordered at: 12/22/22 1533     Code Status (Patient has no pulse and is not breathing):    CPR (Attempt to Resuscitate)     Medical Interventions (Patient has pulse or is breathing):    Full Support         Future Appointments   Date Time Provider Department Center   12/27/2022 To Be Determined Fatou Morales RN [facility]   1/3/2023 To Be Determined Fatou Morales RN [facility]   1/5/2023  2:45 PM [facility] LENNIE 1 [facility] MAMMO [facility]    1/5/2023  3:00 PM [facility]   1/19/2023  3:30 PM Gabriel Goss MD [facility]   4/18/2023  2:45 PM Seipel, Joseph F, MD [facility]   4/24/2023  2:30 PM [facility] [place] DEVICE CHECK [facility]   4/24/2023  3:10 PM Wang Plaza MD [facility]           Time spent on Discharge including face to face service:  25 minutes    Signature:    Electronically signed by Lindsey Krueger DO, 12/23/22, 9:19 AM EST.      Part of this note may be an electronic transcription/translation of spoken language to printed text using the Dragon Dictation System.

## 2022-12-23 NOTE — THERAPY EVALUATION
Patient Name: Aracelis Vargas  : 1935    MRN: [id number]                              Today's Date: 2022       Admit Date: 2022    Visit Dx:     ICD-10-CM ICD-9-CM   1. Alcohol intoxication with delirium (HCC)  F10.921 291.0   2. Fall, initial encounter  W19.XXXA E888.9   3. Altered mental status, unspecified altered mental status type  R41.82 780.97     Patient Active Problem List   Diagnosis   • Fall   • Secondary hyperaldosteronism (HCC)   • Stage 3b chronic kidney disease (CMS/HCC)   • Hypertension associated with stage 3 chronic kidney disease due to type 2 diabetes mellitus (HCC)   • Morbid obesity (HCC)   • Chronic coronary artery disease   • AV block, Mobitz II   • Mixed hyperlipidemia   • Primary hypertension   • Acute UTI   • GERD without esophagitis   • Cardiac pacemaker in situ   • Cerebrovascular accident (CVA) (HCC)   • Hyperammonemia (HCC)   • Obstructive sleep apnea   • Metabolic encephalopathy   • Type 2 diabetes mellitus with hyperglycemia, with long-term current use of insulin (HCC)   • Seizure disorder (HCC)   • Elevated lactic acid level   • Acute kidney failure (HCC)   • Diabetes mellitus with nephropathy (HCC)   • Hyperkalemia   • Elevated serum lactate dehydrogenase   • Altered mental status, unspecified altered mental status type   • ETOH abuse   • Seizure (HCC)   • Alcohol intoxication with delirium (HCC)   • History of CVA (cerebrovascular accident)     Past Medical History:   Diagnosis Date   • Atherosclerosis of native coronary artery of native heart without angina pectoris 10/22/2020   • Atrioventricular block, Mobitz type 1, [place] 10/21/2020    Added automatically from request for surgery [id number]   • Hypertension associated with stage 3 chronic kidney disease due to type 2 diabetes mellitus (HCC) 10/22/2020   • Morbid obesity (HCC) 10/22/2020   • Secondary hyperaldosteronism (HCC) 10/22/2020   • Stage 3b chronic kidney disease (HCC) 10/22/2020   • Type 2 diabetes  mellitus with diabetic neuropathy, with long-term current use of insulin ([facility]) 10/22/2020   • Type 2 diabetes mellitus with diabetic neuropathy, with long-term current use of insulin ([facility]) 10/22/2020     Past Surgical History:   Procedure Laterality Date   • CARDIAC CATHETERIZATION N/A 10/23/2020    Procedure: Left Heart Cath and coronary angiogram;  Surgeon: Wang Plaza MD;  Location: [facility] CATH INVASIVE LOCATION;  Service: Cardiovascular;  Laterality: N/A;   • CARDIAC ELECTROPHYSIOLOGY PROCEDURE Left 11/3/2021    Procedure: Pacemaker DC new;  Surgeon: Wang Plaza MD;  Location: [facility] CATH INVASIVE LOCATION;  Service: Cardiovascular;  Laterality: Left;   • CARDIAC ELECTROPHYSIOLOGY PROCEDURE N/A 11/3/2021    Procedure: LOOP RECORDER REMOVAL;  Surgeon: Wang Plaza MD;  Location: [facility] CATH INVASIVE LOCATION;  Service: Cardiovascular;  Laterality: N/A;   • HYSTERECTOMY     • JOINT REPLACEMENT Right     Hip   • JOINT REPLACEMENT Left     hip   • JOINT REPLACEMENT Left     hip (for second time)   • JOINT REPLACEMENT Right     knee   • OOPHORECTOMY        General Information     Row Name 12/23/22 0928          Physical Therapy Time and Intention    Document Type evaluation  -EJ     Mode of Treatment physical therapy  -EJ     Row Name 12/23/22 0928          General Information    Patient Profile Reviewed yes  -EJ     Prior Level of Function independent:;gait;transfer;bed mobility;min assist:;ADL's;dressing;bathing  Pt reports she uses a cane at home.  Owns SPC and quad.  She has RW also but prefers cane. Spouse assists with ADLs at times if needed.  He puts on her socks/shoes.  Pt typically sits in recliner most of her day.  -EJ     Existing Precautions/Restrictions fall  -EJ     Row Name 12/23/22 0928          Living Environment    People in Home spouse  -EJ     Name(s) of People in Home [place] ()  -EJ     Row Name 12/23/22 0928          Home Main Entrance    Number of Stairs, Main Entrance  none  -     Row Name 12/23/22 0928          Stairs Within Home, Primary    Stair Railings, Within Home, Primary other (see comments)  basement, but has a lift chair.  -     Row Name 12/23/22 0928          Cognition    Orientation Status (Cognition) oriented x 3;other (see comments)  Pt oriented to self, location, and situation.  She could not recall her home phone number.  PT called phone number for pt so she could speak with her spouse, and wrote down her home # on paper for her to call if needed  -     Row Name 12/23/22 0928          Safety Issues, Functional Mobility    Safety Issues Affecting Function (Mobility) safety precaution awareness;safety precautions follow-through/compliance  -     Impairments Affecting Function (Mobility) balance;pain;strength;endurance/activity tolerance  -           User Key  (r) = Recorded By, (t) = Taken By, (c) = Cosigned By    Initials Name Provider Type    EJ Patsy Howell, PT Physical Therapist               Mobility     Row Name 12/23/22 0948          Bed Mobility    Bed Mobility bed mobility (all) activities  -     All Activities, [place] (Bed Mobility) supervision  -     Row Name 12/23/22 0948          Bed-Chair Transfer    Bed-Chair [place] (Transfers) standby assist  bed to BSC  -     Assistive Device (Bed-Chair Transfers) walker, front-wheeled  -EJ     Row Name 12/23/22 0948          Sit-Stand Transfer    Sit-Stand [place] (Transfers) standby assist  -     Assistive Device (Sit-Stand Transfers) walker, front-wheeled  -EJ     Row Name 12/23/22 0948          Gait/Stairs (Locomotion)    [place] Level (Gait) contact guard  -     Assistive Device (Gait) walker, front-wheeled  -EJ     Distance in Feet (Gait) 23 feet  -EJ     Deviations/Abnormal Patterns (Gait) gait speed decreased;stride length decreased  -EJ     Bilateral Gait Deviations forward flexed posture;heel strike decreased  -EJ           User Key  (r) = Recorded By, (t) =  Taken By, (c) = Cosigned By    Initials Name Provider Type    Patsy Kingsley PT Physical Therapist               Obj/Interventions     Row Name 12/23/22 0950          Range of Motion Comprehensive    Comment, General Range of Motion B UE ROM limitations due to GH joint restrictions.  Pt with R RC issues from past.  BLE hip restrictions present due to weakness with ~30% restrictoin.  Pt with WFL knee flexion/extension.  -     Row Name 12/23/22 0950          Strength Comprehensive (MMT)    Comment, General Manual Muscle Testing (MMT) Assessment Pt with gross strength deficits present. BUE 3-/5, BLE 3+ to 4-/5.  -     Row Name 12/23/22 0950          Balance    Balance Assessment sitting static balance;sitting dynamic balance;sit to stand dynamic balance;standing static balance;standing dynamic balance  -EJ     Static Sitting Balance independent  -EJ     Dynamic Sitting Balance independent  -EJ     Position, Sitting Balance unsupported;sitting edge of bed  -EJ     Sit to Stand Dynamic Balance standby assist  -EJ     Static Standing Balance standby assist  -EJ     Dynamic Standing Balance contact guard  -EJ     Position/Device Used, Standing Balance walker, front-wheeled  -EJ     Balance Interventions sitting;standing;sit to stand;supported;static;dynamic;minimal challenge  -EJ           User Key  (r) = Recorded By, (t) = Taken By, (c) = Cosigned By    Initials Name Provider Type    Patsy Kingsley PT Physical Therapist               Goals/Plan     Row Name 12/23/22 1002          Bed Mobility Goal 1 (PT)    Activity/Assistive Device (Bed Mobility Goal 1, PT) bed mobility activities, all  -EJ     [place] Level/Cues Needed (Bed Mobility Goal 1, PT) supervision required  -EJ     Time Frame (Bed Mobility Goal 1, PT) long term goal (LTG);2 weeks  -     Row Name 12/23/22 1002          Transfer Goal 1 (PT)    Activity/Assistive Device (Transfer Goal 1, PT) transfers, all;walker, rolling;cane, straight  -EJ      [place] Level/Cues Needed (Transfer Goal 1, PT) supervision required  -EJ     Time Frame (Transfer Goal 1, PT) long term goal (LTG);2 weeks  -EJ     Row Name 12/23/22 1002          Gait Training Goal 1 (PT)    Activity/Assistive Device (Gait Training Goal 1, PT) gait (walking locomotion);walker, rolling;cane, straight  -EJ     [place] Level (Gait Training Goal 1, PT) standby assist  -EJ     Distance (Gait Training Goal 1, PT) 30 feet  -EJ     Time Frame (Gait Training Goal 1, PT) long term goal (LTG);2 weeks  -EJ     Row Name 12/23/22 1002          Therapy Assessment/Plan (PT)    Planned Therapy Interventions (PT) balance training;bed mobility training;gait training;patient/family education;neuromuscular re-education;postural re-education;ROM (range of motion);strengthening;transfer training  -EJ           User Key  (r) = Recorded By, (t) = Taken By, (c) = Cosigned By    Initials Name Provider Type    EJ Patsy Howell, PT Physical Therapist               Clinical Impression     Row Name 12/23/22 0955          Pain    Pretreatment Pain Rating 5/10  -EJ     Posttreatment Pain Rating 5/10  -EJ     Pain Location - Side/Orientation Bilateral  -EJ     Pain Location lower  -EJ     Pain Location - other (see comments)  B lower legs below knee painful to pt - both are red and swollen.  Pt with slight weeping.  -EJ     Pain Intervention(s) Therapeutic presence;Emotional support;Repositioned  -EJ     Row Name 12/23/22 0955          Plan of Care Review    Plan of Care Reviewed With patient  -EJ     Outcome Evaluation Patient is an 86 y/o F who was admitted 12/22/22 post passing out after drinking an alcoholic beverage.  Pt reports she drank this on an empty stomach resulting in her passing out.  She has a PMHx of alcohol abuse, HTN, CKD, DMII, Atherosclerosis of coronary artery, COPD, A-fib, OA, and has a Pacemaker.  At baseline pt reports she is independent at home and uses a cane.  Her spouse will assist her  with donning/doffing her socks/shoes.  At times she will need assist from him for watson-care and bathing/dressing, but she does some of that by herself.  During today's evaluation pt was able to perform bed mobility with SBA, transfers sit to stand and stand pivot sit bed to [facility] with SBA, and gait with CGA with RW.  Pt was safe with use of walker, but she does have reduced gait speed and postural deviations.  Pt reports she does not want to go to rehab, and only wants to return home.  She reports she is near her baseline function, and sits most of her day in her recliner due to her BLE issues/swelling/pain. She only walks ~20-30 feet at a time at home.  At this time PT recommends home with home health as pt declines going to rehab.  PT will continue to see pt in acute setting to further improve her balance, strength, activity tolerance, and endurance for safety at home.  -     Row Name 12/23/22 0955          Therapy Assessment/Plan (PT)    Criteria for Skilled Interventions Met (PT) yes;skilled treatment is necessary  -EJ     Therapy Frequency (PT) 3 times/wk  -EJ     Predicted Duration of Therapy Intervention (PT) until discharge  -     Row Name 12/23/22 0955          Positioning and Restraints    Pre-Treatment Position in bed  -EJ     Post Treatment Position chair  -EJ     In Chair reclined;call light within reach;encouraged to call for assist;exit alarm on  -EJ           User Key  (r) = Recorded By, (t) = Taken By, (c) = Cosigned By    Initials Name Provider Type    Patsy Kingsley, PT Physical Therapist               Outcome Measures     Row Name 12/23/22 1003          How much help from another person do you currently need...    Turning from your back to your side while in flat bed without using bedrails? 3  -EJ     Moving from lying on back to sitting on the side of a flat bed without bedrails? 3  -EJ     Moving to and from a bed to a chair (including a wheelchair)? 3  -EJ     Standing up from a chair  using your arms (e.g., wheelchair, bedside chair)? 3  -EJ     Climbing 3-5 steps with a railing? 2  -EJ     To walk in hospital room? 3  -EJ     AM-PAC 6 Clicks Score (PT) 17  -EJ     Highest level of mobility 5 --> Static standing  -     Row Name 12/23/22 1003          Functional Assessment    Outcome Measure Options AM-PAC 6 Clicks Basic Mobility (PT)  -           User Key  (r) = Recorded By, (t) = Taken By, (c) = Cosigned By    Initials Name Provider Type    EJ Patsy Howell, PT Physical Therapist                             Physical Therapy Education     Title: PT OT SLP Therapies (Done)     Topic: Physical Therapy (Done)     Point: Mobility training (Done)     Learning Progress Summary           Patient Acceptance, E,TB, VU by  at 12/23/2022 1004                               User Key     Initials Effective Dates Name Provider Type Discipline     06/16/21 -  Patsy Howell, PT Physical Therapist PT              PT Recommendation and Plan  Planned Therapy Interventions (PT): balance training, bed mobility training, gait training, patient/family education, neuromuscular re-education, postural re-education, ROM (range of motion), strengthening, transfer training  Plan of Care Reviewed With: patient  Outcome Evaluation: Patient is an 88 y/o F who was admitted 12/22/22 post passing out after drinking an alcoholic beverage.  Pt reports she drank this on an empty stomach resulting in her passing out.  She has a PMHx of alcohol abuse, HTN, CKD, DMII, Atherosclerosis of coronary artery, COPD, A-fib, OA, and has a Pacemaker.  At baseline pt reports she is independent at home and uses a cane.  Her spouse will assist her with donning/doffing her socks/shoes.  At times she will need assist from him for watson-care and bathing/dressing, but she does some of that by herself.  During today's evaluation pt was able to perform bed mobility with SBA, transfers sit to stand and stand pivot sit bed to [facility] with SBA, and  gait with CGA with RW.  Pt was safe with use of walker, but she does have reduced gait speed and postural deviations.  Pt reports she does not want to go to rehab, and only wants to return home.  She reports she is near her baseline function, and sits most of her day in her recliner due to her BLE issues/swelling/pain. She only walks ~20-30 feet at a time at home.  At this time PT recommends home with home health as pt declines going to rehab.  PT will continue to see pt in acute setting to further improve her balance, strength, activity tolerance, and endurance for safety at home.     Time Calculation:    PT Charges     Row Name 12/23/22 1006             Time Calculation    Start Time 0819  -EJ      Stop Time 0905  -EJ      Time Calculation (min) 46 min  -EJ      PT Received On 12/23/22  -EJ      PT - Next Appointment 12/26/22  -EJ      PT Goal Re-Cert Due Date 01/06/23  -EJ         Time Calculation- PT    Total Timed Code Minutes- PT 0 minute(s)  -EJ            User Key  (r) = Recorded By, (t) = Taken By, (c) = Cosigned By    Initials Name Provider Type     Patsy Howell, PT Physical Therapist              Therapy Charges for Today     Code Description Service Date Service Provider Modifiers Qty    [id number] HC-PT EVAL MOD COMPLEXITY 5 12/23/2022 Patsy Howell, PT  1          PT G-Codes  Outcome Measure Options: AM-PAC 6 Clicks Basic Mobility (PT)  AM-PAC 6 Clicks Score (PT): 17  PT Discharge Summary  Anticipated Discharge Disposition (PT): home with home health, home with assist    Patsy Howell, PT  12/23/2022

## 2022-12-23 NOTE — PLAN OF CARE
Goal Outcome Evaluation:  Plan of Care Reviewed With: patient           Outcome Evaluation: Patient is an 86 y/o F who was admitted 12/22/22 post passing out after drinking an alcoholic beverage.  Pt reports she drank this on an empty stomach resulting in her passing out.  She has a PMHx of alcohol abuse, HTN, CKD, DMII, Atherosclerosis of coronary artery, COPD, A-fib, OA, and has a Pacemaker.  At baseline pt reports she is independent at home and uses a cane.  Her spouse will assist her with donning/doffing her socks/shoes.  At times she will need assist from him for watson-care and bathing/dressing, but she does some of that by herself.  During today's evaluation pt was able to perform bed mobility with SBA, transfers sit to stand and stand pivot sit bed to [facility] with SBA, and gait with CGA with RW.  Pt was safe with use of walker, but she does have reduced gait speed and postural deviations.  Pt reports she does not want to go to rehab, and only wants to return home.  She reports she is near her baseline function, and sits most of her day in her recliner due to her BLE issues/swelling/pain. She only walks ~20-30 feet at a time at home.  At this time PT recommends home with home health as pt declines going to rehab.  PT will continue to see pt in acute setting to further improve her balance, strength, activity tolerance, and endurance for safety at home.

## 2022-12-23 NOTE — OUTREACH NOTE
Prep Survey    Flowsheet Row Responses   [facility] patient discharged from? [facility]   Is LACE score < 7 ? No   Eligibility [facility]   Date of Admission 12/22/22   Date of Discharge 12/23/22   Discharge Disposition Home or Self Care   Discharge diagnosis Alcohol intoxication with delirium    Does the patient have one of the following disease processes/diagnoses(primary or secondary)? Other   Does the patient have Home health ordered? No   Is there a DME ordered? No   Prep survey completed? Yes          FRANCY SEVERINO - Registered Nurse

## 2022-12-23 NOTE — CASE MANAGEMENT/SOCIAL WORK
Social Work Assessment   [facility]     Patient Name: Aracelis Vargas  MRN: [id number]  Today's Date: 12/23/2022    Admit Date: 12/22/2022     Substance Abuse     Row Name 12/23/22 1213       Substance Use    Substance Use Status current alcohol use    Substance Use Comment Attempted to see patient re: ETOH withdrawal consult, however patient was already discharged home. Patient previously screened by  & declined ETOH resources in past admissions.           Phone communication or documentation only - no physical contact with patient or family.  LEN Bourne    Phone: [phone]  Cell: [phone]  Fax: [phone]  [email or web address]

## 2022-12-23 NOTE — PLAN OF CARE
Goal Outcome Evaluation:               Pt experiencing intermittent N/V. CIWA protocol in place, symptoms alleviated with PRN Zofran and Ativan. Call light in reach, will continue to monitor...

## 2022-12-23 NOTE — SIGNIFICANT NOTE
Case Management Discharge Note      Final Note: (P) d/c home         Selected Continued Care - Discharged on 12/23/2022 Admission date: 12/22/2022 - Discharge disposition: Home-Health Care [facility]                          Selected Continued Care - Prior Encounters Includes continued care and service providers with selected services from prior encounters from 9/23/2022 to 12/23/2022      Discharged on 11/3/2022 Admission date: 11/1/2022 - Discharge disposition: Home-Health Care c      Home Medical Care       Service Provider Selected Services Address Phone Fax Patient Preferred    [facility] [address] [facility] [id number] [phone] --                               Final Discharge Disposition Code: (P) 01 - home or self-care

## 2022-12-26 ENCOUNTER — TRANSITIONAL CARE MANAGEMENT TELEPHONE ENCOUNTER (OUTPATIENT)
Dept: CALL CENTER | Facility: HOSPITAL | Age: 87
End: 2022-12-26

## 2022-12-26 NOTE — OUTREACH NOTE
Call Center TCM Note    Flowsheet Row Responses   [facility] patient discharged from? [facility]   Does the patient have one of the following disease processes/diagnoses(primary or secondary)? Other   TCM attempt successful? Yes  [Verbal release on file. Spouse/Pimento or Nika/relative]   Call start time 1057   Call end time 1102   Discharge diagnosis Alcohol intoxication with delirium    Meds reviewed with patient/caregiver? Yes   Is the patient having any side effects they believe may be caused by any medication additions or changes? No   Does the patient have all medications ordered at discharge? N/A   Is the patient taking all medications as directed (includes completed medication regime)? Yes   Comments Declined follow up during TCM call. Patient reports that Spouse has scheduled PCP follow up with NP on 1/10/22 and someone will see her that day also. Message sent to office.   Does the patient have an appointment with their PCP within 7 days of discharge? No   Nursing Interventions Patient declined scheduling/rescheduling appointment at this time   Has home health visited the patient within 72 hours of discharge? N/A   Psychosocial issues? No   Did the patient receive a copy of their discharge instructions? Yes   Nursing interventions Reviewed instructions with patient   What is the patient's perception of their health status since discharge? Improving   Is the patient/caregiver able to teach back signs and symptoms related to disease process for when to call PCP? Yes   Is the patient/caregiver able to teach back signs and symptoms related to disease process for when to call 911? Yes   Is the patient/caregiver able to teach back the hierarchy of who to call/visit for symptoms/problems? PCP, Specialist, Home health nurse, Urgent Care, ED, 911 Yes   If the patient is a current smoker, are they able to teach back resources for cessation? Not a smoker   TCM call completed? Yes   Call end time 1102   Would this  patient benefit from a Referral to [facility] Social Work? No   Is the patient interested in additional calls from an ambulatory ?  NOTE:  applies to high risk patients requiring additional follow-up. Hermelinda Kapadia RN    12/26/2022, 11:02 EST

## 2022-12-27 ENCOUNTER — HOME CARE VISIT (OUTPATIENT)
Dept: HOME HEALTH SERVICES | Facility: HOME HEALTHCARE | Age: 87
End: 2022-12-27
Payer: MEDICARE

## 2022-12-27 LAB — LEVETIRACETAM SERPL-MCNC: 11.4 UG/ML (ref 10–40)

## 2022-12-27 PROCEDURE — G0299 HHS/HOSPICE OF RN EA 15 MIN: HCPCS

## 2022-12-28 ENCOUNTER — HOME CARE VISIT (OUTPATIENT)
Dept: HOME HEALTH SERVICES | Facility: HOME HEALTHCARE | Age: 87
End: 2022-12-28
Payer: MEDICARE

## 2022-12-28 VITALS
OXYGEN SATURATION: 94 % | RESPIRATION RATE: 18 BRPM | SYSTOLIC BLOOD PRESSURE: 136 MMHG | DIASTOLIC BLOOD PRESSURE: 78 MMHG | HEART RATE: 77 BPM | TEMPERATURE: 98.2 F

## 2022-12-28 NOTE — CASE COMMUNICATION
The patient has finished her antibiotics and BLE are still very edematous and red. I encouraged her to keep them elevated and wear compression garments but she called today concerned that they have gotten even worse and wanted me to ask if her diuretic should be adjusted? We measured her calf so she could order new compression stockings as well.

## 2022-12-29 NOTE — HOME HEALTH
BLE are very edematous and red and patient is still on oral antibiotics. Contacted PCP to update. Awaiting return call.     Plan for next visit: CP assess, wound care/wound assess, assess edema/redness to BLE, assess pain/falls/safety

## 2023-01-03 ENCOUNTER — HOME CARE VISIT (OUTPATIENT)
Dept: HOME HEALTH SERVICES | Facility: HOME HEALTHCARE | Age: 88
End: 2023-01-03
Payer: MEDICARE

## 2023-01-03 ENCOUNTER — READMISSION MANAGEMENT (OUTPATIENT)
Dept: CALL CENTER | Facility: HOSPITAL | Age: 88
End: 2023-01-03
Payer: MEDICARE

## 2023-01-03 PROCEDURE — G0299 HHS/HOSPICE OF RN EA 15 MIN: HCPCS

## 2023-01-04 RX ORDER — FUROSEMIDE 20 MG/1
TABLET ORAL
Qty: 90 TABLET | Refills: 1 | Status: SHIPPED | OUTPATIENT
Start: 2023-01-04 | End: 2023-01-10 | Stop reason: SDUPTHER

## 2023-01-04 NOTE — CASE COMMUNICATION
HUD RECERT –HUD RECERT REVIEW    Complete between days 46-50    Progress toward goals: patient wound is now healed but BLE remain very edematous and red, monitoring weekly, frequent cellulitis, upcoming appt to discuss diuretics    Barriers to goal achievement: none    Recert or discharge? recert done today    Any payor source changes? no    Any changes needed to the focus of  care? no

## 2023-01-05 ENCOUNTER — HOSPITAL ENCOUNTER (OUTPATIENT)
Dept: BONE DENSITY | Facility: HOSPITAL | Age: 88
End: 2023-01-05
Payer: MEDICARE

## 2023-01-05 ENCOUNTER — APPOINTMENT (OUTPATIENT)
Dept: MAMMOGRAPHY | Facility: HOSPITAL | Age: 88
End: 2023-01-05
Payer: MEDICARE

## 2023-01-05 VITALS
OXYGEN SATURATION: 96 % | DIASTOLIC BLOOD PRESSURE: 74 MMHG | HEART RATE: 68 BPM | SYSTOLIC BLOOD PRESSURE: 130 MMHG | TEMPERATURE: 97.7 F | RESPIRATION RATE: 18 BRPM

## 2023-01-05 NOTE — HOME HEALTH
Recertification of 87 year old female with FOC Type 2 diabetes mellitus with diabetic chronic kidney disease. Skilled nursing for cardiopulmonary assessment, diabetic education, edema monitoring and education, rehospitalization prevention education, medication education. Wounds recently healed to scalp and left leg but her BLE remain edematous and red. Patient's LE measured and compression hose were ordered, patient awaiting delivery. Encouraged elevation and low sodium diet, patient verbalized understanding. Stage 1 to perirectal area    Plan for next visit: CP assess, wound assess, assess BLE edema, edema education, DM education, assess pain/safety/falls, monitor for alcohol abuse r/t multiple ER visits

## 2023-01-05 NOTE — CASE COMMUNICATION
Home Health need continues for: skilled nursing    Primary diagnoses/co-morbidities/recent procedures in past 60 days that impact current episode: Type 2 diabetes mellitus with diabetic chronic kidney disease    Homebound status and living arrangements: lives in a single dwelling home with her spouse    Goals accomplished and/or measurable progress toward unmet goals in past 60 days: wounds have healed but BLE remain edematous and redde sasha  Focus of care for next 60 days for each discipline ordered: Skilled nursing for cardiopulmonary assessment, edema monitoring/education, DM education, medication education, fall prevention and rehospitalization prevention    Skin integrity/wound status: wounds healed    Most recent fall risk: high    Estimated date when home care services will end 6-8 weeks

## 2023-01-06 PROCEDURE — G0179 MD RECERTIFICATION HHA PT: HCPCS | Performed by: FAMILY MEDICINE

## 2023-01-10 ENCOUNTER — HOME CARE VISIT (OUTPATIENT)
Dept: HOME HEALTH SERVICES | Facility: HOME HEALTHCARE | Age: 88
End: 2023-01-10
Payer: MEDICARE

## 2023-01-10 ENCOUNTER — LAB (OUTPATIENT)
Dept: FAMILY MEDICINE CLINIC | Facility: CLINIC | Age: 88
End: 2023-01-10
Payer: MEDICARE

## 2023-01-10 VITALS
SYSTOLIC BLOOD PRESSURE: 122 MMHG | DIASTOLIC BLOOD PRESSURE: 76 MMHG | HEART RATE: 78 BPM | RESPIRATION RATE: 19 BRPM | OXYGEN SATURATION: 98 % | TEMPERATURE: 97 F

## 2023-01-10 DIAGNOSIS — E53.8 FOLIC ACID DEFICIENCY: ICD-10-CM

## 2023-01-10 DIAGNOSIS — Z79.4 TYPE 2 DIABETES MELLITUS WITH HYPERGLYCEMIA, WITH LONG-TERM CURRENT USE OF INSULIN: Primary | ICD-10-CM

## 2023-01-10 DIAGNOSIS — E11.65 TYPE 2 DIABETES MELLITUS WITH HYPERGLYCEMIA, WITH LONG-TERM CURRENT USE OF INSULIN: Primary | ICD-10-CM

## 2023-01-10 DIAGNOSIS — E53.8 VITAMIN B 12 DEFICIENCY: ICD-10-CM

## 2023-01-10 DIAGNOSIS — E11.65 TYPE 2 DIABETES MELLITUS WITH HYPERGLYCEMIA, WITH LONG-TERM CURRENT USE OF INSULIN: ICD-10-CM

## 2023-01-10 DIAGNOSIS — Z79.4 TYPE 2 DIABETES MELLITUS WITH HYPERGLYCEMIA, WITH LONG-TERM CURRENT USE OF INSULIN: ICD-10-CM

## 2023-01-10 LAB
ALBUMIN SERPL-MCNC: 3.7 G/DL (ref 3.5–5.2)
ALBUMIN/GLOB SERPL: 1.1 G/DL
ALP SERPL-CCNC: 80 U/L (ref 39–117)
ALT SERPL W P-5'-P-CCNC: 15 U/L (ref 1–33)
ANION GAP SERPL CALCULATED.3IONS-SCNC: 8 MMOL/L (ref 5–15)
AST SERPL-CCNC: 30 U/L (ref 1–32)
BASOPHILS # BLD AUTO: 0.04 10*3/MM3 (ref 0–0.2)
BASOPHILS NFR BLD AUTO: 0.8 % (ref 0–1.5)
BILIRUB SERPL-MCNC: 0.5 MG/DL (ref 0–1.2)
BUN SERPL-MCNC: 22 MG/DL (ref 8–23)
BUN/CREAT SERPL: 14.6 (ref 7–25)
CALCIUM SPEC-SCNC: 9.1 MG/DL (ref 8.6–10.5)
CHLORIDE SERPL-SCNC: 105 MMOL/L (ref 98–107)
CHOLEST SERPL-MCNC: 161 MG/DL (ref 0–200)
CO2 SERPL-SCNC: 26 MMOL/L (ref 22–29)
CREAT SERPL-MCNC: 1.51 MG/DL (ref 0.57–1)
DEPRECATED RDW RBC AUTO: 59 FL (ref 37–54)
EGFRCR SERPLBLD CKD-EPI 2021: 33.7 ML/MIN/1.73
EOSINOPHIL # BLD AUTO: 0.26 10*3/MM3 (ref 0–0.4)
EOSINOPHIL NFR BLD AUTO: 5.4 % (ref 0.3–6.2)
ERYTHROCYTE [DISTWIDTH] IN BLOOD BY AUTOMATED COUNT: 16.2 % (ref 12.3–15.4)
GLOBULIN UR ELPH-MCNC: 3.4 GM/DL
GLUCOSE SERPL-MCNC: 141 MG/DL (ref 65–99)
HBA1C MFR BLD: 5.7 % (ref 3.5–5.6)
HCT VFR BLD AUTO: 30.5 % (ref 34–46.6)
HDLC SERPL-MCNC: 77 MG/DL (ref 40–60)
HGB BLD-MCNC: 9.6 G/DL (ref 12–15.9)
IMM GRANULOCYTES # BLD AUTO: 0.02 10*3/MM3 (ref 0–0.05)
IMM GRANULOCYTES NFR BLD AUTO: 0.4 % (ref 0–0.5)
LDLC SERPL CALC-MCNC: 62 MG/DL (ref 0–100)
LDLC/HDLC SERPL: 0.75 {RATIO}
LYMPHOCYTES # BLD AUTO: 1.44 10*3/MM3 (ref 0.7–3.1)
LYMPHOCYTES NFR BLD AUTO: 29.9 % (ref 19.6–45.3)
MCH RBC QN AUTO: 32.1 PG (ref 26.6–33)
MCHC RBC AUTO-ENTMCNC: 31.5 G/DL (ref 31.5–35.7)
MCV RBC AUTO: 102 FL (ref 79–97)
MONOCYTES # BLD AUTO: 0.43 10*3/MM3 (ref 0.1–0.9)
MONOCYTES NFR BLD AUTO: 8.9 % (ref 5–12)
NEUTROPHILS NFR BLD AUTO: 2.62 10*3/MM3 (ref 1.7–7)
NEUTROPHILS NFR BLD AUTO: 54.6 % (ref 42.7–76)
NRBC BLD AUTO-RTO: 0 /100 WBC (ref 0–0.2)
PLATELET # BLD AUTO: 233 10*3/MM3 (ref 140–450)
PMV BLD AUTO: 9.6 FL (ref 6–12)
POTASSIUM SERPL-SCNC: 4.9 MMOL/L (ref 3.5–5.2)
PROT SERPL-MCNC: 7.1 G/DL (ref 6–8.5)
RBC # BLD AUTO: 2.99 10*6/MM3 (ref 3.77–5.28)
SODIUM SERPL-SCNC: 139 MMOL/L (ref 136–145)
TRIGL SERPL-MCNC: 130 MG/DL (ref 0–150)
VLDLC SERPL-MCNC: 22 MG/DL (ref 5–40)
WBC NRBC COR # BLD: 4.81 10*3/MM3 (ref 3.4–10.8)

## 2023-01-10 PROCEDURE — 83036 HEMOGLOBIN GLYCOSYLATED A1C: CPT | Performed by: FAMILY MEDICINE

## 2023-01-10 PROCEDURE — 36415 COLL VENOUS BLD VENIPUNCTURE: CPT

## 2023-01-10 PROCEDURE — 80061 LIPID PANEL: CPT | Performed by: FAMILY MEDICINE

## 2023-01-10 PROCEDURE — 85025 COMPLETE CBC W/AUTO DIFF WBC: CPT | Performed by: FAMILY MEDICINE

## 2023-01-10 PROCEDURE — G0299 HHS/HOSPICE OF RN EA 15 MIN: HCPCS

## 2023-01-10 PROCEDURE — 80053 COMPREHEN METABOLIC PANEL: CPT | Performed by: FAMILY MEDICINE

## 2023-01-10 RX ORDER — LANOLIN ALCOHOL/MO/W.PET/CERES
1000 CREAM (GRAM) TOPICAL DAILY
Qty: 30 TABLET | Refills: 11 | Status: SHIPPED | OUTPATIENT
Start: 2023-01-10

## 2023-01-10 RX ORDER — CLINDAMYCIN HYDROCHLORIDE 150 MG/1
300 CAPSULE ORAL 4 TIMES DAILY
OUTPATIENT
Start: 2023-01-10

## 2023-01-10 RX ORDER — FUROSEMIDE 20 MG/1
20 TABLET ORAL DAILY
Qty: 90 TABLET | Refills: 1 | Status: SHIPPED | OUTPATIENT
Start: 2023-01-10 | End: 2023-01-24 | Stop reason: SDUPTHER

## 2023-01-10 RX ORDER — ATORVASTATIN CALCIUM 10 MG/1
10 TABLET, FILM COATED ORAL DAILY
Qty: 90 TABLET | Refills: 0 | Status: ON HOLD | OUTPATIENT
Start: 2023-01-10 | End: 2023-02-25

## 2023-01-10 RX ORDER — OMEPRAZOLE 40 MG/1
40 CAPSULE, DELAYED RELEASE ORAL
Qty: 90 CAPSULE | Refills: 1 | Status: SHIPPED | OUTPATIENT
Start: 2023-01-10

## 2023-01-10 NOTE — HOME HEALTH
Routine Visit Note:  CP assess, pain assess, falls assess, edema assess, pt. now on antibiotics for BLE cellulits    Skill/education provided: CP assess, edema education, DM education    Patient/caregiver response: verbalized understanding    Plan for next visit: CP assess, pain assess, falls assess, follow up Dr. Goss apprudy., edema assess    Other pertinent info: n/a

## 2023-01-11 ENCOUNTER — TELEPHONE (OUTPATIENT)
Dept: FAMILY MEDICINE CLINIC | Facility: CLINIC | Age: 88
End: 2023-01-11
Payer: MEDICARE

## 2023-01-11 ENCOUNTER — READMISSION MANAGEMENT (OUTPATIENT)
Dept: CALL CENTER | Facility: HOSPITAL | Age: 88
End: 2023-01-11
Payer: MEDICARE

## 2023-01-11 NOTE — TELEPHONE ENCOUNTER
Caller: Aracelis Vargas    Relationship: Self    Best call back number: [phone]    What is the best time to reach you: ANYTIME    Who are you requesting to speak with (clinical staff, provider,  specific staff member): CLINICAL STAFF    Do you know the name of the person who called: PATIENT     What was the call regarding: PATIENT CALLED TO SEE WHY THE ANTIBIOTIC SHE REQUESTED YESTERDAY WAS NOT SENT TO THE PHARMACY    Do you require a callback: YES

## 2023-01-11 NOTE — OUTREACH NOTE
Medical Week 3 Survey    Flowsheet Row Responses   [facility] facility patient discharged from? [facility]   Does the patient have one of the following disease processes/diagnoses(primary or secondary)? Other   Week 3 attempt successful? No   Unsuccessful attempts Attempt 1          KIM MAHER - Registered Nurse

## 2023-01-13 ENCOUNTER — HOME CARE VISIT (OUTPATIENT)
Dept: HOME HEALTH SERVICES | Facility: HOME HEALTHCARE | Age: 88
End: 2023-01-13
Payer: MEDICARE

## 2023-01-17 ENCOUNTER — HOME CARE VISIT (OUTPATIENT)
Dept: HOME HEALTH SERVICES | Facility: HOME HEALTHCARE | Age: 88
End: 2023-01-17
Payer: MEDICARE

## 2023-01-17 ENCOUNTER — READMISSION MANAGEMENT (OUTPATIENT)
Dept: CALL CENTER | Facility: HOSPITAL | Age: 88
End: 2023-01-17
Payer: MEDICARE

## 2023-01-17 VITALS
RESPIRATION RATE: 18 BRPM | SYSTOLIC BLOOD PRESSURE: 126 MMHG | DIASTOLIC BLOOD PRESSURE: 83 MMHG | OXYGEN SATURATION: 97 % | HEART RATE: 78 BPM | TEMPERATURE: 97 F

## 2023-01-17 PROCEDURE — G0299 HHS/HOSPICE OF RN EA 15 MIN: HCPCS

## 2023-01-17 NOTE — OUTREACH NOTE
Medical Week 3 Survey    Flowsheet Row Responses   [facility] patient discharged from? [facility]   Does the patient have one of the following disease processes/diagnoses(primary or secondary)? Other   Week 3 attempt successful? Yes   Call start time 1431   Call end time 1433   Discharge diagnosis Alcohol intoxication with delirium    Meds reviewed with patient/caregiver? Yes   Is the patient having any side effects they believe may be caused by any medication additions or changes? No   Does the patient have all medications ordered at discharge? N/A   Prescription comments NO new medications ordered   Is the patient taking all medications as directed (includes completed medication regime)? Yes   Does the patient have a primary care provider?  Yes   Comments regarding PCP PCP f/u on 1/19/23   Does the patient have an appointment with their PCP within 7 days of discharge? Greater than 7 days   Nursing Interventions Verified appointment date/time/provider   Has the patient kept scheduled appointments due by today? N/A   Comments Nephro 1/18/23   What is the Home health agency?  [facility]   Has home health visited the patient within 72 hours of discharge? Yes   Psychosocial issues? No   Did the patient receive a copy of their discharge instructions? Yes   Nursing interventions Reviewed instructions with patient   What is the patient's perception of their health status since discharge? Improving  [Reports she is doing very well and denies any issues or concerms--had lab work completed. Will be following up with MDs this week, NO immediate needs, HH following.]   Is the patient/caregiver able to teach back signs and symptoms related to disease process for when to call PCP? Yes   Is the patient/caregiver able to teach back signs and symptoms related to disease process for when to call 911? Yes   Week 3 Call Completed? Yes   Graduated Yes  [NO immediate needs, f/u's scheduled]          KEREN LEVINE - Registered Nurse

## 2023-01-18 NOTE — HOME HEALTH
Routine Visit Note:  pt. states she is doing ok, off of antibiotic for cellulitis, improving BLE    Skill/education provided: edema assess, falls assess, pain assess, DM education    Patient/caregiver response: verbalized understanding    Plan for next visit: CP assess, edema asses, falls assess, DM education    Other pertinent info: see if pillow has helped with edema

## 2023-01-24 ENCOUNTER — HOME CARE VISIT (OUTPATIENT)
Dept: HOME HEALTH SERVICES | Facility: HOME HEALTHCARE | Age: 88
End: 2023-01-24
Payer: MEDICARE

## 2023-01-24 VITALS
DIASTOLIC BLOOD PRESSURE: 61 MMHG | RESPIRATION RATE: 18 BRPM | OXYGEN SATURATION: 94 % | SYSTOLIC BLOOD PRESSURE: 124 MMHG | TEMPERATURE: 97 F | HEART RATE: 68 BPM

## 2023-01-24 PROCEDURE — G0299 HHS/HOSPICE OF RN EA 15 MIN: HCPCS

## 2023-01-24 RX ORDER — FUROSEMIDE 20 MG/1
80 TABLET ORAL DAILY
Qty: 90 TABLET | Refills: 1 | Status: SHIPPED | OUTPATIENT
Start: 2023-01-24

## 2023-01-24 NOTE — HOME HEALTH
Routine Visit Note:  CP assess, pain assess, falls assess, DM education, edema assess    Skill/education provided: DM, edema assess, pain assess, falls assess    Patient/caregiver response: verbalized understanding    Plan for next visit: follow up Dr. Goss, CP assess, pain assess, falls assess, edema assess    Other pertinent info: n/a

## 2023-01-29 ENCOUNTER — APPOINTMENT (OUTPATIENT)
Dept: GENERAL RADIOLOGY | Facility: HOSPITAL | Age: 88
End: 2023-01-29
Payer: MEDICARE

## 2023-01-29 ENCOUNTER — HOSPITAL ENCOUNTER (OUTPATIENT)
Facility: HOSPITAL | Age: 88
Setting detail: OBSERVATION
Discharge: HOME | End: 2023-01-30
Attending: EMERGENCY MEDICINE | Admitting: INTERNAL MEDICINE
Payer: MEDICARE

## 2023-01-29 ENCOUNTER — APPOINTMENT (OUTPATIENT)
Dept: CT IMAGING | Facility: HOSPITAL | Age: 88
End: 2023-01-29
Payer: MEDICARE

## 2023-01-29 DIAGNOSIS — N39.0 URINARY TRACT INFECTION WITH HEMATURIA, SITE UNSPECIFIED: ICD-10-CM

## 2023-01-29 DIAGNOSIS — R31.9 URINARY TRACT INFECTION WITH HEMATURIA, SITE UNSPECIFIED: ICD-10-CM

## 2023-01-29 DIAGNOSIS — R41.82 ALTERED MENTAL STATUS, UNSPECIFIED ALTERED MENTAL STATUS TYPE: Primary | ICD-10-CM

## 2023-01-29 DIAGNOSIS — F10.929 ALCOHOLIC INTOXICATION WITH COMPLICATION: ICD-10-CM

## 2023-01-29 PROBLEM — R79.89 ELEVATED TROPONIN: Status: ACTIVE | Noted: 2023-01-29

## 2023-01-29 PROBLEM — R77.8 ELEVATED TROPONIN: Status: ACTIVE | Noted: 2023-01-29

## 2023-01-29 LAB
ALBUMIN SERPL-MCNC: 3.9 G/DL (ref 3.5–5.2)
ALBUMIN/GLOB SERPL: 1.1 G/DL
ALP SERPL-CCNC: 106 U/L (ref 39–117)
ALT SERPL W P-5'-P-CCNC: 31 U/L (ref 1–33)
AMMONIA BLD-SCNC: 35 UMOL/L (ref 11–51)
AMPHET+METHAMPHET UR QL: NEGATIVE
ANION GAP SERPL CALCULATED.3IONS-SCNC: 12 MMOL/L (ref 5–15)
APTT PPP: 28.7 SECONDS (ref 24–31)
ARTERIAL PATENCY WRIST A: POSITIVE
AST SERPL-CCNC: 47 U/L (ref 1–32)
ATMOSPHERIC PRESS: ABNORMAL MM[HG]
BACTERIA UR QL AUTO: ABNORMAL /HPF
BARBITURATES UR QL SCN: NEGATIVE
BASE EXCESS BLDA CALC-SCNC: -5.3 MMOL/L (ref 0–3)
BASOPHILS # BLD AUTO: 0.1 10*3/MM3 (ref 0–0.2)
BASOPHILS NFR BLD AUTO: 1.2 % (ref 0–1.5)
BDY SITE: ABNORMAL
BENZODIAZ UR QL SCN: NEGATIVE
BILIRUB SERPL-MCNC: 0.4 MG/DL (ref 0–1.2)
BILIRUB UR QL STRIP: NEGATIVE
BUN SERPL-MCNC: 54 MG/DL (ref 8–23)
BUN/CREAT SERPL: 31 (ref 7–25)
CALCIUM SPEC-SCNC: 8.6 MG/DL (ref 8.6–10.5)
CANNABINOIDS SERPL QL: NEGATIVE
CHLORIDE SERPL-SCNC: 104 MMOL/L (ref 98–107)
CLARITY UR: CLEAR
CO2 BLDA-SCNC: 21.7 MMOL/L (ref 22–29)
CO2 SERPL-SCNC: 22 MMOL/L (ref 22–29)
COCAINE UR QL: NEGATIVE
COLOR UR: YELLOW
CREAT SERPL-MCNC: 1.74 MG/DL (ref 0.57–1)
DEPRECATED RDW RBC AUTO: 64.3 FL (ref 37–54)
EGFRCR SERPLBLD CKD-EPI 2021: 28.5 ML/MIN/1.73
EOSINOPHIL # BLD AUTO: 0.2 10*3/MM3 (ref 0–0.4)
EOSINOPHIL NFR BLD AUTO: 5.3 % (ref 0.3–6.2)
ERYTHROCYTE [DISTWIDTH] IN BLOOD BY AUTOMATED COUNT: 17.4 % (ref 12.3–15.4)
ETHANOL UR QL: 0.23 %
FLUAV SUBTYP SPEC NAA+PROBE: NOT DETECTED
FLUBV RNA ISLT QL NAA+PROBE: NOT DETECTED
GLOBULIN UR ELPH-MCNC: 3.6 GM/DL
GLUCOSE SERPL-MCNC: 171 MG/DL (ref 65–99)
GLUCOSE UR STRIP-MCNC: NEGATIVE MG/DL
HCO3 BLDA-SCNC: 20.4 MMOL/L (ref 21–28)
HCT VFR BLD AUTO: 31.6 % (ref 34–46.6)
HEMODILUTION: NO
HGB BLD-MCNC: 10.3 G/DL (ref 12–15.9)
HGB UR QL STRIP.AUTO: NEGATIVE
HYALINE CASTS UR QL AUTO: ABNORMAL /LPF
INR PPP: 1.11 (ref 0.93–1.1)
KETONES UR QL STRIP: ABNORMAL
LEUKOCYTE ESTERASE UR QL STRIP.AUTO: NEGATIVE
LYMPHOCYTES # BLD AUTO: 1.6 10*3/MM3 (ref 0.7–3.1)
LYMPHOCYTES NFR BLD AUTO: 35.7 % (ref 19.6–45.3)
MAGNESIUM SERPL-MCNC: 2.8 MG/DL (ref 1.6–2.4)
MCH RBC QN AUTO: 32.4 PG (ref 26.6–33)
MCHC RBC AUTO-ENTMCNC: 32.5 G/DL (ref 31.5–35.7)
MCV RBC AUTO: 99.5 FL (ref 79–97)
METHADONE UR QL SCN: NEGATIVE
MODALITY: ABNORMAL
MONOCYTES # BLD AUTO: 0.3 10*3/MM3 (ref 0.1–0.9)
MONOCYTES NFR BLD AUTO: 7.3 % (ref 5–12)
NEUTROPHILS NFR BLD AUTO: 2.2 10*3/MM3 (ref 1.7–7)
NEUTROPHILS NFR BLD AUTO: 50.5 % (ref 42.7–76)
NITRITE UR QL STRIP: POSITIVE
NRBC BLD AUTO-RTO: 0.1 /100 WBC (ref 0–0.2)
NT-PROBNP SERPL-MCNC: 365.6 PG/ML (ref 0–1800)
OPIATES UR QL: NEGATIVE
OXYCODONE UR QL SCN: NEGATIVE
PCO2 BLDA: 40.1 MM HG (ref 35–48)
PH BLDA: 7.32 PH UNITS (ref 7.35–7.45)
PH UR STRIP.AUTO: <=5 [PH] (ref 5–8)
PLATELET # BLD AUTO: 222 10*3/MM3 (ref 140–450)
PMV BLD AUTO: 7.1 FL (ref 6–12)
PO2 BLDA: 76 MM HG (ref 83–108)
POTASSIUM SERPL-SCNC: 4.6 MMOL/L (ref 3.5–5.2)
PROT SERPL-MCNC: 7.5 G/DL (ref 6–8.5)
PROT UR QL STRIP: NEGATIVE
PROTHROMBIN TIME: 11.4 SECONDS (ref 9.6–11.7)
RBC # BLD AUTO: 3.17 10*6/MM3 (ref 3.77–5.28)
RBC # UR STRIP: ABNORMAL /HPF
REF LAB TEST METHOD: ABNORMAL
SAO2 % BLDCOA: 93.8 % (ref 94–98)
SARS-COV-2 RNA PNL SPEC NAA+PROBE: NOT DETECTED
SODIUM SERPL-SCNC: 138 MMOL/L (ref 136–145)
SP GR UR STRIP: 1.01 (ref 1–1.03)
SQUAMOUS #/AREA URNS HPF: ABNORMAL /HPF
TROPONIN T SERPL-MCNC: 0.03 NG/ML (ref 0–0.03)
TSH SERPL DL<=0.05 MIU/L-ACNC: 3.15 UIU/ML (ref 0.27–4.2)
UROBILINOGEN UR QL STRIP: ABNORMAL
WBC # UR STRIP: ABNORMAL /HPF
WBC NRBC COR # BLD: 4.4 10*3/MM3 (ref 3.4–10.8)

## 2023-01-29 PROCEDURE — 80053 COMPREHEN METABOLIC PANEL: CPT | Performed by: NURSE PRACTITIONER

## 2023-01-29 PROCEDURE — 96365 THER/PROPH/DIAG IV INF INIT: CPT

## 2023-01-29 PROCEDURE — 99285 EMERGENCY DEPT VISIT HI MDM: CPT

## 2023-01-29 PROCEDURE — 80307 DRUG TEST PRSMV CHEM ANLYZR: CPT | Performed by: NURSE PRACTITIONER

## 2023-01-29 PROCEDURE — 25010000002 CEFTRIAXONE PER 250 MG: Performed by: NURSE PRACTITIONER

## 2023-01-29 PROCEDURE — 85610 PROTHROMBIN TIME: CPT | Performed by: NURSE PRACTITIONER

## 2023-01-29 PROCEDURE — P9612 CATHETERIZE FOR URINE SPEC: HCPCS

## 2023-01-29 PROCEDURE — 36600 WITHDRAWAL OF ARTERIAL BLOOD: CPT

## 2023-01-29 PROCEDURE — 85730 THROMBOPLASTIN TIME PARTIAL: CPT | Performed by: NURSE PRACTITIONER

## 2023-01-29 PROCEDURE — 87502 INFLUENZA DNA AMP PROBE: CPT | Performed by: NURSE PRACTITIONER

## 2023-01-29 PROCEDURE — 93005 ELECTROCARDIOGRAM TRACING: CPT | Performed by: NURSE PRACTITIONER

## 2023-01-29 PROCEDURE — C9803 HOPD COVID-19 SPEC COLLECT: HCPCS

## 2023-01-29 PROCEDURE — 83735 ASSAY OF MAGNESIUM: CPT | Performed by: NURSE PRACTITIONER

## 2023-01-29 PROCEDURE — G0378 HOSPITAL OBSERVATION PER HR: HCPCS

## 2023-01-29 PROCEDURE — 83880 ASSAY OF NATRIURETIC PEPTIDE: CPT | Performed by: NURSE PRACTITIONER

## 2023-01-29 PROCEDURE — 85025 COMPLETE CBC W/AUTO DIFF WBC: CPT | Performed by: NURSE PRACTITIONER

## 2023-01-29 PROCEDURE — 72125 CT NECK SPINE W/O DYE: CPT

## 2023-01-29 PROCEDURE — 87635 SARS-COV-2 COVID-19 AMP PRB: CPT | Performed by: NURSE PRACTITIONER

## 2023-01-29 PROCEDURE — 82077 ASSAY SPEC XCP UR&BREATH IA: CPT | Performed by: NURSE PRACTITIONER

## 2023-01-29 PROCEDURE — 70450 CT HEAD/BRAIN W/O DYE: CPT

## 2023-01-29 PROCEDURE — 71045 X-RAY EXAM CHEST 1 VIEW: CPT

## 2023-01-29 PROCEDURE — 82803 BLOOD GASES ANY COMBINATION: CPT

## 2023-01-29 PROCEDURE — 84443 ASSAY THYROID STIM HORMONE: CPT | Performed by: NURSE PRACTITIONER

## 2023-01-29 PROCEDURE — 84484 ASSAY OF TROPONIN QUANT: CPT | Performed by: NURSE PRACTITIONER

## 2023-01-29 PROCEDURE — 82140 ASSAY OF AMMONIA: CPT | Performed by: NURSE PRACTITIONER

## 2023-01-29 PROCEDURE — 81001 URINALYSIS AUTO W/SCOPE: CPT | Performed by: NURSE PRACTITIONER

## 2023-01-29 RX ORDER — ACETAMINOPHEN 325 MG/1
650 TABLET ORAL EVERY 4 HOURS PRN
Status: DISCONTINUED | OUTPATIENT
Start: 2023-01-29 | End: 2023-01-30 | Stop reason: HOSPADM

## 2023-01-29 RX ORDER — SODIUM CHLORIDE 0.9 % (FLUSH) 0.9 %
10 SYRINGE (ML) INJECTION EVERY 12 HOURS SCHEDULED
Status: DISCONTINUED | OUTPATIENT
Start: 2023-01-29 | End: 2023-01-30 | Stop reason: HOSPADM

## 2023-01-29 RX ORDER — ACETAMINOPHEN 650 MG/1
650 SUPPOSITORY RECTAL EVERY 4 HOURS PRN
Status: DISCONTINUED | OUTPATIENT
Start: 2023-01-29 | End: 2023-01-30 | Stop reason: HOSPADM

## 2023-01-29 RX ORDER — SODIUM CHLORIDE 0.9 % (FLUSH) 0.9 %
10 SYRINGE (ML) INJECTION AS NEEDED
Status: DISCONTINUED | OUTPATIENT
Start: 2023-01-29 | End: 2023-01-30 | Stop reason: HOSPADM

## 2023-01-29 RX ORDER — CHOLECALCIFEROL (VITAMIN D3) 125 MCG
5 CAPSULE ORAL NIGHTLY PRN
Status: DISCONTINUED | OUTPATIENT
Start: 2023-01-29 | End: 2023-01-30 | Stop reason: HOSPADM

## 2023-01-29 RX ORDER — ACETAMINOPHEN 160 MG/5ML
650 SOLUTION ORAL EVERY 4 HOURS PRN
Status: DISCONTINUED | OUTPATIENT
Start: 2023-01-29 | End: 2023-01-30 | Stop reason: HOSPADM

## 2023-01-29 RX ORDER — SODIUM CHLORIDE 9 MG/ML
100 INJECTION, SOLUTION INTRAVENOUS CONTINUOUS
Status: DISCONTINUED | OUTPATIENT
Start: 2023-01-29 | End: 2023-01-30

## 2023-01-29 RX ORDER — ONDANSETRON 2 MG/ML
4 INJECTION INTRAMUSCULAR; INTRAVENOUS EVERY 6 HOURS PRN
Status: DISCONTINUED | OUTPATIENT
Start: 2023-01-29 | End: 2023-01-30 | Stop reason: HOSPADM

## 2023-01-29 RX ORDER — SODIUM CHLORIDE 9 MG/ML
40 INJECTION, SOLUTION INTRAVENOUS AS NEEDED
Status: DISCONTINUED | OUTPATIENT
Start: 2023-01-29 | End: 2023-01-30 | Stop reason: HOSPADM

## 2023-01-29 RX ORDER — ONDANSETRON 4 MG/1
4 TABLET, FILM COATED ORAL EVERY 6 HOURS PRN
Status: DISCONTINUED | OUTPATIENT
Start: 2023-01-29 | End: 2023-01-30 | Stop reason: HOSPADM

## 2023-01-29 RX ADMIN — CEFTRIAXONE 1 G: 1 INJECTION, POWDER, FOR SOLUTION INTRAMUSCULAR; INTRAVENOUS at 21:22

## 2023-01-29 RX ADMIN — SODIUM CHLORIDE 500 ML: 9 INJECTION, SOLUTION INTRAVENOUS at 21:22

## 2023-01-30 ENCOUNTER — READMISSION MANAGEMENT (OUTPATIENT)
Dept: CALL CENTER | Facility: HOSPITAL | Age: 88
End: 2023-01-30
Payer: MEDICARE

## 2023-01-30 VITALS
HEART RATE: 92 BPM | SYSTOLIC BLOOD PRESSURE: 168 MMHG | TEMPERATURE: 97.8 F | WEIGHT: 234.13 LBS | BODY MASS INDEX: 39.97 KG/M2 | DIASTOLIC BLOOD PRESSURE: 73 MMHG | OXYGEN SATURATION: 97 % | HEIGHT: 64 IN | RESPIRATION RATE: 13 BRPM

## 2023-01-30 LAB
ANION GAP SERPL CALCULATED.3IONS-SCNC: 12 MMOL/L (ref 5–15)
BASOPHILS # BLD AUTO: 0.1 10*3/MM3 (ref 0–0.2)
BASOPHILS NFR BLD AUTO: 1.4 % (ref 0–1.5)
BUN SERPL-MCNC: 45 MG/DL (ref 8–23)
BUN/CREAT SERPL: 35.7 (ref 7–25)
CALCIUM SPEC-SCNC: 7.7 MG/DL (ref 8.6–10.5)
CHLORIDE SERPL-SCNC: 108 MMOL/L (ref 98–107)
CO2 SERPL-SCNC: 20 MMOL/L (ref 22–29)
CREAT SERPL-MCNC: 1.26 MG/DL (ref 0.57–1)
D-LACTATE SERPL-SCNC: 3.4 MMOL/L (ref 0.5–2)
D-LACTATE SERPL-SCNC: 3.8 MMOL/L (ref 0.5–2)
DEPRECATED RDW RBC AUTO: 60.8 FL (ref 37–54)
EGFRCR SERPLBLD CKD-EPI 2021: 41.9 ML/MIN/1.73
EOSINOPHIL # BLD AUTO: 0.2 10*3/MM3 (ref 0–0.4)
EOSINOPHIL NFR BLD AUTO: 4.8 % (ref 0.3–6.2)
ERYTHROCYTE [DISTWIDTH] IN BLOOD BY AUTOMATED COUNT: 17.3 % (ref 12.3–15.4)
GLUCOSE SERPL-MCNC: 155 MG/DL (ref 65–99)
HCT VFR BLD AUTO: 30.4 % (ref 34–46.6)
HGB BLD-MCNC: 9.4 G/DL (ref 12–15.9)
LYMPHOCYTES # BLD AUTO: 1 10*3/MM3 (ref 0.7–3.1)
LYMPHOCYTES NFR BLD AUTO: 28.8 % (ref 19.6–45.3)
MCH RBC QN AUTO: 31.2 PG (ref 26.6–33)
MCHC RBC AUTO-ENTMCNC: 31 G/DL (ref 31.5–35.7)
MCV RBC AUTO: 101 FL (ref 79–97)
MONOCYTES # BLD AUTO: 0.2 10*3/MM3 (ref 0.1–0.9)
MONOCYTES NFR BLD AUTO: 6.7 % (ref 5–12)
NEUTROPHILS NFR BLD AUTO: 2.1 10*3/MM3 (ref 1.7–7)
NEUTROPHILS NFR BLD AUTO: 58.3 % (ref 42.7–76)
NRBC BLD AUTO-RTO: 0 /100 WBC (ref 0–0.2)
PLATELET # BLD AUTO: 211 10*3/MM3 (ref 140–450)
PMV BLD AUTO: 7 FL (ref 6–12)
POTASSIUM SERPL-SCNC: 4.7 MMOL/L (ref 3.5–5.2)
PROCALCITONIN SERPL-MCNC: 0.08 NG/ML (ref 0–0.25)
RBC # BLD AUTO: 3.01 10*6/MM3 (ref 3.77–5.28)
SODIUM SERPL-SCNC: 140 MMOL/L (ref 136–145)
TROPONIN T SERPL-MCNC: 0.03 NG/ML (ref 0–0.03)
TROPONIN T SERPL-MCNC: 0.03 NG/ML (ref 0–0.03)
WBC NRBC COR # BLD: 3.6 10*3/MM3 (ref 3.4–10.8)

## 2023-01-30 PROCEDURE — 85025 COMPLETE CBC W/AUTO DIFF WBC: CPT

## 2023-01-30 PROCEDURE — 87040 BLOOD CULTURE FOR BACTERIA: CPT

## 2023-01-30 PROCEDURE — 80048 BASIC METABOLIC PNL TOTAL CA: CPT

## 2023-01-30 PROCEDURE — 83605 ASSAY OF LACTIC ACID: CPT

## 2023-01-30 PROCEDURE — 96361 HYDRATE IV INFUSION ADD-ON: CPT

## 2023-01-30 PROCEDURE — 36415 COLL VENOUS BLD VENIPUNCTURE: CPT

## 2023-01-30 PROCEDURE — G0378 HOSPITAL OBSERVATION PER HR: HCPCS

## 2023-01-30 PROCEDURE — 84145 PROCALCITONIN (PCT): CPT | Performed by: INTERNAL MEDICINE

## 2023-01-30 PROCEDURE — 97161 PT EVAL LOW COMPLEX 20 MIN: CPT

## 2023-01-30 PROCEDURE — 84484 ASSAY OF TROPONIN QUANT: CPT

## 2023-01-30 RX ORDER — LORAZEPAM 1 MG/1
1 TABLET ORAL
Status: DISCONTINUED | OUTPATIENT
Start: 2023-01-30 | End: 2023-01-30 | Stop reason: HOSPADM

## 2023-01-30 RX ORDER — CEFDINIR 300 MG/1
300 CAPSULE ORAL 2 TIMES DAILY
Qty: 14 CAPSULE | Refills: 0 | Status: SHIPPED | OUTPATIENT
Start: 2023-01-30 | End: 2023-02-15

## 2023-01-30 RX ORDER — LORAZEPAM 2 MG/ML
1 INJECTION INTRAMUSCULAR
Status: DISCONTINUED | OUTPATIENT
Start: 2023-01-30 | End: 2023-01-30 | Stop reason: HOSPADM

## 2023-01-30 RX ORDER — LANOLIN ALCOHOL/MO/W.PET/CERES
1000 CREAM (GRAM) TOPICAL DAILY
Status: DISCONTINUED | OUTPATIENT
Start: 2023-01-30 | End: 2023-01-30 | Stop reason: HOSPADM

## 2023-01-30 RX ORDER — PANTOPRAZOLE SODIUM 40 MG/1
40 TABLET, DELAYED RELEASE ORAL DAILY
Status: DISCONTINUED | OUTPATIENT
Start: 2023-01-30 | End: 2023-01-30 | Stop reason: HOSPADM

## 2023-01-30 RX ORDER — AMLODIPINE BESYLATE 5 MG/1
5 TABLET ORAL DAILY
Status: DISCONTINUED | OUTPATIENT
Start: 2023-01-30 | End: 2023-01-30 | Stop reason: HOSPADM

## 2023-01-30 RX ORDER — LORAZEPAM 0.5 MG/1
0.5 TABLET ORAL
Status: DISCONTINUED | OUTPATIENT
Start: 2023-01-30 | End: 2023-01-30 | Stop reason: HOSPADM

## 2023-01-30 RX ORDER — TOPIRAMATE 25 MG/1
12.5 TABLET ORAL 2 TIMES DAILY
Status: DISCONTINUED | OUTPATIENT
Start: 2023-01-30 | End: 2023-01-30 | Stop reason: HOSPADM

## 2023-01-30 RX ORDER — FUROSEMIDE 40 MG/1
80 TABLET ORAL DAILY
Status: DISCONTINUED | OUTPATIENT
Start: 2023-01-30 | End: 2023-01-30 | Stop reason: HOSPADM

## 2023-01-30 RX ORDER — LEVETIRACETAM 500 MG/1
500 TABLET ORAL DAILY
Status: DISCONTINUED | OUTPATIENT
Start: 2023-01-30 | End: 2023-01-30 | Stop reason: HOSPADM

## 2023-01-30 RX ORDER — FERROUS SULFATE TAB EC 324 MG (65 MG FE EQUIVALENT) 324 (65 FE) MG
324 TABLET DELAYED RESPONSE ORAL
Status: DISCONTINUED | OUTPATIENT
Start: 2023-01-31 | End: 2023-01-30 | Stop reason: HOSPADM

## 2023-01-30 RX ORDER — ATORVASTATIN CALCIUM 10 MG/1
10 TABLET, FILM COATED ORAL NIGHTLY
Status: DISCONTINUED | OUTPATIENT
Start: 2023-01-30 | End: 2023-01-30 | Stop reason: HOSPADM

## 2023-01-30 RX ORDER — CLINDAMYCIN HYDROCHLORIDE 300 MG/1
300 CAPSULE ORAL EVERY 8 HOURS SCHEDULED
Status: DISCONTINUED | OUTPATIENT
Start: 2023-01-30 | End: 2023-01-30

## 2023-01-30 RX ORDER — GABAPENTIN 300 MG/1
300 CAPSULE ORAL 2 TIMES DAILY
Status: DISCONTINUED | OUTPATIENT
Start: 2023-01-30 | End: 2023-01-30 | Stop reason: HOSPADM

## 2023-01-30 RX ORDER — LORAZEPAM 2 MG/ML
0.5 INJECTION INTRAMUSCULAR
Status: DISCONTINUED | OUTPATIENT
Start: 2023-01-30 | End: 2023-01-30 | Stop reason: HOSPADM

## 2023-01-30 RX ORDER — FOLIC ACID 1 MG/1
1 TABLET ORAL DAILY
Status: DISCONTINUED | OUTPATIENT
Start: 2023-01-30 | End: 2023-01-30 | Stop reason: HOSPADM

## 2023-01-30 RX ADMIN — Medication 10 ML: at 00:11

## 2023-01-30 RX ADMIN — Medication 10 ML: at 08:42

## 2023-01-30 RX ADMIN — CYANOCOBALAMIN TAB 1000 MCG 1000 MCG: 1000 TAB at 14:26

## 2023-01-30 RX ADMIN — SODIUM CHLORIDE 100 ML/HR: 9 INJECTION, SOLUTION INTRAVENOUS at 00:11

## 2023-01-30 RX ADMIN — FUROSEMIDE 80 MG: 40 TABLET ORAL at 14:26

## 2023-01-30 RX ADMIN — FOLIC ACID 1 MG: 1 TABLET ORAL at 14:26

## 2023-01-30 RX ADMIN — AMLODIPINE BESYLATE 5 MG: 5 TABLET ORAL at 14:26

## 2023-01-30 RX ADMIN — SODIUM CHLORIDE 100 ML/HR: 9 INJECTION, SOLUTION INTRAVENOUS at 08:42

## 2023-01-30 RX ADMIN — PANTOPRAZOLE SODIUM 40 MG: 40 TABLET, DELAYED RELEASE ORAL at 14:26

## 2023-01-30 NOTE — OUTREACH NOTE
Prep Survey    Flowsheet Row Responses   [facility] facility patient discharged from? [facility]   Is LACE score < 7 ? No   Eligibility [facility]   Hospital [facility]   Date of Admission 01/29/23   Date of Discharge 01/30/23   Discharge Disposition Home-Health Care Svc   Discharge diagnosis alcoholic intoxication with delirium, fall, Acute UTI   Does the patient have one of the following disease processes/diagnoses(primary or secondary)? Other   Does the patient have Home health ordered? Yes   What is the Home health agency?  [facility]   Is there a DME ordered? No   Prep survey completed? Yes          SARAH VIRK - Registered Nurse

## 2023-01-31 ENCOUNTER — HOME CARE VISIT (OUTPATIENT)
Dept: HOME HEALTH SERVICES | Facility: HOME HEALTHCARE | Age: 88
End: 2023-01-31
Payer: MEDICARE

## 2023-01-31 ENCOUNTER — TRANSITIONAL CARE MANAGEMENT TELEPHONE ENCOUNTER (OUTPATIENT)
Dept: CALL CENTER | Facility: HOSPITAL | Age: 88
End: 2023-01-31
Payer: MEDICARE

## 2023-01-31 NOTE — OUTREACH NOTE
Call Center TCM Note    Flowsheet Row Responses   [facility] patient discharged from? [facility]   Does the patient have one of the following disease processes/diagnoses(primary or secondary)? Other   TCM attempt successful? Yes   Call start time 1030   Call end time 1034   Person spoke with today (if not patient) and relationship patient   Meds reviewed with patient/caregiver? Yes   Does the patient have all medications ordered at discharge? Yes   Is the patient taking all medications as directed (includes completed medication regime)? Yes   Comments Pateint reports she can handle scheduling her own appts.    Does the patient have an appointment with their PCP within 7 days of discharge? No   Nursing Interventions Patient declined scheduling/rescheduling appointment at this time   What is the Home health agency?  [facility]   Has home health visited the patient within 72 hours of discharge? Yes   Has all DME been delivered? No   Psychosocial issues? No   Did the patient receive a copy of their discharge instructions? Yes   Nursing interventions Reviewed instructions with patient   What is the patient's perception of their health status since discharge? Improving   Is the patient/caregiver able to teach back signs and symptoms related to disease process for when to call PCP? Yes   Is the patient/caregiver able to teach back signs and symptoms related to disease process for when to call 911? Yes   Is the patient/caregiver able to teach back the hierarchy of who to call/visit for symptoms/problems? PCP, Specialist, Home health nurse, Urgent Care, ED, 911 Yes   If the patient is a current smoker, are they able to teach back resources for cessation? Not a smoker   TCM call completed? Yes   Call end time 1034   Would this patient benefit from a Referral to Amb Social Work? No   Is the patient interested in additional calls from an ambulatory ?  NOTE:  applies to high risk patients requiring additional follow-up. No           Julian Bertrand RN    1/31/2023, 10:34 EST

## 2023-02-01 ENCOUNTER — HOME CARE VISIT (OUTPATIENT)
Dept: HOME HEALTH SERVICES | Facility: HOME HEALTHCARE | Age: 88
End: 2023-02-01
Payer: MEDICARE

## 2023-02-01 LAB — QT INTERVAL: 463 MS

## 2023-02-01 PROCEDURE — G0299 HHS/HOSPICE OF RN EA 15 MIN: HCPCS

## 2023-02-01 NOTE — HOME HEALTH
"Routine Visit Note:  Pt. went to ER on 1/29/23 due to fall at home, SN asked what happened and pt. stated \" I just got woozy and fell\" Pt. states she is feeling much better and gave SN very short answers when doing assessment. Pt. was only observation status.  Pt. states no changes in medications were done.  Pt. states she did not know what her Blood sugar was at the time of ER visit, and has yet to check today.     Skill/education provided: CP assess, pain assess, falls assess, follow up ER    Patient/caregiver response: pt. was very short on answers and did not want to answer regarding ER visit    Plan for next visit: CP assess, falls assess, pain assess, start educating on discharge soon, BLE edema assess    Other pertinent info: n/a"

## 2023-02-02 NOTE — DISCHARGE SUMMARY
[facility] Medicine Services  DISCHARGE SUMMARY    Patient Name: Aracelis Vargas  : 1935  MRN: [id number]    Date of Admission: 2023  Discharge Diagnosis: Alcohol intoxication with delirium  Date of Discharge: 2023  Primary Care Physician: Gabriel Goss MD    Presenting Problem:   Alcoholic intoxication with complication (HCC) [F10.929]  Urinary tract infection with hematuria, site unspecified [N39.0, R31.9]  Altered mental status, unspecified altered mental status type [R41.82]  Alcohol intoxication with delirium (HCC) [F10.921]    Active and Resolved Hospital Problems:  Active Hospital Problems    Diagnosis POA   • **Alcohol intoxication with delirium (HCC) [F10.921] Yes   • Elevated troponin [R77.8] Unknown   • ETOH abuse [F10.10] Yes   • Seizure disorder (HCC) [G40.909] Yes   • Acute UTI [N39.0] Yes   • Mixed hyperlipidemia [E78.2] Yes   • Primary hypertension [I10] Yes   • Hypertension associated with stage 3 chronic kidney disease due to type 2 diabetes mellitus (HCC) [E11.22, I12.9, N18.30] Yes   • Fall [W19.XXXA] Yes      Resolved Hospital Problems   No resolved problems to display.     Alcohol intoxication  Fall-likely related to alcohol intoxication  -Blood alcohol 0.233 % in ED  -CT of the head and cervical spine negative  -Monitor for alcohol withdrawal  -Fall precautions  -Neuro checks  -We will observe overnight, likely okay to discharge when her mental status returns to baseline  -Mental status improving patient alert and oriented to person and place     Acute UTI  -UA reviewed  -Most recent culture grew E. coli  -Rocephin given in the ED-Continue Rocephin > home with cefdinir      Elevated Troponin  -Troponin 0.033, trend  -EKG reviewed, V paced rhythm, HR 81.  No acute changes noted  -No complaints of chest pain, shortness of breath, palpitations, diaphoresis     Diabetes Mellitus Type 2 with neuropathy  -Accu-Cheks AC at bedtime  -SSI  ordered  -Continue home prandial insulin  -Continue home gabapentin  -HbA1c 5.7 (01/10/23)     Seizure disorder-Chronic, stable  -Continue home Keppra, Topamax     GERD  -Continue home PPI     Hyperlipidemia-Stable, chronic  -Continue home atorvastatin     Essential Hypertension-Controlled  -Monitor BP  -Continue home Lasix, Norvasc       Hospital Course     Hospital Course:  Aracelis Vargas is a 87 y.o. female with past medical history of diabetes, coronary artery disease, pacemaker placement, atrial fibrillation, COPD, osteoarthritis, hypertension, chronic kidney disease stage III, and chronic congestive heart heart failure who was brought to [facility] on 1/29/2023 after being found by her  after a fall.  He also reports that she is confused.  Due to the patient's confusion, HPI was taken from the chart.  On exam, patient was alert to person only.  She denies any complaints.     In the ED,  Troponin is 0.033, Creatinine 1.74 (baseline 1.51), ethanol 0.233%, ammonia 35, WBC 4.4.  UA showed positive nitrites, 1+ bacteria.  CT of the head and cervical spine are negative.  She is afebrile, all vital signs are stable.  Hospitalist was consulted for further care and management.     On chart review, the patient has multiple previous admissions to [facility] for confusion and falls while intoxicated.     01/30/23: Patient seen and evaluated in bed with MD. She has no complaints and appears to be in no acute distress.  Patient is now alert and oriented, requesting to go home.   Condition on discharge stable    DISCHARGE Follow Up Recommendations for labs and diagnostics: Follow-up PCP in 1 week    Reasons For Change In Medications and Indications for New Medications:   START taking:   amLODIPine (NORVASC)    cefdinir (OMNICEF)    gabapentin (NEURONTIN)    levETIRAcetam (KEPPRA)   CHANGE how you take:   furosemide (LASIX)   STOP taking:   clindamycin 150 MG capsule (CLEOCIN)       Day of Discharge      Vital Signs:     Vitals:    01/29/23 2200 01/29/23 2346 01/30/23 0356 01/30/23 0843   BP: 108/47 125/52 149/71 168/73   BP Location:  Left arm Left arm Right arm   Patient Position:  Lying Lying Lying   Pulse: 80 87 91 92   Resp:  16 13 13   Temp:  98 °F (36.7 °C) 97.8 °F (36.6 °C)    TempSrc:  Oral Oral    SpO2: 93% 94% 98% 97%   Weight:  106 kg (234 lb 2.1 oz)     Height:         Physical Exam:  Vitals and nursing note reviewed.   Constitutional:       Appearance: Normal appearance. She is obese.   HENT:      Head: Normocephalic and atraumatic.      Nose: Nose normal.      Mouth/Throat:      Mouth: Mucous membranes are moist.   Eyes:      Extraocular Movements: Extraocular movements intact.      Pupils: Pupils are equal, round, and reactive to light.   Cardiovascular:      Rate and Rhythm: Normal rate and regular rhythm.      Pulses: Normal pulses.      Heart sounds: Normal heart sounds.   Pulmonary:      Effort: Pulmonary effort is normal.      Breath sounds: Normal breath sounds.   Abdominal:      General: Bowel sounds are normal.      Palpations: Abdomen is soft.   Musculoskeletal:         General: Normal range of motion.      Cervical back: Normal range of motion.   Skin:     General: Skin is warm and dry.   Neurological:      General: No focal deficit present.      Mental Status: She is alert and easily aroused. She is oriented.      Comments: Alert to person and place  Psychiatric:         Mood and Affect: Mood normal. Affect is flat.         Speech: Speech normal.         Behavior: Behavior normal. Behavior is cooperative.     Pertinent  and/or Most Recent Results     LAB RESULTS:      Lab 01/30/23  1312 01/30/23  0954 01/30/23  0741 01/29/23 2007   WBC  --   --  3.60 4.40   HEMOGLOBIN  --   --  9.4* 10.3*   HEMATOCRIT  --   --  30.4* 31.6*   PLATELETS  --   --  211 222   NEUTROS ABS  --   --  2.10 2.20   LYMPHS ABS  --   --  1.00 1.60   MONOS ABS  --   --  0.20 0.30   EOS ABS  --   --  0.20 0.20   MCV   --   --  101.0* 99.5*   PROCALCITONIN  --  0.08  --   --    LACTATE 3.8* 3.4*  --   --    PROTIME  --   --   --  11.4   APTT  --   --   --  28.7         Lab 01/30/23  0741 01/29/23 2007   SODIUM 140 138   POTASSIUM 4.7 4.6   CHLORIDE 108* 104   CO2 20.0* 22.0   ANION GAP 12.0 12.0   BUN 45* 54*   CREATININE 1.26* 1.74*   EGFR 41.9* 28.5*   GLUCOSE 155* 171*   CALCIUM 7.7* 8.6   MAGNESIUM  --  2.8*   TSH  --  3.150         Lab 01/29/23 2007   TOTAL PROTEIN 7.5   ALBUMIN 3.9   GLOBULIN 3.6   ALT (SGPT) 31   AST (SGOT) 47*   BILIRUBIN 0.4   ALK PHOS 106         Lab 01/30/23  0954 01/30/23  0741 01/29/23 2007   PROBNP  --   --  365.6   TROPONIN T 0.025 0.027 0.033*   PROTIME  --   --  11.4   INR  --   --  1.11*                 Lab 01/29/23 2013   PH, ARTERIAL 7.316*   PCO2, ARTERIAL 40.1   PO2 ART 76.0*   O2 SATURATION ART 93.8*   HCO3 ART 20.4*   BASE EXCESS ART -5.3*     Brief Urine Lab Results  (Last result in the past 365 days)      Color   Clarity   Blood   Leuk Est   Nitrite   Protein   CREAT   Urine HCG        01/29/23 2007 Yellow   Clear   Negative   Negative   Positive   Negative               Microbiology Results (last 10 days)     Procedure Component Value - Date/Time    Blood Culture - Blood, Hand, Left [[id number]]  (Normal) Collected: 01/30/23 0955    Lab Status: Preliminary result Specimen: Blood from Hand, Left Updated: 02/01/23 1116     Blood Culture No growth at 2 days    Blood Culture - Blood, Arm, Right [[id number]]  (Normal) Collected: 01/30/23 0954    Lab Status: Preliminary result Specimen: Blood from Arm, Right Updated: 02/01/23 1116     Blood Culture No growth at 2 days    COVID-19,CEPHEID/SHAHLA,COR/[facility]/PAD/MARTHA/MAD IN-HOUSE(OR EMERGENT/ADD-ON),NP SWAB IN TRANSPORT MEDIA 3-4 HR TAT, RT-PCR - Swab, Nasopharynx [[id number]]  (Normal) Collected: 01/29/23 2007    Lab Status: Final result Specimen: Swab from Nasopharynx Updated: 01/29/23 2035     COVID19 Not Detected    Narrative:      Fact sheet for  providers: https://www.fda.gov/media/863665/download     Fact sheet for patients: https://www.fda.gov/media/136189/download  Fact sheet for providers: https://www.fda.gov/media/558752/download    Fact sheet for patients: https://www.fda.gov/media/925757/download    Test performed by PCR.    Influenza A & B, CHILO - Swab, Nasopharynx [[id number]]  (Normal) Collected: 01/29/23 2007    Lab Status: Final result Specimen: Swab from Nasopharynx Updated: 01/29/23 2147     Influenza A PCR Not Detected     Influenza B PCR Not Detected          CT Head Without Contrast    Result Date: 1/29/2023  Impression: CT head: 1. No acute intracranial abnormality. 2. Mild volume loss and chronic microvascular ischemic changes. CT cervical spine: 1. No acute fracture or traumatic subluxation. 2. Multilevel degenerative changes. Mild canal narrowing at C3-4. Electronically signed by:  Laureano Garnett M.D.  1/29/2023 7:23 PM Mountain Time    CT Cervical Spine Without Contrast    Result Date: 1/29/2023  Impression: CT head: 1. No acute intracranial abnormality. 2. Mild volume loss and chronic microvascular ischemic changes. CT cervical spine: 1. No acute fracture or traumatic subluxation. 2. Multilevel degenerative changes. Mild canal narrowing at C3-4. Electronically signed by:  Laureano Garnett M.D.  1/29/2023 7:23 PM Mountain Time    XR Chest 1 View    Result Date: 1/30/2023  Impression: No radiographic findings of acute cardiopulmonary abnormality.  Electronically Signed: Osvaldo Markham  1/30/2023 7:12 AM EST  Workstation ID: [id number]      Results for orders placed during the hospital encounter of 06/29/22    Duplex Venous Lower Extremity - Bilateral CAR    Interpretation Summary  · Normal bilateral lower extremity venous duplex scan.      Results for orders placed during the hospital encounter of 06/29/22    Duplex Venous Lower Extremity - Bilateral CAR    Interpretation Summary  · Normal bilateral lower extremity venous duplex  scan.      Results for orders placed during the hospital encounter of 02/25/22    Adult Transesophageal Echo (ALEYDA) W/ Cont if Necessary Per Protocol (Cardiology Department)    Interpretation Summary  Date of study  2/28/2022.    Indications  Recent TIA.    Procedure performed  Transesophageal echocardiogram and Doppler study.    Procedure  Anesthesia was provided by anesthesiologist with intravenous Diprivan.  ALEYDA probe could be passed without difficulty.  Patient tolerated the procedure well.  No complications were noted.    Results  Technically satisfactory study.  Mitral valve is structurally normal.  Mild mitral regurgitation is present.  Aortic valve is tricuspid.  Thickened with adequate opening motion.  Mild to moderate aortic regurgitation is present.  Tricuspid valve is normal.  Mild tricuspid regurgitation is present.  Calculated pulmonary artery pressure is 28 mmHg.  Mild biatrial enlargement is present.  Left atrial appendage is very small.  Left ventricle is normal in size and contractility with ejection fraction of 60%.  Atrial septum is intact.  No evidence for intracardiac thrombus or smoking effect is present.  No pericardial effusion is present.  Aorta is normal.    Impression  Mild mitral regurgitation and mild to moderate aortic regurgitation.  Mild tricuspid regurgitation  Calculated pulmonary artery pressure is 28 mmHg.  Mild biatrial enlargement.  Left atrial appendage is small.  Left ventricle is normal in size and contractility with ejection fraction of 60%.      Labs Pending at Discharge:  Pending Labs     Order Current Status    Blood Culture - Blood, Arm, Right Preliminary result    Blood Culture - Blood, Hand, Left Preliminary result          Procedures Performed           Consults:   Consults     No orders found from 12/31/2022 to 1/30/2023.            Discharge Details        Discharge Medications      New Medications      Instructions Start Date   amLODIPine 5 MG tablet  Commonly  known as: NORVASC   1 tablet, Oral, Every Morning      cefdinir 300 MG capsule  Commonly known as: OMNICEF   300 mg, Oral, 2 Times Daily      gabapentin 300 MG capsule  Commonly known as: NEURONTIN   1 capsule, Oral, 2 Times Daily      levETIRAcetam 500 MG tablet  Commonly known as: KEPPRA   500 mg, Oral, Daily         Changes to Medications      Instructions Start Date   furosemide 20 MG tablet  Commonly known as: LASIX  What changed: how much to take   80 mg, Oral, Daily         Continue These Medications      Instructions Start Date   atorvastatin 10 MG tablet  Commonly known as: LIPITOR   10 mg, Oral, Daily      ferrous sulfate 325 (65 FE) MG tablet   1 tablet, Oral, Daily With Breakfast      folic acid 1 MG tablet  Commonly known as: FOLVITE   1 mg, Oral, Daily      NovoLIN 70/30 (70-30) 100 UNIT/ML injection  Generic drug: insulin NPH-insulin regular   INJECT 30 UNITS UNDER THE SKIN INTO THE APPROPRIATE AREA TWO TIMES A DAY WITH A MEAL      omeprazole 40 MG capsule  Commonly known as: priLOSEC   40 mg, Oral, Every Morning Before Breakfast, Take on empty stomach!      topiramate 25 MG tablet  Commonly known as: TOPAMAX   12.5 mg, Oral, 2 Times Daily      vitamin B-12 1000 MCG tablet  Commonly known as: CYANOCOBALAMIN   1,000 mcg, Oral, Daily      ZINC OXIDE (TOPICAL) EX   1 application, Topical, Daily         Stop These Medications    clindamycin 150 MG capsule  Commonly known as: CLEOCIN            Allergies   Allergen Reactions   • Latex, Natural Rubber Rash   • Adhesive Tape Rash          Discharge Disposition:   Home or Self Care    Diet:  Hospital:No active diet order        Discharge Activity:   Activity Instructions     Gradually Increase Activity Until at Pre-Hospitalization Level              CODE STATUS:  Code Status and Medical Interventions:   Ordered at: 01/29/23 2226     Code Status (Patient has no pulse and is not breathing):    CPR (Attempt to Resuscitate)     Medical Interventions (Patient has  pulse or is breathing):    Full Support         Future Appointments   Date Time Provider Department Center   2/3/2023  1:45 PM [facility]   2/7/2023 To Be Determined Swapna Chan RN  [facility]   2/14/2023 To Be Determined Swapna Chan RN [facility]   2/21/2023 To Be Determined Swapna Chan RN  [facility]   2/28/2023 To Be Determined Swapna Chan RN  [facility]   4/18/2023  2:45 PM Seipel, Joseph F, MD [facility]   4/24/2023  2:30 PM [facility] DEVICE CHECK [facility]   4/24/2023  3:10 PM Wang Plaza MD [facility]       Additional Instructions for the Follow-ups that You Need to Schedule     Discharge Follow-up with PCP   As directed       Currently Documented PCP:    Gabriel Goss MD    PCP Phone Number:    [phone]     Follow Up Details: 1 week               Time spent on Discharge including face to face service: 34minutes    This patient has been examined wearing appropriate Personal Protective Equipment and discussed with rn. 02/01/23      Signature: Electronically signed by Yoel Baker MD, 02/01/23, 7:42 PM EST.

## 2023-02-04 LAB
BACTERIA SPEC AEROBE CULT: NORMAL
BACTERIA SPEC AEROBE CULT: NORMAL

## 2023-02-07 ENCOUNTER — HOME CARE VISIT (OUTPATIENT)
Dept: HOME HEALTH SERVICES | Facility: HOME HEALTHCARE | Age: 88
End: 2023-02-07
Payer: MEDICARE

## 2023-02-07 NOTE — CASE COMMUNICATION
Patient request SN visit scheduled for today for both her and her spouse be moved to tomorrow. Patient states today is just not a good day for her. Please reattempt visit on 2/8/23 per patient request.

## 2023-02-08 ENCOUNTER — HOME CARE VISIT (OUTPATIENT)
Dept: HOME HEALTH SERVICES | Facility: HOME HEALTHCARE | Age: 88
End: 2023-02-08
Payer: MEDICARE

## 2023-02-08 VITALS
DIASTOLIC BLOOD PRESSURE: 60 MMHG | RESPIRATION RATE: 18 BRPM | OXYGEN SATURATION: 96 % | SYSTOLIC BLOOD PRESSURE: 128 MMHG | TEMPERATURE: 98.2 F | HEART RATE: 63 BPM

## 2023-02-08 PROCEDURE — G0299 HHS/HOSPICE OF RN EA 15 MIN: HCPCS

## 2023-02-08 NOTE — HOME HEALTH
"Routine Visit Note: Patient denies SOA, CP, but reports generalized weakness since most recent fall. pt denies changes in medications and no new bottles noted in the home, denies change in  appetite or activity. SN encouraged f/u with MD which pt reports she would like to schedule herself though SN offered to do so. no wounds noted and falls prevention strategies, diabetic/edema management reviewed. pt verbalized \"I know.\" refuses PT.      Skill/education provided: edema management (elevation and sodium restriction), diabetes, falls prevention (ambulation with assistance, nonslip footwear), nutrition, medications    Patient/caregiver response: verbalized \"I know\"    Plan for next visit: diabetes education, weakness f/u, skin breakdown prevention education, falls safety education, med review    Other pertinent info: f/u on whether pt made appt with MD"

## 2023-02-09 ENCOUNTER — READMISSION MANAGEMENT (OUTPATIENT)
Dept: CALL CENTER | Facility: HOSPITAL | Age: 88
End: 2023-02-09
Payer: MEDICARE

## 2023-02-09 NOTE — OUTREACH NOTE
Medical Week 2 Survey    Flowsheet Row Responses   [facility] patient discharged from? [facility]   Does the patient have one of the following disease processes/diagnoses(primary or secondary)? Other   Week 2 attempt successful? Yes   Call start time 1436   Discharge diagnosis alcoholic intoxication with delirium, fall, Acute UTI   Call end time 1438   Meds reviewed with patient/caregiver? Yes   Is the patient having any side effects they believe may be caused by any medication additions or changes? No   Is the patient taking all medications as directed (includes completed medication regime)? Yes   Does the patient have an appointment with their PCP within 7 days of discharge? Yes   Comments regarding PCP next week   Has the patient kept scheduled appointments due by today? N/A   What is the Home health agency?  [facility]   Has home health visited the patient within 72 hours of discharge? Yes   Psychosocial issues? No   What is the patient's perception of their health status since discharge? Improving   Is the patient/caregiver able to teach back the hierarchy of who to call/visit for symptoms/problems? PCP, Specialist, Home health nurse, Urgent Care, ED, 911 Yes   Week 2 Call Completed? Yes   Graduated Yes   Is the patient interested in additional calls from an ambulatory ?  NOTE:  applies to high risk patients requiring additional follow-up. No   Graduated/Revoked comments Pt reports feeling better each day, no questions/concerns at this time          Fatou VIRK - Registered Nurse

## 2023-02-14 ENCOUNTER — HOME CARE VISIT (OUTPATIENT)
Dept: HOME HEALTH SERVICES | Facility: HOME HEALTHCARE | Age: 88
End: 2023-02-14
Payer: MEDICARE

## 2023-02-15 ENCOUNTER — HOME CARE VISIT (OUTPATIENT)
Dept: HOME HEALTH SERVICES | Facility: HOME HEALTHCARE | Age: 88
End: 2023-02-15
Payer: MEDICARE

## 2023-02-15 VITALS
RESPIRATION RATE: 19 BRPM | OXYGEN SATURATION: 96 % | TEMPERATURE: 97 F | HEART RATE: 78 BPM | SYSTOLIC BLOOD PRESSURE: 126 MMHG | DIASTOLIC BLOOD PRESSURE: 66 MMHG

## 2023-02-15 PROCEDURE — G0299 HHS/HOSPICE OF RN EA 15 MIN: HCPCS

## 2023-02-15 NOTE — HOME HEALTH
Routine Visit Note: ANUEL SEEN 2/15/23 FOR ROUTINE SKILLED NURSE VISIT WITH NOTICE OF MEDICARE NON COVERAGE FORM SIGNATURE. PATIENT IS ALERT AND ORIENTED X4, CONTINENT OF BOWEL AND BLADDER WITH LAST BM 2/15/23, VS WNL, LUNGS DIMINISHED, DENIES FALLS, DENIES PAIN, DENIES MEDICATION CHANGES, BOTTLES CHECKED, SKIN IS CLEAR, AND FORM IS SIGNED.     Skill/education provided: CARDIOPULMONARY ASSESSMENT, ENDOCRINE ASSESSMENT, GASTROINTESTINAL ASSESSMENT, SKIN ASSESSMENT, SAFETY ASSESSMENT, PAIN ASSESSMENT, MEDICATION ASSESSMENT    Patient/caregiver response: PATIENT STATED UNDERSTANDING OF ALL TEACHING     Plan for next visit: CARDIOPULMONARY ASSESSMENT, GASTROINTESTINAL ASSESSMENT, SKIN ASSESSMENT, ENDOCRINE ASSESSMENT,  SAFETY ASSESSMENT, PAIN ASSESSMENT, MEDICATION ASSESSMENT    Other pertinent info: NA

## 2023-02-21 ENCOUNTER — HOME CARE VISIT (OUTPATIENT)
Dept: HOME HEALTH SERVICES | Facility: HOME HEALTHCARE | Age: 88
End: 2023-02-21
Payer: MEDICARE

## 2023-02-21 VITALS
DIASTOLIC BLOOD PRESSURE: 62 MMHG | SYSTOLIC BLOOD PRESSURE: 110 MMHG | HEART RATE: 77 BPM | RESPIRATION RATE: 19 BRPM | OXYGEN SATURATION: 98 % | TEMPERATURE: 98 F

## 2023-02-21 PROCEDURE — G0299 HHS/HOSPICE OF RN EA 15 MIN: HCPCS

## 2023-02-21 NOTE — HOME HEALTH
Routine Visit Note: SEAN SEEN 2/21/23 FOR ROUTINE SKILLED NURSE VISIT. PATIENT IS ALERT AND ORIENTED X4, CONTINENT OF BOWEL AND INCONTINENT OF BLADDER WITH LAST BM 2/20, VS WNL, LUNGS CLEAR TO AUSCULATION, SKIN IS CLEAR OF NEW AREAS, DENIES FALLS, DENIES PAIN, DENIES MEDICATION CHANGES, AND BOTTLES REVIEWED. PATIENT IS HAVING SIGNS AND SYMPTOMS OF UTI. PATIENT IS SITTING IN URINE ON SN ARRIVAL, HOUSE HAS A MALODORUS SMELL, PATIENT SAYS SHE IS NOT DOING WELL AND DOES NOT KNOW WHEN SHE HAS TO PEE. DR. ODONNELL CALLED X2 AND MESSAGED X1 TO OBTAIN AN ORDER FOR UA. NO RESPONSE AT THIS TIME. PATIENT IS GIVEN A CUP AND A HAT.  IS TOLD IF PATIENT BECOMES CONFUSED OR LETHARGIC HE SHOULD CALL 911 TO HAVE AN AMBULANCE TO TAKE HER TO THE HOSPITAL.     Skill/education provided: CARDIOPULMONARY ASSESSMENT, GASTROINTESTINAL ASSESSMENT, GENITOURINARY ASSESSMENT, SKIN ASSESSMENT, SAFETY ASSESSMENT, PAIN ASSESSMENT, MEDICATION ASSESSMENT    Patient/caregiver response: PATIENT STATED UNDERSTANDING OF ALL TEACHING BUT WILL REQUIRE REINFORCEMENT    Plan for next visit: CARDIOPULMONARY ASSESSMENT, GASTROINTESTINAL ASSESSMENT, SKIN ASSESSMENT, GENITOURINARY ASSESSMENT, SAFETY ASSESSMENT, PAIN ASSESSMENT, MEDICATION ASSESSMENT    Other pertinent info: 2 ATTEMPTED CALLS TO [facility] 1 MESSAGE LEFT FOR NURSE NATA, AND 1 TIME THIS NURSE WAS HUNG UP ON AFTER TRANSFER, 1 EPIC MESSAGE SENT THROUGH CASE COMMUNICATION TO MD REGARDING NEED FOR UA

## 2023-02-24 ENCOUNTER — APPOINTMENT (OUTPATIENT)
Dept: CT IMAGING | Facility: HOSPITAL | Age: 88
DRG: 689 | End: 2023-02-24
Payer: MEDICARE

## 2023-02-24 ENCOUNTER — APPOINTMENT (OUTPATIENT)
Dept: CARDIOLOGY | Facility: HOSPITAL | Age: 88
DRG: 689 | End: 2023-02-24
Payer: MEDICARE

## 2023-02-24 ENCOUNTER — APPOINTMENT (OUTPATIENT)
Dept: GENERAL RADIOLOGY | Facility: HOSPITAL | Age: 88
DRG: 689 | End: 2023-02-24
Payer: MEDICARE

## 2023-02-24 ENCOUNTER — INPATIENT HOSPITAL (OUTPATIENT)
Dept: URBAN - METROPOLITAN AREA HOSPITAL 84 | Facility: HOSPITAL | Age: 88
End: 2023-02-24

## 2023-02-24 ENCOUNTER — APPOINTMENT (OUTPATIENT)
Dept: ULTRASOUND IMAGING | Facility: HOSPITAL | Age: 88
DRG: 689 | End: 2023-02-24
Payer: MEDICARE

## 2023-02-24 ENCOUNTER — HOSPITAL ENCOUNTER (INPATIENT)
Facility: HOSPITAL | Age: 88
LOS: 4 days | Discharge: NURSING, REHAB OR LONG-TERM CARE | DRG: 689 | End: 2023-02-28
Attending: EMERGENCY MEDICINE | Admitting: INTERNAL MEDICINE
Payer: MEDICARE

## 2023-02-24 DIAGNOSIS — K92.1 TARRY STOOL: ICD-10-CM

## 2023-02-24 DIAGNOSIS — R94.5 ABNORMAL RESULTS OF LIVER FUNCTION STUDIES: ICD-10-CM

## 2023-02-24 DIAGNOSIS — R53.1 GENERAL WEAKNESS: Primary | ICD-10-CM

## 2023-02-24 DIAGNOSIS — R79.89 ELEVATED LFTS: ICD-10-CM

## 2023-02-24 DIAGNOSIS — D50.9 IRON DEFICIENCY ANEMIA, UNSPECIFIED: ICD-10-CM

## 2023-02-24 DIAGNOSIS — N39.0 ACUTE UTI: ICD-10-CM

## 2023-02-24 PROBLEM — S30.810A EXCORIATION OF BUTTOCK: Status: ACTIVE | Noted: 2023-02-24

## 2023-02-24 PROBLEM — R19.5 HEME POSITIVE STOOL: Status: ACTIVE | Noted: 2023-02-24

## 2023-02-24 PROBLEM — R79.1 SUBTHERAPEUTIC INTERNATIONAL NORMALIZED RATIO (INR): Status: ACTIVE | Noted: 2023-02-24

## 2023-02-24 PROBLEM — E11.22 TYPE 2 DIABETES MELLITUS WITH STAGE 3B CHRONIC KIDNEY DISEASE AND HYPERTENSION: Status: ACTIVE | Noted: 2022-06-30

## 2023-02-24 PROBLEM — R77.8 ELEVATED TROPONIN: Status: ACTIVE | Noted: 2023-02-24

## 2023-02-24 PROBLEM — A49.9 UTI (URINARY TRACT INFECTION), BACTERIAL: Status: ACTIVE | Noted: 2021-09-07

## 2023-02-24 PROBLEM — N18.32 TYPE 2 DIABETES MELLITUS WITH STAGE 3B CHRONIC KIDNEY DISEASE AND HYPERTENSION: Status: ACTIVE | Noted: 2022-06-30

## 2023-02-24 PROBLEM — I12.9 TYPE 2 DIABETES MELLITUS WITH STAGE 3B CHRONIC KIDNEY DISEASE AND HYPERTENSION: Status: ACTIVE | Noted: 2022-06-30

## 2023-02-24 PROBLEM — I48.0 PAF (PAROXYSMAL ATRIAL FIBRILLATION) (HCC): Status: ACTIVE | Noted: 2023-02-24

## 2023-02-24 PROBLEM — R60.0 LOWER LEG EDEMA: Status: ACTIVE | Noted: 2023-02-24

## 2023-02-24 PROBLEM — E11.40 DIABETES MELLITUS WITH NEUROPATHY (HCC): Status: ACTIVE | Noted: 2022-08-20

## 2023-02-24 PROBLEM — D50.0 IRON DEFICIENCY ANEMIA DUE TO CHRONIC BLOOD LOSS: Status: ACTIVE | Noted: 2023-02-24

## 2023-02-24 PROBLEM — Z87.898 HISTORY OF SEIZURE: Status: ACTIVE | Noted: 2023-02-24

## 2023-02-24 LAB
ABO GROUP BLD: NORMAL
ALBUMIN SERPL-MCNC: 3.7 G/DL (ref 3.5–5.2)
ALBUMIN/GLOB SERPL: 1.1 G/DL
ALP SERPL-CCNC: 209 U/L (ref 39–117)
ALT SERPL W P-5'-P-CCNC: 54 U/L (ref 1–33)
AMMONIA BLD-SCNC: 23 UMOL/L (ref 11–51)
AMPHET+METHAMPHET UR QL: NEGATIVE
ANION GAP SERPL CALCULATED.3IONS-SCNC: 21 MMOL/L (ref 5–15)
APAP SERPL-MCNC: <5 MCG/ML (ref 0–30)
APTT PPP: 25.3 SECONDS (ref 24–31)
AST SERPL-CCNC: 114 U/L (ref 1–32)
BACTERIA UR QL AUTO: ABNORMAL /HPF
BARBITURATES UR QL SCN: NEGATIVE
BASOPHILS # BLD AUTO: 0 10*3/MM3 (ref 0–0.2)
BASOPHILS NFR BLD AUTO: 0.4 % (ref 0–1.5)
BENZODIAZ UR QL SCN: NEGATIVE
BH CV LOWER VASCULAR LEFT COMMON FEMORAL AUGMENT: NORMAL
BH CV LOWER VASCULAR LEFT COMMON FEMORAL COMPETENT: NORMAL
BH CV LOWER VASCULAR LEFT COMMON FEMORAL COMPRESS: NORMAL
BH CV LOWER VASCULAR LEFT COMMON FEMORAL PHASIC: NORMAL
BH CV LOWER VASCULAR LEFT COMMON FEMORAL SPONT: NORMAL
BH CV LOWER VASCULAR LEFT DISTAL FEMORAL COMPRESS: NORMAL
BH CV LOWER VASCULAR LEFT GASTRONEMIUS COMPRESS: NORMAL
BH CV LOWER VASCULAR LEFT GREATER SAPH AK COMPRESS: NORMAL
BH CV LOWER VASCULAR LEFT GREATER SAPH BK COMPRESS: NORMAL
BH CV LOWER VASCULAR LEFT LESSER SAPH COMPRESS: NORMAL
BH CV LOWER VASCULAR LEFT MID FEMORAL AUGMENT: NORMAL
BH CV LOWER VASCULAR LEFT MID FEMORAL COMPETENT: NORMAL
BH CV LOWER VASCULAR LEFT MID FEMORAL COMPRESS: NORMAL
BH CV LOWER VASCULAR LEFT MID FEMORAL PHASIC: NORMAL
BH CV LOWER VASCULAR LEFT MID FEMORAL SPONT: NORMAL
BH CV LOWER VASCULAR LEFT PERONEAL COMPRESS: NORMAL
BH CV LOWER VASCULAR LEFT POPLITEAL AUGMENT: NORMAL
BH CV LOWER VASCULAR LEFT POPLITEAL COMPETENT: NORMAL
BH CV LOWER VASCULAR LEFT POPLITEAL COMPRESS: NORMAL
BH CV LOWER VASCULAR LEFT POPLITEAL PHASIC: NORMAL
BH CV LOWER VASCULAR LEFT POPLITEAL SPONT: NORMAL
BH CV LOWER VASCULAR LEFT POSTERIOR TIBIAL COMPRESS: NORMAL
BH CV LOWER VASCULAR LEFT PROXIMAL FEMORAL COMPRESS: NORMAL
BH CV LOWER VASCULAR LEFT SAPHENOFEMORAL JUNCTION COMPRESS: NORMAL
BH CV LOWER VASCULAR RIGHT COMMON FEMORAL AUGMENT: NORMAL
BH CV LOWER VASCULAR RIGHT COMMON FEMORAL COMPETENT: NORMAL
BH CV LOWER VASCULAR RIGHT COMMON FEMORAL COMPRESS: NORMAL
BH CV LOWER VASCULAR RIGHT COMMON FEMORAL PHASIC: NORMAL
BH CV LOWER VASCULAR RIGHT COMMON FEMORAL SPONT: NORMAL
BH CV LOWER VASCULAR RIGHT DISTAL FEMORAL COMPRESS: NORMAL
BH CV LOWER VASCULAR RIGHT GASTRONEMIUS COMPRESS: NORMAL
BH CV LOWER VASCULAR RIGHT GREATER SAPH AK COMPRESS: NORMAL
BH CV LOWER VASCULAR RIGHT GREATER SAPH BK COMPRESS: NORMAL
BH CV LOWER VASCULAR RIGHT LESSER SAPH COMPRESS: NORMAL
BH CV LOWER VASCULAR RIGHT MID FEMORAL AUGMENT: NORMAL
BH CV LOWER VASCULAR RIGHT MID FEMORAL COMPETENT: NORMAL
BH CV LOWER VASCULAR RIGHT MID FEMORAL COMPRESS: NORMAL
BH CV LOWER VASCULAR RIGHT MID FEMORAL PHASIC: NORMAL
BH CV LOWER VASCULAR RIGHT MID FEMORAL SPONT: NORMAL
BH CV LOWER VASCULAR RIGHT PERONEAL COMPRESS: NORMAL
BH CV LOWER VASCULAR RIGHT POPLITEAL AUGMENT: NORMAL
BH CV LOWER VASCULAR RIGHT POPLITEAL COMPETENT: NORMAL
BH CV LOWER VASCULAR RIGHT POPLITEAL COMPRESS: NORMAL
BH CV LOWER VASCULAR RIGHT POPLITEAL PHASIC: NORMAL
BH CV LOWER VASCULAR RIGHT POPLITEAL SPONT: NORMAL
BH CV LOWER VASCULAR RIGHT POSTERIOR TIBIAL COMPRESS: NORMAL
BH CV LOWER VASCULAR RIGHT PROXIMAL FEMORAL COMPRESS: NORMAL
BH CV LOWER VASCULAR RIGHT SAPHENOFEMORAL JUNCTION COMPRESS: NORMAL
BILIRUB SERPL-MCNC: 0.6 MG/DL (ref 0–1.2)
BILIRUB UR QL STRIP: NEGATIVE
BLD GP AB SCN SERPL QL: NEGATIVE
BUN SERPL-MCNC: 32 MG/DL (ref 8–23)
BUN/CREAT SERPL: 22.4 (ref 7–25)
CALCIUM SPEC-SCNC: 8.6 MG/DL (ref 8.6–10.5)
CANNABINOIDS SERPL QL: NEGATIVE
CHLORIDE SERPL-SCNC: 97 MMOL/L (ref 98–107)
CK SERPL-CCNC: 230 U/L (ref 20–180)
CLARITY UR: ABNORMAL
CO2 SERPL-SCNC: 17 MMOL/L (ref 22–29)
COCAINE UR QL: NEGATIVE
COLOR UR: ABNORMAL
CREAT SERPL-MCNC: 1.43 MG/DL (ref 0.57–1)
DEPRECATED RDW RBC AUTO: 79.2 FL (ref 37–54)
EGFRCR SERPLBLD CKD-EPI 2021: 36 ML/MIN/1.73
EOSINOPHIL # BLD AUTO: 0.1 10*3/MM3 (ref 0–0.4)
EOSINOPHIL NFR BLD AUTO: 1.7 % (ref 0.3–6.2)
ERYTHROCYTE [DISTWIDTH] IN BLOOD BY AUTOMATED COUNT: 20.7 % (ref 12.3–15.4)
ETHANOL UR QL: 0.11 %
GEN 5 2HR TROPONIN T REFLEX: 75 NG/L
GLOBULIN UR ELPH-MCNC: 3.3 GM/DL
GLUCOSE BLDC GLUCOMTR-MCNC: 74 MG/DL (ref 70–105)
GLUCOSE SERPL-MCNC: 81 MG/DL (ref 65–99)
GLUCOSE UR STRIP-MCNC: NEGATIVE MG/DL
HAV IGM SERPL QL IA: NORMAL
HBV CORE IGM SERPL QL IA: NORMAL
HBV SURFACE AG SERPL QL IA: NORMAL
HCT VFR BLD AUTO: 29.9 % (ref 34–46.6)
HCV AB SER DONR QL: NORMAL
HGB BLD-MCNC: 9.5 G/DL (ref 12–15.9)
HGB UR QL STRIP.AUTO: ABNORMAL
HOLD SPECIMEN: NORMAL
HYALINE CASTS UR QL AUTO: ABNORMAL /LPF
INR PPP: 1.02 (ref 2–3)
KETONES UR QL STRIP: ABNORMAL
LEUKOCYTE ESTERASE UR QL STRIP.AUTO: ABNORMAL
LYMPHOCYTES # BLD AUTO: 0.9 10*3/MM3 (ref 0.7–3.1)
LYMPHOCYTES NFR BLD AUTO: 22.2 % (ref 19.6–45.3)
MAGNESIUM SERPL-MCNC: 2.6 MG/DL (ref 1.6–2.4)
MAXIMAL PREDICTED HEART RATE: 135 BPM
MCH RBC QN AUTO: 33 PG (ref 26.6–33)
MCHC RBC AUTO-ENTMCNC: 31.7 G/DL (ref 31.5–35.7)
MCV RBC AUTO: 104 FL (ref 79–97)
METHADONE UR QL SCN: NEGATIVE
MONOCYTES # BLD AUTO: 0.5 10*3/MM3 (ref 0.1–0.9)
MONOCYTES NFR BLD AUTO: 14.1 % (ref 5–12)
NEUTROPHILS NFR BLD AUTO: 2.4 10*3/MM3 (ref 1.7–7)
NEUTROPHILS NFR BLD AUTO: 61.6 % (ref 42.7–76)
NITRITE UR QL STRIP: NEGATIVE
NRBC BLD AUTO-RTO: 0.2 /100 WBC (ref 0–0.2)
NT-PROBNP SERPL-MCNC: 1088 PG/ML (ref 0–1800)
OPIATES UR QL: NEGATIVE
OXYCODONE UR QL SCN: NEGATIVE
PH UR STRIP.AUTO: 5.5 [PH] (ref 5–8)
PLATELET # BLD AUTO: 193 10*3/MM3 (ref 140–450)
PMV BLD AUTO: 7.1 FL (ref 6–12)
POTASSIUM SERPL-SCNC: 4.5 MMOL/L (ref 3.5–5.2)
PROT SERPL-MCNC: 7 G/DL (ref 6–8.5)
PROT UR QL STRIP: ABNORMAL
PROTHROMBIN TIME: 10.5 SECONDS (ref 19.4–28.5)
RBC # BLD AUTO: 2.88 10*6/MM3 (ref 3.77–5.28)
RBC # UR STRIP: ABNORMAL /HPF
REF LAB TEST METHOD: ABNORMAL
RH BLD: POSITIVE
SALICYLATES SERPL-MCNC: <0.3 MG/DL
SODIUM SERPL-SCNC: 135 MMOL/L (ref 136–145)
SP GR UR STRIP: 1.01 (ref 1–1.03)
SQUAMOUS #/AREA URNS HPF: ABNORMAL /HPF
STRESS TARGET HR: 115 BPM
T&S EXPIRATION DATE: NORMAL
TROPONIN T DELTA: 0 NG/L
TROPONIN T SERPL HS-MCNC: 75 NG/L
UROBILINOGEN UR QL STRIP: ABNORMAL
WBC # UR STRIP: ABNORMAL /HPF
WBC NRBC COR # BLD: 3.9 10*3/MM3 (ref 3.4–10.8)

## 2023-02-24 PROCEDURE — 84484 ASSAY OF TROPONIN QUANT: CPT

## 2023-02-24 PROCEDURE — 86850 RBC ANTIBODY SCREEN: CPT

## 2023-02-24 PROCEDURE — 99285 EMERGENCY DEPT VISIT HI MDM: CPT

## 2023-02-24 PROCEDURE — 85730 THROMBOPLASTIN TIME PARTIAL: CPT

## 2023-02-24 PROCEDURE — 97162 PT EVAL MOD COMPLEX 30 MIN: CPT

## 2023-02-24 PROCEDURE — 82077 ASSAY SPEC XCP UR&BREATH IA: CPT | Performed by: NURSE PRACTITIONER

## 2023-02-24 PROCEDURE — 82962 GLUCOSE BLOOD TEST: CPT

## 2023-02-24 PROCEDURE — 99222 1ST HOSP IP/OBS MODERATE 55: CPT | Mod: FS | Performed by: NURSE PRACTITIONER

## 2023-02-24 PROCEDURE — 80307 DRUG TEST PRSMV CHEM ANLYZR: CPT

## 2023-02-24 PROCEDURE — 36415 COLL VENOUS BLD VENIPUNCTURE: CPT | Performed by: EMERGENCY MEDICINE

## 2023-02-24 PROCEDURE — 87077 CULTURE AEROBIC IDENTIFY: CPT

## 2023-02-24 PROCEDURE — 93005 ELECTROCARDIOGRAM TRACING: CPT

## 2023-02-24 PROCEDURE — 97165 OT EVAL LOW COMPLEX 30 MIN: CPT

## 2023-02-24 PROCEDURE — 86900 BLOOD TYPING SEROLOGIC ABO: CPT

## 2023-02-24 PROCEDURE — 85025 COMPLETE CBC W/AUTO DIFF WBC: CPT

## 2023-02-24 PROCEDURE — 80179 DRUG ASSAY SALICYLATE: CPT

## 2023-02-24 PROCEDURE — 71045 X-RAY EXAM CHEST 1 VIEW: CPT

## 2023-02-24 PROCEDURE — 83735 ASSAY OF MAGNESIUM: CPT

## 2023-02-24 PROCEDURE — 87086 URINE CULTURE/COLONY COUNT: CPT

## 2023-02-24 PROCEDURE — 99222 1ST HOSP IP/OBS MODERATE 55: CPT | Performed by: INTERNAL MEDICINE

## 2023-02-24 PROCEDURE — 87186 SC STD MICRODIL/AGAR DIL: CPT

## 2023-02-24 PROCEDURE — 82140 ASSAY OF AMMONIA: CPT

## 2023-02-24 PROCEDURE — 93970 EXTREMITY STUDY: CPT

## 2023-02-24 PROCEDURE — 83880 ASSAY OF NATRIURETIC PEPTIDE: CPT

## 2023-02-24 PROCEDURE — 86901 BLOOD TYPING SEROLOGIC RH(D): CPT

## 2023-02-24 PROCEDURE — 70450 CT HEAD/BRAIN W/O DYE: CPT

## 2023-02-24 PROCEDURE — 80074 ACUTE HEPATITIS PANEL: CPT

## 2023-02-24 PROCEDURE — P9612 CATHETERIZE FOR URINE SPEC: HCPCS

## 2023-02-24 PROCEDURE — 80143 DRUG ASSAY ACETAMINOPHEN: CPT

## 2023-02-24 PROCEDURE — 25010000002 CEFTRIAXONE PER 250 MG

## 2023-02-24 PROCEDURE — 76705 ECHO EXAM OF ABDOMEN: CPT

## 2023-02-24 PROCEDURE — 72125 CT NECK SPINE W/O DYE: CPT

## 2023-02-24 PROCEDURE — 80053 COMPREHEN METABOLIC PANEL: CPT

## 2023-02-24 PROCEDURE — 85610 PROTHROMBIN TIME: CPT

## 2023-02-24 PROCEDURE — 82550 ASSAY OF CK (CPK): CPT | Performed by: NURSE PRACTITIONER

## 2023-02-24 PROCEDURE — 81001 URINALYSIS AUTO W/SCOPE: CPT

## 2023-02-24 RX ORDER — SODIUM CHLORIDE 0.9 % (FLUSH) 0.9 %
10 SYRINGE (ML) INJECTION AS NEEDED
Status: DISCONTINUED | OUTPATIENT
Start: 2023-02-24 | End: 2023-02-28 | Stop reason: HOSPADM

## 2023-02-24 RX ORDER — TOPIRAMATE 25 MG/1
12.5 TABLET ORAL 2 TIMES DAILY
Status: CANCELLED | OUTPATIENT
Start: 2023-02-24

## 2023-02-24 RX ORDER — ONDANSETRON 2 MG/ML
4 INJECTION INTRAMUSCULAR; INTRAVENOUS EVERY 6 HOURS PRN
Status: DISCONTINUED | OUTPATIENT
Start: 2023-02-24 | End: 2023-02-28 | Stop reason: HOSPADM

## 2023-02-24 RX ORDER — SODIUM CHLORIDE 0.9 % (FLUSH) 0.9 %
10 SYRINGE (ML) INJECTION EVERY 12 HOURS SCHEDULED
Status: DISCONTINUED | OUTPATIENT
Start: 2023-02-24 | End: 2023-02-28 | Stop reason: HOSPADM

## 2023-02-24 RX ORDER — WARFARIN SODIUM 5 MG/1
5 TABLET ORAL
Status: DISCONTINUED | OUTPATIENT
Start: 2023-02-24 | End: 2023-02-26

## 2023-02-24 RX ORDER — ACETAMINOPHEN 325 MG/1
650 TABLET ORAL EVERY 4 HOURS PRN
Status: DISCONTINUED | OUTPATIENT
Start: 2023-02-24 | End: 2023-02-28 | Stop reason: HOSPADM

## 2023-02-24 RX ORDER — ONDANSETRON 4 MG/1
4 TABLET, FILM COATED ORAL EVERY 6 HOURS PRN
Status: DISCONTINUED | OUTPATIENT
Start: 2023-02-24 | End: 2023-02-28 | Stop reason: HOSPADM

## 2023-02-24 RX ORDER — PANTOPRAZOLE SODIUM 40 MG/1
40 TABLET, DELAYED RELEASE ORAL
Status: DISCONTINUED | OUTPATIENT
Start: 2023-02-25 | End: 2023-02-28 | Stop reason: HOSPADM

## 2023-02-24 RX ORDER — FERROUS SULFATE TAB EC 324 MG (65 MG FE EQUIVALENT) 324 (65 FE) MG
324 TABLET DELAYED RESPONSE ORAL
Status: DISCONTINUED | OUTPATIENT
Start: 2023-02-24 | End: 2023-02-28 | Stop reason: HOSPADM

## 2023-02-24 RX ORDER — NICOTINE POLACRILEX 4 MG
15 LOZENGE BUCCAL
Status: DISCONTINUED | OUTPATIENT
Start: 2023-02-24 | End: 2023-02-28 | Stop reason: HOSPADM

## 2023-02-24 RX ORDER — SODIUM CHLORIDE 9 MG/ML
40 INJECTION, SOLUTION INTRAVENOUS AS NEEDED
Status: DISCONTINUED | OUTPATIENT
Start: 2023-02-24 | End: 2023-02-28 | Stop reason: HOSPADM

## 2023-02-24 RX ORDER — OLANZAPINE 10 MG/2ML
1 INJECTION, POWDER, LYOPHILIZED, FOR SOLUTION INTRAMUSCULAR
Status: DISCONTINUED | OUTPATIENT
Start: 2023-02-24 | End: 2023-02-28 | Stop reason: HOSPADM

## 2023-02-24 RX ORDER — ACETAMINOPHEN 650 MG/1
650 SUPPOSITORY RECTAL EVERY 4 HOURS PRN
Status: DISCONTINUED | OUTPATIENT
Start: 2023-02-24 | End: 2023-02-28 | Stop reason: HOSPADM

## 2023-02-24 RX ORDER — INSULIN LISPRO 100 [IU]/ML
2-9 INJECTION, SOLUTION INTRAVENOUS; SUBCUTANEOUS
Status: DISCONTINUED | OUTPATIENT
Start: 2023-02-24 | End: 2023-02-28 | Stop reason: HOSPADM

## 2023-02-24 RX ORDER — ACETAMINOPHEN 160 MG/5ML
650 SOLUTION ORAL EVERY 4 HOURS PRN
Status: DISCONTINUED | OUTPATIENT
Start: 2023-02-24 | End: 2023-02-28 | Stop reason: HOSPADM

## 2023-02-24 RX ORDER — ATORVASTATIN CALCIUM 10 MG/1
10 TABLET, FILM COATED ORAL NIGHTLY
Status: DISCONTINUED | OUTPATIENT
Start: 2023-02-24 | End: 2023-02-28 | Stop reason: HOSPADM

## 2023-02-24 RX ORDER — FOLIC ACID 1 MG/1
1 TABLET ORAL DAILY
Status: DISCONTINUED | OUTPATIENT
Start: 2023-02-24 | End: 2023-02-28 | Stop reason: HOSPADM

## 2023-02-24 RX ORDER — DEXTROSE MONOHYDRATE 25 G/50ML
25 INJECTION, SOLUTION INTRAVENOUS
Status: DISCONTINUED | OUTPATIENT
Start: 2023-02-24 | End: 2023-02-28 | Stop reason: HOSPADM

## 2023-02-24 RX ADMIN — ACETAMINOPHEN 650 MG: 325 TABLET, FILM COATED ORAL at 21:25

## 2023-02-24 RX ADMIN — ATORVASTATIN CALCIUM 10 MG: 10 TABLET, FILM COATED ORAL at 21:25

## 2023-02-24 RX ADMIN — Medication 10 ML: at 15:58

## 2023-02-24 RX ADMIN — WARFARIN SODIUM 5 MG: 5 TABLET ORAL at 18:11

## 2023-02-24 RX ADMIN — Medication 10 ML: at 21:25

## 2023-02-24 RX ADMIN — CEFTRIAXONE 2 G: 2 INJECTION, POWDER, FOR SOLUTION INTRAMUSCULAR; INTRAVENOUS at 11:47

## 2023-02-24 RX ADMIN — FERROUS SULFATE TAB EC 324 MG (65 MG FE EQUIVALENT) 324 MG: 324 (65 FE) TABLET DELAYED RESPONSE at 15:57

## 2023-02-24 RX ADMIN — FOLIC ACID 1 MG: 1 TABLET ORAL at 15:57

## 2023-02-24 RX ADMIN — SODIUM CHLORIDE 1000 ML: 9 INJECTION, SOLUTION INTRAVENOUS at 08:04

## 2023-02-25 ENCOUNTER — HOME CARE VISIT (OUTPATIENT)
Dept: HOME HEALTH SERVICES | Facility: HOME HEALTHCARE | Age: 88
End: 2023-02-25
Payer: MEDICARE

## 2023-02-25 LAB
ALBUMIN SERPL-MCNC: 3.3 G/DL (ref 3.5–5.2)
ALBUMIN/GLOB SERPL: 1 G/DL
ALP SERPL-CCNC: 195 U/L (ref 39–117)
ALT SERPL W P-5'-P-CCNC: 43 U/L (ref 1–33)
ANION GAP SERPL CALCULATED.3IONS-SCNC: 20 MMOL/L (ref 5–15)
AST SERPL-CCNC: 69 U/L (ref 1–32)
BASOPHILS # BLD AUTO: 0 10*3/MM3 (ref 0–0.2)
BASOPHILS NFR BLD AUTO: 0.6 % (ref 0–1.5)
BILIRUB SERPL-MCNC: 0.4 MG/DL (ref 0–1.2)
BUN SERPL-MCNC: 32 MG/DL (ref 8–23)
BUN/CREAT SERPL: 24.6 (ref 7–25)
CALCIUM SPEC-SCNC: 8.5 MG/DL (ref 8.6–10.5)
CHLORIDE SERPL-SCNC: 100 MMOL/L (ref 98–107)
CHOLEST SERPL-MCNC: 195 MG/DL (ref 0–200)
CO2 SERPL-SCNC: 18 MMOL/L (ref 22–29)
CREAT SERPL-MCNC: 1.3 MG/DL (ref 0.57–1)
DEPRECATED RDW RBC AUTO: 79.6 FL (ref 37–54)
EGFRCR SERPLBLD CKD-EPI 2021: 40.4 ML/MIN/1.73
EOSINOPHIL # BLD AUTO: 0.1 10*3/MM3 (ref 0–0.4)
EOSINOPHIL NFR BLD AUTO: 2.3 % (ref 0.3–6.2)
ERYTHROCYTE [DISTWIDTH] IN BLOOD BY AUTOMATED COUNT: 21 % (ref 12.3–15.4)
GLOBULIN UR ELPH-MCNC: 3.4 GM/DL
GLUCOSE BLDC GLUCOMTR-MCNC: 135 MG/DL (ref 70–105)
GLUCOSE BLDC GLUCOMTR-MCNC: 145 MG/DL (ref 70–105)
GLUCOSE BLDC GLUCOMTR-MCNC: 83 MG/DL (ref 70–105)
GLUCOSE SERPL-MCNC: 98 MG/DL (ref 65–99)
HCT VFR BLD AUTO: 29 % (ref 34–46.6)
HDLC SERPL-MCNC: 117 MG/DL (ref 40–60)
HGB BLD-MCNC: 9 G/DL (ref 12–15.9)
INR PPP: 1.18 (ref 2–3)
IRON 24H UR-MRATE: 71 MCG/DL (ref 37–145)
IRON SATN MFR SERPL: 30 % (ref 20–50)
LDLC SERPL CALC-MCNC: 64 MG/DL (ref 0–100)
LDLC/HDLC SERPL: 0.53 {RATIO}
LYMPHOCYTES # BLD AUTO: 0.6 10*3/MM3 (ref 0.7–3.1)
LYMPHOCYTES NFR BLD AUTO: 20.9 % (ref 19.6–45.3)
MCH RBC QN AUTO: 32.3 PG (ref 26.6–33)
MCHC RBC AUTO-ENTMCNC: 31.1 G/DL (ref 31.5–35.7)
MCV RBC AUTO: 103.8 FL (ref 79–97)
MONOCYTES # BLD AUTO: 0.5 10*3/MM3 (ref 0.1–0.9)
MONOCYTES NFR BLD AUTO: 16.2 % (ref 5–12)
NEUTROPHILS NFR BLD AUTO: 1.8 10*3/MM3 (ref 1.7–7)
NEUTROPHILS NFR BLD AUTO: 60 % (ref 42.7–76)
NRBC BLD AUTO-RTO: 0.3 /100 WBC (ref 0–0.2)
PLATELET # BLD AUTO: 166 10*3/MM3 (ref 140–450)
PMV BLD AUTO: 6.8 FL (ref 6–12)
POTASSIUM SERPL-SCNC: 4.6 MMOL/L (ref 3.5–5.2)
PROT SERPL-MCNC: 6.7 G/DL (ref 6–8.5)
PROTHROMBIN TIME: 12 SECONDS (ref 19.4–28.5)
RBC # BLD AUTO: 2.79 10*6/MM3 (ref 3.77–5.28)
SODIUM SERPL-SCNC: 138 MMOL/L (ref 136–145)
TIBC SERPL-MCNC: 234 MCG/DL (ref 298–536)
TRANSFERRIN SERPL-MCNC: 157 MG/DL (ref 200–360)
TRIGL SERPL-MCNC: 80 MG/DL (ref 0–150)
VLDLC SERPL-MCNC: 14 MG/DL (ref 5–40)
WBC NRBC COR # BLD: 3 10*3/MM3 (ref 3.4–10.8)

## 2023-02-25 PROCEDURE — 99233 SBSQ HOSP IP/OBS HIGH 50: CPT | Performed by: INTERNAL MEDICINE

## 2023-02-25 PROCEDURE — 25010000002 CEFTRIAXONE PER 250 MG: Performed by: NURSE PRACTITIONER

## 2023-02-25 PROCEDURE — 85610 PROTHROMBIN TIME: CPT | Performed by: NURSE PRACTITIONER

## 2023-02-25 PROCEDURE — 85025 COMPLETE CBC W/AUTO DIFF WBC: CPT | Performed by: NURSE PRACTITIONER

## 2023-02-25 PROCEDURE — 83540 ASSAY OF IRON: CPT | Performed by: NURSE PRACTITIONER

## 2023-02-25 PROCEDURE — 80061 LIPID PANEL: CPT | Performed by: NURSE PRACTITIONER

## 2023-02-25 PROCEDURE — 84466 ASSAY OF TRANSFERRIN: CPT | Performed by: NURSE PRACTITIONER

## 2023-02-25 PROCEDURE — 36415 COLL VENOUS BLD VENIPUNCTURE: CPT | Performed by: NURSE PRACTITIONER

## 2023-02-25 PROCEDURE — 80053 COMPREHEN METABOLIC PANEL: CPT | Performed by: NURSE PRACTITIONER

## 2023-02-25 PROCEDURE — 82962 GLUCOSE BLOOD TEST: CPT

## 2023-02-25 PROCEDURE — 83036 HEMOGLOBIN GLYCOSYLATED A1C: CPT | Performed by: NURSE PRACTITIONER

## 2023-02-25 RX ORDER — ATORVASTATIN CALCIUM 10 MG/1
TABLET, FILM COATED ORAL
Qty: 90 TABLET | Refills: 0 | Status: SHIPPED | OUTPATIENT
Start: 2023-02-25

## 2023-02-25 RX ADMIN — FOLIC ACID 1 MG: 1 TABLET ORAL at 09:06

## 2023-02-25 RX ADMIN — WARFARIN SODIUM 5 MG: 5 TABLET ORAL at 17:34

## 2023-02-25 RX ADMIN — FERROUS SULFATE TAB EC 324 MG (65 MG FE EQUIVALENT) 324 MG: 324 (65 FE) TABLET DELAYED RESPONSE at 09:07

## 2023-02-25 RX ADMIN — ATORVASTATIN CALCIUM 10 MG: 10 TABLET, FILM COATED ORAL at 20:21

## 2023-02-25 RX ADMIN — CEFTRIAXONE 2 G: 2 INJECTION, POWDER, FOR SOLUTION INTRAMUSCULAR; INTRAVENOUS at 10:58

## 2023-02-25 RX ADMIN — Medication 10 ML: at 09:07

## 2023-02-25 RX ADMIN — ACETAMINOPHEN 650 MG: 325 TABLET, FILM COATED ORAL at 20:21

## 2023-02-25 RX ADMIN — PANTOPRAZOLE SODIUM 40 MG: 40 TABLET, DELAYED RELEASE ORAL at 05:01

## 2023-02-25 RX ADMIN — Medication 10 ML: at 22:06

## 2023-02-26 LAB
ALBUMIN SERPL-MCNC: 3 G/DL (ref 3.5–5.2)
ALBUMIN/GLOB SERPL: 0.9 G/DL
ALP SERPL-CCNC: 182 U/L (ref 39–117)
ALT SERPL W P-5'-P-CCNC: 35 U/L (ref 1–33)
ANION GAP SERPL CALCULATED.3IONS-SCNC: 11 MMOL/L (ref 5–15)
AST SERPL-CCNC: 47 U/L (ref 1–32)
BACTERIA SPEC AEROBE CULT: ABNORMAL
BASOPHILS # BLD AUTO: 0 10*3/MM3 (ref 0–0.2)
BASOPHILS NFR BLD AUTO: 0.9 % (ref 0–1.5)
BILIRUB SERPL-MCNC: 0.3 MG/DL (ref 0–1.2)
BUN SERPL-MCNC: 23 MG/DL (ref 8–23)
BUN/CREAT SERPL: 23 (ref 7–25)
CALCIUM SPEC-SCNC: 8.5 MG/DL (ref 8.6–10.5)
CHLORIDE SERPL-SCNC: 100 MMOL/L (ref 98–107)
CO2 SERPL-SCNC: 25 MMOL/L (ref 22–29)
CREAT SERPL-MCNC: 1 MG/DL (ref 0.57–1)
DEPRECATED RDW RBC AUTO: 71.8 FL (ref 37–54)
EGFRCR SERPLBLD CKD-EPI 2021: 55.3 ML/MIN/1.73
EOSINOPHIL # BLD AUTO: 0.1 10*3/MM3 (ref 0–0.4)
EOSINOPHIL NFR BLD AUTO: 2.5 % (ref 0.3–6.2)
ERYTHROCYTE [DISTWIDTH] IN BLOOD BY AUTOMATED COUNT: 20.1 % (ref 12.3–15.4)
GLOBULIN UR ELPH-MCNC: 3.4 GM/DL
GLUCOSE BLDC GLUCOMTR-MCNC: 145 MG/DL (ref 70–105)
GLUCOSE BLDC GLUCOMTR-MCNC: 191 MG/DL (ref 70–105)
GLUCOSE BLDC GLUCOMTR-MCNC: 204 MG/DL (ref 70–105)
GLUCOSE SERPL-MCNC: 147 MG/DL (ref 65–99)
HCT VFR BLD AUTO: 29.5 % (ref 34–46.6)
HGB BLD-MCNC: 9.1 G/DL (ref 12–15.9)
INR PPP: 1.54 (ref 2–3)
LYMPHOCYTES # BLD AUTO: 0.5 10*3/MM3 (ref 0.7–3.1)
LYMPHOCYTES NFR BLD AUTO: 19.1 % (ref 19.6–45.3)
MAGNESIUM SERPL-MCNC: 2.4 MG/DL (ref 1.6–2.4)
MCH RBC QN AUTO: 32.1 PG (ref 26.6–33)
MCHC RBC AUTO-ENTMCNC: 30.9 G/DL (ref 31.5–35.7)
MCV RBC AUTO: 103.6 FL (ref 79–97)
MONOCYTES # BLD AUTO: 0.4 10*3/MM3 (ref 0.1–0.9)
MONOCYTES NFR BLD AUTO: 16.7 % (ref 5–12)
NEUTROPHILS NFR BLD AUTO: 1.5 10*3/MM3 (ref 1.7–7)
NEUTROPHILS NFR BLD AUTO: 60.8 % (ref 42.7–76)
NRBC BLD AUTO-RTO: 0 /100 WBC (ref 0–0.2)
PHOSPHATE SERPL-MCNC: 1.4 MG/DL (ref 2.5–4.5)
PHOSPHATE SERPL-MCNC: 1.5 MG/DL (ref 2.5–4.5)
PLATELET # BLD AUTO: 162 10*3/MM3 (ref 140–450)
PMV BLD AUTO: 6.9 FL (ref 6–12)
POTASSIUM SERPL-SCNC: 4.1 MMOL/L (ref 3.5–5.2)
PROT SERPL-MCNC: 6.4 G/DL (ref 6–8.5)
PROTHROMBIN TIME: 15.5 SECONDS (ref 19.4–28.5)
RBC # BLD AUTO: 2.85 10*6/MM3 (ref 3.77–5.28)
SODIUM SERPL-SCNC: 136 MMOL/L (ref 136–145)
WBC NRBC COR # BLD: 2.4 10*3/MM3 (ref 3.4–10.8)

## 2023-02-26 PROCEDURE — 25010000002 FUROSEMIDE PER 20 MG: Performed by: INTERNAL MEDICINE

## 2023-02-26 PROCEDURE — 80053 COMPREHEN METABOLIC PANEL: CPT | Performed by: NURSE PRACTITIONER

## 2023-02-26 PROCEDURE — 85025 COMPLETE CBC W/AUTO DIFF WBC: CPT | Performed by: INTERNAL MEDICINE

## 2023-02-26 PROCEDURE — 25010000002 HYDRALAZINE PER 20 MG: Performed by: INTERNAL MEDICINE

## 2023-02-26 PROCEDURE — 99233 SBSQ HOSP IP/OBS HIGH 50: CPT | Performed by: INTERNAL MEDICINE

## 2023-02-26 PROCEDURE — 84100 ASSAY OF PHOSPHORUS: CPT | Performed by: INTERNAL MEDICINE

## 2023-02-26 PROCEDURE — 85610 PROTHROMBIN TIME: CPT | Performed by: NURSE PRACTITIONER

## 2023-02-26 PROCEDURE — 25010000002 MEROPENEM PER 100 MG: Performed by: INTERNAL MEDICINE

## 2023-02-26 PROCEDURE — 83735 ASSAY OF MAGNESIUM: CPT | Performed by: INTERNAL MEDICINE

## 2023-02-26 PROCEDURE — 25010000002 LORAZEPAM PER 2 MG: Performed by: INTERNAL MEDICINE

## 2023-02-26 PROCEDURE — 63710000001 INSULIN LISPRO (HUMAN) PER 5 UNITS: Performed by: NURSE PRACTITIONER

## 2023-02-26 PROCEDURE — 82962 GLUCOSE BLOOD TEST: CPT

## 2023-02-26 RX ORDER — POTASSIUM CHLORIDE 1.5 G/1.77G
40 POWDER, FOR SOLUTION ORAL AS NEEDED
Status: DISCONTINUED | OUTPATIENT
Start: 2023-02-26 | End: 2023-02-27 | Stop reason: SDUPTHER

## 2023-02-26 RX ORDER — MAGNESIUM SULFATE HEPTAHYDRATE 40 MG/ML
4 INJECTION, SOLUTION INTRAVENOUS AS NEEDED
Status: DISCONTINUED | OUTPATIENT
Start: 2023-02-26 | End: 2023-02-27 | Stop reason: SDUPTHER

## 2023-02-26 RX ORDER — MAGNESIUM SULFATE HEPTAHYDRATE 40 MG/ML
2 INJECTION, SOLUTION INTRAVENOUS AS NEEDED
Status: DISCONTINUED | OUTPATIENT
Start: 2023-02-26 | End: 2023-02-27 | Stop reason: SDUPTHER

## 2023-02-26 RX ORDER — METOPROLOL SUCCINATE 50 MG/1
50 TABLET, EXTENDED RELEASE ORAL
Status: DISCONTINUED | OUTPATIENT
Start: 2023-02-26 | End: 2023-02-28 | Stop reason: HOSPADM

## 2023-02-26 RX ORDER — POTASSIUM CHLORIDE 20 MEQ/1
40 TABLET, EXTENDED RELEASE ORAL AS NEEDED
Status: DISCONTINUED | OUTPATIENT
Start: 2023-02-26 | End: 2023-02-27 | Stop reason: SDUPTHER

## 2023-02-26 RX ORDER — HYDRALAZINE HYDROCHLORIDE 20 MG/ML
10 INJECTION INTRAMUSCULAR; INTRAVENOUS EVERY 6 HOURS PRN
Status: DISCONTINUED | OUTPATIENT
Start: 2023-02-26 | End: 2023-02-28 | Stop reason: HOSPADM

## 2023-02-26 RX ORDER — FUROSEMIDE 10 MG/ML
40 INJECTION INTRAMUSCULAR; INTRAVENOUS
Status: DISCONTINUED | OUTPATIENT
Start: 2023-02-26 | End: 2023-02-28 | Stop reason: HOSPADM

## 2023-02-26 RX ORDER — WARFARIN SODIUM 2.5 MG/1
2.5 TABLET ORAL
Status: DISCONTINUED | OUTPATIENT
Start: 2023-02-26 | End: 2023-02-28

## 2023-02-26 RX ORDER — LORAZEPAM 2 MG/ML
0.5 INJECTION INTRAMUSCULAR NIGHTLY
Status: DISCONTINUED | OUTPATIENT
Start: 2023-02-26 | End: 2023-02-28 | Stop reason: HOSPADM

## 2023-02-26 RX ADMIN — WARFARIN 2.5 MG: 2.5 TABLET ORAL at 17:37

## 2023-02-26 RX ADMIN — Medication 10 ML: at 08:12

## 2023-02-26 RX ADMIN — ATORVASTATIN CALCIUM 10 MG: 10 TABLET, FILM COATED ORAL at 20:35

## 2023-02-26 RX ADMIN — INSULIN LISPRO 4 UNITS: 100 INJECTION, SOLUTION INTRAVENOUS; SUBCUTANEOUS at 17:37

## 2023-02-26 RX ADMIN — FOLIC ACID 1 MG: 1 TABLET ORAL at 08:12

## 2023-02-26 RX ADMIN — Medication 10 ML: at 20:35

## 2023-02-26 RX ADMIN — MEROPENEM 500 MG: 500 INJECTION, POWDER, FOR SOLUTION INTRAVENOUS at 10:17

## 2023-02-26 RX ADMIN — HYDRALAZINE HYDROCHLORIDE 10 MG: 20 INJECTION INTRAMUSCULAR; INTRAVENOUS at 13:50

## 2023-02-26 RX ADMIN — Medication 10 ML: at 13:50

## 2023-02-26 RX ADMIN — FUROSEMIDE 40 MG: 10 INJECTION, SOLUTION INTRAMUSCULAR; INTRAVENOUS at 17:37

## 2023-02-26 RX ADMIN — FERROUS SULFATE TAB EC 324 MG (65 MG FE EQUIVALENT) 324 MG: 324 (65 FE) TABLET DELAYED RESPONSE at 08:13

## 2023-02-26 RX ADMIN — INSULIN LISPRO 2 UNITS: 100 INJECTION, SOLUTION INTRAVENOUS; SUBCUTANEOUS at 11:50

## 2023-02-26 RX ADMIN — Medication 2 PACKET: at 11:50

## 2023-02-26 RX ADMIN — MEROPENEM 500 MG: 500 INJECTION, POWDER, FOR SOLUTION INTRAVENOUS at 17:38

## 2023-02-26 RX ADMIN — LORAZEPAM 0.5 MG: 2 INJECTION INTRAMUSCULAR; INTRAVENOUS at 20:35

## 2023-02-26 RX ADMIN — METOPROLOL SUCCINATE 50 MG: 50 TABLET, EXTENDED RELEASE ORAL at 14:37

## 2023-02-26 RX ADMIN — PANTOPRAZOLE SODIUM 40 MG: 40 TABLET, DELAYED RELEASE ORAL at 05:31

## 2023-02-26 RX ADMIN — Medication 10 ML: at 17:37

## 2023-02-26 RX ADMIN — ACETAMINOPHEN 650 MG: 325 TABLET, FILM COATED ORAL at 12:13

## 2023-02-26 NOTE — HOME HEALTH
PATIENT IS CURRENT WITH [facility].  ADMITTED TO [facility] ON 2/25/23.  WE WILL CONTINUE TO FOLLOW AND RESUME CARE ONCE DISCHARGED HOME.

## 2023-02-27 ENCOUNTER — TELEPHONE (OUTPATIENT)
Dept: FAMILY MEDICINE CLINIC | Facility: CLINIC | Age: 88
End: 2023-02-27
Payer: MEDICARE

## 2023-02-27 LAB
ALBUMIN SERPL-MCNC: 2.9 G/DL (ref 3.5–5.2)
ALBUMIN/GLOB SERPL: 0.9 G/DL
ALP SERPL-CCNC: 179 U/L (ref 39–117)
ALT SERPL W P-5'-P-CCNC: 28 U/L (ref 1–33)
ANION GAP SERPL CALCULATED.3IONS-SCNC: 7 MMOL/L (ref 5–15)
AST SERPL-CCNC: 39 U/L (ref 1–32)
BASOPHILS # BLD AUTO: 0 10*3/MM3 (ref 0–0.2)
BASOPHILS NFR BLD AUTO: 0.9 % (ref 0–1.5)
BILIRUB SERPL-MCNC: 0.4 MG/DL (ref 0–1.2)
BUN SERPL-MCNC: 18 MG/DL (ref 8–23)
BUN/CREAT SERPL: 20.2 (ref 7–25)
CALCIUM SPEC-SCNC: 8.2 MG/DL (ref 8.6–10.5)
CHLORIDE SERPL-SCNC: 100 MMOL/L (ref 98–107)
CO2 SERPL-SCNC: 28 MMOL/L (ref 22–29)
CREAT SERPL-MCNC: 0.89 MG/DL (ref 0.57–1)
DEPRECATED RDW RBC AUTO: 76.1 FL (ref 37–54)
EGFRCR SERPLBLD CKD-EPI 2021: 63.6 ML/MIN/1.73
EOSINOPHIL # BLD AUTO: 0.1 10*3/MM3 (ref 0–0.4)
EOSINOPHIL NFR BLD AUTO: 2 % (ref 0.3–6.2)
ERYTHROCYTE [DISTWIDTH] IN BLOOD BY AUTOMATED COUNT: 20.4 % (ref 12.3–15.4)
GLOBULIN UR ELPH-MCNC: 3.1 GM/DL
GLUCOSE BLDC GLUCOMTR-MCNC: 122 MG/DL (ref 70–105)
GLUCOSE BLDC GLUCOMTR-MCNC: 177 MG/DL (ref 70–105)
GLUCOSE BLDC GLUCOMTR-MCNC: 195 MG/DL (ref 70–105)
GLUCOSE SERPL-MCNC: 180 MG/DL (ref 65–99)
HBA1C MFR BLD: 5.6 % (ref 3.5–5.6)
HCT VFR BLD AUTO: 28.3 % (ref 34–46.6)
HGB BLD-MCNC: 9.2 G/DL (ref 12–15.9)
INR PPP: 1.74 (ref 2–3)
LYMPHOCYTES # BLD AUTO: 0.5 10*3/MM3 (ref 0.7–3.1)
LYMPHOCYTES NFR BLD AUTO: 15.8 % (ref 19.6–45.3)
MAGNESIUM SERPL-MCNC: 2.1 MG/DL (ref 1.6–2.4)
MCH RBC QN AUTO: 32.8 PG (ref 26.6–33)
MCHC RBC AUTO-ENTMCNC: 32.5 G/DL (ref 31.5–35.7)
MCV RBC AUTO: 101.1 FL (ref 79–97)
MONOCYTES # BLD AUTO: 0.4 10*3/MM3 (ref 0.1–0.9)
MONOCYTES NFR BLD AUTO: 14.3 % (ref 5–12)
NEUTROPHILS NFR BLD AUTO: 2 10*3/MM3 (ref 1.7–7)
NEUTROPHILS NFR BLD AUTO: 67 % (ref 42.7–76)
NRBC BLD AUTO-RTO: 0.3 /100 WBC (ref 0–0.2)
PHOSPHATE SERPL-MCNC: 1.9 MG/DL (ref 2.5–4.5)
PHOSPHATE SERPL-MCNC: 2.3 MG/DL (ref 2.5–4.5)
PLATELET # BLD AUTO: 154 10*3/MM3 (ref 140–450)
PMV BLD AUTO: 6.8 FL (ref 6–12)
POTASSIUM SERPL-SCNC: 3.9 MMOL/L (ref 3.5–5.2)
PROT SERPL-MCNC: 6 G/DL (ref 6–8.5)
PROTHROMBIN TIME: 17.4 SECONDS (ref 19.4–28.5)
RBC # BLD AUTO: 2.8 10*6/MM3 (ref 3.77–5.28)
SODIUM SERPL-SCNC: 135 MMOL/L (ref 136–145)
WBC NRBC COR # BLD: 3 10*3/MM3 (ref 3.4–10.8)

## 2023-02-27 PROCEDURE — 63710000001 INSULIN LISPRO (HUMAN) PER 5 UNITS: Performed by: NURSE PRACTITIONER

## 2023-02-27 PROCEDURE — 84100 ASSAY OF PHOSPHORUS: CPT | Performed by: INTERNAL MEDICINE

## 2023-02-27 PROCEDURE — 85610 PROTHROMBIN TIME: CPT | Performed by: NURSE PRACTITIONER

## 2023-02-27 PROCEDURE — 97110 THERAPEUTIC EXERCISES: CPT

## 2023-02-27 PROCEDURE — 99233 SBSQ HOSP IP/OBS HIGH 50: CPT | Performed by: INTERNAL MEDICINE

## 2023-02-27 PROCEDURE — 25010000002 MEROPENEM PER 100 MG: Performed by: INTERNAL MEDICINE

## 2023-02-27 PROCEDURE — 83735 ASSAY OF MAGNESIUM: CPT | Performed by: INTERNAL MEDICINE

## 2023-02-27 PROCEDURE — 97530 THERAPEUTIC ACTIVITIES: CPT

## 2023-02-27 PROCEDURE — 36415 COLL VENOUS BLD VENIPUNCTURE: CPT | Performed by: NURSE PRACTITIONER

## 2023-02-27 PROCEDURE — 80053 COMPREHEN METABOLIC PANEL: CPT | Performed by: NURSE PRACTITIONER

## 2023-02-27 PROCEDURE — 82962 GLUCOSE BLOOD TEST: CPT

## 2023-02-27 PROCEDURE — 25010000002 FUROSEMIDE PER 20 MG: Performed by: INTERNAL MEDICINE

## 2023-02-27 PROCEDURE — 85025 COMPLETE CBC W/AUTO DIFF WBC: CPT | Performed by: INTERNAL MEDICINE

## 2023-02-27 PROCEDURE — 25010000002 LORAZEPAM PER 2 MG: Performed by: INTERNAL MEDICINE

## 2023-02-27 RX ORDER — MAGNESIUM SULFATE HEPTAHYDRATE 40 MG/ML
4 INJECTION, SOLUTION INTRAVENOUS AS NEEDED
Status: DISCONTINUED | OUTPATIENT
Start: 2023-02-27 | End: 2023-02-28 | Stop reason: HOSPADM

## 2023-02-27 RX ORDER — MAGNESIUM SULFATE HEPTAHYDRATE 40 MG/ML
2 INJECTION, SOLUTION INTRAVENOUS AS NEEDED
Status: DISCONTINUED | OUTPATIENT
Start: 2023-02-27 | End: 2023-02-28 | Stop reason: HOSPADM

## 2023-02-27 RX ORDER — POTASSIUM CHLORIDE 1.5 G/1.77G
40 POWDER, FOR SOLUTION ORAL AS NEEDED
Status: DISCONTINUED | OUTPATIENT
Start: 2023-02-27 | End: 2023-02-28 | Stop reason: HOSPADM

## 2023-02-27 RX ORDER — POTASSIUM CHLORIDE 20 MEQ/1
40 TABLET, EXTENDED RELEASE ORAL AS NEEDED
Status: DISCONTINUED | OUTPATIENT
Start: 2023-02-27 | End: 2023-02-28 | Stop reason: HOSPADM

## 2023-02-27 RX ADMIN — ATORVASTATIN CALCIUM 10 MG: 10 TABLET, FILM COATED ORAL at 20:43

## 2023-02-27 RX ADMIN — FUROSEMIDE 40 MG: 10 INJECTION, SOLUTION INTRAMUSCULAR; INTRAVENOUS at 18:27

## 2023-02-27 RX ADMIN — MEROPENEM 500 MG: 500 INJECTION, POWDER, FOR SOLUTION INTRAVENOUS at 18:27

## 2023-02-27 RX ADMIN — Medication 2 PACKET: at 12:19

## 2023-02-27 RX ADMIN — Medication 10 ML: at 20:43

## 2023-02-27 RX ADMIN — WARFARIN 2.5 MG: 2.5 TABLET ORAL at 18:27

## 2023-02-27 RX ADMIN — PANTOPRAZOLE SODIUM 40 MG: 40 TABLET, DELAYED RELEASE ORAL at 06:27

## 2023-02-27 RX ADMIN — MEROPENEM 500 MG: 500 INJECTION, POWDER, FOR SOLUTION INTRAVENOUS at 09:39

## 2023-02-27 RX ADMIN — FERROUS SULFATE TAB EC 324 MG (65 MG FE EQUIVALENT) 324 MG: 324 (65 FE) TABLET DELAYED RESPONSE at 09:38

## 2023-02-27 RX ADMIN — INSULIN LISPRO 2 UNITS: 100 INJECTION, SOLUTION INTRAVENOUS; SUBCUTANEOUS at 18:27

## 2023-02-27 RX ADMIN — FUROSEMIDE 40 MG: 10 INJECTION, SOLUTION INTRAMUSCULAR; INTRAVENOUS at 09:39

## 2023-02-27 RX ADMIN — INSULIN LISPRO 2 UNITS: 100 INJECTION, SOLUTION INTRAVENOUS; SUBCUTANEOUS at 12:20

## 2023-02-27 RX ADMIN — LORAZEPAM 0.5 MG: 2 INJECTION INTRAMUSCULAR; INTRAVENOUS at 20:43

## 2023-02-27 RX ADMIN — MEROPENEM 500 MG: 500 INJECTION, POWDER, FOR SOLUTION INTRAVENOUS at 02:55

## 2023-02-27 RX ADMIN — Medication 2 PACKET: at 02:54

## 2023-02-27 RX ADMIN — FOLIC ACID 1 MG: 1 TABLET ORAL at 09:38

## 2023-02-27 RX ADMIN — METOPROLOL SUCCINATE 50 MG: 50 TABLET, EXTENDED RELEASE ORAL at 09:38

## 2023-02-27 RX ADMIN — Medication 10 ML: at 09:39

## 2023-02-27 NOTE — TELEPHONE ENCOUNTER
Caller: ESDRAS (NOT ON BH VERBAL)    Relationship: DAUGHTER       Best call back number:[phone]    What is the best time to reach you:ANYTIME     Who are you requesting to speak with (clinical staff, provider,  specific staff member):     Do you know the name of the person who called: ESDRAS  What was the call regarding: ESDRAS IS REQUESTING UPDATED MEDICATION LIST WITH FREQUENCY.     Do you require a callback: YES, SHE WOULD LIKE TO  2/27/2023

## 2023-02-27 NOTE — TELEPHONE ENCOUNTER
Explained to Mellisa that she was not on the HIPAA form. We had to talked to her previously because she was on [place]'s HIPAA form. He was at home alone and Gloria was in the hospital. Mellisa was not aware because she was out of town and just got back when we called. I explained we talked to her because it was regarding his medicine and when to take it since Gloria was in the hospital. I explained that Gloria would have to come in and update the HIPAA form.

## 2023-02-28 ENCOUNTER — READMISSION MANAGEMENT (OUTPATIENT)
Dept: CALL CENTER | Facility: HOSPITAL | Age: 88
End: 2023-02-28
Payer: MEDICARE

## 2023-02-28 VITALS
OXYGEN SATURATION: 96 % | DIASTOLIC BLOOD PRESSURE: 65 MMHG | HEIGHT: 63 IN | RESPIRATION RATE: 14 BRPM | BODY MASS INDEX: 42.97 KG/M2 | SYSTOLIC BLOOD PRESSURE: 132 MMHG | TEMPERATURE: 98.8 F | WEIGHT: 242.51 LBS | HEART RATE: 80 BPM

## 2023-02-28 LAB
ALBUMIN SERPL-MCNC: 3.2 G/DL (ref 3.5–5.2)
ALBUMIN/GLOB SERPL: 0.9 G/DL
ALP SERPL-CCNC: 184 U/L (ref 39–117)
ALT SERPL W P-5'-P-CCNC: 31 U/L (ref 1–33)
ANION GAP SERPL CALCULATED.3IONS-SCNC: 10 MMOL/L (ref 5–15)
AST SERPL-CCNC: 41 U/L (ref 1–32)
BASOPHILS # BLD AUTO: 0 10*3/MM3 (ref 0–0.2)
BASOPHILS NFR BLD AUTO: 0.6 % (ref 0–1.5)
BILIRUB SERPL-MCNC: 0.5 MG/DL (ref 0–1.2)
BUN SERPL-MCNC: 15 MG/DL (ref 8–23)
BUN/CREAT SERPL: 17.6 (ref 7–25)
CALCIUM SPEC-SCNC: 8.6 MG/DL (ref 8.6–10.5)
CHLORIDE SERPL-SCNC: 95 MMOL/L (ref 98–107)
CO2 SERPL-SCNC: 29 MMOL/L (ref 22–29)
CREAT SERPL-MCNC: 0.85 MG/DL (ref 0.57–1)
DEPRECATED RDW RBC AUTO: 76.1 FL (ref 37–54)
EGFRCR SERPLBLD CKD-EPI 2021: 67.2 ML/MIN/1.73
EOSINOPHIL # BLD AUTO: 0 10*3/MM3 (ref 0–0.4)
EOSINOPHIL NFR BLD AUTO: 1.1 % (ref 0.3–6.2)
ERYTHROCYTE [DISTWIDTH] IN BLOOD BY AUTOMATED COUNT: 20.5 % (ref 12.3–15.4)
GLOBULIN UR ELPH-MCNC: 3.6 GM/DL
GLUCOSE BLDC GLUCOMTR-MCNC: 122 MG/DL (ref 70–105)
GLUCOSE BLDC GLUCOMTR-MCNC: 151 MG/DL (ref 70–105)
GLUCOSE SERPL-MCNC: 126 MG/DL (ref 65–99)
HCT VFR BLD AUTO: 33.9 % (ref 34–46.6)
HGB BLD-MCNC: 10.9 G/DL (ref 12–15.9)
INR PPP: 1.43 (ref 2–3)
LYMPHOCYTES # BLD AUTO: 0.7 10*3/MM3 (ref 0.7–3.1)
LYMPHOCYTES NFR BLD AUTO: 16.2 % (ref 19.6–45.3)
MAGNESIUM SERPL-MCNC: 1.9 MG/DL (ref 1.6–2.4)
MCH RBC QN AUTO: 32.5 PG (ref 26.6–33)
MCHC RBC AUTO-ENTMCNC: 32.1 G/DL (ref 31.5–35.7)
MCV RBC AUTO: 101.2 FL (ref 79–97)
MONOCYTES # BLD AUTO: 0.6 10*3/MM3 (ref 0.1–0.9)
MONOCYTES NFR BLD AUTO: 14.1 % (ref 5–12)
NEUTROPHILS NFR BLD AUTO: 2.9 10*3/MM3 (ref 1.7–7)
NEUTROPHILS NFR BLD AUTO: 68 % (ref 42.7–76)
NRBC BLD AUTO-RTO: 0.3 /100 WBC (ref 0–0.2)
PHOSPHATE SERPL-MCNC: 2.6 MG/DL (ref 2.5–4.5)
PHOSPHATE SERPL-MCNC: 3 MG/DL (ref 2.5–4.5)
PLATELET # BLD AUTO: 175 10*3/MM3 (ref 140–450)
PMV BLD AUTO: 7.5 FL (ref 6–12)
POTASSIUM SERPL-SCNC: 3.8 MMOL/L (ref 3.5–5.2)
PROT SERPL-MCNC: 6.8 G/DL (ref 6–8.5)
PROTHROMBIN TIME: 14.4 SECONDS (ref 19.4–28.5)
QT INTERVAL: 380 MS
RBC # BLD AUTO: 3.35 10*6/MM3 (ref 3.77–5.28)
SODIUM SERPL-SCNC: 134 MMOL/L (ref 136–145)
WBC NRBC COR # BLD: 4.2 10*3/MM3 (ref 3.4–10.8)

## 2023-02-28 PROCEDURE — 36415 COLL VENOUS BLD VENIPUNCTURE: CPT | Performed by: NURSE PRACTITIONER

## 2023-02-28 PROCEDURE — 84100 ASSAY OF PHOSPHORUS: CPT | Performed by: INTERNAL MEDICINE

## 2023-02-28 PROCEDURE — 83735 ASSAY OF MAGNESIUM: CPT | Performed by: INTERNAL MEDICINE

## 2023-02-28 PROCEDURE — 80053 COMPREHEN METABOLIC PANEL: CPT | Performed by: NURSE PRACTITIONER

## 2023-02-28 PROCEDURE — 36410 VNPNXR 3YR/> PHY/QHP DX/THER: CPT

## 2023-02-28 PROCEDURE — C1751 CATH, INF, PER/CENT/MIDLINE: HCPCS

## 2023-02-28 PROCEDURE — 25010000002 MEROPENEM PER 100 MG: Performed by: INTERNAL MEDICINE

## 2023-02-28 PROCEDURE — 25010000002 ERTAPENEM PER 500 MG: Performed by: INTERNAL MEDICINE

## 2023-02-28 PROCEDURE — 63710000001 INSULIN LISPRO (HUMAN) PER 5 UNITS: Performed by: NURSE PRACTITIONER

## 2023-02-28 PROCEDURE — 99232 SBSQ HOSP IP/OBS MODERATE 35: CPT | Performed by: INTERNAL MEDICINE

## 2023-02-28 PROCEDURE — 85610 PROTHROMBIN TIME: CPT | Performed by: NURSE PRACTITIONER

## 2023-02-28 PROCEDURE — 82962 GLUCOSE BLOOD TEST: CPT

## 2023-02-28 PROCEDURE — 25010000002 FUROSEMIDE PER 20 MG: Performed by: INTERNAL MEDICINE

## 2023-02-28 PROCEDURE — 85025 COMPLETE CBC W/AUTO DIFF WBC: CPT | Performed by: INTERNAL MEDICINE

## 2023-02-28 PROCEDURE — 0 MAGNESIUM SULFATE 4 GM/100ML SOLUTION: Performed by: INTERNAL MEDICINE

## 2023-02-28 RX ORDER — SODIUM CHLORIDE 0.9 % (FLUSH) 0.9 %
20 SYRINGE (ML) INJECTION AS NEEDED
Status: DISCONTINUED | OUTPATIENT
Start: 2023-02-28 | End: 2023-02-28 | Stop reason: HOSPADM

## 2023-02-28 RX ORDER — WARFARIN SODIUM 3 MG/1
TABLET ORAL
Qty: 30 TABLET | Refills: 0 | Status: SHIPPED | OUTPATIENT
Start: 2023-02-28

## 2023-02-28 RX ORDER — SODIUM CHLORIDE 0.9 % (FLUSH) 0.9 %
10 SYRINGE (ML) INJECTION AS NEEDED
Status: DISCONTINUED | OUTPATIENT
Start: 2023-02-28 | End: 2023-02-28 | Stop reason: HOSPADM

## 2023-02-28 RX ORDER — SODIUM CHLORIDE 0.9 % (FLUSH) 0.9 %
10 SYRINGE (ML) INJECTION EVERY 12 HOURS SCHEDULED
Status: DISCONTINUED | OUTPATIENT
Start: 2023-02-28 | End: 2023-02-28 | Stop reason: HOSPADM

## 2023-02-28 RX ORDER — WARFARIN SODIUM 3 MG/1
TABLET ORAL
Qty: 30 TABLET | Refills: 0 | Status: SHIPPED | OUTPATIENT
Start: 2023-02-28 | End: 2023-02-28 | Stop reason: SDUPTHER

## 2023-02-28 RX ORDER — WARFARIN SODIUM 3 MG/1
3 TABLET ORAL
Status: DISCONTINUED | OUTPATIENT
Start: 2023-02-28 | End: 2023-02-28 | Stop reason: HOSPADM

## 2023-02-28 RX ADMIN — MEROPENEM 500 MG: 500 INJECTION, POWDER, FOR SOLUTION INTRAVENOUS at 09:58

## 2023-02-28 RX ADMIN — Medication 10 ML: at 09:58

## 2023-02-28 RX ADMIN — ERTAPENEM SODIUM 1 G: 1 INJECTION, POWDER, LYOPHILIZED, FOR SOLUTION INTRAMUSCULAR; INTRAVENOUS at 15:44

## 2023-02-28 RX ADMIN — FERROUS SULFATE TAB EC 324 MG (65 MG FE EQUIVALENT) 324 MG: 324 (65 FE) TABLET DELAYED RESPONSE at 09:58

## 2023-02-28 RX ADMIN — MAGNESIUM SULFATE HEPTAHYDRATE 4 G: 40 INJECTION, SOLUTION INTRAVENOUS at 09:58

## 2023-02-28 RX ADMIN — METOPROLOL SUCCINATE 50 MG: 50 TABLET, EXTENDED RELEASE ORAL at 09:58

## 2023-02-28 RX ADMIN — FOLIC ACID 1 MG: 1 TABLET ORAL at 09:58

## 2023-02-28 RX ADMIN — FUROSEMIDE 40 MG: 10 INJECTION, SOLUTION INTRAMUSCULAR; INTRAVENOUS at 09:58

## 2023-02-28 RX ADMIN — INSULIN LISPRO 2 UNITS: 100 INJECTION, SOLUTION INTRAVENOUS; SUBCUTANEOUS at 12:54

## 2023-02-28 RX ADMIN — MEROPENEM 500 MG: 500 INJECTION, POWDER, FOR SOLUTION INTRAVENOUS at 01:48

## 2023-02-28 RX ADMIN — PANTOPRAZOLE SODIUM 40 MG: 40 TABLET, DELAYED RELEASE ORAL at 05:49

## 2023-02-28 RX ADMIN — Medication 2 PACKET: at 04:24

## 2023-02-28 NOTE — OUTREACH NOTE
Prep Survey    Flowsheet Row Responses   [facility] facility patient discharged from? [facility]   Is LACE score < 7 ? No   Eligibility Not Eligible   What are the reasons patient is not eligible? [facility]   Does the patient have one of the following disease processes/diagnoses(primary or secondary)? Other   Prep survey completed? Yes          Shara GRAMAJO - Registered Nurse

## 2023-03-01 NOTE — CASE MANAGEMENT/SOCIAL WORK
Case Management Discharge Note      Final Note: [facility]    Selected Continued Care - Discharged on 2/28/2023 Admission date: 2/24/2023 - Discharge disposition: Home or Self Care    Destination Coordination complete.    Service Provider Selected Services Address Phone Fax Patient Preferred    [address] [facility] [address] [place] [address] [phone] --           Transportation Services  W/C Van: Skilled Nursing Facility Van    Final Discharge Disposition Code: 03 - skilled nursing facility (SNF)

## 2023-04-04 NOTE — TELEPHONE ENCOUNTER
Caller: Aracelis Vargas    Relationship: Self    Best call back number: [phone]    Requested Prescriptions:   FOLIC ACID     Pharmacy where request should be sent:    [facility] [place], [place] - [address] [phone]  - [phone] FX    PATIENT STATED SHE LOST THE SCRIPT FOR THIS MEDICATION BUT NEEDS IT CALLED IN      Ally ZAPATA Rep   06/09/22 08:16 EDT           
Returned call to patient and she let me know [facility] would not fill script because it was too early. Called and spoke to [place] at [facility] who states it is not insurance paid so they can fill it today. Updated patient as well.   
none

## 2023-04-17 NOTE — PROGRESS NOTES
Chief Complaint  Seizures and Syncope    Subjective          Aracelis Vargas presents to [facility] for SEIZURES-LIKE ACTIVITY  History of Present Illness  Patient is here to f/u on seizure like activity\  She has not had anymore episodes, she reports two episodes she thinks due to her heart  She quit taking keppra per pcp   She has not had the EEG          **EEG HAS NOT BEEN DONE**  ===PREV. OV 10/25/22 SYLVIA TILLMAN APRN=====  CHIEF COMPLAINT:Witnessed seizure-like activity      History of Present Illness Ms. Vargas is a very pleasant 87-year-old  female who presented today with her friend for an evaluation of possible seizure-like activity.  She reports she passed out in her living room floor and was assumed to have had a seizure.  No witnessed movements, no loss of bladder control, and new biphasic pacemaker.  The patient states that she had a pacemaker malfunction which made her pass out that it was not epileptic.       She has not had an EEG to determine that this was or was not epilepsy.  The patient would like to discontinue Keppra however I cautioned her that we should do the EEG first before we start to wean the medication.  She was most concerned that she would not be able to drive.  She was notified in the state of [place] as long as she went 3 months from the seizure with no further seizures you were released to drive and since the incident happened in February 2022 she is far beyond the 3-month interval.  The patient did agree to undergo an EEG.  I will also discussed this case with Dr. Seiple.        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 after receiving TPA, given patient's pacemaker electrophysiology consulted, no further events  -Orthostatic vital signs  -Patient missed doses of her home antiepileptic, now resumed  -Neurology evaluated patient has signed off  -ALEYDA performed by cardiology showing no signs of ventricular clot  -Patient had discussion with cardiology given no clot noted would like to defer anticoagulation at this time  -Carotid duplex no significant stenosis  -PT recommending home health  TIA-patient with recent hospitalization for focal neurological deficits received TPA  -Continue antiplatelet and statin  -Neurochecks  -Neuro has signed off  Anemia-patient being worked up by hematology at this time  -Anticoagulation held off at this time  -Further work-up will be followed up outpatient   CAD-patient denies any chest pain at this time  -Patient with underlying pacemaker evaluated by electrophysiology  -Cardiac monitoring  -Continue home goal-directed therapy  -ALEYDA ordered no PFO  -Outpatient cardiology follow-up organized   Intermittent previous Mobitz 2 and 1 block-patient with underlying pacemaker  -Management per EP/cardiology  Atrial fibrillation-concern for further possible neurological events  -Anticoagulation recommended  -Rate control medication  -Cardiac monitoring  -Cardiology consulted, cleared for discharge  Hyperammonemia-most likely secondary to underlying seizure activity  -Patient not encephalopathic   Seizure disorder-patient reports she missed a few days of her antiepileptic medication which may be an additional source for the syncopal episode  -Neurology consulted cleared patient  -Antiepileptic resumed  Aracelis Vargas is a 86 y.o. female who presented after a syncopal event and collapse at home, she denied any dizziness prior to  the fall and denied any head injury in the fall.  She does not know how long she lost consciousness, but recalls walking into the room and then suddenly waking up on the floor.  This is the first syncopal event since she had her pacemaker placed.  Cardiology is consulted to follow.     2/26/2022-2/27/2022  Patient continuing cardiogenic work-up.  Patient's pacemaker interrogated.  Electrophysiology evaluated patient discussed with neurology recommending anticoagulation given risk of stroke.  Hematology consulted given patient's anemia, work-up initiated.     2/28/2022:Patient seen after ALEYDA, no intraventricular clot no PFO.  Specialist recommendations to resume anticoagulation.  Patient denies any new symptoms.  Transition from IV to oral Keppra.  Patient denies dizziness.     3/1/2022: Patient cleared by specialist services for discharge.  Patient asymptomatic.  Follow-up organizer primary care, cardiology, hematology and neurology.  Patient able to be discharged home with home health in good condition strict return precautions given.         Current Outpatient Medications:   •  atorvastatin (LIPITOR) 10 MG tablet, TAKE ONE TABLET BY MOUTH DAILY, Disp: 90 tablet, Rfl: 0  •  ferrous sulfate 325 (65 FE) MG tablet, Take 1 tablet by mouth Daily With Breakfast. Indications: Anemia From Inadequate Iron in the Body, Disp: , Rfl:   •  folic acid (FOLVITE) 1 MG tablet, Take 1 tablet by mouth Daily., Disp: 30 tablet, Rfl: 11  •  furosemide (LASIX) 20 MG tablet, Take 4 tablets by mouth Daily., Disp: 90 tablet, Rfl: 1  •  gabapentin (NEURONTIN) 300 MG capsule, Take 1 capsule by mouth 2 (Two) Times a Day. Indications: Disease of the Peripheral Nerves, Disp: , Rfl:   •  NovoLIN 70/30 (70-30) 100 UNIT/ML injection, INJECT 30 UNITS UNDER THE SKIN INTO THE APPROPRIATE AREA TWO TIMES A DAY WITH A MEAL, Disp: 20 mL, Rfl: 3  •  omeprazole (priLOSEC) 40 MG capsule, Take 1 capsule by mouth Every Morning Before Breakfast. Take on empty  "stomach!  Indications: Gastroesophageal Reflux Disease, Disp: 90 capsule, Rfl: 1  •  topiramate (TOPAMAX) 25 MG tablet, Take 0.5 tablets by mouth 2 (Two) Times a Day., Disp: 30 tablet, Rfl: 3  •  vitamin B-12 (CYANOCOBALAMIN) 1000 MCG tablet, Take 1 tablet by mouth Daily. Indications: Inadequate Vitamin B12, Disp: 30 tablet, Rfl: 11  •  warfarin (COUMADIN) 3 MG tablet, A fib  Indications: Atrial Fibrillation, Disp: 30 tablet, Rfl: 0  •  ZINC OXIDE, TOPICAL, EX, Apply 1 application topically to the appropriate area as directed Daily. Indications: Rash and other nonspecific skin eruption, Disp: , Rfl:     Review of Systems   Respiratory: Negative for apnea.    Neurological: Negative for seizures and syncope.   All other systems reviewed and are negative.         Objective:    Vital Signs:   /76   Pulse 97   Ht 160 cm (62.99\")   Wt 110 kg (242 lb)   BMI 42.88 kg/m²     Physical Exam  Vitals reviewed.   Pulmonary:      Effort: Pulmonary effort is normal. No respiratory distress.   Neurological:      Mental Status: She is alert and oriented to person, place, and time. Mental status is at baseline.   Psychiatric:         Mood and Affect: Mood normal.        Result Review :                Neurologic Exam     Mental Status   Oriented to person, place, and time.         Assessment and Plan    Diagnoses and all orders for this visit:    1. History of seizure (Primary)     pt is now off seizure medication wo spells   Pro and con of treatment discussed   Pt wishes to go without the keppra which seems reasonable as no further spells in past 6 months      Follow Up   Return if symptoms worsen or fail to improve.  Patient was given instructions and counseling regarding her condition or for health maintenance advice. Please see specific information pulled into the AVS if appropriate.     This document has been electronically signed by Joseph Seipel, MD on April 18, 2023 15:24 EDT      "

## 2023-04-18 ENCOUNTER — OFFICE VISIT (OUTPATIENT)
Dept: NEUROLOGY | Facility: CLINIC | Age: 88
End: 2023-04-18
Payer: MEDICARE

## 2023-04-18 VITALS
BODY MASS INDEX: 42.88 KG/M2 | WEIGHT: 242 LBS | SYSTOLIC BLOOD PRESSURE: 131 MMHG | DIASTOLIC BLOOD PRESSURE: 76 MMHG | HEIGHT: 63 IN | HEART RATE: 97 BPM

## 2023-04-18 DIAGNOSIS — Z87.898 HISTORY OF SEIZURE: Primary | ICD-10-CM

## 2023-04-23 NOTE — PROGRESS NOTES
Encounter Date:04/24/2023    Last seen 3/28/2023      Patient ID: Aracelis Vargas is a 88 y.o. female.    Chief Complaint:  Status post pacemaker  Hypertension  Diabetes  Dyslipidemia    History of present illness  Patient recently was admitted to [facility] with weakness and was found to have minimal elevation of high-sensitivity troponin.  EKG showed no acute changes.  Patient did not want further cardiac work-up and was discharged home.    Since I have last seen, the patient has been without any chest discomfort ,shortness of breath, palpitations, dizziness or syncope.  Denies having any headache ,abdominal pain ,nausea, vomiting , diarrhea constipation, loss of weight or loss of appetite.  Denies having any excessive bruising ,hematuria or blood in the stool.    Review of all systems negative except as indicated.    Reviewed ROS.      ]]]]]]]]]]]]]]]]]]]]  Impression  ========  - Weakness.-Improved  Minimal elevation of high-sensitivity troponin.  EKG showed no acute changes.  Patient does not have any cardiac symptoms at this time.     -Status post permanent dual-chamber pacemaker implantation ([employer] MRI compatible) 11/3/2021     -Status post removal of loop recorder ([employer]) 11/3/2021     -History of syncope-no further problems  Intermittent Mobitz type II AV block  Patient is not on any medications to cause bradycardia     -Second-degree Mobitz type I AV block.  Narrow QRS complex.     -History of seizure-improved     -Recent difficulty with speaking.  TIA/stroke  Status post TPA with complete resolution of symptoms 2/3/2022     Transesophageal echocardiogram 2/28/2022 revealed  Mild mitral regurgitation and mild to moderate aortic regurgitation.  Mild tricuspid regurgitation  Calculated pulmonary artery pressure is 28 mmHg.  Mild biatrial enlargement.  Left atrial appendage is small.  Left ventricle is normal in size and contractility with ejection fraction of  60%.     Echocardiogram-normal 10/22/2020     Cardiac catheterization 10/23/2020 revealed:  Left ventricular size and contractility is normal with ejection fraction of 60%.  Normal epicardial coronary arteries.     Status post loop recorder placement 10/23/2020 (Medtronic Linq)     -History of paroxysmal atrial fibrillation     -Hypertension dyslipidemia diabetes     -History of seizure disorder     -Renal dysfunction CKD 3  BUN 27 creatinine 1.35     -Exogenous obesity.     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     -No known allergies  ============  Plan  =========   Status post permanent dual-chamber pacemaker implantation ([email or web address] MRI compatible) level 3/20/2021.  Status post loop recorder removal 11/3/2021 ([employer]tronic Linq)  Pacemaker site and function is normal.  Recent interrogation of the pacemaker revealed excellent pacing parameters.  Battery status is 7.5 years.     History of seizure disorder-improved.  No further episodes.      History of TIA/stroke  No further problems.  Status post TPA with complete resolution of symptoms 2/3/2022 premature ventricular contractions-asymptomatic.     Paroxysmal atrial fibrillation.  With stroke history and history of paroxysmal atrial fibrillation patient would benefit from anticoagulation.  Patient had received TPA.  However concerned about patient having falling spells.  Consideration will be given for watchman procedure.  CHADS2 score is 7.  However left atrial appendage was very small in size.  Left atrial appendage is very small and no clots or smoking effect was seen on ALEYDA.  (Watchman is not an option due to left atrial appendage being very small.)     Hypertension-well-controlled  115/67     Anticoagulation  Patient is on Coumadin.  PT/INR on a monthly basis.  .  Renal dysfunction  17/1.04-2/28/2022  BUN/creatinine   45/1.32- 11/1/2022  46/1.1-11/2/2022   34/1.06-11/3/2022  BUNs/creatinine 32/1.43-  2/24/2023     Anemia  Hemoglobin 8.6-12/4/2022  7.7-2/27/2022  Improved hemoglobin at 10.9-11/2/2022  9.5-2/24/2023    Follow-up in the office in 6 months with pacemaker interrogation.      Further plan will depend on patient's progress.  ]]]]]]]]]]]]]]                Diagnosis Plan   1. Cardiac pacemaker in situ        2. AV block, Mobitz II        3. Status post placement of cardiac pacemaker        4. Syncope and collapse        5. Atrioventricular block, Mobitz type 1, [place]        6. Essential hypertension        LAB RESULTS (LAST 7 DAYS)    CBC        BMP        CMP         BNP        TROPONIN        CoAg        Creatinine Clearance  CrCl cannot be calculated (Patient's most recent lab result is older than the maximum 30 days allowed.).    ABG        Radiology  No radiology results for the last day                The following portions of the patient's history were reviewed and updated as appropriate: allergies, current medications, past family history, past medical history, past social history, past surgical history and problem list.    Review of Systems   Constitutional: Negative for malaise/fatigue.   Cardiovascular: Negative for chest pain, dyspnea on exertion, leg swelling and palpitations.   Respiratory: Negative for cough and shortness of breath.    Gastrointestinal: Negative for abdominal pain, nausea and vomiting.   Neurological: Negative for dizziness, focal weakness, headaches, light-headedness and numbness.   All other systems reviewed and are negative.        Current Outpatient Medications:   •  atorvastatin (LIPITOR) 10 MG tablet, TAKE ONE TABLET BY MOUTH DAILY, Disp: 90 tablet, Rfl: 0  •  ferrous sulfate 325 (65 FE) MG tablet, Take 1 tablet by mouth Daily With Breakfast. Indications: Anemia From Inadequate Iron in the Body, Disp: , Rfl:   •  folic acid (FOLVITE) 1 MG tablet, Take 1 tablet by mouth Daily., Disp: 30 tablet, Rfl: 11  •  furosemide (LASIX) 20 MG tablet, Take 4 tablets by mouth  Daily., Disp: 90 tablet, Rfl: 1  •  gabapentin (NEURONTIN) 300 MG capsule, Take 1 capsule by mouth 2 (Two) Times a Day. Indications: Disease of the Peripheral Nerves, Disp: , Rfl:   •  NovoLIN 70/30 (70-30) 100 UNIT/ML injection, INJECT 30 UNITS UNDER THE SKIN INTO THE APPROPRIATE AREA TWO TIMES A DAY WITH A MEAL, Disp: 20 mL, Rfl: 3  •  omeprazole (priLOSEC) 40 MG capsule, Take 1 capsule by mouth Every Morning Before Breakfast. Take on empty stomach!  Indications: Gastroesophageal Reflux Disease, Disp: 90 capsule, Rfl: 1  •  topiramate (TOPAMAX) 25 MG tablet, Take 0.5 tablets by mouth 2 (Two) Times a Day., Disp: 30 tablet, Rfl: 3  •  vitamin B-12 (CYANOCOBALAMIN) 1000 MCG tablet, Take 1 tablet by mouth Daily. Indications: Inadequate Vitamin B12, Disp: 30 tablet, Rfl: 11  •  warfarin (COUMADIN) 3 MG tablet, A fib  Indications: Atrial Fibrillation, Disp: 30 tablet, Rfl: 0  •  ZINC OXIDE, TOPICAL, EX, Apply 1 application topically to the appropriate area as directed Daily. Indications: Rash and other nonspecific skin eruption, Disp: , Rfl:     Allergies   Allergen Reactions   • Latex, Natural Rubber Rash   • Adhesive Tape Rash       Family History   Problem Relation Age of Onset   • Heart disease Mother    • Heart disease Father        Past Surgical History:   Procedure Laterality Date   • CARDIAC CATHETERIZATION N/A 10/23/2020    Procedure: Left Heart Cath and coronary angiogram;  Surgeon: Wang Plaza MD;  Location: [facility] CATH INVASIVE LOCATION;  Service: Cardiovascular;  Laterality: N/A;   • CARDIAC ELECTROPHYSIOLOGY PROCEDURE Left 11/3/2021    Procedure: Pacemaker DC new;  Surgeon: Wang Plaza MD;  Location: [facility] CATH INVASIVE LOCATION;  Service: Cardiovascular;  Laterality: Left;   • CARDIAC ELECTROPHYSIOLOGY PROCEDURE N/A 11/3/2021    Procedure: LOOP RECORDER REMOVAL;  Surgeon: Wang Plaza MD;  Location: [facility] CATH INVASIVE LOCATION;  Service: Cardiovascular;  Laterality: N/A;   •  HYSTERECTOMY     • JOINT REPLACEMENT Right     Hip   • JOINT REPLACEMENT Left     hip   • JOINT REPLACEMENT Left     hip (for second time)   • JOINT REPLACEMENT Right     knee   • OOPHORECTOMY         Past Medical History:   Diagnosis Date   • Atherosclerosis of native coronary artery of native heart without angina pectoris 10/22/2020   • Atrioventricular block, Mobitz type 1, [place] 10/21/2020    Added automatically from request for surgery [id number]   • Hypertension associated with stage 3 chronic kidney disease due to type 2 diabetes mellitus 10/22/2020   • Morbid obesity 10/22/2020   • Secondary hyperaldosteronism 10/22/2020   • Stage 3b chronic kidney disease 10/22/2020   • Type 2 diabetes mellitus with diabetic neuropathy, with long-term current use of insulin 10/22/2020   • Type 2 diabetes mellitus with diabetic neuropathy, with long-term current use of insulin 10/22/2020       Family History   Problem Relation Age of Onset   • Heart disease Mother    • Heart disease Father        Social History     Socioeconomic History   • Marital status:    Tobacco Use   • Smoking status: Never   • Smokeless tobacco: Never   Vaping Use   • Vaping Use: Never used   Substance and Sexual Activity   • Alcohol use: Yes     Alcohol/week: 7.0 standard drinks     Types: 7 Drinks containing 0.5 oz of alcohol per week   • Drug use: Never   • Sexual activity: Defer         Procedures      Objective:       Physical Exam    There were no vitals taken for this visit.  The patient is alert, oriented and in no distress.    Vital signs as noted above.    Head and neck revealed no carotid bruits or jugular venous distension.  No thyromegaly or lymphadenopathy is present.    Lungs clear.  No wheezing.  Breath sounds are normal bilaterally.    Heart normal first and second heart sounds.  No murmur..  No pericardial rub is present.  No gallop is present.    Abdomen soft and nontender.  No organomegaly is present.    Extremities  revealed good peripheral pulses without any pedal edema.    Skin warm and dry.  Pacemaker site looks normal.    Musculoskeletal system is grossly normal.    CNS grossly normal.    Reviewed and updated.

## 2023-04-24 ENCOUNTER — CLINICAL SUPPORT NO REQUIREMENTS (OUTPATIENT)
Dept: CARDIOLOGY | Facility: CLINIC | Age: 88
End: 2023-04-24
Payer: MEDICARE

## 2023-04-24 ENCOUNTER — OFFICE VISIT (OUTPATIENT)
Dept: CARDIOLOGY | Facility: CLINIC | Age: 88
End: 2023-04-24
Payer: MEDICARE

## 2023-04-24 VITALS
BODY MASS INDEX: 42.88 KG/M2 | SYSTOLIC BLOOD PRESSURE: 115 MMHG | WEIGHT: 242 LBS | HEIGHT: 63 IN | DIASTOLIC BLOOD PRESSURE: 67 MMHG | OXYGEN SATURATION: 98 % | HEART RATE: 94 BPM

## 2023-04-24 DIAGNOSIS — Z95.0 STATUS CARDIAC PACEMAKER: ICD-10-CM

## 2023-04-24 DIAGNOSIS — Z95.0 CARDIAC PACEMAKER IN SITU: Primary | ICD-10-CM

## 2023-04-24 DIAGNOSIS — I44.1 AV BLOCK, MOBITZ II: ICD-10-CM

## 2023-04-24 DIAGNOSIS — Z95.0 STATUS POST PLACEMENT OF CARDIAC PACEMAKER: ICD-10-CM

## 2023-04-24 DIAGNOSIS — R55 SYNCOPE AND COLLAPSE: ICD-10-CM

## 2023-04-24 DIAGNOSIS — I44.1 ATRIOVENTRICULAR BLOCK, MOBITZ TYPE 1, WENCKEBACH: ICD-10-CM

## 2023-04-24 DIAGNOSIS — I44.1 AV BLOCK, MOBITZ II: Primary | ICD-10-CM

## 2023-04-24 DIAGNOSIS — I10 ESSENTIAL HYPERTENSION: ICD-10-CM

## 2023-08-16 ENCOUNTER — HOSPITAL ENCOUNTER (EMERGENCY)
Facility: HOSPITAL | Age: 88
Discharge: HOME | End: 2023-08-16
Attending: EMERGENCY MEDICINE
Payer: MEDICARE

## 2023-08-16 ENCOUNTER — APPOINTMENT (OUTPATIENT)
Dept: GENERAL RADIOLOGY | Facility: HOSPITAL | Age: 88
End: 2023-08-16
Payer: MEDICARE

## 2023-08-16 ENCOUNTER — APPOINTMENT (OUTPATIENT)
Dept: CARDIOLOGY | Facility: HOSPITAL | Age: 88
End: 2023-08-16
Payer: MEDICARE

## 2023-08-16 VITALS
WEIGHT: 240 LBS | HEIGHT: 63 IN | OXYGEN SATURATION: 98 % | BODY MASS INDEX: 42.52 KG/M2 | SYSTOLIC BLOOD PRESSURE: 137 MMHG | HEART RATE: 81 BPM | TEMPERATURE: 98.9 F | DIASTOLIC BLOOD PRESSURE: 58 MMHG | RESPIRATION RATE: 18 BRPM

## 2023-08-16 DIAGNOSIS — M79.605 PAIN OF LEFT LOWER EXTREMITY: Primary | ICD-10-CM

## 2023-08-16 LAB
ANION GAP SERPL CALCULATED.3IONS-SCNC: 12 MMOL/L (ref 5–15)
APTT PPP: 41.6 SECONDS (ref 61–76.5)
BASOPHILS # BLD AUTO: 0 10*3/MM3 (ref 0–0.2)
BASOPHILS NFR BLD AUTO: 1.2 % (ref 0–1.5)
BH CV LOWER VASCULAR LEFT COMMON FEMORAL AUGMENT: NORMAL
BH CV LOWER VASCULAR LEFT COMMON FEMORAL COMPETENT: NORMAL
BH CV LOWER VASCULAR LEFT COMMON FEMORAL COMPRESS: NORMAL
BH CV LOWER VASCULAR LEFT COMMON FEMORAL PHASIC: NORMAL
BH CV LOWER VASCULAR LEFT COMMON FEMORAL SPONT: NORMAL
BH CV LOWER VASCULAR LEFT DISTAL FEMORAL COMPRESS: NORMAL
BH CV LOWER VASCULAR LEFT GASTRONEMIUS COMPRESS: NORMAL
BH CV LOWER VASCULAR LEFT GREATER SAPH AK COMPRESS: NORMAL
BH CV LOWER VASCULAR LEFT GREATER SAPH BK COMPRESS: NORMAL
BH CV LOWER VASCULAR LEFT LESSER SAPH COMPRESS: NORMAL
BH CV LOWER VASCULAR LEFT MID FEMORAL AUGMENT: NORMAL
BH CV LOWER VASCULAR LEFT MID FEMORAL COMPETENT: NORMAL
BH CV LOWER VASCULAR LEFT MID FEMORAL COMPRESS: NORMAL
BH CV LOWER VASCULAR LEFT MID FEMORAL PHASIC: NORMAL
BH CV LOWER VASCULAR LEFT MID FEMORAL SPONT: NORMAL
BH CV LOWER VASCULAR LEFT PERONEAL COMPRESS: NORMAL
BH CV LOWER VASCULAR LEFT POPLITEAL AUGMENT: NORMAL
BH CV LOWER VASCULAR LEFT POPLITEAL COMPETENT: NORMAL
BH CV LOWER VASCULAR LEFT POPLITEAL COMPRESS: NORMAL
BH CV LOWER VASCULAR LEFT POPLITEAL PHASIC: NORMAL
BH CV LOWER VASCULAR LEFT POPLITEAL SPONT: NORMAL
BH CV LOWER VASCULAR LEFT POSTERIOR TIBIAL COMPRESS: NORMAL
BH CV LOWER VASCULAR LEFT PROXIMAL FEMORAL COMPRESS: NORMAL
BH CV LOWER VASCULAR LEFT SAPHENOFEMORAL JUNCTION COMPRESS: NORMAL
BH CV LOWER VASCULAR RIGHT COMMON FEMORAL AUGMENT: NORMAL
BH CV LOWER VASCULAR RIGHT COMMON FEMORAL COMPETENT: NORMAL
BH CV LOWER VASCULAR RIGHT COMMON FEMORAL COMPRESS: NORMAL
BH CV LOWER VASCULAR RIGHT COMMON FEMORAL PHASIC: NORMAL
BH CV LOWER VASCULAR RIGHT COMMON FEMORAL SPONT: NORMAL
BH CV VAS PRELIMINARY FINDINGS SCRIPTING: 1
BUN SERPL-MCNC: 32 MG/DL (ref 8–23)
BUN/CREAT SERPL: 20.8 (ref 7–25)
CALCIUM SPEC-SCNC: 8.7 MG/DL (ref 8.6–10.5)
CHLORIDE SERPL-SCNC: 98 MMOL/L (ref 98–107)
CO2 SERPL-SCNC: 25 MMOL/L (ref 22–29)
CREAT SERPL-MCNC: 1.54 MG/DL (ref 0.57–1)
DEPRECATED RDW RBC AUTO: 46.8 FL (ref 37–54)
EGFRCR SERPLBLD CKD-EPI 2021: 32.7 ML/MIN/1.73
EOSINOPHIL # BLD AUTO: 0.5 10*3/MM3 (ref 0–0.4)
EOSINOPHIL NFR BLD AUTO: 12.1 % (ref 0.3–6.2)
ERYTHROCYTE [DISTWIDTH] IN BLOOD BY AUTOMATED COUNT: 14.4 % (ref 12.3–15.4)
GLUCOSE SERPL-MCNC: 113 MG/DL (ref 65–99)
HCT VFR BLD AUTO: 36.2 % (ref 34–46.6)
HGB BLD-MCNC: 11.9 G/DL (ref 12–15.9)
INR PPP: 1.93 (ref 2–3)
LYMPHOCYTES # BLD AUTO: 1.5 10*3/MM3 (ref 0.7–3.1)
LYMPHOCYTES NFR BLD AUTO: 35.5 % (ref 19.6–45.3)
MCH RBC QN AUTO: 30.6 PG (ref 26.6–33)
MCHC RBC AUTO-ENTMCNC: 33 G/DL (ref 31.5–35.7)
MCV RBC AUTO: 92.9 FL (ref 79–97)
MONOCYTES # BLD AUTO: 0.4 10*3/MM3 (ref 0.1–0.9)
MONOCYTES NFR BLD AUTO: 10.4 % (ref 5–12)
NEUTROPHILS NFR BLD AUTO: 1.7 10*3/MM3 (ref 1.7–7)
NEUTROPHILS NFR BLD AUTO: 40.8 % (ref 42.7–76)
NRBC BLD AUTO-RTO: 0.1 /100 WBC (ref 0–0.2)
PLATELET # BLD AUTO: 165 10*3/MM3 (ref 140–450)
PMV BLD AUTO: 7.2 FL (ref 6–12)
POTASSIUM SERPL-SCNC: 3.6 MMOL/L (ref 3.5–5.2)
PROTHROMBIN TIME: 19.9 SECONDS (ref 19.4–28.5)
RBC # BLD AUTO: 3.89 10*6/MM3 (ref 3.77–5.28)
SODIUM SERPL-SCNC: 135 MMOL/L (ref 136–145)
WBC NRBC COR # BLD: 4.2 10*3/MM3 (ref 3.4–10.8)

## 2023-08-16 PROCEDURE — 73590 X-RAY EXAM OF LOWER LEG: CPT

## 2023-08-16 PROCEDURE — 99284 EMERGENCY DEPT VISIT MOD MDM: CPT

## 2023-08-16 PROCEDURE — 73502 X-RAY EXAM HIP UNI 2-3 VIEWS: CPT

## 2023-08-16 PROCEDURE — 80048 BASIC METABOLIC PNL TOTAL CA: CPT | Performed by: PHYSICIAN ASSISTANT

## 2023-08-16 PROCEDURE — 85025 COMPLETE CBC W/AUTO DIFF WBC: CPT | Performed by: PHYSICIAN ASSISTANT

## 2023-08-16 PROCEDURE — 85730 THROMBOPLASTIN TIME PARTIAL: CPT | Performed by: PHYSICIAN ASSISTANT

## 2023-08-16 PROCEDURE — 85610 PROTHROMBIN TIME: CPT | Performed by: PHYSICIAN ASSISTANT

## 2023-08-16 PROCEDURE — 73562 X-RAY EXAM OF KNEE 3: CPT

## 2023-08-16 PROCEDURE — 73552 X-RAY EXAM OF FEMUR 2/>: CPT

## 2023-08-16 PROCEDURE — 93971 EXTREMITY STUDY: CPT

## 2023-08-16 RX ORDER — SODIUM CHLORIDE 0.9 % (FLUSH) 0.9 %
10 SYRINGE (ML) INJECTION AS NEEDED
Status: DISCONTINUED | OUTPATIENT
Start: 2023-08-16 | End: 2023-08-16 | Stop reason: HOSPADM

## 2023-08-16 NOTE — ED PROVIDER NOTES
Subjective   History of Present Illness  Patient is an 88-year-old female sent in from [facility] with reports of left lower extremity pain over the past week.  She states it starts in her hip and goes down to her ankle she states it is worse with movements and ambulation better with rest.  She denies any low back pain, saddle anesthesia, bladder or bowel incontinence.  She denies any recent falls or injury.  Patient was found to be COVID-positive 2 days ago.  She is currently on warfarin.  States they check it regularly but is not sure how it typically results as I do not tell her.  She states when she stands most of the pain is in her hip and groin.  No urinary complaints.  No new swelling redness or warmth of the leg.  No chest pain or shortness of breath.  No reports of fever.  States she typically ambulates with a walker.    History provided by:  Patient    Review of Systems   Constitutional: Negative.    Respiratory:  Negative for shortness of breath.    Cardiovascular:  Positive for leg swelling (Chronic no change). Negative for chest pain and palpitations.   Gastrointestinal:  Negative for abdominal pain, diarrhea, nausea and vomiting.   Musculoskeletal:  Positive for arthralgias, gait problem and myalgias. Negative for neck pain and neck stiffness.   Skin:  Negative for rash.   Neurological:  Negative for dizziness, syncope, weakness, light-headedness, numbness and headaches.   Hematological:  Bruises/bleeds easily.     Past Medical History:   Diagnosis Date    Atherosclerosis of native coronary artery of native heart without angina pectoris 10/22/2020    Atrioventricular block, Mobitz type 1, [place] 10/21/2020    Added automatically from request for surgery [id number]    Hypertension associated with stage 3 chronic kidney disease due to type 2 diabetes mellitus 10/22/2020    Morbid obesity 10/22/2020    Secondary hyperaldosteronism 10/22/2020    Stage 3b chronic kidney disease 10/22/2020    Type 2  diabetes mellitus with diabetic neuropathy, with long-term current use of insulin 10/22/2020    Type 2 diabetes mellitus with diabetic neuropathy, with long-term current use of insulin 10/22/2020       Allergies   Allergen Reactions    Latex, Natural Rubber Rash    Adhesive Tape Rash       Past Surgical History:   Procedure Laterality Date    CARDIAC CATHETERIZATION N/A 10/23/2020    Procedure: Left Heart Cath and coronary angiogram;  Surgeon: Wang Plaza MD;  Location: [facility] CATH INVASIVE LOCATION;  Service: Cardiovascular;  Laterality: N/A;    CARDIAC ELECTROPHYSIOLOGY PROCEDURE Left 11/3/2021    Procedure: Pacemaker DC new;  Surgeon: Wang Plaza MD;  Location: [facility] CATH INVASIVE LOCATION;  Service: Cardiovascular;  Laterality: Left;    CARDIAC ELECTROPHYSIOLOGY PROCEDURE N/A 11/3/2021    Procedure: LOOP RECORDER REMOVAL;  Surgeon: Wang Plaza MD;  Location: [facility] CATH INVASIVE LOCATION;  Service: Cardiovascular;  Laterality: N/A;    HYSTERECTOMY      JOINT REPLACEMENT Right     Hip    JOINT REPLACEMENT Left     hip    JOINT REPLACEMENT Left     hip (for second time)    JOINT REPLACEMENT Right     knee    OOPHORECTOMY         Family History   Problem Relation Age of Onset    Heart disease Mother     Heart disease Father        Social History     Socioeconomic History    Marital status:    Tobacco Use    Smoking status: Never     Passive exposure: Never    Smokeless tobacco: Never   Vaping Use    Vaping Use: Never used   Substance and Sexual Activity    Alcohol use: Yes     Alcohol/week: 7.0 standard drinks     Types: 7 Drinks containing 0.5 oz of alcohol per week    Drug use: Never    Sexual activity: Defer           Objective   Physical Exam  Vitals and nursing note reviewed.   Constitutional:       General: She is not in acute distress.     Appearance: Normal appearance. She is well-developed. She is obese. She is not ill-appearing, toxic-appearing or diaphoretic.   HENT:      Head:  Normocephalic and atraumatic.      Mouth/Throat:      Mouth: Mucous membranes are moist.      Pharynx: Oropharynx is clear.   Eyes:      General: No scleral icterus.     Extraocular Movements: Extraocular movements intact.      Pupils: Pupils are equal, round, and reactive to light.   Cardiovascular:      Rate and Rhythm: Normal rate and regular rhythm.      Pulses: Normal pulses.      Heart sounds: No murmur heard.    No friction rub. No gallop.   Pulmonary:      Effort: Pulmonary effort is normal. No respiratory distress.      Breath sounds: Normal breath sounds. No stridor. No wheezing, rhonchi or rales.   Chest:      Chest wall: No tenderness.   Abdominal:      General: Bowel sounds are normal. There is no distension. There are no signs of injury.      Palpations: Abdomen is soft.      Tenderness: There is no abdominal tenderness. There is no right CVA tenderness, left CVA tenderness, guarding or rebound.      Hernia: No hernia is present.   Musculoskeletal:      Cervical back: No bony tenderness. No pain with movement.      Thoracic back: No bony tenderness. Normal range of motion.      Lumbar back: No swelling, edema, deformity, signs of trauma or bony tenderness. Decreased range of motion.      Left hip: Bony tenderness present. No deformity or lacerations. Decreased range of motion.      Left upper leg: Bony tenderness present. No swelling.      Left knee: No swelling, deformity, ecchymosis or bony tenderness. Decreased range of motion.      Left lower leg: No swelling or bony tenderness.      Left ankle: Normal.        Legs:       Comments: 2 scabs noted where patient reports she was itching.  There is no signs of secondary infection.   Skin:     General: Skin is warm.      Capillary Refill: Capillary refill takes less than 2 seconds.      Coloration: Skin is not cyanotic, jaundiced or pale.      Findings: No rash.   Neurological:      General: No focal deficit present.      Mental Status: She is alert and  "oriented to person, place, and time.   Psychiatric:         Mood and Affect: Mood normal.         Behavior: Behavior normal.       Procedures           ED Course    /58 (BP Location: Right arm, Patient Position: Lying)   Pulse 81   Temp 98.9 øF (37.2 øC) (Oral)   Resp 18   Ht 160 cm (63\")   Wt 109 kg (240 lb)   SpO2 98%   BMI 42.51 kg/mý   Medications   sodium chloride 0.9 % flush 10 mL (has no administration in time range)     Labs Reviewed   BASIC METABOLIC PANEL - Abnormal; Notable for the following components:       Result Value    Glucose 113 (*)     BUN 32 (*)     Creatinine 1.54 (*)     Sodium 135 (*)     eGFR 32.7 (*)     All other components within normal limits    Narrative:     GFR Normal >60  Chronic Kidney Disease <60  Kidney Failure <15    The GFR formula is only valid for adults with stable renal function between ages 18 and 70.   PROTIME-INR - Abnormal; Notable for the following components:    INR 1.93 (*)     All other components within normal limits   APTT - Abnormal; Notable for the following components:    PTT 41.6 (*)     All other components within normal limits   CBC WITH AUTO DIFFERENTIAL - Abnormal; Notable for the following components:    Hemoglobin 11.9 (*)     Neutrophil % 40.8 (*)     Eosinophil % 12.1 (*)     Eosinophils, Absolute 0.50 (*)     All other components within normal limits   CBC AND DIFFERENTIAL    Narrative:     The following orders were created for panel order CBC & Differential.  Procedure                               Abnormality         Status                     ---------                               -----------         ------                     CBC Auto Differential[[id number]]        Abnormal            Final result                 Please view results for these tests on the individual orders.     XR Knee 3 View Left   Final Result   Impression:   1.There are degenerative changes which have progressed since prior study.         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   2.No definite acute osseous abnormality of the left femur or tibia-fibula.   3.Osseous demineralization   4.Small loose body in the area of the knee joint suggested.   5.Soft tissue swelling about the ankle.         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   2.No definite acute osseous abnormality of the left femur or tibia-fibula.   3.Osseous demineralization   4.Small loose body in the area of the knee joint suggested.   5.Soft tissue swelling about the ankle.         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   2.No definite acute osseous abnormality of the left femur or tibia-fibula.   3.Osseous demineralization   4.Small loose body in the area of the knee joint suggested.   5.Soft tissue swelling about the ankle.         Electronically Signed: Andrea Griffith MD     8/16/2023 5:42 PM EDT     Workstation ID: [id number]                                               Medical Decision Making  Chart Review: Discharge summary reviewed from 2/28/2023 admitted for generalized weakness and tarry stools found to be fluid overloaded and eventually diagnosed with ESBL UTI        Comorbidity: As per past medical history  Differentials: Fracture, dislocation, contusion, sprain, strain, DVT      ;this list is not all inclusive and does not constitute the entirety of considered causes    Labs: As above  Radiology: My interpretation x-ray shows no obvious fracture or dislocation correlated with radiologist  interpretations as below  XR Knee 3 View Left   Final Result    Impression:    1.There are degenerative changes which have progressed since prior study.            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    2.No definite acute osseous abnormality of the left femur or tibia-fibula.    3.Osseous demineralization    4.Small loose body in the area of the knee joint suggested.    5.Soft tissue swelling about the ankle.            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    2.No definite acute osseous abnormality of the left femur or tibia-fibula.    3.Osseous demineralization    4.Small loose body in the area of the knee joint suggested.    5.Soft tissue swelling about the ankle.            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    2.No definite acute osseous abnormality of the left femur or tibia-fibula.    3.Osseous demineralization    4.Small loose body in the area of the knee joint suggested.    5.Soft tissue swelling about the ankle.            Electronically Signed: Andrea Griffith MD      8/16/2023 5:42 PM EDT      Workstation ID: [id number]     Disposition/Treatment:  Appropriate PPE was worn during exam and throughout all encounters with the patient.  When the ED IV was placed and labs were obtained patient was placed on proper monitor she was afebrile and appeared nontoxic presented with complaints of left leg pain does report recent COVID diagnosis earlier this week denies any chest pain shortness of  breath or fever.    Ultrasound negative for DVT.  Images were also obtained which showed no acute osseous abnormalities.  It was noted in the hip x-ray that she had a small loose body in the area of the knee joint therefore x-ray of knee was ordered which showed degenerative changes.  There are no signs of cellulitis noted of the left leg.    Labs show no leukocytosis hemoglobin 11.9 on chart review patient does have a history of anemia actually better than it has been over the past 6 months usually in the 9-10 range.  Metabolic panel shows glucose 113 BUN 32 creatinine 1.54 chart review 5 months ago creatinine 15 BUN 0.85 patient does report history of CKD stage III.     In regards to her leg pain she is now able to ambulate without significant difficulty.  There are no signs of secondary infection of the leg.  DVT was ruled out with ultrasound today.  No acute fractures noted on imaging.  Findings were discussed with the patient at bedside voiced understanding of discharge along with signs and symptoms to return.  I did discuss following up with PCP in regards to repeat metabolic panel to monitor her kidney function.  She was in agreement with plan of discharge all questions were answered at bedside.    This document is intended for medical expert use only. Reading of this document by patients and/or patient's family without participating medical staff guidance may result in misinterpretation and unintended morbidity.  Any interpretation of such data is the responsibility of the patient and/or family member responsible for the patient in concert with their primary or specialist providers, not to be left for sources of online searches such as [employer], [employer] or similar queries. Relying on these approaches to knowledge may result in misinterpretation, misguided goals of care and even death should patients or family members try recommendations outside of the realm of professional medical care in a supervised inpatient  environment.       Amount and/or Complexity of Data Reviewed  External Data Reviewed: notes.  Labs: ordered. Decision-making details documented in ED Course.  Radiology: ordered. Decision-making details documented in ED Course.    Risk  Prescription drug management.        Final diagnoses:   Pain of left lower extremity       ED Disposition  ED Disposition       ED Disposition   Discharge    Condition   Stable    Comment   --               Gabriel Goss MD  [address]  [place] [address] [phone]    Schedule an appointment as soon as possible for a visit in 3 days      [facility] EMERGENCY DEPARTMENT  [facility] [facility] [address] [phone]  Go to   If symptoms worsen         Medication List      No changes were made to your prescriptions during this visit.            Pamela Yu PA  08/16/23 2029

## 2023-08-16 NOTE — ED NOTES
Pt c/o left lower leg pain radiating up to her hip. Pt is alert and oriented and states she cannot move that leg without a lot of pain.

## 2023-08-17 NOTE — DISCHARGE INSTRUCTIONS
Tylenol as needed for pain.    Follow-up with your primary care provider in 3-5 days to recheck your kidney function.  If you do not have a primary care provider call [phone] for help in finding one, or you may follow up with [facility] at [phone].    Return to ED for any new or worsening symptoms.    Plenty of clear fluids and get plenty of rest.    Return to ED for any new or worsening symptoms

## 2023-10-22 NOTE — PROGRESS NOTES
Encounter Date:11/07/2023    Last seen-4/24/2023      Patient ID: Aracelis Vargas is a 88 y.o. female.      Chief Complaint:  Status post pacemaker  Hypertension  Diabetes  Dyslipidemia     History of present illness  Since I have last seen, the patient has been without any chest discomfort ,shortness of breath, palpitations, dizziness or syncope.  Denies having any headache ,abdominal pain ,nausea, vomiting , diarrhea constipation, loss of weight or loss of appetite.  Denies having any excessive bruising ,hematuria or blood in the stool.    Review of all systems negative except as indicated.    Reviewed ROS.       ]]]]]]]]]]]]]]]]]]]]  History  ========  -Status post permanent dual-chamber pacemaker implantation ([place] [employer] MRI compatible) 11/3/2021     -Status post removal of loop recorder (Medtronic) 11/3/2021     -History of syncope-no further problems  Intermittent Mobitz type II AV block  Patient is not on any medications to cause bradycardia     -Second-degree Mobitz type I AV block.  Narrow QRS complex.     -History of seizure-improved     -Recent difficulty with speaking.  TIA/stroke  Status post TPA with complete resolution of symptoms 2/3/2022     Transesophageal echocardiogram 2/28/2022 revealed  Mild mitral regurgitation and mild to moderate aortic regurgitation.  Mild tricuspid regurgitation  Calculated pulmonary artery pressure is 28 mmHg.  Mild biatrial enlargement.  Left atrial appendage is small.  Left ventricle is normal in size and contractility with ejection fraction of 60%.     Echocardiogram-normal 10/22/2020     Cardiac catheterization 10/23/2020 revealed:  Left ventricular size and contractility is normal with ejection fraction of 60%.  Normal epicardial coronary arteries.     Status post loop recorder placement 10/23/2020 (Medtronic Linq)     -History of paroxysmal atrial fibrillation     -Hypertension dyslipidemia diabetes     -History of seizure disorder     -Renal dysfunction CKD  3  BUN 27 creatinine 1.35     -Exogenous obesity.     -Status post right and left hip replacement right knee replacement     -Non-smoker.     -No known allergies  ============  Plan  =========   Status post permanent dual-chamber pacemaker implantation ([place] [employer] MRI compatible) level 3/20/2021.  Status post loop recorder removal 11/3/2021 (Medtronic Linq)  Pacemaker site and function is normal.  Recent interrogation of the pacemaker revealed excellent pacing parameters.  Battery status is 6.5 years.     History of seizure disorder-improved.  No further episodes.      History of TIA/stroke  No further problems.  Status post TPA with complete resolution of symptoms 2/3/2022 premature ventricular contractions-asymptomatic.     Paroxysmal atrial fibrillation.  With stroke history and history of paroxysmal atrial fibrillation patient would benefit from anticoagulation.  Patient had received TPA.  However concerned about patient having falling spells.  Consideration will be given for watchman procedure.  CHADS2 score is 7.  However left atrial appendage was very small in size.  Left atrial appendage is very small and no clots or smoking effect was seen on ALEYDA.  (Watchman is not an option due to left atrial appendage being very small.)     Hypertension-well-controlled  118/52    Anticoagulation  Patient is on Coumadin.  PT/INR on a monthly basis.  .  Renal dysfunction    BUNs/creatinine 32/1.43- 2/24/2023     Anemia    9.5-2/24/2023     Follow-up in the office in 6 months with pacemaker interrogation.      Further plan will depend on patient's progress.    Reviewed and updated-11/7/2023  ]]]]]]]]]]]]]]          Diagnosis Plan   1. AV block, Mobitz II        2. Status cardiac pacemaker        3. Cardiac pacemaker in situ        4. Status post placement of cardiac pacemaker        5. Altered mental status, unspecified altered mental status type        6. Essential hypertension        7. Atrioventricular block,  Mobitz type 1, Mitch        8. Syncope and collapse        LAB RESULTS (LAST 7 DAYS)    CBC        BMP        CMP         BNP        TROPONIN        CoAg        Creatinine Clearance  CrCl cannot be calculated (Patient's most recent lab result is older than the maximum 30 days allowed.).    ABG        Radiology  No radiology results for the last day                The following portions of the patient's history were reviewed and updated as appropriate: allergies, current medications, past family history, past medical history, past social history, past surgical history, and problem list.    Review of Systems   Constitutional: Negative for malaise/fatigue.   Cardiovascular:  Negative for chest pain, leg swelling, palpitations and syncope.   Respiratory:  Negative for shortness of breath.    Skin:  Negative for rash.   Gastrointestinal:  Negative for nausea and vomiting.   Neurological:  Negative for dizziness, light-headedness and numbness.   All other systems reviewed and are negative.        Current Outpatient Medications:   •  atorvastatin (LIPITOR) 10 MG tablet, TAKE ONE TABLET BY MOUTH DAILY, Disp: 90 tablet, Rfl: 0  •  ferrous sulfate 325 (65 FE) MG tablet, Take 1 tablet by mouth Daily With Breakfast. Indications: Anemia From Inadequate Iron in the Body, Disp: , Rfl:   •  folic acid (FOLVITE) 1 MG tablet, Take 1 tablet by mouth Daily., Disp: 30 tablet, Rfl: 11  •  furosemide (LASIX) 20 MG tablet, Take 4 tablets by mouth Daily., Disp: 90 tablet, Rfl: 1  •  gabapentin (NEURONTIN) 300 MG capsule, Take 1 capsule by mouth 2 (Two) Times a Day. Indications: Disease of the Peripheral Nerves, Disp: , Rfl:   •  NovoLIN 70/30 (70-30) 100 UNIT/ML injection, INJECT 30 UNITS UNDER THE SKIN INTO THE APPROPRIATE AREA TWO TIMES A DAY WITH A MEAL, Disp: 20 mL, Rfl: 3  •  omeprazole (priLOSEC) 40 MG capsule, Take 1 capsule by mouth Every Morning Before Breakfast. Take on empty stomach!  Indications: Gastroesophageal Reflux  Disease, Disp: 90 capsule, Rfl: 1  •  topiramate (TOPAMAX) 25 MG tablet, Take 0.5 tablets by mouth 2 (Two) Times a Day., Disp: 30 tablet, Rfl: 3  •  vitamin B-12 (CYANOCOBALAMIN) 1000 MCG tablet, Take 1 tablet by mouth Daily. Indications: Inadequate Vitamin B12, Disp: 30 tablet, Rfl: 11  •  warfarin (COUMADIN) 3 MG tablet, A fib  Indications: Atrial Fibrillation, Disp: 30 tablet, Rfl: 0  •  ZINC OXIDE, TOPICAL, EX, Apply 1 application topically to the appropriate area as directed Daily. Indications: Rash and other nonspecific skin eruption, Disp: , Rfl:     Allergies   Allergen Reactions   • Latex, Natural Rubber Rash   • Adhesive Tape Rash       Family History   Problem Relation Age of Onset   • Heart disease Mother    • Heart disease Father        Past Surgical History:   Procedure Laterality Date   • CARDIAC CATHETERIZATION N/A 10/23/2020    Procedure: Left Heart Cath and coronary angiogram;  Surgeon: Wang Plaza MD;  Location: [facility] CATH INVASIVE LOCATION;  Service: Cardiovascular;  Laterality: N/A;   • CARDIAC ELECTROPHYSIOLOGY PROCEDURE Left 11/3/2021    Procedure: Pacemaker DC new;  Surgeon: Wang Plaza MD;  Location: [facility] CATH INVASIVE LOCATION;  Service: Cardiovascular;  Laterality: Left;   • CARDIAC ELECTROPHYSIOLOGY PROCEDURE N/A 11/3/2021    Procedure: LOOP RECORDER REMOVAL;  Surgeon: Wang Plaza MD;  Location: [facility] CATH INVASIVE LOCATION;  Service: Cardiovascular;  Laterality: N/A;   • HYSTERECTOMY     • JOINT REPLACEMENT Right     Hip   • JOINT REPLACEMENT Left     hip   • JOINT REPLACEMENT Left     hip (for second time)   • JOINT REPLACEMENT Right     knee   • OOPHORECTOMY         Past Medical History:   Diagnosis Date   • Atherosclerosis of native coronary artery of native heart without angina pectoris 10/22/2020   • Atrioventricular block, Mobitz type 1, Mitch 10/21/2020    Added automatically from request for surgery [id number]   • Hypertension associated with stage 3  chronic kidney disease due to type 2 diabetes mellitus 10/22/2020   • Morbid obesity 10/22/2020   • Secondary hyperaldosteronism 10/22/2020   • Stage 3b chronic kidney disease 10/22/2020   • Type 2 diabetes mellitus with diabetic neuropathy, with long-term current use of insulin 10/22/2020   • Type 2 diabetes mellitus with diabetic neuropathy, with long-term current use of insulin 10/22/2020       Family History   Problem Relation Age of Onset   • Heart disease Mother    • Heart disease Father        Social History     Socioeconomic History   • Marital status:    Tobacco Use   • Smoking status: Never     Passive exposure: Never   • Smokeless tobacco: Never   Vaping Use   • Vaping Use: Never used   Substance and Sexual Activity   • Alcohol use: Yes     Alcohol/week: 7.0 standard drinks of alcohol     Types: 7 Drinks containing 0.5 oz of alcohol per week   • Drug use: Never   • Sexual activity: Defer           ECG 12 Lead    Date/Time: 11/7/2023 3:17 PM  Performed by: Wang Plaza MD    Authorized by: Wang Plaza MD  Comparison: compared with previous ECG   Similar to previous ECG  Comparison to previous ECG: Atrial sensed ventricular paced rhythm 75/min nonspecific ST-T wave changes no ectopy no significant change from previous EKG.        Objective:       Physical Exam    /52 (BP Location: Left arm, Patient Position: Sitting, Cuff Size: Large Adult)   Pulse 76   Wt 85.7 kg (189 lb)   SpO2 95%   BMI 33.48 kg/m²   The patient is alert, oriented and in no distress.    Vital signs as noted above.    Head and neck revealed no carotid bruits or jugular venous distension.  No thyromegaly or lymphadenopathy is present.    Lungs clear.  No wheezing.  Breath sounds are normal bilaterally.    Heart normal first and second heart sounds.  No murmur..  No pericardial rub is present.  No gallop is present.    Abdomen soft and nontender.  No organomegaly is present.    Extremities revealed good  peripheral pulses without any pedal edema.    Skin warm and dry.  Pacemaker site looks normal.    Musculoskeletal system is grossly normal.    CNS grossly normal.    Reviewed and updated.

## 2023-11-07 ENCOUNTER — CLINICAL SUPPORT NO REQUIREMENTS (OUTPATIENT)
Dept: CARDIOLOGY | Facility: CLINIC | Age: 88
End: 2023-11-07
Payer: MEDICARE

## 2023-11-07 ENCOUNTER — OFFICE VISIT (OUTPATIENT)
Dept: CARDIOLOGY | Facility: CLINIC | Age: 88
End: 2023-11-07
Payer: MEDICARE

## 2023-11-07 VITALS
HEART RATE: 76 BPM | WEIGHT: 189 LBS | OXYGEN SATURATION: 95 % | BODY MASS INDEX: 33.48 KG/M2 | DIASTOLIC BLOOD PRESSURE: 52 MMHG | SYSTOLIC BLOOD PRESSURE: 118 MMHG

## 2023-11-07 DIAGNOSIS — Z95.0 STATUS CARDIAC PACEMAKER: ICD-10-CM

## 2023-11-07 DIAGNOSIS — I44.1 ATRIOVENTRICULAR BLOCK, MOBITZ TYPE 1, WENCKEBACH: ICD-10-CM

## 2023-11-07 DIAGNOSIS — R41.82 ALTERED MENTAL STATUS, UNSPECIFIED ALTERED MENTAL STATUS TYPE: ICD-10-CM

## 2023-11-07 DIAGNOSIS — I44.1 AV BLOCK, MOBITZ II: Primary | ICD-10-CM

## 2023-11-07 DIAGNOSIS — I10 ESSENTIAL HYPERTENSION: ICD-10-CM

## 2023-11-07 DIAGNOSIS — Z95.0 CARDIAC PACEMAKER IN SITU: ICD-10-CM

## 2023-11-07 DIAGNOSIS — Z95.0 STATUS POST PLACEMENT OF CARDIAC PACEMAKER: ICD-10-CM

## 2023-11-07 DIAGNOSIS — R55 SYNCOPE AND COLLAPSE: ICD-10-CM

## 2023-11-07 PROCEDURE — 99214 OFFICE O/P EST MOD 30 MIN: CPT | Performed by: INTERNAL MEDICINE

## 2023-11-07 PROCEDURE — 93000 ELECTROCARDIOGRAM COMPLETE: CPT | Performed by: INTERNAL MEDICINE

## 2023-11-07 PROCEDURE — 1160F RVW MEDS BY RX/DR IN RCRD: CPT | Performed by: INTERNAL MEDICINE

## 2023-11-07 PROCEDURE — 1159F MED LIST DOCD IN RCRD: CPT | Performed by: INTERNAL MEDICINE

## 2023-11-07 PROCEDURE — 93280 PM DEVICE PROGR EVAL DUAL: CPT | Performed by: INTERNAL MEDICINE

## 2024-05-08 NOTE — PROGRESS NOTES
Encounter Date:05/23/2024  Last seen 11/7/2023      Patient ID: Aracelis Vargas is a 89 y.o. female.    Chief Complaint:  Status post pacemaker  Hypertension  Diabetes  Dyslipidemia     History of present illness    Since I have last seen, the patient has been without any chest discomfort ,shortness of breath, palpitations, dizziness or syncope.  Denies having any headache ,abdominal pain ,nausea, vomiting , diarrhea constipation, loss of weight or loss of appetite.  Denies having any excessive bruising ,hematuria or blood in the stool.    Review of all systems negative except as indicated.    Reviewed ROS.     ]]]]]]]]]]]]]]]]]]]]  History  ========  -Status post permanent dual-chamber pacemaker implantation ([place] [employer] MRI compatible) 11/3/2021     -Status post removal of loop recorder (Medtronic) 11/3/2021     -History of syncope-no further problems  Intermittent Mobitz type II AV block  Patient is not on any medications to cause bradycardia     -Second-degree Mobitz type I AV block.  Narrow QRS complex.     -History of seizure-improved     -Recent difficulty with speaking.  TIA/stroke  Status post TPA with complete resolution of symptoms 2/3/2022     Transesophageal echocardiogram 2/28/2022 revealed  Mild mitral regurgitation and mild to moderate aortic regurgitation.  Mild tricuspid regurgitation  Calculated pulmonary artery pressure is 28 mmHg.  Mild biatrial enlargement.  Left atrial appendage is small.  Left ventricle is normal in size and contractility with ejection fraction of 60%.     Echocardiogram-normal 10/22/2020     Cardiac catheterization 10/23/2020 revealed:  Left ventricular size and contractility is normal with ejection fraction of 60%.  Normal epicardial coronary arteries.     Status post loop recorder placement 10/23/2020 (Medtronic Linq)     -History of paroxysmal atrial fibrillation     -Hypertension dyslipidemia diabetes     -History of seizure disorder     -Renal dysfunction CKD 3  BUN  27 creatinine 1.35     -Exogenous obesity.     -Status post right and left hip replacement right knee replacement     -Non-smoker.     -No known allergies  ============  Plan  =========   Status post permanent dual-chamber pacemaker implantation ([place] [employer] MRI compatible) level 3/20/2021.  Status post loop recorder removal 11/3/2021 (Medtronic Linq)  Pacemaker site and function is normal.  Recent interrogation of the pacemaker revealed excellent pacing parameters.-5/23/2024  Battery status is 6 years.    EKG was not performed today 5/23/2024     History of seizure disorder-improved.  No further episodes.      History of TIA/stroke  No further problems.  Status post TPA with complete resolution of symptoms 2/3/2022 premature ventricular contractions-asymptomatic.     Paroxysmal atrial fibrillation.  With stroke history and history of paroxysmal atrial fibrillation patient would benefit from anticoagulation.  Patient had received TPA.  However concerned about patient having falling spells.  Consideration was given for watchman procedure.  CHADS2 score is 7.  However left atrial appendage was very small in size.  Left atrial appendage is very small and no clots or smoking effect was seen on ALEYDA.  (Watchman is not an option due to left atrial appendage being very small.)     Hypertension-well-controlled  118/52     Anticoagulation  Patient is on Coumadin.  PT/INR on a monthly basis.  .  Renal dysfunction  BUNs/creatinine 32/1.43- 2/24/2023     Anemia  9.5-2/24/2023     Follow-up in the office in 6 months with pacemaker interrogation.      Further plan will depend on patient's progress.     Reviewed and updated-5/23/2024  ]]]]]]]]]]]]]]            Diagnosis Plan   1. AV block, Mobitz II        2. Essential hypertension        3. Status cardiac pacemaker        4. Atrioventricular block, Mobitz type 1, Wenckebach        5. Cardiac pacemaker in situ        6. Syncope and collapse        7. Status post placement of  cardiac pacemaker        LAB RESULTS (LAST 7 DAYS)    CBC        BMP        CMP         BNP        TROPONIN        CoAg        Creatinine Clearance  CrCl cannot be calculated (Patient's most recent lab result is older than the maximum 30 days allowed.).    ABG        Radiology  No radiology results for the last day                The following portions of the patient's history were reviewed and updated as appropriate: allergies, current medications, past family history, past medical history, past social history, past surgical history, and problem list.    Review of Systems   Constitutional: Negative for malaise/fatigue.   Cardiovascular:  Negative for chest pain, dyspnea on exertion, leg swelling and palpitations.   Respiratory:  Negative for cough and shortness of breath.    Gastrointestinal:  Negative for abdominal pain, nausea and vomiting.   Neurological:  Negative for dizziness, focal weakness, headaches, light-headedness and numbness.   All other systems reviewed and are negative.      Current Outpatient Medications:     atorvastatin (LIPITOR) 10 MG tablet, TAKE ONE TABLET BY MOUTH DAILY, Disp: 90 tablet, Rfl: 0    ferrous sulfate 325 (65 FE) MG tablet, Take 1 tablet by mouth Daily With Breakfast. Indications: Anemia From Inadequate Iron in the Body, Disp: , Rfl:     folic acid (FOLVITE) 1 MG tablet, Take 1 tablet by mouth Daily., Disp: 30 tablet, Rfl: 11    furosemide (LASIX) 20 MG tablet, Take 4 tablets by mouth Daily., Disp: 90 tablet, Rfl: 1    gabapentin (NEURONTIN) 300 MG capsule, Take 1 capsule by mouth 2 (Two) Times a Day. Indications: Disease of the Peripheral Nerves, Disp: , Rfl:     NovoLIN 70/30 (70-30) 100 UNIT/ML injection, INJECT 30 UNITS UNDER THE SKIN INTO THE APPROPRIATE AREA TWO TIMES A DAY WITH A MEAL, Disp: 20 mL, Rfl: 3    omeprazole (priLOSEC) 40 MG capsule, Take 1 capsule by mouth Every Morning Before Breakfast. Take on empty stomach!  Indications: Gastroesophageal Reflux Disease, Disp:  90 capsule, Rfl: 1    topiramate (TOPAMAX) 25 MG tablet, Take 0.5 tablets by mouth 2 (Two) Times a Day., Disp: 30 tablet, Rfl: 3    vitamin B-12 (CYANOCOBALAMIN) 1000 MCG tablet, Take 1 tablet by mouth Daily. Indications: Inadequate Vitamin B12, Disp: 30 tablet, Rfl: 11    warfarin (COUMADIN) 3 MG tablet, A fib  Indications: Atrial Fibrillation, Disp: 30 tablet, Rfl: 0    ZINC OXIDE, TOPICAL, EX, Apply 1 application topically to the appropriate area as directed Daily. Indications: Rash and other nonspecific skin eruption, Disp: , Rfl:     Allergies   Allergen Reactions    Latex, Natural Rubber Rash    Adhesive Tape Rash       Family History   Problem Relation Age of Onset    Heart disease Mother     Heart disease Father        Past Surgical History:   Procedure Laterality Date    CARDIAC CATHETERIZATION N/A 10/23/2020    Procedure: Left Heart Cath and coronary angiogram;  Surgeon: Wang Plaza MD;  Location: [facility] CATH INVASIVE LOCATION;  Service: Cardiovascular;  Laterality: N/A;    CARDIAC ELECTROPHYSIOLOGY PROCEDURE Left 11/3/2021    Procedure: Pacemaker DC new;  Surgeon: Wang Plaza MD;  Location: [facility] CATH INVASIVE LOCATION;  Service: Cardiovascular;  Laterality: Left;    CARDIAC ELECTROPHYSIOLOGY PROCEDURE N/A 11/3/2021    Procedure: LOOP RECORDER REMOVAL;  Surgeon: Wang Plaza MD;  Location: [facility] CATH INVASIVE LOCATION;  Service: Cardiovascular;  Laterality: N/A;    HYSTERECTOMY      JOINT REPLACEMENT Right     Hip    JOINT REPLACEMENT Left     hip    JOINT REPLACEMENT Left     hip (for second time)    JOINT REPLACEMENT Right     knee    OOPHORECTOMY         Past Medical History:   Diagnosis Date    Atherosclerosis of native coronary artery of native heart without angina pectoris 10/22/2020    Atrioventricular block, Mobitz type 1, Mitch 10/21/2020    Added automatically from request for surgery [id number]    Hypertension associated with stage 3 chronic kidney disease due to type 2  diabetes mellitus 10/22/2020    Morbid obesity 10/22/2020    Secondary hyperaldosteronism 10/22/2020    Stage 3b chronic kidney disease 10/22/2020    Type 2 diabetes mellitus with diabetic neuropathy, with long-term current use of insulin 10/22/2020    Type 2 diabetes mellitus with diabetic neuropathy, with long-term current use of insulin 10/22/2020       Family History   Problem Relation Age of Onset    Heart disease Mother     Heart disease Father        Social History     Socioeconomic History    Marital status:    Tobacco Use    Smoking status: Never     Passive exposure: Never    Smokeless tobacco: Never   Vaping Use    Vaping status: Never Used   Substance and Sexual Activity    Alcohol use: Yes     Alcohol/week: 7.0 standard drinks of alcohol     Types: 7 Drinks containing 0.5 oz of alcohol per week    Drug use: Never    Sexual activity: Defer         Procedures      Objective:       Physical Exam    There were no vitals taken for this visit.  The patient is alert, oriented and in no distress.    Vital signs as noted above.    Head and neck revealed no carotid bruits or jugular venous distension.  No thyromegaly or lymphadenopathy is present.    Lungs clear.  No wheezing.  Breath sounds are normal bilaterally.    Heart normal first and second heart sounds.  No murmur..  No pericardial rub is present.  No gallop is present.    Abdomen soft and nontender.  No organomegaly is present.    Extremities revealed good peripheral pulses without any pedal edema.    Skin warm and dry.  Pacemaker site looks normal.    Musculoskeletal system is grossly normal.    CNS grossly normal.    Reviewed and updated.

## 2024-05-23 ENCOUNTER — OFFICE VISIT (OUTPATIENT)
Dept: CARDIOLOGY | Facility: CLINIC | Age: 89
End: 2024-05-23
Payer: MEDICARE

## 2024-05-23 ENCOUNTER — CLINICAL SUPPORT NO REQUIREMENTS (OUTPATIENT)
Dept: CARDIOLOGY | Facility: CLINIC | Age: 89
End: 2024-05-23
Payer: MEDICARE

## 2024-05-23 VITALS
HEIGHT: 63 IN | WEIGHT: 190 LBS | SYSTOLIC BLOOD PRESSURE: 141 MMHG | OXYGEN SATURATION: 97 % | BODY MASS INDEX: 33.66 KG/M2 | HEART RATE: 77 BPM | DIASTOLIC BLOOD PRESSURE: 61 MMHG

## 2024-05-23 DIAGNOSIS — I44.1 ATRIOVENTRICULAR BLOCK, MOBITZ TYPE 1, WENCKEBACH: ICD-10-CM

## 2024-05-23 DIAGNOSIS — I44.1 AV BLOCK, MOBITZ II: Primary | ICD-10-CM

## 2024-05-23 DIAGNOSIS — I10 ESSENTIAL HYPERTENSION: ICD-10-CM

## 2024-05-23 DIAGNOSIS — Z95.0 STATUS POST PLACEMENT OF CARDIAC PACEMAKER: ICD-10-CM

## 2024-05-23 DIAGNOSIS — R55 SYNCOPE AND COLLAPSE: ICD-10-CM

## 2024-05-23 DIAGNOSIS — Z95.0 STATUS CARDIAC PACEMAKER: ICD-10-CM

## 2024-05-23 DIAGNOSIS — Z95.0 CARDIAC PACEMAKER IN SITU: ICD-10-CM

## 2024-05-23 PROCEDURE — 1159F MED LIST DOCD IN RCRD: CPT | Performed by: INTERNAL MEDICINE

## 2024-05-23 PROCEDURE — 99214 OFFICE O/P EST MOD 30 MIN: CPT | Performed by: INTERNAL MEDICINE

## 2024-05-23 PROCEDURE — 1160F RVW MEDS BY RX/DR IN RCRD: CPT | Performed by: INTERNAL MEDICINE

## 2024-06-10 NOTE — CASE MANAGEMENT/SOCIAL WORK
Discharge Planning Assessment   Angelo     Patient Name: Aracelis Vargas  MRN: [id number]  Today's Date: 12/23/2022    Admit Date: 12/22/2022    Plan: Return home.   Discharge Needs Assessment     Row Name 12/23/22 1002       Living Environment    People in Home spouse    Current Living Arrangements home    Primary Care Provided by self    Provides Primary Care For no one    Family Caregiver if Needed other (see comments)  daughter-in-law    Able to Return to Prior Arrangements yes       Resource/Environmental Concerns    Resource/Environmental Concerns none    Transportation Concerns none       Transition Planning    Patient/Family Anticipates Transition to home with family    Patient/Family Anticipated Services at Transition none    Transportation Anticipated family or friend will provide       Discharge Needs Assessment    Readmission Within the Last 30 Days no previous admission in last 30 days    Concerns to be Addressed no discharge needs identified    Anticipated Changes Related to Illness none    Current Discharge Risk substance use/abuse               Discharge Plan     Row Name 12/23/22 1003       Plan    Plan Return home.    Patient/Family in Agreement with Plan yes    Plan Comments Met with pt in room. Pt said she is IADLs and drives, and lives with her spouse. PCP verified. Denies trouble affording home medications. Pt daughter-in-law to provide transportation at d/c.              Continued Care and Services - Admitted Since 12/22/2022         Selected Continued Care - Prior Encounters Includes continued care and service providers with selected services from prior encounters from 9/23/2022 to 12/23/2022    Discharged on 11/3/2022 Admission date: 11/1/2022 - Discharge disposition: Home-Health Care c    Home Medical Care     Service Provider Selected Services Address Phone Fax Patient Preferred    [facility] [address] [facility] [place] [address] [phone] --                     Expected Discharge Date and Time     Expected Discharge Date Expected Discharge Time    Dec 23, 2022          Demographic Summary     Row Name 12/23/22 1001       General Information    Admission Type observation    Arrived From emergency department    Required Notices Provided Observation Status Notice    Referral Source admission list    Reason for Consult discharge planning    Preferred Language English               Functional Status     Row Name 12/23/22 1001       Functional Status    Usual Activity Tolerance good    Current Activity Tolerance good       Functional Status, IADL    Medications independent    Meal Preparation independent    Housekeeping independent    Laundry independent    Shopping independent       Mental Status    General Appearance WDL WDL       Mental Status Summary    Recent Changes in Mental Status/Cognitive Functioning no changes                      Patient Forms     Row Name 12/23/22 1001       Patient Forms    Patient Observation Letter Delivered    Delivered to Patient    Method of delivery In person                  Mimi Lawrence RN     General Sunscreen Counseling: I recommended a broad spectrum sunscreen with a SPF of 30 or higher.  I explained that SPF 30 sunscreens block approximately 97 percent of the sun's harmful rays.  Sunscreens should be applied at least 15 minutes prior to expected sun exposure and then every 2 hours after that as long as sun exposure continues. If swimming or exercising sunscreen should be reapplied every 45 minutes to an hour after getting wet or sweating.  One ounce, or the equivalent of a shot glass full of sunscreen, is adequate to protect the skin not covered by a bathing suit. I also recommended a lip balm with a sunscreen as well. Sun protective clothing can be used in lieu of sunscreen but must be worn the entire time you are exposed to the sun's rays. Detail Level: Zone

## 2024-11-22 ENCOUNTER — APPOINTMENT (OUTPATIENT)
Dept: GENERAL RADIOLOGY | Facility: HOSPITAL | Age: 89
End: 2024-11-22
Payer: MEDICARE

## 2024-11-22 ENCOUNTER — APPOINTMENT (OUTPATIENT)
Dept: CT IMAGING | Facility: HOSPITAL | Age: 89
End: 2024-11-22
Payer: MEDICARE

## 2024-11-22 ENCOUNTER — HOSPITAL ENCOUNTER (INPATIENT)
Facility: HOSPITAL | Age: 89
LOS: 1 days | Discharge: HOME | End: 2024-11-23
Attending: EMERGENCY MEDICINE | Admitting: FAMILY MEDICINE
Payer: MEDICARE

## 2024-11-22 DIAGNOSIS — R47.1 DYSARTHRIA: Primary | ICD-10-CM

## 2024-11-22 LAB
ABO GROUP BLD: NORMAL
ALBUMIN SERPL-MCNC: 3.8 G/DL (ref 3.5–5.2)
ALBUMIN/GLOB SERPL: 1 G/DL
ALP SERPL-CCNC: 78 U/L (ref 39–117)
ALT SERPL W P-5'-P-CCNC: 11 U/L (ref 1–33)
ANION GAP SERPL CALCULATED.3IONS-SCNC: 10.4 MMOL/L (ref 5–15)
APTT PPP: 38.7 SECONDS (ref 22.7–35.4)
AST SERPL-CCNC: 22 U/L (ref 1–32)
BASOPHILS # BLD AUTO: 0.06 10*3/MM3 (ref 0–0.2)
BASOPHILS NFR BLD AUTO: 1.3 % (ref 0–1.5)
BILIRUB SERPL-MCNC: 0.4 MG/DL (ref 0–1.2)
BLD GP AB SCN SERPL QL: NEGATIVE
BUN SERPL-MCNC: 21 MG/DL (ref 8–23)
BUN/CREAT SERPL: 14.9 (ref 7–25)
CALCIUM SPEC-SCNC: 9.6 MG/DL (ref 8.6–10.5)
CHLORIDE SERPL-SCNC: 97 MMOL/L (ref 98–107)
CO2 SERPL-SCNC: 29.6 MMOL/L (ref 22–29)
CREAT SERPL-MCNC: 1.41 MG/DL (ref 0.57–1)
DEPRECATED RDW RBC AUTO: 52.7 FL (ref 37–54)
EGFRCR SERPLBLD CKD-EPI 2021: 36.2 ML/MIN/1.73
EOSINOPHIL # BLD AUTO: 0.21 10*3/MM3 (ref 0–0.4)
EOSINOPHIL NFR BLD AUTO: 4.4 % (ref 0.3–6.2)
ERYTHROCYTE [DISTWIDTH] IN BLOOD BY AUTOMATED COUNT: 15.9 % (ref 12.3–15.4)
ETHANOL UR QL: <0.01 %
FOLATE SERPL-MCNC: 10.1 NG/ML (ref 4.78–24.2)
GLOBULIN UR ELPH-MCNC: 3.8 GM/DL
GLUCOSE BLDC GLUCOMTR-MCNC: 142 MG/DL (ref 70–105)
GLUCOSE BLDC GLUCOMTR-MCNC: 144 MG/DL (ref 70–105)
GLUCOSE BLDC GLUCOMTR-MCNC: 155 MG/DL (ref 70–105)
GLUCOSE SERPL-MCNC: 117 MG/DL (ref 65–99)
HCT VFR BLD AUTO: 37.6 % (ref 34–46.6)
HGB BLD-MCNC: 11.5 G/DL (ref 12–15.9)
HOLD SPECIMEN: NORMAL
HOLD SPECIMEN: NORMAL
IMM GRANULOCYTES # BLD AUTO: 0.01 10*3/MM3 (ref 0–0.05)
IMM GRANULOCYTES NFR BLD AUTO: 0.2 % (ref 0–0.5)
INR PPP: 1.18 (ref 0.9–1.1)
LYMPHOCYTES # BLD AUTO: 1.38 10*3/MM3 (ref 0.7–3.1)
LYMPHOCYTES NFR BLD AUTO: 29.1 % (ref 19.6–45.3)
MCH RBC QN AUTO: 27.8 PG (ref 26.6–33)
MCHC RBC AUTO-ENTMCNC: 30.6 G/DL (ref 31.5–35.7)
MCV RBC AUTO: 90.8 FL (ref 79–97)
MONOCYTES # BLD AUTO: 0.36 10*3/MM3 (ref 0.1–0.9)
MONOCYTES NFR BLD AUTO: 7.6 % (ref 5–12)
NEUTROPHILS NFR BLD AUTO: 2.72 10*3/MM3 (ref 1.7–7)
NEUTROPHILS NFR BLD AUTO: 57.4 % (ref 42.7–76)
NRBC BLD AUTO-RTO: 0 /100 WBC (ref 0–0.2)
PLATELET # BLD AUTO: 188 10*3/MM3 (ref 140–450)
PMV BLD AUTO: 9.7 FL (ref 6–12)
POTASSIUM SERPL-SCNC: 3.5 MMOL/L (ref 3.5–5.2)
PROT SERPL-MCNC: 7.6 G/DL (ref 6–8.5)
PROTHROMBIN TIME: 15.1 SECONDS (ref 11.7–14.2)
RBC # BLD AUTO: 4.14 10*6/MM3 (ref 3.77–5.28)
RH BLD: POSITIVE
SODIUM SERPL-SCNC: 137 MMOL/L (ref 136–145)
T&S EXPIRATION DATE: NORMAL
TROPONIN T SERPL HS-MCNC: 35 NG/L
TSH SERPL DL<=0.05 MIU/L-ACNC: 0.98 UIU/ML (ref 0.27–4.2)
VIT B12 BLD-MCNC: 790 PG/ML (ref 211–946)
WBC NRBC COR # BLD AUTO: 4.74 10*3/MM3 (ref 3.4–10.8)
WHOLE BLOOD HOLD COAG: NORMAL
WHOLE BLOOD HOLD SPECIMEN: NORMAL

## 2024-11-22 PROCEDURE — 99285 EMERGENCY DEPT VISIT HI MDM: CPT

## 2024-11-22 PROCEDURE — 82607 VITAMIN B-12: CPT

## 2024-11-22 PROCEDURE — 86900 BLOOD TYPING SEROLOGIC ABO: CPT | Performed by: EMERGENCY MEDICINE

## 2024-11-22 PROCEDURE — 85025 COMPLETE CBC W/AUTO DIFF WBC: CPT

## 2024-11-22 PROCEDURE — 25510000001 IOPAMIDOL PER 1 ML: Performed by: EMERGENCY MEDICINE

## 2024-11-22 PROCEDURE — 86850 RBC ANTIBODY SCREEN: CPT | Performed by: EMERGENCY MEDICINE

## 2024-11-22 PROCEDURE — 70496 CT ANGIOGRAPHY HEAD: CPT

## 2024-11-22 PROCEDURE — 82077 ASSAY SPEC XCP UR&BREATH IA: CPT | Performed by: EMERGENCY MEDICINE

## 2024-11-22 PROCEDURE — 70498 CT ANGIOGRAPHY NECK: CPT

## 2024-11-22 PROCEDURE — 86901 BLOOD TYPING SEROLOGIC RH(D): CPT | Performed by: EMERGENCY MEDICINE

## 2024-11-22 PROCEDURE — 80061 LIPID PANEL: CPT

## 2024-11-22 PROCEDURE — 84484 ASSAY OF TROPONIN QUANT: CPT | Performed by: EMERGENCY MEDICINE

## 2024-11-22 PROCEDURE — 63710000001 INSULIN LISPRO (HUMAN) PER 5 UNITS

## 2024-11-22 PROCEDURE — 82746 ASSAY OF FOLIC ACID SERUM: CPT

## 2024-11-22 PROCEDURE — 82948 REAGENT STRIP/BLOOD GLUCOSE: CPT

## 2024-11-22 PROCEDURE — 36415 COLL VENOUS BLD VENIPUNCTURE: CPT

## 2024-11-22 PROCEDURE — 87635 SARS-COV-2 COVID-19 AMP PRB: CPT | Performed by: FAMILY MEDICINE

## 2024-11-22 PROCEDURE — 25810000003 SODIUM CHLORIDE 0.9 % SOLUTION: Performed by: EMERGENCY MEDICINE

## 2024-11-22 PROCEDURE — 93005 ELECTROCARDIOGRAM TRACING: CPT | Performed by: EMERGENCY MEDICINE

## 2024-11-22 PROCEDURE — 99221 1ST HOSP IP/OBS SF/LOW 40: CPT | Performed by: PSYCHIATRY & NEUROLOGY

## 2024-11-22 PROCEDURE — 85730 THROMBOPLASTIN TIME PARTIAL: CPT | Performed by: EMERGENCY MEDICINE

## 2024-11-22 PROCEDURE — 84443 ASSAY THYROID STIM HORMONE: CPT

## 2024-11-22 PROCEDURE — 70450 CT HEAD/BRAIN W/O DYE: CPT

## 2024-11-22 PROCEDURE — 83036 HEMOGLOBIN GLYCOSYLATED A1C: CPT | Performed by: PSYCHIATRY & NEUROLOGY

## 2024-11-22 PROCEDURE — 85610 PROTHROMBIN TIME: CPT | Performed by: EMERGENCY MEDICINE

## 2024-11-22 PROCEDURE — 71045 X-RAY EXAM CHEST 1 VIEW: CPT

## 2024-11-22 PROCEDURE — 0042T HC CT CEREBRAL PERFUSION W/WO CONTRAST: CPT

## 2024-11-22 PROCEDURE — 80053 COMPREHEN METABOLIC PANEL: CPT | Performed by: EMERGENCY MEDICINE

## 2024-11-22 PROCEDURE — 85025 COMPLETE CBC W/AUTO DIFF WBC: CPT | Performed by: EMERGENCY MEDICINE

## 2024-11-22 RX ORDER — POTASSIUM CHLORIDE 750 MG/1
10 CAPSULE, EXTENDED RELEASE ORAL DAILY
COMMUNITY

## 2024-11-22 RX ORDER — METOPROLOL SUCCINATE 50 MG/1
50 TABLET, EXTENDED RELEASE ORAL
COMMUNITY

## 2024-11-22 RX ORDER — BISACODYL 10 MG
10 SUPPOSITORY, RECTAL RECTAL DAILY PRN
Status: DISCONTINUED | OUTPATIENT
Start: 2024-11-22 | End: 2024-11-23 | Stop reason: HOSPADM

## 2024-11-22 RX ORDER — VALSARTAN 40 MG/1
40 TABLET ORAL
COMMUNITY

## 2024-11-22 RX ORDER — NICOTINE POLACRILEX 4 MG
15 LOZENGE BUCCAL
Status: DISCONTINUED | OUTPATIENT
Start: 2024-11-22 | End: 2024-11-23 | Stop reason: HOSPADM

## 2024-11-22 RX ORDER — DEXTROSE MONOHYDRATE 25 G/50ML
25 INJECTION, SOLUTION INTRAVENOUS
Status: DISCONTINUED | OUTPATIENT
Start: 2024-11-22 | End: 2024-11-23 | Stop reason: HOSPADM

## 2024-11-22 RX ORDER — IOPAMIDOL 755 MG/ML
50 INJECTION, SOLUTION INTRAVASCULAR
Status: COMPLETED | OUTPATIENT
Start: 2024-11-22 | End: 2024-11-22

## 2024-11-22 RX ORDER — POLYETHYLENE GLYCOL 3350 17 G/17G
17 POWDER, FOR SOLUTION ORAL DAILY PRN
Status: DISCONTINUED | OUTPATIENT
Start: 2024-11-22 | End: 2024-11-23 | Stop reason: HOSPADM

## 2024-11-22 RX ORDER — SEMAGLUTIDE 1.34 MG/ML
1.5 INJECTION, SOLUTION SUBCUTANEOUS WEEKLY
COMMUNITY

## 2024-11-22 RX ORDER — IBUPROFEN 600 MG/1
1 TABLET ORAL
Status: DISCONTINUED | OUTPATIENT
Start: 2024-11-22 | End: 2024-11-23 | Stop reason: HOSPADM

## 2024-11-22 RX ORDER — POTASSIUM CHLORIDE 1500 MG/1
40 TABLET, EXTENDED RELEASE ORAL EVERY 4 HOURS
Status: COMPLETED | OUTPATIENT
Start: 2024-11-22 | End: 2024-11-22

## 2024-11-22 RX ORDER — ONDANSETRON 2 MG/ML
4 INJECTION INTRAMUSCULAR; INTRAVENOUS EVERY 6 HOURS PRN
Status: DISCONTINUED | OUTPATIENT
Start: 2024-11-22 | End: 2024-11-22

## 2024-11-22 RX ORDER — SPIRONOLACTONE 25 MG/1
25 TABLET ORAL DAILY
COMMUNITY

## 2024-11-22 RX ORDER — ATORVASTATIN CALCIUM 40 MG/1
40 TABLET, FILM COATED ORAL NIGHTLY
Status: DISCONTINUED | OUTPATIENT
Start: 2024-11-22 | End: 2024-11-23 | Stop reason: HOSPADM

## 2024-11-22 RX ORDER — INSULIN LISPRO 100 [IU]/ML
2-7 INJECTION, SOLUTION INTRAVENOUS; SUBCUTANEOUS
Status: DISCONTINUED | OUTPATIENT
Start: 2024-11-22 | End: 2024-11-23 | Stop reason: HOSPADM

## 2024-11-22 RX ORDER — DAPAGLIFLOZIN 10 MG/1
1 TABLET, FILM COATED ORAL DAILY
COMMUNITY

## 2024-11-22 RX ORDER — IOPAMIDOL 755 MG/ML
100 INJECTION, SOLUTION INTRAVASCULAR
Status: COMPLETED | OUTPATIENT
Start: 2024-11-22 | End: 2024-11-22

## 2024-11-22 RX ORDER — TRAZODONE HYDROCHLORIDE 100 MG/1
100 TABLET ORAL NIGHTLY
COMMUNITY

## 2024-11-22 RX ORDER — SODIUM CHLORIDE 0.9 % (FLUSH) 0.9 %
10 SYRINGE (ML) INJECTION AS NEEDED
Status: DISCONTINUED | OUTPATIENT
Start: 2024-11-22 | End: 2024-11-23 | Stop reason: HOSPADM

## 2024-11-22 RX ORDER — AMOXICILLIN 250 MG
2 CAPSULE ORAL 2 TIMES DAILY PRN
Status: DISCONTINUED | OUTPATIENT
Start: 2024-11-22 | End: 2024-11-23 | Stop reason: HOSPADM

## 2024-11-22 RX ORDER — NITROGLYCERIN 0.4 MG/1
0.4 TABLET SUBLINGUAL
Status: DISCONTINUED | OUTPATIENT
Start: 2024-11-22 | End: 2024-11-23 | Stop reason: HOSPADM

## 2024-11-22 RX ORDER — NICOTINE POLACRILEX 4 MG
15 LOZENGE BUCCAL
COMMUNITY

## 2024-11-22 RX ORDER — FUROSEMIDE 40 MG/1
40 TABLET ORAL 2 TIMES DAILY
Status: ON HOLD | COMMUNITY
End: 2024-11-23

## 2024-11-22 RX ORDER — PHENOL 1.4 %
1 AEROSOL, SPRAY (ML) MUCOUS MEMBRANE
COMMUNITY

## 2024-11-22 RX ORDER — ONDANSETRON 4 MG/1
4 TABLET, ORALLY DISINTEGRATING ORAL EVERY 6 HOURS PRN
Status: DISCONTINUED | OUTPATIENT
Start: 2024-11-22 | End: 2024-11-22

## 2024-11-22 RX ORDER — IBUPROFEN 600 MG/1
600 TABLET, FILM COATED ORAL EVERY 8 HOURS PRN
COMMUNITY
End: 2024-11-23 | Stop reason: HOSPADM

## 2024-11-22 RX ORDER — BISACODYL 5 MG/1
5 TABLET, DELAYED RELEASE ORAL DAILY PRN
Status: DISCONTINUED | OUTPATIENT
Start: 2024-11-22 | End: 2024-11-23 | Stop reason: HOSPADM

## 2024-11-22 RX ADMIN — POTASSIUM CHLORIDE 40 MEQ: 1500 TABLET, EXTENDED RELEASE ORAL at 20:07

## 2024-11-22 RX ADMIN — IOPAMIDOL 100 ML: 755 INJECTION, SOLUTION INTRAVENOUS at 08:21

## 2024-11-22 RX ADMIN — POTASSIUM CHLORIDE 40 MEQ: 1500 TABLET, EXTENDED RELEASE ORAL at 16:07

## 2024-11-22 RX ADMIN — INSULIN LISPRO 2 UNITS: 100 INJECTION, SOLUTION INTRAVENOUS; SUBCUTANEOUS at 18:15

## 2024-11-22 RX ADMIN — SODIUM CHLORIDE 500 ML: 9 INJECTION, SOLUTION INTRAVENOUS at 08:23

## 2024-11-22 RX ADMIN — IOPAMIDOL 50 ML: 755 INJECTION, SOLUTION INTRAVENOUS at 08:20

## 2024-11-22 RX ADMIN — Medication 10 ML: at 20:07

## 2024-11-22 RX ADMIN — ATORVASTATIN CALCIUM 40 MG: 40 TABLET, FILM COATED ORAL at 20:07

## 2024-11-22 NOTE — LETTER
"    EMS Transport Request  For use at [facility], [place], [place], [place], and [place] only   Patient Name: Aracelis Vargas : 1935   Weight:87.1 kg (192 lb 0.3 oz) Pick-up Location: [facility] BLS/ALS: BLS/ALS: BLS   Insurance: MEDICARE Auth End Date:    Pre-Cert #: D/C Summary complete:    Destination:    Contact Precautions: {Precautions:86541}   Equipment (O2, Fluids, etc.): {Equipment:45132}   Arrive By Date/Time: *** Stretcher/WC: {Stretcher/WC:09202}   CM Requesting: Kathi Banuelos RN Ext: ***   Notes/Medical Necessity: ***     ______________________________________________________________________    *Only 2 patient bags OR 1 carry-on size bag are permitted.  Wheelchairs and walkers CANNOT transported with the patient. Acknowledge: {Acknowledge:68325::\"Yes\"}    "

## 2024-11-22 NOTE — ED NOTES
Patient NIH 1 in CT. Just slurred speech. Pt did say she woke up with blurry vision but has resolved since.

## 2024-11-22 NOTE — DISCHARGE PLACEMENT REQUEST
"Aracelis Vargas \"Gloria\" (89 y.o. Female)       Date of Birth   1935    Social Security Number       Address   [facility] [address] [place] [address] [place] [address]    Home Phone   [phone]    MRN   [id number]       Lutheran   Sabianism    Marital Status                               Admission Date   11/22/24    Admission Type   Emergency    Admitting Provider   Melissa Sharma MD    Attending Provider   Melissa Sharma MD    Department, Room/Bed   [facility] EMERGENCY DEPARTMENT, 12/12       Discharge Date       Discharge Disposition       Discharge Destination                                 Attending Provider: Melissa Sharma MD    Allergies: Latex, Natural Rubber, Adhesive Tape    Isolation: Contact   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)   Code Status: Prior    Ht: 160 cm (63\")   Wt: 94.2 kg (207 lb 10.8 oz)    Admission Cmt: None   Principal Problem: Dysarthria [R47.1]                   Active Insurance as of 11/22/2024       Primary Coverage       Payor Plan Insurance Group Employer/Plan Group    MEDICARE MEDICARE A & B        Payor Plan Address Payor Plan Phone Number Payor Plan Fax Number Effective Dates    [address] [phone]  3/1/2002 - None Entered    [facility] [id number]         Subscriber Name Subscriber Birth Date Member ID       ARACELIS VARGAS 1935 [id number]               Secondary Coverage       Payor Plan Insurance Group Employer/Plan Group    ANTH BLUE CROSS [facility] SUPP [id number]       Payor Plan Address Payor Plan Phone Number Payor Plan Fax Number Effective Dates    [address]   12/1/2016 - None Entered    [facility] [id number]         Subscriber Name Subscriber Birth Date Member ID       ARACELIS VARGAS 1935 [id number]               Tertiary Coverage       Payor Plan Insurance Group Employer/Plan Group    [place]        Payor Plan Address Payor Plan Phone Number Payor Plan Fax Number Effective Dates    [address] [address]   " 8/1/2023 - None Entered    [place] [address]         Subscriber Name Subscriber Birth Date Member ID       SEAN MOBLEY 1935 [id number]                     Emergency Contacts        (Rel.) Home Phone Work Phone Mobile Phone    BRYANSHAUN (Relative) [phone] -- [phone]

## 2024-11-22 NOTE — ED NOTES
Patient brought in by EMS from [facility] with concerns of possible stroke. Patient went to bed at 2200 yesterday and woke up around 0200 this morning with slurred speech and blurred vision. Patient initially placed in ER room where code stroke was called. Patient blurred vision has resolved since her arrival but slurred speech is still noted. Patient unsure if she has neuro or cardiac history, nurse noted a pacemaker while doing EKG. NIH -1

## 2024-11-22 NOTE — LETTER
EMS Transport Request  For use at [facility], [place], [place], [place], and [place] only   Patient Name: Aracelis Vargas : 1935   Weight:87.1 kg (192 lb 0.3 oz) Pick-up Location: [facility] BLS/ALS: BLS/ALS: BLS   Insurance: MEDICARE Auth End Date:    Pre-Cert #: D/C Summary complete:    Destination: Home How many stairs 0, Will the patient be on the main level y, Is there a ramp available na, Can the patient stand and pivot y, Address [address] [place] [address], and Name/contact number for who will be present [facility] assisted [facility].   Contact Precautions: None   Equipment (O2, Fluids, etc.): None   Arrive By Date/Time: 2024  Stretcher/WC: Wheelchair   CM Requesting: Kathi Banuelos RN Ext: [phone]   Notes/Medical Necessity: return to assisted living.       ______________________________________________________________________    *Only 2 patient bags OR 1 carry-on size bag are permitted.  Wheelchairs and walkers CANNOT transported with the patient. Acknowledge: Yes

## 2024-11-22 NOTE — ED NOTES
Nursing report ED to floor  Aracelis Vargas  89 y.o.  female    HPI:   Chief Complaint   Patient presents with    Blurred Vision       Admitting doctor:   Melissa Sharma MD    Admitting diagnosis:   The encounter diagnosis was Dysarthria.    Code status:   Current Code Status       Date Active Code Status Order ID Comments User Context       Prior            Allergies:   Latex, natural rubber and Adhesive tape    Isolation:  Contact     Fall Risk:  Fall Risk Assessment was completed, and patient is at moderate risk for falls.   Predictive Model Details         11 (Low) Factor Value    Calculated 11/22/2024 11:37 Age 89    Risk of Fall Model Active Peripheral IV Present     Imaging order in this encounter Present     Magnesium not on file     Respiratory Rate 17     Diastolic BP 68     Theo Scale not on file     Creatinine 1.41 mg/dL     Number of Distinct Medication Classes administered 2     Chloride 97 mmol/L     Albumin 3.8 g/dL     Days after Admission 0.158     Total Bilirubin 0.4 mg/dL     Calcium 9.6 mg/dL     ALT 11 U/L     Potassium 3.5 mmol/L         Weight:       11/22/24  0821   Weight: 94.2 kg (207 lb 10.8 oz)       Intake and Output    Intake/Output Summary (Last 24 hours) at 11/22/2024 1137  Last data filed at 11/22/2024 0838  Gross per 24 hour   Intake 500 ml   Output --   Net 500 ml       Diet:   Dietary Orders (From admission, onward)       Start     Ordered    11/22/24 0759  NPO Diet NPO Type: Strict NPO  (Acute (Onset < 24 hrs) Stroke Order Panel - COR / RAYRAY / LAG / CHERYL / MAD / PAD / LUZ / FSED)  Diet Effective Now        Question:  NPO Type  Answer:  Strict NPO    11/22/24 0759                     Most recent vitals:   Vitals:    11/22/24 0914 11/22/24 0917 11/22/24 1002 11/22/24 1046   BP:  116/61 130/69 132/68   BP Location:       Patient Position:       Pulse: 79 68 69 69   Resp:   18 17   Temp:       SpO2: (!) 78% 100% 100% 100%   Weight:       Height:           Active LDAs/IV Access:    Lines, Drains & Airways       Active LDAs       Name Placement date Placement time Site Days    Midline Catheter - Single Lumen 02/28/23 Right Cephalic 02/28/23  1430  -- 632    Peripheral IV 11/22/24 0810 Left Antecubital 11/22/24  0810  Antecubital  less than 1                    Skin Condition:   Skin Assessments (last day)       Date/Time Interval    11/22/24 08:11:26 24 hrs post onset of symptoms +/- 20 mins             Labs (abnormal labs have a star):   Labs Reviewed   COMPREHENSIVE METABOLIC PANEL - Abnormal; Notable for the following components:       Result Value    Glucose 117 (*)     Creatinine 1.41 (*)     Chloride 97 (*)     CO2 29.6 (*)     eGFR 36.2 (*)     All other components within normal limits    Narrative:     GFR Normal >60  Chronic Kidney Disease <60  Kidney Failure <15    The GFR formula is only valid for adults with stable renal function between ages 18 and 70.   PROTIME-INR - Abnormal; Notable for the following components:    Protime 15.1 (*)     INR 1.18 (*)     All other components within normal limits   APTT - Abnormal; Notable for the following components:    PTT 38.7 (*)     All other components within normal limits   SINGLE HS TROPONIN T - Abnormal; Notable for the following components:    HS Troponin T 35 (*)     All other components within normal limits    Narrative:     High Sensitive Troponin T Reference Range:  <14.0 ng/L- Negative Female for AMI  <22.0 ng/L- Negative Male for AMI  >=14 - Abnormal Female indicating possible myocardial injury.  >=22 - Abnormal Male indicating possible myocardial injury.   Clinicians would have to utilize clinical acumen, EKG, Troponin, and serial changes to determine if it is an Acute Myocardial Infarction or myocardial injury due to an underlying chronic condition.        CBC WITH AUTO DIFFERENTIAL - Abnormal; Notable for the following components:    Hemoglobin 11.5 (*)     MCHC 30.6 (*)     RDW 15.9 (*)     All other components within normal  limits   TSH RFX ON ABNORMAL TO FREE T4 - Normal   RAINBOW DRAW    Narrative:     The following orders were created for panel order [facility] Draw.  Procedure                               Abnormality         Status                     ---------                               -----------         ------                     Green Top (Gel)[[id number]]                                  Final result               Lavender Top[[id number]]                                     Final result               Gold Top - SST[[id number]]                                   Final result               Light Blue Top[[id number]]                                   Final result                 Please view results for these tests on the individual orders.   ETHANOL    Narrative:     Plasma Ethanol Clinical Symptoms:    ETOH (%)               Clinical Symptom  .01-.05              No apparent influence  .03-.12              Euphoria, Diminished judgment and attention   .09-.25              Impaired comprehension, Muscle incoordination  .18-.30              Confusion, Staggered gait, Slurred speech  .25-.40              Markedly decreased response to stimuli, unable to stand or                        walk, vomitting, sleep or stupor  .35-.50              Comatose, Anesthesia, Subnormal body temperature       FOLATE   VITAMIN B12   POCT GLUCOSE FINGERSTICK   TYPE AND SCREEN   CBC AND DIFFERENTIAL    Narrative:     The following orders were created for panel order CBC & Differential.  Procedure                               Abnormality         Status                     ---------                               -----------         ------                     CBC Auto Differential[[id number]]        Abnormal            Final result                 Please view results for these tests on the individual orders.   GREEN TOP   LAVENDER TOP   GOLD TOP - SST   LIGHT BLUE TOP       LOC: Person, Place, Time, and Situation    Telemetry:  Med/Surg    Cardiac  Monitoring Ordered: yes    EKG:   ECG 12 Lead Stroke Evaluation   Preliminary Result   HEART RATE=70  bpm   RR Qfavypak=345  ms   [place] Interval=68  ms   P Horizontal Axis=-81  deg   P Front Axis=0  deg   QRSD Tigkkbms=941  ms   QT Wcuodthb=048  ms   YGpS=510  ms   QRS Axis=-81  deg   T Wave Axis=86  deg   - ABNORMAL ECG -   Ventricular-paced rhythm   Date and Time of Study:2024-11-22 08:29:38          Medications Given in the ED:   Medications   sodium chloride 0.9 % flush 10 mL (has no administration in time range)   nitroglycerin (NITROSTAT) SL tablet 0.4 mg (has no administration in time range)   Potassium Replacement - Follow Nurse / BPA Driven Protocol (has no administration in time range)   Magnesium Standard Dose Replacement - Follow Nurse / BPA Driven Protocol (has no administration in time range)   Phosphorus Replacement - Follow Nurse / BPA Driven Protocol (has no administration in time range)   Calcium Replacement - Follow Nurse / BPA Driven Protocol (has no administration in time range)   sennosides-docusate (PERICOLACE) 8.6-50 MG per tablet 2 tablet (has no administration in time range)     And   polyethylene glycol (MIRALAX) packet 17 g (has no administration in time range)     And   bisacodyl (DULCOLAX) EC tablet 5 mg (has no administration in time range)     And   bisacodyl (DULCOLAX) suppository 10 mg (has no administration in time range)   sodium chloride 0.9 % bolus 500 mL (0 mL Intravenous Stopped 11/22/24 0838)   iopamidol (ISOVUE-370) 76 % injection 50 mL (50 mL Intravenous Given 11/22/24 0820)   iopamidol (ISOVUE-370) 76 % injection 100 mL (100 mL Intravenous Given 11/22/24 0821)       Imaging results:  CT Angiogram Head w AI Analysis of LVO    Result Date: 11/22/2024  1. No high-grade or flow-limiting stenosis, large vessel occlusive thrombus, vascular malformation or aneurysm is seen in the head or neck. Electronically Signed: Lamar Holman MD  11/22/2024 9:04 AM EST  Workstation ID:  [id number]    CT Angiogram Neck    Result Date: 11/22/2024  1. No high-grade or flow-limiting stenosis, large vessel occlusive thrombus, vascular malformation or aneurysm is seen in the head or neck. Electronically Signed: Lamar Holman MD  11/22/2024 9:04 AM EST  Workstation ID: [id number]    XR Chest 1 View    Result Date: 11/22/2024  Impression: No acute process. Electronically Signed: Benjamín Padron MD  11/22/2024 9:03 AM EST  Workstation ID: [id number]    CT CEREBRAL PERFUSION WITH & WITHOUT CONTRAST    Result Date: 11/22/2024  Impression: Negative CT cerebral perfusion study. Electronically Signed: Lamar Holman MD  11/22/2024 8:25 AM EST  Workstation ID: [id number]    CT Head Without Contrast Stroke Protocol    Result Date: 11/22/2024  Impression: 1. No intracranial hemorrhage. 2. Stable generalized cerebral loss and white matter findings of chronic microvascular disease. Electronically Signed: Benjamín Padron MD  11/22/2024 8:10 AM EST  Workstation ID: [id number]     Social issues:   Social History     Socioeconomic History    Marital status:    Tobacco Use    Smoking status: Never     Passive exposure: Never    Smokeless tobacco: Never   Vaping Use    Vaping status: Never Used   Substance and Sexual Activity    Alcohol use: Yes     Alcohol/week: 7.0 standard drinks of alcohol     Types: 7 Drinks containing 0.5 oz of alcohol per week    Drug use: Never    Sexual activity: Defer       NIH Stroke Scale:  Interval: 24 hrs post onset of symptoms +/- 20 mins (11/22/24 0811)  1a. Level of Consciousness: 0-->Alert, keenly responsive (11/22/24 1104)  1b. LOC Questions: 0-->Answers both questions correctly (11/22/24 1104)  1c. LOC Commands: 0-->Performs both tasks correctly (11/22/24 1104)  2. Best Gaze: 0-->Normal (11/22/24 1104)  3. Visual: 0-->No visual loss (11/22/24 1104)  4. Facial Palsy: 0-->Normal symmetrical movements (11/22/24 1104)  5a. Motor Arm, Left: 0-->No drift, limb holds 90 (or 45) degrees for full 10  secs (11/22/24 1104)  5b. Motor Arm, Right: 0-->No drift, limb holds 90 (or 45) degrees for full 10 secs (11/22/24 1104)  6a. Motor Leg, Left: 0-->No drift, leg holds 30 degree position for full 5 secs (11/22/24 1104)  6b. Motor Leg, Right: 0-->No drift, leg holds 30 degree position for full 5 secs (11/22/24 1104)  7. Limb Ataxia: 0-->Absent (11/22/24 1104)  8. Sensory: 0-->Normal, no sensory loss (11/22/24 1104)  9. Best Language: 0-->No aphasia, normal (11/22/24 1104)  10. Dysarthria: 1-->Mild-to-moderate dysarthria, patient slurs at least some words and, at worst, can be understood with some difficulty (11/22/24 1104)  11. Extinction and Inattention (formerly Neglect): 0-->No abnormality (11/22/24 1104)    Total (NIH Stroke Scale): 1 (11/22/24 1104)     Additional notable assessment information:     Nursing report ED to floor:      Alem Dumont RN   11/22/24 11:37 EST

## 2024-11-22 NOTE — ED NOTES
Nurse noted low oxygen sat while patient was sleeping. Patient easily aroused, denies sleep apnea. 3L NC placed on patient for comfort while resting. ER provider notified

## 2024-11-22 NOTE — CONSULTS
"Primary Care Provider: Provider, No Known     Consult requested by: ER and admitting team    Reason for Consultation: Neurological evaluation /speech difficulty    History taken from: patient chart family RN my discussions with the ER team    Chief complaint: Difficulty in speech       SUBJECTIVE:    History of present illness: Background per H&P: Aracelis Vargas is a 89 y.o. female who was evaluated in room 12 in the ER at [facility]    Source of information is the patient, the medical records, my discussion with the patient's POA, her niece actually, and ER team    This patient is 89-year-old and lives in assisted living and around 2:00 in the morning she reported that she did not feel well.  She reported that she had numbness around her lips.  She said that it felt like there were dry    She never had double vision or focal changes    She has been labeled as \"dysarthric because the way she talks and questionable facial asymmetry but otherwise her NIH is 0    She is doing well now    No passing out or migraines      Vital signs stable but her sats were dropping and there are several values as low as 78 and 80    Creatinine 1.41    INR 1.18, she is on warfarin    CT and CTA okay  CTP okay    As per ER team,  89-year-old female presents from the assisted living facility with reports of waking up at 2 AM this morning and having difficulty speaking as well as having some blurry vision. She denies headache and denies focal numbness or weakness in the extremities. She reports no chest pain or abdominal pain. She states the blurry vision has resolved. She states her speech has improved significantly although she still feels slightly slurred. States when she went to bed last night at 10 PM she felt normal        Medical Decision Making  Differential diagnosis including CVA, metabolic encephalopathy, TIA     Patient has no focal neurologic deficits except for some mild slurring of speech which she states has " improved.  Her CT CTA workup was negative for any large vessel occlusion.  She was stable on the monitor.  On reexamination which she was sleeping but was arousable and was advised of findings.  She does voiced agreement with the plan of hospital admission for further neurologic evaluation.  Notably on reexamination neurologic exam was unchanged.  Hospitalist service was paged for admission.     Problems Addressed:  Dysarthria: complicated acute illness or injury      - Portions of the above HPI were copied from previous encounters and edited as appropriate. PMH as detailed below.     Review of Systems   No fever chills rigors or sweats  Her BMI is 36.79  No sleep problems  HEENT:  No speech problem, vision changes, facial asymmetry or pain, or neck problem  Chest: No chest pain, clubbing, cyanosis, orthopnea palpitations but she has heart block  Pulmonary:  No shortness of air, cough or expectoration  Abdomen:  No swelling/tension, constipation,diarrhea or pain  No genitourinary symptoms  Extremity problems as discussed  No back problem  No psychotic issues  Neurologic issues as discussed  No hematologic, dermatologic or endocrine problems        PATIENT HISTORY:  Past Medical History:   Diagnosis Date    Atherosclerosis of native coronary artery of native heart without angina pectoris 10/22/2020    Atrioventricular block, Mobitz type 1, Wenckebach 10/21/2020    Added automatically from request for surgery [id number]    Hypertension associated with stage 3 chronic kidney disease due to type 2 diabetes mellitus 10/22/2020    Morbid obesity 10/22/2020    Secondary hyperaldosteronism 10/22/2020    Stage 3b chronic kidney disease 10/22/2020    Type 2 diabetes mellitus with diabetic neuropathy, with long-term current use of insulin 10/22/2020    Type 2 diabetes mellitus with diabetic neuropathy, with long-term current use of insulin 10/22/2020   ,   Past Surgical History:   Procedure Laterality Date    CARDIAC  CATHETERIZATION N/A 10/23/2020    Procedure: Left Heart Cath and coronary angiogram;  Surgeon: Wang Plaza MD;  Location: [facility] CATH INVASIVE LOCATION;  Service: Cardiovascular;  Laterality: N/A;    CARDIAC ELECTROPHYSIOLOGY PROCEDURE Left 11/3/2021    Procedure: Pacemaker DC new;  Surgeon: Wang Plaza MD;  Location: [facility] CATH INVASIVE LOCATION;  Service: Cardiovascular;  Laterality: Left;    CARDIAC ELECTROPHYSIOLOGY PROCEDURE N/A 11/3/2021    Procedure: LOOP RECORDER REMOVAL;  Surgeon: Wang Plaza MD;  Location: [facility] CATH INVASIVE LOCATION;  Service: Cardiovascular;  Laterality: N/A;    HYSTERECTOMY      JOINT REPLACEMENT Right     Hip    JOINT REPLACEMENT Left     hip    JOINT REPLACEMENT Left     hip (for second time)    JOINT REPLACEMENT Right     knee    OOPHORECTOMY     ,   Family History   Problem Relation Age of Onset    Heart disease Mother     Heart disease Father    ,   Social History     Tobacco Use    Smoking status: Never     Passive exposure: Never    Smokeless tobacco: Never   Vaping Use    Vaping status: Never Used   Substance Use Topics    Alcohol use: Yes     Alcohol/week: 7.0 standard drinks of alcohol     Types: 7 Drinks containing 0.5 oz of alcohol per week    Drug use: Never   ,   Prior to Admission medications    Medication Sig Start Date End Date Taking? Authorizing Provider   atorvastatin (LIPITOR) 10 MG tablet TAKE ONE TABLET BY MOUTH DAILY 2/25/23   Gabriel Goss MD   ferrous sulfate 325 (65 FE) MG tablet Take 1 tablet by mouth Daily With Breakfast. Indications: Anemia From Inadequate Iron in the Body    Provider, MD Lucas   folic acid (FOLVITE) 1 MG tablet Take 1 tablet by mouth Daily. 5/18/22   Ralph Almeida MD   furosemide (LASIX) 20 MG tablet Take 4 tablets by mouth Daily. 1/24/23   Gabriel Goss MD   gabapentin (NEURONTIN) 300 MG capsule Take 1 capsule by mouth 2 (Two) Times a Day. Indications: Peripheral Nerve Disease     Provider, MD Lucas   NovoLIN 70/30 (70-30) 100 UNIT/ML injection INJECT 30 UNITS UNDER THE SKIN INTO THE APPROPRIATE AREA TWO TIMES A DAY WITH A MEAL 9/7/22   Gabriel Goss MD   omeprazole (priLOSEC) 40 MG capsule Take 1 capsule by mouth Every Morning Before Breakfast. Take on empty stomach!  Indications: Gastroesophageal Reflux Disease 1/10/23   Gabriel Goss MD   topiramate (TOPAMAX) 25 MG tablet Take 0.5 tablets by mouth 2 (Two) Times a Day. 3/23/22   Gabriel Goss MD   vitamin B-12 (CYANOCOBALAMIN) 1000 MCG tablet Take 1 tablet by mouth Daily. Indications: Inadequate Vitamin B12 1/10/23   Gabriel Goss MD   warfarin (COUMADIN) 3 MG tablet A fib  Indications: Atrial Fibrillation 2/28/23   Franklin Miranda MD   ZINC OXIDE, TOPICAL, EX Apply 1 application topically to the appropriate area as directed Daily. Indications: Rash and other nonspecific skin eruption 1/2/23   ProviderLucas MD    Allergies:  Latex, natural rubber and Adhesive tape    Current Facility-Administered Medications   Medication Dose Route Frequency Provider Last Rate Last Admin    sennosides-docusate (PERICOLACE) 8.6-50 MG per tablet 2 tablet  2 tablet Oral BID PRN Whitney Licea APRN        And    polyethylene glycol (MIRALAX) packet 17 g  17 g Oral Daily PRN Whitney Licea, APRN        And    bisacodyl (DULCOLAX) EC tablet 5 mg  5 mg Oral Daily PRN Whitney Licea APRN        And    bisacodyl (DULCOLAX) suppository 10 mg  10 mg Rectal Daily PRN Ani Licaea, APRN        Calcium Replacement - Follow Nurse / BPA Driven Protocol   Not Applicable PRN Ani Liceaa, APRN        Magnesium Standard Dose Replacement - Follow Nurse / BPA Driven Protocol   Not Applicable PRN Ani Liceaa, APRN        nitroglycerin (NITROSTAT) SL tablet 0.4 mg  0.4 mg Sublingual Q5 Min PRN Whitney Licea, APRN        Phosphorus Replacement - Follow Nurse / BPA Driven Protocol   Not Applicable PRN Ani Liceaa, APRN        Potassium  Replacement - Follow Nurse / BPA Driven Protocol   Not Applicable PRN Whitney Licea APRN        sodium chloride 0.9 % flush 10 mL  10 mL Intravenous PRN Nestor Glaser MD         Current Outpatient Medications   Medication Sig Dispense Refill    atorvastatin (LIPITOR) 10 MG tablet TAKE ONE TABLET BY MOUTH DAILY 90 tablet 0    ferrous sulfate 325 (65 FE) MG tablet Take 1 tablet by mouth Daily With Breakfast. Indications: Anemia From Inadequate Iron in the Body      folic acid (FOLVITE) 1 MG tablet Take 1 tablet by mouth Daily. 30 tablet 11    furosemide (LASIX) 20 MG tablet Take 4 tablets by mouth Daily. 90 tablet 1    gabapentin (NEURONTIN) 300 MG capsule Take 1 capsule by mouth 2 (Two) Times a Day. Indications: Peripheral Nerve Disease      NovoLIN 70/30 (70-30) 100 UNIT/ML injection INJECT 30 UNITS UNDER THE SKIN INTO THE APPROPRIATE AREA TWO TIMES A DAY WITH A MEAL 20 mL 3    omeprazole (priLOSEC) 40 MG capsule Take 1 capsule by mouth Every Morning Before Breakfast. Take on empty stomach!  Indications: Gastroesophageal Reflux Disease 90 capsule 1    topiramate (TOPAMAX) 25 MG tablet Take 0.5 tablets by mouth 2 (Two) Times a Day. 30 tablet 3    vitamin B-12 (CYANOCOBALAMIN) 1000 MCG tablet Take 1 tablet by mouth Daily. Indications: Inadequate Vitamin B12 30 tablet 11    warfarin (COUMADIN) 3 MG tablet A fib  Indications: Atrial Fibrillation 30 tablet 0    ZINC OXIDE, TOPICAL, EX Apply 1 application topically to the appropriate area as directed Daily. Indications: Rash and other nonspecific skin eruption          ________________________________________________________        OBJECTIVE:  Upon today's exam, the patient is resting comfortably in bed in no acute distress      The patient is awake and alert and oriented x3     Cranial nerve examination demonstrate:  Full fields of vision to confrontation  Pupils are round, reactive to light and accommodation and size of about 3 mm  No ptosis or nystagmus  Funduscopic  examination was not successful  Eye movements are conjugate     Sensation on the face and scalp are normal  Muscles of mastication are normal and symmetric  Muscles of  facial expression are normal and symmetric  Hearing is intact bilaterally  Head turning and shoulder shrugs were unremarkable  Tongue was midline  I could not visualize  oropharynx or uvula     Motor examination:  Normal bulk, tone and strength was 5/5  No fasciculations     Sensory examination:  Intact for soft touch, pain   Romberg was not evaluated     Reflexes:  0/4     Coordination:  Normal finger-to-nose to finger, rapid alternating movements and toe to finger     Gait:  Deferred     Toe signs:  Mute      NIH Stroke Scale  Interval: 24 hrs post onset of symptoms +/- 20 mins  1a. Level of Consciousness: 0-->Alert, keenly responsive  1b. LOC Questions: 0-->Answers both questions correctly  1c. LOC Commands: 0-->Performs both tasks correctly  2. Best Gaze: 0-->Normal  3. Visual: 0-->No visual loss  4. Facial Palsy: 0-->Normal symmetrical movements  5a. Motor Arm, Left: 0-->No drift, limb holds 90 (or 45) degrees for full 10 secs  5b. Motor Arm, Right: 0-->No drift, limb holds 90 (or 45) degrees for full 10 secs  6a. Motor Leg, Left: 0-->No drift, leg holds 30 degree position for full 5 secs  6b. Motor Leg, Right: 0-->No drift, leg holds 30 degree position for full 5 secs  7. Limb Ataxia: 0-->Absent  8. Sensory: 0-->Normal, no sensory loss  9. Best Language: 0-->No aphasia, normal  10. Dysarthria: 1-->Mild-to-moderate dysarthria, patient slurs at least some words and, at worst, can be understood with some difficulty  11. Extinction and Inattention (formerly Neglect): 0-->No abnormality  Total (NIH Stroke Scale): 1         ________________________________________________________   RESULTS REVIEW:    VITAL SIGNS:   Temp:  [98.1 °F (36.7 °C)] 98.1 °F (36.7 °C)  Heart Rate:  [66-80] 69  Resp:  [17-18] 17  BP: (114-138)/(46-69) 132/68     LABS:      Lab  11/22/24  0810   WBC 4.74   HEMOGLOBIN 11.5*   HEMATOCRIT 37.6   PLATELETS 188   NEUTROS ABS 2.72   IMMATURE GRANS (ABS) 0.01   LYMPHS ABS 1.38   MONOS ABS 0.36   EOS ABS 0.21   MCV 90.8   PROTIME 15.1*   APTT 38.7*         Lab 11/22/24  0810   SODIUM 137   POTASSIUM 3.5   CHLORIDE 97*   CO2 29.6*   ANION GAP 10.4   BUN 21   CREATININE 1.41*   EGFR 36.2*   GLUCOSE 117*   CALCIUM 9.6   TSH 0.982         Lab 11/22/24  0810   TOTAL PROTEIN 7.6   ALBUMIN 3.8   GLOBULIN 3.8   ALT (SGPT) 11   AST (SGOT) 22   BILIRUBIN 0.4   ALK PHOS 78         Lab 11/22/24  0810   HSTROP T 35*   PROTIME 15.1*   INR 1.18*             Lab 11/22/24  0826   ABO TYPING A   RH TYPING Positive   ANTIBODY SCREEN Negative             Lab Results   Component Value Date    TSH 0.982 11/22/2024    LDL 64 02/25/2023    HGBA1C 5.6 02/25/2023    [id number] >2,000 (H) 08/17/2022       IMAGING STUDIES:  CT Angiogram Head w AI Analysis of LVO    Result Date: 11/22/2024  1. No high-grade or flow-limiting stenosis, large vessel occlusive thrombus, vascular malformation or aneurysm is seen in the head or neck. Electronically Signed: Lamar Holman MD  11/22/2024 9:04 AM EST  Workstation ID: [id number]    CT Angiogram Neck    Result Date: 11/22/2024  1. No high-grade or flow-limiting stenosis, large vessel occlusive thrombus, vascular malformation or aneurysm is seen in the head or neck. Electronically Signed: Lamar Holman MD  11/22/2024 9:04 AM EST  Workstation ID: [id number]    XR Chest 1 View    Result Date: 11/22/2024  Impression: No acute process. Electronically Signed: Benjamín Padron MD  11/22/2024 9:03 AM EST  Workstation ID: [id number]    CT CEREBRAL PERFUSION WITH & WITHOUT CONTRAST    Result Date: 11/22/2024  Impression: Negative CT cerebral perfusion study. Electronically Signed: Lamar oHlman MD  11/22/2024 8:25 AM EST  Workstation ID: [id number]    CT Head Without Contrast Stroke Protocol    Result Date: 11/22/2024  Impression: 1. No intracranial  hemorrhage. 2. Stable generalized cerebral loss and white matter findings of chronic microvascular disease. Electronically Signed: Benjamín Padron MD  11/22/2024 8:10 AM EST  Workstation ID: [id number]     I reviewed the patient's new clinical results.    ________________________________________________________     PROBLEM LIST:    Dysarthria            ASSESSMENT/PLAN:  Some dry lips and other issues  I am not really getting a focal picture  I believe she is going for MRI brain    Her INR was low so she needs to take her medication    I have increased her Lipitor to 40 mg at least because of her risk of TIA and strokes     this was not a classic TIA or stroke    If her MRI is okay, she needs to focus on compliance and she may be discharged as far as I am concerned    If the MRI is abnormal, depending on what it shows, if its embolic phenomena, she has had heart block and she needs to make sure she takes her medication    If it is a small vessel type stroke we can add a baby aspirin with excepting the risk of hemorrhagic possibility, if the patient agrees    I talked to her niece who is the POA    If all the above tests are negative, nothing else to change other than already established and call me if needed        Modification of stroke risk factors:   - Blood pressure should be less than 130/80 outpatient, HbA1c less than 6.5, LDL less than 70; b12>500 and smoking cessation if applicable. We would be grateful if the primary team / primary care physician would keep a close watch on the above targets.  - Stroke education  - Follow up with neurologist of choice      I discussed the patient's findings and my recommendations with patient, family, and nursing staff    Saskia Mccall MD  11/22/24  12:34 EST

## 2024-11-22 NOTE — ED PROVIDER NOTES
Subjective   History of Present Illness  89-year-old female presents from the assisted living facility with reports of waking up at 2 AM this morning and having difficulty speaking as well as having some blurry vision.  She denies headache and denies focal numbness or weakness in the extremities.  She reports no chest pain or abdominal pain.  She states the blurry vision has resolved.  She states her speech has improved significantly although she still feels slightly slurred.  States when she went to bed last night at 10 PM she felt normal  Review of Systems    Past Medical History:   Diagnosis Date    Atherosclerosis of native coronary artery of native heart without angina pectoris 10/22/2020    Atrioventricular block, Mobitz type 1, Wenckebach 10/21/2020    Added automatically from request for surgery [id number]    Hypertension associated with stage 3 chronic kidney disease due to type 2 diabetes mellitus 10/22/2020    Morbid obesity 10/22/2020    Secondary hyperaldosteronism 10/22/2020    Stage 3b chronic kidney disease 10/22/2020    Type 2 diabetes mellitus with diabetic neuropathy, with long-term current use of insulin 10/22/2020    Type 2 diabetes mellitus with diabetic neuropathy, with long-term current use of insulin 10/22/2020       Allergies   Allergen Reactions    Latex, Natural Rubber Rash    Adhesive Tape Rash       Past Surgical History:   Procedure Laterality Date    CARDIAC CATHETERIZATION N/A 10/23/2020    Procedure: Left Heart Cath and coronary angiogram;  Surgeon: Wang Plaza MD;  Location: [facility] CATH INVASIVE LOCATION;  Service: Cardiovascular;  Laterality: N/A;    CARDIAC ELECTROPHYSIOLOGY PROCEDURE Left 11/3/2021    Procedure: Pacemaker DC new;  Surgeon: Wang Plaza MD;  Location: [facility] CATH INVASIVE LOCATION;  Service: Cardiovascular;  Laterality: Left;    CARDIAC ELECTROPHYSIOLOGY PROCEDURE N/A 11/3/2021    Procedure: LOOP RECORDER REMOVAL;  Surgeon: Wang Plaza MD;   Location: [facility] INVASIVE LOCATION;  Service: Cardiovascular;  Laterality: N/A;    HYSTERECTOMY      JOINT REPLACEMENT Right     Hip    JOINT REPLACEMENT Left     hip    JOINT REPLACEMENT Left     hip (for second time)    JOINT REPLACEMENT Right     knee    OOPHORECTOMY         Family History   Problem Relation Age of Onset    Heart disease Mother     Heart disease Father        Social History     Socioeconomic History    Marital status:    Tobacco Use    Smoking status: Never     Passive exposure: Never    Smokeless tobacco: Never   Vaping Use    Vaping status: Never Used   Substance and Sexual Activity    Alcohol use: Yes     Alcohol/week: 7.0 standard drinks of alcohol     Types: 7 Drinks containing 0.5 oz of alcohol per week    Drug use: Never    Sexual activity: Defer     Prior to Admission medications    Medication Sig Start Date End Date Taking? Authorizing Provider   atorvastatin (LIPITOR) 10 MG tablet TAKE ONE TABLET BY MOUTH DAILY 2/25/23   Gabriel Goss MD   ferrous sulfate 325 (65 FE) MG tablet Take 1 tablet by mouth Daily With Breakfast. Indications: Anemia From Inadequate Iron in the Body    ProviderLucas MD   folic acid (FOLVITE) 1 MG tablet Take 1 tablet by mouth Daily. 5/18/22   Ralph Almeida MD   furosemide (LASIX) 20 MG tablet Take 4 tablets by mouth Daily. 1/24/23   Gabriel Goss MD   gabapentin (NEURONTIN) 300 MG capsule Take 1 capsule by mouth 2 (Two) Times a Day. Indications: Peripheral Nerve Disease    Provider, MD Lucas   NovoLIN 70/30 (70-30) 100 UNIT/ML injection INJECT 30 UNITS UNDER THE SKIN INTO THE APPROPRIATE AREA TWO TIMES A DAY WITH A MEAL 9/7/22   Gabriel Goss MD   omeprazole (priLOSEC) 40 MG capsule Take 1 capsule by mouth Every Morning Before Breakfast. Take on empty stomach!  Indications: Gastroesophageal Reflux Disease 1/10/23   Gabriel Goss MD   topiramate (TOPAMAX) 25 MG tablet Take 0.5 tablets by  "mouth 2 (Two) Times a Day. 3/23/22   Gabriel Goss MD   vitamin B-12 (CYANOCOBALAMIN) 1000 MCG tablet Take 1 tablet by mouth Daily. Indications: Inadequate Vitamin B12 1/10/23   Gabriel Goss MD   warfarin (COUMADIN) 3 MG tablet A fib  Indications: Atrial Fibrillation 2/28/23   Franklin Miranda MD   ZINC OXIDE, TOPICAL, EX Apply 1 application topically to the appropriate area as directed Daily. Indications: Rash and other nonspecific skin eruption 1/2/23   Provider, MD Lucas     /61   Pulse 68   Temp 98.1 °F (36.7 °C)   Resp 18   Ht 160 cm (63\")   Wt 94.2 kg (207 lb 10.8 oz)   SpO2 100%   BMI 36.79 kg/m²         Objective   Physical Exam  General: Well-developed well-appearing, no acute distress, alert and appropriate  Eyes: Pupils round and reactive, sclera nonicteric  HEENT: Mucous membranes moist, no mucosal swelling  Neck: Supple, no nuchal rigidity,   Respirations: Respirations nonlabored, equal breath sounds bilaterally, clear lungs  Heart regular rate and rhythm, no murmurs rubs or gallops,   Abdomen soft nontender nondistended,   Extremities no clubbing cyanosis or edema, calves are symmetric and nontender  Neuro cranial nerves II through XII intact , normal sensory/motor function and strength in four extremities, speech slightly slurred, no facial droop, normal finger to nose, normal heel to shin, no nuchal rigidity, NIH 1  Psych oriented, pleasant affect  Skin no rash, brisk cap refill  Procedures           ED Course  ED Course as of 11/22/24 0930   Fri Nov 22, 2024   0800 Point-of-care blood sugar 108 [SH]   0803 Initial blood pressure 132/58 [SH]   0803 Code stroke initiated.  Case and findings discussed with Dr. Mccall, neurology.  He is in agreement patient not a thrombolytic candidate.  Will perform CT CTA to further evaluate for stroke and rule out large vessel occlusion [SH]      ED Course User Index  [SH] Nestor Glaser MD      Results for orders placed or " performed during the hospital encounter of 11/22/24   Comprehensive Metabolic Panel    Collection Time: 11/22/24  8:10 AM    Specimen: Blood   Result Value Ref Range    Glucose 117 (H) 65 - 99 mg/dL    BUN 21 8 - 23 mg/dL    Creatinine 1.41 (H) 0.57 - 1.00 mg/dL    Sodium 137 136 - 145 mmol/L    Potassium 3.5 3.5 - 5.2 mmol/L    Chloride 97 (L) 98 - 107 mmol/L    CO2 29.6 (H) 22.0 - 29.0 mmol/L    Calcium 9.6 8.6 - 10.5 mg/dL    Total Protein 7.6 6.0 - 8.5 g/dL    Albumin 3.8 3.5 - 5.2 g/dL    ALT (SGPT) 11 1 - 33 U/L    AST (SGOT) 22 1 - 32 U/L    Alkaline Phosphatase 78 39 - 117 U/L    Total Bilirubin 0.4 0.0 - 1.2 mg/dL    Globulin 3.8 gm/dL    A/G Ratio 1.0 g/dL    BUN/Creatinine Ratio 14.9 7.0 - 25.0    Anion Gap 10.4 5.0 - 15.0 mmol/L    eGFR 36.2 (L) >60.0 mL/min/1.73   Protime-INR    Collection Time: 11/22/24  8:10 AM    Specimen: Blood   Result Value Ref Range    Protime 15.1 (H) 11.7 - 14.2 Seconds    INR 1.18 (H) 0.90 - 1.10   aPTT    Collection Time: 11/22/24  8:10 AM    Specimen: Blood   Result Value Ref Range    PTT 38.7 (H) 22.7 - 35.4 seconds   Single High Sensitivity Troponin T    Collection Time: 11/22/24  8:10 AM    Specimen: Blood   Result Value Ref Range    HS Troponin T 35 (H) <14 ng/L   CBC Auto Differential    Collection Time: 11/22/24  8:10 AM    Specimen: Blood   Result Value Ref Range    WBC 4.74 3.40 - 10.80 10*3/mm3    RBC 4.14 3.77 - 5.28 10*6/mm3    Hemoglobin 11.5 (L) 12.0 - 15.9 g/dL    Hematocrit 37.6 34.0 - 46.6 %    MCV 90.8 79.0 - 97.0 fL    MCH 27.8 26.6 - 33.0 pg    MCHC 30.6 (L) 31.5 - 35.7 g/dL    RDW 15.9 (H) 12.3 - 15.4 %    RDW-SD 52.7 37.0 - 54.0 fl    MPV 9.7 6.0 - 12.0 fL    Platelets 188 140 - 450 10*3/mm3    Neutrophil % 57.4 42.7 - 76.0 %    Lymphocyte % 29.1 19.6 - 45.3 %    Monocyte % 7.6 5.0 - 12.0 %    Eosinophil % 4.4 0.3 - 6.2 %    Basophil % 1.3 0.0 - 1.5 %    Immature Grans % 0.2 0.0 - 0.5 %    Neutrophils, Absolute 2.72 1.70 - 7.00 10*3/mm3    Lymphocytes,  Absolute 1.38 0.70 - 3.10 10*3/mm3    Monocytes, Absolute 0.36 0.10 - 0.90 10*3/mm3    Eosinophils, Absolute 0.21 0.00 - 0.40 10*3/mm3    Basophils, Absolute 0.06 0.00 - 0.20 10*3/mm3    Immature Grans, Absolute 0.01 0.00 - 0.05 10*3/mm3    nRBC 0.0 0.0 - 0.2 /100 WBC   Ethanol    Collection Time: 11/22/24  8:10 AM    Specimen: Blood   Result Value Ref Range    Ethanol % <0.010 %   Green Top (Gel)    Collection Time: 11/22/24  8:10 AM   Result Value Ref Range    Extra Tube Hold for add-ons.    Lavender Top    Collection Time: 11/22/24  8:10 AM   Result Value Ref Range    Extra Tube hold for add-on    Gold Top - SST    Collection Time: 11/22/24  8:10 AM   Result Value Ref Range    Extra Tube Hold for add-ons.    Light Blue Top    Collection Time: 11/22/24  8:10 AM   Result Value Ref Range    Extra Tube Hold for add-ons.    Type & Screen    Collection Time: 11/22/24  8:26 AM    Specimen: Blood   Result Value Ref Range    ABO Type A     RH type Positive     Antibody Screen Negative     T&S Expiration Date 11/25/2024 11:59:59 PM    ECG 12 Lead Stroke Evaluation    Collection Time: 11/22/24  8:29 AM   Result Value Ref Range    QT Interval 542 ms    QTC Interval 587 ms     CT Angiogram Head w AI Analysis of LVO    Result Date: 11/22/2024  1. No high-grade or flow-limiting stenosis, large vessel occlusive thrombus, vascular malformation or aneurysm is seen in the head or neck. Electronically Signed: aLmar Holman MD  11/22/2024 9:04 AM EST  Workstation ID: [id number]    CT Angiogram Neck    Result Date: 11/22/2024  1. No high-grade or flow-limiting stenosis, large vessel occlusive thrombus, vascular malformation or aneurysm is seen in the head or neck. Electronically Signed: Lamar Holman MD  11/22/2024 9:04 AM EST  Workstation ID: [id number]    XR Chest 1 View    Result Date: 11/22/2024  Impression: No acute process. Electronically Signed: Benjamín Padron MD  11/22/2024 9:03 AM EST  Workstation ID: [id number]    CT CEREBRAL  PERFUSION WITH & WITHOUT CONTRAST    Result Date: 11/22/2024  Impression: Negative CT cerebral perfusion study. Electronically Signed: Lamar Holman MD  11/22/2024 8:25 AM EST  Workstation ID: [id number]    CT Head Without Contrast Stroke Protocol    Result Date: 11/22/2024  Impression: 1. No intracranial hemorrhage. 2. Stable generalized cerebral loss and white matter findings of chronic microvascular disease. Electronically Signed: Benjamín Padron MD  11/22/2024 8:10 AM EST  Workstation ID: [id number]                                                    Medical Decision Making  Differential diagnosis including CVA, metabolic encephalopathy, TIA    Patient has no focal neurologic deficits except for some mild slurring of speech which she states has improved.  Her CT CTA workup was negative for any large vessel occlusion.  She was stable on the monitor.  On reexamination which she was sleeping but was arousable and was advised of findings.  She does voiced agreement with the plan of hospital admission for further neurologic evaluation.  Notably on reexamination neurologic exam was unchanged.  Hospitalist service was paged for admission.    Problems Addressed:  Dysarthria: complicated acute illness or injury    Amount and/or Complexity of Data Reviewed  Labs: ordered. Decision-making details documented in ED Course.     Details: CBC no leukocytosis, high-sensitivity troponin in the indeterminate range, comprehensive metabolic panel showing some renal insufficiency  Radiology: ordered and independent interpretation performed.     Details: My independent interpretation of chest x-ray image no apparent pneumonia or congestive failure, pacemaker present, CT head no apparent acute hemorrhage  ECG/medicine tests: ordered and independent interpretation performed.     Details: My EKG interpretation ventricular paced rhythm rate of 70  Discussion of management or test interpretation with external provider(s): Case discussed with Alexsandra  with the hospitalist service who is agreeable plan of admission.    Risk  Prescription drug management.  Decision regarding hospitalization.        Final diagnoses:   Dysarthria       ED Disposition  ED Disposition       ED Disposition   Decision to Admit    Condition   --    Comment   Level of Care: Telemetry [5]   Admitting Physician: CANDY CHRIS [[id number]]   Attending Physician: CANDY CHRIS [[id number]]   Bed Request Comments: mendez                 No follow-up provider specified.       Medication List      No changes were made to your prescriptions during this visit.            Nestor Glaser MD  11/22/24 0987

## 2024-11-22 NOTE — H&P
[facility] Medicine Services  History & Physical    Patient Name: Aracelis Vargas  : 1935  MRN: [id number]  Primary Care Physician:  Gabriel Goss MD  Date of admission: 2024  Date and Time of Service: 2024 at 0941    Subjective      Chief Complaint: slurred speech, blurry vision    History of Present Illness: Aracelis Vargas is a 89 y.o. female with a CMH of CKD, DM, HTN, obesity who presented to [facility] on 2024 with blurry vision and slurred speech. This started around 0200 this morning, and the assisted living facility she lives at sent her here. Once she arrived to the hospital the blurry vision resolved. She was noted to be mildly hypoxic, and is currently requiring 3L NC, she does not use oxygen at home.     On further ED evaluation, troponin was 35, Cr 1.41, WBC 4.74 Hgb 11.5. Ct head negative for acute intracranial hemorrhage. CTA head/neck showed no large vessel occlusion. Negative CT cerebral perfusion study. CXR shows no acute process. EKG shows ventricular paced rhythm. Dr. Lee was consulted with neurology. Hospitalist team to admit for further medical management.       Review of Systems   Constitutional:  Negative for chills, fatigue and fever.   HENT:  Positive for congestion.    Eyes:  Positive for visual disturbance.   Respiratory:  Positive for cough. Negative for shortness of breath.    Neurological:  Positive for speech difficulty. Negative for facial asymmetry, light-headedness, numbness and headaches.       Personal History     Past Medical History:   Diagnosis Date    Atherosclerosis of native coronary artery of native heart without angina pectoris 10/22/2020    Atrioventricular block, Mobitz type 1, Wenckebach 10/21/2020    Added automatically from request for surgery [id number]    Hypertension associated with stage 3 chronic kidney disease due to type 2 diabetes mellitus 10/22/2020    Morbid obesity 10/22/2020    Secondary  hyperaldosteronism 10/22/2020    Stage 3b chronic kidney disease 10/22/2020    Type 2 diabetes mellitus with diabetic neuropathy, with long-term current use of insulin 10/22/2020    Type 2 diabetes mellitus with diabetic neuropathy, with long-term current use of insulin 10/22/2020       Past Surgical History:   Procedure Laterality Date    CARDIAC CATHETERIZATION N/A 10/23/2020    Procedure: Left Heart Cath and coronary angiogram;  Surgeon: Wang Plaza MD;  Location: [facility] CATH INVASIVE LOCATION;  Service: Cardiovascular;  Laterality: N/A;    CARDIAC ELECTROPHYSIOLOGY PROCEDURE Left 11/3/2021    Procedure: Pacemaker DC new;  Surgeon: Wang Plaza MD;  Location: [facility] CATH INVASIVE LOCATION;  Service: Cardiovascular;  Laterality: Left;    CARDIAC ELECTROPHYSIOLOGY PROCEDURE N/A 11/3/2021    Procedure: LOOP RECORDER REMOVAL;  Surgeon: Wang Plaza MD;  Location: [facility] CATH INVASIVE LOCATION;  Service: Cardiovascular;  Laterality: N/A;    HYSTERECTOMY      JOINT REPLACEMENT Right     Hip    JOINT REPLACEMENT Left     hip    JOINT REPLACEMENT Left     hip (for second time)    JOINT REPLACEMENT Right     knee    OOPHORECTOMY         Family History: family history includes Heart disease in her father and mother. Otherwise pertinent FHx was reviewed and not pertinent to current issue.    Social History:  reports that she has never smoked. She has never been exposed to tobacco smoke. She has never used smokeless tobacco. She reports current alcohol use of about 7.0 standard drinks of alcohol per week. She reports that she does not use drugs.    Home Medications:  Prior to Admission Medications       Prescriptions Last Dose Informant Patient Reported? Taking?    atorvastatin (LIPITOR) 10 MG tablet   No No    TAKE ONE TABLET BY MOUTH DAILY    ferrous sulfate 325 (65 FE) MG tablet   Yes No    Take 1 tablet by mouth Daily With Breakfast. Indications: Anemia From Inadequate Iron in the Body    folic acid  (FOLVITE) 1 MG tablet  Pharmacy No No    Take 1 tablet by mouth Daily.    furosemide (LASIX) 20 MG tablet  Pharmacy No No    Take 4 tablets by mouth Daily.    gabapentin (NEURONTIN) 300 MG capsule   Yes No    Take 1 capsule by mouth 2 (Two) Times a Day. Indications: Peripheral Nerve Disease    NovoLIN 70/30 (70-30) 100 UNIT/ML injection  Pharmacy No No    INJECT 30 UNITS UNDER THE SKIN INTO THE APPROPRIATE AREA TWO TIMES A DAY WITH A MEAL    omeprazole (priLOSEC) 40 MG capsule  Pharmacy No No    Take 1 capsule by mouth Every Morning Before Breakfast. Take on empty stomach!  Indications: Gastroesophageal Reflux Disease    topiramate (TOPAMAX) 25 MG tablet  Pharmacy No No    Take 0.5 tablets by mouth 2 (Two) Times a Day.    vitamin B-12 (CYANOCOBALAMIN) 1000 MCG tablet  Pharmacy No No    Take 1 tablet by mouth Daily. Indications: Inadequate Vitamin B12    warfarin (COUMADIN) 3 MG tablet   No No    A fib  Indications: Atrial Fibrillation    ZINC OXIDE, TOPICAL, EX   Yes No    Apply 1 application topically to the appropriate area as directed Daily. Indications: Rash and other nonspecific skin eruption              Allergies:  Allergies   Allergen Reactions    Latex, Natural Rubber Rash    Adhesive Tape Rash       Objective      Vitals:   Temp:  [98.1 °F (36.7 °C)] 98.1 °F (36.7 °C)  Heart Rate:  [66-80] 69  Resp:  [17-18] 17  BP: (114-138)/(46-69) 132/68  Body mass index is 36.79 kg/m².  Physical Exam  Vitals reviewed.   Constitutional:       Appearance: She is obese. She is not ill-appearing.   HENT:      Head: Normocephalic and atraumatic.      Nose: Nose normal.      Mouth/Throat:      Mouth: Mucous membranes are moist.   Eyes:      General: Gaze aligned appropriately.      Extraocular Movements: Extraocular movements intact.      Conjunctiva/sclera: Conjunctivae normal.      Pupils: Pupils are equal, round, and reactive to light.   Cardiovascular:      Rate and Rhythm: Normal rate and regular rhythm.      Pulses:  Normal pulses.   Pulmonary:      Effort: Pulmonary effort is normal.      Breath sounds: Normal breath sounds.   Abdominal:      General: Bowel sounds are normal.      Palpations: Abdomen is soft.   Musculoskeletal:         General: Normal range of motion.      Cervical back: Normal range of motion and neck supple.   Skin:     General: Skin is warm.      Capillary Refill: Capillary refill takes less than 2 seconds.   Neurological:      General: No focal deficit present.      Mental Status: She is alert and oriented to person, place, and time.      Cranial Nerves: No cranial nerve deficit, dysarthria (resolved) or facial asymmetry.      Motor: No weakness.       Diagnostic Data:  Lab Results (last 24 hours)       Procedure Component Value Units Date/Time    TSH Rfx On Abnormal To Free T4 [[id number]]  (Normal) Collected: 11/22/24 0810    Specimen: Blood Updated: 11/22/24 1006     TSH 0.982 uIU/mL     Vitamin B12 [[id number]] Collected: 11/22/24 0810    Specimen: Blood Updated: 11/22/24 0943    Folate [[id number]] Collected: 11/22/24 0810    Specimen: Blood Updated: 11/22/24 0943    Ethanol [[id number]] Collected: 11/22/24 0810    Specimen: Blood Updated: 11/22/24 0915     Ethanol % <0.010 %     Narrative:      Plasma Ethanol Clinical Symptoms:    ETOH (%)               Clinical Symptom  .01-.05              No apparent influence  .03-.12              Euphoria, Diminished judgment and attention   .09-.25              Impaired comprehension, Muscle incoordination  .18-.30              Confusion, Staggered gait, Slurred speech  .25-.40              Markedly decreased response to stimuli, unable to stand or                        walk, vomitting, sleep or stupor  .35-.50              Comatose, Anesthesia, Subnormal body temperature        Comprehensive Metabolic Panel [[id number]]  (Abnormal) Collected: 11/22/24 0810    Specimen: Blood Updated: 11/22/24 0842     Glucose 117 mg/dL      BUN 21 mg/dL      Creatinine 1.41 mg/dL       Sodium 137 mmol/L      Potassium 3.5 mmol/L      Chloride 97 mmol/L      CO2 29.6 mmol/L      Calcium 9.6 mg/dL      Total Protein 7.6 g/dL      Albumin 3.8 g/dL      ALT (SGPT) 11 U/L      AST (SGOT) 22 U/L      Alkaline Phosphatase 78 U/L      Total Bilirubin 0.4 mg/dL      Globulin 3.8 gm/dL      A/G Ratio 1.0 g/dL      BUN/Creatinine Ratio 14.9     Anion Gap 10.4 mmol/L      eGFR 36.2 mL/min/1.73     Narrative:      GFR Normal >60  Chronic Kidney Disease <60  Kidney Failure <15    The GFR formula is only valid for adults with stable renal function between ages 18 and 70.    Single High Sensitivity Troponin T [[id number]]  (Abnormal) Collected: 11/22/24 0810    Specimen: Blood Updated: 11/22/24 0842     HS Troponin T 35 ng/L     Narrative:      High Sensitive Troponin T Reference Range:  <14.0 ng/L- Negative Female for AMI  <22.0 ng/L- Negative Male for AMI  >=14 - Abnormal Female indicating possible myocardial injury.  >=22 - Abnormal Male indicating possible myocardial injury.   Clinicians would have to utilize clinical acumen, EKG, Troponin, and serial changes to determine if it is an Acute Myocardial Infarction or myocardial injury due to an underlying chronic condition.         aPTT [[id number]]  (Abnormal) Collected: 11/22/24 0810    Specimen: Blood Updated: 11/22/24 0826     PTT 38.7 seconds     Protime-INR [[id number]]  (Abnormal) Collected: 11/22/24 0810    Specimen: Blood Updated: 11/22/24 0826     Protime 15.1 Seconds      INR 1.18    CBC & Differential [[id number]]  (Abnormal) Collected: 11/22/24 0810    Specimen: Blood Updated: 11/22/24 0818    Narrative:      The following orders were created for panel order CBC & Differential.  Procedure                               Abnormality         Status                     ---------                               -----------         ------                     CBC Auto Differential[[id number]]        Abnormal            Final result                 Please view  results for these tests on the individual orders.    CBC Auto Differential [[id number]]  (Abnormal) Collected: 11/22/24 0810    Specimen: Blood Updated: 11/22/24 0818     WBC 4.74 10*3/mm3      RBC 4.14 10*6/mm3      Hemoglobin 11.5 g/dL      Hematocrit 37.6 %      MCV 90.8 fL      MCH 27.8 pg      MCHC 30.6 g/dL      RDW 15.9 %      RDW-SD 52.7 fl      MPV 9.7 fL      Platelets 188 10*3/mm3      Neutrophil % 57.4 %      Lymphocyte % 29.1 %      Monocyte % 7.6 %      Eosinophil % 4.4 %      Basophil % 1.3 %      Immature Grans % 0.2 %      Neutrophils, Absolute 2.72 10*3/mm3      Lymphocytes, Absolute 1.38 10*3/mm3      Monocytes, Absolute 0.36 10*3/mm3      Eosinophils, Absolute 0.21 10*3/mm3      Basophils, Absolute 0.06 10*3/mm3      Immature Grans, Absolute 0.01 10*3/mm3      nRBC 0.0 /100 WBC     Leary Draw [[id number]] Collected: 11/22/24 0810    Specimen: Blood Updated: 11/22/24 0815    Narrative:      The following orders were created for panel order Leary Draw.  Procedure                               Abnormality         Status                     ---------                               -----------         ------                     Green Top (Gel)[[id number]]                                  Final result               Lavender Top[[id number]]                                     Final result               Gold Top - SST[[id number]]                                   Final result               Light Blue Top[[id number]]                                   Final result                 Please view results for these tests on the individual orders.    Green Top (Gel) [[id number]] Collected: 11/22/24 0810    Specimen: Blood Updated: 11/22/24 0815     Extra Tube Hold for add-ons.     Comment: Auto resulted.       Lavender Top [[id number]] Collected: 11/22/24 0810    Specimen: Blood Updated: 11/22/24 0815     Extra Tube hold for add-on     Comment: Auto resulted       Gold Top - SST [[id number]] Collected: 11/22/24 0810     Specimen: Blood Updated: 11/22/24 0815     Extra Tube Hold for add-ons.     Comment: Auto resulted.       Light Blue Top [[id number]] Collected: 11/22/24 0810    Specimen: Blood Updated: 11/22/24 0815     Extra Tube Hold for add-ons.     Comment: Auto resulted                Imaging Results (Last 24 Hours)       Procedure Component Value Units Date/Time    CT Angiogram Head w AI Analysis of LVO [[id number]] Collected: 11/22/24 0856     Updated: 11/22/24 0907    Narrative:      CT ANGIOGRAM HEAD W AI ANALYSIS OF LVO, CT ANGIOGRAM NECK    Date of Exam: 11/22/2024 8:18 AM EST    Indication: Stroke, follow up  Neuro deficit, acute, stroke suspected  Acute Stroke. Blurred vision. Slurred speech.    Comparison: CT cerebral perfusion study 11/22/2024. CT head without contrast 11/22/2024. CT angiography of the head and neck 2/3/2022.    Technique: CTA of the head was performed after the uneventful intravenous administration of iodinated contrast. Reconstructed coronal and sagittal images were also obtained. In addition, a 3-D volume rendered image was created for interpretation.   Automated exposure control and iterative reconstruction methods were used.          VASCULAR FINDINGS:  Normal appearance of the aortic arch with conventional 3 vessel branching pattern from the arch. The great arteries appear patent. The left vertebral artery is dominant, and both vertebral arteries are patent, supplying the basilar artery. Bilateral   common carotid arteries are patent. Mild calcific atherosclerosis is demonstrated within the bilateral carotid bulbs without flow limitation. There is mild calcific atherosclerosis in the proximal right internal carotid artery with 40% luminal stenosis   by NASCET methodology. No significant atherosclerotic plaque or flow-limiting stenosis in the left internal carotid artery. There is mild calcific atherosclerosis in the intracranial carotid artery segments without flow limitation.      The bilateral  anterior cerebral arteries, bilateral middle cerebral arteries, and bilateral posterior cerebral arteries appear patent, without indication of flow-limiting stenosis, large vessel occlusive thrombus, gross aneurysm or malformation. The   basilar artery appears patent.            NONVASCULAR FINDINGS:   The lung apices are clear and the imaged superior mediastinum appears within normal limits. Pacemaker generator is seen in the left chest wall. No cervical mass or adenopathy is identified. The larynx and epiglottis, thyroid limbs, 7 nebular glands and   parotid glands appear unremarkable.    Intracranially, no mass lesion or abnormal parenchymal enhancement is identified. There is mild to moderate generalized atrophy. Ventricular configuration is normal. Paranasal sinuses and mastoid air cells are clear. Advanced multilevel cervical facet   arthropathy. Grade 1 anterolisthesis C4 upon C5 and C7 upon T1. Chronic appearing concavity of the superior endplate of T3. 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          Impression:      1. No high-grade or flow-limiting stenosis, large vessel occlusive thrombus, vascular malformation or aneurysm is seen in the head or neck.        Electronically Signed: Lamar Holman MD    11/22/2024 9:04 AM EST    Workstation ID: [id number]    CT Angiogram Neck [[id number]] Collected: 11/22/24 0856     Updated: 11/22/24 0907    Narrative:      CT ANGIOGRAM HEAD W AI ANALYSIS OF LVO, CT ANGIOGRAM NECK    Date of Exam: 11/22/2024 8:18 AM EST    Indication: Stroke, follow up  Neuro deficit, acute, stroke suspected  Acute Stroke. Blurred vision. Slurred speech.    Comparison: CT cerebral perfusion study 11/22/2024. CT head without contrast 11/22/2024. CT angiography of the head and neck 2/3/2022.    Technique: CTA of the head was performed after the uneventful intravenous administration of iodinated contrast. Reconstructed coronal and sagittal images were also obtained. In  addition, a 3-D volume rendered image was created for interpretation.   Automated exposure control and iterative reconstruction methods were used.          VASCULAR FINDINGS:  Normal appearance of the aortic arch with conventional 3 vessel branching pattern from the arch. The great arteries appear patent. The left vertebral artery is dominant, and both vertebral arteries are patent, supplying the basilar artery. Bilateral   common carotid arteries are patent. Mild calcific atherosclerosis is demonstrated within the bilateral carotid bulbs without flow limitation. There is mild calcific atherosclerosis in the proximal right internal carotid artery with 40% luminal stenosis   by NASCET methodology. No significant atherosclerotic plaque or flow-limiting stenosis in the left internal carotid artery. There is mild calcific atherosclerosis in the intracranial carotid artery segments without flow limitation.      The bilateral anterior cerebral arteries, bilateral middle cerebral arteries, and bilateral posterior cerebral arteries appear patent, without indication of flow-limiting stenosis, large vessel occlusive thrombus, gross aneurysm or malformation. The   basilar artery appears patent.            NONVASCULAR FINDINGS:   The lung apices are clear and the imaged superior mediastinum appears within normal limits. Pacemaker generator is seen in the left chest wall. No cervical mass or adenopathy is identified. The larynx and epiglottis, thyroid limbs, 7 nebular glands and   parotid glands appear unremarkable.    Intracranially, no mass lesion or abnormal parenchymal enhancement is identified. There is mild to moderate generalized atrophy. Ventricular configuration is normal. Paranasal sinuses and mastoid air cells are clear. Advanced multilevel cervical facet   arthropathy. Grade 1 anterolisthesis C4 upon C5 and C7 upon T1. Chronic appearing concavity of the superior endplate of T3. Appearance of partial congenital  osseous fusion of T1 and T2.          Impression:      1. No high-grade or flow-limiting stenosis, large vessel occlusive thrombus, vascular malformation or aneurysm is seen in the head or neck.        Electronically Signed: Lamar Holman MD    11/22/2024 9:04 AM EST    Workstation ID: [id number]    XR Chest 1 View [[id number]] Collected: 11/22/24 0900     Updated: 11/22/24 0905    Narrative:      XR CHEST 1 VW    Date of Exam: 11/22/2024 8:49 AM EST    Indication: Acute Stroke Protocol (onset < 12 hrs)    Comparison: 2/24/2023    Findings:  Stable mild cardiac enlargement. Left-sided AICD noted. Calcified granuloma overlying the right lung base. Negative for pneumothorax. No significant consolidation or pleural effusion. Osseous structures appear intact.      Impression:      Impression:  No acute process.        Electronically Signed: Benjamín Padron MD    11/22/2024 9:03 AM EST    Workstation ID: [id number]    CT CEREBRAL PERFUSION WITH & WITHOUT CONTRAST [[id number]] Collected: 11/22/24 0822     Updated: 11/22/24 0827    Narrative:      CT CEREBRAL PERFUSION W WO CONTRAST    Date of Exam: 11/22/2024 8:11 AM EST    Indication: Neuro deficit, acute, stroke suspected  Neuro deficit, acute, stroke suspected.     Comparison: None available.    Technique: Axial CT images of the brain were obtained prior to and after the administration of iodinated contrast. CT Perfusion protocol was utilized. Automated post processing was performed by RAPID software and submitted to PACS for interpretation.   Automated exposure control and iterative reconstruction was utilized.      Findings:  No hypoperfusion defect or core ischemic infarct is identified.  Cerebral blood flow less than 30%: 0 mL  Tmax greater than 6 seconds: 0 mL  Mismatch volume: 0 mL  Mismatch ratio: None.      Impression:      Impression:  Negative CT cerebral perfusion study.        Electronically Signed: Lamar Holman MD    11/22/2024 8:25 AM EST    Workstation ID:  [id number]    CT Head Without Contrast Stroke Protocol [[id number]] Collected: 11/22/24 0807     Updated: 11/22/24 0812    Narrative:      CT HEAD WO CONTRAST STROKE PROTOCOL    Date of Exam: 11/22/2024 8:03 AM EST    Indication: Neuro deficit, acute, stroke suspected  Neuro deficit, acute, stroke suspected.    Comparison: CT head without contrast 2/24/2023    Technique: Axial CT images were obtained of the head without contrast administration.  Reconstructed coronal and sagittal images were also obtained. Automated exposure control and iterative construction methods were used.    Scan Time: 8:05 a.m.  Results discussed with CT technologist who relayed the findings to the ER team at the scanner at 8:07 a.m.    Findings:  Generalized cerebral volume loss unchanged. Mild white matter findings of chronic microvascular disease. No intracranial hemorrhage. No mass effect or midline shift. The posterior fossa is without acute abnormality. Globes are symmetric. There is no   retro-orbital abnormality. The mastoid air cells are well-aerated. Negative for sinus fluid level. No calvarial fracture. Hyperostosis frontalis interna.      Impression:      Impression:  1. No intracranial hemorrhage.  2. Stable generalized cerebral loss and white matter findings of chronic microvascular disease.        Electronically Signed: Benjamín Padron MD    11/22/2024 8:10 AM EST    Workstation ID: [id number]              Assessment & Plan        This is a 89 y.o. female with:    Active and Resolved Problems  Active Hospital Problems    Diagnosis  POA    **Dysarthria [R47.1]  Yes      Resolved Hospital Problems   No resolved problems to display.       Dysarthria, blurred vision   - possibly 2/2 hypoxemia   - symptoms have no resolved   - CT head: No intracranial hemorrhage.Stable generalized cerebral loss and white matter findings of chronic microvascular disease.   - Cta head/neck: no large vessel occlusion   - Patient has PPM that is not MRI  compatible   - TSH 0.982, folate pending, b12 pending, lipid panel pending  - CXR: no acute process  - passed beside swallow study   - consider ROSSY, recommend outpatient sleep study  - Neurology consulted and following     Hypertension   - BP stable at 132/68  - Resume home medications once reconciled     Atrial fibrillation   - Currently has ventricular paced rhythm  - Continue AC and BB once reconciled   - Telemetry    CKD  - Cr 1.41, at baseline   - Avoid nephrotoxic medications  - Continue to monitor kidney fn on BMP     Diabetes Mellitus   -   - Low SSI   - Consistent carb diet  - Hypoglycemia protocol         VTE Prophylaxis:  Mechanical VTE prophylaxis orders are present.        The patient desires to be as follows:    CODE STATUS:           Nika Alexander, who can be contacted at [phone], is the designated person to make medical decisions on the patient's behalf if She is incapable of doing so. This was clarified with patient and/or next of kin on 11/22/2024 during the course of this H&P.    Admission Status:  I believe this patient meets inpatient status.    Expected Length of Stay: > 48 hours     PDMP and Medication Dispenses via Sidebar reviewed and consistent with patient reported medications.    I discussed the patient's findings and my recommendations with patient and family.      Signature:     This document has been electronically signed by SHANI Ospina on November 22, 2024 12:17 EMERY   [facility] Hospitalist Team

## 2024-11-22 NOTE — CASE MANAGEMENT/SOCIAL WORK
Discharge Planning Assessment   Angelo     Patient Name: Aracelis Vargas  MRN: [id number]  Today's Date: 11/22/2024    Admit Date: 11/22/2024    Plan: From [facility]. Receives PT there. Plans to return. Notify liaison when dc ready for potential transportation assistance. Currently on O2 @ 3L - doesn't wear this at home   Discharge Needs Assessment       Row Name 11/22/24 1052       Living Environment    People in Home facility resident    Name(s) of People in Home [facility]    Current Living Arrangements assisted living facility    Potentially Unsafe Housing Conditions none    In the past 12 months has the electric, gas, oil, or water company threatened to shut off services in your home? Pt Unable  Resides in assisted living    Primary Care Provided by self;other (see comments)  facility staff    Family Caregiver if Needed none    Quality of Family Relationships non-existent;other (see comments)  Has friend Nika Alexander that is involved in her care. She reports Nika is also her POA. I have requested Nika to bring in POA paperwork to be scanned into chart    Able to Return to Prior Arrangements yes       Resource/Environmental Concerns    Resource/Environmental Concerns none    Transportation Concerns none       Transportation Needs    In the past 12 months, has lack of transportation kept you from medical appointments or from getting medications? no    In the past 12 months, has lack of transportation kept you from meetings, work, or from getting things needed for daily living? No       Food Insecurity    Within the past 12 months, you worried that your food would run out before you got the money to buy more. Pt Unable  Uses facility for meals    Within the past 12 months, the food you bought just didn't last and you didn't have money to get more. Pt Unable       Transition Planning    Patient/Family Anticipates Transition to home    Patient/Family Anticipated Services  at Transition rehabilitation services    Transportation Anticipated family or friend will provide;other (see comments)  facility transport       Discharge Needs Assessment    Current Outpatient/Agency/Support Group assisted living facility;other (see comments)  outpatient rehab services    Equipment Currently Used at Home nebulizer;walker, rolling;wheelchair;cane, straight;hospital bed;shower chair;grab bar    Concerns to be Addressed denies needs/concerns at this time    Anticipated Changes Related to Illness none    Equipment Needed After Discharge none                   Discharge Plan       Row Name 11/22/24 1106       Plan    Plan From [facility]. Receives PT there. Plans to return. Notify liaison when dc ready for potential transportation assistance. Currently on O2 @ 3L - doesn't wear this at home    Patient/Family in Agreement with Plan yes    Plan Comments  spoke with patient and her friend Nika Alexander. Pt & Nika report Nika is patient's POA. I have asked Nika to bring in copy of POA paperwork for patient's chart.Pt reports she resides at [facility] and intends to return there at discharge. She states she is receiving PT there 5 days/week. She confirms PCP and states she uses facility pharmacy.They manage her medications, provide her meals//transportation services. Pt states she is able to dress/bathe/feed self. She consented to  contacting facility liaison Luda.  spoke with Luda who confirms patient's services and ok to return at discharge.Pt and Nika aware that if patient discharges over the weekend, that  AL facility doesn't provide transportation. Nika said she could provide transportation if needed. Luda states AL facility can provide transportation during weekday normal business hours if given notice of anticipated dc. I've aske NP Whitney Licea to consult SLP due to slurred speech. She is  awaiting nurse completion of dysphagia screen. Pt currently on O2 @ 3L and doesn't wear this at home. May need exercise oximetry prior to dc. DC Barriers: neurology consult, neurochecks q 2 hours; O2                  Continued Care and Services - Admitted Since 11/22/2024       Destination       Service Provider Request Status Services Address Phone Fax Patient Preferred    [facility] Pending - Request Sent -- [address] [place] [address] [phone] --                     Demographic Summary       Row Name 11/22/24 1051       General Information    Admission Type inpatient    Arrived From home;other (see comments)  Assisted Living Facility    Required Notices Provided Important Message from Medicare    Referral Source admission list;hospital clinician/department    Reason for Consult discharge planning    Preferred Language English       Contact Information    Permission Granted to Share Info With ;family/designee;facility                    Functional Status       Row Name 11/22/24 1051       Functional Status    Usual Activity Tolerance moderate    Current Activity Tolerance moderate       Functional Status, IADL    Medications assistive person    Meal Preparation other (see comments)    Housekeeping other (see comments)    Laundry other (see comments)    Shopping assistive person    IADL Comments pt reports assisted living facility manages her medications, housekeeping, laundry, meals       Mental Status Summary    Mental Status Comments Oriented to person/place/date                  Patient Forms       Row Name 11/22/24 1115       Patient Forms    Important Message from Medicare (IMM) Delivered  IMM 11/22/24 per registration                  Bethany Valencia RN, [facility]  Office: [phone]  Fax: [phone]  [email or web address]      I met with patient in room wearing PPE: mask    Maintained distance greater  than six feet and spent </=15 minutes in the room    Bethany Valencia RN

## 2024-11-22 NOTE — Clinical Note
Level of Care: Telemetry [5]   Admitting Physician: CANDY CHRIS [[id number]]   Attending Physician: CANDY CHRIS [[id number]]   Bed Request Comments: mendez

## 2024-11-23 ENCOUNTER — READMISSION MANAGEMENT (OUTPATIENT)
Dept: CALL CENTER | Facility: HOSPITAL | Age: 89
End: 2024-11-23
Payer: MEDICARE

## 2024-11-23 VITALS
HEART RATE: 80 BPM | OXYGEN SATURATION: 99 % | BODY MASS INDEX: 34.02 KG/M2 | DIASTOLIC BLOOD PRESSURE: 50 MMHG | WEIGHT: 192.02 LBS | SYSTOLIC BLOOD PRESSURE: 145 MMHG | HEIGHT: 63 IN | TEMPERATURE: 98 F | RESPIRATION RATE: 14 BRPM

## 2024-11-23 LAB
ANION GAP SERPL CALCULATED.3IONS-SCNC: 8 MMOL/L (ref 5–15)
BASOPHILS # BLD AUTO: 0.04 10*3/MM3 (ref 0–0.2)
BASOPHILS NFR BLD AUTO: 0.7 % (ref 0–1.5)
BUN SERPL-MCNC: 25 MG/DL (ref 8–23)
BUN/CREAT SERPL: 17.1 (ref 7–25)
CALCIUM SPEC-SCNC: 9.2 MG/DL (ref 8.6–10.5)
CHLORIDE SERPL-SCNC: 99 MMOL/L (ref 98–107)
CHOLEST SERPL-MCNC: 176 MG/DL (ref 0–200)
CO2 SERPL-SCNC: 27 MMOL/L (ref 22–29)
CREAT SERPL-MCNC: 1.46 MG/DL (ref 0.57–1)
DEPRECATED RDW RBC AUTO: 52.6 FL (ref 37–54)
EGFRCR SERPLBLD CKD-EPI 2021: 34.7 ML/MIN/1.73
EOSINOPHIL # BLD AUTO: 0.24 10*3/MM3 (ref 0–0.4)
EOSINOPHIL NFR BLD AUTO: 4.5 % (ref 0.3–6.2)
ERYTHROCYTE [DISTWIDTH] IN BLOOD BY AUTOMATED COUNT: 15.8 % (ref 12.3–15.4)
GLUCOSE BLDC GLUCOMTR-MCNC: 120 MG/DL (ref 70–105)
GLUCOSE BLDC GLUCOMTR-MCNC: 126 MG/DL (ref 70–105)
GLUCOSE SERPL-MCNC: 122 MG/DL (ref 65–99)
HBA1C MFR BLD: 5.46 % (ref 4.8–5.6)
HCT VFR BLD AUTO: 34.5 % (ref 34–46.6)
HDLC SERPL-MCNC: 46 MG/DL (ref 40–60)
HGB BLD-MCNC: 10.5 G/DL (ref 12–15.9)
IMM GRANULOCYTES # BLD AUTO: 0 10*3/MM3 (ref 0–0.05)
IMM GRANULOCYTES NFR BLD AUTO: 0 % (ref 0–0.5)
LDLC SERPL CALC-MCNC: 112 MG/DL (ref 0–100)
LDLC/HDLC SERPL: 2.39 {RATIO}
LYMPHOCYTES # BLD AUTO: 1.25 10*3/MM3 (ref 0.7–3.1)
LYMPHOCYTES NFR BLD AUTO: 23.2 % (ref 19.6–45.3)
MCH RBC QN AUTO: 27.7 PG (ref 26.6–33)
MCHC RBC AUTO-ENTMCNC: 30.4 G/DL (ref 31.5–35.7)
MCV RBC AUTO: 91 FL (ref 79–97)
MONOCYTES # BLD AUTO: 0.43 10*3/MM3 (ref 0.1–0.9)
MONOCYTES NFR BLD AUTO: 8 % (ref 5–12)
NEUTROPHILS NFR BLD AUTO: 3.43 10*3/MM3 (ref 1.7–7)
NEUTROPHILS NFR BLD AUTO: 63.6 % (ref 42.7–76)
NRBC BLD AUTO-RTO: 0 /100 WBC (ref 0–0.2)
PLATELET # BLD AUTO: 163 10*3/MM3 (ref 140–450)
PMV BLD AUTO: 9.7 FL (ref 6–12)
POTASSIUM SERPL-SCNC: 4 MMOL/L (ref 3.5–5.2)
QT INTERVAL: 542 MS
QTC INTERVAL: 587 MS
RBC # BLD AUTO: 3.79 10*6/MM3 (ref 3.77–5.28)
SARS-COV-2 RNA RESP QL NAA+PROBE: NOT DETECTED
SODIUM SERPL-SCNC: 134 MMOL/L (ref 136–145)
TRIGL SERPL-MCNC: 101 MG/DL (ref 0–150)
VLDLC SERPL-MCNC: 18 MG/DL (ref 5–40)
WBC NRBC COR # BLD AUTO: 5.39 10*3/MM3 (ref 3.4–10.8)

## 2024-11-23 PROCEDURE — 82948 REAGENT STRIP/BLOOD GLUCOSE: CPT

## 2024-11-23 RX ORDER — FUROSEMIDE 40 MG/1
40 TABLET ORAL DAILY
Start: 2024-11-23

## 2024-11-23 RX ORDER — PANTOPRAZOLE SODIUM 40 MG/1
40 TABLET, DELAYED RELEASE ORAL
Status: DISCONTINUED | OUTPATIENT
Start: 2024-11-24 | End: 2024-11-23 | Stop reason: HOSPADM

## 2024-11-23 RX ORDER — SPIRONOLACTONE 25 MG/1
25 TABLET ORAL DAILY
Status: DISCONTINUED | OUTPATIENT
Start: 2024-11-23 | End: 2024-11-23 | Stop reason: HOSPADM

## 2024-11-23 RX ORDER — FERROUS SULFATE 325(65) MG
1 TABLET ORAL EVERY OTHER DAY
Start: 2024-11-23

## 2024-11-23 RX ORDER — ATORVASTATIN CALCIUM 10 MG/1
10 TABLET, FILM COATED ORAL DAILY
Status: DISCONTINUED | OUTPATIENT
Start: 2024-11-23 | End: 2024-11-23

## 2024-11-23 RX ORDER — TRAZODONE HYDROCHLORIDE 100 MG/1
100 TABLET ORAL NIGHTLY
Status: DISCONTINUED | OUTPATIENT
Start: 2024-11-23 | End: 2024-11-23 | Stop reason: HOSPADM

## 2024-11-23 RX ORDER — FUROSEMIDE 40 MG/1
40 TABLET ORAL
Status: DISCONTINUED | OUTPATIENT
Start: 2024-11-23 | End: 2024-11-23 | Stop reason: HOSPADM

## 2024-11-23 RX ORDER — TOPIRAMATE 25 MG/1
12.5 TABLET, FILM COATED ORAL 2 TIMES DAILY
Status: DISCONTINUED | OUTPATIENT
Start: 2024-11-23 | End: 2024-11-23 | Stop reason: HOSPADM

## 2024-11-23 RX ORDER — FOLIC ACID 1 MG/1
1 TABLET ORAL DAILY
Status: DISCONTINUED | OUTPATIENT
Start: 2024-11-23 | End: 2024-11-23 | Stop reason: HOSPADM

## 2024-11-23 RX ORDER — VALSARTAN 40 MG/1
40 TABLET ORAL NIGHTLY
Status: DISCONTINUED | OUTPATIENT
Start: 2024-11-23 | End: 2024-11-23 | Stop reason: HOSPADM

## 2024-11-23 RX ORDER — GABAPENTIN 300 MG/1
600 CAPSULE ORAL DAILY
Status: DISCONTINUED | OUTPATIENT
Start: 2024-11-23 | End: 2024-11-23 | Stop reason: HOSPADM

## 2024-11-23 RX ORDER — METOPROLOL SUCCINATE 50 MG/1
50 TABLET, EXTENDED RELEASE ORAL NIGHTLY
Status: DISCONTINUED | OUTPATIENT
Start: 2024-11-23 | End: 2024-11-23 | Stop reason: HOSPADM

## 2024-11-23 RX ADMIN — GABAPENTIN 600 MG: 300 CAPSULE ORAL at 14:14

## 2024-11-23 RX ADMIN — SPIRONOLACTONE 25 MG: 25 TABLET ORAL at 14:14

## 2024-11-23 RX ADMIN — FOLIC ACID 1 MG: 1 TABLET ORAL at 14:14

## 2024-11-23 NOTE — CASE MANAGEMENT/SOCIAL WORK
Continued Stay Note   Angelo     Patient Name: Aracelis Vargas  MRN: [id number]  Today's Date: 11/23/2024    Admit Date: 11/22/2024    Plan: Return to [facility].  Does in house PT.   Discharge Plan       Row Name 11/23/24 1430       Plan    Plan Return to [facility].  Does in house PT.    Plan Comments notified liasion of d/c orders.  per nursing, emergency contact unable to transport today.  Wheelchair van request completed and notified EMs coordinator.                      Expected Discharge Date and Time       Expected Discharge Date Expected Discharge Time    Nov 23, 2024           JENNA Rush RN  Case Management  [phone] office  [phone] fax

## 2024-11-23 NOTE — PLAN OF CARE
Problem: Violence Risk or Actual  Goal: Anger and Impulse Control  Outcome: Met  Intervention: Minimize Safety Risk  Recent Flowsheet Documentation  Taken 11/23/2024 1400 by Zabrina Bertrand LPN  Enhanced Safety Measures: chair alarm set  Taken 11/23/2024 1253 by Zabrina Bertrand LPN  Sensory Stimulation Regulation: care clustered  Enhanced Safety Measures: chair alarm set  Taken 11/23/2024 1000 by Zabrina Bertrand LPN  Enhanced Safety Measures: chair alarm set  Taken 11/23/2024 0800 by Zabrina Bertrand LPN  Sensory Stimulation Regulation: care clustered  Enhanced Safety Measures: chair alarm set  Intervention: Promote Self-Control  Recent Flowsheet Documentation  Taken 11/23/2024 1253 by Zabrina Bertrand LPN  Supportive Measures: active listening utilized  Environmental Support: calm environment promoted  Taken 11/23/2024 0800 by Zabrina Bertrand LPN  Supportive Measures: active listening utilized  Environmental Support: calm environment promoted     Problem: Adult Inpatient Plan of Care  Goal: Plan of Care Review  Outcome: Met  Goal: Patient-Specific Goal (Individualized)  Outcome: Met  Goal: Absence of Hospital-Acquired Illness or Injury  Outcome: Met  Intervention: Identify and Manage Fall Risk  Recent Flowsheet Documentation  Taken 11/23/2024 1400 by Zabrina Bertrand LPN  Safety Promotion/Fall Prevention:   safety round/check completed   room organization consistent   assistive device/personal items within reach   clutter free environment maintained  Taken 11/23/2024 1253 by Zabrina Bertrand LPN  Safety Promotion/Fall Prevention:   safety round/check completed   room organization consistent   clutter free environment maintained   assistive device/personal items within reach  Taken 11/23/2024 1000 by Zabrina Bertrand LPN  Safety Promotion/Fall Prevention:   safety round/check completed   room organization consistent   clutter free environment maintained   assistive device/personal items within reach  Taken 11/23/2024 0800 by  Bertrand, Zabrina, LPN  Safety Promotion/Fall Prevention:   safety round/check completed   room organization consistent   assistive device/personal items within reach   clutter free environment maintained  Intervention: Prevent Skin Injury  Recent Flowsheet Documentation  Taken 11/23/2024 1253 by Zabrina Bertrand LPN  Body Position: position changed independently  Skin Protection: transparent dressing maintained  Taken 11/23/2024 0800 by Zabrina Bertrand LPN  Body Position: position changed independently  Skin Protection: transparent dressing maintained  Intervention: Prevent and Manage VTE (Venous Thromboembolism) Risk  Recent Flowsheet Documentation  Taken 11/23/2024 1253 by Zabrina Bertrand LPN  VTE Prevention/Management:   bilateral   SCDs (sequential compression devices) on  Taken 11/23/2024 0800 by Zabrina Bertrand LPN  VTE Prevention/Management:   bilateral   SCDs (sequential compression devices) on  Intervention: Prevent Infection  Recent Flowsheet Documentation  Taken 11/23/2024 1400 by Zabrina Bertrand LPN  Infection Prevention:   environmental surveillance performed   equipment surfaces disinfected   single patient room provided   rest/sleep promoted  Taken 11/23/2024 1253 by Zabrina Bertrand LPN  Infection Prevention:   environmental surveillance performed   equipment surfaces disinfected   single patient room provided   rest/sleep promoted  Taken 11/23/2024 1000 by Zabrina Bertrand LPN  Infection Prevention:   environmental surveillance performed   equipment surfaces disinfected   rest/sleep promoted   single patient room provided  Taken 11/23/2024 0800 by Zabrina Bertrand LPN  Infection Prevention:   environmental surveillance performed   equipment surfaces disinfected   rest/sleep promoted   single patient room provided  Goal: Optimal Comfort and Wellbeing  Outcome: Met  Intervention: Provide Person-Centered Care  Recent Flowsheet Documentation  Taken 11/23/2024 1253 by Zabrina Bertrand LPN  Trust Relationship/Rapport:    care explained   choices provided  Taken 11/23/2024 0800 by Zabrina Bertrand LPN  Trust Relationship/Rapport:   care explained   choices provided  Goal: Readiness for Transition of Care  Outcome: Met     Problem: Comorbidity Management  Goal: Maintenance of COPD Symptom Control  Outcome: Met  Intervention: Maintain COPD (Chronic Obstructive Pulmonary Disease) Symptom Control  Recent Flowsheet Documentation  Taken 11/23/2024 1400 by Zabrina Bertrand LPN  Medication Review/Management: medications reviewed  Taken 11/23/2024 1253 by Zabrina Bertrand LPN  Medication Review/Management: medications reviewed  Taken 11/23/2024 1000 by Zabrina Bertrand LPN  Medication Review/Management: medications reviewed  Taken 11/23/2024 0800 by Zabrina Bertrand LPN  Medication Review/Management: medications reviewed  Goal: Blood Glucose Level Within Target Range  Outcome: Met  Intervention: Monitor and Manage Glycemia  Recent Flowsheet Documentation  Taken 11/23/2024 1400 by Zabrina Bertrand LPN  Medication Review/Management: medications reviewed  Taken 11/23/2024 1253 by Zabrina Bertrand LPN  Medication Review/Management: medications reviewed  Taken 11/23/2024 1000 by Zabrina Bertrand LPN  Medication Review/Management: medications reviewed  Taken 11/23/2024 0800 by Zabrina Bertrand LPN  Medication Review/Management: medications reviewed  Goal: Maintenance of Heart Failure Symptom Control  Outcome: Met  Intervention: Maintain Heart Failure Management  Recent Flowsheet Documentation  Taken 11/23/2024 1400 by Zabrina Bertrand LPN  Medication Review/Management: medications reviewed  Taken 11/23/2024 1253 by Zabrina Bertrand LPN  Medication Review/Management: medications reviewed  Taken 11/23/2024 1000 by Zabrina Bertrand LPN  Medication Review/Management: medications reviewed  Taken 11/23/2024 0800 by Zabrina Bertrand LPN  Medication Review/Management: medications reviewed  Goal: Blood Pressure in Desired Range  Outcome: Met  Intervention: Maintain Blood Pressure  Management  Recent Flowsheet Documentation  Taken 11/23/2024 1400 by Zabrina Bertrand LPN  Medication Review/Management: medications reviewed  Taken 11/23/2024 1253 by Zabrina Bertrand LPN  Medication Review/Management: medications reviewed  Taken 11/23/2024 1000 by Zabrina Bertrand LPN  Medication Review/Management: medications reviewed  Taken 11/23/2024 0800 by Zabrina Bertrand LPN  Medication Review/Management: medications reviewed  Goal: Maintenance of Osteoarthritis Symptom Control  Outcome: Met  Intervention: Maintain Osteoarthritis Symptom Control  Recent Flowsheet Documentation  Taken 11/23/2024 1400 by Zabrina Bertrand LPN  Medication Review/Management: medications reviewed  Taken 11/23/2024 1253 by Zabrina Bertrand LPN  Medication Review/Management: medications reviewed  Taken 11/23/2024 1000 by Zabrina Bertrand LPN  Medication Review/Management: medications reviewed  Taken 11/23/2024 0800 by Zabrina Bertrand LPN  Medication Review/Management: medications reviewed     Problem: Fall Injury Risk  Goal: Absence of Fall and Fall-Related Injury  Outcome: Met  Intervention: Identify and Manage Contributors  Recent Flowsheet Documentation  Taken 11/23/2024 1400 by Zabrina Bertrand LPN  Medication Review/Management: medications reviewed  Taken 11/23/2024 1253 by Zabrina Bertrand LPN  Medication Review/Management: medications reviewed  Taken 11/23/2024 1000 by Zabrina Bertrand LPN  Medication Review/Management: medications reviewed  Taken 11/23/2024 0800 by Zabrina Bertrand LPN  Medication Review/Management: medications reviewed  Intervention: Promote Injury-Free Environment  Recent Flowsheet Documentation  Taken 11/23/2024 1400 by Zabrina Bertrand LPN  Safety Promotion/Fall Prevention:   safety round/check completed   room organization consistent   assistive device/personal items within reach   clutter free environment maintained  Taken 11/23/2024 1253 by Zabrina Bertrand LPN  Safety Promotion/Fall Prevention:   safety round/check  completed   room organization consistent   clutter free environment maintained   assistive device/personal items within reach  Taken 11/23/2024 1000 by Zabrina Bertrand LPN  Safety Promotion/Fall Prevention:   safety round/check completed   room organization consistent   clutter free environment maintained   assistive device/personal items within reach  Taken 11/23/2024 0800 by Zabrina Bertrand LPN  Safety Promotion/Fall Prevention:   safety round/check completed   room organization consistent   assistive device/personal items within reach   clutter free environment maintained   Goal Outcome Evaluation:

## 2024-11-23 NOTE — DISCHARGE SUMMARY
[facility] Medicine Services  Discharge Summary    Date of Service: 2024  Patient Name: Aracelis aVrgas  : 1935  MRN: [id number]    Date of Admission: 2024  Discharge Diagnosis:     Dysarthria resolved  Acute on chronic renal insufficiency  Type 2 diabetes  Morbid obesity  Atrial fibs history  Chronic anticoagulation        Date of Discharge: 2024  Primary Care Physician: Gabriel Goss MD      Presenting Problem:   Dysarthria [R47.1]    Active and Resolved Hospital Problems:  Active Hospital Problems    Diagnosis POA    **Dysarthria [R47.1] Yes      Resolved Hospital Problems   No resolved problems to display.         Hospital Course     HPI:    As per history and physical of 2024.    Hospital Course:   Is a pleasant obese 89-year-old white female who was admitted with concerns as noted in history and physical.  She states that she lives in assisted living situation and she was told that her speech was slurred.  She was not aware of any issues.  Present issues.  She denies any headache dizziness or paresthesias.  Feels that she is at her baseline.  Bircher elevation during her hospitalization.  Blood pressure is well-controlled.  Patient was supplemented with nasal cannula but she had no documented hypoxemia.  19 screen was negative.  CT scan of the head showed stable generalized atrophy and chronic microvascular changes.  CT angiogram failed to show any evidence of intracerebral abnormality or severe stenotic vasculature.  X-ray showed no acute process.  Functions appear normal.  B12 and folic acid levels were normal.  CBC showed a mild anemia.  Cardiac enzymes showed no significant elevation.  Lites were nonremarkable.  Some elevated creatinine suggestive of a chronic kidney dysfunction.  Hemoglobin returned being 5.46.  Symptomatically resolved.  She requested to return to her assisted living situation.  In light of her renal insufficiency  reticulocyte could be decreased to once daily and followed up as an outpatient for any need for further adjustments as well as follow-up of her creatinine.              Day of Discharge     Vital Signs:  Temp:  [97.3 °F (36.3 °C)-98 °F (36.7 °C)] 98 °F (36.7 °C)  Heart Rate:  [72-86] 80  Resp:  [11-17] 14  BP: (101-145)/(43-52) 145/50  Flow (L/min) (Oxygen Therapy):  [3] 3    Physical Exam:  Physical Exam  Vitals reviewed.   Constitutional:       Appearance: She is obese.   HENT:      Head: Normocephalic.   Cardiovascular:      Rate and Rhythm: Normal rate and regular rhythm.   Pulmonary:      Effort: Pulmonary effort is normal.      Breath sounds: Normal breath sounds.   Abdominal:      General: Bowel sounds are normal.      Palpations: Abdomen is soft.      Tenderness: There is no abdominal tenderness.   Musculoskeletal:         General: No swelling.   Neurological:      General: No focal deficit present.      Mental Status: She is alert and oriented to person, place, and time. Mental status is at baseline.   Psychiatric:         Behavior: Behavior normal.         Thought Content: Thought content normal.            Pertinent  and/or Most Recent Results     LAB RESULTS:      Lab 11/22/24  2335 11/22/24  0810   WBC 5.39 4.74   HEMOGLOBIN 10.5* 11.5*   HEMATOCRIT 34.5 37.6   PLATELETS 163 188   NEUTROS ABS 3.43 2.72   IMMATURE GRANS (ABS) 0.00 0.01   LYMPHS ABS 1.25 1.38   MONOS ABS 0.43 0.36   EOS ABS 0.24 0.21   MCV 91.0 90.8   PROTIME  --  15.1*   APTT  --  38.7*         Lab 11/22/24  2335 11/22/24  0810   SODIUM 134* 137   POTASSIUM 4.0 3.5   CHLORIDE 99 97*   CO2 27.0 29.6*   ANION GAP 8.0 10.4   BUN 25* 21   CREATININE 1.46* 1.41*   EGFR 34.7* 36.2*   GLUCOSE 122* 117*   CALCIUM 9.2 9.6   HEMOGLOBIN A1C 5.46  --    TSH  --  0.982         Lab 11/22/24  0810   TOTAL PROTEIN 7.6   ALBUMIN 3.8   GLOBULIN 3.8   ALT (SGPT) 11   AST (SGOT) 22   BILIRUBIN 0.4   ALK PHOS 78         Lab 11/22/24  0810   HSTROP T 35*    PROTIME 15.1*   INR 1.18*         Lab 11/22/24  2335   CHOLESTEROL 176   LDL CHOL 112*   HDL CHOL 46   TRIGLYCERIDES 101         Lab 11/22/24  0826 11/22/24  0810   FOLATE  --  10.10   VITAMIN B 12  --  790   ABO TYPING A  --    RH TYPING Positive  --    ANTIBODY SCREEN Negative  --          Brief Urine Lab Results       None          Microbiology Results (last 10 days)       Procedure Component Value - Date/Time    COVID-19,CEPHEID/SHAHLA,COR/RAYRAY/PAD/MARTHA/LAG/LUZ IN-HOUSE,NP SWAB IN TRANSPORT MEDIA 1 HR TAT, RT-PCR - Swab, Nasopharynx [[id number]]  (Normal) Collected: 11/22/24 2324    Lab Status: Final result Specimen: Swab from Nasopharynx Updated: 11/23/24 0051     COVID19 Not Detected    Narrative:      Fact sheet for providers: https://www.fda.gov/media/008811/download     Fact sheet for patients: https://www.fda.gov/media/881774/download  Fact sheet for providers: https://www.fda.gov/media/776272/download    Fact sheet for patients: https://www.fda.gov/media/373793/download    Test performed by PCR.            CT Angiogram Head w AI Analysis of LVO    Result Date: 11/22/2024  Impression: 1. No high-grade or flow-limiting stenosis, large vessel occlusive thrombus, vascular malformation or aneurysm is seen in the head or neck. Electronically Signed: Lamar Holman MD  11/22/2024 9:04 AM EST  Workstation ID: [id number]    CT Angiogram Neck    Result Date: 11/22/2024  Impression: 1. No high-grade or flow-limiting stenosis, large vessel occlusive thrombus, vascular malformation or aneurysm is seen in the head or neck. Electronically Signed: Lamar Holman MD  11/22/2024 9:04 AM EST  Workstation ID: [id number]    XR Chest 1 View    Result Date: 11/22/2024  Impression: Impression: No acute process. Electronically Signed: Benjamín Padron MD  11/22/2024 9:03 AM EST  Workstation ID: [id number]    CT CEREBRAL PERFUSION WITH & WITHOUT CONTRAST    Result Date: 11/22/2024  Impression: Impression: Negative CT cerebral perfusion study.  Electronically Signed: Lamar Holman MD  11/22/2024 8:25 AM EST  Workstation ID: [id number]    CT Head Without Contrast Stroke Protocol    Result Date: 11/22/2024  Impression: Impression: 1. No intracranial hemorrhage. 2. Stable generalized cerebral loss and white matter findings of chronic microvascular disease. Electronically Signed: Benjamín Padron MD  11/22/2024 8:10 AM EST  Workstation ID: [id number]     Results for orders placed during the hospital encounter of 08/16/23    Duplex Venous Lower Extremity - Left CAR    Interpretation Summary    Normal left lower extremity venous duplex scan.      Results for orders placed during the hospital encounter of 08/16/23    Duplex Venous Lower Extremity - Left CAR    Interpretation Summary    Normal left lower extremity venous duplex scan.      Results for orders placed during the hospital encounter of 02/25/22    Adult Transesophageal Echo (ALEYDA) W/ Cont if Necessary Per Protocol (Cardiology Department)    Interpretation Summary  Date of study  2/28/2022.    Indications  Recent TIA.    Procedure performed  Transesophageal echocardiogram and Doppler study.    Procedure  Anesthesia was provided by anesthesiologist with intravenous Diprivan.  ALEYDA probe could be passed without difficulty.  Patient tolerated the procedure well.  No complications were noted.    Results  Technically satisfactory study.  Mitral valve is structurally normal.  Mild mitral regurgitation is present.  Aortic valve is tricuspid.  Thickened with adequate opening motion.  Mild to moderate aortic regurgitation is present.  Tricuspid valve is normal.  Mild tricuspid regurgitation is present.  Calculated pulmonary artery pressure is 28 mmHg.  Mild biatrial enlargement is present.  Left atrial appendage is very small.  Left ventricle is normal in size and contractility with ejection fraction of 60%.  Atrial septum is intact.  No evidence for intracardiac thrombus or smoking effect is present.  No pericardial  effusion is present.  Aorta is normal.    Impression  Mild mitral regurgitation and mild to moderate aortic regurgitation.  Mild tricuspid regurgitation  Calculated pulmonary artery pressure is 28 mmHg.  Mild biatrial enlargement.  Left atrial appendage is small.  Left ventricle is normal in size and contractility with ejection fraction of 60%.      Labs Pending at Discharge:  Pending Results       Procedure [Order ID] Specimen - Date/Time    MRI Brain Without Contrast [[id number]]             Procedures Performed           Consults:   Consults       Date and Time Order Name Status Description    11/22/2024  9:25 AM Hospitalist (on-call MD unless specified)      11/22/2024  7:59 AM Inpatient Neurology Consult Stroke Completed     11/22/2024  7:59 AM Inpatient Neurology Consult Stroke Completed               Discharge Details        Discharge Medications        Changes to Medications        Instructions Start Date   ferrous sulfate 325 (65 FE) MG tablet  What changed: when to take this   325 mg, Oral, Every Other Day      furosemide 40 MG tablet  Commonly known as: LASIX  What changed: when to take this   40 mg, Oral, Daily             Continue These Medications        Instructions Start Date   apixaban 5 MG tablet tablet  Commonly known as: ELIQUIS   5 mg, 2 Times Daily      atorvastatin 10 MG tablet  Commonly known as: LIPITOR   TAKE ONE TABLET BY MOUTH DAILY      dextrose 40 % gel  Commonly known as: GLUTOSE   15 g, Every 1 Hour PRN      Farxiga 10 MG tablet  Generic drug: dapagliflozin Propanediol   1 tablet, Daily      folic acid 1 MG tablet  Commonly known as: FOLVITE   1 mg, Oral, Daily      gabapentin 600 MG tablet  Commonly known as: NEURONTIN   600 mg, Oral, 3 Times Daily      glucagon 1 MG injection  Commonly known as: GLUCAGEN   1 mg, Once As Needed      Melatonin 10 MG tablet   1 tablet, Every Night at Bedtime      metoprolol succinate XL 50 MG 24 hr tablet  Commonly known as: TOPROL-XL   50 mg, Every  Night at Bedtime      NovoLIN 70/30 (70-30) 100 UNIT/ML injection  Generic drug: insulin NPH-insulin regular   INJECT 30 UNITS UNDER THE SKIN INTO THE APPROPRIATE AREA TWO TIMES A DAY WITH A MEAL      omeprazole 40 MG capsule  Commonly known as: priLOSEC   40 mg, Oral, Every Morning Before Breakfast, Take on empty stomach!      Ozempic (1 MG/DOSE) 2 MG/1.5ML solution pen-injector  Generic drug: Semaglutide (1 MG/DOSE)   1.5 mL, Subcutaneous, Weekly, Tuesdays      potassium chloride 10 MEQ CR capsule  Commonly known as: MICRO-K   10 mEq, Daily      spironolactone 25 MG tablet  Commonly known as: ALDACTONE   25 mg, Daily      topiramate 25 MG tablet  Commonly known as: TOPAMAX   12.5 mg, Oral, 2 Times Daily      traZODone 100 MG tablet  Commonly known as: DESYREL   100 mg, Nightly      valsartan 40 MG tablet  Commonly known as: DIOVAN   40 mg, Every Night at Bedtime             Stop These Medications      ibuprofen 600 MG tablet  Commonly known as: ADVIL,MOTRIN     metFORMIN 500 MG tablet  Commonly known as: GLUCOPHAGE              Allergies   Allergen Reactions    Latex, Natural Rubber Rash    Adhesive Tape Rash         Discharge Disposition: assisted living  Home or Self Care    Diet:  Hospital:  Diet Order   Procedures    Diet: Cardiac, Diabetic; Healthy Heart (2-3 Na+); Consistent Carbohydrate; Fluid Consistency: Thin (IDDSI 0)         Discharge Activity:         CODE STATUS:  Code Status and Medical Interventions: CPR (Attempt to Resuscitate); Full Support   Ordered at: 11/22/24 1512     Code Status (Patient has no pulse and is not breathing):    CPR (Attempt to Resuscitate)     Medical Interventions (Patient has pulse or is breathing):    Full Support         Future Appointments   Date Time Provider Department Center   12/5/2024 10:00 AM [facility]   12/5/2024 10:30 AM [facility] [place] MD [facility]       Additional Instructions for the Follow-ups that  You Need to Schedule       Discharge Follow-up with PCP   As directed       Currently Documented PCP:    Gabriel Goss MD    PCP Phone Number:    [phone]     Follow Up Details: one week                Time spent on Discharge including face to face service:  30 minutes    Signature: Electronically signed by Timothy Duane Brammell, MD, 11/23/24, 15:43 EST.  [facility] Hospitalist Team

## 2024-11-23 NOTE — PROGRESS NOTES
Patient neurologically otherwise stable, rather more alert today    I had very very detailed discussion with the patient's  and daughter who are at bedside    I showed them the images    The patient has had subacute stroke    Nothing suggesting new large stroke send the patient has been evaluated by an neurology, by Dr. Barton and Dr Hauser, both of whom are stroke specialist    She is already on aspirin and Brilinta, failed aspirin monotherapy and aspirin and Plavix    She is not intervention candidate    She has UTI and she had hypertension and that is probably causing unmasking    She is n.p.o. so we will use aspirin per rectal    Beyond that nothing else to offer or change    Modification of stroke risk factors:   - Blood pressure should be less than 130/80 outpatient, HbA1c less than 6.5, LDL less than 70; b12>500 and smoking cessation if applicable. We would be grateful if the primary team / primary care physician keep a close watch on this above targets.  - Stroke education  - Follow up with neurologist of choice    Call if needed

## 2024-11-23 NOTE — PLAN OF CARE
Goal Outcome Evaluation:        Problem: Violence Risk or Actual  Goal: Anger and Impulse Control  Outcome: Progressing  Intervention: Minimize Safety Risk  Recent Flowsheet Documentation  Taken 11/23/2024 0400 by Toma Blakely RN  Sensory Stimulation Regulation: care clustered  Enhanced Safety Measures:   bed alarm set   room near unit station  Taken 11/23/2024 0200 by Toma Blakely RN  Enhanced Safety Measures:   bed alarm set   room near unit station  Intervention: Promote Self-Control  Recent Flowsheet Documentation  Taken 11/23/2024 0400 by Toma Blakely RN  Supportive Measures: active listening utilized  Environmental Support: calm environment promoted     Problem: Adult Inpatient Plan of Care  Goal: Plan of Care Review  Outcome: Progressing  Goal: Patient-Specific Goal (Individualized)  Outcome: Progressing  Goal: Absence of Hospital-Acquired Illness or Injury  Outcome: Progressing  Intervention: Identify and Manage Fall Risk  Recent Flowsheet Documentation  Taken 11/23/2024 0400 by Toma Blakely RN  Safety Promotion/Fall Prevention:   safety round/check completed   room organization consistent  Taken 11/23/2024 0200 by Toma Blakely RN  Safety Promotion/Fall Prevention:   safety round/check completed   room organization consistent  Intervention: Prevent Skin Injury  Recent Flowsheet Documentation  Taken 11/23/2024 0400 by Toma Blakely RN  Skin Protection:   transparent dressing maintained   incontinence pads utilized  Intervention: Prevent and Manage VTE (Venous Thromboembolism) Risk  Recent Flowsheet Documentation  Taken 11/23/2024 0400 by Toma Blakely RN  VTE Prevention/Management:   bilateral   SCDs (sequential compression devices) on  Intervention: Prevent Infection  Recent Flowsheet Documentation  Taken 11/23/2024 0400 by Toma Blakely RN  Infection Prevention:   environmental surveillance performed   single patient room provided   rest/sleep promoted   hand hygiene promoted  Taken 11/23/2024 0200 by  Reddy, Toma, RN  Infection Prevention:   single patient room provided   rest/sleep promoted   hand hygiene promoted  Goal: Optimal Comfort and Wellbeing  Outcome: Progressing  Intervention: Provide Person-Centered Care  Recent Flowsheet Documentation  Taken 11/23/2024 0400 by Toma Blakely RN  Trust Relationship/Rapport:   care explained   choices provided  Goal: Readiness for Transition of Care  Outcome: Progressing     Problem: Comorbidity Management  Goal: Maintenance of COPD Symptom Control  Outcome: Progressing  Intervention: Maintain COPD (Chronic Obstructive Pulmonary Disease) Symptom Control  Recent Flowsheet Documentation  Taken 11/23/2024 0400 by Toma Blakely RN  Breathing Techniques/Airway Clearance: deep/controlled cough encouraged  Medication Review/Management: medications reviewed  Taken 11/23/2024 0200 by Toma Blakely RN  Medication Review/Management: medications reviewed  Goal: Blood Glucose Level Within Target Range  Outcome: Progressing  Intervention: Monitor and Manage Glycemia  Recent Flowsheet Documentation  Taken 11/23/2024 0400 by Toma Blakely RN  Medication Review/Management: medications reviewed  Taken 11/23/2024 0200 by Toma Blakely RN  Medication Review/Management: medications reviewed  Goal: Maintenance of Heart Failure Symptom Control  Outcome: Progressing  Intervention: Maintain Heart Failure Management  Recent Flowsheet Documentation  Taken 11/23/2024 0400 by Toma Blakely RN  Medication Review/Management: medications reviewed  Taken 11/23/2024 0200 by Toma Blakely RN  Medication Review/Management: medications reviewed  Goal: Blood Pressure in Desired Range  Outcome: Progressing  Intervention: Maintain Blood Pressure Management  Recent Flowsheet Documentation  Taken 11/23/2024 0400 by Toma Blakely RN  Medication Review/Management: medications reviewed  Taken 11/23/2024 0200 by Toma Blakely RN  Medication Review/Management: medications reviewed  Goal: Maintenance of  Osteoarthritis Symptom Control  Outcome: Progressing  Intervention: Maintain Osteoarthritis Symptom Control  Recent Flowsheet Documentation  Taken 11/23/2024 0400 by Toma Blakely RN  Medication Review/Management: medications reviewed  Taken 11/23/2024 0200 by Toma Blakely RN  Medication Review/Management: medications reviewed     Problem: Fall Injury Risk  Goal: Absence of Fall and Fall-Related Injury  Outcome: Progressing  Intervention: Identify and Manage Contributors  Recent Flowsheet Documentation  Taken 11/23/2024 0400 by Toma Blakely RN  Medication Review/Management: medications reviewed  Taken 11/23/2024 0200 by Toma Blakely RN  Medication Review/Management: medications reviewed  Intervention: Promote Injury-Free Environment  Recent Flowsheet Documentation  Taken 11/23/2024 0400 by Toma Blakely RN  Safety Promotion/Fall Prevention:   safety round/check completed   room organization consistent  Taken 11/23/2024 0200 by Toma Blakely RN  Safety Promotion/Fall Prevention:   safety round/check completed   room organization consistent

## 2024-11-24 LAB — GLUCOSE BLDC GLUCOMTR-MCNC: 108 MG/DL (ref 70–105)

## 2024-11-24 NOTE — CASE MANAGEMENT/SOCIAL WORK
Case Management Discharge Note      Final Note: [facility] w/ in house PT         Selected Continued Care - Discharged on 11/23/2024 Admission date: 11/22/2024 - Discharge disposition: Home or Self Care      Destination Coordination complete.      Service Provider Services Address Phone Fax Patient Preferred    [address] AT [facility] [address] [place] [address] [phone] --                 Transportation Services  W/C Van: Hunter Mckee    Final Discharge Disposition Code: 01 - home or self-care

## 2024-11-24 NOTE — NURSING NOTE
Facility called and stated they received no paperwork with patient. Faxed MAR, AVS, HP, DC summary.

## 2024-11-24 NOTE — OUTREACH NOTE
Prep Survey      Flowsheet Row Responses   [facility]ondenominational [facility] patient discharged from? [facility]   Is LACE score < 7 ? No   Eligibility [facility]   Date of Admission 11/22/24   Date of Discharge 11/23/24   Discharge Disposition Home or Self Care   Discharge diagnosis Dysarthria   Does the patient have one of the following disease processes/diagnoses(primary or secondary)? Other   Does the patient have Home health ordered? No   Is there a DME ordered? No   General alerts for this patient [facility] AT [facility]   Prep survey completed? Yes            BALDEMAR WATTS - Registered Nurse

## 2024-11-25 ENCOUNTER — TRANSITIONAL CARE MANAGEMENT TELEPHONE ENCOUNTER (OUTPATIENT)
Dept: CALL CENTER | Facility: HOSPITAL | Age: 89
End: 2024-11-25
Payer: MEDICARE

## 2024-11-25 NOTE — OUTREACH NOTE
Call Center TCM Note      Flowsheet Row Responses   [facility] patient discharged from? [facility]   Does the patient have one of the following disease processes/diagnoses(primary or secondary)? Other   TCM attempt successful? Yes   Call start time 0918   Call end time 0924   General alerts for this patient [address] AT [facility]   Discharge diagnosis Dysarthria   Person spoke with today (if not patient) and relationship Nika Garcia reviewed with patient/caregiver? Yes   Is the patient having any side effects they believe may be caused by any medication additions or changes? No   Does the patient have all medications ordered at discharge? Yes   Is the patient taking all medications as directed (includes completed medication regime)? Yes   Comments States she will talk with her and then make appt. Has appt with cardiology on 12/5 @ 10:30   Does the patient have an appointment with their PCP within 7-14 days of discharge? No   Nursing Interventions Patient declined scheduling/rescheduling appointment at this time   Psychosocial issues? No   Did the patient receive a copy of their discharge instructions? Yes   Nursing interventions Reviewed instructions with patient   What is the patient's perception of their health status since discharge? Improving   Is the patient/caregiver able to teach back signs and symptoms related to disease process for when to call PCP? Yes   Is the patient/caregiver able to teach back signs and symptoms related to disease process for when to call 911? Yes   Is the patient/caregiver able to teach back the hierarchy of who to call/visit for symptoms/problems? PCP, Specialist, Home health nurse, Urgent Care, ED, 911 Yes   If the patient is a current smoker, are they able to teach back resources for cessation? Not a smoker   Additional teach back comments All symptoms resolved.   TCM call completed? Yes   Wrap up additional comments States she is doing  well   Call end time 0924            Ange Pope LPN    11/25/2024, 09:25 EST

## 2024-11-26 NOTE — PROGRESS NOTES
Encounter Date:12/05/2024  Last seen 5/23/2024      Patient ID: Aracelis Vargas is a 89 y.o. female.    Chief Complaint:  Status post pacemaker  Hypertension  Diabetes  Dyslipidemia     History of present illness     Since I have last seen, the patient has been without any chest discomfort ,shortness of breath, palpitations, dizziness or syncope.  Denies having any headache ,abdominal pain ,nausea, vomiting , diarrhea constipation, loss of weight or loss of appetite.  Denies having any excessive bruising ,hematuria or blood in the stool.    Review of all systems negative except as indicated.    Reviewed ROS.       ]]]]]]]]]]]]]]]]]]]]  History  ========  -Status post permanent dual-chamber pacemaker implantation ([place] [employer] MRI compatible) 11/3/2021     -Status post removal of loop recorder (Medtronic) 11/3/2021     -History of syncope-no further problems  Intermittent Mobitz type II AV block  Patient is not on any medications to cause bradycardia     -Second-degree Mobitz type I AV block.  Narrow QRS complex.     -History of seizure-improved     -Recent difficulty with speaking.  TIA/stroke  Status post TPA with complete resolution of symptoms 2/3/2022     Transesophageal echocardiogram 2/28/2022 revealed  Mild mitral regurgitation and mild to moderate aortic regurgitation.  Mild tricuspid regurgitation  Calculated pulmonary artery pressure is 28 mmHg.  Mild biatrial enlargement.  Left atrial appendage is small.  Left ventricle is normal in size and contractility with ejection fraction of 60%.     Echocardiogram-normal 10/22/2020     Cardiac catheterization 10/23/2020 revealed:  Left ventricular size and contractility is normal with ejection fraction of 60%.  Normal epicardial coronary arteries.     Status post loop recorder placement 10/23/2020 (Medtronic Linq)     -History of paroxysmal atrial fibrillation     -Hypertension dyslipidemia diabetes     -History of seizure disorder     -Renal dysfunction CKD  3  BUN 27 creatinine 1.35     -Exogenous obesity.     -Status post right and left hip replacement right knee replacement     -Non-smoker.     -No known allergies  ============  Plan  =========   Status post permanent dual-chamber pacemaker implantation ([employer] MRI compatible) level 3/20/2021.  Status post loop recorder removal 11/3/2021 (Medtronic Linq)  Pacemaker site and function is normal.  Recent interrogation of the pacemaker revealed excellent pacing parameters.-12/5/2024.  Battery status is 5.5 years.  Patient was educated regarding MRI compatibility of the pacemaker system.     EKG was not performed today 12/5/2024     History of seizure disorder-improved.  No further episodes.      History of TIA/stroke  No further problems.  Status post TPA with complete resolution of symptoms 2/3/2022 premature ventricular contractions-asymptomatic.     Paroxysmal atrial fibrillation.  With stroke history and history of paroxysmal atrial fibrillation patient would benefit from anticoagulation.  Patient had received TPA.  However concerned about patient having falling spells.  Consideration was given for watchman procedure.  CHADS2 score is 7.  However left atrial appendage was very small in size.  Left atrial appendage is very small and no clots or smoking effect was seen on ALEYDA.  (Watchman is not an option due to left atrial appendage being very small.)     Hypertension-well-controlled  118/52     Anticoagulation  Patient is on Coumadin.  PT/INR on a monthly basis.  .  Renal dysfunction  BUNs/creatinine 32/1.43- 2/24/2023     Anemia  9.5-2/24/2023     Follow-up in the office in 6 months with pacemaker interrogation.      Further plan will depend on patient's progress.     Reviewed updated 12/5/2024.  ]]]]]]]]]]]]]]           Diagnosis Plan   1. Atrioventricular block, Mobitz type 1, Wenckebach        2. Status post placement of cardiac pacemaker        3. AV block, Mobitz II        4. Essential hypertension         5. Cardiac pacemaker in situ        6. Status cardiac pacemaker        7. Syncope and collapse        LAB RESULTS (LAST 7 DAYS)    CBC  Results from last 7 days   Lab Units 11/22/24 2335 11/22/24  0810   WBC 10*3/mm3 5.39 4.74   RBC 10*6/mm3 3.79 4.14   HEMOGLOBIN g/dL 10.5* 11.5*   HEMATOCRIT % 34.5 37.6   MCV fL 91.0 90.8   PLATELETS 10*3/mm3 163 188       BMP  Results from last 7 days   Lab Units 11/22/24 2335 11/22/24  0810   SODIUM mmol/L 134* 137   POTASSIUM mmol/L 4.0 3.5   CHLORIDE mmol/L 99 97*   CO2 mmol/L 27.0 29.6*   BUN mg/dL 25* 21   CREATININE mg/dL 1.46* 1.41*   GLUCOSE mg/dL 122* 117*       CMP   Results from last 7 days   Lab Units 11/22/24 2335 11/22/24  0810   SODIUM mmol/L 134* 137   POTASSIUM mmol/L 4.0 3.5   CHLORIDE mmol/L 99 97*   CO2 mmol/L 27.0 29.6*   BUN mg/dL 25* 21   CREATININE mg/dL 1.46* 1.41*   GLUCOSE mg/dL 122* 117*   ALBUMIN g/dL  --  3.8   BILIRUBIN mg/dL  --  0.4   ALK PHOS U/L  --  78   AST (SGOT) U/L  --  22   ALT (SGPT) U/L  --  11         BNP        TROPONIN  Results from last 7 days   Lab Units 11/22/24  0810   HSTROP T ng/L 35*       CoAg  Results from last 7 days   Lab Units 11/22/24  0810   INR  1.18*   APTT seconds 38.7*       Creatinine Clearance  Estimated Creatinine Clearance: 28.4 mL/min (A) (by C-G formula based on SCr of 1.46 mg/dL (H)).    ABG        Radiology  No radiology results for the last day                The following portions of the patient's history were reviewed and updated as appropriate: allergies, current medications, past family history, past medical history, past social history, past surgical history, and problem list.    Review of Systems   Constitutional: Negative for malaise/fatigue.   Cardiovascular:  Positive for leg swelling. Negative for chest pain, dyspnea on exertion and palpitations.   Respiratory:  Negative for cough and shortness of breath.    Gastrointestinal:  Negative for abdominal pain, nausea and vomiting.    Neurological:  Negative for dizziness, focal weakness, headaches, light-headedness and numbness.   All other systems reviewed and are negative.      Current Outpatient Medications:     apixaban (ELIQUIS) 5 MG tablet tablet, Take 1 tablet by mouth 2 (Two) Times a Day., Disp: , Rfl:     atorvastatin (LIPITOR) 10 MG tablet, TAKE ONE TABLET BY MOUTH DAILY, Disp: 90 tablet, Rfl: 0    dapagliflozin Propanediol (Farxiga) 10 MG tablet, Take 10 mg by mouth Daily., Disp: , Rfl:     dextrose (GLUTOSE) 40 % gel, Take 15 g by mouth Every 1 (One) Hour As Needed for Low Blood Sugar., Disp: , Rfl:     ferrous sulfate 325 (65 FE) MG tablet, Take 1 tablet by mouth Every Other Day. Indications: Anemia From Inadequate Iron in the Body, Disp: , Rfl:     folic acid (FOLVITE) 1 MG tablet, Take 1 tablet by mouth Daily., Disp: 30 tablet, Rfl: 11    furosemide (LASIX) 40 MG tablet, Take 1 tablet by mouth Daily., Disp: , Rfl:     gabapentin (NEURONTIN) 600 MG tablet, Take 1 tablet by mouth 3 (Three) Times a Day. Indications: Peripheral Nerve Disease, Disp: , Rfl:     glucagon (GLUCAGEN) 1 MG injection, Inject 1 mg under the skin into the appropriate area as directed 1 (One) Time As Needed for Low Blood Sugar., Disp: , Rfl:     Melatonin 10 MG tablet, Take 1 tablet by mouth every night at bedtime., Disp: , Rfl:     metoprolol succinate XL (TOPROL-XL) 50 MG 24 hr tablet, Take 1 tablet by mouth every night at bedtime., Disp: , Rfl:     NovoLIN 70/30 (70-30) 100 UNIT/ML injection, INJECT 30 UNITS UNDER THE SKIN INTO THE APPROPRIATE AREA TWO TIMES A DAY WITH A MEAL, Disp: 20 mL, Rfl: 3    omeprazole (priLOSEC) 40 MG capsule, Take 1 capsule by mouth Every Morning Before Breakfast. Take on empty stomach!  Indications: Gastroesophageal Reflux Disease, Disp: 90 capsule, Rfl: 1    potassium chloride (MICRO-K) 10 MEQ CR capsule, Take 1 capsule by mouth Daily., Disp: , Rfl:     Semaglutide, 1 MG/DOSE, (Ozempic, 1 MG/DOSE,) 2 MG/1.5ML solution  pen-injector, Inject 2 mg under the skin into the appropriate area as directed 1 (One) Time Per Week. Tuesdays, Disp: , Rfl:     spironolactone (ALDACTONE) 25 MG tablet, Take 1 tablet by mouth Daily., Disp: , Rfl:     topiramate (TOPAMAX) 25 MG tablet, Take 0.5 tablets by mouth 2 (Two) Times a Day., Disp: 30 tablet, Rfl: 3    traZODone (DESYREL) 100 MG tablet, Take 1 tablet by mouth Every Night., Disp: , Rfl:     valsartan (DIOVAN) 40 MG tablet, Take 1 tablet by mouth every night at bedtime., Disp: , Rfl:     Allergies   Allergen Reactions    Latex, Natural Rubber Rash    Adhesive Tape Rash       Family History   Problem Relation Age of Onset    Heart disease Mother     Heart disease Father        Past Surgical History:   Procedure Laterality Date    CARDIAC CATHETERIZATION N/A 10/23/2020    Procedure: Left Heart Cath and coronary angiogram;  Surgeon: Wang Plaza MD;  Location: [facility] CATH INVASIVE LOCATION;  Service: Cardiovascular;  Laterality: N/A;    CARDIAC ELECTROPHYSIOLOGY PROCEDURE Left 11/3/2021    Procedure: Pacemaker DC new;  Surgeon: Wang Plaza MD;  Location: [facility] CATH INVASIVE LOCATION;  Service: Cardiovascular;  Laterality: Left;    CARDIAC ELECTROPHYSIOLOGY PROCEDURE N/A 11/3/2021    Procedure: LOOP RECORDER REMOVAL;  Surgeon: Wang Plaza MD;  Location: [facility] CATH INVASIVE LOCATION;  Service: Cardiovascular;  Laterality: N/A;    HYSTERECTOMY      JOINT REPLACEMENT Right     Hip    JOINT REPLACEMENT Left     hip    JOINT REPLACEMENT Left     hip (for second time)    JOINT REPLACEMENT Right     knee    OOPHORECTOMY         Past Medical History:   Diagnosis Date    Atherosclerosis of native coronary artery of native heart without angina pectoris 10/22/2020    Atrioventricular block, Mobitz type 1, Mitch 10/21/2020    Added automatically from request for surgery [id number]    CHF (congestive heart failure)     COPD (chronic obstructive pulmonary disease)     Hypertension associated  with stage 3 chronic kidney disease due to type 2 diabetes mellitus 10/22/2020    Morbid obesity 10/22/2020    Secondary hyperaldosteronism 10/22/2020    Stage 3b chronic kidney disease 10/22/2020    Type 2 diabetes mellitus with diabetic neuropathy, with long-term current use of insulin 10/22/2020    Type 2 diabetes mellitus with diabetic neuropathy, with long-term current use of insulin 10/22/2020       Family History   Problem Relation Age of Onset    Heart disease Mother     Heart disease Father        Social History     Socioeconomic History    Marital status:    Tobacco Use    Smoking status: Never     Passive exposure: Never    Smokeless tobacco: Never   Vaping Use    Vaping status: Never Used   Substance and Sexual Activity    Alcohol use: Yes     Alcohol/week: 7.0 standard drinks of alcohol     Types: 7 Drinks containing 0.5 oz of alcohol per week    Drug use: Never    Sexual activity: Defer         Procedures      Objective:       Physical Exam    There were no vitals taken for this visit.  The patient is alert, oriented and in no distress.    Vital signs as noted above.  Exogenous obesity (BMI 34)    Head and neck revealed no carotid bruits or jugular venous distension.  No thyromegaly or lymphadenopathy is present.    Lungs clear.  No wheezing.  Breath sounds are normal bilaterally.    Heart normal first and second heart sounds.  No murmur..  No pericardial rub is present.  No gallop is present.    Abdomen soft and nontender.  No organomegaly is present.    Extremities revealed good peripheral pulses without any pedal edema.    Skin warm and dry.  Pacemaker site looks normal.    Musculoskeletal system is grossly normal.    CNS grossly normal.    Reviewed and updated.

## 2024-12-05 ENCOUNTER — OFFICE VISIT (OUTPATIENT)
Dept: CARDIOLOGY | Facility: CLINIC | Age: 89
End: 2024-12-05
Payer: MEDICARE

## 2024-12-05 ENCOUNTER — CLINICAL SUPPORT NO REQUIREMENTS (OUTPATIENT)
Dept: CARDIOLOGY | Facility: CLINIC | Age: 89
End: 2024-12-05
Payer: MEDICARE

## 2024-12-05 VITALS
WEIGHT: 192 LBS | BODY MASS INDEX: 34.02 KG/M2 | DIASTOLIC BLOOD PRESSURE: 77 MMHG | HEART RATE: 69 BPM | OXYGEN SATURATION: 94 % | HEIGHT: 63 IN | SYSTOLIC BLOOD PRESSURE: 138 MMHG

## 2024-12-05 DIAGNOSIS — Z95.0 CARDIAC PACEMAKER IN SITU: ICD-10-CM

## 2024-12-05 DIAGNOSIS — I10 ESSENTIAL HYPERTENSION: ICD-10-CM

## 2024-12-05 DIAGNOSIS — I44.1 AV BLOCK, MOBITZ II: Primary | ICD-10-CM

## 2024-12-05 DIAGNOSIS — I44.1 AV BLOCK, MOBITZ II: ICD-10-CM

## 2024-12-05 DIAGNOSIS — Z95.0 STATUS CARDIAC PACEMAKER: ICD-10-CM

## 2024-12-05 DIAGNOSIS — R55 SYNCOPE AND COLLAPSE: ICD-10-CM

## 2024-12-05 DIAGNOSIS — I44.1 ATRIOVENTRICULAR BLOCK, MOBITZ TYPE 1, WENCKEBACH: Primary | ICD-10-CM

## 2024-12-05 DIAGNOSIS — Z95.0 STATUS POST PLACEMENT OF CARDIAC PACEMAKER: ICD-10-CM

## 2024-12-05 PROCEDURE — 1160F RVW MEDS BY RX/DR IN RCRD: CPT | Performed by: INTERNAL MEDICINE

## 2024-12-05 PROCEDURE — 1159F MED LIST DOCD IN RCRD: CPT | Performed by: INTERNAL MEDICINE

## 2024-12-05 PROCEDURE — 99214 OFFICE O/P EST MOD 30 MIN: CPT | Performed by: INTERNAL MEDICINE

## 2024-12-09 NOTE — PLAN OF CARE
Goal Outcome Evaluation:  Plan of Care Reviewed With: patient        Progress: improving      Shital

## 2025-06-07 NOTE — PROGRESS NOTES
"    [facility] Medicine Services Daily Progress Note    Patient Name: Aracelis Vargas  : 1935  MRN: [id number]  Primary Care Physician:  Gabriel Goss MD  Date of admission: 10/31/2021      Subjective      Chief Complaint: Confusion    Aracelis Vargas is a 86 y.o. female who presented to [facility] on 10/31/2021 with reported altered mental status per EMS.  She is currently awake and alert and answering appropriately.  She is oriented to person and place.  She denies any chest pain, shortness of air, headache, focal weakness, blurry vision, confusion, or dye Boyd, fever cough or congestion.  CT head per radiology today:     \" 1. No acute intracranial abnormality.  2. No fracture the calvarium or skull base.  3. Mild chronic age-related intracranial findings, unchanged from prior.  \"     Troponin 0 0.024 proBNP 792.7 glucose was 128 on admission.  Creatinine was 1.55 with BUN 30 WBC not elevated, hemoglobin 8.5.  Urinalysis was positive for bacteria/trace WBCs 13-20 2+ leukocytes negative nitrites.  She was started on IV ceftriaxone with urine cultures pending.  IV fluids initiated.  He has a past medical history of coronary artery disease status post CABG, anemia, diabetes mellitus type 2, hyperlipidemia, hypertension, neuropathy, carotid stenosis, COPD and chronic back pain.  She will be admitted for further evaluation and treatment.  She was COVID-19 negative.     2021  Patient seen and examined.  Patient on empiric treatment for UTI   Urine culture pending  Xray for pain (hx of fall): Negative for fracture, prosthetic intact        2021  Patient seen and examined.  Urine culture negative (final)  Blood cultures: NGTD  PT/OT: Inpatient rehab recommended.  Patient prefers to be discharged to home  Cardio: Pacemaker for AV blocks     11/3/2021  Patient seen and examined.   Patient alert/oriented   Urine culture negative   Blood culture:NGTD  Cardio: Pacemaker " placed (second-degree AV block and syncope) //explant of loop recorder  Bedrest x24 hours     11/4/2021  Patient seen and examined.  Afebrile   Confusion resolved  PT/OT recommends IPR but patient refuses.  Patient is a high fall risk for home discharge  Patient understands risk and voiced risk acceptance  POD #1: Pacemaker placement  Urine culture negative, completed 3-day antibiotics  Blood culture: NGTD     11/5/2021  Patient seen and examined.  Afebrile   PT/OT recommends IPR   POD #2: Pacemaker placement  Urine culture negative, completed 3-day antibiotics  Blood culture: NGTD      Review of Systems   Constitutional: Negative.   HENT: Negative.    Eyes: Negative.    Cardiovascular: Negative.    Respiratory: Negative.    Endocrine: Negative.    Hematologic/Lymphatic: Negative.    Skin: Negative.    Musculoskeletal: Positive for muscle weakness.   Gastrointestinal: Negative.    Genitourinary: Negative.    Neurological: Negative.    Psychiatric/Behavioral: Negative.    Allergic/Immunologic: Negative.    ROS       Objective      Vitals:   Temp:  [98.1 °F (36.7 °C)-98.6 °F (37 °C)] 98.1 °F (36.7 °C)  Heart Rate:  [74-82] 74  Resp:  [12-17] 16  BP: (119-168)/(49-77) 154/49  Flow (L/min):  [4] 4    Physical Exam   Vitals reviewed.   Constitutional:       Appearance: Normal appearance. She is obese.   HENT:      Head: Normocephalic and atraumatic.      Right Ear: External ear normal.      Left Ear: External ear normal.      Nose: Nose normal.      Mouth/Throat:      Mouth: Mucous membranes are moist.   Eyes:      Extraocular Movements: Extraocular movements intact.   Cardiovascular:      Rate and Rhythm: Normal rate and regular rhythm.      Pulses: Normal pulses.      Heart sounds: Normal heart sounds.   Pulmonary:      Effort: Pulmonary effort is normal.      Breath sounds: Normal breath sounds.   Abdominal:      Palpations: Abdomen is soft.   Genitourinary:     Comments: deferred  Musculoskeletal:         General:  Normal range of motion.      Cervical back: Normal range of motion and neck supple.   Skin:     General: Skin is warm and dry.   Neurological:      General: No focal deficit present.      Mental Status: She is alert.      Comments: Oriented x2   Psychiatric:         Mood and Affect: Mood normal.         Behavior: Behavior normal.         Thought Content: Thought content normal.         Judgment: Judgment normal.                             Result Review    Result Review:  I have personally reviewed the results from the time of this admission to 11/5/2021 08:16 EDT and agree with these findings:  [x]  Laboratory  [x]  Microbiology  [x]  Radiology  []  EKG/Telemetry   []  Cardiology/Vascular   []  Pathology  []  Old records  []  Other:       Wounds (last 24 hours)     LDA Wound     Row Name 11/05/21 0400 11/05/21 0000 11/04/21 2027       Wound 11/03/21 0831 Left upper chest    Wound - Properties Group Placement Date: 11/03/21  -DW Placement Time: 0831 -DW Present on Hospital Admission: N  -DW Side: Left  -DW Orientation: upper  -DW Location: chest  -DW    Dressing Appearance -- -- intact; dried drainage  -BW    Closure EILEEN  -BW EILEEN  -BW EILEEN  -BW    Periwound dry; intact  -BW dry; intact  -BW dry; intact  -BW    Periwound Temperature warm  -BW warm  -BW warm  -BW    Periwound Skin Turgor soft  -BW soft  -BW soft  -BW    Drainage Characteristics/Odor bleeding controlled  -BW bleeding controlled  -BW bleeding controlled  -BW    Drainage Amount scant  -BW scant  -BW scant  -BW    Retired Wound - Properties Group Date first assessed: 11/03/21  -DW Time first assessed: 0831 -DW Present on Hospital Admission: N  -DW Side: Left  -DW Location: chest  -DW    Row Name 11/04/21 1752 11/04/21 1500 11/04/21 1143       Wound 11/03/21 0831 Left upper chest    Wound - Properties Group Placement Date: 11/03/21  -DW Placement Time: 0831  -DW Present on Hospital Admission: N  -DW Side: Left  -DW Orientation: upper  -DW Location: chest   -DW    Dressing Appearance -- -- dry; intact  -SP    Closure EILEEN  -SP EILEEN  -SP EILEEN  -SP    Periwound intact; dry  -SP intact; dry  -SP intact; dry  -SP    Periwound Temperature warm  -SP warm  -SP warm  -SP    Periwound Skin Turgor soft  -SP soft  -SP soft  -SP    Drainage Amount none  -SP none  -SP none  -SP    Retired Wound - Properties Group Date first assessed: 11/03/21  -DW Time first assessed: 0831  -DW Present on Hospital Admission: N  -DW Side: Left  -DW Location: chest  -DW          User Key  (r) = Recorded By, (t) = Taken By, (c) = Cosigned By    Initials Name Provider Type    DW Maribell Rivas Technologist    Azeb Valenzuela, RN Registered Nurse    Yenny Acosta RN Registered Nurse                  Assessment/Plan      Brief Patient Summary:  Aracelis Vargas is a 86 y.o. female who was admitted for change in mentation.    Admission labs revealed UTI.   Urine culture: Negative.      amLODIPine, 5 mg, Oral, QAM  aspirin, 81 mg, Oral, Daily  atorvastatin, 10 mg, Oral, Daily  furosemide, 20 mg, Oral, Daily  gabapentin, 300 mg, Oral, BID  insulin lispro, 0-7 Units, Subcutaneous, TID AC  losartan, 50 mg, Oral, Daily  sodium chloride, 10 mL, Intravenous, Q12H  sodium chloride, 3 mL, Intravenous, Q12H  spironolactone, 25 mg, Oral, Daily  topiramate, 12.5 mg, Oral, BID       sodium chloride, 75 mL/hr, Last Rate: 75 mL/hr (11/03/21 0925)         Active Hospital Problems:  Active Hospital Problems    Diagnosis    • Normocytic anemia    • Altered mental status    • Acute UTI    • GERD (gastroesophageal reflux disease)    • Hypertension    • Hyperlipidemia    • Morbid obesity ([facility])    • Type 2 diabetes mellitus with diabetic neuropathy, with long-term current use of insulin ([facility])    • Stage 3b chronic kidney disease (CMS/[facility])    • Syncope and collapse    • Atrioventricular block, Mobitz type 1, Mitch      Added automatically from request for surgery [id number]       Plan:   Altered mental status, appears resolved  Her/She oriented x2 awake alert and answering appropriately, CT head negative for acute per radiology, likely secondary to below     Acute UTI, trace bacteria elevated WBC urine, positive leukocytes continue IV ceftriaxone with urine culture pending     Acute on chronic stage IIIb chronic kidney disease creatinine 1.55 elevated from previous normal saline IV fluid hydration repeat BMP in a.m. avoid nephrotoxic meds     Normocytic anemia hemoglobin 8.5 MCV 88 hemoglobin was 9.2 previously no signs of bleeding monitor CBC     Hypertension, controlled on home meds Home meds unverified at this time monitor BP will add as needed medication if needed     Type 2 diabetes mellitus with diabetic neuropathy, home meds unverified at this time serum glucose 128, add SSI as needed and Accu-Cheks AC at bedtime A1c in a.m. low concentrated sweet diet     Hyperlipidemia Home meds unverified at this time reorder pending verification from pharmacy     Morbid obesity BMI 44.7 lifestyle management education    DVT prophylaxis:  Mechanical DVT prophylaxis orders are present.    CODE STATUS:    Code Status (Patient has no pulse and is not breathing): CPR (Attempt to Resuscitate)  Medical Interventions (Patient has pulse or is breathing): Full      Disposition:  I expect patient to be discharged 1 to 2 days.    This patient has been examined wearing appropriate Personal Protective Equipment and discussed with hospital infection control department. 11/05/21      Electronically signed by Ginny Coulter MD, 11/05/21, 08:16 EDT.  [facility]ist Team

## 2025-07-31 ENCOUNTER — OFFICE VISIT (OUTPATIENT)
Dept: CARDIOLOGY | Facility: CLINIC | Age: 89
End: 2025-07-31
Payer: MEDICARE

## 2025-07-31 ENCOUNTER — CLINICAL SUPPORT NO REQUIREMENTS (OUTPATIENT)
Dept: CARDIOLOGY | Facility: CLINIC | Age: 89
End: 2025-07-31
Payer: MEDICARE

## 2025-07-31 VITALS
OXYGEN SATURATION: 95 % | HEIGHT: 63 IN | HEART RATE: 81 BPM | SYSTOLIC BLOOD PRESSURE: 143 MMHG | BODY MASS INDEX: 34.01 KG/M2 | DIASTOLIC BLOOD PRESSURE: 72 MMHG

## 2025-07-31 DIAGNOSIS — R55 SYNCOPE AND COLLAPSE: ICD-10-CM

## 2025-07-31 DIAGNOSIS — Z95.0 STATUS CARDIAC PACEMAKER: ICD-10-CM

## 2025-07-31 DIAGNOSIS — I44.1 AV BLOCK, MOBITZ II: Primary | ICD-10-CM

## 2025-07-31 DIAGNOSIS — Z95.0 CARDIAC PACEMAKER IN SITU: ICD-10-CM

## 2025-07-31 DIAGNOSIS — Z95.0 STATUS POST PLACEMENT OF CARDIAC PACEMAKER: ICD-10-CM

## 2025-07-31 DIAGNOSIS — I44.1 ATRIOVENTRICULAR BLOCK, MOBITZ TYPE 1, WENCKEBACH: ICD-10-CM

## 2025-07-31 DIAGNOSIS — I10 ESSENTIAL HYPERTENSION: ICD-10-CM

## 2025-07-31 PROCEDURE — 1160F RVW MEDS BY RX/DR IN RCRD: CPT | Performed by: INTERNAL MEDICINE

## 2025-07-31 PROCEDURE — 99214 OFFICE O/P EST MOD 30 MIN: CPT | Performed by: INTERNAL MEDICINE

## 2025-07-31 PROCEDURE — 1159F MED LIST DOCD IN RCRD: CPT | Performed by: INTERNAL MEDICINE

## 2025-07-31 NOTE — PROGRESS NOTES
Encounter Date:07/31/2025  Last seen 12/5/2024.      Patient ID: Aracelis Vargas is a 89 y.o. female.    Chief Complaint:  Status post pacemaker  Hypertension  Diabetes  Dyslipidemia    History of present illness     Since I have last seen, the patient has been without any chest discomfort ,shortness of breath, palpitations, dizziness or syncope.  Denies having any headache ,abdominal pain ,nausea, vomiting , diarrhea constipation, loss of weight or loss of appetite.  Denies having any excessive bruising ,hematuria or blood in the stool.    Review of all systems negative except as indicated.    Reviewed ROS.     ]]]]]]]]]]]]]]]]]]]]  History  ========  -Status post permanent dual-chamber pacemaker implantation ([employer] MRI compatible) 11/3/2021     -Status post removal of loop recorder ([employer]) 11/3/2021     -History of syncope-no further problems  Intermittent Mobitz type II AV block  Patient is not on any medications to cause bradycardia     -Second-degree Mobitz type I AV block.  Narrow QRS complex.     -History of seizure-improved     -Recent difficulty with speaking.  TIA/stroke  Status post TPA with complete resolution of symptoms 2/3/2022     Transesophageal echocardiogram 2/28/2022 revealed  Mild mitral regurgitation and mild to moderate aortic regurgitation.  Mild tricuspid regurgitation  Calculated pulmonary artery pressure is 28 mmHg.  Mild biatrial enlargement.  Left atrial appendage is small.  Left ventricle is normal in size and contractility with ejection fraction of 60%.     Echocardiogram-normal 10/22/2020     Cardiac catheterization 10/23/2020 revealed:  Left ventricular size and contractility is normal with ejection fraction of 60%.  Normal epicardial coronary arteries.     Status post loop recorder placement 10/23/2020 (Medtronic Linq)     -History of paroxysmal atrial fibrillation     -Hypertension dyslipidemia diabetes     -History of seizure disorder     -Renal dysfunction CKD 3  BUN  27 creatinine 1.35     -Exogenous obesity.     -Status post right and left hip replacement right knee replacement     -Non-smoker.     -No known allergies  ============  Plan  =========   Status post permanent dual-chamber pacemaker implantation ([employer] MRI compatible) level 3/20/2021.  Status post loop recorder removal 11/3/2021 (Medtronic Linq)  Pacemaker site and function is normal.  Recent interrogation of the pacemaker revealed excellent pacing parameters.-12/5/2024.  Battery status is 4.5 years  Patient was educated regarding MRI compatibility of the pacemaker system.     EKG was not performed 12/5/2024  EKG was not performed 7/31/2025.     History of seizure disorder-improved.  No further episodes.      History of TIA stroke  No further episodes.  Status post TPA with complete resolution of symptoms 2/3/2022 premature ventricular contractions-asymptomatic.     Paroxysmal atrial fibrillation  With stroke history and history of paroxysmal atrial fibrillation patient would benefit from anticoagulation.  Patient had received TPA.  However concerned about patient having falling spells.  Consideration was given for watchman procedure.  CHADS2 score is 7.  However left atrial appendage was very small in size.  Left atrial appendage is very small and no clots or smoking effect was seen on ALEYDA.  (Watchman is not an option due to left atrial appendage being very small.)     Hypertension  140/70    Anticoagulation status reviewed.  Patient is on Coumadin.  PT/INR on a monthly basis.  .  Renal dysfunction  BUNs/creatinine 32/1.43- 2/24/2023     Anemia  9.5-2/24/2023     Follow-up in the office in 6 months with pacemaker interrogation    Further plan will depend on patient's progress.    Reviewed and updated 7/31/2025.  ]]]]]]]]]]]]]]               Diagnosis Plan   1. AV block, Mobitz II        2. Status post placement of cardiac pacemaker        3. Syncope and collapse        4. Status cardiac pacemaker         5. Essential hypertension        6. Atrioventricular block, Mobitz type 1, Wenckebach        7. Cardiac pacemaker in situ        LAB RESULTS (LAST 7 DAYS)    CBC        BMP        CMP         BNP        TROPONIN        CoAg        Creatinine Clearance  CrCl cannot be calculated (Patient's most recent lab result is older than the maximum 30 days allowed.).    ABG        Radiology  No radiology results for the last day                The following portions of the patient's history were reviewed and updated as appropriate: allergies, current medications, past family history, past medical history, past social history, past surgical history, and problem list.    Review of Systems   Constitutional: Negative for malaise/fatigue.   Cardiovascular:  Negative for chest pain, leg swelling, palpitations and syncope.   Respiratory:  Negative for shortness of breath.    Skin:  Negative for rash.   Gastrointestinal:  Negative for nausea and vomiting.   Neurological:  Negative for dizziness, light-headedness and numbness.   All other systems reviewed and are negative.        Current Outpatient Medications:     apixaban (ELIQUIS) 5 MG tablet tablet, Take 1 tablet by mouth 2 (Two) Times a Day., Disp: , Rfl:     atorvastatin (LIPITOR) 10 MG tablet, TAKE ONE TABLET BY MOUTH DAILY, Disp: 90 tablet, Rfl: 0    dapagliflozin Propanediol (Farxiga) 10 MG tablet, Take 10 mg by mouth Daily., Disp: , Rfl:     dextrose (GLUTOSE) 40 % gel, Take 15 g by mouth Every 1 (One) Hour As Needed for Low Blood Sugar., Disp: , Rfl:     ferrous sulfate 325 (65 FE) MG tablet, Take 1 tablet by mouth Every Other Day. Indications: Anemia From Inadequate Iron in the Body, Disp: , Rfl:     folic acid (FOLVITE) 1 MG tablet, Take 1 tablet by mouth Daily., Disp: 30 tablet, Rfl: 11    furosemide (LASIX) 40 MG tablet, Take 1 tablet by mouth Daily., Disp: , Rfl:     gabapentin (NEURONTIN) 600 MG tablet, Take 1 tablet by mouth 3 (Three) Times a Day. Indications:  Peripheral Nerve Disease, Disp: , Rfl:     glucagon (GLUCAGEN) 1 MG injection, Inject 1 mg under the skin into the appropriate area as directed 1 (One) Time As Needed for Low Blood Sugar., Disp: , Rfl:     Melatonin 10 MG tablet, Take 1 tablet by mouth every night at bedtime., Disp: , Rfl:     metoprolol succinate XL (TOPROL-XL) 50 MG 24 hr tablet, Take 1 tablet by mouth every night at bedtime., Disp: , Rfl:     NovoLIN 70/30 (70-30) 100 UNIT/ML injection, INJECT 30 UNITS UNDER THE SKIN INTO THE APPROPRIATE AREA TWO TIMES A DAY WITH A MEAL, Disp: 20 mL, Rfl: 3    omeprazole (priLOSEC) 40 MG capsule, Take 1 capsule by mouth Every Morning Before Breakfast. Take on empty stomach!  Indications: Gastroesophageal Reflux Disease, Disp: 90 capsule, Rfl: 1    potassium chloride (MICRO-K) 10 MEQ CR capsule, Take 1 capsule by mouth Daily., Disp: , Rfl:     Semaglutide, 1 MG/DOSE, (Ozempic, 1 MG/DOSE,) 2 MG/1.5ML solution pen-injector, Inject 2 mg under the skin into the appropriate area as directed 1 (One) Time Per Week. Tuesdays, Disp: , Rfl:     spironolactone (ALDACTONE) 25 MG tablet, Take 1 tablet by mouth Daily., Disp: , Rfl:     topiramate (TOPAMAX) 25 MG tablet, Take 0.5 tablets by mouth 2 (Two) Times a Day., Disp: 30 tablet, Rfl: 3    traZODone (DESYREL) 100 MG tablet, Take 1 tablet by mouth Every Night., Disp: , Rfl:     valsartan (DIOVAN) 40 MG tablet, Take 1 tablet by mouth every night at bedtime., Disp: , Rfl:     Allergies   Allergen Reactions    Latex, Natural Rubber Rash    Adhesive Tape Rash       Family History   Problem Relation Age of Onset    Heart disease Mother     Heart disease Father        Past Surgical History:   Procedure Laterality Date    CARDIAC CATHETERIZATION N/A 10/23/2020    Procedure: Left Heart Cath and coronary angiogram;  Surgeon: Wang Plaza MD;  Location: [facility] CATH INVASIVE LOCATION;  Service: Cardiovascular;  Laterality: N/A;    CARDIAC ELECTROPHYSIOLOGY PROCEDURE Left 11/3/2021     Procedure: Pacemaker DC new;  Surgeon: Wang Plaza MD;  Location: [facility] CATH INVASIVE LOCATION;  Service: Cardiovascular;  Laterality: Left;    CARDIAC ELECTROPHYSIOLOGY PROCEDURE N/A 11/3/2021    Procedure: LOOP RECORDER REMOVAL;  Surgeon: Wang Plaza MD;  Location: [facility] CATH INVASIVE LOCATION;  Service: Cardiovascular;  Laterality: N/A;    HYSTERECTOMY      JOINT REPLACEMENT Right     Hip    JOINT REPLACEMENT Left     hip    JOINT REPLACEMENT Left     hip (for second time)    JOINT REPLACEMENT Right     knee    OOPHORECTOMY         Past Medical History:   Diagnosis Date    Atherosclerosis of native coronary artery of native heart without angina pectoris 10/22/2020    Atrioventricular block, Mobitz type 1, Wenckebach 10/21/2020    Added automatically from request for surgery [id number]    CHF (congestive heart failure)     COPD (chronic obstructive pulmonary disease)     Hypertension associated with stage 3 chronic kidney disease due to type 2 diabetes mellitus 10/22/2020    Morbid obesity 10/22/2020    Secondary hyperaldosteronism 10/22/2020    Stage 3b chronic kidney disease 10/22/2020    Type 2 diabetes mellitus with diabetic neuropathy, with long-term current use of insulin 10/22/2020    Type 2 diabetes mellitus with diabetic neuropathy, with long-term current use of insulin 10/22/2020       Family History   Problem Relation Age of Onset    Heart disease Mother     Heart disease Father        Social History     Socioeconomic History    Marital status:    Tobacco Use    Smoking status: Never     Passive exposure: Never    Smokeless tobacco: Never   Vaping Use    Vaping status: Never Used   Substance and Sexual Activity    Alcohol use: Yes     Alcohol/week: 7.0 standard drinks of alcohol     Types: 7 Drinks containing 0.5 oz of alcohol per week    Drug use: Never    Sexual activity: Defer         Procedures      Objective:       Physical Exam    /72 (BP Location: Right arm, Patient  "Position: Sitting, Cuff Size: Adult)   Pulse 81   Ht 160 cm (63\")   SpO2 95%   BMI 34.01 kg/m²   The patient is alert, oriented and in no distress.    Vital signs as noted above.    Head and neck revealed no carotid bruits or jugular venous distension.  No thyromegaly or lymphadenopathy is present.    Lungs clear.  No wheezing.  Breath sounds are normal bilaterally.    Heart normal first and second heart sounds.  No murmur..  No pericardial rub is present.  No gallop is present.    Abdomen soft and nontender.  No organomegaly is present.    Extremities revealed good peripheral pulses without any pedal edema.    Skin warm and dry.  Pacemaker site looks normal.    Musculoskeletal system is grossly normal.    CNS grossly normal.    Reviewed and updated        "

## (undated) DEVICE — VIOLET BRAIDED (POLYGLACTIN 910), SYNTHETIC ABSORBABLE SUTURE: Brand: COATED VICRYL

## (undated) DEVICE — PK TRY HEART CATH 50

## (undated) DEVICE — UNDYED BRAIDED (POLYGLACTIN 910), SYNTHETIC ABSORBABLE SUTURE: Brand: COATED VICRYL

## (undated) DEVICE — 3M™ STERI-STRIP™ REINFORCED ADHESIVE SKIN CLOSURES, R1547, 1/2 IN X 4 IN (12 MM X 100 MM), 6 STRIPS/ENVELOPE: Brand: 3M™ STERI-STRIP™

## (undated) DEVICE — SUT SILK 2/0 FS BLK 18IN 685G

## (undated) DEVICE — PACEMAKER CDS: Brand: MEDLINE INDUSTRIES, INC.

## (undated) DEVICE — USE OF THE SMARTNEEDLE DEVICE IS INDICATED WHEN BLOOD FLOW MUST BE DETECTED FOR PERCUTANEOUS VESSEL CANNULATION. THE VESSEL MUST BE OF A CALIBER WHICH WOULD NORMALLY BE PUNCTURED WITH A NEEDLE AND/OR CATHETER OF THIS SIZE OR LARGER.: Brand: SMARTNEEDLE® VASCULAR ACCESS SYSTEM

## (undated) DEVICE — ELECTRD DEFIB M/FUNC PROPADZ RADIOL 2PK

## (undated) DEVICE — CATH DIAG IMPULSE FL4 5F 100CM

## (undated) DEVICE — PINNACLE INTRODUCER SHEATH: Brand: PINNACLE

## (undated) DEVICE — CATH DIAG IMPULSE PIG 5F 100CM

## (undated) DEVICE — PENCL HND ROCKRSWTCH HOLSTR EZ CLEAN TP CRD 10FT

## (undated) DEVICE — INTRO SHEATH PRELUDE SNAP .038 9F 13CM W/SDPRT BLK

## (undated) DEVICE — GW DIAG EMERALD HEPCOAT MOVE JTIP STD .035 3MM 150CM

## (undated) DEVICE — CABL BIPOL W/ALLGTR CLIP/SM 12FT

## (undated) DEVICE — CATH DIAG IMPULSE FR4 5F 100CM

## (undated) DEVICE — INTRO SHEATH PRELUDE SNAP .038 6F 13CM W/SDPRT

## (undated) DEVICE — SKIN PREP TRAY W/CHG: Brand: MEDLINE INDUSTRIES, INC.